# Patient Record
Sex: FEMALE | URBAN - METROPOLITAN AREA
[De-identification: names, ages, dates, MRNs, and addresses within clinical notes are randomized per-mention and may not be internally consistent; named-entity substitution may affect disease eponyms.]

---

## 2017-03-12 ENCOUNTER — INPATIENT (INPATIENT)
Facility: HOSPITAL | Age: 82
LOS: 9 days | Discharge: NURSING, REHAB OR LONG-TERM CARE | End: 2017-03-22
Attending: INTERNAL MEDICINE | Admitting: INTERNAL MEDICINE
Payer: MEDICARE

## 2017-03-12 VITALS
HEART RATE: 103 BPM | DIASTOLIC BLOOD PRESSURE: 118 MMHG | HEIGHT: 62 IN | SYSTOLIC BLOOD PRESSURE: 212 MMHG | WEIGHT: 139.99 LBS | TEMPERATURE: 98 F | OXYGEN SATURATION: 96 % | RESPIRATION RATE: 20 BRPM

## 2017-03-12 LAB
ALBUMIN SERPL ELPH-MCNC: 3 G/DL — LOW (ref 3.3–5)
ALP SERPL-CCNC: 65 U/L — SIGNIFICANT CHANGE UP (ref 40–120)
ALT FLD-CCNC: 16 U/L — SIGNIFICANT CHANGE UP (ref 12–78)
ANION GAP SERPL CALC-SCNC: 11 MMOL/L — SIGNIFICANT CHANGE UP (ref 5–17)
APTT BLD: 35.8 SEC — SIGNIFICANT CHANGE UP (ref 27.5–37.4)
AST SERPL-CCNC: 16 U/L — SIGNIFICANT CHANGE UP (ref 15–37)
BASOPHILS # BLD AUTO: 0.1 K/UL — SIGNIFICANT CHANGE UP (ref 0–0.2)
BASOPHILS NFR BLD AUTO: 1.4 % — SIGNIFICANT CHANGE UP (ref 0–2)
BILIRUB SERPL-MCNC: 0.4 MG/DL — SIGNIFICANT CHANGE UP (ref 0.2–1.2)
BUN SERPL-MCNC: 29 MG/DL — HIGH (ref 7–23)
CALCIUM SERPL-MCNC: 8.6 MG/DL — SIGNIFICANT CHANGE UP (ref 8.5–10.1)
CHLORIDE SERPL-SCNC: 103 MMOL/L — SIGNIFICANT CHANGE UP (ref 96–108)
CK MB BLD-MCNC: <1.4 % — SIGNIFICANT CHANGE UP (ref 0–3.5)
CK MB CFR SERPL CALC: <0.5 NG/ML — SIGNIFICANT CHANGE UP (ref 0.5–3.6)
CK SERPL-CCNC: 35 U/L — SIGNIFICANT CHANGE UP (ref 26–192)
CO2 SERPL-SCNC: 25 MMOL/L — SIGNIFICANT CHANGE UP (ref 22–31)
CREAT SERPL-MCNC: 0.81 MG/DL — SIGNIFICANT CHANGE UP (ref 0.5–1.3)
EOSINOPHIL # BLD AUTO: 0.2 K/UL — SIGNIFICANT CHANGE UP (ref 0–0.5)
EOSINOPHIL NFR BLD AUTO: 2.3 % — SIGNIFICANT CHANGE UP (ref 0–6)
GLUCOSE SERPL-MCNC: 106 MG/DL — HIGH (ref 70–99)
HCT VFR BLD CALC: 35.3 % — SIGNIFICANT CHANGE UP (ref 34.5–45)
HGB BLD-MCNC: 11.9 G/DL — SIGNIFICANT CHANGE UP (ref 11.5–15.5)
INR BLD: 1.22 RATIO — HIGH (ref 0.88–1.16)
LYMPHOCYTES # BLD AUTO: 1.9 K/UL — SIGNIFICANT CHANGE UP (ref 1–3.3)
LYMPHOCYTES # BLD AUTO: 24.1 % — SIGNIFICANT CHANGE UP (ref 13–44)
MCHC RBC-ENTMCNC: 29.9 PG — SIGNIFICANT CHANGE UP (ref 27–34)
MCHC RBC-ENTMCNC: 33.8 GM/DL — SIGNIFICANT CHANGE UP (ref 32–36)
MCV RBC AUTO: 88.4 FL — SIGNIFICANT CHANGE UP (ref 80–100)
MONOCYTES # BLD AUTO: 0.5 K/UL — SIGNIFICANT CHANGE UP (ref 0–0.9)
MONOCYTES NFR BLD AUTO: 6.8 % — SIGNIFICANT CHANGE UP (ref 2–14)
NEUTROPHILS # BLD AUTO: 5.1 K/UL — SIGNIFICANT CHANGE UP (ref 1.8–7.4)
NEUTROPHILS NFR BLD AUTO: 65.5 % — SIGNIFICANT CHANGE UP (ref 43–77)
PLATELET # BLD AUTO: 230 K/UL — SIGNIFICANT CHANGE UP (ref 150–400)
POTASSIUM SERPL-MCNC: 3.7 MMOL/L — SIGNIFICANT CHANGE UP (ref 3.5–5.3)
POTASSIUM SERPL-SCNC: 3.7 MMOL/L — SIGNIFICANT CHANGE UP (ref 3.5–5.3)
PROT SERPL-MCNC: 10 GM/DL — HIGH (ref 6–8.3)
PROTHROM AB SERPL-ACNC: 13.7 SEC — HIGH (ref 10–13.1)
RBC # BLD: 3.99 M/UL — SIGNIFICANT CHANGE UP (ref 3.8–5.2)
RBC # FLD: 13 % — SIGNIFICANT CHANGE UP (ref 11–15)
SODIUM SERPL-SCNC: 139 MMOL/L — SIGNIFICANT CHANGE UP (ref 135–145)
TROPONIN I SERPL-MCNC: <.015 NG/ML — SIGNIFICANT CHANGE UP (ref 0.01–0.04)
WBC # BLD: 7.9 K/UL — SIGNIFICANT CHANGE UP (ref 3.8–10.5)
WBC # FLD AUTO: 7.9 K/UL — SIGNIFICANT CHANGE UP (ref 3.8–10.5)

## 2017-03-12 PROCEDURE — 99285 EMERGENCY DEPT VISIT HI MDM: CPT

## 2017-03-12 PROCEDURE — 73502 X-RAY EXAM HIP UNI 2-3 VIEWS: CPT | Mod: 26,RT

## 2017-03-12 PROCEDURE — 71010: CPT | Mod: 26

## 2017-03-12 PROCEDURE — 70450 CT HEAD/BRAIN W/O DYE: CPT | Mod: 26

## 2017-03-12 RX ORDER — MORPHINE SULFATE 50 MG/1
4 CAPSULE, EXTENDED RELEASE ORAL ONCE
Qty: 0 | Refills: 0 | Status: DISCONTINUED | OUTPATIENT
Start: 2017-03-12 | End: 2017-03-12

## 2017-03-12 RX ORDER — SODIUM CHLORIDE 9 MG/ML
3 INJECTION INTRAMUSCULAR; INTRAVENOUS; SUBCUTANEOUS ONCE
Qty: 0 | Refills: 0 | Status: COMPLETED | OUTPATIENT
Start: 2017-03-12 | End: 2017-03-12

## 2017-03-12 RX ORDER — ONDANSETRON 8 MG/1
4 TABLET, FILM COATED ORAL ONCE
Qty: 0 | Refills: 0 | Status: COMPLETED | OUTPATIENT
Start: 2017-03-12 | End: 2017-03-12

## 2017-03-12 RX ADMIN — MORPHINE SULFATE 4 MILLIGRAM(S): 50 CAPSULE, EXTENDED RELEASE ORAL at 22:00

## 2017-03-12 RX ADMIN — ONDANSETRON 4 MILLIGRAM(S): 8 TABLET, FILM COATED ORAL at 22:00

## 2017-03-12 RX ADMIN — SODIUM CHLORIDE 3 MILLILITER(S): 9 INJECTION INTRAMUSCULAR; INTRAVENOUS; SUBCUTANEOUS at 22:00

## 2017-03-12 NOTE — ED PROVIDER NOTE - OBJECTIVE STATEMENT
87yo female with pmh parkinsons presents with hip pain s/p trip and fall per son. Pt with externall rotated lengthened left leg. Per family, no h/o htn. Pt AOX3, denies head injury, per family (translators).    No fever/chills. No photophobia/eye pain/changes in vision, No ear pain/sore throat/dysphagia, No chest pain/palpitations. No SOB/cough/stridor. No abdominal pain, N/V/D, no black/bloody bm. No dysuria/frequency/discharge, No headache. No Dizziness.  No rash.  No numbness/tingling/weakness. 87yo female with pmh parkinsons presents with hip pain s/p trip and fall per son. Pt with externall rotated lengthened left leg. Per family, no h/o htn. Pt AOX3, denies head injury, per family (translators). family: [phone], [phone]    No fever/chills. No photophobia/eye pain/changes in vision, No ear pain/sore throat/dysphagia, No chest pain/palpitations. No SOB/cough/stridor. No abdominal pain, N/V/D, no black/bloody bm. No dysuria/frequency/discharge, No headache. No Dizziness.  No rash.  No numbness/tingling/weakness.

## 2017-03-12 NOTE — ED PROVIDER NOTE - PHYSICAL EXAMINATION
Gen: Alert, Well appearing. NAD, + b/l upper tremors  Head: NC, AT, PERRL, EOMI, normal lids/conjunctiva   ENT: Bilateral TM WNL, normal hearing, patent oropharynx without erythema/exudate, uvula midline  Neck: supple, no tenderness/meningismus/JVD   Pulm: Bilateral clear BS, normal resp effort, no wheeze/stridor/retractions  CV: RRR, no M/R/G, +dist pulses   Abd: soft, NT/ND, +BS, no guarding/rebound tenderness  Mskel: + rt leg externally rotated and lengthened. DP/PT pulses intact. able to move toes and ankle, but movement and hip/knee limited by pain.   Skin: no rash   Neuro: AAOx3, no sensory/motor deficits, CN 2-12 intact

## 2017-03-12 NOTE — ED ADULT NURSE NOTE - OBJECTIVE STATEMENT
Patient received lying on stretcher w/ family at bedside, p/w c/o to R hip s/p T/F, denies LOC, denies AC use, noted w/ tenderness to R hip, ROM limited, pedal pulse palpable, sensation intact and equal B/L, no shortening or external rotation noted, +tremors to B/L hands, patient appears uncomfortable.

## 2017-03-13 DIAGNOSIS — G20 PARKINSON'S DISEASE: ICD-10-CM

## 2017-03-13 DIAGNOSIS — S72.001A FRACTURE OF UNSPECIFIED PART OF NECK OF RIGHT FEMUR, INITIAL ENCOUNTER FOR CLOSED FRACTURE: ICD-10-CM

## 2017-03-13 DIAGNOSIS — R03.0 ELEVATED BLOOD-PRESSURE READING, WITHOUT DIAGNOSIS OF HYPERTENSION: ICD-10-CM

## 2017-03-13 LAB
ANION GAP SERPL CALC-SCNC: 10 MMOL/L — SIGNIFICANT CHANGE UP (ref 5–17)
ANION GAP SERPL CALC-SCNC: 12 MMOL/L — SIGNIFICANT CHANGE UP (ref 5–17)
APPEARANCE UR: CLEAR — SIGNIFICANT CHANGE UP
APTT BLD: 36.9 SEC — SIGNIFICANT CHANGE UP (ref 27.5–37.4)
BILIRUB UR-MCNC: NEGATIVE — SIGNIFICANT CHANGE UP
BUN SERPL-MCNC: 24 MG/DL — HIGH (ref 7–23)
BUN SERPL-MCNC: 28 MG/DL — HIGH (ref 7–23)
CALCIUM SERPL-MCNC: 8.7 MG/DL — SIGNIFICANT CHANGE UP (ref 8.5–10.1)
CALCIUM SERPL-MCNC: 8.7 MG/DL — SIGNIFICANT CHANGE UP (ref 8.5–10.1)
CHLORIDE SERPL-SCNC: 104 MMOL/L — SIGNIFICANT CHANGE UP (ref 96–108)
CHLORIDE SERPL-SCNC: 105 MMOL/L — SIGNIFICANT CHANGE UP (ref 96–108)
CO2 SERPL-SCNC: 25 MMOL/L — SIGNIFICANT CHANGE UP (ref 22–31)
CO2 SERPL-SCNC: 25 MMOL/L — SIGNIFICANT CHANGE UP (ref 22–31)
COLOR SPEC: YELLOW — SIGNIFICANT CHANGE UP
CREAT SERPL-MCNC: 0.8 MG/DL — SIGNIFICANT CHANGE UP (ref 0.5–1.3)
CREAT SERPL-MCNC: 0.91 MG/DL — SIGNIFICANT CHANGE UP (ref 0.5–1.3)
DIFF PNL FLD: ABNORMAL
GLUCOSE SERPL-MCNC: 110 MG/DL — HIGH (ref 70–99)
GLUCOSE SERPL-MCNC: 123 MG/DL — HIGH (ref 70–99)
GLUCOSE UR QL: NEGATIVE MG/DL — SIGNIFICANT CHANGE UP
HCT VFR BLD CALC: 32.2 % — LOW (ref 34.5–45)
HCT VFR BLD CALC: 32.8 % — LOW (ref 34.5–45)
HGB BLD-MCNC: 10.9 G/DL — LOW (ref 11.5–15.5)
HGB BLD-MCNC: 10.9 G/DL — LOW (ref 11.5–15.5)
INR BLD: 1.3 RATIO — HIGH (ref 0.88–1.16)
KETONES UR-MCNC: NEGATIVE — SIGNIFICANT CHANGE UP
LEUKOCYTE ESTERASE UR-ACNC: ABNORMAL
MCHC RBC-ENTMCNC: 29.6 PG — SIGNIFICANT CHANGE UP (ref 27–34)
MCHC RBC-ENTMCNC: 29.9 PG — SIGNIFICANT CHANGE UP (ref 27–34)
MCHC RBC-ENTMCNC: 33.3 GM/DL — SIGNIFICANT CHANGE UP (ref 32–36)
MCHC RBC-ENTMCNC: 33.7 GM/DL — SIGNIFICANT CHANGE UP (ref 32–36)
MCV RBC AUTO: 88.7 FL — SIGNIFICANT CHANGE UP (ref 80–100)
MCV RBC AUTO: 88.9 FL — SIGNIFICANT CHANGE UP (ref 80–100)
NITRITE UR-MCNC: POSITIVE
PH UR: 7 — SIGNIFICANT CHANGE UP (ref 4.8–8)
PLATELET # BLD AUTO: 220 K/UL — SIGNIFICANT CHANGE UP (ref 150–400)
PLATELET # BLD AUTO: 222 K/UL — SIGNIFICANT CHANGE UP (ref 150–400)
POTASSIUM SERPL-MCNC: 3.9 MMOL/L — SIGNIFICANT CHANGE UP (ref 3.5–5.3)
POTASSIUM SERPL-MCNC: 4.2 MMOL/L — SIGNIFICANT CHANGE UP (ref 3.5–5.3)
POTASSIUM SERPL-SCNC: 3.9 MMOL/L — SIGNIFICANT CHANGE UP (ref 3.5–5.3)
POTASSIUM SERPL-SCNC: 4.2 MMOL/L — SIGNIFICANT CHANGE UP (ref 3.5–5.3)
PROT UR-MCNC: 30 MG/DL
PROTHROM AB SERPL-ACNC: 14.6 SEC — HIGH (ref 10–13.1)
RBC # BLD: 3.63 M/UL — LOW (ref 3.8–5.2)
RBC # BLD: 3.69 M/UL — LOW (ref 3.8–5.2)
RBC # FLD: 12.7 % — SIGNIFICANT CHANGE UP (ref 11–15)
RBC # FLD: 13 % — SIGNIFICANT CHANGE UP (ref 11–15)
SODIUM SERPL-SCNC: 139 MMOL/L — SIGNIFICANT CHANGE UP (ref 135–145)
SODIUM SERPL-SCNC: 142 MMOL/L — SIGNIFICANT CHANGE UP (ref 135–145)
SP GR SPEC: 1 — LOW (ref 1.01–1.02)
TROPONIN I SERPL-MCNC: <.015 NG/ML — SIGNIFICANT CHANGE UP (ref 0.01–0.04)
UROBILINOGEN FLD QL: NEGATIVE MG/DL — SIGNIFICANT CHANGE UP
WBC # BLD: 11.5 K/UL — HIGH (ref 3.8–10.5)
WBC # BLD: 9.9 K/UL — SIGNIFICANT CHANGE UP (ref 3.8–10.5)
WBC # FLD AUTO: 11.5 K/UL — HIGH (ref 3.8–10.5)
WBC # FLD AUTO: 9.9 K/UL — SIGNIFICANT CHANGE UP (ref 3.8–10.5)

## 2017-03-13 PROCEDURE — 99223 1ST HOSP IP/OBS HIGH 75: CPT

## 2017-03-13 PROCEDURE — 73552 X-RAY EXAM OF FEMUR 2/>: CPT | Mod: 26,RT

## 2017-03-13 RX ORDER — BENZOCAINE AND MENTHOL 5; 1 G/100ML; G/100ML
1 LIQUID ORAL
Qty: 0 | Refills: 0 | Status: DISCONTINUED | OUTPATIENT
Start: 2017-03-13 | End: 2017-03-22

## 2017-03-13 RX ORDER — FOLIC ACID 0.8 MG
1 TABLET ORAL DAILY
Qty: 0 | Refills: 0 | Status: DISCONTINUED | OUTPATIENT
Start: 2017-03-13 | End: 2017-03-22

## 2017-03-13 RX ORDER — OXYCODONE HYDROCHLORIDE 5 MG/1
10 TABLET ORAL EVERY 4 HOURS
Qty: 0 | Refills: 0 | Status: DISCONTINUED | OUTPATIENT
Start: 2017-03-13 | End: 2017-03-15

## 2017-03-13 RX ORDER — MORPHINE SULFATE 50 MG/1
4 CAPSULE, EXTENDED RELEASE ORAL ONCE
Qty: 0 | Refills: 0 | Status: DISCONTINUED | OUTPATIENT
Start: 2017-03-13 | End: 2017-03-13

## 2017-03-13 RX ORDER — ENOXAPARIN SODIUM 100 MG/ML
40 INJECTION SUBCUTANEOUS EVERY 24 HOURS
Qty: 0 | Refills: 0 | Status: DISCONTINUED | OUTPATIENT
Start: 2017-03-14 | End: 2017-03-22

## 2017-03-13 RX ORDER — OXYCODONE HYDROCHLORIDE 5 MG/1
5 TABLET ORAL EVERY 4 HOURS
Qty: 0 | Refills: 0 | Status: DISCONTINUED | OUTPATIENT
Start: 2017-03-13 | End: 2017-03-13

## 2017-03-13 RX ORDER — FENTANYL CITRATE 50 UG/ML
25 INJECTION INTRAVENOUS
Qty: 0 | Refills: 0 | Status: DISCONTINUED | OUTPATIENT
Start: 2017-03-13 | End: 2017-03-13

## 2017-03-13 RX ORDER — HYDROMORPHONE HYDROCHLORIDE 2 MG/ML
0.5 INJECTION INTRAMUSCULAR; INTRAVENOUS; SUBCUTANEOUS
Qty: 0 | Refills: 0 | Status: DISCONTINUED | OUTPATIENT
Start: 2017-03-13 | End: 2017-03-13

## 2017-03-13 RX ORDER — DIPHENHYDRAMINE HCL 50 MG
25 CAPSULE ORAL EVERY 4 HOURS
Qty: 0 | Refills: 0 | Status: DISCONTINUED | OUTPATIENT
Start: 2017-03-13 | End: 2017-03-13

## 2017-03-13 RX ORDER — DOCUSATE SODIUM 100 MG
100 CAPSULE ORAL THREE TIMES A DAY
Qty: 0 | Refills: 0 | Status: DISCONTINUED | OUTPATIENT
Start: 2017-03-13 | End: 2017-03-20

## 2017-03-13 RX ORDER — CARBIDOPA AND LEVODOPA 25; 100 MG/1; MG/1
1 TABLET ORAL DAILY
Qty: 0 | Refills: 0 | Status: DISCONTINUED | OUTPATIENT
Start: 2017-03-13 | End: 2017-03-13

## 2017-03-13 RX ORDER — CARBIDOPA AND LEVODOPA 25; 100 MG/1; MG/1
1 TABLET ORAL
Qty: 0 | Refills: 0 | Status: DISCONTINUED | OUTPATIENT
Start: 2017-03-13 | End: 2017-03-22

## 2017-03-13 RX ORDER — ASCORBIC ACID 60 MG
500 TABLET,CHEWABLE ORAL
Qty: 0 | Refills: 0 | Status: DISCONTINUED | OUTPATIENT
Start: 2017-03-13 | End: 2017-03-22

## 2017-03-13 RX ORDER — OXYCODONE HYDROCHLORIDE 5 MG/1
10 TABLET ORAL EVERY 4 HOURS
Qty: 0 | Refills: 0 | Status: DISCONTINUED | OUTPATIENT
Start: 2017-03-13 | End: 2017-03-13

## 2017-03-13 RX ORDER — FENTANYL CITRATE 50 UG/ML
50 INJECTION INTRAVENOUS
Qty: 0 | Refills: 0 | Status: DISCONTINUED | OUTPATIENT
Start: 2017-03-13 | End: 2017-03-13

## 2017-03-13 RX ORDER — FERROUS SULFATE 325(65) MG
325 TABLET ORAL
Qty: 0 | Refills: 0 | Status: DISCONTINUED | OUTPATIENT
Start: 2017-03-13 | End: 2017-03-22

## 2017-03-13 RX ORDER — METOPROLOL TARTRATE 50 MG
5 TABLET ORAL ONCE
Qty: 0 | Refills: 0 | Status: COMPLETED | OUTPATIENT
Start: 2017-03-13 | End: 2017-03-13

## 2017-03-13 RX ORDER — CARBIDOPA AND LEVODOPA 25; 100 MG/1; MG/1
1 TABLET ORAL
Qty: 0 | Refills: 0 | COMMUNITY

## 2017-03-13 RX ORDER — HYDROMORPHONE HYDROCHLORIDE 2 MG/ML
0.5 INJECTION INTRAMUSCULAR; INTRAVENOUS; SUBCUTANEOUS EVERY 4 HOURS
Qty: 0 | Refills: 0 | Status: DISCONTINUED | OUTPATIENT
Start: 2017-03-13 | End: 2017-03-13

## 2017-03-13 RX ORDER — OXYCODONE HYDROCHLORIDE 5 MG/1
5 TABLET ORAL EVERY 4 HOURS
Qty: 0 | Refills: 0 | Status: DISCONTINUED | OUTPATIENT
Start: 2017-03-13 | End: 2017-03-20

## 2017-03-13 RX ORDER — SODIUM CHLORIDE 9 MG/ML
1000 INJECTION, SOLUTION INTRAVENOUS
Qty: 0 | Refills: 0 | Status: DISCONTINUED | OUTPATIENT
Start: 2017-03-13 | End: 2017-03-17

## 2017-03-13 RX ORDER — SODIUM CHLORIDE 9 MG/ML
1000 INJECTION, SOLUTION INTRAVENOUS
Qty: 0 | Refills: 0 | Status: DISCONTINUED | OUTPATIENT
Start: 2017-03-13 | End: 2017-03-13

## 2017-03-13 RX ORDER — CEFAZOLIN SODIUM 1 G
2000 VIAL (EA) INJECTION EVERY 8 HOURS
Qty: 0 | Refills: 0 | Status: COMPLETED | OUTPATIENT
Start: 2017-03-13 | End: 2017-03-14

## 2017-03-13 RX ORDER — ONDANSETRON 8 MG/1
4 TABLET, FILM COATED ORAL EVERY 4 HOURS
Qty: 0 | Refills: 0 | Status: DISCONTINUED | OUTPATIENT
Start: 2017-03-13 | End: 2017-03-22

## 2017-03-13 RX ORDER — HYDROMORPHONE HYDROCHLORIDE 2 MG/ML
0.5 INJECTION INTRAMUSCULAR; INTRAVENOUS; SUBCUTANEOUS EVERY 4 HOURS
Qty: 0 | Refills: 0 | Status: DISCONTINUED | OUTPATIENT
Start: 2017-03-13 | End: 2017-03-15

## 2017-03-13 RX ORDER — ACETAMINOPHEN 500 MG
650 TABLET ORAL EVERY 6 HOURS
Qty: 0 | Refills: 0 | Status: DISCONTINUED | OUTPATIENT
Start: 2017-03-13 | End: 2017-03-22

## 2017-03-13 RX ORDER — DIPHENHYDRAMINE HCL 50 MG
25 CAPSULE ORAL AT BEDTIME
Qty: 0 | Refills: 0 | Status: DISCONTINUED | OUTPATIENT
Start: 2017-03-13 | End: 2017-03-15

## 2017-03-13 RX ORDER — CYCLOBENZAPRINE HYDROCHLORIDE 10 MG/1
5 TABLET, FILM COATED ORAL THREE TIMES A DAY
Qty: 0 | Refills: 0 | Status: DISCONTINUED | OUTPATIENT
Start: 2017-03-13 | End: 2017-03-13

## 2017-03-13 RX ORDER — DIPHENHYDRAMINE HCL 50 MG
25 CAPSULE ORAL EVERY 4 HOURS
Qty: 0 | Refills: 0 | Status: DISCONTINUED | OUTPATIENT
Start: 2017-03-13 | End: 2017-03-15

## 2017-03-13 RX ORDER — BENZOCAINE AND MENTHOL 5; 1 G/100ML; G/100ML
1 LIQUID ORAL
Qty: 0 | Refills: 0 | Status: DISCONTINUED | OUTPATIENT
Start: 2017-03-13 | End: 2017-03-13

## 2017-03-13 RX ORDER — MAGNESIUM HYDROXIDE 400 MG/1
30 TABLET, CHEWABLE ORAL DAILY
Qty: 0 | Refills: 0 | Status: DISCONTINUED | OUTPATIENT
Start: 2017-03-13 | End: 2017-03-22

## 2017-03-13 RX ORDER — METOPROLOL TARTRATE 50 MG
5 TABLET ORAL EVERY 6 HOURS
Qty: 0 | Refills: 0 | Status: DISCONTINUED | OUTPATIENT
Start: 2017-03-13 | End: 2017-03-13

## 2017-03-13 RX ORDER — SODIUM CHLORIDE 9 MG/ML
1000 INJECTION INTRAMUSCULAR; INTRAVENOUS; SUBCUTANEOUS
Qty: 0 | Refills: 0 | Status: DISCONTINUED | OUTPATIENT
Start: 2017-03-13 | End: 2017-03-13

## 2017-03-13 RX ORDER — CARBIDOPA AND LEVODOPA 25; 100 MG/1; MG/1
1 TABLET ORAL
Qty: 0 | Refills: 0 | Status: DISCONTINUED | OUTPATIENT
Start: 2017-03-13 | End: 2017-03-13

## 2017-03-13 RX ADMIN — Medication 100 MILLIGRAM(S): at 23:45

## 2017-03-13 RX ADMIN — Medication 500 MILLIGRAM(S): at 23:45

## 2017-03-13 RX ADMIN — Medication 5 MILLIGRAM(S): at 13:57

## 2017-03-13 RX ADMIN — CARBIDOPA AND LEVODOPA 1 TABLET(S): 25; 100 TABLET ORAL at 23:45

## 2017-03-13 RX ADMIN — SODIUM CHLORIDE 75 MILLILITER(S): 9 INJECTION INTRAMUSCULAR; INTRAVENOUS; SUBCUTANEOUS at 10:36

## 2017-03-13 RX ADMIN — SODIUM CHLORIDE 40 MILLILITER(S): 9 INJECTION, SOLUTION INTRAVENOUS at 18:02

## 2017-03-13 RX ADMIN — MORPHINE SULFATE 4 MILLIGRAM(S): 50 CAPSULE, EXTENDED RELEASE ORAL at 02:50

## 2017-03-13 RX ADMIN — MORPHINE SULFATE 4 MILLIGRAM(S): 50 CAPSULE, EXTENDED RELEASE ORAL at 02:07

## 2017-03-13 NOTE — H&P ADULT. - PROBLEM SELECTOR PLAN 1
Will need clearance  NPO for now, pain control, IVF  Consent obtained in ED by orthopedic surgery resident from [facility] and karolina Flood.

## 2017-03-13 NOTE — BRIEF OPERATIVE NOTE - PRE-OP DX
Closed intertrochanteric fracture of hip, right, initial encounter  03/13/2017    Active  Zana Armstrong

## 2017-03-13 NOTE — H&P ADULT. - HISTORY OF PRESENT ILLNESS
86y Female community ambulatory presents c/o R hip pain and inability to ambulate sp mechanical fall. Denies HS/LOC. Denies numbness/tingling. Denies fever/chills. Denies pain/injury elsewhere. Patient is Uzbek speakin, history obtained via  phone and from son Kyle over the telephone. Patient lives as home with son. He states she is very independent and only takes carbidopa/levodopa for parkinsons disease, denies use of any blood thinners.

## 2017-03-13 NOTE — BRIEF OPERATIVE NOTE - POST-OP DX
Closed intertrochanteric fracture of right hip, initial encounter  03/13/2017    Active  Zana Armstrong

## 2017-03-13 NOTE — CONSULT NOTE ADULT - ASSESSMENT
Elderly Bulgarian female with h/o:  Parkinson disease (G20)  Closed fracture of right hip, initial encounter (S72.001A)  s/p Mechanical fall    No cardiac contrindication to surgery.  BP management as per medicine.  Will follow as needed.

## 2017-03-13 NOTE — H&P ADULT. - NEGATIVE CARDIOVASCULAR SYMPTOMS
no palpitations/no peripheral edema/no dyspnea on exertion/no orthopnea/no claudication/no paroxysmal nocturnal dyspnea/no chest pain

## 2017-03-13 NOTE — PROGRESS NOTE ADULT - SUBJECTIVE AND OBJECTIVE BOX
ortho POC  Patient seen and examined. Pain controlled. difficult exam due to mentation.    HEALTH ISSUES - PROBLEM Dx:  Elevated blood-pressure reading without diagnosis of hypertension: Elevated blood-pressure reading without diagnosis of hypertension  Parkinson disease: Parkinson disease  Closed fracture of right hip, initial encounter: Closed fracture of right hip, initial encounter        MEDICATIONS  (STANDING):  ceFAZolin   IVPB 2000milliGRAM(s) IV Intermittent every 8 hours  lactated ringers. 1000milliLiter(s) IV Continuous <Continuous>    Allergies    No Known Allergies    Intolerances                            10.9   11.5  )-----------( 220      ( 13 Mar 2017 19:02 )             32.2     13 Mar 2017 19:02    139    |  104    |  24     ----------------------------<  123    3.9     |  25     |  0.91     Ca    8.7        13 Mar 2017 19:02    TPro  10.0   /  Alb  3.0    /  TBili  0.4    /  DBili  x      /  AST  16     /  ALT  16     /  AlkPhos  65     12 Mar 2017 22:02    PT/INR - ( 13 Mar 2017 05:58 )   PT: 14.6 sec;   INR: 1.30 ratio         PTT - ( 13 Mar 2017 05:58 )  PTT:36.9 sec  Vital Signs Last 24 Hrs  T(C): 36.3, Max: 36.7 (03-13 @ 08:01)  T(F): 97.4, Max: 98.1 (03-13 @ 08:01)  HR: 87 (71 - 103)  BP: 164/80 (149/73 - 212/118)  BP(mean): --  RR: 16 (12 - 20)  SpO2: 94% (94% - 99%)    Physical Exam  Gen: NAD  RLE:   Dressing c/d/i  motor grossly intact  sensation grossly intact  Dp pulse intact  No calf ttp  Compartments soft

## 2017-03-13 NOTE — ED ADULT NURSE REASSESSMENT NOTE - NS ED NURSE REASSESS COMMENT FT1
Ortho at bedside, confirmed fracture, NPO status maintained, pain managed, pending admission at this time

## 2017-03-13 NOTE — CONSULT NOTE ADULT - ASSESSMENT
A/P: 86y Female with R hip fracture  Pain control  NWB R LE, bedrest  FU labs/imaging  Ca/Vit D  Outpt osteoporosis workup  Admit to medical team  Medical clearance/optimization for OR  Will discuss with attending  Hold all anticoagulation, SCDs only  IVF  NPO except meds  plan for OR today 3/13 for IMN pending medical clearance

## 2017-03-13 NOTE — CHART NOTE - NSCHARTNOTEFT_GEN_A_CORE
hospitalist Follow up note    patient with history of parkinsons admitted with hip fracture   asked by ortho to clear the patient for orif of the hip .  patient with history of parkinsons with no history of coronary artery disease short of breath , congestive heart failure or complain of chest pain or short of breath .   pe   ICU Vital Signs Last 24 Hrs  T(C): 36.2, Max: 36.7 (03-13 @ 08:01)  T(F): 97.2, Max: 98.1 (03-13 @ 08:01)  HR: 82 (82 - 103)  BP: 169/88 (169/88 - 212/118)  BP(mean): --  ABP: --  ABP(mean): --  RR: 16 (16 - 20)  SpO2: 95% (95% - 97%)  patient with obvious upper extremity tremors   lungs- clear  heart s1 s2 with no mrg  abd- soft nontender no organomegaly   ext - no edema    ekg- nsr with no sttw changes  ekg - cardiac sillouette enlarged by ap technique   and only central congestion    assessment      this is a 86 years old female with parkinsons in optimal medical condition  except for her elevated blood pressure, will give lopressor 5 mg ivp and observe. the patient is in optimal medical condition for planned surgery and has  mild- moderate rsik of morbidity or mortality

## 2017-03-14 ENCOUNTER — TRANSCRIPTION ENCOUNTER (OUTPATIENT)
Age: 82
End: 2017-03-14

## 2017-03-14 DIAGNOSIS — I10 ESSENTIAL (PRIMARY) HYPERTENSION: ICD-10-CM

## 2017-03-14 LAB
ANION GAP SERPL CALC-SCNC: 11 MMOL/L — SIGNIFICANT CHANGE UP (ref 5–17)
BUN SERPL-MCNC: 25 MG/DL — HIGH (ref 7–23)
CALCIUM SERPL-MCNC: 8.1 MG/DL — LOW (ref 8.5–10.1)
CHLORIDE SERPL-SCNC: 104 MMOL/L — SIGNIFICANT CHANGE UP (ref 96–108)
CO2 SERPL-SCNC: 24 MMOL/L — SIGNIFICANT CHANGE UP (ref 22–31)
CREAT SERPL-MCNC: 0.88 MG/DL — SIGNIFICANT CHANGE UP (ref 0.5–1.3)
GLUCOSE SERPL-MCNC: 141 MG/DL — HIGH (ref 70–99)
HCT VFR BLD CALC: 27.6 % — LOW (ref 34.5–45)
HGB BLD-MCNC: 9.7 G/DL — LOW (ref 11.5–15.5)
MCHC RBC-ENTMCNC: 30.7 PG — SIGNIFICANT CHANGE UP (ref 27–34)
MCHC RBC-ENTMCNC: 35.1 GM/DL — SIGNIFICANT CHANGE UP (ref 32–36)
MCV RBC AUTO: 87.5 FL — SIGNIFICANT CHANGE UP (ref 80–100)
PLATELET # BLD AUTO: 175 K/UL — SIGNIFICANT CHANGE UP (ref 150–400)
POTASSIUM SERPL-MCNC: 3.7 MMOL/L — SIGNIFICANT CHANGE UP (ref 3.5–5.3)
POTASSIUM SERPL-SCNC: 3.7 MMOL/L — SIGNIFICANT CHANGE UP (ref 3.5–5.3)
RBC # BLD: 3.15 M/UL — LOW (ref 3.8–5.2)
RBC # FLD: 12.9 % — SIGNIFICANT CHANGE UP (ref 11–15)
SODIUM SERPL-SCNC: 139 MMOL/L — SIGNIFICANT CHANGE UP (ref 135–145)
WBC # BLD: 12.2 K/UL — HIGH (ref 3.8–10.5)
WBC # FLD AUTO: 12.2 K/UL — HIGH (ref 3.8–10.5)

## 2017-03-14 RX ORDER — ENOXAPARIN SODIUM 100 MG/ML
40 INJECTION SUBCUTANEOUS
Qty: 0 | Refills: 0 | COMMUNITY
Start: 2017-03-14

## 2017-03-14 RX ADMIN — Medication 1 MILLIGRAM(S): at 12:02

## 2017-03-14 RX ADMIN — Medication 100 MILLIGRAM(S): at 08:58

## 2017-03-14 RX ADMIN — CARBIDOPA AND LEVODOPA 1 TABLET(S): 25; 100 TABLET ORAL at 17:31

## 2017-03-14 RX ADMIN — ENOXAPARIN SODIUM 40 MILLIGRAM(S): 100 INJECTION SUBCUTANEOUS at 06:06

## 2017-03-14 RX ADMIN — Medication 500 MILLIGRAM(S): at 05:56

## 2017-03-14 RX ADMIN — Medication 325 MILLIGRAM(S): at 17:33

## 2017-03-14 RX ADMIN — Medication 100 MILLIGRAM(S): at 00:10

## 2017-03-14 RX ADMIN — OXYCODONE HYDROCHLORIDE 5 MILLIGRAM(S): 5 TABLET ORAL at 15:40

## 2017-03-14 RX ADMIN — CARBIDOPA AND LEVODOPA 1 TABLET(S): 25; 100 TABLET ORAL at 05:56

## 2017-03-14 RX ADMIN — SODIUM CHLORIDE 75 MILLILITER(S): 9 INJECTION, SOLUTION INTRAVENOUS at 05:49

## 2017-03-14 RX ADMIN — Medication 500 MILLIGRAM(S): at 17:31

## 2017-03-14 RX ADMIN — Medication 1 TABLET(S): at 12:02

## 2017-03-14 RX ADMIN — Medication 100 MILLIGRAM(S): at 05:55

## 2017-03-14 RX ADMIN — Medication 100 MILLIGRAM(S): at 22:31

## 2017-03-14 RX ADMIN — Medication 325 MILLIGRAM(S): at 12:03

## 2017-03-14 RX ADMIN — Medication 100 MILLIGRAM(S): at 14:46

## 2017-03-14 RX ADMIN — OXYCODONE HYDROCHLORIDE 5 MILLIGRAM(S): 5 TABLET ORAL at 14:46

## 2017-03-14 NOTE — PROGRESS NOTE ADULT - SUBJECTIVE AND OBJECTIVE BOX
Pt S/E at bedside, no acute events overnight, pain controlled    Vital Signs Last 24 Hrs  T(C): 37.3, Max: 37.3 (03-14 @ 06:16)  T(F): 99.2, Max: 99.2 (03-14 @ 06:16)  HR: 110 (71 - 110)  BP: 146/71 (143/68 - 198/87)  BP(mean): --  RR: 18 (12 - 18)  SpO2: 96% (91% - 99%)    Gen: NAD, Resting comfortably    RLE:  Dressing clean dry intact  +EHL/FHL/TA/GS  SILT L3-S1  +DP/PT Pulses  Compartments soft  No calf TTP B/L

## 2017-03-14 NOTE — PHYSICAL THERAPY INITIAL EVALUATION ADULT - ADDITIONAL COMMENTS
Unable to obtain from pt due to confusion.  SonIglesia reported that pt was I in ambulation w/o assist device and pt was able to negotiate 4-5 steps c one rail on the stairs under supervision. Pt has an aide 8 to 6, 7 days a week to assist in ADL.

## 2017-03-14 NOTE — DISCHARGE NOTE ADULT - PATIENT PORTAL LINK FT
“You can access the Follow[employer] Patient Portal, offered by [facility], by registering with the following website: [email or web address]”

## 2017-03-14 NOTE — DISCHARGE NOTE ADULT - CARE PROVIDER_API CALL
Marcel Xiong (DO), Orthopaedic Surgery  [address] [place] [address]  Phone: [phone]  Fax: [phone] Marcel Xiong (DO), Orthopaedic Surgery  [address] [place] [address]  Phone: [phone]  Fax: [phone]    Elgin White), [facility]  [address] [place] [address]  Phone: [phone]  Fax: [phone]

## 2017-03-14 NOTE — PHYSICAL THERAPY INITIAL EVALUATION ADULT - GENERAL OBSERVATIONS, REHAB EVAL
Pt was seen in supine c IV and  Venodyne pumps donned, sleepy but was able to be aroused. Flashpoint phone was used for translation. Pt was oriented to name only. Pt needed encouragement to participate in therapy. Pt was able to follow 50% of simple command. Resting tremor was noted in LUE.

## 2017-03-14 NOTE — DISCHARGE NOTE ADULT - MEDICATION SUMMARY - MEDICATIONS TO TAKE
I will START or STAY ON the medications listed below when I get home from the hospital:    acetaminophen 325 mg oral tablet  -- 2 tab(s) by mouth every 6 hours, As needed, for  pain  -- Indication: For HIP FRACTURE    oxyCODONE 5 mg oral capsule  -- 1 cap(s) by mouth every 6 hours, As Needed  for  severe m pain  -- Indication: For HIP FRACTURE    aluminum hydroxide-magnesium hydroxide 200 mg-200 mg/5 mL oral suspension  -- 30 milliliter(s) by mouth 4 times a day, As needed, Indigestion  -- Indication: For dyspepsia    enoxaparin  -- 40 milligram(s) subcutaneous every 24 hours for 30 days total for dvt ppx. do not skip doses.  -- Indication: For dvt prophylaxis     carbidopa-levodopa 25 mg-100 mg oral tablet  -- 1 cap(s) by mouth 2 times a day  -- Indication: For Parkinson disease    albuterol-ipratropium 2.5 mg-0.5 mg/3 mL inhalation solution  -- 3 milliliter(s) inhaled every 6 hours  -- Indication: For Pneumonia    betamethasone-clotrimazole 0.05%-1% topical cream  -- 1 application on skin 2 times a day  -- Indication: For dermatitis    petrolatum topical ointment  -- 1 application on skin every 6 hours  -- Indication: For Lip  sore    nystatin 100,000 units/g topical powder  -- 1 application on skin 2 times a day  -- Indication: For Stomatits    bisacodyl 10 mg rectal suppository  -- 1 suppository(ies) rectally once a day, As needed, If no bowel movement  -- Indication: For Constipation    aluminum hydroxide/diphenhydrAMINE/lidocaine/MgOH/simethicone mucous membrane suspension  -- 1 applicatorful mucous membrane 3 times a day  -- Indication: For dyspepsia    Cepacol Sore Throat Cherry 15 mg-3.6 mg mucous membrane lozenge  -- 1 lozenge mucous membrane every 6 hours, As Needed  -- Indication: For Lip sore    Levaquin 500 mg oral tablet  -- 1 tab(s) by mouth every 24 hours for 7 days  -- Indication: For Pneumonia    Multiple Vitamins oral tablet  -- 1 tab(s) by mouth once a day  -- Indication: For Supplemet I will START or STAY ON the medications listed below when I get home from the hospital:    acetaminophen 325 mg oral tablet  -- 2 tab(s) by mouth every 6 hours, As needed, for  pain  -- Indication: For Pain    oxyCODONE 5 mg oral capsule  -- 1 cap(s) by mouth every 6 hours, As Needed  for  severe m pain  -- Indication: For Pain    aluminum hydroxide-magnesium hydroxide 200 mg-200 mg/5 mL oral suspension  -- 30 milliliter(s) by mouth 4 times a day, As needed, Indigestion  -- Indication: For Indigestion    enoxaparin  -- 40 milligram(s) subcutaneous every 24 hours for 30 days total for dvt ppx. do not skip doses.  -- Indication: For DVT prophylaxis    cholestyramine 4 g/5 g oral powder for reconstitution  -- 1 scoop(s) by mouth once a day for  one week  -- Indication: For HLD    carbidopa-levodopa 25 mg-100 mg oral tablet  -- 1 cap(s) by mouth 2 times a day  -- Indication: For Parkinson disease    albuterol-ipratropium 2.5 mg-0.5 mg/3 mL inhalation solution  -- 3 milliliter(s) inhaled every 6 hours  -- Indication: For Aspiration pneumonia of right lower lobe, unspecified aspiration pneumonia type    Merrem 1000 mg intravenous injection  -- 1000 milligram(s) intravenous every 8 hoursm for  2 more  days  -- Indication: For Aspiration pneumonia of right lower lobe, unspecified aspiration pneumonia type    petrolatum topical ointment  -- 1 application on skin every 6 hours  -- Indication: For Lip abrasion    nystatin 100,000 units/g topical powder  -- 1 application on skin 2 times a day  -- Indication: For Dermatitis    betamethasone-clotrimazole 0.05%-1% topical cream  -- 1 application on skin 2 times a day  -- Indication: For Dematitis    Cepacol Sore Throat Cherry 15 mg-3.6 mg mucous membrane lozenge  -- 1 lozenge mucous membrane every 6 hours, As Needed  -- Indication: For Cough    aluminum hydroxide/diphenhydrAMINE/lidocaine/MgOH/simethicone mucous membrane suspension  -- 1 applicatorful mucous membrane 3 times a day  -- Indication: For Indigestion    Levaquin 500 mg oral tablet  -- 1 tab(s) by mouth every 24 hours for 7 days  -- Indication: For Aspiration pneumonia of right lower lobe, unspecified aspiration pneumonia type    Multiple Vitamins oral tablet  -- 1 tab(s) by mouth once a day  -- Indication: For Supplement    folic acid 1 mg oral tablet  -- 1 tab(s) by mouth once a day  -- Indication: For Supplement

## 2017-03-14 NOTE — DISCHARGE NOTE ADULT - PLAN OF CARE
return to baseline ADLs 1.	Pain Control  2.           WBAT Right lower extremity. Rolling walker/Assistive devices for ambulation as needed.  3.           Ice and elevation to extremity  4.           Continue DVT ppx Lovenox 40  mg subq every 24hrs for a total  of 30 days. do not skip doses.  5.           Keep Aquacel bandage on hip. Only change if saturated or leaking/soiled to dry sterile gauze and paper tape PRN. Remove Sutures/Staples Post Op Day #14. OK to Shower with Aquacel bandage.  Avoid direct water beating on bandage. Continue ICE packs to hip.  6.           Call office if there is any Fevers over 101 Deg F, discharge from wound, increased numbness/tingling.  7.           Follow up with Dr. Xiong in 10-14 days. Call office for appointment.

## 2017-03-14 NOTE — DISCHARGE NOTE ADULT - HOSPITAL COURSE
patient  had IMN placement for  R hip  fracture subsequent  hospital  course  +  for  uti  pneumonia  treated  with  meropenem vanco.  also had  c/o  of  rt  lower  lip pain  with  excoriation  of  mucosal  surface  treated  with  oral hygene local  antiseptics removal  of  dentures  and  diet  consistency  modifications. AB  changed  to  oral  patient  discharged  to  rehab patient  had IMN placement for  R hip  fracture subsequent  hospital  course  +  for  uti  pneumonia  treated  with  meropenem vanco.  also had  c/o  of  rt  lower  lip pain  with  excoriation  of  mucosal  surface  treated  with  oral hygene local  antiseptics removal  of  dentures  and  diet  consistency  modifications. AB  changed  to  oral  patient  discharged  to  rehab patient  had  episode  of  fever  tachycardia  now  with  diarrhea  but  feeling  much  better  discussed  with patient  pt's  son discharge  to  [facility]  to  complete AB  and  PT

## 2017-03-14 NOTE — DISCHARGE NOTE ADULT - CARE PROVIDERS DIRECT ADDRESSES
,DirectAddress_Unknown,DirectAddress_Unknown ,DirectAddress_Unknown,[email or web address]

## 2017-03-14 NOTE — PROGRESS NOTE ADULT - SUBJECTIVE AND OBJECTIVE BOX
CHIEF COMPLAINT/INTERVAL HISTORY:    Patient is a 86y old  Female who presents with a chief complaint of Right IT fracture (14 Mar 2017 07:00)      HPI:  86y Female community ambulatory presents c/o R hip pain and inability to ambulate sp mechanical fall. Denies HS/LOC. Denies numbness/tingling. Denies fever/chills. Denies pain/injury elsewhere. Patient is Greek speakin, history obtained via  phone and from son Kyle over the telephone. Patient lives as home with son. He states she is very independent and only takes carbidopa/levodopa for parkinsons disease, denies use of any blood thinners. (13 Mar 2017 02:25)    Overnight issues pod #1- status post orif oflef thip fracture  complain of local pain   SUBJECTIVE & OBJECTIVE: Pt seen and examined at bedside.   ROS:  CONSTITUTIONAL: No fever, weight loss, or fatigue  NECK: No pain or stiffness  RESPIRATORY: No cough, wheezing, chills or hemoptysis; No shortness of breath  CARDIOVASCULAR: No chest pain, palpitations, dizziness, or leg swelling  GASTROINTESTINAL: No abdominal or epigastric pain. No nausea, vomiting, or hematemesis; No diarrhea or constipation. No melena or hematochezia.  GENITOURINARY: No dysuria, frequency, hematuria, or incontinence  NEUROLOGICAL: No headaches, memory loss, loss of strength, numbness, or tremors  SKIN: No itching, burning, rashes, or lesions   ICU Vital Signs Last 24 Hrs  T(C): 37, Max: 37.3 (03-14 @ 06:16)  T(F): 98.6, Max: 99.2 (03-14 @ 06:16)  HR: 96 (71 - 110)  BP: 143/72 (143/68 - 198/87)  BP(mean): --  ABP: --  ABP(mean): --  RR: 17 (12 - 18)  SpO2: 94% (91% - 99%)        MEDICATIONS  (STANDING):  enoxaparin Injectable 40milliGRAM(s) SubCutaneous every 24 hours  lactated ringers. 1000milliLiter(s) IV Continuous <Continuous>  docusate sodium 100milliGRAM(s) Oral three times a day  ferrous    sulfate 325milliGRAM(s) Oral three times a day with meals  folic acid 1milliGRAM(s) Oral daily  multivitamin 1Tablet(s) Oral daily  ascorbic acid 500milliGRAM(s) Oral two times a day  carbidopa/levodopa  25/100 1Tablet(s) Oral two times a day    MEDICATIONS  (PRN):  acetaminophen   Tablet 650milliGRAM(s) Oral every 6 hours PRN For Temp greater than 38 C (100.4 F)  acetaminophen   Tablet. 650milliGRAM(s) Oral every 6 hours PRN headache  oxyCODONE IR 10milliGRAM(s) Oral every 4 hours PRN Moderate Pain  oxyCODONE IR 5milliGRAM(s) Oral every 4 hours PRN Mild Pain  aluminum hydroxide/magnesium hydroxide/simethicone Suspension 30milliLiter(s) Oral four times a day PRN Indigestion  ondansetron Injectable 4milliGRAM(s) IV Push every 4 hours PRN Nausea and/or Vomiting  magnesium hydroxide Suspension 30milliLiter(s) Oral daily PRN Constipation  diphenhydrAMINE   Capsule 25milliGRAM(s) Oral at bedtime PRN Insomnia  benzocaine 15 mG/menthol 3.6 mG Lozenge 1Lozenge Oral every 3 hours PRN Sore Throat  diphenhydrAMINE   Capsule 25milliGRAM(s) Oral every 4 hours PRN Rash and/or Itching  HYDROmorphone  Injectable 0.5milliGRAM(s) IV Push every 4 hours PRN Severe Pain (7 - 10)        PHYSICAL EXAM:    GENERAL: NAD, well-groomed, well-developed  HEAD:  Atraumatic, Normocephalic  EYES: EOMI, PERRLA, conjunctiva and sclera clear  ENMT: Moist mucous membranes  NECK: Supple, No JVD  NERVOUS SYSTEM:  Alert & Oriented X3, Motor Strength 5/5 B/L upper and lower extremities; DTRs 2+ intact and symmetric  CHEST/LUNG: Clear to auscultation bilaterally; No rales, rhonchi, wheezing, or rubs  HEART: Regular rate and rhythm; No murmurs, rubs, or gallops  ABDOMEN: Soft, Nontender, Nondistended; Bowel sounds present  EXTREMITIES:  2+ Peripheral Pulses, No clubbing, cyanosis, or edema    LABS:                        9.7    12.2  )-----------( 175      ( 14 Mar 2017 07:29 )             27.6     14 Mar 2017 07:29    139    |  104    |  25     ----------------------------<  141    3.7     |  24     |  0.88     Ca    8.1        14 Mar 2017 07:29    TPro  10.0   /  Alb  3.0    /  TBili  0.4    /  DBili  x      /  AST  16     /  ALT  16     /  AlkPhos  65     12 Mar 2017 22:02    PT/INR - ( 13 Mar 2017 05:58 )   PT: 14.6 sec;   INR: 1.30 ratio         PTT - ( 13 Mar 2017 05:58 )  PTT:36.9 sec  Urinalysis Basic - ( 13 Mar 2017 03:06 )    Color: Yellow / Appearance: Clear / S.005 / pH: x  Gluc: x / Ketone: Negative  / Bili: Negative / Urobili: Negative mg/dL   Blood: x / Protein: 30 mg/dL / Nitrite: Positive   Leuk Esterase: Trace / RBC: 3-5 /HPF / WBC 0-2   Sq Epi: x / Non Sq Epi: Occasional / Bacteria: Many        CAPILLARY BLOOD GLUCOSE      RECENT CULTURES:      RADIOLOGY & ADDITIONAL TESTS:  Imaging Personally Reviewed:  [ ] YES      Consultant(s) Notes Reviewed:  [ ] YES     Care Discussed with [ ] Consultants [X ] Patient [ ] Family  [x ]    [x ]  Other; RN  HEALTH ISSUES - PROBLEM Dx:  Elevated blood-pressure reading without diagnosis of hypertension: Elevated blood-pressure reading without diagnosis of hypertension  Parkinson disease: Parkinson disease  Closed fracture of right hip, initial encounter: Closed fracture of right hip, initial encounter        DVT/GI ppx  Discussed with pt @ bedside

## 2017-03-14 NOTE — PHYSICAL THERAPY INITIAL EVALUATION ADULT - IMPAIRMENTS CONTRIBUTING IMPAIRED BED MOBILITY, REHAB EVAL
decreased strength/impaired postural control/impaired balance/decreased flexibility/decreased ROM/cognition

## 2017-03-14 NOTE — PHYSICAL THERAPY INITIAL EVALUATION ADULT - MANUAL MUSCLE TESTING RESULTS, REHAB EVAL
not tested due to/confusion and unable to follow command. Grossly  is 3-/5 throughourt except R hip and knee is 2/5 from observation of active movement

## 2017-03-14 NOTE — PHYSICAL THERAPY INITIAL EVALUATION ADULT - PLANNED THERAPY INTERVENTIONS, PT EVAL
balance training/gait training/postural re-education/transfer training/strengthening/ROM/bed mobility training

## 2017-03-14 NOTE — PHYSICAL THERAPY INITIAL EVALUATION ADULT - CRITERIA FOR SKILLED THERAPEUTIC INTERVENTIONS
therapy frequency/impairments found/functional limitations in following categories/risk reduction/prevention/rehab potential/anticipated discharge recommendation/predicted duration of therapy intervention

## 2017-03-14 NOTE — PHYSICAL THERAPY INITIAL EVALUATION ADULT - IMPAIRMENTS FOUND, PT EVAL
poor safety awareness/aerobic capacity/endurance/cognitive impairment/muscle strength/posture/ROM/ergonomics and body mechanics/gait, locomotion, and balance

## 2017-03-15 DIAGNOSIS — G93.41 METABOLIC ENCEPHALOPATHY: ICD-10-CM

## 2017-03-15 DIAGNOSIS — D50.0 IRON DEFICIENCY ANEMIA SECONDARY TO BLOOD LOSS (CHRONIC): ICD-10-CM

## 2017-03-15 DIAGNOSIS — N30.00 ACUTE CYSTITIS WITHOUT HEMATURIA: ICD-10-CM

## 2017-03-15 LAB
-  AMIKACIN: SIGNIFICANT CHANGE UP
-  AMPICILLIN/SULBACTAM: SIGNIFICANT CHANGE UP
-  AMPICILLIN: SIGNIFICANT CHANGE UP
-  AZTREONAM: SIGNIFICANT CHANGE UP
-  CEFAZOLIN: SIGNIFICANT CHANGE UP
-  CEFEPIME: SIGNIFICANT CHANGE UP
-  CEFOXITIN: SIGNIFICANT CHANGE UP
-  CEFTAZIDIME: SIGNIFICANT CHANGE UP
-  CEFTRIAXONE: SIGNIFICANT CHANGE UP
-  CIPROFLOXACIN: SIGNIFICANT CHANGE UP
-  ERTAPENEM: SIGNIFICANT CHANGE UP
-  GENTAMICIN: SIGNIFICANT CHANGE UP
-  IMIPENEM: SIGNIFICANT CHANGE UP
-  LEVOFLOXACIN: SIGNIFICANT CHANGE UP
-  MEROPENEM: SIGNIFICANT CHANGE UP
-  NITROFURANTOIN: SIGNIFICANT CHANGE UP
-  PIPERACILLIN/TAZOBACTAM: SIGNIFICANT CHANGE UP
-  TOBRAMYCIN: SIGNIFICANT CHANGE UP
-  TRIMETHOPRIM/SULFAMETHOXAZOLE: SIGNIFICANT CHANGE UP
ANION GAP SERPL CALC-SCNC: 11 MMOL/L — SIGNIFICANT CHANGE UP (ref 5–17)
BASOPHILS # BLD AUTO: 0.1 K/UL — SIGNIFICANT CHANGE UP (ref 0–0.2)
BASOPHILS NFR BLD AUTO: 0.9 % — SIGNIFICANT CHANGE UP (ref 0–2)
BUN SERPL-MCNC: 23 MG/DL — SIGNIFICANT CHANGE UP (ref 7–23)
CALCIUM SERPL-MCNC: 8 MG/DL — LOW (ref 8.5–10.1)
CHLORIDE SERPL-SCNC: 105 MMOL/L — SIGNIFICANT CHANGE UP (ref 96–108)
CO2 SERPL-SCNC: 24 MMOL/L — SIGNIFICANT CHANGE UP (ref 22–31)
CREAT SERPL-MCNC: 0.68 MG/DL — SIGNIFICANT CHANGE UP (ref 0.5–1.3)
CULTURE RESULTS: SIGNIFICANT CHANGE UP
EOSINOPHIL # BLD AUTO: 0.2 K/UL — SIGNIFICANT CHANGE UP (ref 0–0.5)
EOSINOPHIL NFR BLD AUTO: 1 % — SIGNIFICANT CHANGE UP (ref 0–6)
GLUCOSE SERPL-MCNC: 103 MG/DL — HIGH (ref 70–99)
HCT VFR BLD CALC: 24.1 % — LOW (ref 34.5–45)
HGB BLD-MCNC: 8.2 G/DL — LOW (ref 11.5–15.5)
LYMPHOCYTES # BLD AUTO: 1.8 K/UL — SIGNIFICANT CHANGE UP (ref 1–3.3)
LYMPHOCYTES # BLD AUTO: 11.7 % — LOW (ref 13–44)
MCHC RBC-ENTMCNC: 29.8 PG — SIGNIFICANT CHANGE UP (ref 27–34)
MCHC RBC-ENTMCNC: 34.3 GM/DL — SIGNIFICANT CHANGE UP (ref 32–36)
MCV RBC AUTO: 86.9 FL — SIGNIFICANT CHANGE UP (ref 80–100)
METHOD TYPE: SIGNIFICANT CHANGE UP
MONOCYTES # BLD AUTO: 1 K/UL — HIGH (ref 0–0.9)
MONOCYTES NFR BLD AUTO: 6.5 % — SIGNIFICANT CHANGE UP (ref 2–14)
NEUTROPHILS # BLD AUTO: 12 K/UL — HIGH (ref 1.8–7.4)
NEUTROPHILS NFR BLD AUTO: 79.9 % — HIGH (ref 43–77)
ORGANISM # SPEC MICROSCOPIC CNT: SIGNIFICANT CHANGE UP
ORGANISM # SPEC MICROSCOPIC CNT: SIGNIFICANT CHANGE UP
PLATELET # BLD AUTO: 173 K/UL — SIGNIFICANT CHANGE UP (ref 150–400)
POTASSIUM SERPL-MCNC: 3.7 MMOL/L — SIGNIFICANT CHANGE UP (ref 3.5–5.3)
POTASSIUM SERPL-SCNC: 3.7 MMOL/L — SIGNIFICANT CHANGE UP (ref 3.5–5.3)
RBC # BLD: 2.77 M/UL — LOW (ref 3.8–5.2)
RBC # FLD: 12.8 % — SIGNIFICANT CHANGE UP (ref 11–15)
SODIUM SERPL-SCNC: 140 MMOL/L — SIGNIFICANT CHANGE UP (ref 135–145)
SPECIMEN SOURCE: SIGNIFICANT CHANGE UP
WBC # BLD: 15.1 K/UL — HIGH (ref 3.8–10.5)
WBC # FLD AUTO: 15.1 K/UL — HIGH (ref 3.8–10.5)

## 2017-03-15 PROCEDURE — 99233 SBSQ HOSP IP/OBS HIGH 50: CPT

## 2017-03-15 RX ORDER — CEFTRIAXONE 500 MG/1
INJECTION, POWDER, FOR SOLUTION INTRAMUSCULAR; INTRAVENOUS
Qty: 0 | Refills: 0 | Status: DISCONTINUED | OUTPATIENT
Start: 2017-03-15 | End: 2017-03-17

## 2017-03-15 RX ORDER — CEFTRIAXONE 500 MG/1
1 INJECTION, POWDER, FOR SOLUTION INTRAMUSCULAR; INTRAVENOUS EVERY 24 HOURS
Qty: 0 | Refills: 0 | Status: DISCONTINUED | OUTPATIENT
Start: 2017-03-16 | End: 2017-03-17

## 2017-03-15 RX ORDER — CEFTRIAXONE 500 MG/1
1 INJECTION, POWDER, FOR SOLUTION INTRAMUSCULAR; INTRAVENOUS ONCE
Qty: 0 | Refills: 0 | Status: COMPLETED | OUTPATIENT
Start: 2017-03-15 | End: 2017-03-15

## 2017-03-15 RX ADMIN — SODIUM CHLORIDE 75 MILLILITER(S): 9 INJECTION, SOLUTION INTRAVENOUS at 12:21

## 2017-03-15 RX ADMIN — Medication 500 MILLIGRAM(S): at 06:47

## 2017-03-15 RX ADMIN — Medication 100 MILLIGRAM(S): at 06:47

## 2017-03-15 RX ADMIN — Medication 650 MILLIGRAM(S): at 10:33

## 2017-03-15 RX ADMIN — ENOXAPARIN SODIUM 40 MILLIGRAM(S): 100 INJECTION SUBCUTANEOUS at 06:47

## 2017-03-15 RX ADMIN — Medication 325 MILLIGRAM(S): at 09:33

## 2017-03-15 RX ADMIN — CARBIDOPA AND LEVODOPA 1 TABLET(S): 25; 100 TABLET ORAL at 17:52

## 2017-03-15 RX ADMIN — CEFTRIAXONE 100 GRAM(S): 500 INJECTION, POWDER, FOR SOLUTION INTRAMUSCULAR; INTRAVENOUS at 14:53

## 2017-03-15 RX ADMIN — CARBIDOPA AND LEVODOPA 1 TABLET(S): 25; 100 TABLET ORAL at 06:47

## 2017-03-15 RX ADMIN — Medication 100 MILLIGRAM(S): at 22:33

## 2017-03-15 RX ADMIN — Medication 500 MILLIGRAM(S): at 17:52

## 2017-03-15 RX ADMIN — Medication 325 MILLIGRAM(S): at 17:52

## 2017-03-15 RX ADMIN — Medication 650 MILLIGRAM(S): at 09:33

## 2017-03-15 NOTE — DIETITIAN INITIAL EVALUATION ADULT. - PROBLEM SELECTOR PLAN 1
Will need clearance  NPO for now, pain control, IVF  Consent obtained in ED by orthopedic surgery resident from [facility] and karolina Flood.

## 2017-03-15 NOTE — OCCUPATIONAL THERAPY INITIAL EVALUATION ADULT - PERSONAL SAFETY AND JUDGMENT, REHAB EVAL
at risk behaviors demonstrated/Pt requires verbal cues for hand placement on Etac device during transfers. Multiple reminders for safety awareness were given due to reduce the risk of injury due to falls.

## 2017-03-15 NOTE — OCCUPATIONAL THERAPY INITIAL EVALUATION ADULT - LIVES WITH, PROFILE
children/with son in a private home with 4 steps & rails to enter. Pt has a flight of stairs to get to the shower, which son reports she uses about 2 times a week.  Pt's bathroom is equipped with a  walk in shower with grab bars & shower bench built into the wall.

## 2017-03-15 NOTE — OCCUPATIONAL THERAPY INITIAL EVALUATION ADULT - PERTINENT HX OF CURRENT PROBLEM, REHAB EVAL
Pt was admitted for interochanteric fracture of right hip on 3/13/17 due to a fall. Pt was admitted for interochanteric fracture of right hip on 3/13/17 due to a fall. Xray of the hip on 3/13/17 shows apparent acute intertrochanteric fracture of the right femur Pt was admitted for intertochanteric fracture of right hip on 3/13/17 due to a fall. Xray of the hip on 3/13/17 shows apparent acute intertrochanteric fracture of the right femur

## 2017-03-15 NOTE — DIETITIAN INITIAL EVALUATION ADULT. - PERTINENT LABORATORY DATA
03-15 Na140 mmol/L Glu 103 mg/dL<H> K+ 3.7 mmol/L Cr  0.68 mg/dL BUN 23 mg/dL Phos n/a   Alb n/a   PAB n/a. H/H 8.2L/24.1L, WBC 15.1H.

## 2017-03-15 NOTE — PROGRESS NOTE ADULT - SUBJECTIVE AND OBJECTIVE BOX
CHIEF COMPLAINT/INTERVAL HISTORY:    Patient is a 86y old  Female who presents with a chief complaint of Right IT fracture (14 Mar 2017 07:00)      HPI:  86y Female community ambulatory presents c/o R hip pain and inability to ambulate sp mechanical fall. Denies HS/LOC. Denies numbness/tingling. Denies fever/chills. Denies pain/injury elsewhere. Patient is Qatari speakin, history obtained via  phone and from son Kyle over the telephone. Patient lives as home with son. He states she is very independent and only takes carbidopa/levodopa for parkinsons disease, denies use of any blood thinners. (13 Mar 2017 02:25)    Overnight issues  lethargic  not eating   SUBJECTIVE & OBJECTIVE: Pt seen and examined at bedside.   ROS:  s   ICU Vital Signs Last 24 Hrs  T(C): 37.2, Max: 37.2 (03-15 @ 12:00)  T(F): 98.9, Max: 98.9 (03-15 @ 12:00)  HR: 67 (67 - 122)  BP: 113/63 (113/63 - 148/68)  BP(mean): --  ABP: --  ABP(mean): --  RR: 16 (16 - 18)  SpO2: 95% (93% - 95%)        MEDICATIONS  (STANDING):  enoxaparin Injectable 40milliGRAM(s) SubCutaneous every 24 hours  lactated ringers. 1000milliLiter(s) IV Continuous <Continuous>  docusate sodium 100milliGRAM(s) Oral three times a day  ferrous    sulfate 325milliGRAM(s) Oral three times a day with meals  folic acid 1milliGRAM(s) Oral daily  multivitamin 1Tablet(s) Oral daily  ascorbic acid 500milliGRAM(s) Oral two times a day  carbidopa/levodopa  25/100 1Tablet(s) Oral two times a day  cefTRIAXone   IVPB  IV Intermittent     MEDICATIONS  (PRN):  acetaminophen   Tablet 650milliGRAM(s) Oral every 6 hours PRN For Temp greater than 38 C (100.4 F)  acetaminophen   Tablet. 650milliGRAM(s) Oral every 6 hours PRN headache  oxyCODONE IR 5milliGRAM(s) Oral every 4 hours PRN Mild Pain  aluminum hydroxide/magnesium hydroxide/simethicone Suspension 30milliLiter(s) Oral four times a day PRN Indigestion  ondansetron Injectable 4milliGRAM(s) IV Push every 4 hours PRN Nausea and/or Vomiting  magnesium hydroxide Suspension 30milliLiter(s) Oral daily PRN Constipation  bisacodyl Suppository 10milliGRAM(s) Rectal daily PRN If no bowel movement  benzocaine 15 mG/menthol 3.6 mG Lozenge 1Lozenge Oral every 3 hours PRN Sore Throat        PHYSICAL EXAM:    GENERAL: NAD, well-groomed, well-developed  HEAD:  Atraumatic, Normocephalic  EYES: EOMI, PERRLA, conjunctiva and sclera clear  ENMT: Moist mucous membranes  NECK: Supple, No JVD  NERVOUS SYSTEM:  Alert & Oriented X3, Motor Strength 5/5 B/L upper and lower extremities; DTRs 2+ intact and symmetric  CHEST/LUNG: Clear to auscultation bilaterally; No rales, rhonchi, wheezing, or rubs  HEART: Regular rate and rhythm; No murmurs, rubs, or gallops  ABDOMEN: Soft, Nontender, Nondistended; Bowel sounds present  EXTREMITIES:  2+ Peripheral Pulses, No clubbing, cyanosis, or edema    LABS:                        8.2    15.1  )-----------( 173      ( 15 Mar 2017 06:07 )             24.1     15 Mar 2017 06:07    140    |  105    |  23     ----------------------------<  103    3.7     |  24     |  0.68     Ca    8.0        15 Mar 2017 06:07            CAPILLARY BLOOD GLUCOSE      RECENT CULTURES:      RADIOLOGY & ADDITIONAL TESTS:  Imaging Personally Reviewed:  [ ] YES      Consultant(s) Notes Reviewed:  [ ] YES     Care Discussed with [ ] Consultants [X ] Patient [ ] Family  [x ]    [x ]  Other; RN  HEALTH ISSUES - PROBLEM Dx:  HTN (hypertension), benign: HTN (hypertension), benign  Elevated blood-pressure reading without diagnosis of hypertension: Elevated blood-pressure reading without diagnosis of hypertension  Parkinson disease: Parkinson disease  Closed fracture of right hip, initial encounter: Closed fracture of right hip, initial encounter        DVT/GI ppx  Discussed with pt @ bedside CHIEF COMPLAINT/INTERVAL HISTORY:    Patient is a 86y old  Female who presents with a chief complaint of Right IT fracture (14 Mar 2017 07:00)      HPI:  86y Female community ambulatory presents c/o R hip pain and inability to ambulate sp mechanical fall. Denies HS/LOC. Denies numbness/tingling. Denies fever/chills. Denies pain/injury elsewhere. Patient is Belarusian speakin, history obtained via  phone and from son Kyle over the telephone. Patient lives as home with son. He states she is very independent and only takes carbidopa/levodopa for parkinsons disease, denies use of any blood thinners. (13 Mar 2017 02:25)    Overnight issues  lethargic  not eating   SUBJECTIVE & OBJECTIVE: Pt seen and examined at bedside.   ROS:  s   ICU Vital Signs Last 24 Hrs  T(C): 37.2, Max: 37.2 (03-15 @ 12:00)  T(F): 98.9, Max: 98.9 (03-15 @ 12:00)  HR: 67 (67 - 122)  BP: 113/63 (113/63 - 148/68)  BP(mean): --  ABP: --  ABP(mean): --  RR: 16 (16 - 18)  SpO2: 95% (93% - 95%)        MEDICATIONS  (STANDING):  enoxaparin Injectable 40milliGRAM(s) SubCutaneous every 24 hours  lactated ringers. 1000milliLiter(s) IV Continuous <Continuous>  docusate sodium 100milliGRAM(s) Oral three times a day  ferrous    sulfate 325milliGRAM(s) Oral three times a day with meals  folic acid 1milliGRAM(s) Oral daily  multivitamin 1Tablet(s) Oral daily  ascorbic acid 500milliGRAM(s) Oral two times a day  carbidopa/levodopa  25/100 1Tablet(s) Oral two times a day  cefTRIAXone   IVPB  IV Intermittent     MEDICATIONS  (PRN):  acetaminophen   Tablet 650milliGRAM(s) Oral every 6 hours PRN For Temp greater than 38 C (100.4 F)  acetaminophen   Tablet. 650milliGRAM(s) Oral every 6 hours PRN headache  oxyCODONE IR 5milliGRAM(s) Oral every 4 hours PRN Mild Pain  aluminum hydroxide/magnesium hydroxide/simethicone Suspension 30milliLiter(s) Oral four times a day PRN Indigestion  ondansetron Injectable 4milliGRAM(s) IV Push every 4 hours PRN Nausea and/or Vomiting  magnesium hydroxide Suspension 30milliLiter(s) Oral daily PRN Constipation  bisacodyl Suppository 10milliGRAM(s) Rectal daily PRN If no bowel movement  benzocaine 15 mG/menthol 3.6 mG Lozenge 1Lozenge Oral every 3 hours PRN Sore Throat        PHYSICAL EXAM:    GENERAL: lethargic   HEAD:  Atraumatic, Normocephalic  EYES: EOMI, PERRLA, conjunctiva and sclera clear  ENMT: Moist mucous membranes  NECK: Supple, No JVD  NERVOUS SYSTEM:  Alert & Oriented X3, Motor Strength 5/5 B/L upper and lower extremities; DTRs 2+ intact and symmetric  CHEST/LUNG: Clear to auscultation bilaterally; No rales, rhonchi, wheezing, or rubs  HEART: Regular rate and rhythm; No murmurs, rubs, or gallops  ABDOMEN: Soft, Nontender, Nondistended; Bowel sounds present  EXTREMITIES:  2+ Peripheral Pulses, No clubbing, cyanosis, or edema    LABS:                        8.2    15.1  )-----------( 173      ( 15 Mar 2017 06:07 )             24.1     15 Mar 2017 06:07    140    |  105    |  23     ----------------------------<  103    3.7     |  24     |  0.68     Ca    8.0        15 Mar 2017 06:07            CAPILLARY BLOOD GLUCOSE      RECENT CULTURES:      RADIOLOGY & ADDITIONAL TESTS:  Imaging Personally Reviewed:  [ ] YES      Consultant(s) Notes Reviewed:  [ ] YES     Care Discussed with [ ] Consultants [X ] Patient [ ] Family  [x ]    [x ]  Other; RN  HEALTH ISSUES - PROBLEM Dx:  HTN (hypertension), benign: HTN (hypertension), benign  Elevated blood-pressure reading without diagnosis of hypertension: Elevated blood-pressure reading without diagnosis of hypertension  Parkinson disease: Parkinson disease  Closed fracture of right hip, initial encounter: Closed fracture of right hip, initial encounter        DVT/GI ppx  Discussed with pt @ bedside CHIEF COMPLAINT/INTERVAL HISTORY:    Patient is a 86y old  Female who presents with a chief complaint of Right IT fracture (14 Mar 2017 07:00)      HPI:  86y Female community ambulatory presents c/o R hip pain and inability to ambulate sp mechanical fall. Denies HS/LOC. Denies numbness/tingling. Denies fever/chills. Denies pain/injury elsewhere. Patient is Georgian speakin, history obtained via  phone and from son Kyle over the telephone. Patient lives as home with son. He states she is very independent and only takes carbidopa/levodopa for parkinsons disease, denies use of any blood thinners. (13 Mar 2017 02:25)    Overnight issues  lethargic  not eating   SUBJECTIVE & OBJECTIVE: Pt seen and examined at bedside.   ROS:  s   ICU Vital Signs Last 24 Hrs  T(C): 37.2, Max: 37.2 (03-15 @ 12:00)  T(F): 98.9, Max: 98.9 (03-15 @ 12:00)  HR: 67 (67 - 122)  BP: 113/63 (113/63 - 148/68)  BP(mean): --  ABP: --  ABP(mean): --  RR: 16 (16 - 18)  SpO2: 95% (93% - 95%)        MEDICATIONS  (STANDING):  enoxaparin Injectable 40milliGRAM(s) SubCutaneous every 24 hours  lactated ringers. 1000milliLiter(s) IV Continuous <Continuous>  docusate sodium 100milliGRAM(s) Oral three times a day  ferrous    sulfate 325milliGRAM(s) Oral three times a day with meals  folic acid 1milliGRAM(s) Oral daily  multivitamin 1Tablet(s) Oral daily  ascorbic acid 500milliGRAM(s) Oral two times a day  carbidopa/levodopa  25/100 1Tablet(s) Oral two times a day  cefTRIAXone   IVPB  IV Intermittent     MEDICATIONS  (PRN):  acetaminophen   Tablet 650milliGRAM(s) Oral every 6 hours PRN For Temp greater than 38 C (100.4 F)  acetaminophen   Tablet. 650milliGRAM(s) Oral every 6 hours PRN headache  oxyCODONE IR 5milliGRAM(s) Oral every 4 hours PRN Mild Pain  aluminum hydroxide/magnesium hydroxide/simethicone Suspension 30milliLiter(s) Oral four times a day PRN Indigestion  ondansetron Injectable 4milliGRAM(s) IV Push every 4 hours PRN Nausea and/or Vomiting  magnesium hydroxide Suspension 30milliLiter(s) Oral daily PRN Constipation  bisacodyl Suppository 10milliGRAM(s) Rectal daily PRN If no bowel movement  benzocaine 15 mG/menthol 3.6 mG Lozenge 1Lozenge Oral every 3 hours PRN Sore Throat        PHYSICAL EXAM:    GENERAL: lethargic   HEAD:  Atraumatic, Normocephalic  EYES: EOMI, PERRLA, conjunctiva and sclera clear  ENMT: Moist mucous membranes  NECK: Supple, No JVD  NERVOUS SYSTEM:  lethargic  CHEST/LUNG: Clear to auscultation bilaterally; No rales, rhonchi, wheezing, or rubs  HEART: Regular rate and rhythm; No murmurs, rubs, or gallops  ABDOMEN: Soft, Nontender, Nondistended; Bowel sounds present  EXTREMITIES:  2+ Peripheral Pulses, No clubbing, cyanosis, or edema    LABS:                        8.2    15.1  )-----------( 173      ( 15 Mar 2017 06:07 )             24.1     15 Mar 2017 06:07    140    |  105    |  23     ----------------------------<  103    3.7     |  24     |  0.68     Ca    8.0        15 Mar 2017 06:07            CAPILLARY BLOOD GLUCOSE      RECENT CULTURES:      RADIOLOGY & ADDITIONAL TESTS:  Imaging Personally Reviewed:  [ ] YES      Consultant(s) Notes Reviewed:  [ ] YES     Care Discussed with [ ] Consultants [X ] Patient [ ] Family  [x ]    [x ]  Other; RN  HEALTH ISSUES - PROBLEM Dx:  HTN (hypertension), benign: HTN (hypertension), benign  Elevated blood-pressure reading without diagnosis of hypertension: Elevated blood-pressure reading without diagnosis of hypertension  Parkinson disease: Parkinson disease  Closed fracture of right hip, initial encounter: Closed fracture of right hip, initial encounter        DVT/GI ppx  Discussed with pt @ bedside

## 2017-03-15 NOTE — PROGRESS NOTE ADULT - SUBJECTIVE AND OBJECTIVE BOX
Pt S/E at bedside, no acute events overnight, pain controlled    AVSS  Gen: NAD, Resting comfortably    RLE:  Dressing clean dry intact  +EHL/FHL/TA/GS  SILT L3-S1  +DP/PT Pulses  Compartments soft  No calf TTP B/L

## 2017-03-15 NOTE — OCCUPATIONAL THERAPY INITIAL EVALUATION ADULT - ADDITIONAL COMMENTS
Prior to admission, pt was functioning in her roles & ambulating independently without any assistive devices. Pt completes BADLs independently with minimal assistance from HHA for things like bathing and cooking.  Pt owns a rolling walker but did not need to use it prior to the fall.  Presently, pt requires more assistance with self care & functional mobility due to fear, pain & weakness.  The scale below depicts a picture of the pt's current level of functioning. Barthel Index: Feeding Score__10____, Bathing Score_0_____, Grooming Score__0___, Dressing Score__5___, Bowel Score__10___, Bladder Score__10____, Toilet Score__0___, Transfer Score__5____, Mobility Score___5__, Stairs Score__0___, Total Score___45/100__.

## 2017-03-15 NOTE — OCCUPATIONAL THERAPY INITIAL EVALUATION ADULT - RANGE OF MOTION EXAMINATION, LOWER EXTREMITY
Left LE Active ROM was WFL (within functional limits)/Left LE Passive ROM was WFL (w/i functional limits)/AROm of right hip limited by more than 50% due to pain & weakness

## 2017-03-15 NOTE — DIETITIAN INITIAL EVALUATION ADULT. - PERTINENT MEDS FT
Lactated ringers, Vitamin C 500 mg BID, Colace, Ferrous sulfate, Folic acid, Magnesium hydroxide, Dilaudid PRN, MVI, Zofran PRN, Oxycodone PRN

## 2017-03-15 NOTE — OCCUPATIONAL THERAPY INITIAL EVALUATION ADULT - RANGE OF MOTION EXAMINATION, UPPER EXTREMITY
bilateral UE Passive ROM was WFL  (within functional limits)/bilateral UE Active ROM was WFL  (within functional limits)

## 2017-03-15 NOTE — DIETITIAN INITIAL EVALUATION ADULT. - OTHER INFO
87 yo F seen for consult for chewing/swallowing difficulty, FTT. POD2 s/p R hip IMN. Per caregiver pt c very good appetite PTA, eats everything, prepares food for pt c no salt. Regular consistency diet. No weight changes.

## 2017-03-15 NOTE — OCCUPATIONAL THERAPY INITIAL EVALUATION ADULT - PRECAUTIONS/LIMITATIONS, REHAB EVAL
Pt has diminished reaction time due to age related changes. Pt has developed fear of falling which limit motivation to participate in RX & functional tasks.  Pt has tremors due to Parkinson's./fall precautions

## 2017-03-15 NOTE — OCCUPATIONAL THERAPY INITIAL EVALUATION ADULT - PLANNED THERAPY INTERVENTIONS, OT EVAL
IADL retraining/motor coordination training/fine motor coordination training/energy conservation techniques/neuromuscular re-education/ROM/balance training/joint mobilization/parent/caregiver training.../bed mobility training/massage/strengthening/stretching/transfer training/ADL retraining

## 2017-03-15 NOTE — OCCUPATIONAL THERAPY INITIAL EVALUATION ADULT - GENERAL OBSERVATIONS, REHAB EVAL
seen supine in bed, son Iglesia & caregiver Nano present, AA&Ox2. Grade 1+ edema in RLE noted. Freezing & resting tremors that increase with movement in LUE due to Parkinson's.  seen supine in bed, son Iglesia & caregiver Nano present, AA&Ox2. Grade 1+ edema in RLE noted. Freezing & resting tremors that increase with movement in LUE due to Parkinson's.  Gross grasp & fine motor skills are diminished due to tremors & weakness.  seen supine in bed, son Iglesia who the health care provider & caregiver Nano present, AA&Ox2. Grade 1+ edema in RLE noted. Freezing & resting tremors that increase with movement in LUE due to Parkinson's.  Gross grasp & fine motor skills are diminished due to tremors & weakness.

## 2017-03-15 NOTE — OCCUPATIONAL THERAPY INITIAL EVALUATION ADULT - SOCIAL CONCERNS
Pt has good support system at home. Pt's fear of falling severely limit functional abilities. Pt requires motivation to participate in RX & feels more comfortable when son is present./Complex psychosocial needs/coping issues

## 2017-03-16 DIAGNOSIS — R00.0 TACHYCARDIA, UNSPECIFIED: ICD-10-CM

## 2017-03-16 LAB
ANION GAP SERPL CALC-SCNC: 12 MMOL/L — SIGNIFICANT CHANGE UP (ref 5–17)
BASOPHILS NFR BLD AUTO: 2 % — SIGNIFICANT CHANGE UP (ref 0–2)
BUN SERPL-MCNC: 24 MG/DL — HIGH (ref 7–23)
CALCIUM SERPL-MCNC: 7.9 MG/DL — LOW (ref 8.5–10.1)
CHLORIDE SERPL-SCNC: 107 MMOL/L — SIGNIFICANT CHANGE UP (ref 96–108)
CO2 SERPL-SCNC: 24 MMOL/L — SIGNIFICANT CHANGE UP (ref 22–31)
CREAT SERPL-MCNC: 0.67 MG/DL — SIGNIFICANT CHANGE UP (ref 0.5–1.3)
EOSINOPHIL NFR BLD AUTO: 1 % — SIGNIFICANT CHANGE UP (ref 0–6)
GLUCOSE SERPL-MCNC: 108 MG/DL — HIGH (ref 70–99)
HCT VFR BLD CALC: 21.3 % — LOW (ref 34.5–45)
HGB BLD-MCNC: 7.6 G/DL — LOW (ref 11.5–15.5)
LYMPHOCYTES # BLD AUTO: 10 % — LOW (ref 13–44)
MCHC RBC-ENTMCNC: 31.4 PG — SIGNIFICANT CHANGE UP (ref 27–34)
MCHC RBC-ENTMCNC: 35.9 GM/DL — SIGNIFICANT CHANGE UP (ref 32–36)
MCV RBC AUTO: 87.6 FL — SIGNIFICANT CHANGE UP (ref 80–100)
MONOCYTES NFR BLD AUTO: 3 % — SIGNIFICANT CHANGE UP (ref 2–14)
NEUTROPHILS NFR BLD AUTO: 84 % — HIGH (ref 43–77)
PLATELET # BLD AUTO: 160 K/UL — SIGNIFICANT CHANGE UP (ref 150–400)
POTASSIUM SERPL-MCNC: 3.5 MMOL/L — SIGNIFICANT CHANGE UP (ref 3.5–5.3)
POTASSIUM SERPL-SCNC: 3.5 MMOL/L — SIGNIFICANT CHANGE UP (ref 3.5–5.3)
RBC # BLD: 2.43 M/UL — LOW (ref 3.8–5.2)
RBC # FLD: 12.9 % — SIGNIFICANT CHANGE UP (ref 11–15)
SODIUM SERPL-SCNC: 143 MMOL/L — SIGNIFICANT CHANGE UP (ref 135–145)
WBC # BLD: 18.2 K/UL — HIGH (ref 3.8–10.5)
WBC # FLD AUTO: 18.2 K/UL — HIGH (ref 3.8–10.5)

## 2017-03-16 PROCEDURE — 71010: CPT | Mod: 26

## 2017-03-16 PROCEDURE — 99223 1ST HOSP IP/OBS HIGH 75: CPT

## 2017-03-16 PROCEDURE — 99233 SBSQ HOSP IP/OBS HIGH 50: CPT

## 2017-03-16 RX ORDER — VANCOMYCIN HCL 1 G
750 VIAL (EA) INTRAVENOUS ONCE
Qty: 0 | Refills: 0 | Status: COMPLETED | OUTPATIENT
Start: 2017-03-16 | End: 2017-03-16

## 2017-03-16 RX ORDER — VANCOMYCIN HCL 1 G
VIAL (EA) INTRAVENOUS
Qty: 0 | Refills: 0 | Status: DISCONTINUED | OUTPATIENT
Start: 2017-03-16 | End: 2017-03-16

## 2017-03-16 RX ORDER — VANCOMYCIN HCL 1 G
VIAL (EA) INTRAVENOUS
Qty: 0 | Refills: 0 | Status: DISCONTINUED | OUTPATIENT
Start: 2017-03-16 | End: 2017-03-19

## 2017-03-16 RX ORDER — VANCOMYCIN HCL 1 G
750 VIAL (EA) INTRAVENOUS EVERY 12 HOURS
Qty: 0 | Refills: 0 | Status: DISCONTINUED | OUTPATIENT
Start: 2017-03-17 | End: 2017-03-19

## 2017-03-16 RX ADMIN — ENOXAPARIN SODIUM 40 MILLIGRAM(S): 100 INJECTION SUBCUTANEOUS at 05:54

## 2017-03-16 RX ADMIN — Medication 325 MILLIGRAM(S): at 09:34

## 2017-03-16 RX ADMIN — SODIUM CHLORIDE 75 MILLILITER(S): 9 INJECTION, SOLUTION INTRAVENOUS at 03:41

## 2017-03-16 RX ADMIN — Medication 500 MILLIGRAM(S): at 17:05

## 2017-03-16 RX ADMIN — CEFTRIAXONE 100 GRAM(S): 500 INJECTION, POWDER, FOR SOLUTION INTRAMUSCULAR; INTRAVENOUS at 12:32

## 2017-03-16 RX ADMIN — CARBIDOPA AND LEVODOPA 1 TABLET(S): 25; 100 TABLET ORAL at 05:54

## 2017-03-16 RX ADMIN — Medication 650 MILLIGRAM(S): at 03:32

## 2017-03-16 RX ADMIN — Medication 100 MILLIGRAM(S): at 22:41

## 2017-03-16 RX ADMIN — Medication 650 MILLIGRAM(S): at 19:41

## 2017-03-16 RX ADMIN — Medication 325 MILLIGRAM(S): at 17:05

## 2017-03-16 RX ADMIN — CARBIDOPA AND LEVODOPA 1 TABLET(S): 25; 100 TABLET ORAL at 17:05

## 2017-03-16 RX ADMIN — Medication 150 MILLIGRAM(S): at 17:05

## 2017-03-16 NOTE — PROGRESS NOTE ADULT - SUBJECTIVE AND OBJECTIVE BOX
CHIEF COMPLAINT/INTERVAL HISTORY:    Patient is a 86y old  Female who presents with a chief complaint of Right IT fracture (14 Mar 2017 07:00)      HPI:  86y Female community ambulatory presents c/o R hip pain and inability to ambulate sp mechanical fall. Denies HS/LOC. Denies numbness/tingling. Denies fever/chills. Denies pain/injury elsewhere. Patient is [place]araguan speakin, history obtained via  phone and from son Kyle over the telephone. Patient lives as home with son. He states she is very independent and only takes carbidopa/levodopa for parkinsons disease, denies use of any blood thinners. (13 Mar 2017 02:25)    Overnight issues  hgb decreased -receiving transfusion  on antibiotics yet wbc continue to increase- dr wilkinson called to consult  not eating well [id number] of gum ulcers- requested for dentures to be removed   dr mitchell will take over care   patient more alert today off of narcotics   SUBJECTIVE & OBJECTIVE: Pt seen and examined at bedside.   ROS:  no fever   ICU Vital Signs Last 24 Hrs  T(C): 37.8, Max: 38.4 (03-15 @ 23:20)  T(F): 100, Max: 101.2 (03-15 @ 23:20)  HR: 106 (67 - 116)  BP: 142/61 (113/63 - 147/87)  BP(mean): --  ABP: --  ABP(mean): --  RR: 16 (16 - 18)  SpO2: 94% (93% - 95%)        MEDICATIONS  (STANDING):  enoxaparin Injectable 40milliGRAM(s) SubCutaneous every 24 hours  lactated ringers. 1000milliLiter(s) IV Continuous <Continuous>  docusate sodium 100milliGRAM(s) Oral three times a day  ferrous    sulfate 325milliGRAM(s) Oral three times a day with meals  folic acid 1milliGRAM(s) Oral daily  multivitamin 1Tablet(s) Oral daily  ascorbic acid 500milliGRAM(s) Oral two times a day  carbidopa/levodopa  25/100 1Tablet(s) Oral two times a day  cefTRIAXone   IVPB 1Gram(s) IV Intermittent every 24 hours  cefTRIAXone   IVPB  IV Intermittent     MEDICATIONS  (PRN):  acetaminophen   Tablet 650milliGRAM(s) Oral every 6 hours PRN For Temp greater than 38 C (100.4 F)  acetaminophen   Tablet. 650milliGRAM(s) Oral every 6 hours PRN headache  oxyCODONE IR 5milliGRAM(s) Oral every 4 hours PRN Mild Pain  aluminum hydroxide/magnesium hydroxide/simethicone Suspension 30milliLiter(s) Oral four times a day PRN Indigestion  ondansetron Injectable 4milliGRAM(s) IV Push every 4 hours PRN Nausea and/or Vomiting  magnesium hydroxide Suspension 30milliLiter(s) Oral daily PRN Constipation  bisacodyl Suppository 10milliGRAM(s) Rectal daily PRN If no bowel movement  benzocaine 15 mG/menthol 3.6 mG Lozenge 1Lozenge Oral every 3 hours PRN Sore Throat        PHYSICAL EXAM:    GENERAL: NAD, well-groomed, well-developed  HEAD:  Atraumatic, Normocephalic  EYES: EOMI, PERRLA, conjunctiva and sclera clear  ENMT: Moist mucous membranes  NECK: Supple, No JVD  NERVOUS SYSTEM:  Alert & Oriented X3, Motor Strength 5/5 B/L upper and lower extremities; DTRs 2+ intact and symmetric  CHEST/LUNG: Clear to auscultation bilaterally; No rales, rhonchi, wheezing, or rubs  HEART: Regular rate and rhythm; No murmurs, rubs, or gallops  ABDOMEN: Soft, Nontender, Nondistended; Bowel sounds present  EXTREMITIES:  2+ Peripheral Pulses, No clubbing, cyanosis, or edema    LABS:                        7.6    18.2  )-----------( 160      ( 16 Mar 2017 06:02 )             21.3     16 Mar 2017 06:02    143    |  107    |  24     ----------------------------<  108    3.5     |  24     |  0.67     Ca    7.9        16 Mar 2017 06:02            CAPILLARY BLOOD GLUCOSE      RECENT CULTURES:      RADIOLOGY & ADDITIONAL TESTS:  Imaging Personally Reviewed:  [ ] YES      Consultant(s) Notes Reviewed:  [ ] YES     Care Discussed with [ ] Consultants [X ] Patient [ ] Family  [x ]    [x ]  Other; RN  HEALTH ISSUES - PROBLEM Dx:  Anemia due to blood loss: Anemia due to blood loss  Metabolic encephalopathy: Metabolic encephalopathy  Acute cystitis without hematuria: Acute cystitis without hematuria  HTN (hypertension), benign: HTN (hypertension), benign  Elevated blood-pressure reading without diagnosis of hypertension: Elevated blood-pressure reading without diagnosis of hypertension  Parkinson disease: Parkinson disease  Closed fracture of right hip, initial encounter: Closed fracture of right hip, initial encounter        DVT/GI ppx  Discussed with pt @ bedside

## 2017-03-16 NOTE — PROGRESS NOTE ADULT - SUBJECTIVE AND OBJECTIVE BOX
Pt S/E at bedside, no acute events overnight. pt more confused and lethargic compared to previous exams. unable to answer questions.     Vital Signs Last 24 Hrs  T(C): 37.8, Max: 38.4 (03-15 @ 23:20)  T(F): 100, Max: 101.2 (03-15 @ 23:20)  HR: 109 (67 - 122)  BP: 128/67 (113/63 - 148/68)  BP(mean): --  RR: 16 (16 - 18)  SpO2: 95% (93% - 95%)                          8.2    15.1  )-----------( 173      ( 15 Mar 2017 06:07 )             24.1   16 Mar 2017 06:02    143    |  107    |  24     ----------------------------<  108    3.5     |  24     |  0.67     Ca    7.9        16 Mar 2017 06:02        Gen: NAD, more lethargic, difficult to arouse    RLE:  Dressing clean dry intact, changed, incision clean, well appearing, no erythema, no discharge  motor and sensation grossly intact, difficult exam due to mentation  +DP, foot warm  Compartments soft  No calf TTP B/L

## 2017-03-16 NOTE — CONSULT NOTE ADULT - PROBLEM SELECTOR RECOMMENDATION 3
Pain mediated likely. Appears to have responded to IV beta blockers. May continue to use IV Lopressor as needed but would suggest better pain therapy. Blood pressures have been sufficiently improved to proceed with surgery.
as above

## 2017-03-16 NOTE — PROGRESS NOTE ADULT - SUBJECTIVE AND OBJECTIVE BOX
Patient is a 86y old  Female who presents to ER s/p mechanical fall with c/o severe right hip pain and inability to ambulate.    PAST MEDICAL & SURGICAL HISTORY:  Tremors of nervous system  No pertinent past medical history  No significant past surgical history      INTERVAL HISTORY:   	  MEDICATIONS:  MEDICATIONS  (STANDING):  enoxaparin Injectable 40milliGRAM(s) SubCutaneous every 24 hours  lactated ringers. 1000milliLiter(s) IV Continuous <Continuous>  docusate sodium 100milliGRAM(s) Oral three times a day  ferrous    sulfate 325milliGRAM(s) Oral three times a day with meals  folic acid 1milliGRAM(s) Oral daily  multivitamin 1Tablet(s) Oral daily  ascorbic acid 500milliGRAM(s) Oral two times a day  carbidopa/levodopa  25/100 1Tablet(s) Oral two times a day  cefTRIAXone   IVPB 1Gram(s) IV Intermittent every 24 hours  cefTRIAXone   IVPB  IV Intermittent   vancomycin  IVPB 750milliGRAM(s) IV Intermittent once  vancomycin  IVPB  IV Intermittent     MEDICATIONS  (PRN):  acetaminophen   Tablet 650milliGRAM(s) Oral every 6 hours PRN For Temp greater than 38 C (100.4 F)  acetaminophen   Tablet. 650milliGRAM(s) Oral every 6 hours PRN headache  oxyCODONE IR 5milliGRAM(s) Oral every 4 hours PRN Mild Pain  aluminum hydroxide/magnesium hydroxide/simethicone Suspension 30milliLiter(s) Oral four times a day PRN Indigestion  ondansetron Injectable 4milliGRAM(s) IV Push every 4 hours PRN Nausea and/or Vomiting  magnesium hydroxide Suspension 30milliLiter(s) Oral daily PRN Constipation  bisacodyl Suppository 10milliGRAM(s) Rectal daily PRN If no bowel movement  benzocaine 15 mG/menthol 3.6 mG Lozenge 1Lozenge Oral every 3 hours PRN Sore Throat      Vitals:  T(F): 99.9, Max: 101.2 (03-15 @ 23:20)  HR: 105 (102 - 116)  BP: 143/61 (128/67 - 147/87)  RR: 17 (16 - 20)  SpO2: 95% (93% - 98%)    I & Os for current day (as of 03-16 @ 15:55)  =============================================  IN:    lactated ringers.: 900 ml    Total IN: 900 ml  ---------------------------------------------  OUT:    Total OUT: 0 ml  ---------------------------------------------  Total NET: 900 ml          PHYSICAL EXAM:  Neuro: Awake, responsive  CV: S1 S2 RRR  Lungs: CTABL  GI: Soft, BS +, ND, NT  Extremities: No edema    TELEMETRY:   	    ECG:  	    RADIOLOGY:     DIAGNOSTIC TESTING:    [ ] Echocardiogram:  [ ]  Catheterization:  [ ] Stress Test:    OTHER: 	2    LABS:	 	    CARDIAC MARKERS:          16 Mar 2017 06:02    143    |  107    |  24     ----------------------------<  108    3.5     |  24     |  0.67   15 Mar 2017 06:07    140    |  105    |  23     ----------------------------<  103    3.7     |  24     |  0.68   14 Mar 2017 07:29    139    |  104    |  25     ----------------------------<  141    3.7     |  24     |  0.88     Ca    7.9        16 Mar 2017 06:02                            7.6    18.2  )-----------( 160      ( 16 Mar 2017 06:02 )             21.3 ,                       8.2    15.1  )-----------( 173      ( 15 Mar 2017 06:07 )             24.1 ,                       9.7    12.2  )-----------( 175      ( 14 Mar 2017 07:29 )             27.6 ,                       10.9   11.5  )-----------( 220      ( 13 Mar 2017 19:02 )             32.2   proBNP:   Lipid Profile:   HgA1c:   TSH: Patient is a 86y old  Female who presents to ER s/p mechanical fall with c/o severe right hip pain and inability to ambulate.    PAST MEDICAL & SURGICAL HISTORY:  Tremors of nervous system  No pertinent past medical history  No significant past surgical history      INTERVAL HISTORY: resting in bed, with c/o Rt hip pain, denies any SOB, or CP  	  MEDICATIONS:  MEDICATIONS  (STANDING):  enoxaparin Injectable 40milliGRAM(s) SubCutaneous every 24 hours  lactated ringers. 1000milliLiter(s) IV Continuous <Continuous>  docusate sodium 100milliGRAM(s) Oral three times a day  ferrous    sulfate 325milliGRAM(s) Oral three times a day with meals  folic acid 1milliGRAM(s) Oral daily  multivitamin 1Tablet(s) Oral daily  ascorbic acid 500milliGRAM(s) Oral two times a day  carbidopa/levodopa  25/100 1Tablet(s) Oral two times a day  cefTRIAXone   IVPB 1Gram(s) IV Intermittent every 24 hours  cefTRIAXone   IVPB  IV Intermittent   vancomycin  IVPB 750milliGRAM(s) IV Intermittent once  vancomycin  IVPB  IV Intermittent     MEDICATIONS  (PRN):  acetaminophen   Tablet 650milliGRAM(s) Oral every 6 hours PRN For Temp greater than 38 C (100.4 F)  acetaminophen   Tablet. 650milliGRAM(s) Oral every 6 hours PRN headache  oxyCODONE IR 5milliGRAM(s) Oral every 4 hours PRN Mild Pain  aluminum hydroxide/magnesium hydroxide/simethicone Suspension 30milliLiter(s) Oral four times a day PRN Indigestion  ondansetron Injectable 4milliGRAM(s) IV Push every 4 hours PRN Nausea and/or Vomiting  magnesium hydroxide Suspension 30milliLiter(s) Oral daily PRN Constipation  bisacodyl Suppository 10milliGRAM(s) Rectal daily PRN If no bowel movement  benzocaine 15 mG/menthol 3.6 mG Lozenge 1Lozenge Oral every 3 hours PRN Sore Throat      Vitals:  T(F): 99.9, Max: 101.2 (03-15 @ 23:20)  HR: 105 (102 - 116)  BP: 143/61 (128/67 - 147/87)  RR: 17 (16 - 20)  SpO2: 95% (93% - 98%)    I & Os for current day (as of 03-16 @ 15:55)  =============================================  IN:    lactated ringers.: 900 ml    Total IN: 900 ml  ---------------------------------------------  OUT:    Total OUT: 0 ml  ---------------------------------------------  Total NET: 900 ml      PHYSICAL EXAM:  Neuro: lethargic, easily arousable  CV: S1 S2 RRR  Lungs: diminished to bases  GI: Soft, BS +, ND, NT  Extremities: s/p  Intramedullary nailing of trochanteric fracture of right femur , dressing intact  	     Radiology: CT BRAIN    :No intracranial hemorrhage or mass effect.                    LABS:	 	      16 Mar 2017 06:02    143    |  107    |  24     ----------------------------<  108    3.5     |  24     |  0.67   15 Mar 2017 06:07    140    |  105    |  23     ----------------------------<  103    3.7     |  24     |  0.68   14 Mar 2017 07:29    139    |  104    |  25     ----------------------------<  141    3.7     |  24     |  0.88     Ca    7.9        16 Mar 2017 06:02                            7.6    18.2  )-----------( 160      ( 16 Mar 2017 06:02 )             21.3 ,                       8.2    15.1  )-----------( 173      ( 15 Mar 2017 06:07 )             24.1 ,                       9.7    12.2  )-----------( 175      ( 14 Mar 2017 07:29 )             27.6 ,                       10.9   11.5  )-----------( 220      ( 13 Mar 2017 19:02 )             32.2     16 Mar 2017 06:02    143    |  107    |  24     ----------------------------<  108    3.5     |  24     |  0.67   15 Mar 2017 06:07    140    |  105    |  23     ----------------------------<  103    3.7     |  24     |  0.68   14 Mar 2017 07:29    139    |  104    |  25     ----------------------------<  141    3.7     |  24     |  0.88     Ca    7.9        16 Mar 2017 06:02                            7.6    18.2  )-----------( 160      ( 16 Mar 2017 06:02 )             21.3 ,                       8.2    15.1  )-----------( 173      ( 15 Mar 2017 06:07 )             24.1 ,                       9.7    12.2  )-----------( 175      ( 14 Mar 2017 07:29 )             27.6 ,                       10.9   11.5  )-----------( 220      ( 13 Mar 2017 19:02 )             32.2   proBNP:   Lipid Profile:   HgA1c:   TSH: Patient is a 86y old  Female who presents to ER s/p mechanical fall with c/o severe right hip pain and inability to ambulate.    PAST MEDICAL & SURGICAL HISTORY:  Tremors of nervous system  No pertinent past medical history  No significant past surgical history      INTERVAL HISTORY: resting in bed, with c/o Rt hip pain, denies any SOB, or CP  	  MEDICATIONS:  MEDICATIONS  (STANDING):  enoxaparin Injectable 40milliGRAM(s) SubCutaneous every 24 hours  lactated ringers. 1000milliLiter(s) IV Continuous <Continuous>  docusate sodium 100milliGRAM(s) Oral three times a day  ferrous    sulfate 325milliGRAM(s) Oral three times a day with meals  folic acid 1milliGRAM(s) Oral daily  multivitamin 1Tablet(s) Oral daily  ascorbic acid 500milliGRAM(s) Oral two times a day  carbidopa/levodopa  25/100 1Tablet(s) Oral two times a day  cefTRIAXone   IVPB 1Gram(s) IV Intermittent every 24 hours  cefTRIAXone   IVPB  IV Intermittent   vancomycin  IVPB 750milliGRAM(s) IV Intermittent once  vancomycin  IVPB  IV Intermittent     MEDICATIONS  (PRN):  acetaminophen   Tablet 650milliGRAM(s) Oral every 6 hours PRN For Temp greater than 38 C (100.4 F)  acetaminophen   Tablet. 650milliGRAM(s) Oral every 6 hours PRN headache  oxyCODONE IR 5milliGRAM(s) Oral every 4 hours PRN Mild Pain  aluminum hydroxide/magnesium hydroxide/simethicone Suspension 30milliLiter(s) Oral four times a day PRN Indigestion  ondansetron Injectable 4milliGRAM(s) IV Push every 4 hours PRN Nausea and/or Vomiting  magnesium hydroxide Suspension 30milliLiter(s) Oral daily PRN Constipation  bisacodyl Suppository 10milliGRAM(s) Rectal daily PRN If no bowel movement  benzocaine 15 mG/menthol 3.6 mG Lozenge 1Lozenge Oral every 3 hours PRN Sore Throat      Vitals:  T(F): 99.9, Max: 101.2 (03-15 @ 23:20)  HR: 105 (102 - 116)  BP: 143/61 (128/67 - 147/87)  RR: 17 (16 - 20)  SpO2: 95% (93% - 98%)    I & Os for current day (as of 03-16 @ 15:55)  =============================================  IN:    lactated ringers.: 900 ml    Total IN: 900 ml  ---------------------------------------------  OUT:    Total OUT: 0 ml  ---------------------------------------------  Total NET: 900 ml      PHYSICAL EXAM:  Neuro: lethargic, easily arousable  CV: S1 S2 RRR  Lungs: diminished to bases  GI: Soft, BS +, ND, NT  Extremities: s/p  Intramedullary nailing of trochanteric fracture of right femur , dressing intact  	     Radiology: CT BRAIN    :No intracranial hemorrhage or mass effect.                    LABS:	 	      16 Mar 2017 06:02    143    |  107    |  24     ----------------------------<  108    3.5     |  24     |  0.67   15 Mar 2017 06:07    140    |  105    |  23     ----------------------------<  103    3.7     |  24     |  0.68   14 Mar 2017 07:29    139    |  104    |  25     ----------------------------<  141    3.7     |  24     |  0.88     Ca    7.9        16 Mar 2017 06:02                            7.6    18.2  )-----------( 160      ( 16 Mar 2017 06:02 )             21.3 ,                       8.2    15.1  )-----------( 173      ( 15 Mar 2017 06:07 )             24.1 ,                       9.7    12.2  )-----------( 175      ( 14 Mar 2017 07:29 )             27.6 ,                       10.9   11.5  )-----------( 220      ( 13 Mar 2017 19:02 )             32.2

## 2017-03-16 NOTE — PROGRESS NOTE ADULT - PROBLEM SELECTOR PLAN 6
monitor cbc
likely due to blood loss/pain induced/infection  pain control/PRBC transfusion/ABx
monitor cbc

## 2017-03-16 NOTE — CONSULT NOTE ADULT - PROBLEM SELECTOR RECOMMENDATION 2
Continue meds as per Medicine
with worseing leuckocytosis   add Vanco and CXR  f/u on WBC  check PCT

## 2017-03-16 NOTE — CONSULT NOTE ADULT - PROBLEM SELECTOR RECOMMENDATION 9
awaiting ORIF.  No cardiac contraindication to planned orthopedic procedure
on rocephin  (E coli sens to it ) but leukocytosis worsening  though fever better  F/U On BC

## 2017-03-17 DIAGNOSIS — D72.829 ELEVATED WHITE BLOOD CELL COUNT, UNSPECIFIED: ICD-10-CM

## 2017-03-17 LAB
ANION GAP SERPL CALC-SCNC: 12 MMOL/L — SIGNIFICANT CHANGE UP (ref 5–17)
BASOPHILS # BLD AUTO: 0.1 K/UL — SIGNIFICANT CHANGE UP (ref 0–0.2)
BASOPHILS NFR BLD AUTO: 0.6 % — SIGNIFICANT CHANGE UP (ref 0–2)
BUN SERPL-MCNC: 19 MG/DL — SIGNIFICANT CHANGE UP (ref 7–23)
CALCIUM SERPL-MCNC: 8 MG/DL — LOW (ref 8.5–10.1)
CHLORIDE SERPL-SCNC: 104 MMOL/L — SIGNIFICANT CHANGE UP (ref 96–108)
CO2 SERPL-SCNC: 23 MMOL/L — SIGNIFICANT CHANGE UP (ref 22–31)
CREAT SERPL-MCNC: 0.55 MG/DL — SIGNIFICANT CHANGE UP (ref 0.5–1.3)
EOSINOPHIL # BLD AUTO: 0.3 K/UL — SIGNIFICANT CHANGE UP (ref 0–0.5)
EOSINOPHIL NFR BLD AUTO: 1.4 % — SIGNIFICANT CHANGE UP (ref 0–6)
GLUCOSE SERPL-MCNC: 114 MG/DL — HIGH (ref 70–99)
HCT VFR BLD CALC: 27.3 % — LOW (ref 34.5–45)
HGB BLD-MCNC: 9.9 G/DL — LOW (ref 11.5–15.5)
LYMPHOCYTES # BLD AUTO: 1.6 K/UL — SIGNIFICANT CHANGE UP (ref 1–3.3)
LYMPHOCYTES # BLD AUTO: 7.3 % — LOW (ref 13–44)
MAGNESIUM SERPL-MCNC: 2.1 MG/DL — SIGNIFICANT CHANGE UP (ref 1.8–2.4)
MCHC RBC-ENTMCNC: 31.3 PG — SIGNIFICANT CHANGE UP (ref 27–34)
MCHC RBC-ENTMCNC: 36.5 GM/DL — HIGH (ref 32–36)
MCV RBC AUTO: 85.8 FL — SIGNIFICANT CHANGE UP (ref 80–100)
MONOCYTES # BLD AUTO: 1.1 K/UL — HIGH (ref 0–0.9)
MONOCYTES NFR BLD AUTO: 4.8 % — SIGNIFICANT CHANGE UP (ref 2–14)
NEUTROPHILS # BLD AUTO: 19.2 K/UL — HIGH (ref 1.8–7.4)
NEUTROPHILS NFR BLD AUTO: 85.9 % — HIGH (ref 43–77)
PLATELET # BLD AUTO: 164 K/UL — SIGNIFICANT CHANGE UP (ref 150–400)
POTASSIUM SERPL-MCNC: 3.6 MMOL/L — SIGNIFICANT CHANGE UP (ref 3.5–5.3)
POTASSIUM SERPL-SCNC: 3.6 MMOL/L — SIGNIFICANT CHANGE UP (ref 3.5–5.3)
PROCALCITONIN SERPL-MCNC: 0.22 NG/ML — HIGH (ref 0–0.05)
RBC # BLD: 3.18 M/UL — LOW (ref 3.8–5.2)
RBC # FLD: 12.8 % — SIGNIFICANT CHANGE UP (ref 11–15)
SODIUM SERPL-SCNC: 139 MMOL/L — SIGNIFICANT CHANGE UP (ref 135–145)
TSH SERPL-MCNC: 0.67 UIU/ML — SIGNIFICANT CHANGE UP (ref 0.36–3.74)
VANCOMYCIN TROUGH SERPL-MCNC: 8.2 UG/ML — LOW (ref 10–20)
WBC # BLD: 22.3 K/UL — HIGH (ref 3.8–10.5)
WBC # FLD AUTO: 22.3 K/UL — HIGH (ref 3.8–10.5)

## 2017-03-17 PROCEDURE — 99233 SBSQ HOSP IP/OBS HIGH 50: CPT

## 2017-03-17 PROCEDURE — 71250 CT THORAX DX C-: CPT | Mod: 26

## 2017-03-17 RX ORDER — MEROPENEM 1 G/30ML
1000 INJECTION INTRAVENOUS ONCE
Qty: 0 | Refills: 0 | Status: COMPLETED | OUTPATIENT
Start: 2017-03-17 | End: 2017-03-17

## 2017-03-17 RX ORDER — MEROPENEM 1 G/30ML
INJECTION INTRAVENOUS
Qty: 0 | Refills: 0 | Status: DISCONTINUED | OUTPATIENT
Start: 2017-03-17 | End: 2017-03-22

## 2017-03-17 RX ORDER — CLOTRIMAZOLE 10 MG
1 TROCHE MUCOUS MEMBRANE
Qty: 0 | Refills: 0 | Status: DISCONTINUED | OUTPATIENT
Start: 2017-03-17 | End: 2017-03-22

## 2017-03-17 RX ORDER — MEROPENEM 1 G/30ML
1000 INJECTION INTRAVENOUS EVERY 8 HOURS
Qty: 0 | Refills: 0 | Status: DISCONTINUED | OUTPATIENT
Start: 2017-03-17 | End: 2017-03-22

## 2017-03-17 RX ADMIN — Medication 1 LOZENGE: at 18:32

## 2017-03-17 RX ADMIN — Medication 100 MILLIGRAM(S): at 22:09

## 2017-03-17 RX ADMIN — CEFTRIAXONE 100 GRAM(S): 500 INJECTION, POWDER, FOR SOLUTION INTRAMUSCULAR; INTRAVENOUS at 13:53

## 2017-03-17 RX ADMIN — Medication 100 MILLIGRAM(S): at 13:53

## 2017-03-17 RX ADMIN — Medication 325 MILLIGRAM(S): at 13:43

## 2017-03-17 RX ADMIN — MEROPENEM 200 MILLIGRAM(S): 1 INJECTION INTRAVENOUS at 18:31

## 2017-03-17 RX ADMIN — Medication 1 TABLET(S): at 13:43

## 2017-03-17 RX ADMIN — Medication 1 LOZENGE: at 22:09

## 2017-03-17 RX ADMIN — CARBIDOPA AND LEVODOPA 1 TABLET(S): 25; 100 TABLET ORAL at 05:27

## 2017-03-17 RX ADMIN — Medication 500 MILLIGRAM(S): at 18:32

## 2017-03-17 RX ADMIN — Medication 325 MILLIGRAM(S): at 08:33

## 2017-03-17 RX ADMIN — ENOXAPARIN SODIUM 40 MILLIGRAM(S): 100 INJECTION SUBCUTANEOUS at 05:27

## 2017-03-17 RX ADMIN — Medication 1 MILLIGRAM(S): at 13:43

## 2017-03-17 RX ADMIN — Medication 100 MILLIGRAM(S): at 05:27

## 2017-03-17 RX ADMIN — Medication 150 MILLIGRAM(S): at 20:44

## 2017-03-17 RX ADMIN — MEROPENEM 200 MILLIGRAM(S): 1 INJECTION INTRAVENOUS at 22:09

## 2017-03-17 RX ADMIN — Medication 1 LOZENGE: at 13:48

## 2017-03-17 RX ADMIN — Medication 150 MILLIGRAM(S): at 05:27

## 2017-03-17 RX ADMIN — SODIUM CHLORIDE 75 MILLILITER(S): 9 INJECTION, SOLUTION INTRAVENOUS at 02:38

## 2017-03-17 RX ADMIN — CARBIDOPA AND LEVODOPA 1 TABLET(S): 25; 100 TABLET ORAL at 18:35

## 2017-03-17 RX ADMIN — Medication 325 MILLIGRAM(S): at 18:32

## 2017-03-17 RX ADMIN — Medication 500 MILLIGRAM(S): at 05:27

## 2017-03-17 NOTE — PROGRESS NOTE ADULT - SUBJECTIVE AND OBJECTIVE BOX
INTERVAL HPI/OVERNIGHT EVENTS:    ASKED  BY  PT'S  SON  TO  TAKE OVER PT'S CARE .  PT  KNOWN  TO ME  FROM OFFICE  CHART  REVIEWED  DISCUSSED  WITH DR NO  AND  PT'S  SON  patient  presented  to  ER  with  Rt  hip  pain  found  to have  hip  fracture  surgically  reapird  by  ortho.  patient  with  anemia  post  operatively  requiring Transffsion PRBCs  also  treated with  vaco and  rocephin  for  cystitis  with  Leucoctosis  c/o  mouth pain has  ulceration  lower  lip  probably  from  ill  fitting  dentures      INTERVAL HPI/OVERNIGHT EVENTS:        REVIEW OF SYSTEMS:  CONSTITUTIONAL:  feels  better    NECK: No pain or stiffnes  RESPIRATORY: No SOB   CARDIOVASCULAR: No chest pain, palpitations, dizziness,   GASTROINTESTINAL: No abdominal pain. No nausea, vomiting,   NEUROLOGICAL: No headaches, no  blurry  vision no  dizziness  SKIN: No itching,   MUSCULOSKELETAL: No pain    MEDICATION:  enoxaparin Injectable 40milliGRAM(s) SubCutaneous every 24 hours  lactated ringers. 1000milliLiter(s) IV Continuous <Continuous>  acetaminophen   Tablet 650milliGRAM(s) Oral every 6 hours PRN  acetaminophen   Tablet. 650milliGRAM(s) Oral every 6 hours PRN  oxyCODONE IR 5milliGRAM(s) Oral every 4 hours PRN  aluminum hydroxide/magnesium hydroxide/simethicone Suspension 30milliLiter(s) Oral four times a day PRN  ondansetron Injectable 4milliGRAM(s) IV Push every 4 hours PRN  magnesium hydroxide Suspension 30milliLiter(s) Oral daily PRN  bisacodyl Suppository 10milliGRAM(s) Rectal daily PRN  docusate sodium 100milliGRAM(s) Oral three times a day  ferrous    sulfate 325milliGRAM(s) Oral three times a day with meals  folic acid 1milliGRAM(s) Oral daily  multivitamin 1Tablet(s) Oral daily  ascorbic acid 500milliGRAM(s) Oral two times a day  benzocaine 15 mG/menthol 3.6 mG Lozenge 1Lozenge Oral every 3 hours PRN  carbidopa/levodopa  25/100 1Tablet(s) Oral two times a day  cefTRIAXone   IVPB 1Gram(s) IV Intermittent every 24 hours  cefTRIAXone   IVPB  IV Intermittent   vancomycin  IVPB 750milliGRAM(s) IV Intermittent every 12 hours  vancomycin  IVPB  IV Intermittent     Vital Signs Last 24 Hrs  T(C): 37.6, Max: 38.2 (03-16 @ 19:27)  T(F): 99.6, Max: 100.7 (03-16 @ 19:27)  HR: 113 (91 - 116)  BP: 156/81 (123/63 - 158/78)  BP(mean): --  RR: 17 (16 - 20)  SpO2: 97% (94% - 99%)    PHYSICAL EXAM:  GENERAL: NAD, well-groomed, well-developed  EYES:  conjunctiva and sclera clear  ENMT:  Moist mucous membranes, ulceration  mucosal  aspect  rt  lower  lip  NECK: Supple, No JVD, Normal thyroid  NERVOUS SYSTEM:  Alert oriented   no  focal  deficits;   CHEST/LUNG: Clear    HEART: Regular rate and rhythm; No murmurs, rubs, or gallops  ABDOMEN: Soft, Nontender, Nondistended; Bowel sounds present  EXTREMITIES:  mild  tenderness  rt  hip  SKIN: No rashes   LABS:                        9.9    22.3  )-----------( 164      ( 17 Mar 2017 06:35 )             27.3     17 Mar 2017 06:35    139    |  104    |  19     ----------------------------<  114    3.6     |  23     |  0.55     Ca    8.0        17 Mar 2017 06:35  Mg     2.1       17 Mar 2017 06:35          CAPILLARY BLOOD GLUCOSE      RADIOLOGY & ADDITIONAL TESTS:    Imaging reports  Personally Reviewed:  [ x] YES  [ ] NO    Consultant(s) Notes Reviewed:  [x ] YES  [ ] NO    Care Discussed with Consultants/Other Providers [x ] YES  [ ] NO    Problem/Plan - 1:  ·  Problem: Closed fracture of right hip, initial encounter.  anlagesia  with  tylenol  deep vein thrombosis prophlaxis  physical therapy.     Problem/Plan - 2:  ·  Problem: Parkinson disease.  Plan: Continue Carbidopa/levodopa at home dose.     Problem/Plan - 3:  ·  Problem: HTN (hypertension), benign.  Plan: norvasc.     Problem/Plan - 4:  ·  Problem: Acute cystitis without hematuria.  Plan: rocephin  infectious disease consult for elevated wbc.     Problem/Plan - 5:  ·  Problem: Metabolic encephalopathy.  Plan: hold analgesics   continue to improve.     Problem/Plan - 6:  Problem: Anemia due to blood loss. observe  post  transfusion   oral  pain  ill  fitting  dentures  keep  dentures  out  might  need  to  modify  diet  consistancy

## 2017-03-17 NOTE — PROGRESS NOTE ADULT - PROBLEM SELECTOR PROBLEM 5
Metabolic encephalopathy
Metabolic encephalopathy
HTN (hypertension), benign
Metabolic encephalopathy
Tachycardia

## 2017-03-17 NOTE — PROGRESS NOTE ADULT - SUBJECTIVE AND OBJECTIVE BOX
Patient is a 86y old  Female who presents with a chief complaint of Right IT fracture (14 Mar 2017 07:00)      INTERVAL HPI / OVERNIGHT EVENTS:mental status improved, sitting up and talking. low grade fever but WBC still worsening    MEDICATIONS  (STANDING):  enoxaparin Injectable 40milliGRAM(s) SubCutaneous every 24 hours  docusate sodium 100milliGRAM(s) Oral three times a day  ferrous    sulfate 325milliGRAM(s) Oral three times a day with meals  folic acid 1milliGRAM(s) Oral daily  multivitamin 1Tablet(s) Oral daily  ascorbic acid 500milliGRAM(s) Oral two times a day  carbidopa/levodopa  25/100 1Tablet(s) Oral two times a day  vancomycin  IVPB 750milliGRAM(s) IV Intermittent every 12 hours  vancomycin  IVPB  IV Intermittent   clotrimazole Lozenge 1Lozenge Oral five times a day  meropenem IVPB 1000milliGRAM(s) IV Intermittent every 8 hours  meropenem IVPB  IV Intermittent   ALBUTerol    0.083% 2.5milliGRAM(s) Nebulizer every 6 hours    MEDICATIONS  (PRN):  acetaminophen   Tablet 650milliGRAM(s) Oral every 6 hours PRN For Temp greater than 38 C (100.4 F)  acetaminophen   Tablet. 650milliGRAM(s) Oral every 6 hours PRN headache  oxyCODONE IR 5milliGRAM(s) Oral every 4 hours PRN Mild Pain  aluminum hydroxide/magnesium hydroxide/simethicone Suspension 30milliLiter(s) Oral four times a day PRN Indigestion  ondansetron Injectable 4milliGRAM(s) IV Push every 4 hours PRN Nausea and/or Vomiting  magnesium hydroxide Suspension 30milliLiter(s) Oral daily PRN Constipation  bisacodyl Suppository 10milliGRAM(s) Rectal daily PRN If no bowel movement  benzocaine 15 mG/menthol 3.6 mG Lozenge 1Lozenge Oral every 3 hours PRN Sore Throat      Vital Signs Last 24 Hrs  Tmax 100.7  HR: 87 (82 - 92)  BP: 168/69 (133/57 - 168/69)  BP(mean): --  RR: 17 (16 - 17)  SpO2: 97% (94% - 97%)    PHYSICAL EXAM:    Constitutional: NAD, well-groomed, well-developed  Respiratory: CTAB/L  Cardiovascular: S1 and S2, RRR, no M/G/R  Gastrointestinal: BS+, soft, NT/ND  Extremities: No peripheral edema,Right thigh surgical wound in dressing  Vascular: 2+ peripheral pulses  Skin: No abnormal erythema or swelling of right thigh      LABS:             WBC 18-->22            MICROBIOLOGY:  RECENT CULTURES:  03-16 .Blood Blood XXXX XXXX   No growth to date.    03-13 .Urine Clean Catch (Midstream) Escherichia coli XXXX   >100,000 CFU/ml Escherichia coli          RADIOLOGY & ADDITIONAL STUDIES:

## 2017-03-17 NOTE — PROGRESS NOTE ADULT - SUBJECTIVE AND OBJECTIVE BOX
Patient is a 86y old  Female who presents with a chief complaint of Right IT fracture (14 Mar 2017 07:00)      PAST MEDICAL & SURGICAL HISTORY:  Tremors of nervous system  No pertinent past medical history  No significant past surgical history      INTERVAL HISTORY: OOB in chair, more awake today, no c/o sob or chest pain, + rt hip post op pain.  	  MEDICATIONS:  MEDICATIONS  (STANDING):  enoxaparin Injectable 40milliGRAM(s) SubCutaneous every 24 hours  lactated ringers. 1000milliLiter(s) IV Continuous <Continuous>  docusate sodium 100milliGRAM(s) Oral three times a day  ferrous    sulfate 325milliGRAM(s) Oral three times a day with meals  folic acid 1milliGRAM(s) Oral daily  multivitamin 1Tablet(s) Oral daily  ascorbic acid 500milliGRAM(s) Oral two times a day  carbidopa/levodopa  25/100 1Tablet(s) Oral two times a day  vancomycin  IVPB 750milliGRAM(s) IV Intermittent every 12 hours  vancomycin  IVPB  IV Intermittent   clotrimazole Lozenge 1Lozenge Oral five times a day  meropenem IVPB 1000milliGRAM(s) IV Intermittent once  meropenem IVPB 1000milliGRAM(s) IV Intermittent every 8 hours  meropenem IVPB  IV Intermittent     MEDICATIONS  (PRN):  acetaminophen   Tablet 650milliGRAM(s) Oral every 6 hours PRN For Temp greater than 38 C (100.4 F)  acetaminophen   Tablet. 650milliGRAM(s) Oral every 6 hours PRN headache  oxyCODONE IR 5milliGRAM(s) Oral every 4 hours PRN Mild Pain  aluminum hydroxide/magnesium hydroxide/simethicone Suspension 30milliLiter(s) Oral four times a day PRN Indigestion  ondansetron Injectable 4milliGRAM(s) IV Push every 4 hours PRN Nausea and/or Vomiting  magnesium hydroxide Suspension 30milliLiter(s) Oral daily PRN Constipation  bisacodyl Suppository 10milliGRAM(s) Rectal daily PRN If no bowel movement  benzocaine 15 mG/menthol 3.6 mG Lozenge 1Lozenge Oral every 3 hours PRN Sore Throat      Vitals:  T(F): 98.4, Max: 100.7 (03-16 @ 19:27)  HR: 90 (88 - 116)  BP: 133/57 (133/57 - 158/78)  RR: 16 (16 - 18)  SpO2: 94% (94% - 99%)    I & Os for current day (as of 03-17 @ 17:44)  =============================================  IN:    lactated ringers.: 1500 ml    Packed Red Blood Cells: 614 ml    Total IN: 2114 ml  ---------------------------------------------  OUT:    Total OUT: 0 ml  ---------------------------------------------  Total NET: 2114 ml      PHYSICAL EXAM:  Neuro: Awake, responsive  CV: S1 S2 RRR  Lungs: few rales to rt base  GI: Soft, BS +, ND, NT  Extremities: s/p Rt hip surgery    RADIOLOGY: 3/16/2017  No lung infiltration at this time.      LABS:	 	    17 Mar 2017 06:35    139    |  104    |  19     ----------------------------<  114    3.6     |  23     |  0.55   16 Mar 2017 06:02    143    |  107    |  24     ----------------------------<  108    3.5     |  24     |  0.67   15 Mar 2017 06:07    140    |  105    |  23     ----------------------------<  103    3.7     |  24     |  0.68     Ca    8.0        17 Mar 2017 06:35  Mg     2.1       17 Mar 2017 06:35                            9.9    22.3  )-----------( 164      ( 17 Mar 2017 06:35 )             27.3 ,                       7.6    18.2  )-----------( 160      ( 16 Mar 2017 06:02 )             21.3 ,                       8.2    15.1  )-----------( 173      ( 15 Mar 2017 06:07 )             24.1       TSH: Thyroid Stimulating Hormone, Serum: 0.665 uIU/mL (03-17 @ 06:35)

## 2017-03-17 NOTE — PROGRESS NOTE ADULT - PROBLEM SELECTOR PLAN 5
HR improving  likely due to blood loss/pain induced/infection  pain control/PRBC transfusion/ABx
c/w Norvasc  BP controlled
hold analgesics   continue to improve
hold analgesics   ct if no response

## 2017-03-17 NOTE — PROGRESS NOTE ADULT - ATTENDING COMMENTS
PT dvt px
PT. Dvt px
Tachyarrhythmias likely secondary to combination of anemia and infection. Otherwise appears hemodynamically stable and in no significant distress. No evidence of acute cardiac disease, will follow only as needed.
bleeding likely cause of tachycardia.  Cv appears stable otherwise.  continue current Rx.

## 2017-03-18 DIAGNOSIS — S72.001S FRACTURE OF UNSPECIFIED PART OF NECK OF RIGHT FEMUR, SEQUELA: ICD-10-CM

## 2017-03-18 DIAGNOSIS — A41.9 SEPSIS, UNSPECIFIED ORGANISM: ICD-10-CM

## 2017-03-18 LAB
ALBUMIN SERPL ELPH-MCNC: 1.7 G/DL — LOW (ref 3.3–5)
ALP SERPL-CCNC: 73 U/L — SIGNIFICANT CHANGE UP (ref 40–120)
ALT FLD-CCNC: 6 U/L — LOW (ref 12–78)
ANION GAP SERPL CALC-SCNC: 11 MMOL/L — SIGNIFICANT CHANGE UP (ref 5–17)
AST SERPL-CCNC: 17 U/L — SIGNIFICANT CHANGE UP (ref 15–37)
BASOPHILS # BLD AUTO: 0.1 K/UL — SIGNIFICANT CHANGE UP (ref 0–0.2)
BASOPHILS NFR BLD AUTO: 0.4 % — SIGNIFICANT CHANGE UP (ref 0–2)
BILIRUB SERPL-MCNC: 0.8 MG/DL — SIGNIFICANT CHANGE UP (ref 0.2–1.2)
BUN SERPL-MCNC: 18 MG/DL — SIGNIFICANT CHANGE UP (ref 7–23)
CALCIUM SERPL-MCNC: 7.9 MG/DL — LOW (ref 8.5–10.1)
CHLORIDE SERPL-SCNC: 105 MMOL/L — SIGNIFICANT CHANGE UP (ref 96–108)
CO2 SERPL-SCNC: 25 MMOL/L — SIGNIFICANT CHANGE UP (ref 22–31)
CREAT SERPL-MCNC: 0.48 MG/DL — LOW (ref 0.5–1.3)
EOSINOPHIL # BLD AUTO: 0.4 K/UL — SIGNIFICANT CHANGE UP (ref 0–0.5)
EOSINOPHIL NFR BLD AUTO: 1.9 % — SIGNIFICANT CHANGE UP (ref 0–6)
GLUCOSE SERPL-MCNC: 78 MG/DL — SIGNIFICANT CHANGE UP (ref 70–99)
HCT VFR BLD CALC: 26.6 % — LOW (ref 34.5–45)
HGB BLD-MCNC: 9.5 G/DL — LOW (ref 11.5–15.5)
LYMPHOCYTES # BLD AUTO: 1.5 K/UL — SIGNIFICANT CHANGE UP (ref 1–3.3)
LYMPHOCYTES # BLD AUTO: 7.5 % — LOW (ref 13–44)
MCHC RBC-ENTMCNC: 31.1 PG — SIGNIFICANT CHANGE UP (ref 27–34)
MCHC RBC-ENTMCNC: 35.8 GM/DL — SIGNIFICANT CHANGE UP (ref 32–36)
MCV RBC AUTO: 86.6 FL — SIGNIFICANT CHANGE UP (ref 80–100)
MONOCYTES # BLD AUTO: 1 K/UL — HIGH (ref 0–0.9)
MONOCYTES NFR BLD AUTO: 5.2 % — SIGNIFICANT CHANGE UP (ref 2–14)
NEUTROPHILS # BLD AUTO: 17 K/UL — HIGH (ref 1.8–7.4)
NEUTROPHILS NFR BLD AUTO: 85 % — HIGH (ref 43–77)
PLATELET # BLD AUTO: 148 K/UL — LOW (ref 150–400)
POTASSIUM SERPL-MCNC: 3.4 MMOL/L — LOW (ref 3.5–5.3)
POTASSIUM SERPL-SCNC: 3.4 MMOL/L — LOW (ref 3.5–5.3)
PROT SERPL-MCNC: 7 GM/DL — SIGNIFICANT CHANGE UP (ref 6–8.3)
RBC # BLD: 3.07 M/UL — LOW (ref 3.8–5.2)
RBC # FLD: 12.9 % — SIGNIFICANT CHANGE UP (ref 11–15)
SODIUM SERPL-SCNC: 141 MMOL/L — SIGNIFICANT CHANGE UP (ref 135–145)
WBC # BLD: 20 K/UL — HIGH (ref 3.8–10.5)
WBC # FLD AUTO: 20 K/UL — HIGH (ref 3.8–10.5)

## 2017-03-18 RX ORDER — ALBUTEROL 90 UG/1
2.5 AEROSOL, METERED ORAL EVERY 6 HOURS
Qty: 0 | Refills: 0 | Status: DISCONTINUED | OUTPATIENT
Start: 2017-03-18 | End: 2017-03-20

## 2017-03-18 RX ORDER — ALBUTEROL 90 UG/1
2.5 AEROSOL, METERED ORAL
Qty: 0 | Refills: 0 | Status: DISCONTINUED | OUTPATIENT
Start: 2017-03-18 | End: 2017-03-18

## 2017-03-18 RX ADMIN — MEROPENEM 200 MILLIGRAM(S): 1 INJECTION INTRAVENOUS at 14:34

## 2017-03-18 RX ADMIN — ALBUTEROL 2.5 MILLIGRAM(S): 90 AEROSOL, METERED ORAL at 15:30

## 2017-03-18 RX ADMIN — MEROPENEM 200 MILLIGRAM(S): 1 INJECTION INTRAVENOUS at 05:07

## 2017-03-18 RX ADMIN — Medication 1 LOZENGE: at 16:35

## 2017-03-18 RX ADMIN — Medication 1 TABLET(S): at 12:09

## 2017-03-18 RX ADMIN — Medication 1 LOZENGE: at 08:17

## 2017-03-18 RX ADMIN — Medication 1 MILLIGRAM(S): at 12:09

## 2017-03-18 RX ADMIN — Medication 100 MILLIGRAM(S): at 21:52

## 2017-03-18 RX ADMIN — Medication 325 MILLIGRAM(S): at 17:41

## 2017-03-18 RX ADMIN — ALBUTEROL 2.5 MILLIGRAM(S): 90 AEROSOL, METERED ORAL at 18:39

## 2017-03-18 RX ADMIN — Medication 100 MILLIGRAM(S): at 14:34

## 2017-03-18 RX ADMIN — CARBIDOPA AND LEVODOPA 1 TABLET(S): 25; 100 TABLET ORAL at 05:08

## 2017-03-18 RX ADMIN — ENOXAPARIN SODIUM 40 MILLIGRAM(S): 100 INJECTION SUBCUTANEOUS at 05:09

## 2017-03-18 RX ADMIN — Medication 1 LOZENGE: at 12:08

## 2017-03-18 RX ADMIN — Medication 325 MILLIGRAM(S): at 08:17

## 2017-03-18 RX ADMIN — Medication 500 MILLIGRAM(S): at 05:08

## 2017-03-18 RX ADMIN — Medication 500 MILLIGRAM(S): at 17:41

## 2017-03-18 RX ADMIN — Medication 325 MILLIGRAM(S): at 12:08

## 2017-03-18 RX ADMIN — Medication 1 LOZENGE: at 01:11

## 2017-03-18 RX ADMIN — Medication 1 LOZENGE: at 20:26

## 2017-03-18 RX ADMIN — Medication 150 MILLIGRAM(S): at 05:48

## 2017-03-18 RX ADMIN — Medication 1 LOZENGE: at 23:35

## 2017-03-18 RX ADMIN — Medication 150 MILLIGRAM(S): at 17:41

## 2017-03-18 RX ADMIN — MEROPENEM 200 MILLIGRAM(S): 1 INJECTION INTRAVENOUS at 21:52

## 2017-03-18 RX ADMIN — CARBIDOPA AND LEVODOPA 1 TABLET(S): 25; 100 TABLET ORAL at 17:41

## 2017-03-18 NOTE — CONSULT NOTE ADULT - SUBJECTIVE AND OBJECTIVE BOX
Infectious Diseases - Attending at Dr. Flynn    HPI:  86y Female community ambulatory presents c/o R hip pain and inability to ambulate sp mechanical fall. Denies HS/LOC. Denies numbness/tingling. Denies fever/chills. Denies pain/injury elsewhere. Patient is Khmer speakin, history obtained via  phone and from son Kyle over the telephone. Patient lives as home with son. He states she is very independent and only takes carbidopa/levodopa for parkinsons disease, denies use of any blood thinners. (13 Mar 2017 02:25)  Pt speaks [place]se only but currently confused, lethargic. Aid at bedside, no fever.  Pt is POD 3 for the right hip surgery. Cough fever resolving, UA and UC + UTI ,pt on rocephin      PAST MEDICAL & SURGICAL HISTORY:  Tremors of nervous system  No pertinent past medical history  No significant past surgical history      Allergies    No Known Allergies    Intolerances        FAMILY HISTORY:  No pertinent family history in first degree relatives      SOCIAL HISTORY: No smoking, alcoholism    REVIEW OF SYSTEMS:    unable to obtain     MEDICATIONS  (STANDING):  enoxaparin Injectable 40milliGRAM(s) SubCutaneous every 24 hours  lactated ringers. 1000milliLiter(s) IV Continuous <Continuous>  docusate sodium 100milliGRAM(s) Oral three times a day  ferrous    sulfate 325milliGRAM(s) Oral three times a day with meals  folic acid 1milliGRAM(s) Oral daily  multivitamin 1Tablet(s) Oral daily  ascorbic acid 500milliGRAM(s) Oral two times a day  carbidopa/levodopa  25/100 1Tablet(s) Oral two times a day  cefTRIAXone   IVPB 1Gram(s) IV Intermittent every 24 hours  cefTRIAXone   IVPB  IV Intermittent   vancomycin  IVPB 750milliGRAM(s) IV Intermittent every 12 hours  vancomycin  IVPB  IV Intermittent   clotrimazole Lozenge 1Lozenge Oral five times a day    MEDICATIONS  (PRN):  acetaminophen   Tablet 650milliGRAM(s) Oral every 6 hours PRN For Temp greater than 38 C (100.4 F)  acetaminophen   Tablet. 650milliGRAM(s) Oral every 6 hours PRN headache  oxyCODONE IR 5milliGRAM(s) Oral every 4 hours PRN Mild Pain  aluminum hydroxide/magnesium hydroxide/simethicone Suspension 30milliLiter(s) Oral four times a day PRN Indigestion  ondansetron Injectable 4milliGRAM(s) IV Push every 4 hours PRN Nausea and/or Vomiting  magnesium hydroxide Suspension 30milliLiter(s) Oral daily PRN Constipation  bisacodyl Suppository 10milliGRAM(s) Rectal daily PRN If no bowel movement  benzocaine 15 mG/menthol 3.6 mG Lozenge 1Lozenge Oral every 3 hours PRN Sore Throat      Vital Signs Last 24 Hrs  T(C): 37.6, Max: 38.2 (03-16 @ 19:27)  T(F): 99.6, Max: 100.7 (03-16 @ 19:27)  HR: 112 (91 - 116)  BP: 155/82 (123/63 - 158/78)  BP(mean): --  RR: 17 (17 - 20)  SpO2: 96% (95% - 99%)    PHYSICAL EXAM:    Constitutional: NAD, somnolent, confused  HEENT: PERRL.  Neck: No LAD, No JVD  Respiratory: CTAB/L  Cardiovascular: S1 and S2, RRR, no M/G/R  Gastrointestinal: BS+, soft, NT/ND  Extremities: No peripheral edema  Vascular: 2+ peripheral pulses  Neurological: A/O x 3, no focal deficits  Skin: right hip s/p surgery      LABS:                     WBC 15--> 18  Cr WNL          MICROBIOLOGY:  RECENT CULTURES:  03-16 .Blood Blood XXXX XXXX   No growth to date.    03-13 .Urine Clean Catch (Midstream) Escherichia coli XXXX   >100,000 CFU/ml Escherichia coli          RADIOLOGY & ADDITIONAL STUDIES:
86y Female community ambulatory presents c/o R hip pain and inability to ambulate sp mechanical fall. Denies HS/LOC. Denies numbness/tingling. Denies fever/chills. Denies pain/injury elsewhere. Patient is Syrian speakin, history obtained via  phone and from son Kyle over the telephone. Patient lives as home with son. He states she is very independent and only takes medication for parkinsons disease, denies use of any blood thinners.     HEALTH ISSUES - PROBLEM Dx:    Parkinsons    MEDICATIONS  (STANDING):  morphine  - Injectable 4milliGRAM(s) IV Push Once    Allergies    No Known Allergies    Intolerances                              11.9   7.9   )-----------( 230      ( 12 Mar 2017 22:02 )             35.3     12 Mar 2017 22:02    139    |  103    |  29     ----------------------------<  106    3.7     |  25     |  0.81     Ca    8.6        12 Mar 2017 22:02    TPro  10.0   /  Alb  3.0    /  TBili  0.4    /  DBili  x      /  AST  16     /  ALT  16     /  AlkPhos  65     12 Mar 2017 22:02    PT/INR - ( 12 Mar 2017 22:02 )   PT: 13.7 sec;   INR: 1.22 ratio         PTT - ( 12 Mar 2017 22:02 )  PTT:35.8 sec  Vital Signs Last 24 Hrs  T(C): 36.5, Max: 36.5 (03-12 @ 20:48)  T(F): 97.7, Max: 97.7 (03-12 @ 20:48)  HR: 96 (96 - 103)  BP: 175/82 (175/82 - 212/118)  BP(mean): --  RR: 19 (19 - 20)  SpO2: 96% (96% - 96%)    Imaging: XR demonstates R hip fracture    Physical Exam  Gen: NAD  R/L LE: skin intact, short/ER leg, unable to SLR R LE, + log roll R LE, +ttp hip/groin, no ttp elsewhere, +ehl/fhl/ta/gs function, no calf ttp, dp/pt pulse intact, compartments soft  Secondary survey: benign, nv intact, able to SLR contralateral leg, negative log roll contralateral leg, no bony ttp elsewhere
CARDIOLOGY CONSULT NOTE    Patient is a 86y Female with a known history of :  Parkinson disease (G20)  Closed fracture of right hip, initial encounter (S72.001A)    HPI:  86y Female, generally ambulatory who presented to ER s/p mechanical fall with c/o severe right hip pain and inability to ambulate.  Denies chest pain, dyspnea, palpitations or LOC. Denied fever/chills, pain/injury elsewhere. Patient is Singaporean speakin, history obtained from chart and patient. Patient lives as home with son. He states she is very independent and only takes carbidopa/levodopa for Parkinsons disease, denies use of any blood thinners. Followed by Dr. Hakeem Schultz.    REVIEW OF SYSTEMS:    CONSTITUTIONAL: No fever, weight loss, or fatigue  EYES: No eye pain, visual disturbances, or discharge  ENMT:  No difficulty hearing, tinnitus, vertigo; No sinus or throat pain  NECK: No pain or stiffness  BREASTS: No pain, masses, or nipple discharge  RESPIRATORY: No cough, wheezing, chills or hemoptysis; No shortness of breath  CARDIOVASCULAR: No chest pain, palpitations, dizziness, or leg swelling  GASTROINTESTINAL: No abdominal or epigastric pain. No nausea, vomiting, or hematemesis; No diarrhea or constipation. No melena or hematochezia.  GENITOURINARY: No dysuria, frequency, hematuria, or incontinence  NEUROLOGICAL: No headaches, memory loss, loss of strength, numbness, or tremors  SKIN: No itching, burning, rashes, or lesions   LYMPH NODES: No enlarged glands  ENDOCRINE: No heat or cold intolerance; No hair loss  MUSCULOSKELETAL: Severe right hip pain.  PSYCHIATRIC: No depression, anxiety, mood swings, or difficulty sleeping  HEME/LYMPH: No easy bruising, or bleeding gums  ALLERGY AND IMMUNOLOGIC: No hives or eczema    MEDICATIONS  (STANDING):  sodium chloride 0.9%. 1000milliLiter(s) IV Continuous <Continuous>  carbidopa/levodopa  25/100 1Tablet(s) Oral two times a day    MEDICATIONS  (PRN):  oxyCODONE IR 5milliGRAM(s) Oral every 4 hours PRN Mild Pain (1 - 3)  oxyCODONE IR 10milliGRAM(s) Oral every 4 hours PRN Moderate Pain (4 - 6)  HYDROmorphone  Injectable 0.5milliGRAM(s) IV Push every 3 hours PRN Severe Pain (7 - 10)  cyclobenzaprine 5milliGRAM(s) Oral three times a day PRN Muscle Spasm  benzocaine 15 mG/menthol 3.6 mG Lozenge 1Lozenge Oral every 2 hours PRN Sore Throat  diphenhydrAMINE   Capsule 25milliGRAM(s) Oral every 4 hours PRN Rash and/or Itching      ALLERGIES: No Known Allergies    FAMILY HISTORY: No pertinent family history in first degree relatives      PHYSICAL EXAMINATION:  -----------------------------  T(C): 36.2, Max: 36.7 (03-13 @ 08:01)  HR: 77 (77 - 103)  BP: 149/73 (149/73 - 212/118)  RR: 15 (15 - 20)  SpO2: 94% (94% - 97%)  Wt(kg): --    Height (cm): 157.5 (03-12 @ 20:48)  Weight (kg): 66.5 (03-13 @ 09:35)  BMI (kg/m2): 26.8 (03-13 @ 09:35)  BSA (m2): 1.68 (03-13 @ 09:35)    Constitutional: well developed, normal appearance, well groomed, well nourished, no deformities and no acute distress.   Eyes: the conjunctiva exhibited no abnormalities and the eyelids demonstrated no xanthelasmas.   HEENT: normal oral mucosa, no oral pallor and no oral cyanosis.   Neck: normal jugular venous A waves present, normal jugular venous V waves present and no jugular venous salomon A waves.   Pulmonary: no respiratory distress, normal respiratory rhythm and effort, no accessory muscle use and lungs were clear to auscultation bilaterally.   Cardiovascular: heart rate and rhythm were normal, normal S1 and S2 and no murmur, gallop, rub, heave or thrill are present.   Abdomen: soft, non-tender, no hepato-splenomegaly and no abdominal mass palpated.   Musculoskeletal: the gait could not be assessed..   Extremities: no clubbing of the fingernails, no localized cyanosis, no petechial hemorrhages and no ischemic changes.   Skin: normal skin color and pigmentation, no rash, no venous stasis, no skin lesions, no skin ulcer and no xanthoma was observed.   Psychiatric: oriented to person, place, and time, the affect was normal, the mood was normal and not feeling anxious.     LABS:   --------  13 Mar 2017 05:58    142    |  105    |  28     ----------------------------<  110    4.2     |  25     |  0.80     Ca    8.7        13 Mar 2017 05:58    TPro  10.0   /  Alb  3.0    /  TBili  0.4    /  DBili  x      /  AST  16     /  ALT  16     /  AlkPhos  65     12 Mar 2017 22:02                         10.9   9.9   )-----------( 222      ( 13 Mar 2017 05:58 )             32.8     PT/INR - ( 13 Mar 2017 05:58 )   PT: 14.6 sec;   INR: 1.30 ratio         PTT - ( 13 Mar 2017 05:58 )  PTT:36.9 sec    03-13 @ 11:41 CPK total:--, CKMB --, Troponin I - <.015 ng/mL  03-12 @ 22:02 CPK total:--, CKMB --, Troponin I - <.015 ng/mL      RADIOLOGY:  -----------------    EXAM:  CHEST SINGLE VIEW                            PROCEDURE DATE:  03/12/2017        INTERPRETATION:  Portable chest x-ray    Indication: Hip pain. Hip fracture.    Portable chest x-ray is compared to a previous examination dated   9/12/2016.    Impression: New mild central pulmonary congestion congestion.    No evidence for pulmonary consolidation, pleural effusion or pneumothorax.    New small atelectasis in the right lower lung zone.    The cardiac silhouette magnified by AP technique.              ISRRAEL REGAN M.D., ATTENDING RADIOLOGIST  This document has been electronically signed. Mar 13 2017  8:48AM              ECG: NSR, normal axis and intervals
Patient is a 86y old  Female was admitted with right intertrochanteric fracture.    HPI:  86 year female with Parkinsonism admitted s/p mechanical fall and found to have Right intertrochanteric fracture. Underwent intramedullary nailing  for fracture on 03/13/17. Post op course complicated due to  acute cystitis with hematuria, metabolic encephalopathy and leukocytosis. Seen by ID and is on antibiotics. Ct chest has shown right sided pneumonia.  Prior to admission is reported to be independent with ambulation and lives with son.    PAST MEDICAL & SURGICAL HISTORY:  Tremors of nervous system  Parkinsons.    FAMILY HISTORY:  No pertinent family history in first degree relatives    SOCIAL HISTORY: not available.    Allergies  No Known Allergies    MEDICATIONS  (STANDING):  enoxaparin Injectable 40milliGRAM(s) SubCutaneous every 24 hours  docusate sodium 100milliGRAM(s) Oral three times a day  ferrous    sulfate 325milliGRAM(s) Oral three times a day with meals  folic acid 1milliGRAM(s) Oral daily  multivitamin 1Tablet(s) Oral daily  ascorbic acid 500milliGRAM(s) Oral two times a day  carbidopa/levodopa  25/100 1Tablet(s) Oral two times a day  vancomycin  IVPB 750milliGRAM(s) IV Intermittent every 12 hours  vancomycin  IVPB  IV Intermittent   clotrimazole Lozenge 1Lozenge Oral five times a day  meropenem IVPB 1000milliGRAM(s) IV Intermittent every 8 hours  meropenem IVPB  IV Intermittent   ALBUTerol    0.083% 2.5milliGRAM(s) Nebulizer every 6 hours    MEDICATIONS  (PRN):  acetaminophen   Tablet 650milliGRAM(s) Oral every 6 hours PRN For Temp greater than 38 C (100.4 F)  acetaminophen   Tablet. 650milliGRAM(s) Oral every 6 hours PRN headache  oxyCODONE IR 5milliGRAM(s) Oral every 4 hours PRN Mild Pain  aluminum hydroxide/magnesium hydroxide/simethicone Suspension 30milliLiter(s) Oral four times a day PRN Indigestion  ondansetron Injectable 4milliGRAM(s) IV Push every 4 hours PRN Nausea and/or Vomiting  magnesium hydroxide Suspension 30milliLiter(s) Oral daily PRN Constipation  bisacodyl Suppository 10milliGRAM(s) Rectal daily PRN If no bowel movement  benzocaine 15 mG/menthol 3.6 mG Lozenge 1Lozenge Oral every 3 hours PRN Sore Throat    REVIEW OF SYSTEMS: Some what confused , not able to provide.    Vital Signs Last 24 Hrs  T(C): 37.1, Max: 37.7 (03-17 @ 18:14)  T(F): 98.8, Max: 99.8 (03-17 @ 18:14)  HR: 88 (82 - 92)  BP: 134/62 (134/62 - 168/69)  BP(mean): --  RR: 17 (16 - 17)  SpO2: 93% (92% - 97%)    PHYSICAL EXAM:  GEN:        Awake, responsive and comfortable.  HEENT:    Normal.    RESP:     decreased air entry.  CVS:        Regular rate and rhythm.   ABD:        Soft, non-tender, non-distended;   :           No costovertebral angle tenderness  SKIN:         Warm and dry.  EXTR:          No clubbing, cyanosis or edema  CNS:            Intact sensory and motor function.  PSYCH:        cooperative, no anxiety or depression    LABS:                        9.5    20.0  )-----------( 148      ( 18 Mar 2017 07:19 )             26.6     18 Mar 2017 07:19    141    |  105    |  18     ----------------------------<  78     3.4     |  25     |  0.48     Ca    7.9        18 Mar 2017 07:19  Mg     2.1       17 Mar 2017 06:35    TPro  7.0    /  Alb  1.7    /  TBili  0.8    /  DBili  x      /  AST  17     /  ALT  6      /  AlkPhos  73     18 Mar 2017 07:19    EKG: sinus rhythm.    RADIOLOGY & ADDITIONAL STUDIES:    EXAM:  CT CHEST                            PROCEDURE DATE:  03/17/2017        INTERPRETATION:  CLINICAL HISTORY:  Sepsis    Multiple axial images of the chest were obtained from the lung apices   through upper abdomen without the administration of intravascular   contrast. Reformatted coronal and sagittal images are submitted.    COMPARISON: None    FINDINGS:    Right lower paratracheal lymph nodes measure up to 1.0 cm in size.   Subcentimeter anterior mediastinal lymph nodes are noted. There is no   evidence for left hilar region lymphadenopathy. Evaluation of the right   hilar region is precluded secondary to airspace consolidation within the   adjacent lung parenchyma. There is no evidence for axillary   lymphadenopathy. There is cardiomegaly. No pericardial effusion is   identified. Thoracic aortic caliber demonstrates unremarkable caliber.   Coronary arterial calcification is identified.    The central bronchial anatomy appears patent.    Dense airspace consolidation is identified within the right lower lobe as   well as medial/inferior right middle lobe. A 5 mm nodular opacities   identified within the posterior left lower lobe lung parenchyma. A small   right pleural effusion is noted. There is no evidence for left pleural   effusion. No pneumothorax is demonstrated. No mediastinal shift is noted.    Imaging of the upper abdomen demonstrates an unremarkable appearance to   the bilateral adrenal glands. Degenerative changes of the thoracic and   lumbar spine noted.     IMPRESSION:  Dense airspace consolidation is identified within the right   lower lobe as well as inferior/medial right middle lobe lung parenchyma   consistent with right-sided pneumonia. A small right pleural effusion is   noted.    THAI BOB   This document has been electronically signed. Mar 17 2017  8:03PM      ASSESSMENT AND PLAN:  ·	Multifocal pneumonia.  ·	Right pleural effusion.  ·	Cystitis with hematuria.  ·	Leukocytosis.  ·	Anemia.  ·	Parkinson's disease.    Will continue broad spectrum antibiotics.  Continue nebulizer.  Aspiration precautions.  Incentive spirometry.  Ambulate with assistance if possible.  DVT prophylaxis.  Pain control.

## 2017-03-18 NOTE — PROGRESS NOTE ADULT - SUBJECTIVE AND OBJECTIVE BOX
INTERVAL HPI/OVERNIGHT EVENTS:        REVIEW OF SYSTEMS:  CONSTITUTIONAL:  c/o  poor  appetite    NECK: No pain or stiffnes  RESPIRATORY: No SOB   CARDIOVASCULAR: No chest pain, palpitations, dizziness,   GASTROINTESTINAL: No abdominal pain. No nausea, vomiting,   NEUROLOGICAL: No headaches, no  blurry  vision no  dizziness  SKIN: No itching,   MUSCULOSKELETAL:mild  hip  pain    MEDICATION:  enoxaparin Injectable 40milliGRAM(s) SubCutaneous every 24 hours  acetaminophen   Tablet 650milliGRAM(s) Oral every 6 hours PRN  acetaminophen   Tablet. 650milliGRAM(s) Oral every 6 hours PRN  oxyCODONE IR 5milliGRAM(s) Oral every 4 hours PRN  aluminum hydroxide/magnesium hydroxide/simethicone Suspension 30milliLiter(s) Oral four times a day PRN  ondansetron Injectable 4milliGRAM(s) IV Push every 4 hours PRN  magnesium hydroxide Suspension 30milliLiter(s) Oral daily PRN  bisacodyl Suppository 10milliGRAM(s) Rectal daily PRN  docusate sodium 100milliGRAM(s) Oral three times a day  ferrous    sulfate 325milliGRAM(s) Oral three times a day with meals  folic acid 1milliGRAM(s) Oral daily  multivitamin 1Tablet(s) Oral daily  ascorbic acid 500milliGRAM(s) Oral two times a day  benzocaine 15 mG/menthol 3.6 mG Lozenge 1Lozenge Oral every 3 hours PRN  carbidopa/levodopa  25/100 1Tablet(s) Oral two times a day  vancomycin  IVPB 750milliGRAM(s) IV Intermittent every 12 hours  vancomycin  IVPB  IV Intermittent   clotrimazole Lozenge 1Lozenge Oral five times a day  meropenem IVPB 1000milliGRAM(s) IV Intermittent every 8 hours  meropenem IVPB  IV Intermittent     Vital Signs Last 24 Hrs  T(C): 37.3, Max: 37.7 (03-17 @ 18:14)  T(F): 99.2, Max: 99.8 (03-17 @ 18:14)  HR: 82 (82 - 112)  BP: 138/68 (133/57 - 164/78)  BP(mean): --  RR: 16 (16 - 17)  SpO2: 97% (94% - 97%)    PHYSICAL EXAM:  GENERAL: NAD, well-groomed, well-developed  EYES:  conjunctiva and sclera clear  ENMT:  Moist mucous membranes,   NECK: Supple, No JVD, Normal thyroid  NERVOUS SYSTEM:  Alert oriented   no  focal  deficits;   CHEST/LUNG:  decreased  bs  rt  base   HEART: Regular rate and rhythm; No murmurs, rubs, or gallops  ABDOMEN: Soft, Nontender, Nondistended; Bowel sounds present  EXTREMITIES:  no  edema no  tenderness  SKIN: No rashes   LABS:                        9.5    20.0  )-----------( 148      ( 18 Mar 2017 07:19 )             26.6     18 Mar 2017 07:19    141    |  105    |  18     ----------------------------<  78     3.4     |  25     |  0.48     Ca    7.9        18 Mar 2017 07:19  Mg     2.1       17 Mar 2017 06:35    TPro  7.0    /  Alb  1.7    /  TBili  0.8    /  DBili  x      /  AST  17     /  ALT  6      /  AlkPhos  73     18 Mar 2017 07:19        CAPILLARY BLOOD GLUCOSE      RADIOLOGY & ADDITIONAL TESTS:    Imaging reports  Personally Reviewed:  [x ] YES  [ ] NO    Consultant(s) Notes Reviewed:  [ x] YES  [ ] NO    Care Discussed with Consultants/Other Providers [x ] YES  [ ] NO      Problem: Closed fracture of right hip, initial encounter.  anlagesia  with  tylenol  deep vein thrombosis prophlaxis  physical therapy.     Problem/Plan - 2:  ·  Problem: Parkinson disease.  Plan: Continue Carbidopa/levodopa at home dose.     Problem/Plan - 3:  ·  Problem: HTN (hypertension), benign.  Plan: norvasc.     Problem/Plan - 4:  ·  Problem: Acute cystitis without hematuria.  Plan: vanco  meropenem  as per  ID  Pneumonia  add  prove\ntil  nebulizer  for  pulmonary  evaluation  i    Problem/Plan - 5:  ·  Problem: Metabolic encephalopathy.  Plan: hold analgesics   continue to improve.     Problem/Plan - 6:

## 2017-03-19 DIAGNOSIS — J69.0 PNEUMONITIS DUE TO INHALATION OF FOOD AND VOMIT: ICD-10-CM

## 2017-03-19 LAB
ALBUMIN SERPL ELPH-MCNC: 1.7 G/DL — LOW (ref 3.3–5)
ALP SERPL-CCNC: 64 U/L — SIGNIFICANT CHANGE UP (ref 40–120)
ALT FLD-CCNC: 9 U/L — LOW (ref 12–78)
ANION GAP SERPL CALC-SCNC: 12 MMOL/L — SIGNIFICANT CHANGE UP (ref 5–17)
AST SERPL-CCNC: 22 U/L — SIGNIFICANT CHANGE UP (ref 15–37)
BASOPHILS # BLD AUTO: 0.1 K/UL — SIGNIFICANT CHANGE UP (ref 0–0.2)
BASOPHILS NFR BLD AUTO: 0.7 % — SIGNIFICANT CHANGE UP (ref 0–2)
BILIRUB SERPL-MCNC: 0.7 MG/DL — SIGNIFICANT CHANGE UP (ref 0.2–1.2)
BUN SERPL-MCNC: 18 MG/DL — SIGNIFICANT CHANGE UP (ref 7–23)
CALCIUM SERPL-MCNC: 7.9 MG/DL — LOW (ref 8.5–10.1)
CHLORIDE SERPL-SCNC: 103 MMOL/L — SIGNIFICANT CHANGE UP (ref 96–108)
CO2 SERPL-SCNC: 23 MMOL/L — SIGNIFICANT CHANGE UP (ref 22–31)
CREAT SERPL-MCNC: 0.54 MG/DL — SIGNIFICANT CHANGE UP (ref 0.5–1.3)
EOSINOPHIL # BLD AUTO: 0.1 K/UL — SIGNIFICANT CHANGE UP (ref 0–0.5)
EOSINOPHIL NFR BLD AUTO: 0.6 % — SIGNIFICANT CHANGE UP (ref 0–6)
GLUCOSE SERPL-MCNC: 111 MG/DL — HIGH (ref 70–99)
HCT VFR BLD CALC: 27.7 % — LOW (ref 34.5–45)
HGB BLD-MCNC: 9.7 G/DL — LOW (ref 11.5–15.5)
LYMPHOCYTES # BLD AUTO: 1.2 K/UL — SIGNIFICANT CHANGE UP (ref 1–3.3)
LYMPHOCYTES # BLD AUTO: 6.9 % — LOW (ref 13–44)
MCHC RBC-ENTMCNC: 30.4 PG — SIGNIFICANT CHANGE UP (ref 27–34)
MCHC RBC-ENTMCNC: 35.1 GM/DL — SIGNIFICANT CHANGE UP (ref 32–36)
MCV RBC AUTO: 86.6 FL — SIGNIFICANT CHANGE UP (ref 80–100)
MONOCYTES # BLD AUTO: 1.1 K/UL — HIGH (ref 0–0.9)
MONOCYTES NFR BLD AUTO: 6 % — SIGNIFICANT CHANGE UP (ref 2–14)
NEUTROPHILS # BLD AUTO: 15.2 K/UL — HIGH (ref 1.8–7.4)
NEUTROPHILS NFR BLD AUTO: 85.8 % — HIGH (ref 43–77)
PLATELET # BLD AUTO: 159 K/UL — SIGNIFICANT CHANGE UP (ref 150–400)
POTASSIUM SERPL-MCNC: 3.8 MMOL/L — SIGNIFICANT CHANGE UP (ref 3.5–5.3)
POTASSIUM SERPL-SCNC: 3.8 MMOL/L — SIGNIFICANT CHANGE UP (ref 3.5–5.3)
PROT SERPL-MCNC: 7.5 GM/DL — SIGNIFICANT CHANGE UP (ref 6–8.3)
RBC # BLD: 3.2 M/UL — LOW (ref 3.8–5.2)
RBC # FLD: 13.2 % — SIGNIFICANT CHANGE UP (ref 11–15)
SODIUM SERPL-SCNC: 138 MMOL/L — SIGNIFICANT CHANGE UP (ref 135–145)
WBC # BLD: 17.8 K/UL — HIGH (ref 3.8–10.5)
WBC # FLD AUTO: 17.8 K/UL — HIGH (ref 3.8–10.5)

## 2017-03-19 PROCEDURE — 99233 SBSQ HOSP IP/OBS HIGH 50: CPT

## 2017-03-19 RX ORDER — VANCOMYCIN HCL 1 G
1000 VIAL (EA) INTRAVENOUS EVERY 12 HOURS
Qty: 0 | Refills: 0 | Status: DISCONTINUED | OUTPATIENT
Start: 2017-03-19 | End: 2017-03-21

## 2017-03-19 RX ORDER — CLOTRIMAZOLE AND BETAMETHASONE DIPROPIONATE 10; .5 MG/G; MG/G
1 CREAM TOPICAL
Qty: 0 | Refills: 0 | Status: DISCONTINUED | OUTPATIENT
Start: 2017-03-19 | End: 2017-03-22

## 2017-03-19 RX ADMIN — CARBIDOPA AND LEVODOPA 1 TABLET(S): 25; 100 TABLET ORAL at 05:30

## 2017-03-19 RX ADMIN — ALBUTEROL 2.5 MILLIGRAM(S): 90 AEROSOL, METERED ORAL at 17:20

## 2017-03-19 RX ADMIN — Medication 500 MILLIGRAM(S): at 05:30

## 2017-03-19 RX ADMIN — Medication 150 MILLIGRAM(S): at 05:30

## 2017-03-19 RX ADMIN — Medication 100 MILLIGRAM(S): at 05:30

## 2017-03-19 RX ADMIN — ALBUTEROL 2.5 MILLIGRAM(S): 90 AEROSOL, METERED ORAL at 06:00

## 2017-03-19 RX ADMIN — MEROPENEM 200 MILLIGRAM(S): 1 INJECTION INTRAVENOUS at 06:09

## 2017-03-19 RX ADMIN — Medication 500 MILLIGRAM(S): at 17:11

## 2017-03-19 RX ADMIN — Medication 650 MILLIGRAM(S): at 11:00

## 2017-03-19 RX ADMIN — CLOTRIMAZOLE AND BETAMETHASONE DIPROPIONATE 1 APPLICATION(S): 10; .5 CREAM TOPICAL at 17:10

## 2017-03-19 RX ADMIN — ALBUTEROL 2.5 MILLIGRAM(S): 90 AEROSOL, METERED ORAL at 12:37

## 2017-03-19 RX ADMIN — Medication 100 MILLIGRAM(S): at 21:03

## 2017-03-19 RX ADMIN — ALBUTEROL 2.5 MILLIGRAM(S): 90 AEROSOL, METERED ORAL at 23:43

## 2017-03-19 RX ADMIN — Medication 1 LOZENGE: at 12:11

## 2017-03-19 RX ADMIN — Medication 1 LOZENGE: at 08:00

## 2017-03-19 RX ADMIN — Medication 325 MILLIGRAM(S): at 12:10

## 2017-03-19 RX ADMIN — Medication 250 MILLIGRAM(S): at 17:10

## 2017-03-19 RX ADMIN — ALBUTEROL 2.5 MILLIGRAM(S): 90 AEROSOL, METERED ORAL at 00:59

## 2017-03-19 RX ADMIN — MEROPENEM 200 MILLIGRAM(S): 1 INJECTION INTRAVENOUS at 21:03

## 2017-03-19 RX ADMIN — ENOXAPARIN SODIUM 40 MILLIGRAM(S): 100 INJECTION SUBCUTANEOUS at 05:31

## 2017-03-19 RX ADMIN — Medication 650 MILLIGRAM(S): at 10:00

## 2017-03-19 RX ADMIN — Medication 325 MILLIGRAM(S): at 08:00

## 2017-03-19 RX ADMIN — Medication 1 MILLIGRAM(S): at 12:11

## 2017-03-19 RX ADMIN — CARBIDOPA AND LEVODOPA 1 TABLET(S): 25; 100 TABLET ORAL at 17:10

## 2017-03-19 RX ADMIN — Medication 1 TABLET(S): at 12:11

## 2017-03-19 RX ADMIN — Medication 1 LOZENGE: at 15:58

## 2017-03-19 RX ADMIN — MEROPENEM 200 MILLIGRAM(S): 1 INJECTION INTRAVENOUS at 15:57

## 2017-03-19 RX ADMIN — Medication 100 MILLIGRAM(S): at 15:57

## 2017-03-19 RX ADMIN — Medication 1 LOZENGE: at 21:00

## 2017-03-19 RX ADMIN — Medication 325 MILLIGRAM(S): at 17:10

## 2017-03-19 NOTE — PROGRESS NOTE ADULT - SUBJECTIVE AND OBJECTIVE BOX
INTERVAL HPI/OVERNIGHT EVENTS:  86 year female with Parkinsonism admitted s/p mechanical fall and found to have Right intertrochanteric fracture. Underwent intramedullary nailing  for fracture on 03/13/17. Post op course complicated due to  acute cystitis with hematuria, metabolic encephalopathy and leukocytosis. Seen by ID and is on antibiotics. Ct chest has shown right sided pneumonia.  Prior to admission is reported to be independent with ambulation and lives with son.  Awake, comfortable and feels better.    Vital Signs Last 24 Hrs  T(C): 36.1, Max: 37.7 (03-19 @ 00:06)  T(F): 97, Max: 99.8 (03-19 @ 00:06)  HR: 98 (91 - 115)  BP: 117/66 (115/64 - 156/46)  BP(mean): --  RR: 16 (16 - 17)  SpO2: 95% (92% - 96%)    PHYSICAL EXAM:  GEN:       Awake, responsive and comfortable.  HEENT:    Normal.    RESP:      no wheezing  CVS:          Regular rate and rhythm.   ABD:         Soft, non-tender, non-distended;     MEDICATIONS  (STANDING):  enoxaparin Injectable 40milliGRAM(s) SubCutaneous every 24 hours  docusate sodium 100milliGRAM(s) Oral three times a day  ferrous    sulfate 325milliGRAM(s) Oral three times a day with meals  folic acid 1milliGRAM(s) Oral daily  multivitamin 1Tablet(s) Oral daily  ascorbic acid 500milliGRAM(s) Oral two times a day  carbidopa/levodopa  25/100 1Tablet(s) Oral two times a day  clotrimazole Lozenge 1Lozenge Oral five times a day  meropenem IVPB 1000milliGRAM(s) IV Intermittent every 8 hours  meropenem IVPB  IV Intermittent   ALBUTerol    0.083% 2.5milliGRAM(s) Nebulizer every 6 hours  clotrimazole/betamethasone Cream 1Application(s) Topical two times a day  vancomycin  IVPB 1000milliGRAM(s) IV Intermittent every 12 hours    MEDICATIONS  (PRN):  acetaminophen   Tablet 650milliGRAM(s) Oral every 6 hours PRN For Temp greater than 38 C (100.4 F)  acetaminophen   Tablet. 650milliGRAM(s) Oral every 6 hours PRN headache  oxyCODONE IR 5milliGRAM(s) Oral every 4 hours PRN Mild Pain  aluminum hydroxide/magnesium hydroxide/simethicone Suspension 30milliLiter(s) Oral four times a day PRN Indigestion  ondansetron Injectable 4milliGRAM(s) IV Push every 4 hours PRN Nausea and/or Vomiting  magnesium hydroxide Suspension 30milliLiter(s) Oral daily PRN Constipation  bisacodyl Suppository 10milliGRAM(s) Rectal daily PRN If no bowel movement  benzocaine 15 mG/menthol 3.6 mG Lozenge 1Lozenge Oral every 3 hours PRN Sore Throat    LABS:                        9.7    17.8  )-----------( 159      ( 19 Mar 2017 05:31 )             27.7     19 Mar 2017 05:31    138    |  103    |  18     ----------------------------<  111    3.8     |  23     |  0.54     Ca    7.9        19 Mar 2017 05:31    TPro  7.5    /  Alb  1.7    /  TBili  0.7    /  DBili  x      /  AST  22     /  ALT  9      /  AlkPhos  64     19 Mar 2017 05:31    ASSESSMENT AND PLAN:  ·	Multifocal pneumonia.  ·	Right pleural effusion.  ·	Cystitis with hematuria.  ·	Leukocytosis.  ·	Anemia.  ·	Parkinson's disease.      Continue antibiotics and nebulizer.

## 2017-03-19 NOTE — PROGRESS NOTE ADULT - SUBJECTIVE AND OBJECTIVE BOX
INTERVAL HPI/OVERNIGHT EVENTS:        REVIEW OF SYSTEMS:  CONSTITUTIONAL:  L  hip  pain  pruritus  to  rt  neck  area    NECK: No pain or stiffnes  RESPIRATORY: No SOB   CARDIOVASCULAR: No chest pain, palpitations, dizziness,   GASTROINTESTINAL: No abdominal pain. No nausea, vomiting,   NEUROLOGICAL: No headaches, no  blurry  vision no  dizziness  SKIN: No itching,   MUSCULOSKELETAL: No pain    MEDICATION:  enoxaparin Injectable 40milliGRAM(s) SubCutaneous every 24 hours  acetaminophen   Tablet 650milliGRAM(s) Oral every 6 hours PRN  acetaminophen   Tablet. 650milliGRAM(s) Oral every 6 hours PRN  oxyCODONE IR 5milliGRAM(s) Oral every 4 hours PRN  aluminum hydroxide/magnesium hydroxide/simethicone Suspension 30milliLiter(s) Oral four times a day PRN  ondansetron Injectable 4milliGRAM(s) IV Push every 4 hours PRN  magnesium hydroxide Suspension 30milliLiter(s) Oral daily PRN  bisacodyl Suppository 10milliGRAM(s) Rectal daily PRN  docusate sodium 100milliGRAM(s) Oral three times a day  ferrous    sulfate 325milliGRAM(s) Oral three times a day with meals  folic acid 1milliGRAM(s) Oral daily  multivitamin 1Tablet(s) Oral daily  ascorbic acid 500milliGRAM(s) Oral two times a day  benzocaine 15 mG/menthol 3.6 mG Lozenge 1Lozenge Oral every 3 hours PRN  carbidopa/levodopa  25/100 1Tablet(s) Oral two times a day  vancomycin  IVPB 750milliGRAM(s) IV Intermittent every 12 hours  vancomycin  IVPB  IV Intermittent   clotrimazole Lozenge 1Lozenge Oral five times a day  meropenem IVPB 1000milliGRAM(s) IV Intermittent every 8 hours  meropenem IVPB  IV Intermittent   ALBUTerol    0.083% 2.5milliGRAM(s) Nebulizer every 6 hours    Vital Signs Last 24 Hrs  T(C): 37.7, Max: 37.7 (03-19 @ 00:06)  T(F): 99.8, Max: 99.8 (03-19 @ 00:06)  HR: 103 (88 - 115)  BP: 156/46 (134/62 - 156/46)  BP(mean): --  RR: 16 (16 - 17)  SpO2: 96% (92% - 96%)    PHYSICAL EXAM:  GENERAL: NAD, well-groomed, well-developed  EYES:  conjunctiva and sclera clear  ENMT:  Moist mucous membranes,   NECK: Supple, No JVD, Normal thyroid  NERVOUS SYSTEM:  Alert oriented   no  focal  deficits;   CHEST/LUNG: Clear    HEART: Regular rate and rhythm; No murmurs, rubs, or gallops  ABDOMEN: Soft, Nontender, Nondistended; Bowel sounds present  EXTREMITIES:  no  edema no  tenderness  SKIN: ERYTHEMATOUS  MACERATED  RASH  BASE  OF  RT  NECK  LABS:                        9.7    17.8  )-----------( 159      ( 19 Mar 2017 05:31 )             27.7     19 Mar 2017 05:31    138    |  103    |  18     ----------------------------<  111    3.8     |  23     |  0.54     Ca    7.9        19 Mar 2017 05:31    TPro  7.5    /  Alb  1.7    /  TBili  0.7    /  DBili  x      /  AST  22     /  ALT  9      /  AlkPhos  64     19 Mar 2017 05:31        CAPILLARY BLOOD GLUCOSE      RADIOLOGY & ADDITIONAL TESTS:    Imaging reports  Personally Reviewed:  [ x] YES  [ ] NO    Consultant(s) Notes Reviewed:  [x ] YES  [ ] NO    Care Discussed with Consultants/Other Providers [ x] YES  [ ] NO    PNEUMONIA  MULTIFOCAL  IMPROVING  WITH  MEROPENEM  VANCO NEBULIZER  O2    Problem: Closed fracture of right hip, initial encounter.  anlagesia  with  tylenol  deep vein thrombosis prophlaxis  physical therapy.     Problem/Plan - 2:  ·  Problem: Parkinson disease.  Plan: Continue Carbidopa/levodopa at home dose.     Problem/Plan - 3:  ·  Problem: HTN (hypertension), benign.  Plan: norvasc.     Problem/Plan - 4:  ·  Problem: Acute cystitis without hematuria.  Plan: vanco  meropenem  as per  ID  Pneumonia  add  prove nebulizer  for  pulmonary  evaluation  i    TINEA  CORPORIS   LOTRISONE  CREAM

## 2017-03-19 NOTE — CHART NOTE - NSCHARTNOTEFT_GEN_A_CORE
orthopedics    dressing changed  incision well appearing, no erythema, no discharge  new aquacel applied

## 2017-03-19 NOTE — PROGRESS NOTE ADULT - SUBJECTIVE AND OBJECTIVE BOX
Patient is a 86y old  Female who presents with a chief complaint of Right IT fracture (14 Mar 2017 07:00)      INTERVAL HPI / OVERNIGHT EVENTS:pt seen and examined ,mental status sig better, fever resolved    MEDICATIONS  (STANDING):  enoxaparin Injectable 40milliGRAM(s) SubCutaneous every 24 hours  docusate sodium 100milliGRAM(s) Oral three times a day  ferrous    sulfate 325milliGRAM(s) Oral three times a day with meals  folic acid 1milliGRAM(s) Oral daily  multivitamin 1Tablet(s) Oral daily  ascorbic acid 500milliGRAM(s) Oral two times a day  carbidopa/levodopa  25/100 1Tablet(s) Oral two times a day  clotrimazole Lozenge 1Lozenge Oral five times a day  meropenem IVPB 1000milliGRAM(s) IV Intermittent every 8 hours  meropenem IVPB  IV Intermittent   ALBUTerol    0.083% 2.5milliGRAM(s) Nebulizer every 6 hours  clotrimazole/betamethasone Cream 1Application(s) Topical two times a day  vancomycin  IVPB 1000milliGRAM(s) IV Intermittent every 12 hours    MEDICATIONS  (PRN):  acetaminophen   Tablet 650milliGRAM(s) Oral every 6 hours PRN For Temp greater than 38 C (100.4 F)  acetaminophen   Tablet. 650milliGRAM(s) Oral every 6 hours PRN headache  oxyCODONE IR 5milliGRAM(s) Oral every 4 hours PRN Mild Pain  aluminum hydroxide/magnesium hydroxide/simethicone Suspension 30milliLiter(s) Oral four times a day PRN Indigestion  ondansetron Injectable 4milliGRAM(s) IV Push every 4 hours PRN Nausea and/or Vomiting  magnesium hydroxide Suspension 30milliLiter(s) Oral daily PRN Constipation  bisacodyl Suppository 10milliGRAM(s) Rectal daily PRN If no bowel movement  benzocaine 15 mG/menthol 3.6 mG Lozenge 1Lozenge Oral every 3 hours PRN Sore Throat      Vital Signs Last 24 Hrs  T(C): 36.6, Max: 37.7 (03-19 @ 00:06)  T(F): 97.8, Max: 99.8 (03-19 @ 00:06)  HR: 102 (98 - 115)  BP: 152/75 (115/64 - 156/46)  BP(mean): --  RR: 17 (16 - 17)  SpO2: 96% (94% - 96%)    PHYSICAL EXAM:    Constitutional: NAD, well-groomed, well-developed  Respiratory: CTAB/L  Cardiovascular: S1 and S2, RRR, no M/G/R  Gastrointestinal: BS+, soft, NT/ND  Extremities: No peripheral edema  Vascular: 2+ peripheral pulses  Skin: right leg SSI clean      LABS:                        9.7    17.8  )-----------( 159      ( 19 Mar 2017 05:31 )             27.7     19 Mar 2017 05:31    138    |  103    |  18     ----------------------------<  111    3.8     |  23     |  0.54     Ca    7.9        19 Mar 2017 05:31    TPro  7.5    /  Alb  1.7    /  TBili  0.7    /  DBili  x      /  AST  22     /  ALT  9      /  AlkPhos  64     19 Mar 2017 05:31            MICROBIOLOGY:  RECENT CULTURES:  03-16 .Blood Blood XXXX XXXX   No growth to date.      03-13 .Urine Clean Catch (Midstream) Escherichia coli XXXX   >100,000 CFU/ml Escherichia coli          RADIOLOGY & ADDITIONAL STUDIES:    Ct chest    IMPRESSION:  Dense airspace consolidation is identified within the right   lower lobe as well as inferior/medial right middle lobe lung parenchyma   consistent with right-sided pneumonia. A small right pleural effusion is   noted.

## 2017-03-20 LAB
ALBUMIN SERPL ELPH-MCNC: 1.8 G/DL — LOW (ref 3.3–5)
ALP SERPL-CCNC: 61 U/L — SIGNIFICANT CHANGE UP (ref 40–120)
ALT FLD-CCNC: 13 U/L — SIGNIFICANT CHANGE UP (ref 12–78)
ANION GAP SERPL CALC-SCNC: 14 MMOL/L — SIGNIFICANT CHANGE UP (ref 5–17)
AST SERPL-CCNC: 30 U/L — SIGNIFICANT CHANGE UP (ref 15–37)
BASE EXCESS BLDA CALC-SCNC: 2.1 MMOL/L — HIGH (ref -2–2)
BASOPHILS # BLD AUTO: 0.1 K/UL — SIGNIFICANT CHANGE UP (ref 0–0.2)
BASOPHILS NFR BLD AUTO: 0.9 % — SIGNIFICANT CHANGE UP (ref 0–2)
BILIRUB SERPL-MCNC: 0.7 MG/DL — SIGNIFICANT CHANGE UP (ref 0.2–1.2)
BLOOD GAS COMMENTS: SIGNIFICANT CHANGE UP
BLOOD GAS COMMENTS: SIGNIFICANT CHANGE UP
BLOOD GAS SOURCE: SIGNIFICANT CHANGE UP
BUN SERPL-MCNC: 18 MG/DL — SIGNIFICANT CHANGE UP (ref 7–23)
CALCIUM SERPL-MCNC: 8.1 MG/DL — LOW (ref 8.5–10.1)
CHLORIDE SERPL-SCNC: 102 MMOL/L — SIGNIFICANT CHANGE UP (ref 96–108)
CO2 SERPL-SCNC: 22 MMOL/L — SIGNIFICANT CHANGE UP (ref 22–31)
CREAT SERPL-MCNC: 0.51 MG/DL — SIGNIFICANT CHANGE UP (ref 0.5–1.3)
EOSINOPHIL # BLD AUTO: 0.1 K/UL — SIGNIFICANT CHANGE UP (ref 0–0.5)
EOSINOPHIL NFR BLD AUTO: 0.8 % — SIGNIFICANT CHANGE UP (ref 0–6)
GLUCOSE SERPL-MCNC: 116 MG/DL — HIGH (ref 70–99)
HCO3 BLDA-SCNC: 24 MMOL/L — SIGNIFICANT CHANGE UP (ref 21–29)
HCT VFR BLD CALC: 28.2 % — LOW (ref 34.5–45)
HGB BLD-MCNC: 10.1 G/DL — LOW (ref 11.5–15.5)
HOROWITZ INDEX BLDA+IHG-RTO: 28 — SIGNIFICANT CHANGE UP
LYMPHOCYTES # BLD AUTO: 1 K/UL — SIGNIFICANT CHANGE UP (ref 1–3.3)
LYMPHOCYTES # BLD AUTO: 7.1 % — LOW (ref 13–44)
MCHC RBC-ENTMCNC: 31.3 PG — SIGNIFICANT CHANGE UP (ref 27–34)
MCHC RBC-ENTMCNC: 35.9 GM/DL — SIGNIFICANT CHANGE UP (ref 32–36)
MCV RBC AUTO: 87 FL — SIGNIFICANT CHANGE UP (ref 80–100)
MONOCYTES # BLD AUTO: 1 K/UL — HIGH (ref 0–0.9)
MONOCYTES NFR BLD AUTO: 7 % — SIGNIFICANT CHANGE UP (ref 2–14)
NEUTROPHILS # BLD AUTO: 12.2 K/UL — HIGH (ref 1.8–7.4)
NEUTROPHILS NFR BLD AUTO: 84.1 % — HIGH (ref 43–77)
PCO2 BLDA: 31 MMHG — LOW (ref 32–46)
PH BLD: 7.51 — HIGH (ref 7.35–7.45)
PLATELET # BLD AUTO: 188 K/UL — SIGNIFICANT CHANGE UP (ref 150–400)
PO2 BLDA: 99 MMHG — SIGNIFICANT CHANGE UP (ref 74–108)
POTASSIUM SERPL-MCNC: 3.8 MMOL/L — SIGNIFICANT CHANGE UP (ref 3.5–5.3)
POTASSIUM SERPL-SCNC: 3.8 MMOL/L — SIGNIFICANT CHANGE UP (ref 3.5–5.3)
PROT SERPL-MCNC: 7.9 GM/DL — SIGNIFICANT CHANGE UP (ref 6–8.3)
RBC # BLD: 3.24 M/UL — LOW (ref 3.8–5.2)
RBC # FLD: 13.3 % — SIGNIFICANT CHANGE UP (ref 11–15)
SAO2 % BLDA: 98 % — HIGH (ref 92–96)
SODIUM SERPL-SCNC: 138 MMOL/L — SIGNIFICANT CHANGE UP (ref 135–145)
WBC # BLD: 14.4 K/UL — HIGH (ref 3.8–10.5)
WBC # FLD AUTO: 14.4 K/UL — HIGH (ref 3.8–10.5)

## 2017-03-20 PROCEDURE — 71275 CT ANGIOGRAPHY CHEST: CPT | Mod: 26

## 2017-03-20 RX ORDER — DIPHENHYDRAMINE HYDROCHLORIDE AND LIDOCAINE HYDROCHLORIDE AND ALUMINUM HYDROXIDE AND MAGNESIUM HYDRO
5 KIT EVERY 4 HOURS
Qty: 0 | Refills: 0 | Status: DISCONTINUED | OUTPATIENT
Start: 2017-03-20 | End: 2017-03-22

## 2017-03-20 RX ORDER — IPRATROPIUM/ALBUTEROL SULFATE 18-103MCG
3 AEROSOL WITH ADAPTER (GRAM) INHALATION EVERY 6 HOURS
Qty: 0 | Refills: 0 | Status: DISCONTINUED | OUTPATIENT
Start: 2017-03-20 | End: 2017-03-22

## 2017-03-20 RX ORDER — NYSTATIN CREAM 100000 [USP'U]/G
1 CREAM TOPICAL
Qty: 0 | Refills: 0 | Status: DISCONTINUED | OUTPATIENT
Start: 2017-03-20 | End: 2017-03-22

## 2017-03-20 RX ORDER — MAGNESIUM HYDROXIDE 400 MG/1
30 TABLET, CHEWABLE ORAL DAILY
Qty: 0 | Refills: 0 | Status: DISCONTINUED | OUTPATIENT
Start: 2017-03-20 | End: 2017-03-22

## 2017-03-20 RX ORDER — PETROLATUM,WHITE
1 JELLY (GRAM) TOPICAL EVERY 6 HOURS
Qty: 0 | Refills: 0 | Status: DISCONTINUED | OUTPATIENT
Start: 2017-03-20 | End: 2017-03-22

## 2017-03-20 RX ADMIN — ALBUTEROL 2.5 MILLIGRAM(S): 90 AEROSOL, METERED ORAL at 11:12

## 2017-03-20 RX ADMIN — Medication 1 TABLET(S): at 11:35

## 2017-03-20 RX ADMIN — CARBIDOPA AND LEVODOPA 1 TABLET(S): 25; 100 TABLET ORAL at 05:25

## 2017-03-20 RX ADMIN — Medication 325 MILLIGRAM(S): at 11:35

## 2017-03-20 RX ADMIN — OXYCODONE HYDROCHLORIDE 5 MILLIGRAM(S): 5 TABLET ORAL at 11:10

## 2017-03-20 RX ADMIN — NYSTATIN CREAM 1 APPLICATION(S): 100000 CREAM TOPICAL at 18:55

## 2017-03-20 RX ADMIN — MEROPENEM 200 MILLIGRAM(S): 1 INJECTION INTRAVENOUS at 06:54

## 2017-03-20 RX ADMIN — DIPHENHYDRAMINE HYDROCHLORIDE AND LIDOCAINE HYDROCHLORIDE AND ALUMINUM HYDROXIDE AND MAGNESIUM HYDRO 5 MILLILITER(S): KIT at 23:29

## 2017-03-20 RX ADMIN — Medication 1 LOZENGE: at 23:30

## 2017-03-20 RX ADMIN — CLOTRIMAZOLE AND BETAMETHASONE DIPROPIONATE 1 APPLICATION(S): 10; .5 CREAM TOPICAL at 05:26

## 2017-03-20 RX ADMIN — Medication 100 MILLIGRAM(S): at 05:25

## 2017-03-20 RX ADMIN — CLOTRIMAZOLE AND BETAMETHASONE DIPROPIONATE 1 APPLICATION(S): 10; .5 CREAM TOPICAL at 18:55

## 2017-03-20 RX ADMIN — Medication 1 LOZENGE: at 08:02

## 2017-03-20 RX ADMIN — Medication 500 MILLIGRAM(S): at 18:54

## 2017-03-20 RX ADMIN — ENOXAPARIN SODIUM 40 MILLIGRAM(S): 100 INJECTION SUBCUTANEOUS at 05:25

## 2017-03-20 RX ADMIN — ALBUTEROL 2.5 MILLIGRAM(S): 90 AEROSOL, METERED ORAL at 05:57

## 2017-03-20 RX ADMIN — Medication 1 MILLIGRAM(S): at 11:35

## 2017-03-20 RX ADMIN — MEROPENEM 200 MILLIGRAM(S): 1 INJECTION INTRAVENOUS at 13:15

## 2017-03-20 RX ADMIN — Medication 250 MILLIGRAM(S): at 05:12

## 2017-03-20 RX ADMIN — Medication 1 LOZENGE: at 18:54

## 2017-03-20 RX ADMIN — ALBUTEROL 2.5 MILLIGRAM(S): 90 AEROSOL, METERED ORAL at 17:55

## 2017-03-20 RX ADMIN — Medication 250 MILLIGRAM(S): at 18:55

## 2017-03-20 RX ADMIN — Medication 3 MILLILITER(S): at 23:12

## 2017-03-20 RX ADMIN — Medication 1 APPLICATION(S): at 23:30

## 2017-03-20 RX ADMIN — Medication 1 LOZENGE: at 11:35

## 2017-03-20 RX ADMIN — CARBIDOPA AND LEVODOPA 1 TABLET(S): 25; 100 TABLET ORAL at 18:54

## 2017-03-20 RX ADMIN — Medication 325 MILLIGRAM(S): at 18:55

## 2017-03-20 RX ADMIN — Medication 500 MILLIGRAM(S): at 05:25

## 2017-03-20 RX ADMIN — Medication 325 MILLIGRAM(S): at 08:02

## 2017-03-20 RX ADMIN — OXYCODONE HYDROCHLORIDE 5 MILLIGRAM(S): 5 TABLET ORAL at 10:18

## 2017-03-20 RX ADMIN — MEROPENEM 200 MILLIGRAM(S): 1 INJECTION INTRAVENOUS at 22:39

## 2017-03-20 NOTE — PROGRESS NOTE ADULT - SUBJECTIVE AND OBJECTIVE BOX
INTERVAL HPI/OVERNIGHT EVENTS:        REVIEW OF SYSTEMS:  CONSTITUTIONAL:  feels  better  +  fatigue    NECK: No pain or stiffnes  RESPIRATORY: No SOB +  cough  CARDIOVASCULAR: No chest pain, palpitations, dizziness,   GASTROINTESTINAL: No abdominal pain. No nausea, vomiting,   NEUROLOGICAL: No headaches, no  blurry  vision no  dizziness  SKIN: No itching,   MUSCULOSKELETAL: l  hip  pain    MEDICATION:  enoxaparin Injectable 40milliGRAM(s) SubCutaneous every 24 hours  acetaminophen   Tablet 650milliGRAM(s) Oral every 6 hours PRN  acetaminophen   Tablet. 650milliGRAM(s) Oral every 6 hours PRN  oxyCODONE IR 5milliGRAM(s) Oral every 4 hours PRN  aluminum hydroxide/magnesium hydroxide/simethicone Suspension 30milliLiter(s) Oral four times a day PRN  ondansetron Injectable 4milliGRAM(s) IV Push every 4 hours PRN  magnesium hydroxide Suspension 30milliLiter(s) Oral daily PRN  bisacodyl Suppository 10milliGRAM(s) Rectal daily PRN  docusate sodium 100milliGRAM(s) Oral three times a day  ferrous    sulfate 325milliGRAM(s) Oral three times a day with meals  folic acid 1milliGRAM(s) Oral daily  multivitamin 1Tablet(s) Oral daily  ascorbic acid 500milliGRAM(s) Oral two times a day  benzocaine 15 mG/menthol 3.6 mG Lozenge 1Lozenge Oral every 3 hours PRN  carbidopa/levodopa  25/100 1Tablet(s) Oral two times a day  clotrimazole Lozenge 1Lozenge Oral five times a day  meropenem IVPB 1000milliGRAM(s) IV Intermittent every 8 hours  meropenem IVPB  IV Intermittent   ALBUTerol    0.083% 2.5milliGRAM(s) Nebulizer every 6 hours  clotrimazole/betamethasone Cream 1Application(s) Topical two times a day  vancomycin  IVPB 1000milliGRAM(s) IV Intermittent every 12 hours    Vital Signs Last 24 Hrs  T(C): 37.1, Max: 37.1 (03-19 @ 23:54)  T(F): 98.8, Max: 98.8 (03-19 @ 23:54)  HR: 97 (97 - 112)  BP: 159/87 (115/64 - 159/87)  BP(mean): --  RR: 16 (16 - 17)  SpO2: 96% (93% - 96%)    PHYSICAL EXAM:  GENERAL: NAD, well-groomed, well-developed  EYES:  conjunctiva and sclera clear  ENMT:  Moist mucous membranes,   NECK: Supple, No JVD, Normal thyroid  NERVOUS SYSTEM:  Alert oriented   no  focal  deficits;   CHEST/LUNG: Clear    HEART: Regular rate and rhythm; No murmurs, rubs, or gallops  ABDOMEN: Soft, Nontender, Nondistended; Bowel sounds present  EXTREMITIES:  no  edema no  tenderness  SKIN: No rashes   LABS:                        10.1   14.4  )-----------( 188      ( 20 Mar 2017 05:28 )             28.2     20 Mar 2017 05:28    138    |  102    |  18     ----------------------------<  116    3.8     |  22     |  0.51     Ca    8.1        20 Mar 2017 05:28    TPro  7.9    /  Alb  1.8    /  TBili  0.7    /  DBili  x      /  AST  30     /  ALT  13     /  AlkPhos  61     20 Mar 2017 05:28        CAPILLARY BLOOD GLUCOSE      RADIOLOGY & ADDITIONAL TESTS:    Imaging reports  Personally Reviewed:  [x ] YES  [ ] NO    Consultant(s) Notes Reviewed:  [x ] YES  [ ] NO    Care Discussed with Consultants/Other Providers [x ] YES  [ ] NO  PNEUMONIA  MULTIFOCAL  IMPROVING  WITH  MEROPENEM  VANCO NEBULIZER  O2    Problem: Closed fracture of right hip, initial encounter.  anlagesia  with  tylenol  deep vein thrombosis prophlaxis  physical therapy.     Problem/Plan - 2:  ·  Problem: Parkinson disease.  Plan: Continue Carbidopa/levodopa at home dose.     Problem/Plan - 3:  ·  Problem: HTN (hypertension), benign.  Plan: norvasc.     Problem/Plan - 4:  ·  Problem: Acute cystitis without hematuria.  Plan: vanco  meropenem  as per  ID  Pneumonia  add  prove nebulizer    continue  present  treatment  plan   monitor  labs  discharge t o  rehab in  AM

## 2017-03-20 NOTE — CHART NOTE - NSCHARTNOTEFT_GEN_A_CORE
Called by RN to assess patients lip. Patient's son Iglesia is at bedside.    He states her lip has gotten worse since her operation here on 3/13. She complains of pain and burning to her lip/mouth. Patient has not been wearing her dentures for 3 days now and is unable to eat due to pain.    Patient with abrasion/open sore to corner of Right mouth, dried blood present on lip and over bottom gums. A few red macules noted around the base of the lower lip.    Magic Mouthwash and petroleum jelly ordered.

## 2017-03-20 NOTE — PROGRESS NOTE ADULT - SUBJECTIVE AND OBJECTIVE BOX
Called to see patient for tachycardia 's  and decreased O2 saturation, 90% room air  EKG ST no changes from previous ekg  Temp (R) 99.8    Patient is a 86y old  Female who presents with a chief complaint of Right IT fracture (14 Mar 2017 07:00)      INTERVAL HPI/OVERNIGHT EVENTS:  T(C): 37.7, Max: 37.7 (03-20 @ 19:10)  HR: 120 (84 - 121)  BP: 154/92 (140/69 - 159/87)  RR: 20 (16 - 20)  SpO2: 96% (91% - 98%)  Wt(kg): --  I&O's Summary  I & Os for 24h ending 20 Mar 2017 07:00  =============================================  IN: 690 ml / OUT: 0 ml / NET: 690 ml    I & Os for current day (as of 20 Mar 2017 20:30)  =============================================  IN: 460 ml / OUT: 0 ml / NET: 460 ml      LABS:                        10.1   14.4  )-----------( 188      ( 20 Mar 2017 05:28 )             28.2     20 Mar 2017 05:28    138    |  102    |  18     ----------------------------<  116    3.8     |  22     |  0.51     Ca    8.1        20 Mar 2017 05:28    TPro  7.9    /  Alb  1.8    /  TBili  0.7    /  DBili  x      /  AST  30     /  ALT  13     /  AlkPhos  61     20 Mar 2017 05:28        CAPILLARY BLOOD GLUCOSE      ABG - ( 20 Mar 2017 19:35 )  pH: x     /  pCO2: 31    /  pO2: 99    / HCO3: 24    / Base Excess: 2.1   /  SaO2: 98                        PHYSICAL EXAM:  GENERAL: NAD, Pt alert, not answering questions       CHEST/LUNG: + Rhonchi   HEART: Regular rate and rhythm; + murmur   ABDOMEN: Soft, Nontender, Nondistended; Bowel sounds present  EXTREMITIES:  2+ Peripheral Pulses, + edema   LYMPH: No lymphadenopathy noted  SKIN: Rash right nec, Lips with ? abrasion laceration     Care Discussed with Consultants/Other Providers [X ] YES  [ ] NO

## 2017-03-20 NOTE — PROGRESS NOTE ADULT - SUBJECTIVE AND OBJECTIVE BOX
INTERVAL HPI/OVERNIGHT EVENTS:  86 year female with Parkinsonism admitted s/p mechanical fall and found to have Right intertrochanteric fracture. Underwent intramedullary nailing  for fracture on 03/13/17. Post op course complicated due to  acute cystitis with hematuria, metabolic encephalopathy and leukocytosis. Seen by ID and is on antibiotics. Ct chest has shown right sided pneumonia.  Prior to admission is reported to be independent with ambulation and lives with son.  Tired looking and noted to be tachycardic. Spo2 towards lower side.     Vital Signs Last 24 Hrs  T(C): 36.7, Max: 37.1 (03-19 @ 23:54)  T(F): 98, Max: 98.8 (03-19 @ 23:54)  HR: 113 (84 - 121)  BP: 154/92 (140/69 - 159/87)  BP(mean): --  RR: 16 (16 - 17)  SpO2: 96% (93% - 98%)    PHYSICAL EXAM:  GEN:         Awake, responsive and comfortable. tired looking  HEENT:    Normal.    RESP:      crackles.  CVS:         Regular rate and rhythm.   ABD:         Soft, non-tender, non-distended;     MEDICATIONS  (STANDING):  enoxaparin Injectable 40milliGRAM(s) SubCutaneous every 24 hours  ferrous    sulfate 325milliGRAM(s) Oral three times a day with meals  folic acid 1milliGRAM(s) Oral daily  multivitamin 1Tablet(s) Oral daily  ascorbic acid 500milliGRAM(s) Oral two times a day  carbidopa/levodopa  25/100 1Tablet(s) Oral two times a day  clotrimazole Lozenge 1Lozenge Oral five times a day  meropenem IVPB 1000milliGRAM(s) IV Intermittent every 8 hours  meropenem IVPB  IV Intermittent   ALBUTerol    0.083% 2.5milliGRAM(s) Nebulizer every 6 hours  clotrimazole/betamethasone Cream 1Application(s) Topical two times a day  vancomycin  IVPB 1000milliGRAM(s) IV Intermittent every 12 hours  nystatin Powder 1Application(s) Topical two times a day  ALBUTerol/ipratropium for Nebulization 3milliLiter(s) Nebulizer every 6 hours    MEDICATIONS  (PRN):  acetaminophen   Tablet 650milliGRAM(s) Oral every 6 hours PRN For Temp greater than 38 C (100.4 F)  acetaminophen   Tablet. 650milliGRAM(s) Oral every 6 hours PRN headache  aluminum hydroxide/magnesium hydroxide/simethicone Suspension 30milliLiter(s) Oral four times a day PRN Indigestion  ondansetron Injectable 4milliGRAM(s) IV Push every 4 hours PRN Nausea and/or Vomiting  magnesium hydroxide Suspension 30milliLiter(s) Oral daily PRN Constipation  bisacodyl Suppository 10milliGRAM(s) Rectal daily PRN If no bowel movement  benzocaine 15 mG/menthol 3.6 mG Lozenge 1Lozenge Oral every 3 hours PRN Sore Throat  magnesium hydroxide Suspension 30milliLiter(s) Oral daily PRN Constipation    LABS:                        10.1   14.4  )-----------( 188      ( 20 Mar 2017 05:28 )             28.2     20 Mar 2017 05:28    138    |  102    |  18     ----------------------------<  116    3.8     |  22     |  0.51     Ca    8.1        20 Mar 2017 05:28    TPro  7.9    /  Alb  1.8    /  TBili  0.7    /  DBili  x      /  AST  30     /  ALT  13     /  AlkPhos  61     20 Mar 2017 05:28    ASSESSMENT AND PLAN:  ·	Multifocal pneumonia.  ·	Right pleural effusion.  ·	Cystitis with hematuria. E COLI.  ·	Leukocytosis better.  ·	Anemia.  ·	Parkinson's disease.    Noted to be tachycardic, some what tired and relatively low SPO2.   Will obtain ABG for Acid base status, check rectal temp and obtain CTA chest to r/o PE.  Is on broad spectrum antibiotics, Lovenox for DVT prophylaxis.

## 2017-03-21 LAB
CULTURE RESULTS: SIGNIFICANT CHANGE UP
CULTURE RESULTS: SIGNIFICANT CHANGE UP
SPECIMEN SOURCE: SIGNIFICANT CHANGE UP
SPECIMEN SOURCE: SIGNIFICANT CHANGE UP
VANCOMYCIN TROUGH SERPL-MCNC: 28.8 UG/ML — CRITICAL HIGH (ref 10–20)

## 2017-03-21 PROCEDURE — 99233 SBSQ HOSP IP/OBS HIGH 50: CPT

## 2017-03-21 RX ORDER — MEROPENEM 1 G/30ML
1000 INJECTION INTRAVENOUS
Qty: 2 | Refills: 0 | OUTPATIENT
Start: 2017-03-21 | End: 2017-03-23

## 2017-03-21 RX ORDER — ACETAMINOPHEN 500 MG
2 TABLET ORAL
Qty: 0 | Refills: 0 | COMMUNITY
Start: 2017-03-21

## 2017-03-21 RX ORDER — BENZOCAINE AND MENTHOL 5; 1 G/100ML; G/100ML
1 LIQUID ORAL
Qty: 0 | Refills: 0 | COMMUNITY
Start: 2017-03-21

## 2017-03-21 RX ORDER — CLOTRIMAZOLE AND BETAMETHASONE DIPROPIONATE 10; .5 MG/G; MG/G
1 CREAM TOPICAL
Qty: 0 | Refills: 0 | COMMUNITY
Start: 2017-03-21

## 2017-03-21 RX ORDER — PETROLATUM,WHITE
1 JELLY (GRAM) TOPICAL
Qty: 0 | Refills: 0 | COMMUNITY
Start: 2017-03-21

## 2017-03-21 RX ORDER — SODIUM CHLORIDE 9 MG/ML
1000 INJECTION INTRAMUSCULAR; INTRAVENOUS; SUBCUTANEOUS
Qty: 0 | Refills: 0 | Status: DISCONTINUED | OUTPATIENT
Start: 2017-03-21 | End: 2017-03-22

## 2017-03-21 RX ORDER — DIPHENHYDRAMINE HYDROCHLORIDE AND LIDOCAINE HYDROCHLORIDE AND ALUMINUM HYDROXIDE AND MAGNESIUM HYDRO
1 KIT
Qty: 0 | Refills: 0 | COMMUNITY
Start: 2017-03-21

## 2017-03-21 RX ORDER — IPRATROPIUM/ALBUTEROL SULFATE 18-103MCG
3 AEROSOL WITH ADAPTER (GRAM) INHALATION
Qty: 0 | Refills: 0 | COMMUNITY
Start: 2017-03-21

## 2017-03-21 RX ORDER — FOLIC ACID 0.8 MG
1 TABLET ORAL
Qty: 30 | Refills: 0 | OUTPATIENT
Start: 2017-03-21 | End: 2017-04-20

## 2017-03-21 RX ORDER — NYSTATIN CREAM 100000 [USP'U]/G
1 CREAM TOPICAL
Qty: 0 | Refills: 0 | COMMUNITY
Start: 2017-03-21

## 2017-03-21 RX ORDER — MEROPENEM 1 G/30ML
1000 INJECTION INTRAVENOUS
Qty: 6 | Refills: 0 | OUTPATIENT
Start: 2017-03-21 | End: 2017-03-23

## 2017-03-21 RX ADMIN — Medication 325 MILLIGRAM(S): at 17:51

## 2017-03-21 RX ADMIN — NYSTATIN CREAM 1 APPLICATION(S): 100000 CREAM TOPICAL at 05:43

## 2017-03-21 RX ADMIN — CARBIDOPA AND LEVODOPA 1 TABLET(S): 25; 100 TABLET ORAL at 17:51

## 2017-03-21 RX ADMIN — Medication 1 APPLICATION(S): at 17:52

## 2017-03-21 RX ADMIN — Medication 1 APPLICATION(S): at 12:25

## 2017-03-21 RX ADMIN — Medication 650 MILLIGRAM(S): at 16:36

## 2017-03-21 RX ADMIN — MEROPENEM 200 MILLIGRAM(S): 1 INJECTION INTRAVENOUS at 05:39

## 2017-03-21 RX ADMIN — Medication 1 APPLICATION(S): at 05:43

## 2017-03-21 RX ADMIN — Medication 3 MILLILITER(S): at 06:16

## 2017-03-21 RX ADMIN — Medication 1 LOZENGE: at 12:25

## 2017-03-21 RX ADMIN — NYSTATIN CREAM 1 APPLICATION(S): 100000 CREAM TOPICAL at 17:51

## 2017-03-21 RX ADMIN — Medication 3 MILLILITER(S): at 11:12

## 2017-03-21 RX ADMIN — Medication 500 MILLIGRAM(S): at 05:43

## 2017-03-21 RX ADMIN — Medication 30 MILLILITER(S): at 15:45

## 2017-03-21 RX ADMIN — Medication 1 LOZENGE: at 20:00

## 2017-03-21 RX ADMIN — MEROPENEM 200 MILLIGRAM(S): 1 INJECTION INTRAVENOUS at 13:22

## 2017-03-21 RX ADMIN — CLOTRIMAZOLE AND BETAMETHASONE DIPROPIONATE 1 APPLICATION(S): 10; .5 CREAM TOPICAL at 17:51

## 2017-03-21 RX ADMIN — Medication 1 LOZENGE: at 08:26

## 2017-03-21 RX ADMIN — SODIUM CHLORIDE 75 MILLILITER(S): 9 INJECTION INTRAMUSCULAR; INTRAVENOUS; SUBCUTANEOUS at 20:30

## 2017-03-21 RX ADMIN — DIPHENHYDRAMINE HYDROCHLORIDE AND LIDOCAINE HYDROCHLORIDE AND ALUMINUM HYDROXIDE AND MAGNESIUM HYDRO 5 MILLILITER(S): KIT at 22:10

## 2017-03-21 RX ADMIN — ENOXAPARIN SODIUM 40 MILLIGRAM(S): 100 INJECTION SUBCUTANEOUS at 05:43

## 2017-03-21 RX ADMIN — CLOTRIMAZOLE AND BETAMETHASONE DIPROPIONATE 1 APPLICATION(S): 10; .5 CREAM TOPICAL at 05:43

## 2017-03-21 RX ADMIN — Medication 1 LOZENGE: at 16:36

## 2017-03-21 RX ADMIN — Medication 325 MILLIGRAM(S): at 12:25

## 2017-03-21 RX ADMIN — DIPHENHYDRAMINE HYDROCHLORIDE AND LIDOCAINE HYDROCHLORIDE AND ALUMINUM HYDROXIDE AND MAGNESIUM HYDRO 5 MILLILITER(S): KIT at 12:26

## 2017-03-21 RX ADMIN — Medication 1 TABLET(S): at 12:25

## 2017-03-21 RX ADMIN — Medication 3 MILLILITER(S): at 17:01

## 2017-03-21 RX ADMIN — MEROPENEM 200 MILLIGRAM(S): 1 INJECTION INTRAVENOUS at 22:21

## 2017-03-21 RX ADMIN — CARBIDOPA AND LEVODOPA 1 TABLET(S): 25; 100 TABLET ORAL at 05:39

## 2017-03-21 RX ADMIN — Medication 1 MILLIGRAM(S): at 12:25

## 2017-03-21 RX ADMIN — Medication 325 MILLIGRAM(S): at 08:26

## 2017-03-21 RX ADMIN — Medication 500 MILLIGRAM(S): at 17:51

## 2017-03-21 NOTE — PROGRESS NOTE ADULT - SUBJECTIVE AND OBJECTIVE BOX
Patient is a 86y old  Female who presents with a chief complaint of Right IT fracture (14 Mar 2017 07:00)      INTERVAL HPI / OVERNIGHT EVENTS: coughing with phlegm ,no fever    MEDICATIONS  (STANDING):  enoxaparin Injectable 40milliGRAM(s) SubCutaneous every 24 hours  ferrous    sulfate 325milliGRAM(s) Oral three times a day with meals  folic acid 1milliGRAM(s) Oral daily  multivitamin 1Tablet(s) Oral daily  ascorbic acid 500milliGRAM(s) Oral two times a day  carbidopa/levodopa  25/100 1Tablet(s) Oral two times a day  clotrimazole Lozenge 1Lozenge Oral five times a day  meropenem IVPB 1000milliGRAM(s) IV Intermittent every 8 hours  meropenem IVPB  IV Intermittent   clotrimazole/betamethasone Cream 1Application(s) Topical two times a day  nystatin Powder 1Application(s) Topical two times a day  ALBUTerol/ipratropium for Nebulization 3milliLiter(s) Nebulizer every 6 hours  petrolatum white Ointment 1Application(s) Topical every 6 hours  sodium chloride 0.9%. 1000milliLiter(s) IV Continuous <Continuous>    MEDICATIONS  (PRN):  acetaminophen   Tablet 650milliGRAM(s) Oral every 6 hours PRN For Temp greater than 38 C (100.4 F)  acetaminophen   Tablet. 650milliGRAM(s) Oral every 6 hours PRN headache  aluminum hydroxide/magnesium hydroxide/simethicone Suspension 30milliLiter(s) Oral four times a day PRN Indigestion  ondansetron Injectable 4milliGRAM(s) IV Push every 4 hours PRN Nausea and/or Vomiting  magnesium hydroxide Suspension 30milliLiter(s) Oral daily PRN Constipation  bisacodyl Suppository 10milliGRAM(s) Rectal daily PRN If no bowel movement  benzocaine 15 mG/menthol 3.6 mG Lozenge 1Lozenge Oral every 3 hours PRN Sore Throat  magnesium hydroxide Suspension 30milliLiter(s) Oral daily PRN Constipation  FIRST- Mouthwash  BLM 5milliLiter(s) Swish and Swallow every 4 hours PRN Mouth Care      Vital Signs Last 24 Hrs  T(C): 37.8, Max: 38 (03-21 @ 16:30)  T(F): 100.1, Max: 100.4 (03-21 @ 16:30)  HR: 121 (104 - 124)  BP: 145/88 (138/74 - 152/90)  BP(mean): --  RR: 16 (16 - 17)  SpO2: 95% (93% - 98%)    PHYSICAL EXAM:    Constitutional: NAD, well-groomed, well-developed  Respiratory: CTAB/L  Cardiovascular: S1 and S2, RRR, no M/G/R  Gastrointestinal: BS+, soft, NT/ND  Extremities: No peripheral edema  Vascular: 2+ peripheral pulses  Skin: No rashes      LABS:                        9.4    10.3  )-----------( 237      ( 21 Mar 2017 05:52 )             27.2     21 Mar 2017 05:52    139    |  104    |  25     ----------------------------<  104    4.0     |  24     |  0.52     Ca    8.5        21 Mar 2017 05:52    TPro  7.9    /  Alb  1.9    /  TBili  0.8    /  DBili  x      /  AST  38     /  ALT  18     /  AlkPhos  57     21 Mar 2017 05:52            MICROBIOLOGY:  RECENT CULTURES:  03-16 .Blood Blood XXXX XXXX   No growth at 5 days.          RADIOLOGY & ADDITIONAL STUDIES:

## 2017-03-21 NOTE — PROGRESS NOTE ADULT - SUBJECTIVE AND OBJECTIVE BOX
INTERVAL HPI/OVERNIGHT EVENTS:  86 year female with Parkinsonism admitted s/p mechanical fall and found to have Right intertrochanteric fracture. Underwent intramedullary nailing  for fracture on 03/13/17. Post op course complicated due to  acute cystitis with hematuria, metabolic encephalopathy and leukocytosis. Seen by ID and is on antibiotics. Ct chest has shown right sided pneumonia.  Prior to admission is reported to be independent with ambulation and lives with son.  CT angio chest negative for PE. Breathing is OK but still tachycardic.    Vital Signs Last 24 Hrs  T(C): 37.4, Max: 37.7 (03-20 @ 19:10)  T(F): 99.4, Max: 99.9 (03-20 @ 19:10)  HR: 124 (109 - 124)  BP: 152/85 (138/74 - 154/92)  BP(mean): --  RR: 16 (16 - 20)  SpO2: 94% (91% - 98%)    PHYSICAL EXAM:  GEN:        Awake, responsive and comfortable.  HEENT:    Normal.    RESP:      no wheezing.  CVS:         Regular rate and rhythm.   ABD:         Soft, non-tender, non-distended;     MEDICATIONS  (STANDING):  enoxaparin Injectable 40milliGRAM(s) SubCutaneous every 24 hours  ferrous    sulfate 325milliGRAM(s) Oral three times a day with meals  folic acid 1milliGRAM(s) Oral daily  multivitamin 1Tablet(s) Oral daily  ascorbic acid 500milliGRAM(s) Oral two times a day  carbidopa/levodopa  25/100 1Tablet(s) Oral two times a day  clotrimazole Lozenge 1Lozenge Oral five times a day  meropenem IVPB 1000milliGRAM(s) IV Intermittent every 8 hours  meropenem IVPB  IV Intermittent   clotrimazole/betamethasone Cream 1Application(s) Topical two times a day  vancomycin  IVPB 1000milliGRAM(s) IV Intermittent every 12 hours  nystatin Powder 1Application(s) Topical two times a day  ALBUTerol/ipratropium for Nebulization 3milliLiter(s) Nebulizer every 6 hours  petrolatum white Ointment 1Application(s) Topical every 6 hours    MEDICATIONS  (PRN):  acetaminophen   Tablet 650milliGRAM(s) Oral every 6 hours PRN For Temp greater than 38 C (100.4 F)  acetaminophen   Tablet. 650milliGRAM(s) Oral every 6 hours PRN headache  aluminum hydroxide/magnesium hydroxide/simethicone Suspension 30milliLiter(s) Oral four times a day PRN Indigestion  ondansetron Injectable 4milliGRAM(s) IV Push every 4 hours PRN Nausea and/or Vomiting  magnesium hydroxide Suspension 30milliLiter(s) Oral daily PRN Constipation  bisacodyl Suppository 10milliGRAM(s) Rectal daily PRN If no bowel movement  benzocaine 15 mG/menthol 3.6 mG Lozenge 1Lozenge Oral every 3 hours PRN Sore Throat  magnesium hydroxide Suspension 30milliLiter(s) Oral daily PRN Constipation  FIRST- Mouthwash  BLM 5milliLiter(s) Swish and Swallow every 4 hours PRN Mouth Care    LABS:                        9.4    10.3  )-----------( 237      ( 21 Mar 2017 05:52 )             27.2     21 Mar 2017 05:52    139    |  104    |  25     ----------------------------<  104    4.0     |  24     |  0.52     Ca    8.5        21 Mar 2017 05:52    TPro  7.9    /  Alb  1.9    /  TBili  0.8    /  DBili  x      /  AST  38     /  ALT  18     /  AlkPhos  57     21 Mar 2017 05:52      ABG - 03-20 @ 19:35  pH: 7.51 / pcO2: 31 / pO2: 99 on 2 litres.    RADIOLOGY & ADDITIONAL STUDIES:    EXAM:  CT ANGIO CHEST (W)AW                             PROCEDURE DATE:  03/20/2017        INTERPRETATION:  CLINICAL INFORMATION: Shortness of breath    COMPARISON: 3/17/2017    PROCEDURE:     Serial axial sections through the chest were obtainedfollowing   administration of nonionic intravenous contrast utilizing pulmonary   embolism protocol. Sagittal and coronal reformats were then generated   from the axial images. 3-D maximal intensity projection reconstructions   were performed on an independent workstation.    75 mls of Omnipaque 350 was administered intravenously without   complication and 25 mls were discarded.    FINDINGS:     PULMONARY ARTERIES: The bolus is of good quality for diagnosis of   pulmonary embolism. Multiple images are degraded by respiratory motion.   Excluding regions with motion artifact, there are no pulmonary arterial   filling defects identified.    LUNG AND LARGE AIRWAYS: Right lower lobe opacities. Basilar atelectasis.   Scattered calcified granulomas.  PLEURA: No pleural effusions.  VESSELS: Atherosclerotic changes of the aorta and coronary arteries. No   thoracic aortic aneurysm or dissection.  HEART: Enlarged. No pericardial effusion.  MEDIASTINUM AND JOSE: Small calcified hilar lymph nodes.  CHEST WALL AND LOWER NECK: Within normal limits.    UPPER ABDOMEN: Sludge and/or gallstones. Atrophic pancreas. Mild left   hydronephrosis. Mild adrenal thickening. Colonic diverticulosis.    BONES: Spinal degenerative changes.    IMPRESSION:     Negative for pulmonary embolism.  Right lower lobe opacities, suspicious for pneumonia.  Mild left hydronephrosis.    MICHAEL PARKS M.D., ATTENDING RADIOLOGIST  This document has been electronically signed. Mar 21 2017  8:39AM      ASSESSMENT AND PLAN:  ·	Multifocal pneumonia.  ·	Right pleural effusion.  ·	Cystitis with hematuria. E COLI.  ·	Leukocytosis better.  ·	Anemia.  ·	Parkinson's disease.  ·	Tachycardia.    Continue antibiotics and nebulizer.

## 2017-03-21 NOTE — PROGRESS NOTE ADULT - SUBJECTIVE AND OBJECTIVE BOX
INTERVAL HPI/OVERNIGHT EVENTS:        REVIEW OF SYSTEMS:  CONSTITUTIONAL:  c/o  episodic  rt  lower  lip  pain    NECK: No pain or stiffnes  RESPIRATORY: No SOB   CARDIOVASCULAR: No chest pain, palpitations, dizziness,   GASTROINTESTINAL: No abdominal pain. No nausea, vomiting,   NEUROLOGICAL: No headaches, no  blurry  vision no  dizziness  SKIN: No itching,   MUSCULOSKELETAL: No pain    MEDICATION:  enoxaparin Injectable 40milliGRAM(s) SubCutaneous every 24 hours  acetaminophen   Tablet 650milliGRAM(s) Oral every 6 hours PRN  acetaminophen   Tablet. 650milliGRAM(s) Oral every 6 hours PRN  aluminum hydroxide/magnesium hydroxide/simethicone Suspension 30milliLiter(s) Oral four times a day PRN  ondansetron Injectable 4milliGRAM(s) IV Push every 4 hours PRN  magnesium hydroxide Suspension 30milliLiter(s) Oral daily PRN  bisacodyl Suppository 10milliGRAM(s) Rectal daily PRN  ferrous    sulfate 325milliGRAM(s) Oral three times a day with meals  folic acid 1milliGRAM(s) Oral daily  multivitamin 1Tablet(s) Oral daily  ascorbic acid 500milliGRAM(s) Oral two times a day  benzocaine 15 mG/menthol 3.6 mG Lozenge 1Lozenge Oral every 3 hours PRN  carbidopa/levodopa  25/100 1Tablet(s) Oral two times a day  clotrimazole Lozenge 1Lozenge Oral five times a day  meropenem IVPB 1000milliGRAM(s) IV Intermittent every 8 hours  meropenem IVPB  IV Intermittent   clotrimazole/betamethasone Cream 1Application(s) Topical two times a day  vancomycin  IVPB 1000milliGRAM(s) IV Intermittent every 12 hours  magnesium hydroxide Suspension 30milliLiter(s) Oral daily PRN  nystatin Powder 1Application(s) Topical two times a day  ALBUTerol/ipratropium for Nebulization 3milliLiter(s) Nebulizer every 6 hours  petrolatum white Ointment 1Application(s) Topical every 6 hours  FIRST- Mouthwash  BLM 5milliLiter(s) Swish and Swallow every 4 hours PRN    Vital Signs Last 24 Hrs  T(C): 37.4, Max: 37.7 (03-20 @ 19:10)  T(F): 99.4, Max: 99.9 (03-20 @ 19:10)  HR: 115 (84 - 121)  BP: 147/91 (140/69 - 154/92)  BP(mean): --  RR: 17 (16 - 20)  SpO2: 98% (91% - 98%)    PHYSICAL EXAM:  GENERAL: NAD, well-groomed, well-developed  EYES:  conjunctiva and sclera clear  ENMT:  Moist mucous membranes, dry  excoriation  mucosal aspect  rt  lip  NECK: Supple, No JVD, Normal thyroid  NERVOUS SYSTEM:  Alert oriented   no  focal  deficits;   CHEST/LUNG: Rondchis  rt  base  HEART: Regular rate and rhythm; No murmurs, rubs, or gallops  ABDOMEN: Soft, Nontender, Nondistended; Bowel sounds present  EXTREMITIES:  no  tenderness  SKIN: No rashes   LABS:                        9.4    10.3  )-----------( 237      ( 21 Mar 2017 05:52 )             27.2     21 Mar 2017 05:52    139    |  104    |  25     ----------------------------<  104    4.0     |  24     |  0.52     Ca    8.5        21 Mar 2017 05:52    TPro  7.9    /  Alb  1.9    /  TBili  0.8    /  DBili  x      /  AST  38     /  ALT  18     /  AlkPhos  57     21 Mar 2017 05:52        CAPILLARY BLOOD GLUCOSE      RADIOLOGY & ADDITIONAL TESTS:    Imaging reports  Personally Reviewed:  [x ] YES  [ ] NO    Consultant(s) Notes Reviewed:  [x ] YES  [ ] NO    Care Discussed with Consultants/Other Providers [ x] YES  [ ] NO    Care Discussed with Consultants/Other Providers [x ] YES  [ ] NO    PNEUMONIA  MULTIFOCAL  IMPROVING  WITH  MEROPENEM  VANCO NEBULIZER  O2    Problem: Closed fracture of right hip, initial encounter.  anlagesia  with  tylenol  deep vein thrombosis prophlaxis  physical therapy.     Problem/Plan - 2:  ·  Problem: Parkinson disease.  Plan: Continue Carbidopa/levodopa at home dose.     Problem/Plan - 3:  ·  Problem: HTN (hypertension), benign.  Plan: norvasc.     Problem/Plan - 4:  ·  Problem: Acute cystitis without hematuria.  Plan: vanco  meropenem  as per  ID  rt  lip  excoriation continue  oral   hygene  with FIRST  mouthwash  and and  vaseline    continue  present  treatment  plan   monitor  labs  discharge t o  rehab

## 2017-03-22 VITALS — TEMPERATURE: 99 F

## 2017-03-22 PROCEDURE — 99233 SBSQ HOSP IP/OBS HIGH 50: CPT

## 2017-03-22 RX ORDER — MEROPENEM 1 G/30ML
1000 INJECTION INTRAVENOUS
Qty: 6 | Refills: 0 | OUTPATIENT
Start: 2017-03-22 | End: 2017-03-24

## 2017-03-22 RX ADMIN — Medication 1 TABLET(S): at 12:39

## 2017-03-22 RX ADMIN — Medication 3 MILLILITER(S): at 00:24

## 2017-03-22 RX ADMIN — ONDANSETRON 4 MILLIGRAM(S): 8 TABLET, FILM COATED ORAL at 03:28

## 2017-03-22 RX ADMIN — NYSTATIN CREAM 1 APPLICATION(S): 100000 CREAM TOPICAL at 05:41

## 2017-03-22 RX ADMIN — Medication 3 MILLILITER(S): at 12:13

## 2017-03-22 RX ADMIN — ENOXAPARIN SODIUM 40 MILLIGRAM(S): 100 INJECTION SUBCUTANEOUS at 05:43

## 2017-03-22 RX ADMIN — Medication 1 LOZENGE: at 12:39

## 2017-03-22 RX ADMIN — Medication 1 APPLICATION(S): at 05:43

## 2017-03-22 RX ADMIN — CARBIDOPA AND LEVODOPA 1 TABLET(S): 25; 100 TABLET ORAL at 05:42

## 2017-03-22 RX ADMIN — CLOTRIMAZOLE AND BETAMETHASONE DIPROPIONATE 1 APPLICATION(S): 10; .5 CREAM TOPICAL at 05:41

## 2017-03-22 RX ADMIN — Medication 500 MILLIGRAM(S): at 05:41

## 2017-03-22 RX ADMIN — Medication 1 LOZENGE: at 08:03

## 2017-03-22 RX ADMIN — Medication 1 APPLICATION(S): at 12:39

## 2017-03-22 RX ADMIN — MEROPENEM 200 MILLIGRAM(S): 1 INJECTION INTRAVENOUS at 05:42

## 2017-03-22 RX ADMIN — Medication 1 LOZENGE: at 01:08

## 2017-03-22 RX ADMIN — MEROPENEM 200 MILLIGRAM(S): 1 INJECTION INTRAVENOUS at 13:20

## 2017-03-22 RX ADMIN — Medication 325 MILLIGRAM(S): at 08:03

## 2017-03-22 RX ADMIN — Medication 1 MILLIGRAM(S): at 12:39

## 2017-03-22 RX ADMIN — Medication 1 APPLICATION(S): at 00:50

## 2017-03-22 RX ADMIN — Medication 3 MILLILITER(S): at 05:37

## 2017-03-22 RX ADMIN — SODIUM CHLORIDE 75 MILLILITER(S): 9 INJECTION INTRAMUSCULAR; INTRAVENOUS; SUBCUTANEOUS at 11:07

## 2017-03-22 RX ADMIN — Medication 325 MILLIGRAM(S): at 12:39

## 2017-03-22 NOTE — PROGRESS NOTE ADULT - PROBLEM SELECTOR PLAN 4
Continue sinemet
Continue sinemet
abx as above  Cont meropenem (Day 5of 7)
abx as above  Cont meropenem (Day 6of 7)  sh get one day of merem in nursing home
abx as above  vanco dose increased as level < 10(cont day 3 of 7)  Cont meropenem (Day 3 of 7)
rocephin  infectious disease consult for elevated wbc
rocephin

## 2017-03-22 NOTE — PROGRESS NOTE ADULT - PROBLEM SELECTOR PLAN 3
Ecoli in urine  ABx as per  primary team  WBC elevated
Ecoli in urine  ABx as per  primary team  WBC elevated
norvasc
norvasc
resolving  sec to sepsis
per ortho  DVT prophylaxis SQ lovenox, IPCD
s/p surgery
norvasc

## 2017-03-22 NOTE — PROGRESS NOTE ADULT - SUBJECTIVE AND OBJECTIVE BOX
Patient is a 86y old  Female who presents with a chief complaint of Right IT fracture (14 Mar 2017 07:00)      INTERVAL HPI / OVERNIGHT EVENTS: coughing with phlegm-better today ,last evening few low grde temp    MEDICATIONS  (STANDING):  enoxaparin Injectable 40milliGRAM(s) SubCutaneous every 24 hours  ferrous    sulfate 325milliGRAM(s) Oral three times a day with meals  folic acid 1milliGRAM(s) Oral daily  multivitamin 1Tablet(s) Oral daily  ascorbic acid 500milliGRAM(s) Oral two times a day  carbidopa/levodopa  25/100 1Tablet(s) Oral two times a day  clotrimazole Lozenge 1Lozenge Oral five times a day  meropenem IVPB 1000milliGRAM(s) IV Intermittent every 8 hours  meropenem IVPB  IV Intermittent   clotrimazole/betamethasone Cream 1Application(s) Topical two times a day  nystatin Powder 1Application(s) Topical two times a day  ALBUTerol/ipratropium for Nebulization 3milliLiter(s) Nebulizer every 6 hours  petrolatum white Ointment 1Application(s) Topical every 6 hours  sodium chloride 0.9%. 1000milliLiter(s) IV Continuous <Continuous>    MEDICATIONS  (PRN):  acetaminophen   Tablet 650milliGRAM(s) Oral every 6 hours PRN For Temp greater than 38 C (100.4 F)  acetaminophen   Tablet. 650milliGRAM(s) Oral every 6 hours PRN headache  aluminum hydroxide/magnesium hydroxide/simethicone Suspension 30milliLiter(s) Oral four times a day PRN Indigestion  ondansetron Injectable 4milliGRAM(s) IV Push every 4 hours PRN Nausea and/or Vomiting  magnesium hydroxide Suspension 30milliLiter(s) Oral daily PRN Constipation  bisacodyl Suppository 10milliGRAM(s) Rectal daily PRN If no bowel movement  benzocaine 15 mG/menthol 3.6 mG Lozenge 1Lozenge Oral every 3 hours PRN Sore Throat  magnesium hydroxide Suspension 30milliLiter(s) Oral daily PRN Constipation  FIRST- Mouthwash  BLM 5milliLiter(s) Swish and Swallow every 4 hours PRN Mouth Care      Vital Signs Last 24 Hrs  Tmax:100.4  HR: 121 (104 - 124)  BP: 145/88 (138/74 - 152/90)  BP(mean): --  RR: 16 (16 - 17)  SpO2: 95% (93% - 98%)    PHYSICAL EXAM:    Constitutional: NAD, well-groomed, well-developed  Respiratory: CTAB/L  Cardiovascular: S1 and S2, RRR, no M/G/R  Gastrointestinal: BS+, soft, NT/ND  Extremities: No peripheral edema  Vascular: 2+ peripheral pulses  Skin: right thigh dressing       LABS:             WBC10.6  Cr 0.48            MICROBIOLOGY:  RECENT CULTURES:  03-16 .Blood Blood XXXX XXXX   No growth at 5 days.          RADIOLOGY & ADDITIONAL STUDIES:

## 2017-03-22 NOTE — PROGRESS NOTE ADULT - PROBLEM SELECTOR PROBLEM 4
Parkinson disease
Acute cystitis without hematuria
Acute cystitis without hematuria
Aspiration pneumonia of right lower lobe, unspecified aspiration pneumonia type
Parkinson disease
Parkinson disease

## 2017-03-22 NOTE — PROGRESS NOTE ADULT - PROBLEM SELECTOR PLAN 1
On meroepenem (day 6/7)
analgesics  deep vein thrombosis prohlaxis  physical therapy
hold analgesics as patient lethargic   deep vein thrombosis prohlaxis  physical therapy
hold analgesics as patient lethargic   deep vein thrombosis prophlaxis  physical therapy
s/p Intramedullary nailing of trochanteric fracture of right femur  03/13/2017  DVT prophylaxis  physical therapy  pain control  incentive spirometry  monitor h/h
s/p Intramedullary nailing of trochanteric fracture of right femur  03/13/2017  DVT prophylaxis  physical therapy  will d/c IVF  monitor respiratory status  pain control  incentive spirometry  monitor h/h
swithed to meroepenem (day 5)
with ecoludin on rocephin
stat EKG --   CTA r/o PE
with ecoludin on rocephin

## 2017-03-22 NOTE — PROGRESS NOTE ADULT - PROBLEM SELECTOR PROBLEM 3
Fracture of hip, closed, right, sequela
Acute cystitis without hematuria
Acute cystitis without hematuria
HTN (hypertension), benign
Metabolic encephalopathy
Closed fracture of right hip, initial encounter

## 2017-03-22 NOTE — PROGRESS NOTE ADULT - PROBLEM SELECTOR PLAN 2
Continue Carbidopa/levodopa at home dose.
PRBC transfusion for low H/H  monitor H/H post transfusion  monitor for bleeding
Post PRBC transfusion for low H/H   H/H improved  monitor for bleeding  c/w vitamin supplements /iron
sec to RLL Pneumonia  likely aspiration PNA  cont Vanco and meropenem  Sepsis resolving
sec to RLL Pneumonia  likely aspiration PNA  cont meropenem 5/7  low grade fever-monitor temp curve  hold vanco as level 25  Sepsis resolving
sec to RLL Pneumonia  likely aspiration PNA  cont meropenem 6/7  low grade fever-monitor temp curve  hold vanco as level 25  Sepsis resolving
continue ABX, Duo nebs  oxygen PRN
on appropraite antibiotic for E coli UTI but contniues to have fever (low grade and worsening WBC count as pt had significant AMS   doa CT chest to r/o HCAp vs aspiration pna  cont vanco   change Rocephin to meropenem  f/u on wbc  and temp curve

## 2017-03-22 NOTE — PROGRESS NOTE ADULT - PROVIDER SPECIALTY LIST ADULT
Cardiology
Hospitalist
Infectious Disease
Internal Medicine
Orthopedics
Pulmonology
Cardiology
Internal Medicine

## 2017-03-22 NOTE — PROGRESS NOTE ADULT - PROBLEM SELECTOR PROBLEM 1
Acute cystitis without hematuria
Closed fracture of right hip, initial encounter
Tachycardia

## 2017-03-22 NOTE — PROGRESS NOTE ADULT - PROBLEM SELECTOR PROBLEM 2
Sepsis, due to unspecified organism
Anemia due to blood loss
Anemia due to blood loss
Parkinson disease
Sepsis, due to unspecified organism
Aspiration pneumonia of right lower lobe, unspecified aspiration pneumonia type

## 2017-03-22 NOTE — PROGRESS NOTE ADULT - SUBJECTIVE AND OBJECTIVE BOX
INTERVAL HPI/OVERNIGHT EVENTS:    unable t o  be  discharged  yesterday  secondary  to  fever  tachycardia  now  with  Diarrhea    REVIEW OF SYSTEMS:  CONSTITUTIONAL:  no  complaints    NECK: No pain or stiffnes  RESPIRATORY: No SOB   CARDIOVASCULAR: No chest pain, palpitations, dizziness,   GASTROINTESTINAL: No abdominal pain. No nausea, vomiting,   NEUROLOGICAL: No headaches, no  blurry  vision no  dizziness  SKIN: No itching,   MUSCULOSKELETAL: No pain    MEDICATION:  enoxaparin Injectable 40milliGRAM(s) SubCutaneous every 24 hours  acetaminophen   Tablet 650milliGRAM(s) Oral every 6 hours PRN  acetaminophen   Tablet. 650milliGRAM(s) Oral every 6 hours PRN  aluminum hydroxide/magnesium hydroxide/simethicone Suspension 30milliLiter(s) Oral four times a day PRN  ondansetron Injectable 4milliGRAM(s) IV Push every 4 hours PRN  magnesium hydroxide Suspension 30milliLiter(s) Oral daily PRN  bisacodyl Suppository 10milliGRAM(s) Rectal daily PRN  ferrous    sulfate 325milliGRAM(s) Oral three times a day with meals  folic acid 1milliGRAM(s) Oral daily  multivitamin 1Tablet(s) Oral daily  ascorbic acid 500milliGRAM(s) Oral two times a day  benzocaine 15 mG/menthol 3.6 mG Lozenge 1Lozenge Oral every 3 hours PRN  carbidopa/levodopa  25/100 1Tablet(s) Oral two times a day  clotrimazole Lozenge 1Lozenge Oral five times a day  meropenem IVPB 1000milliGRAM(s) IV Intermittent every 8 hours  meropenem IVPB  IV Intermittent   clotrimazole/betamethasone Cream 1Application(s) Topical two times a day  magnesium hydroxide Suspension 30milliLiter(s) Oral daily PRN  nystatin Powder 1Application(s) Topical two times a day  ALBUTerol/ipratropium for Nebulization 3milliLiter(s) Nebulizer every 6 hours  petrolatum white Ointment 1Application(s) Topical every 6 hours  FIRST- Mouthwash  BLM 5milliLiter(s) Swish and Swallow every 4 hours PRN  sodium chloride 0.9%. 1000milliLiter(s) IV Continuous <Continuous>    Vital Signs Last 24 Hrs  T(C): 37.6, Max: 38 (03-21 @ 16:30)  T(F): 99.6, Max: 100.4 (03-21 @ 16:30)  HR: 103 (100 - 124)  BP: 148/80 (138/74 - 155/84)  BP(mean): --  RR: 18 (16 - 18)  SpO2: 98% (94% - 98%)    PHYSICAL EXAM:  GENERAL: NAD, well-groomed, well-developed  EYES:  conjunctiva and sclera clear  ENMT:  Moist mucous membranes,   NECK: Supple, No JVD, Normal thyroid  NERVOUS SYSTEM:  Alert oriented   no  focal  deficits;   CHEST/LUNG:ronchis  rt  base    HEART: Regular rate and rhythm; No murmurs, rubs, or gallops  ABDOMEN: Soft, Nontender, Nondistended; Bowel sounds present  EXTREMITIES:  no  edema no  tenderness  SKIN: No rashes   LABS:                        9.2    10.6  )-----------( 244      ( 22 Mar 2017 05:55 )             26.7     22 Mar 2017 05:55    142    |  106    |  26     ----------------------------<  102    4.0     |  24     |  0.48     Ca    8.4        22 Mar 2017 05:55    TPro  7.9    /  Alb  1.9    /  TBili  0.8    /  DBili  x      /  AST  38     /  ALT  18     /  AlkPhos  57     21 Mar 2017 05:52        CAPILLARY BLOOD GLUCOSE      RADIOLOGY & ADDITIONAL TESTS:    Imaging reports  Personally Reviewed:  [ x] YES  [ ] NO    Consultant(s) Notes Reviewed:  [x ] YES  [ ] NO    Care Discussed with Consultants/Other Providers [x ] YES  [ ] NO  PNEUMONIA  MULTIFOCAL  IMPROVING  WITH  MEROPENEM  NEBULIZER  O2 DISCHARGE  TO  REHAB  CONT  MEROPENEM  FOR  7 DAY  COURSE  ADD ORAL  LEVAQUIN GFOR  PNEUMONIA  AND  uti    Problem: Closed fracture of right hip, initial encounter.  anlagesia  with  tylenol  deep vein thrombosis prophlaxis  physical therapy.     Problem/Plan - 2:  ·  Problem: Parkinson disease.  Plan: Continue Carbidopa/levodopa at home dose.     Problem/Plan - 3:  ·  Problem: HTN (hypertension), benign.  Plan: norvasc.     Problem/Plan - 4:  ·  Problem: Acute cystitis without hematuria.  Plan: meropenem    continue  present  treatment  plan   monitor  labs  discharge t o  rehab in  [place]  dIARRHEA  D/C  BISOCODYL  ADD  CHOLESTYRAMINE CHECK STOOLS  FOR  C  DIFF

## 2017-03-24 DIAGNOSIS — G93.41 METABOLIC ENCEPHALOPATHY: ICD-10-CM

## 2017-03-24 DIAGNOSIS — K13.0 DISEASES OF LIPS: ICD-10-CM

## 2017-03-24 DIAGNOSIS — S72.141A DISPLACED INTERTROCHANTERIC FRACTURE OF RIGHT FEMUR, INITIAL ENCOUNTER FOR CLOSED FRACTURE: ICD-10-CM
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
## 2017-03-24 DIAGNOSIS — D62 ACUTE POSTHEMORRHAGIC ANEMIA: ICD-10-CM
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
## 2017-03-24 DIAGNOSIS — G20 PARKINSON'S DISEASE: ICD-10-CM

## 2017-03-24 DIAGNOSIS — N30.00 ACUTE CYSTITIS WITHOUT HEMATURIA: ICD-10-CM

## 2017-03-24 DIAGNOSIS — B96.20 UNSPECIFIED ESCHERICHIA COLI [E. COLI] AS THE CAUSE OF DISEASES CLASSIFIED ELSEWHERE: ICD-10-CM

## 2017-03-24 DIAGNOSIS — I10 ESSENTIAL (PRIMARY) HYPERTENSION: ICD-10-CM

## 2017-03-24 DIAGNOSIS — J69.0 PNEUMONITIS DUE TO INHALATION OF FOOD AND VOMIT: ICD-10-CM

## 2017-04-02 ENCOUNTER — OUTPATIENT (OUTPATIENT)
Dept: OUTPATIENT SERVICES | Facility: HOSPITAL | Age: 82
LOS: 1 days | Discharge: HOME | End: 2017-04-02
Payer: MEDICARE

## 2017-04-02 DIAGNOSIS — R10.84 GENERALIZED ABDOMINAL PAIN: ICD-10-CM

## 2017-04-02 PROCEDURE — 74000: CPT | Mod: 26

## 2017-04-02 PROCEDURE — 71010: CPT | Mod: 26

## 2017-04-04 ENCOUNTER — INPATIENT (INPATIENT)
Facility: HOSPITAL | Age: 82
LOS: 52 days | End: 2017-05-27
Attending: INTERNAL MEDICINE | Admitting: INTERNAL MEDICINE
Payer: MEDICARE

## 2017-04-04 ENCOUNTER — OUTPATIENT (OUTPATIENT)
Dept: OUTPATIENT SERVICES | Facility: HOSPITAL | Age: 82
LOS: 1 days | Discharge: HOME | End: 2017-04-04
Payer: MEDICARE

## 2017-04-04 VITALS
SYSTOLIC BLOOD PRESSURE: 165 MMHG | WEIGHT: 134.92 LBS | OXYGEN SATURATION: 96 % | HEART RATE: 97 BPM | DIASTOLIC BLOOD PRESSURE: 87 MMHG | HEIGHT: 62 IN | TEMPERATURE: 99 F | RESPIRATION RATE: 18 BRPM

## 2017-04-04 DIAGNOSIS — J18.9 PNEUMONIA, UNSPECIFIED ORGANISM: ICD-10-CM

## 2017-04-04 DIAGNOSIS — G20 PARKINSON'S DISEASE: ICD-10-CM

## 2017-04-04 DIAGNOSIS — N39.0 URINARY TRACT INFECTION, SITE NOT SPECIFIED: ICD-10-CM

## 2017-04-04 DIAGNOSIS — Z98.890 OTHER SPECIFIED POSTPROCEDURAL STATES: Chronic | ICD-10-CM

## 2017-04-04 DIAGNOSIS — D64.9 ANEMIA, UNSPECIFIED: ICD-10-CM

## 2017-04-04 DIAGNOSIS — R41.82 ALTERED MENTAL STATUS, UNSPECIFIED: ICD-10-CM

## 2017-04-04 DIAGNOSIS — Z96.641 PRESENCE OF RIGHT ARTIFICIAL HIP JOINT: ICD-10-CM

## 2017-04-04 LAB
ALBUMIN SERPL ELPH-MCNC: 2.5 G/DL — LOW (ref 3.3–5)
ALP SERPL-CCNC: 61 U/L — SIGNIFICANT CHANGE UP (ref 40–120)
ALT FLD-CCNC: 9 U/L — LOW (ref 12–78)
ANION GAP SERPL CALC-SCNC: 12 MMOL/L — SIGNIFICANT CHANGE UP (ref 5–17)
ANISOCYTOSIS BLD QL: SLIGHT — SIGNIFICANT CHANGE UP
APPEARANCE UR: ABNORMAL
APTT BLD: 35.7 SEC — SIGNIFICANT CHANGE UP (ref 27.5–37.4)
AST SERPL-CCNC: 17 U/L — SIGNIFICANT CHANGE UP (ref 15–37)
BACTERIA # UR AUTO: ABNORMAL
BILIRUB SERPL-MCNC: 0.8 MG/DL — SIGNIFICANT CHANGE UP (ref 0.2–1.2)
BILIRUB UR-MCNC: NEGATIVE — SIGNIFICANT CHANGE UP
BUN SERPL-MCNC: 53 MG/DL — HIGH (ref 7–23)
CALCIUM SERPL-MCNC: 8.7 MG/DL — SIGNIFICANT CHANGE UP (ref 8.5–10.1)
CHLORIDE SERPL-SCNC: 110 MMOL/L — HIGH (ref 96–108)
CK MB BLD-MCNC: 1.9 % — SIGNIFICANT CHANGE UP (ref 0–3.5)
CK MB CFR SERPL CALC: 0.5 NG/ML — SIGNIFICANT CHANGE UP (ref 0.5–3.6)
CK SERPL-CCNC: 27 U/L — SIGNIFICANT CHANGE UP (ref 26–192)
CO2 SERPL-SCNC: 23 MMOL/L — SIGNIFICANT CHANGE UP (ref 22–31)
COLOR SPEC: YELLOW — SIGNIFICANT CHANGE UP
CREAT SERPL-MCNC: 0.7 MG/DL — SIGNIFICANT CHANGE UP (ref 0.5–1.3)
DIFF PNL FLD: ABNORMAL
GLUCOSE SERPL-MCNC: 102 MG/DL — HIGH (ref 70–99)
GLUCOSE UR QL: NEGATIVE MG/DL — SIGNIFICANT CHANGE UP
HCT VFR BLD CALC: 28.9 % — LOW (ref 34.5–45)
HGB BLD-MCNC: 10 G/DL — LOW (ref 11.5–15.5)
HYPOCHROMIA BLD QL: SLIGHT — SIGNIFICANT CHANGE UP
INR BLD: 1.58 RATIO — HIGH (ref 0.88–1.16)
KETONES UR-MCNC: ABNORMAL
LACTATE SERPL-SCNC: 0.7 MMOL/L — SIGNIFICANT CHANGE UP (ref 0.7–2)
LEUKOCYTE ESTERASE UR-ACNC: ABNORMAL
LYMPHOCYTES # BLD AUTO: 18 % — SIGNIFICANT CHANGE UP (ref 13–44)
MCHC RBC-ENTMCNC: 30.4 PG — SIGNIFICANT CHANGE UP (ref 27–34)
MCHC RBC-ENTMCNC: 34.8 GM/DL — SIGNIFICANT CHANGE UP (ref 32–36)
MCV RBC AUTO: 87.3 FL — SIGNIFICANT CHANGE UP (ref 80–100)
MONOCYTES NFR BLD AUTO: 2 % — SIGNIFICANT CHANGE UP (ref 2–14)
NEUTROPHILS NFR BLD AUTO: 71 % — SIGNIFICANT CHANGE UP (ref 43–77)
NEUTS BAND # BLD: 9 % — HIGH (ref 0–8)
NITRITE UR-MCNC: POSITIVE
PH UR: 8 — SIGNIFICANT CHANGE UP (ref 4.8–8)
PLAT MORPH BLD: NORMAL — SIGNIFICANT CHANGE UP
PLATELET # BLD AUTO: 401 K/UL — HIGH (ref 150–400)
POTASSIUM SERPL-MCNC: 3.4 MMOL/L — LOW (ref 3.5–5.3)
POTASSIUM SERPL-SCNC: 3.4 MMOL/L — LOW (ref 3.5–5.3)
PROT SERPL-MCNC: 9.1 GM/DL — HIGH (ref 6–8.3)
PROT UR-MCNC: 100 MG/DL
PROTHROM AB SERPL-ACNC: 17.4 SEC — HIGH (ref 9.8–12.7)
RBC # BLD: 3.31 M/UL — LOW (ref 3.8–5.2)
RBC # FLD: 13.6 % — SIGNIFICANT CHANGE UP (ref 11–15)
RBC BLD AUTO: ABNORMAL
RBC CASTS # UR COMP ASSIST: ABNORMAL /HPF (ref 0–4)
SODIUM SERPL-SCNC: 145 MMOL/L — SIGNIFICANT CHANGE UP (ref 135–145)
SP GR SPEC: 1.01 — SIGNIFICANT CHANGE UP (ref 1.01–1.02)
TRI-PHOS CRY UR QL COMP ASSIST: ABNORMAL
TROPONIN I SERPL-MCNC: 0.02 NG/ML — SIGNIFICANT CHANGE UP (ref 0.01–0.04)
UROBILINOGEN FLD QL: NEGATIVE MG/DL — SIGNIFICANT CHANGE UP
WBC # BLD: 10.9 K/UL — HIGH (ref 3.8–10.5)
WBC # FLD AUTO: 10.9 K/UL — HIGH (ref 3.8–10.5)
WBC UR QL: >50

## 2017-04-04 PROCEDURE — 73502 X-RAY EXAM HIP UNI 2-3 VIEWS: CPT | Mod: 26,RT

## 2017-04-04 PROCEDURE — 71010: CPT | Mod: 26,76

## 2017-04-04 PROCEDURE — 71010: CPT | Mod: 26

## 2017-04-04 PROCEDURE — 99285 EMERGENCY DEPT VISIT HI MDM: CPT

## 2017-04-04 PROCEDURE — 70450 CT HEAD/BRAIN W/O DYE: CPT | Mod: 26

## 2017-04-04 RX ORDER — CARBIDOPA AND LEVODOPA 25; 100 MG/1; MG/1
1 TABLET ORAL
Qty: 0 | Refills: 0 | Status: DISCONTINUED | OUTPATIENT
Start: 2017-04-04 | End: 2017-04-07

## 2017-04-04 RX ORDER — FOLIC ACID 0.8 MG
0.8 TABLET ORAL DAILY
Qty: 0 | Refills: 0 | Status: DISCONTINUED | OUTPATIENT
Start: 2017-04-04 | End: 2017-04-04

## 2017-04-04 RX ORDER — PIPERACILLIN AND TAZOBACTAM 4; .5 G/20ML; G/20ML
3.38 INJECTION, POWDER, LYOPHILIZED, FOR SOLUTION INTRAVENOUS EVERY 8 HOURS
Qty: 0 | Refills: 0 | Status: DISCONTINUED | OUTPATIENT
Start: 2017-04-04 | End: 2017-04-07

## 2017-04-04 RX ORDER — PIPERACILLIN AND TAZOBACTAM 4; .5 G/20ML; G/20ML
3.38 INJECTION, POWDER, LYOPHILIZED, FOR SOLUTION INTRAVENOUS ONCE
Qty: 0 | Refills: 0 | Status: COMPLETED | OUTPATIENT
Start: 2017-04-04 | End: 2017-04-04

## 2017-04-04 RX ORDER — DEXTROSE MONOHYDRATE, SODIUM CHLORIDE, AND POTASSIUM CHLORIDE 50; .745; 4.5 G/1000ML; G/1000ML; G/1000ML
1000 INJECTION, SOLUTION INTRAVENOUS
Qty: 0 | Refills: 0 | Status: DISCONTINUED | OUTPATIENT
Start: 2017-04-04 | End: 2017-04-12

## 2017-04-04 RX ORDER — ENOXAPARIN SODIUM 100 MG/ML
40 INJECTION SUBCUTANEOUS EVERY 24 HOURS
Qty: 0 | Refills: 0 | Status: DISCONTINUED | OUTPATIENT
Start: 2017-04-04 | End: 2017-04-28

## 2017-04-04 RX ORDER — SENNA PLUS 8.6 MG/1
2 TABLET ORAL AT BEDTIME
Qty: 0 | Refills: 0 | Status: DISCONTINUED | OUTPATIENT
Start: 2017-04-04 | End: 2017-04-04

## 2017-04-04 RX ORDER — FOLIC ACID 0.8 MG
1 TABLET ORAL DAILY
Qty: 0 | Refills: 0 | Status: DISCONTINUED | OUTPATIENT
Start: 2017-04-04 | End: 2017-04-28

## 2017-04-04 RX ORDER — ACETAMINOPHEN 500 MG
650 TABLET ORAL EVERY 6 HOURS
Qty: 0 | Refills: 0 | Status: DISCONTINUED | OUTPATIENT
Start: 2017-04-04 | End: 2017-04-28

## 2017-04-04 RX ORDER — ACETAMINOPHEN 500 MG
650 TABLET ORAL ONCE
Qty: 0 | Refills: 0 | Status: COMPLETED | OUTPATIENT
Start: 2017-04-04 | End: 2017-04-04

## 2017-04-04 RX ORDER — SODIUM CHLORIDE 9 MG/ML
1000 INJECTION INTRAMUSCULAR; INTRAVENOUS; SUBCUTANEOUS ONCE
Qty: 0 | Refills: 0 | Status: COMPLETED | OUTPATIENT
Start: 2017-04-04 | End: 2017-04-04

## 2017-04-04 RX ORDER — MORPHINE SULFATE 50 MG/1
2 CAPSULE, EXTENDED RELEASE ORAL EVERY 4 HOURS
Qty: 0 | Refills: 0 | Status: DISCONTINUED | OUTPATIENT
Start: 2017-04-04 | End: 2017-04-04

## 2017-04-04 RX ORDER — SENNA PLUS 8.6 MG/1
10 TABLET ORAL AT BEDTIME
Qty: 0 | Refills: 0 | Status: DISCONTINUED | OUTPATIENT
Start: 2017-04-04 | End: 2017-04-18

## 2017-04-04 RX ORDER — POLYETHYLENE GLYCOL 3350 17 G/17G
17 POWDER, FOR SOLUTION ORAL DAILY
Qty: 0 | Refills: 0 | Status: DISCONTINUED | OUTPATIENT
Start: 2017-04-04 | End: 2017-04-18

## 2017-04-04 RX ADMIN — PIPERACILLIN AND TAZOBACTAM 200 GRAM(S): 4; .5 INJECTION, POWDER, LYOPHILIZED, FOR SOLUTION INTRAVENOUS at 17:16

## 2017-04-04 RX ADMIN — SODIUM CHLORIDE 1000 MILLILITER(S): 9 INJECTION INTRAMUSCULAR; INTRAVENOUS; SUBCUTANEOUS at 16:00

## 2017-04-04 RX ADMIN — SODIUM CHLORIDE 1000 MILLILITER(S): 9 INJECTION INTRAMUSCULAR; INTRAVENOUS; SUBCUTANEOUS at 17:38

## 2017-04-04 RX ADMIN — Medication 650 MILLIGRAM(S): at 17:15

## 2017-04-04 RX ADMIN — PIPERACILLIN AND TAZOBACTAM 25 GRAM(S): 4; .5 INJECTION, POWDER, LYOPHILIZED, FOR SOLUTION INTRAVENOUS at 23:36

## 2017-04-04 NOTE — H&P ADULT. - HISTORY OF PRESENT ILLNESS
patient  with  worsening  lethargy  fever  dysphagia  evaluated  in  ER  admitted  for  pneumonia  UTI  patient  s/p  l  hip  medullary  nail

## 2017-04-04 NOTE — ED PROVIDER NOTE - CONSTITUTIONAL, MLM
normal... Well appearing, well nourished, awake, alert, oriented to person,  and in no apparent distress. No nasal flaring no shoulders retractions

## 2017-04-04 NOTE — ED ADULT NURSE NOTE - OBJECTIVE STATEMENT
pt received with weakness, sent from [facility] , pt is Albanian speaking and as per son sent by MD CASTELLANOS. PT WITH 101 RECTAL TEMP , SON STATES SHE IS UNABLE TO SWALLOW pt received with weakness, sent from [facility] , pt is English speaking and as per son sent by MD CASTELLANOS. PT WITH 101 RECTAL TEMP , SON STATES SHE IS UNABLE TO SWALLOW. pt has stage 2 ulcer

## 2017-04-04 NOTE — ED PROVIDER NOTE - CARE PLAN
Principal Discharge DX:	Altered mental status  Secondary Diagnosis:	UTI (urinary tract infection)  Secondary Diagnosis:	Urine retention

## 2017-04-04 NOTE — ED ADULT NURSE REASSESSMENT NOTE - NS ED NURSE REASSESS COMMENT FT1
patient tolerated iv fluids well, diuresing large volumes of cloudy yellow foul smelling urine. Incontinent of liquid brown stool, hygienic care done, repositioned for comfort.

## 2017-04-04 NOTE — ED PROVIDER NOTE - OBJECTIVE STATEMENT
86 years old female from [facility] rehab c/o fever for couple days and lethargic with difficulty of swallowing. Pt is lethargic and unable to give detail hx. According to pmd Dr. White pt had right hip replacement about one week ago and subsequently developed pneumonia treated with levaquin orally. He also sts pt stated having difficulty of swallowing for last few days especially taking her medications. Dr. White sts call Dr. Sanchez for consult.

## 2017-04-05 LAB
ALBUMIN SERPL ELPH-MCNC: 2.2 G/DL — LOW (ref 3.3–5)
ALP SERPL-CCNC: 50 U/L — SIGNIFICANT CHANGE UP (ref 40–120)
ALT FLD-CCNC: 12 U/L — SIGNIFICANT CHANGE UP (ref 12–78)
ANION GAP SERPL CALC-SCNC: 13 MMOL/L — SIGNIFICANT CHANGE UP (ref 5–17)
AST SERPL-CCNC: 15 U/L — SIGNIFICANT CHANGE UP (ref 15–37)
BILIRUB SERPL-MCNC: 0.8 MG/DL — SIGNIFICANT CHANGE UP (ref 0.2–1.2)
BUN SERPL-MCNC: 28 MG/DL — HIGH (ref 7–23)
CALCIUM SERPL-MCNC: 8.2 MG/DL — LOW (ref 8.5–10.1)
CHLORIDE SERPL-SCNC: 114 MMOL/L — HIGH (ref 96–108)
CO2 SERPL-SCNC: 21 MMOL/L — LOW (ref 22–31)
CREAT SERPL-MCNC: 0.59 MG/DL — SIGNIFICANT CHANGE UP (ref 0.5–1.3)
GLUCOSE SERPL-MCNC: 128 MG/DL — HIGH (ref 70–99)
HCT VFR BLD CALC: 26.3 % — LOW (ref 34.5–45)
HGB BLD-MCNC: 8.7 G/DL — LOW (ref 11.5–15.5)
MCHC RBC-ENTMCNC: 29.2 PG — SIGNIFICANT CHANGE UP (ref 27–34)
MCHC RBC-ENTMCNC: 33 GM/DL — SIGNIFICANT CHANGE UP (ref 32–36)
MCV RBC AUTO: 88.5 FL — SIGNIFICANT CHANGE UP (ref 80–100)
PLATELET # BLD AUTO: 330 K/UL — SIGNIFICANT CHANGE UP (ref 150–400)
POTASSIUM SERPL-MCNC: 3 MMOL/L — LOW (ref 3.5–5.3)
POTASSIUM SERPL-SCNC: 3 MMOL/L — LOW (ref 3.5–5.3)
PROT SERPL-MCNC: 8 GM/DL — SIGNIFICANT CHANGE UP (ref 6–8.3)
RBC # BLD: 2.97 M/UL — LOW (ref 3.8–5.2)
RBC # FLD: 13.4 % — SIGNIFICANT CHANGE UP (ref 11–15)
SODIUM SERPL-SCNC: 148 MMOL/L — HIGH (ref 135–145)
WBC # BLD: 9.5 K/UL — SIGNIFICANT CHANGE UP (ref 3.8–10.5)
WBC # FLD AUTO: 9.5 K/UL — SIGNIFICANT CHANGE UP (ref 3.8–10.5)

## 2017-04-05 PROCEDURE — 43752 NASAL/OROGASTRIC W/TUBE PLMT: CPT

## 2017-04-05 PROCEDURE — 74000: CPT | Mod: 26

## 2017-04-05 RX ORDER — POTASSIUM CHLORIDE 20 MEQ
10 PACKET (EA) ORAL ONCE
Qty: 0 | Refills: 0 | Status: COMPLETED | OUTPATIENT
Start: 2017-04-05 | End: 2017-04-05

## 2017-04-05 RX ADMIN — Medication 650 MILLIGRAM(S): at 01:34

## 2017-04-05 RX ADMIN — ENOXAPARIN SODIUM 40 MILLIGRAM(S): 100 INJECTION SUBCUTANEOUS at 06:42

## 2017-04-05 RX ADMIN — Medication 650 MILLIGRAM(S): at 11:29

## 2017-04-05 RX ADMIN — CARBIDOPA AND LEVODOPA 1 TABLET(S): 25; 100 TABLET ORAL at 18:17

## 2017-04-05 RX ADMIN — DEXTROSE MONOHYDRATE, SODIUM CHLORIDE, AND POTASSIUM CHLORIDE 75 MILLILITER(S): 50; .745; 4.5 INJECTION, SOLUTION INTRAVENOUS at 11:32

## 2017-04-05 RX ADMIN — CARBIDOPA AND LEVODOPA 1 TABLET(S): 25; 100 TABLET ORAL at 06:42

## 2017-04-05 RX ADMIN — PIPERACILLIN AND TAZOBACTAM 25 GRAM(S): 4; .5 INJECTION, POWDER, LYOPHILIZED, FOR SOLUTION INTRAVENOUS at 22:12

## 2017-04-05 RX ADMIN — PIPERACILLIN AND TAZOBACTAM 25 GRAM(S): 4; .5 INJECTION, POWDER, LYOPHILIZED, FOR SOLUTION INTRAVENOUS at 06:43

## 2017-04-05 RX ADMIN — PIPERACILLIN AND TAZOBACTAM 25 GRAM(S): 4; .5 INJECTION, POWDER, LYOPHILIZED, FOR SOLUTION INTRAVENOUS at 14:34

## 2017-04-05 RX ADMIN — POLYETHYLENE GLYCOL 3350 17 GRAM(S): 17 POWDER, FOR SOLUTION ORAL at 14:28

## 2017-04-05 RX ADMIN — Medication 1 MILLIGRAM(S): at 14:28

## 2017-04-05 RX ADMIN — Medication 100 MILLIEQUIVALENT(S): at 11:31

## 2017-04-05 RX ADMIN — SENNA PLUS 10 MILLILITER(S): 8.6 TABLET ORAL at 22:13

## 2017-04-05 RX ADMIN — SENNA PLUS 10 MILLILITER(S): 8.6 TABLET ORAL at 01:18

## 2017-04-05 RX ADMIN — Medication 650 MILLIGRAM(S): at 18:16

## 2017-04-05 NOTE — SWALLOW BEDSIDE ASSESSMENT ADULT - COMMENTS
CXR 4/4/2017 Impression: Increasing retrocardiac atelectasis. NG tube inserted.    CT Head 4/4/2017 Impression: Chronic brain findings again noted. Presently there is slight fluid in the left maxillary sinus.

## 2017-04-05 NOTE — SWALLOW BEDSIDE ASSESSMENT ADULT - SWALLOW EVAL: PATIENT/FAMILY GOALS STATEMENT
Family friend stated that pt had poor appetite over the weekend which got progressively worse the past few fays. Family friend stated that pt had poor appetite over the weekend which got progressively worse the past few days. Rehab SLP reportedly made pt NPO prior to admission.

## 2017-04-05 NOTE — SWALLOW BEDSIDE ASSESSMENT ADULT - ORAL PREPARATORY PHASE
Refuses to accept bolus into oral cavity Lateral loss of bolus/Refuses to accept bolus into oral cavity Refuses to accept bolus into oral cavity/severe/Anterior loss of bolus

## 2017-04-05 NOTE — PROGRESS NOTE ADULT - SUBJECTIVE AND OBJECTIVE BOX
INTERVAL HPI/OVERNIGHT EVENTS:        REVIEW OF SYSTEMS:  CONSTITUTIONAL:  more  alert  presents  no  complaints      MEDICATION:  enoxaparin Injectable 40milliGRAM(s) SubCutaneous every 24 hours  carbidopa/levodopa  25/100 1Tablet(s) Oral two times a day  piperacillin/tazobactam IVPB. 3.375Gram(s) IV Intermittent every 8 hours  dextrose 5% + sodium chloride 0.9% with potassium chloride 20 mEq/L 1000milliLiter(s) IV Continuous <Continuous>  morphine  - Injectable 2milliGRAM(s) IV Push every 4 hours PRN  polyethylene glycol 3350 17Gram(s) Oral daily  folic acid 1milliGRAM(s) Oral daily  senna Syrup 10milliLiter(s) Oral at bedtime  acetaminophen    Suspension 650milliGRAM(s) Oral every 6 hours PRN    Vital Signs Last 24 Hrs  T(C): 37.7, Max: 37.8 ( @ 22:29)  T(F): 99.8, Max: 100.1 ( @ 22:29)  HR: 89 (89 - 107)  BP: 149/76 (149/76 - 182/92)  BP(mean): --  RR: 18 (17 - 18)  SpO2: 97% (96% - 98%)    PHYSICAL EXAM:  GENERAL: NAD, well-groomed, well-developed  EYES:  conjunctiva and sclera clear  ENMT:  Moist mucous membranes,   NECK: Supple, No JVD, Normal thyroid  NERVOUS SYSTEM:  Alert oriented   no  focal  deficits;   CHEST/LUNG: Clear    HEART: Regular rate and rhythm; No murmurs, rubs, or gallops  ABDOMEN: Soft, Nontender, Nondistended; Bowel sounds present  EXTREMITIES:  no  edema no  tenderness  SKIN: No rashes   LABS:                        8.7    9.5   )-----------( 330      ( 2017 06:23 )             26.3     2017 06:23    148    |  114    |  28     ----------------------------<  128    3.0     |  21     |  0.59     Ca    8.2        2017 06:23    TPro  8.0    /  Alb  2.2    /  TBili  0.8    /  DBili  x      /  AST  15     /  ALT  12     /  AlkPhos  50     2017 06:23    PT/INR - ( 2017 16:02 )   PT: 17.4 sec;   INR: 1.58 ratio         PTT - ( 2017 16:02 )  PTT:35.7 sec  Urinalysis Basic - ( 2017 16:02 )    Color: Yellow / Appearance: very cloudy / S.010 / pH: x  Gluc: x / Ketone: Trace  / Bili: Negative / Urobili: Negative mg/dL   Blood: x / Protein: 100 mg/dL / Nitrite: Positive   Leuk Esterase: Moderate / RBC: 25-50 /HPF / WBC >50   Sq Epi: x / Non Sq Epi: x / Bacteria: Many      CAPILLARY BLOOD GLUCOSE  115 (2017 15:11)      RADIOLOGY & ADDITIONAL TESTS:    Imaging reports  Personally Reviewed:  [ x] YES  [ ] NO    Consultant(s) Notes Reviewed:  [x ] YES  [ ] NO    Care Discussed with Consultants/Other Providers [ x] YES  [ ] NO  Assessment and Plan:   Problem/Plan - 1:  ·  Problem: Pneumonia.  Plan: zosyn  vanco  O2  duoneb.     Problem/Plan - 2:  ·  Problem: UTI (urinary tract infection).  Plan: zosyn.     Problem/Plan - 3:  ·  Problem: Parkinson disease encephalopathy.  Plan: sinemet observe  with  present  treatment  plan    Problem/Plan - 4:  ·  Problem: Altered mental status.  Plan: observation further w/u  and  treatment  as per  clinical  course.     Problem/Plan - 5:  ·  Problem: Anemia.  Plan: iron  folic  acid.

## 2017-04-05 NOTE — SWALLOW BEDSIDE ASSESSMENT ADULT - SWALLOW EVAL: DIAGNOSIS
Pt is an 86 y o female admitted with altered mental status, UTI, with pmh Parkinson's disease presented with oropharyngeal dysphagia marked by reduced cognition, decreased po acceptance, and lateral loss of honey thick liquid to right side. Pt is not safe for po diet at this time. Pt is an 86 y o female admitted with altered mental status, UTI, with pmh Parkinson's disease presented with oropharyngeal dysphagia marked by reduced cognition, decreased po acceptance, and severe anterior loss of honey thick liquid to right side. Pt is not safe for po diet at this time.

## 2017-04-05 NOTE — SWALLOW BEDSIDE ASSESSMENT ADULT - SLP GENERAL OBSERVATIONS
Pt was seen beside alert and nonverbal however communicated by elevating arm to behaviorally refuse po trials. Pt did not follow 1-step directions. Pt was seen beside alert and nonverbal essentially noncommunicative except pt elevates her arm when presented with trials ? to refuse po. to behaviorally refuse po trials. Pt did not follow 1-step directions.

## 2017-04-06 LAB
-  AMIKACIN: SIGNIFICANT CHANGE UP
-  AMPICILLIN/SULBACTAM: SIGNIFICANT CHANGE UP
-  AMPICILLIN: SIGNIFICANT CHANGE UP
-  AZTREONAM: SIGNIFICANT CHANGE UP
-  CEFAZOLIN: SIGNIFICANT CHANGE UP
-  CEFEPIME: SIGNIFICANT CHANGE UP
-  CEFOXITIN: SIGNIFICANT CHANGE UP
-  CEFTAZIDIME: SIGNIFICANT CHANGE UP
-  CEFTRIAXONE: SIGNIFICANT CHANGE UP
-  CIPROFLOXACIN: SIGNIFICANT CHANGE UP
-  ERTAPENEM: SIGNIFICANT CHANGE UP
-  GENTAMICIN: SIGNIFICANT CHANGE UP
-  IMIPENEM: SIGNIFICANT CHANGE UP
-  LEVOFLOXACIN: SIGNIFICANT CHANGE UP
-  MEROPENEM: SIGNIFICANT CHANGE UP
-  NITROFURANTOIN: SIGNIFICANT CHANGE UP
-  PIPERACILLIN/TAZOBACTAM: SIGNIFICANT CHANGE UP
-  TOBRAMYCIN: SIGNIFICANT CHANGE UP
-  TRIMETHOPRIM/SULFAMETHOXAZOLE: SIGNIFICANT CHANGE UP
ALBUMIN SERPL ELPH-MCNC: 2.2 G/DL — LOW (ref 3.3–5)
ALP SERPL-CCNC: 53 U/L — SIGNIFICANT CHANGE UP (ref 40–120)
ALT FLD-CCNC: 7 U/L — LOW (ref 12–78)
ANION GAP SERPL CALC-SCNC: 12 MMOL/L — SIGNIFICANT CHANGE UP (ref 5–17)
AST SERPL-CCNC: 13 U/L — LOW (ref 15–37)
BILIRUB SERPL-MCNC: 0.9 MG/DL — SIGNIFICANT CHANGE UP (ref 0.2–1.2)
BUN SERPL-MCNC: 14 MG/DL — SIGNIFICANT CHANGE UP (ref 7–23)
CALCIUM SERPL-MCNC: 8.3 MG/DL — LOW (ref 8.5–10.1)
CHLORIDE SERPL-SCNC: 111 MMOL/L — HIGH (ref 96–108)
CO2 SERPL-SCNC: 23 MMOL/L — SIGNIFICANT CHANGE UP (ref 22–31)
CREAT SERPL-MCNC: 0.49 MG/DL — LOW (ref 0.5–1.3)
CULTURE RESULTS: SIGNIFICANT CHANGE UP
GLUCOSE SERPL-MCNC: 116 MG/DL — HIGH (ref 70–99)
HCT VFR BLD CALC: 28.2 % — LOW (ref 34.5–45)
HGB BLD-MCNC: 9.8 G/DL — LOW (ref 11.5–15.5)
MAGNESIUM SERPL-MCNC: 1.7 MG/DL — LOW (ref 1.8–2.4)
MCHC RBC-ENTMCNC: 30.7 PG — SIGNIFICANT CHANGE UP (ref 27–34)
MCHC RBC-ENTMCNC: 34.7 GM/DL — SIGNIFICANT CHANGE UP (ref 32–36)
MCV RBC AUTO: 88.7 FL — SIGNIFICANT CHANGE UP (ref 80–100)
METHOD TYPE: SIGNIFICANT CHANGE UP
ORGANISM # SPEC MICROSCOPIC CNT: SIGNIFICANT CHANGE UP
ORGANISM # SPEC MICROSCOPIC CNT: SIGNIFICANT CHANGE UP
PLATELET # BLD AUTO: 314 K/UL — SIGNIFICANT CHANGE UP (ref 150–400)
POTASSIUM SERPL-MCNC: 2.8 MMOL/L — CRITICAL LOW (ref 3.5–5.3)
POTASSIUM SERPL-SCNC: 2.8 MMOL/L — CRITICAL LOW (ref 3.5–5.3)
PROT SERPL-MCNC: 8.5 GM/DL — HIGH (ref 6–8.3)
RBC # BLD: 3.18 M/UL — LOW (ref 3.8–5.2)
RBC # FLD: 13.6 % — SIGNIFICANT CHANGE UP (ref 11–15)
SODIUM SERPL-SCNC: 146 MMOL/L — HIGH (ref 135–145)
SPECIMEN SOURCE: SIGNIFICANT CHANGE UP
WBC # BLD: 10.5 K/UL — SIGNIFICANT CHANGE UP (ref 3.8–10.5)
WBC # FLD AUTO: 10.5 K/UL — SIGNIFICANT CHANGE UP (ref 3.8–10.5)

## 2017-04-06 RX ORDER — POTASSIUM CHLORIDE 20 MEQ
40 PACKET (EA) ORAL EVERY 4 HOURS
Qty: 0 | Refills: 0 | Status: DISCONTINUED | OUTPATIENT
Start: 2017-04-06 | End: 2017-04-06

## 2017-04-06 RX ORDER — LOSARTAN POTASSIUM 100 MG/1
50 TABLET, FILM COATED ORAL DAILY
Qty: 0 | Refills: 0 | Status: DISCONTINUED | OUTPATIENT
Start: 2017-04-06 | End: 2017-04-16

## 2017-04-06 RX ORDER — NITROGLYCERIN 6.5 MG
0.5 CAPSULE, EXTENDED RELEASE ORAL ONCE
Qty: 0 | Refills: 0 | Status: COMPLETED | OUTPATIENT
Start: 2017-04-06 | End: 2017-04-06

## 2017-04-06 RX ORDER — POTASSIUM CHLORIDE 20 MEQ
10 PACKET (EA) ORAL ONCE
Qty: 0 | Refills: 0 | Status: COMPLETED | OUTPATIENT
Start: 2017-04-06 | End: 2017-04-06

## 2017-04-06 RX ORDER — POTASSIUM CHLORIDE 20 MEQ
40 PACKET (EA) ORAL EVERY 4 HOURS
Qty: 0 | Refills: 0 | Status: COMPLETED | OUTPATIENT
Start: 2017-04-06 | End: 2017-04-06

## 2017-04-06 RX ADMIN — PIPERACILLIN AND TAZOBACTAM 25 GRAM(S): 4; .5 INJECTION, POWDER, LYOPHILIZED, FOR SOLUTION INTRAVENOUS at 06:07

## 2017-04-06 RX ADMIN — SENNA PLUS 10 MILLILITER(S): 8.6 TABLET ORAL at 21:47

## 2017-04-06 RX ADMIN — Medication 40 MILLIEQUIVALENT(S): at 14:38

## 2017-04-06 RX ADMIN — Medication 0.5 INCH(S): at 07:05

## 2017-04-06 RX ADMIN — Medication 650 MILLIGRAM(S): at 07:24

## 2017-04-06 RX ADMIN — Medication 40 MILLIEQUIVALENT(S): at 08:42

## 2017-04-06 RX ADMIN — Medication 100 MILLIEQUIVALENT(S): at 10:10

## 2017-04-06 RX ADMIN — ENOXAPARIN SODIUM 40 MILLIGRAM(S): 100 INJECTION SUBCUTANEOUS at 06:07

## 2017-04-06 RX ADMIN — DEXTROSE MONOHYDRATE, SODIUM CHLORIDE, AND POTASSIUM CHLORIDE 75 MILLILITER(S): 50; .745; 4.5 INJECTION, SOLUTION INTRAVENOUS at 08:41

## 2017-04-06 RX ADMIN — PIPERACILLIN AND TAZOBACTAM 25 GRAM(S): 4; .5 INJECTION, POWDER, LYOPHILIZED, FOR SOLUTION INTRAVENOUS at 21:47

## 2017-04-06 RX ADMIN — Medication 650 MILLIGRAM(S): at 23:42

## 2017-04-06 RX ADMIN — Medication 1 MILLIGRAM(S): at 11:13

## 2017-04-06 RX ADMIN — CARBIDOPA AND LEVODOPA 1 TABLET(S): 25; 100 TABLET ORAL at 17:41

## 2017-04-06 RX ADMIN — POLYETHYLENE GLYCOL 3350 17 GRAM(S): 17 POWDER, FOR SOLUTION ORAL at 11:13

## 2017-04-06 RX ADMIN — CARBIDOPA AND LEVODOPA 1 TABLET(S): 25; 100 TABLET ORAL at 06:07

## 2017-04-06 RX ADMIN — LOSARTAN POTASSIUM 50 MILLIGRAM(S): 100 TABLET, FILM COATED ORAL at 08:42

## 2017-04-06 RX ADMIN — PIPERACILLIN AND TAZOBACTAM 25 GRAM(S): 4; .5 INJECTION, POWDER, LYOPHILIZED, FOR SOLUTION INTRAVENOUS at 14:38

## 2017-04-06 RX ADMIN — Medication 0.5 INCH(S): at 20:00

## 2017-04-06 NOTE — PROGRESS NOTE ADULT - SUBJECTIVE AND OBJECTIVE BOX
INTERVAL HPI/OVERNIGHT EVENTS:        REVIEW OF SYSTEMS:  CONSTITUTIONAL:  alert  presents  no  complaints    NECK: No pain or stiffnes  RESPIRATORY: No SOB   CARDIOVASCULAR: No chest pain, palpitations, dizziness,   GASTROINTESTINAL: No abdominal pain. No nausea, vomiting,   NEUROLOGICAL: No headaches, no  blurry  vision no  dizziness  SKIN: No itching,   MUSCULOSKELETAL: No pain    MEDICATION:  enoxaparin Injectable 40milliGRAM(s) SubCutaneous every 24 hours  carbidopa/levodopa  25/100 1Tablet(s) Oral two times a day  piperacillin/tazobactam IVPB. 3.375Gram(s) IV Intermittent every 8 hours  dextrose 5% + sodium chloride 0.9% with potassium chloride 20 mEq/L 1000milliLiter(s) IV Continuous <Continuous>  morphine  - Injectable 2milliGRAM(s) IV Push every 4 hours PRN  polyethylene glycol 3350 17Gram(s) Oral daily  folic acid 1milliGRAM(s) Oral daily  senna Syrup 10milliLiter(s) Oral at bedtime  acetaminophen    Suspension 650milliGRAM(s) Oral every 6 hours PRN    Vital Signs Last 24 Hrs  T(C): 38.1, Max: 38.4 (-05 @ 11:14)  T(F): 100.6, Max: 101.2 (04-05 @ 11:14)  HR: 100 (65 - 117)  BP: 192/65 (158/68 - 192/65)  BP(mean): --  RR: 15 (15 - 19)  SpO2: 98% (95% - 99%)    PHYSICAL EXAM:  GENERAL: NAD, well-groomed, well-developed  EYES:  conjunctiva and sclera clear  ENMT:  Moist mucous membranes,   NECK: Supple, No JVD, Normal thyroid  NERVOUS SYSTEM:  Alert oriented   no  focal  deficits;   CHEST/LUNG: Clear    HEART: Regular rate and rhythm; No murmurs, rubs, or gallops  ABDOMEN: Soft, Nontender, Nondistended; Bowel sounds present  EXTREMITIES:  no  edema no  tenderness  SKIN: No rashes   LABS:                        9.8    10.5  )-----------( 314      ( 2017 07:04 )             28.2     04-05    148<H>  |  114<H>  |  28<H>  ----------------------------<  128<H>  3.0<L>   |  21<L>  |  0.59    Ca    8.2<L>      2017 06:23    TPro  8.0  /  Alb  2.2<L>  /  TBili  0.8  /  DBili  x   /  AST  15  /  ALT  12  /  AlkPhos  50  -    PT/INR - ( 2017 16:02 )   PT: 17.4 sec;   INR: 1.58 ratio         PTT - ( 2017 16:02 )  PTT:35.7 sec  Urinalysis Basic - ( 2017 16:02 )    Color: Yellow / Appearance: very cloudy / S.010 / pH: x  Gluc: x / Ketone: Trace  / Bili: Negative / Urobili: Negative mg/dL   Blood: x / Protein: 100 mg/dL / Nitrite: Positive   Leuk Esterase: Moderate / RBC: 25-50 /HPF / WBC >50   Sq Epi: x / Non Sq Epi: x / Bacteria: Many    Culture Results:   >100,000 CFU/ml Morganella morganii (17 @ 01:00)        CAPILLARY BLOOD GLUCOSE      RADIOLOGY & ADDITIONAL TESTS:    Imaging reports  Personally Reviewed:  [ x] YES  [ ] NO    Consultant(s) Notes Reviewed:  [x ] YES  [ ] NO    Care Discussed with Consultants/Other Providers [x ] YES  [ ] NO  Assessment and Plan:   Problem/Plan - 1:  ·  Problem: Pneumonia.  Plan: zosyn  vanco  O2  duoneb.     Problem/Plan - 2:  ·  Problem: UTI (urinary tract infection).  Plan: zosyn.     Problem/Plan - 3:  ·  Problem: Parkinson disease encephalopathy.  Plan: sinemet observe  with  present  treatment  plan    Problem/Plan - 4:  ·  Problem: Altered mental status.  Plan: observation further w/u  and  treatment  as per  clinical  course.     Problem/Plan - 5:  ·  Problem: Anemia.  Plan: iron  folic  acid.   Hypokalemia  supplement K

## 2017-04-07 LAB
ALBUMIN SERPL ELPH-MCNC: 1.9 G/DL — LOW (ref 3.3–5)
ALP SERPL-CCNC: 64 U/L — SIGNIFICANT CHANGE UP (ref 40–120)
ALT FLD-CCNC: 12 U/L — SIGNIFICANT CHANGE UP (ref 12–78)
ANION GAP SERPL CALC-SCNC: 12 MMOL/L — SIGNIFICANT CHANGE UP (ref 5–17)
ANION GAP SERPL CALC-SCNC: 12 MMOL/L — SIGNIFICANT CHANGE UP (ref 5–17)
ANION GAP SERPL CALC-SCNC: 13 MMOL/L — SIGNIFICANT CHANGE UP (ref 5–17)
AST SERPL-CCNC: 16 U/L — SIGNIFICANT CHANGE UP (ref 15–37)
BASE EXCESS BLDA CALC-SCNC: -4.3 MMOL/L — LOW (ref -2–2)
BILIRUB SERPL-MCNC: 0.9 MG/DL — SIGNIFICANT CHANGE UP (ref 0.2–1.2)
BLOOD GAS COMMENTS: SIGNIFICANT CHANGE UP
BLOOD GAS COMMENTS: SIGNIFICANT CHANGE UP
BLOOD GAS SOURCE: SIGNIFICANT CHANGE UP
BUN SERPL-MCNC: 10 MG/DL — SIGNIFICANT CHANGE UP (ref 7–23)
BUN SERPL-MCNC: 13 MG/DL — SIGNIFICANT CHANGE UP (ref 7–23)
BUN SERPL-MCNC: 14 MG/DL — SIGNIFICANT CHANGE UP (ref 7–23)
CALCIUM SERPL-MCNC: 8.3 MG/DL — LOW (ref 8.5–10.1)
CALCIUM SERPL-MCNC: 8.5 MG/DL — SIGNIFICANT CHANGE UP (ref 8.5–10.1)
CALCIUM SERPL-MCNC: 8.7 MG/DL — SIGNIFICANT CHANGE UP (ref 8.5–10.1)
CHLORIDE SERPL-SCNC: 111 MMOL/L — HIGH (ref 96–108)
CHLORIDE SERPL-SCNC: 112 MMOL/L — HIGH (ref 96–108)
CHLORIDE SERPL-SCNC: 114 MMOL/L — HIGH (ref 96–108)
CO2 SERPL-SCNC: 20 MMOL/L — LOW (ref 22–31)
CO2 SERPL-SCNC: 21 MMOL/L — LOW (ref 22–31)
CO2 SERPL-SCNC: 23 MMOL/L — SIGNIFICANT CHANGE UP (ref 22–31)
CREAT SERPL-MCNC: 0.56 MG/DL — SIGNIFICANT CHANGE UP (ref 0.5–1.3)
CREAT SERPL-MCNC: 0.64 MG/DL — SIGNIFICANT CHANGE UP (ref 0.5–1.3)
CREAT SERPL-MCNC: 0.73 MG/DL — SIGNIFICANT CHANGE UP (ref 0.5–1.3)
GLUCOSE SERPL-MCNC: 148 MG/DL — HIGH (ref 70–99)
GLUCOSE SERPL-MCNC: 152 MG/DL — HIGH (ref 70–99)
GLUCOSE SERPL-MCNC: 239 MG/DL — HIGH (ref 70–99)
HCO3 BLDA-SCNC: 18 MMOL/L — LOW (ref 21–29)
HCT VFR BLD CALC: 28.8 % — LOW (ref 34.5–45)
HCT VFR BLD CALC: 29.3 % — LOW (ref 34.5–45)
HGB BLD-MCNC: 10 G/DL — LOW (ref 11.5–15.5)
HGB BLD-MCNC: 9.8 G/DL — LOW (ref 11.5–15.5)
HOROWITZ INDEX BLDA+IHG-RTO: 32 — SIGNIFICANT CHANGE UP
LACTATE SERPL-SCNC: 3.1 MMOL/L — HIGH (ref 0.7–2)
LACTATE SERPL-SCNC: 3.8 MMOL/L — HIGH (ref 0.7–2)
MAGNESIUM SERPL-MCNC: 1.6 MG/DL — LOW (ref 1.8–2.4)
MCHC RBC-ENTMCNC: 29.9 PG — SIGNIFICANT CHANGE UP (ref 27–34)
MCHC RBC-ENTMCNC: 30.1 PG — SIGNIFICANT CHANGE UP (ref 27–34)
MCHC RBC-ENTMCNC: 33.9 GM/DL — SIGNIFICANT CHANGE UP (ref 32–36)
MCHC RBC-ENTMCNC: 34.3 GM/DL — SIGNIFICANT CHANGE UP (ref 32–36)
MCV RBC AUTO: 87.7 FL — SIGNIFICANT CHANGE UP (ref 80–100)
MCV RBC AUTO: 88.4 FL — SIGNIFICANT CHANGE UP (ref 80–100)
PCO2 BLDA: 26 MMHG — LOW (ref 32–46)
PH BLD: 7.46 — HIGH (ref 7.35–7.45)
PHOSPHATE SERPL-MCNC: 1.6 MG/DL — LOW (ref 2.5–4.5)
PLATELET # BLD AUTO: 295 K/UL — SIGNIFICANT CHANGE UP (ref 150–400)
PLATELET # BLD AUTO: 327 K/UL — SIGNIFICANT CHANGE UP (ref 150–400)
PO2 BLDA: 98 MMHG — SIGNIFICANT CHANGE UP (ref 74–108)
POTASSIUM SERPL-MCNC: 3.2 MMOL/L — LOW (ref 3.5–5.3)
POTASSIUM SERPL-MCNC: 4.2 MMOL/L — SIGNIFICANT CHANGE UP (ref 3.5–5.3)
POTASSIUM SERPL-MCNC: 4.9 MMOL/L — SIGNIFICANT CHANGE UP (ref 3.5–5.3)
POTASSIUM SERPL-SCNC: 3.2 MMOL/L — LOW (ref 3.5–5.3)
POTASSIUM SERPL-SCNC: 4.2 MMOL/L — SIGNIFICANT CHANGE UP (ref 3.5–5.3)
POTASSIUM SERPL-SCNC: 4.9 MMOL/L — SIGNIFICANT CHANGE UP (ref 3.5–5.3)
PROT SERPL-MCNC: 8.3 GM/DL — SIGNIFICANT CHANGE UP (ref 6–8.3)
RBC # BLD: 3.26 M/UL — LOW (ref 3.8–5.2)
RBC # BLD: 3.34 M/UL — LOW (ref 3.8–5.2)
RBC # FLD: 13.4 % — SIGNIFICANT CHANGE UP (ref 11–15)
RBC # FLD: 13.5 % — SIGNIFICANT CHANGE UP (ref 11–15)
SAO2 % BLDA: 97 % — HIGH (ref 92–96)
SODIUM SERPL-SCNC: 144 MMOL/L — SIGNIFICANT CHANGE UP (ref 135–145)
SODIUM SERPL-SCNC: 146 MMOL/L — HIGH (ref 135–145)
SODIUM SERPL-SCNC: 148 MMOL/L — HIGH (ref 135–145)
WBC # BLD: 20.2 K/UL — HIGH (ref 3.8–10.5)
WBC # BLD: 22.2 K/UL — HIGH (ref 3.8–10.5)
WBC # FLD AUTO: 20.2 K/UL — HIGH (ref 3.8–10.5)
WBC # FLD AUTO: 22.2 K/UL — HIGH (ref 3.8–10.5)

## 2017-04-07 PROCEDURE — 71010: CPT | Mod: 26

## 2017-04-07 RX ORDER — IBUPROFEN 200 MG
400 TABLET ORAL ONCE
Qty: 0 | Refills: 0 | Status: COMPLETED | OUTPATIENT
Start: 2017-04-07 | End: 2017-04-07

## 2017-04-07 RX ORDER — MEROPENEM 1 G/30ML
1000 INJECTION INTRAVENOUS EVERY 8 HOURS
Qty: 0 | Refills: 0 | Status: DISCONTINUED | OUTPATIENT
Start: 2017-04-07 | End: 2017-04-20

## 2017-04-07 RX ORDER — SALIVA SUBSTITUTE COMB NO.11 351 MG
5 POWDER IN PACKET (EA) MUCOUS MEMBRANE
Qty: 0 | Refills: 0 | Status: DISCONTINUED | OUTPATIENT
Start: 2017-04-07 | End: 2017-04-07

## 2017-04-07 RX ORDER — MEROPENEM 1 G/30ML
1000 INJECTION INTRAVENOUS ONCE
Qty: 0 | Refills: 0 | Status: COMPLETED | OUTPATIENT
Start: 2017-04-07 | End: 2017-04-07

## 2017-04-07 RX ORDER — POTASSIUM CHLORIDE 20 MEQ
40 PACKET (EA) ORAL EVERY 4 HOURS
Qty: 0 | Refills: 0 | Status: COMPLETED | OUTPATIENT
Start: 2017-04-07 | End: 2017-04-07

## 2017-04-07 RX ORDER — MAGNESIUM SULFATE 500 MG/ML
1 VIAL (ML) INJECTION ONCE
Qty: 0 | Refills: 0 | Status: COMPLETED | OUTPATIENT
Start: 2017-04-07 | End: 2017-04-07

## 2017-04-07 RX ORDER — METOPROLOL TARTRATE 50 MG
5 TABLET ORAL ONCE
Qty: 0 | Refills: 0 | Status: COMPLETED | OUTPATIENT
Start: 2017-04-07 | End: 2017-04-07

## 2017-04-07 RX ORDER — SALIVA SUBSTITUTE COMB NO.11 351 MG
1 POWDER IN PACKET (EA) MUCOUS MEMBRANE
Qty: 0 | Refills: 0 | Status: DISCONTINUED | OUTPATIENT
Start: 2017-04-07 | End: 2017-04-18

## 2017-04-07 RX ORDER — MEROPENEM 1 G/30ML
INJECTION INTRAVENOUS
Qty: 0 | Refills: 0 | Status: DISCONTINUED | OUTPATIENT
Start: 2017-04-07 | End: 2017-04-20

## 2017-04-07 RX ORDER — SODIUM CHLORIDE 9 MG/ML
1500 INJECTION INTRAMUSCULAR; INTRAVENOUS; SUBCUTANEOUS ONCE
Qty: 0 | Refills: 0 | Status: COMPLETED | OUTPATIENT
Start: 2017-04-07 | End: 2017-04-07

## 2017-04-07 RX ORDER — SODIUM CHLORIDE 9 MG/ML
500 INJECTION, SOLUTION INTRAVENOUS
Qty: 0 | Refills: 0 | Status: DISCONTINUED | OUTPATIENT
Start: 2017-04-07 | End: 2017-04-08

## 2017-04-07 RX ORDER — VANCOMYCIN HCL 1 G
750 VIAL (EA) INTRAVENOUS EVERY 12 HOURS
Qty: 0 | Refills: 0 | Status: DISCONTINUED | OUTPATIENT
Start: 2017-04-07 | End: 2017-04-07

## 2017-04-07 RX ORDER — GENTAMICIN SULFATE 40 MG/ML
120 VIAL (ML) INJECTION ONCE
Qty: 0 | Refills: 0 | Status: COMPLETED | OUTPATIENT
Start: 2017-04-07 | End: 2017-04-07

## 2017-04-07 RX ORDER — CARBIDOPA AND LEVODOPA 25; 100 MG/1; MG/1
1 TABLET ORAL
Qty: 0 | Refills: 0 | Status: DISCONTINUED | OUTPATIENT
Start: 2017-04-07 | End: 2017-04-28

## 2017-04-07 RX ORDER — ACETYLCYSTEINE 200 MG/ML
2 VIAL (ML) MISCELLANEOUS EVERY 6 HOURS
Qty: 0 | Refills: 0 | Status: DISCONTINUED | OUTPATIENT
Start: 2017-04-07 | End: 2017-04-20

## 2017-04-07 RX ORDER — VANCOMYCIN HCL 1 G
1000 VIAL (EA) INTRAVENOUS EVERY 24 HOURS
Qty: 0 | Refills: 0 | Status: DISCONTINUED | OUTPATIENT
Start: 2017-04-08 | End: 2017-04-24

## 2017-04-07 RX ORDER — IPRATROPIUM BROMIDE 0.2 MG/ML
500 SOLUTION, NON-ORAL INHALATION EVERY 6 HOURS
Qty: 0 | Refills: 0 | Status: DISCONTINUED | OUTPATIENT
Start: 2017-04-07 | End: 2017-04-28

## 2017-04-07 RX ORDER — POTASSIUM CHLORIDE 20 MEQ
40 PACKET (EA) ORAL EVERY 4 HOURS
Qty: 0 | Refills: 0 | Status: DISCONTINUED | OUTPATIENT
Start: 2017-04-07 | End: 2017-04-07

## 2017-04-07 RX ORDER — SODIUM CHLORIDE 9 MG/ML
1000 INJECTION, SOLUTION INTRAVENOUS ONCE
Qty: 0 | Refills: 0 | Status: COMPLETED | OUTPATIENT
Start: 2017-04-07 | End: 2017-04-07

## 2017-04-07 RX ORDER — POTASSIUM CHLORIDE 20 MEQ
10 PACKET (EA) ORAL ONCE
Qty: 0 | Refills: 0 | Status: COMPLETED | OUTPATIENT
Start: 2017-04-07 | End: 2017-04-07

## 2017-04-07 RX ORDER — PRAMIPEXOLE DIHYDROCHLORIDE 0.12 MG/1
0.25 TABLET ORAL THREE TIMES A DAY
Qty: 0 | Refills: 0 | Status: DISCONTINUED | OUTPATIENT
Start: 2017-04-07 | End: 2017-04-28

## 2017-04-07 RX ORDER — VANCOMYCIN HCL 1 G
VIAL (EA) INTRAVENOUS
Qty: 0 | Refills: 0 | Status: DISCONTINUED | OUTPATIENT
Start: 2017-04-07 | End: 2017-04-07

## 2017-04-07 RX ORDER — AZITHROMYCIN 500 MG/1
500 TABLET, FILM COATED ORAL DAILY
Qty: 0 | Refills: 0 | Status: DISCONTINUED | OUTPATIENT
Start: 2017-04-07 | End: 2017-04-13

## 2017-04-07 RX ADMIN — SODIUM CHLORIDE 2000 MILLILITER(S): 9 INJECTION, SOLUTION INTRAVENOUS at 16:11

## 2017-04-07 RX ADMIN — Medication 500 MICROGRAM(S): at 17:16

## 2017-04-07 RX ADMIN — PIPERACILLIN AND TAZOBACTAM 25 GRAM(S): 4; .5 INJECTION, POWDER, LYOPHILIZED, FOR SOLUTION INTRAVENOUS at 06:16

## 2017-04-07 RX ADMIN — Medication 500 MICROGRAM(S): at 23:53

## 2017-04-07 RX ADMIN — Medication 400 MILLIGRAM(S): at 16:02

## 2017-04-07 RX ADMIN — CARBIDOPA AND LEVODOPA 1 TABLET(S): 25; 100 TABLET ORAL at 06:16

## 2017-04-07 RX ADMIN — Medication 150 MILLIGRAM(S): at 12:45

## 2017-04-07 RX ADMIN — MEROPENEM 200 MILLIGRAM(S): 1 INJECTION INTRAVENOUS at 23:17

## 2017-04-07 RX ADMIN — Medication 1 MILLIGRAM(S): at 11:36

## 2017-04-07 RX ADMIN — LOSARTAN POTASSIUM 50 MILLIGRAM(S): 100 TABLET, FILM COATED ORAL at 06:16

## 2017-04-07 RX ADMIN — SODIUM CHLORIDE 500 MILLILITER(S): 9 INJECTION, SOLUTION INTRAVENOUS at 21:02

## 2017-04-07 RX ADMIN — Medication 650 MILLIGRAM(S): at 06:14

## 2017-04-07 RX ADMIN — DEXTROSE MONOHYDRATE, SODIUM CHLORIDE, AND POTASSIUM CHLORIDE 75 MILLILITER(S): 50; .745; 4.5 INJECTION, SOLUTION INTRAVENOUS at 06:14

## 2017-04-07 RX ADMIN — Medication 650 MILLIGRAM(S): at 15:53

## 2017-04-07 RX ADMIN — MEROPENEM 200 MILLIGRAM(S): 1 INJECTION INTRAVENOUS at 17:15

## 2017-04-07 RX ADMIN — CARBIDOPA AND LEVODOPA 1 TABLET(S): 25; 100 TABLET ORAL at 17:16

## 2017-04-07 RX ADMIN — Medication 5 MILLIGRAM(S): at 16:22

## 2017-04-07 RX ADMIN — PIPERACILLIN AND TAZOBACTAM 25 GRAM(S): 4; .5 INJECTION, POWDER, LYOPHILIZED, FOR SOLUTION INTRAVENOUS at 14:27

## 2017-04-07 RX ADMIN — ENOXAPARIN SODIUM 40 MILLIGRAM(S): 100 INJECTION SUBCUTANEOUS at 06:16

## 2017-04-07 RX ADMIN — Medication 1 SPRAY(S): at 17:15

## 2017-04-07 RX ADMIN — Medication 100 MILLIEQUIVALENT(S): at 11:36

## 2017-04-07 RX ADMIN — Medication 40 MILLIEQUIVALENT(S): at 14:27

## 2017-04-07 RX ADMIN — Medication 100 GRAM(S): at 21:45

## 2017-04-07 RX ADMIN — PRAMIPEXOLE DIHYDROCHLORIDE 0.25 MILLIGRAM(S): 0.12 TABLET ORAL at 21:47

## 2017-04-07 RX ADMIN — Medication 40 MILLIEQUIVALENT(S): at 09:34

## 2017-04-07 RX ADMIN — Medication 2 MILLILITER(S): at 23:54

## 2017-04-07 RX ADMIN — AZITHROMYCIN 500 MILLIGRAM(S): 500 TABLET, FILM COATED ORAL at 17:14

## 2017-04-07 RX ADMIN — Medication 200 MILLIGRAM(S): at 17:15

## 2017-04-07 RX ADMIN — Medication 2 MILLILITER(S): at 17:16

## 2017-04-07 RX ADMIN — SENNA PLUS 10 MILLILITER(S): 8.6 TABLET ORAL at 22:04

## 2017-04-07 RX ADMIN — CARBIDOPA AND LEVODOPA 1 TABLET(S): 25; 100 TABLET ORAL at 21:45

## 2017-04-07 NOTE — PHYSICAL THERAPY INITIAL EVALUATION ADULT - PLANNED THERAPY INTERVENTIONS, PT EVAL
ROM/gait training/lumbar stabilization/strengthening/transfer training/bed mobility training/balance training

## 2017-04-07 NOTE — CONSULT NOTE ADULT - SUBJECTIVE AND OBJECTIVE BOX
Patient is a 86y old  Female who presents with a chief complaint of change  in  mental  status  pneumonia  dysphagia .      HPI:  87 y/o  white female, with hx Parkinson's Disease, with Fx of the Rt Femur in 3/17 and underwent Rt hip ORIF on 3/12 at [facility] with hospital course complicated by  RLL + RML PNA as seen on CT Chest on 3/17 and an EColi UTI, Treated then with Rocephin initially and changed to Meropenem + Vanco for about 5 days, then d/francine to [facility] on 3/22 on po Levaquin, readmitted via the ER on 4/4/17 with increasing lethargy, fever, and difficulty swallowing and suspected Sepsis likely from urinary origin, which later on grew Omrganella.  RRT on 04/07/17 for high fever, tachycardia. Suctioned thick secretions fro upper airway. Lethargic, not able to provide history.    PAST MEDICAL & SURGICAL HISTORY:  Pneumonia  Anemia  Parkinson disease  Tremors of nervous system  No pertinent past medical history  Status post hip surgery  No significant past surgical history    FAMILY HISTORY:  No pertinent family history in first degree relatives    SOCIAL HISTORY: not able to provide.    Allergies  No Known Allergies    MEDICATIONS  (STANDING):  enoxaparin Injectable 40milliGRAM(s) Sub Cutaneous every 24 hours  carbidopa/levodopa  25/100 1Tablet(s) Oral two times a day  dextrose 5% + sodium chloride 0.9% with potassium chloride 20 mEq/L 1000milliLiter(s) IV Continuous <Continuous>  polyethylene glycol 3350 17Gram(s) Oral daily  folic acid 1milliGRAM(s) Oral daily  senna Syrup 10milliLiter(s) Oral at bedtime  losartan 50milliGRAM(s) Oral daily  azithromycin   Tablet 500milliGRAM(s) Oral daily  meropenem IVPB  IV Intermittent   gentamicin   IVPB 120milliGRAM(s) IV Intermittent once  Biotene Dry Mouth Oral Rinse 5milliLiter(s) Swish and Spit two times a day  meropenem IVPB 1000milliGRAM(s) IV Intermittent once  meropenem IVPB 1000milliGRAM(s) IV Intermittent every 8 hours    MEDICATIONS  (PRN):  morphine  - Injectable 2milliGRAM(s) IV Push every 4 hours PRN Severe Pain (7 - 10)  acetaminophen    Suspension 650milliGRAM(s) Oral every 6 hours PRN For Temp greater than 38 C (100.4 F)    REVIEW OF SYSTEMS: not able to provide.    Vital Signs Last 24 Hrs  T(C): 38.7, Max: 38.9 (04-07 @ 00:54)  T(F): 101.7, Max: 102.1 (04-07 @ 00:54)  HR: 121 (88 - 125)  BP: 174/88 (150/73 - 182/69)  BP(mean): --  RR: 17 (16 - 18)  SpO2: 96% (96% - 98%)    PHYSICAL EXAM:  GEN:        Awake, responsive and comfortable.  HEENT:    Normal.    RESP:      crackles.  CVS:         Regular rate and rhythm.   ABD:         Soft, non-tender, non-distended;   :             No costovertebral angle tenderness  SKIN:           Warm and dry.  EXTR:            No clubbing, cyanosis or edema  CNS:              lethargic  PSYCH:        Lethargic.    LABS:                        10.0   22.2  )-----------( 295      ( 07 Apr 2017 16:11 )             29.3     04-07    148<H>  |  112<H>  |  10  ----------------------------<  148<H>  3.2<L>   |  23  |  0.56    Ca    8.7      07 Apr 2017 07:09  Mg     1.7     04-06    TPro  8.3  /  Alb  1.9<L>  /  TBili  0.9  /  DBili  x   /  AST  16  /  ALT  12  /  AlkPhos  64  04-07      ABG - 04-07 @ 16:22  pH: 7.46 / pCO2: 26 / pO2: 98 on 3 litres.    EKG:     RADIOLOGY & ADDITIONAL STUDIES:  EXAM:  CHEST SINGLE VIEW                            PROCEDURE DATE:  04/04/2017        INTERPRETATION:  AP erect chest on April 4 at 11:03 PM. Patient had NG   tube placement.    Heart is magnified by technique.    Retrocardiac atelectasis is more pronounced than on earlier study of the   same day.    NG tube has been inserted with the tip traveling well into the abdominal   area.    IMPRESSION: Increasing retrocardiac atelectasis. NG tube inserted.    YULIYA LOPEZ M.D., ATTENDING RADIOLOGIST  This document has been electronically signed. Apr 5 2017  8:27AM      ASSESSMENT AND PLAN:  ·	Altered mental status.  ·	Gram negative Sepsis with Morganella.  ·	Pyuria with Morganella  ·	Pneumonia/ Tracheobronchitis.  ·	Leukocytosis.  ·	Parkinsonism  ·	Hypokalemia.  ·	Hypernatremia.  ·	Anemia.    Broad spectrum antibiotics.  IV hydration. Follow lactate and procalcitonin.  Suction PRN.  Nebulizer with Atrovent and Mucomyst.  Follow electrolytes.  Seen fro 3.59pm to 4.46pm

## 2017-04-07 NOTE — DIETITIAN INITIAL EVALUATION ADULT. - OTHER INFO
Pt seen for consult - nutrition support. Unable to interview pt; no family present. Per swallow eval (4/5) unable to access due to poor participation/comprehension; recommendation to continue NPO status (pt made NPO by SLP at [facility]). No reports of N/V; BM regular; last x 1 (4/6).

## 2017-04-07 NOTE — PHYSICAL THERAPY INITIAL EVALUATION ADULT - GENERAL OBSERVATIONS, REHAB EVAL
Pt supine c 2L O2 nasal canula, IV, shoemaker catheter, cardiac monitor, and NG tube in place, lethargic c non-verbal indications of discomfort c movement, unable to open L eye but protective reflex intact.

## 2017-04-07 NOTE — CONSULT NOTE ADULT - SUBJECTIVE AND OBJECTIVE BOX
HPI:  87 y/o  white female, born in [place], in the [place] yrs[place] with hx Parkinson's Disease, s/p fall with Fx of the Rt Femur in 3/17 and underwent Rt hip ORIF on 3/12 at [facility] with hospital course complicated by  RLL + RML PNA as seen on CT Chest on 3/17 and an EColi UTI, rx'ed then with Rocephin initially and changed to Meropenem + Vanco for about 5 days, then d/francine to [facility] on 3/22 on po Levaquin, readmitted via the ER on 4/4/17 with increasing lethargy, fever, and difficulty swallowing.  In the ER patient found to have low-grade fever and pyuria on u/a and cx's were obtained and patient was begun empirically on Zosyn.  W/u with CT Head and CXR was done and urine cx subsequently grew Morganella sp. which is sensitive to Zosyn.  Patient required NGT placement for feedings and now fever has increased  over the past few days and now with a TMax of >102 and with increased WBC's to 20.2 today.      Allergies :  No Known Allergies  Intolerances - none      Social History:  Tobacco: neg   Alcohol: neg  Recent Travel: none    Patient lives at home with her son and has an aide with her during the day.  Prior to her fall and fx of the Rt Hip, patient was doing well, eating a regular diet, and ambulating without assistance.    MEDICATIONS  (STANDING):  enoxaparin Injectable 40milliGRAM(s) SubCutaneous every 24 hours  carbidopa/levodopa  25/100 1Tablet(s) Oral two times a day  piperacillin/tazobactam IVPB. 3.375Gram(s) IV Intermittent every 8 hours  dextrose 5% + sodium chloride 0.9% with potassium chloride 20 mEq/L 1000milliLiter(s) IV Continuous <Continuous>  polyethylene glycol 3350 17Gram(s) Oral daily  folic acid 1milliGRAM(s) Oral daily  senna Syrup 10milliLiter(s) Oral at bedtime  losartan 50milliGRAM(s) Oral daily  azithromycin   Tablet 500milliGRAM(s) Oral daily    MEDICATIONS  (PRN):  morphine  - Injectable 2milliGRAM(s) IV Push every 4 hours PRN Severe Pain (7 - 10)  acetaminophen    Suspension 650milliGRAM(s) Oral every 6 hours PRN For Temp greater than 38 C (100.4 F)        LABS:  CBC Full  -  ( 07 Apr 2017 07:09 )  WBC Count : 20.2 K/uL  Hemoglobin : 9.8 g/dL  Hematocrit : 28.8 %  Platelet Count - Automated : 327 K/uL  Mean Cell Volume : 88.4 fl  Mean Cell Hemoglobin : 29.9 pg  Mean Cell Hemoglobin Concentration : 33.9 gm/dL  Auto Neutrophil # : x  Auto Lymphocyte # : x  Auto Monocyte # : x  Auto Eosinophil # : x  Auto Basophil # : x  Auto Neutrophil % : x  Auto Lymphocyte % : x  Auto Monocyte % : x  Auto Eosinophil % : x  Auto Basophil % : x    04-07    148<H>  |  112<H>  |  10  ----------------------------<  148<H>  3.2<L>   |  23  |  0.56    Ca    8.7      07 Apr 2017 07:09  Mg     1.7     04-06    TPro  8.3  /  Alb  1.9<L>  /  TBili  0.9  /  DBili  x   /  AST  16  /  ALT  12  /  AlkPhos  64  04-07    LIVER FUNCTIONS - ( 07 Apr 2017 07:09 )  Alb: 1.9 g/dL / Pro: 8.3 gm/dL / ALK PHOS: 64 U/L / ALT: 12 U/L / AST: 16 U/L / GGT: x               CULTURES:  Specimen Source: .Urine Catheterized (04-05 @ 01:00)  Culture Results:   >100,000 CFU/ml Morganella morganii (04-05 @ 01:00) -  sensitive to Zosyn, Carbapenems, and Aminoglycosides    Specimen Source: .Blood Blood-Peripheral (04-05 @ 00:34)  Culture Results:   No growth to date. (04-05 @ 00:34)    Specimen Source: .Blood Blood-Peripheral (04-05 @ 00:34)  Culture Results:   No growth to date. (04-05 @ 00:34)          Radiology:  CXR - 4/4/17 -   INTERPRETATION:  AP erect chest on April 4 at 11:03 PM. Patient had NG   tube placement.    Heart is magnified by technique.    Retrocardiac atelectasis is more pronounced than on earlier study of the   same day.    NG tube has been inserted with the tip traveling well into the abdominal   area.    IMPRESSION: Increasing retrocardiac atelectasis. NG tube inserted.                        CT Head -   IMPRESSION:Chronic brain findings again noted. Presently there is slight   fluid in the left maxillary sinus.      Vital Signs Last 24 Hrs  T(C): 38.7, Max: 38.9 (04-07 @ 00:54)  T(F): 101.7, Max: 102.1 (04-07 @ 00:54)  HR: 121 (88 - 125)  BP: 174/88 (150/73 - 182/69)  BP(mean): --  RR: 17 (16 - 18)  SpO2: 96% (96% - 98%)  Supplemental O2: on NC O2 now    PHYSICAL EXAM    General: 87 y/o white female presently very lethargic, lying semi-upright in bed, with NGT in place, NCO2 in place, and with marked audible congestion noted now  HEENT: conj pink, sclerae anicteric, PERRLA, no oral lesions noted but mucosa dry  Neck: semi-supple, no nodes noted  Heart: RR  Lungs: diffuse moist rhonchi throughout  Abdomen: soft, BS +, nontender appearance, no masses, no HS-megaly noted, no CVA tenderness elicited  Extremities: no edema LE's                     Rt thigh/ hip incision appears to be healing well, no drainage noted, no erythema, no swelling noted                    Noted tremors of both hands  Skin: warm, dry, no rash noted  Colon in place and draining slightly cloudy yellow urine now      I&O's Summary:    I & Os for current day (as of 07 Apr 2017 15:17)  =============================================  IN: 1920 ml / OUT: 601 ml / NET: 1319 ml      Impression: 87 y/o white female with hx of Parkinson's Disease, s/p Rt Hip ORIF in 3/17 after a fall and fx of the Rt Femur, complicated by an EColi UTI and RLL+ RML PNA, now readmitted from [facility] with fever and lethargy and found to have a Morganella UTI now ( fairly resistant to many ab's) and Sepsis likely  secondary to recurrent PNA - ? Aspiration ? Gm negative PNA and with marked leukocytosis.    Suggestions: Will therefore re-culture blood and try to obtain sputum specimen for cx as well as w/u for Legionella, repeat CXR now, and empirically change ab rx to Meropenem + Vanco +   Zithromax  +  Gent x1 dose pending further w/u and results.  Recommend suctioning as needed.  Renal Sono to r/o hydro/obstruction in view of recurrent UTI. Will repeat labs in AM.  Discussed with patient's private home aide at bedside.  Thanks, will follow with you.

## 2017-04-07 NOTE — DIETITIAN INITIAL EVALUATION ADULT. - PERTINENT MEDS FT
Lovenox, Vancomycin, Zosyn, D5 + NS c KCl @ 75 ml/hr, Cozaar, Sinemet, Folic Acid, Morphine, Miralax, Senna

## 2017-04-07 NOTE — DIETITIAN INITIAL EVALUATION ADULT. - ENERGY NEEDS
Height (cm): 157.5 (04-04)  Weight (kg): 58.6 (04-04)  BMI (kg/m2): 23.6 (04-04)  IBW:   50 kg +/- 10%       % IBW:    117%         UBW:  unknown   %UBW: unknown

## 2017-04-07 NOTE — DIETITIAN INITIAL EVALUATION ADULT. - PERTINENT LABORATORY DATA
04-07 Na148 mmol/L<H> Glu 148 mg/dL<H> K+ 3.2 mmol/L<L> Cr  0.56 mg/dL BUN 10 mg/dL Phos n/a   Alb 1.9 g/dL<L> PAB n/a

## 2017-04-07 NOTE — GOALS OF CARE CONVERSATION - PERSONAL ADVANCE DIRECTIVE - WHAT MATTERS MOST
Son is hoping pt will get better after treatment of UTI, go to rehab & eventually  come back home with prior services. Son is coming in later & we will discuss GOC & Advance Care Planning at that time. Son is hoping pt will get better after treatment of UTI, go to rehab & eventually  come back home with prior services. Son is coming in later & we will discuss GOC & Advance Care Planning at that time. Spoke to son regarding advanced directives and he stated he is thinking about it to come up with a plan. will meet Monday to further discuss.

## 2017-04-07 NOTE — DIETITIAN INITIAL EVALUATION ADULT. - PHYSICAL APPEARANCE
BMI = 23.6; 1+ generalized edema noted; Per RD observation, no physical signs of malnutrition present./well nourished

## 2017-04-08 LAB
ALBUMIN SERPL ELPH-MCNC: 1.7 G/DL — LOW (ref 3.3–5)
ALP SERPL-CCNC: 70 U/L — SIGNIFICANT CHANGE UP (ref 40–120)
ALT FLD-CCNC: <6 U/L — LOW (ref 12–78)
ANION GAP SERPL CALC-SCNC: 12 MMOL/L — SIGNIFICANT CHANGE UP (ref 5–17)
AST SERPL-CCNC: 18 U/L — SIGNIFICANT CHANGE UP (ref 15–37)
BASE EXCESS BLDA CALC-SCNC: -0.7 MMOL/L — SIGNIFICANT CHANGE UP (ref -2–2)
BILIRUB SERPL-MCNC: 0.8 MG/DL — SIGNIFICANT CHANGE UP (ref 0.2–1.2)
BLOOD GAS COMMENTS: SIGNIFICANT CHANGE UP
BLOOD GAS SOURCE: SIGNIFICANT CHANGE UP
BUN SERPL-MCNC: 15 MG/DL — SIGNIFICANT CHANGE UP (ref 7–23)
CALCIUM SERPL-MCNC: 8.5 MG/DL — SIGNIFICANT CHANGE UP (ref 8.5–10.1)
CHLORIDE SERPL-SCNC: 109 MMOL/L — HIGH (ref 96–108)
CO2 SERPL-SCNC: 22 MMOL/L — SIGNIFICANT CHANGE UP (ref 22–31)
CREAT SERPL-MCNC: 0.58 MG/DL — SIGNIFICANT CHANGE UP (ref 0.5–1.3)
ERYTHROCYTE [SEDIMENTATION RATE] IN BLOOD: >140 — SIGNIFICANT CHANGE UP (ref 0–20)
GLUCOSE SERPL-MCNC: 123 MG/DL — HIGH (ref 70–99)
GRAM STN FLD: SIGNIFICANT CHANGE UP
HCO3 BLDA-SCNC: 22 MMOL/L — SIGNIFICANT CHANGE UP (ref 21–29)
HCT VFR BLD CALC: 26.4 % — LOW (ref 34.5–45)
HGB BLD-MCNC: 9.1 G/DL — LOW (ref 11.5–15.5)
HOROWITZ INDEX BLDA+IHG-RTO: 21 — SIGNIFICANT CHANGE UP
MCHC RBC-ENTMCNC: 30.5 PG — SIGNIFICANT CHANGE UP (ref 27–34)
MCHC RBC-ENTMCNC: 34.6 GM/DL — SIGNIFICANT CHANGE UP (ref 32–36)
MCV RBC AUTO: 88 FL — SIGNIFICANT CHANGE UP (ref 80–100)
PCO2 BLDA: 28 MMHG — LOW (ref 32–46)
PH BLD: 7.5 — HIGH (ref 7.35–7.45)
PLATELET # BLD AUTO: 230 K/UL — SIGNIFICANT CHANGE UP (ref 150–400)
PO2 BLDA: 68 MMHG — LOW (ref 74–108)
POTASSIUM SERPL-MCNC: 4 MMOL/L — SIGNIFICANT CHANGE UP (ref 3.5–5.3)
POTASSIUM SERPL-SCNC: 4 MMOL/L — SIGNIFICANT CHANGE UP (ref 3.5–5.3)
PROCALCITONIN SERPL-MCNC: 0.11 NG/ML — HIGH (ref 0–0.05)
PROT SERPL-MCNC: 8 GM/DL — SIGNIFICANT CHANGE UP (ref 6–8.3)
RBC # BLD: 3 M/UL — LOW (ref 3.8–5.2)
RBC # FLD: 13.8 % — SIGNIFICANT CHANGE UP (ref 11–15)
SAO2 % BLDA: 94 % — SIGNIFICANT CHANGE UP (ref 92–96)
SODIUM SERPL-SCNC: 143 MMOL/L — SIGNIFICANT CHANGE UP (ref 135–145)
SPECIMEN SOURCE: SIGNIFICANT CHANGE UP
WBC # BLD: 21.5 K/UL — HIGH (ref 3.8–10.5)
WBC # FLD AUTO: 21.5 K/UL — HIGH (ref 3.8–10.5)

## 2017-04-08 PROCEDURE — 76705 ECHO EXAM OF ABDOMEN: CPT | Mod: 26

## 2017-04-08 PROCEDURE — 99497 ADVNCD CARE PLAN 30 MIN: CPT

## 2017-04-08 RX ORDER — IBUPROFEN 200 MG
400 TABLET ORAL ONCE
Qty: 0 | Refills: 0 | Status: COMPLETED | OUTPATIENT
Start: 2017-04-08 | End: 2017-04-08

## 2017-04-08 RX ADMIN — MEROPENEM 200 MILLIGRAM(S): 1 INJECTION INTRAVENOUS at 13:50

## 2017-04-08 RX ADMIN — Medication 500 MICROGRAM(S): at 17:05

## 2017-04-08 RX ADMIN — PRAMIPEXOLE DIHYDROCHLORIDE 0.25 MILLIGRAM(S): 0.12 TABLET ORAL at 13:51

## 2017-04-08 RX ADMIN — Medication 1 MILLIGRAM(S): at 11:25

## 2017-04-08 RX ADMIN — Medication 500 MICROGRAM(S): at 23:41

## 2017-04-08 RX ADMIN — Medication 500 MICROGRAM(S): at 11:07

## 2017-04-08 RX ADMIN — MEROPENEM 200 MILLIGRAM(S): 1 INJECTION INTRAVENOUS at 22:42

## 2017-04-08 RX ADMIN — AZITHROMYCIN 500 MILLIGRAM(S): 500 TABLET, FILM COATED ORAL at 11:25

## 2017-04-08 RX ADMIN — Medication 2 MILLILITER(S): at 11:07

## 2017-04-08 RX ADMIN — ENOXAPARIN SODIUM 40 MILLIGRAM(S): 100 INJECTION SUBCUTANEOUS at 05:06

## 2017-04-08 RX ADMIN — PRAMIPEXOLE DIHYDROCHLORIDE 0.25 MILLIGRAM(S): 0.12 TABLET ORAL at 22:59

## 2017-04-08 RX ADMIN — CARBIDOPA AND LEVODOPA 1 TABLET(S): 25; 100 TABLET ORAL at 11:25

## 2017-04-08 RX ADMIN — Medication 500 MICROGRAM(S): at 05:55

## 2017-04-08 RX ADMIN — PRAMIPEXOLE DIHYDROCHLORIDE 0.25 MILLIGRAM(S): 0.12 TABLET ORAL at 05:02

## 2017-04-08 RX ADMIN — CARBIDOPA AND LEVODOPA 1 TABLET(S): 25; 100 TABLET ORAL at 17:06

## 2017-04-08 RX ADMIN — SENNA PLUS 10 MILLILITER(S): 8.6 TABLET ORAL at 22:43

## 2017-04-08 RX ADMIN — LOSARTAN POTASSIUM 50 MILLIGRAM(S): 100 TABLET, FILM COATED ORAL at 05:02

## 2017-04-08 RX ADMIN — Medication 250 MILLIGRAM(S): at 04:06

## 2017-04-08 RX ADMIN — Medication 2 MILLILITER(S): at 17:05

## 2017-04-08 RX ADMIN — Medication 650 MILLIGRAM(S): at 19:02

## 2017-04-08 RX ADMIN — CARBIDOPA AND LEVODOPA 1 TABLET(S): 25; 100 TABLET ORAL at 05:02

## 2017-04-08 RX ADMIN — POLYETHYLENE GLYCOL 3350 17 GRAM(S): 17 POWDER, FOR SOLUTION ORAL at 11:25

## 2017-04-08 RX ADMIN — Medication 2 MILLILITER(S): at 05:56

## 2017-04-08 RX ADMIN — Medication 400 MILLIGRAM(S): at 16:02

## 2017-04-08 RX ADMIN — Medication 2 MILLILITER(S): at 23:41

## 2017-04-08 RX ADMIN — Medication 650 MILLIGRAM(S): at 11:24

## 2017-04-08 RX ADMIN — MEROPENEM 200 MILLIGRAM(S): 1 INJECTION INTRAVENOUS at 05:02

## 2017-04-08 RX ADMIN — Medication 1 SPRAY(S): at 17:06

## 2017-04-08 RX ADMIN — CARBIDOPA AND LEVODOPA 1 TABLET(S): 25; 100 TABLET ORAL at 01:07

## 2017-04-08 NOTE — PROGRESS NOTE ADULT - SUBJECTIVE AND OBJECTIVE BOX
INTERVAL HPI/OVERNIGHT EVENTS:  85 y/o  white female, with hx Parkinson's Disease, with Fx of the Rt Femur in 3/17 and underwent Rt hip ORIF on 3/12 at [facility] with hospital course complicated by  RLL + RML PNA as seen on CT Chest on 3/17 and an EColi UTI, Treated then with Rocephin initially and changed to Meropenem + Vanco for about 5 days, then d/francine to [facility] on 3/22 on po Levaquin, readmitted via the ER on 4/4/17 with increasing lethargy, fever, and difficulty swallowing and suspected Sepsis likely from urinary origin, which later on grew Omrganella.  RRT on 04/07/17 for high fever, tachycardia. Suctioned thick secretions fro upper airway. Lethargic, not able to provide history.  Continues to be lethargic and febrile. ID follow up noted.    Vital Signs Last 24 Hrs  T(C): 38.7, Max: 38.9 (04-08 @ 14:11)  T(F): 101.6, Max: 102.1 (04-08 @ 14:11)  HR: 91 (88 - 124)  BP: 147/84 (134/80 - 154/86)  BP(mean): --  RR: 20 (16 - 20)  SpO2: 95% (92% - 98%)    PHYSICAL EXAM:  GEN:         unresponsive and comfortable.  HEENT:     Normal.    RESP:      crackles.  CVS:          Regular rate and rhythm.   ABD:         Soft, non-tender, non-distended;     MEDICATIONS  (STANDING):  enoxaparin Injectable 40milliGRAM(s) SubCutaneous every 24 hours  dextrose 5% + sodium chloride 0.9% with potassium chloride 20 mEq/L 1000milliLiter(s) IV Continuous <Continuous>  polyethylene glycol 3350 17Gram(s) Oral daily  folic acid 1milliGRAM(s) Oral daily  senna Syrup 10milliLiter(s) Oral at bedtime  losartan 50milliGRAM(s) Oral daily  azithromycin   Tablet 500milliGRAM(s) Oral daily  vancomycin  IVPB 1000milliGRAM(s) IV Intermittent every 24 hours  meropenem IVPB  IV Intermittent   ipratropium    for Nebulization 500MICROGram(s) Inhalation every 6 hours  acetylcysteine 10% Inhalation 2milliLiter(s) Inhalation every 6 hours  meropenem IVPB 1000milliGRAM(s) IV Intermittent every 8 hours  saliva substitute (BIOTENE MOISTURIZING MOUTH SPRAY) 1Spray(s) Topical two times a day  carbidopa/levodopa  25/100 1Tablet(s) Oral four times a day  pramipexole 0.25milliGRAM(s) Oral three times a day    MEDICATIONS  (PRN):  morphine  - Injectable 2milliGRAM(s) IV Push every 4 hours PRN Severe Pain (7 - 10)  acetaminophen    Suspension 650milliGRAM(s) Oral every 6 hours PRN For Temp greater than 38 C (100.4 F)    LABS:                        9.1    21.5  )-----------( 230      ( 08 Apr 2017 07:28 )             26.4     04-08    143  |  109<H>  |  15  ----------------------------<  123<H>  4.0   |  22  |  0.58    Ca    8.5      08 Apr 2017 07:28  Phos  1.6     04-07  Mg     1.6     04-07    TPro  8.0  /  Alb  1.7<L>  /  TBili  0.8  /  DBili  .26<H>  /  AST  18  /  ALT  <6<L>  /  AlkPhos  70  04-08      ASSESSMENT AND PLAN:  ·	Altered mental status.  ·	Gram negative Sepsis with Morganella.  ·	UTI with Morganella  ·	RLL Pneumonia  ·	Leukocytosis.  ·	Parkinsonism  ·	Hypokalemia corrected.  ·	Hypernatremia.  ·	Anemia.    Will obtain ABG to assess acid base status.  On broad spectrum antibiotics.  Aspiration precautions.  Continue nebulizer with Mucomyst.

## 2017-04-08 NOTE — PROGRESS NOTE ADULT - SUBJECTIVE AND OBJECTIVE BOX
TMAX - 102.1    On day # 2 Meropenem/ #2 Vanco / # 2 Zithromax now    Vital Signs Last 24 Hrs  T(C): 38.7, Max: 38.9 (04-08 @ 14:11)  T(F): 101.6, Max: 102.1 (04-08 @ 14:11)  HR: 91 (88 - 124)  BP: 147/84 (134/80 - 154/86)  BP(mean): --  RR: 20 (16 - 20)  SpO2: 95% (92% - 98%)    Supplemental O2:  onNC O2             PHYSICAL EXAM  General:    HEENT:    Neck:    Heart:    Lungs:    Abdomen:    Extremities:    Skin:      I&O's Summary:  I & Os for 24h ending 08 Apr 2017 07:00  =============================================  IN: 500 ml / OUT: 1250 ml / NET: -750 ml    I & Os for current day (as of 08 Apr 2017 19:18)  =============================================  IN: 930 ml / OUT: 250 ml / NET: 680 ml        LABS:  CBC Full  -  ( 08 Apr 2017 07:28 )  WBC Count : 21.5 K/uL  Hemoglobin : 9.1 g/dL  Hematocrit : 26.4 %  Platelet Count - Automated : 230 K/uL  Mean Cell Volume : 88.0 fl  Mean Cell Hemoglobin : 30.5 pg  Mean Cell Hemoglobin Concentration : 34.6 gm/dL  Auto Neutrophil # : x  Auto Lymphocyte # : x  Auto Monocyte # : x  Auto Eosinophil # : x  Auto Basophil # : x  Auto Neutrophil % : x  Auto Lymphocyte % : x  Auto Monocyte % : x  Auto Eosinophil % : x  Auto Basophil % : x    04-08    143  |  109<H>  |  15  ----------------------------<  123<H>  4.0   |  22  |  0.58    Ca    8.5      08 Apr 2017 07:28  Phos  1.6     04-07  Mg     1.6     04-07    TPro  8.0  /  Alb  1.7<L>  /  TBili  0.8  /  DBili  .26<H>  /  AST  18  /  ALT  <6<L>  /  AlkPhos  70  04-08    LIVER FUNCTIONS - ( 08 Apr 2017 07:28 )  Alb: 1.7 g/dL / Pro: 8.0 gm/dL / ALK PHOS: 70 U/L / ALT: <6 U/L / AST: 18 U/L / GGT: x           ABG - ( 07 Apr 2017 16:22 )  pH: x     /  pCO2: 26    /  pO2: 98    / HCO3: 18    / Base Excess: -4.3  /  SaO2: 97        Sedimentation Rate, Erythrocyte: >140 (04-08 @ 07:28)        CULTURES:  Specimen Source: .Sputum Sputum/TRAP (04-08 @ 07:12)    Specimen Source: .Urine Catheterized (04-05 @ 01:00)  Culture Results:   >100,000 CFU/ml Morganella morganii (04-05 @ 01:00)    Specimen Source: .Blood Blood-Peripheral (04-05 @ 00:34)  Culture Results:   No growth to date. (04-05 @ 00:34)    Specimen Source: .Blood Blood-Peripheral (04-05 @ 00:34)  Culture Results:   No growth to date. (04-05 @ 00:34)          Radiology:.  CXR - 4/7/17 -     FINDINGS:  Prior study dated 4/4/2017 was available for review.    The lungs demonstrate increased airspace opacity in the right lower lobe   with air bronchograms suspicious for pneumonia. No pleural effusion is   seen.    The heart and mediastinum appear intact.  A nasogastric tube is noted   traversing the chest into the left upper quadrant.      IMPRESSION: Right lower lobe pneumonia.                                Renal Sono- 4/8/17 -      IMPRESSION:    Mild increased echogenicity of both kidneys, possibly renal parenchymal   disease.      Impression:    Suggestions: TMAX - 102.1    On day # 2 Meropenem/ #2 Vanco / # 2 Zithromax now    Vital Signs Last 24 Hrs  T(C): 38.7, Max: 38.9 (04-08 @ 14:11)  T(F): 101.6, Max: 102.1 (04-08 @ 14:11)  HR: 91 (88 - 124)  BP: 147/84 (134/80 - 154/86)  BP(mean): --  RR: 20 (16 - 20)  SpO2: 95% (92% - 98%)    Supplemental O2:  onNC O2     Lying in bed semi-upright now, not following any commands, nonverbal.      PHYSICAL EXAM  General:  still lethargic lying in bed, semi-upright, with NC O2 in place and feeding tube in place, following no commands and nonverbal, but slightly less congested  HEENT:  conj pink, sclerae anicteric, PERRLA, no oral lesions noted  Neck:  semi-supple, no nodes noted  Heart:  RR  Lungs:  bilat rhonchi throughout  Abdomen:  soft, BS+, nontender appearance   Extremities:  1+ edema LE's  Skin: warm, dry, no rash noted    I&O's Summary:  I & Os for 24h ending 08 Apr 2017 07:00  =============================================  IN: 500 ml / OUT: 1250 ml / NET: -750 ml    I & Os for current day (as of 08 Apr 2017 19:18)  =============================================  IN: 930 ml / OUT: 250 ml / NET: 680 ml        LABS:  CBC Full  -  ( 08 Apr 2017 07:28 )  WBC Count : 21.5 K/uL  Hemoglobin : 9.1 g/dL  Hematocrit : 26.4 %  Platelet Count - Automated : 230 K/uL  Mean Cell Volume : 88.0 fl  Mean Cell Hemoglobin : 30.5 pg  Mean Cell Hemoglobin Concentration : 34.6 gm/dL  Auto Neutrophil # : x  Auto Lymphocyte # : x  Auto Monocyte # : x  Auto Eosinophil # : x  Auto Basophil # : x  Auto Neutrophil % : x  Auto Lymphocyte % : x  Auto Monocyte % : x  Auto Eosinophil % : x  Auto Basophil % : x    04-08    143  |  109<H>  |  15  ----------------------------<  123<H>  4.0   |  22  |  0.58    Ca    8.5      08 Apr 2017 07:28  Phos  1.6     04-07  Mg     1.6     04-07    TPro  8.0  /  Alb  1.7<L>  /  TBili  0.8  /  DBili  .26<H>  /  AST  18  /  ALT  <6<L>  /  AlkPhos  70  04-08    LIVER FUNCTIONS - ( 08 Apr 2017 07:28 )  Alb: 1.7 g/dL / Pro: 8.0 gm/dL / ALK PHOS: 70 U/L / ALT: <6 U/L / AST: 18 U/L / GGT: x           ABG - ( 07 Apr 2017 16:22 )  pH: x     /  pCO2: 26    /  pO2: 98    / HCO3: 18    / Base Excess: -4.3  /  SaO2: 97        Sedimentation Rate, Erythrocyte: >140 (04-08 @ 07:28)        CULTURES:  Specimen Source: .Sputum Sputum/TRAP (04-08 @ 07:12)    Specimen Source: .Urine Catheterized (04-05 @ 01:00)  Culture Results:   >100,000 CFU/ml Morganella morganii (04-05 @ 01:00)    Specimen Source: .Blood Blood-Peripheral (04-05 @ 00:34)  Culture Results:   No growth to date. (04-05 @ 00:34)    Specimen Source: .Blood Blood-Peripheral (04-05 @ 00:34)  Culture Results:   No growth to date. (04-05 @ 00:34)          Radiology:.  CXR - 4/7/17 -     FINDINGS:  Prior study dated 4/4/2017 was available for review.    The lungs demonstrate increased airspace opacity in the right lower lobe   with air bronchograms suspicious for pneumonia. No pleural effusion is   seen.    The heart and mediastinum appear intact.  A nasogastric tube is noted   traversing the chest into the left upper quadrant.      IMPRESSION: Right lower lobe pneumonia.                                Renal Sono- 4/8/17 -      IMPRESSION:    Mild increased echogenicity of both kidneys, possibly renal parenchymal   disease.      Impression:  Remains febrile on ab rx for Rt PNA and Morganella UTI with Sepsis, with slightly decreased upper airway congestion noted today.    Suggestions: WIll continue current ab rx with Meropenem + Vanco + Zithromax for now and follow-up new cx's and temps and labs.  Repeat CXR again in a few days.

## 2017-04-08 NOTE — PROGRESS NOTE ADULT - SUBJECTIVE AND OBJECTIVE BOX
INTERVAL HPI/OVERNIGHT EVENTS:        REVIEW OF SYSTEMS:  CONSTITUTIONAL:  somnolent  opens  eyes  with  verbal  stimulation        MEDICATION:  enoxaparin Injectable 40milliGRAM(s) SubCutaneous every 24 hours  dextrose 5% + sodium chloride 0.9% with potassium chloride 20 mEq/L 1000milliLiter(s) IV Continuous <Continuous>  morphine  - Injectable 2milliGRAM(s) IV Push every 4 hours PRN  polyethylene glycol 3350 17Gram(s) Oral daily  folic acid 1milliGRAM(s) Oral daily  senna Syrup 10milliLiter(s) Oral at bedtime  acetaminophen    Suspension 650milliGRAM(s) Oral every 6 hours PRN  losartan 50milliGRAM(s) Oral daily  azithromycin   Tablet 500milliGRAM(s) Oral daily  vancomycin  IVPB 1000milliGRAM(s) IV Intermittent every 24 hours  meropenem IVPB  IV Intermittent   ipratropium    for Nebulization 500MICROGram(s) Inhalation every 6 hours  acetylcysteine 10% Inhalation 2milliLiter(s) Inhalation every 6 hours  meropenem IVPB 1000milliGRAM(s) IV Intermittent every 8 hours  saliva substitute (BIOTENE MOISTURIZING MOUTH SPRAY) 1Spray(s) Topical two times a day  carbidopa/levodopa  25/100 1Tablet(s) Oral four times a day  pramipexole 0.25milliGRAM(s) Oral three times a day  sodium chloride 0.45%. 500milliLiter(s) IV Continuous <Continuous>    Vital Signs Last 24 Hrs  T(C): 37.7, Max: 38.9 (04-07 @ 17:54)  T(F): 99.8, Max: 102 (04-07 @ 17:54)  HR: 109 (99 - 128)  BP: 154/86 (134/80 - 174/88)  BP(mean): --  RR: 16 (16 - 18)  SpO2: 98% (96% - 98%)    PHYSICAL EXAM:  GENERAL: NAD, well-groomed, well-developed  EYES:  conjunctiva and sclera clear  ENMT:  Moist mucous membranes,   NECK: Supple, No JVD, Normal thyroid  NERVOUS SYSTEM:  Alert oriented   no  focal  deficits;   CHEST/LUNG: Clear    HEART: Regular rate and rhythm; No murmurs, rubs, or gallops  ABDOMEN: Soft, Nontender, Nondistended; Bowel sounds present  EXTREMITIES:  no  edema no  tenderness  SKIN: No rashes   LABS:                        9.1    21.5  )-----------( 230      ( 08 Apr 2017 07:28 )             26.4     04-08    143  |  109<H>  |  15  ----------------------------<  123<H>  4.0   |  22  |  0.58    Ca    8.5      08 Apr 2017 07:28  Phos  1.6     04-07  Mg     1.6     04-07    TPro  8.0  /  Alb  1.7<L>  /  TBili  0.8  /  DBili  .26<H>  /  AST  18  /  ALT  <6<L>  /  AlkPhos  70  04-08        CAPILLARY BLOOD GLUCOSE      RADIOLOGY & ADDITIONAL TESTS:    Imaging reports  Personally Reviewed:  [ x] YES  [ ] NO    Consultant(s) Notes Reviewed:  [x ] YES  [ ] NO    Care Discussed with Consultants/Other Providers [x ] YES  [ ] NO  Assessment and Plan:   Problem/Plan - 1:  ·  Problem: Pneumonia.  Plan: zosyn  meropenem azithomycin  O2  duoneb. for  pulmonary  ID  evlals    Problem/Plan - 2:  ·  Problem: UTI (urinary tract infection).  Plan: zosyn.     Problem/Plan - 3:  ·  Problem: Parkinson disease encephalopathy.  Plan: sinemet discussed  with  neurology  as  to  further  adjustments/addition  to  treatment    Problem/Plan - 4:  ·  Problem: Altered mental status.  Plan: observation further w/u  and  treatment  as per  clinical  course.     Problem/Plan - 5:  ·  Problem: Anemia.  Plan: iron  folic  acid.   prognosis  guarded

## 2017-04-09 LAB
ALBUMIN SERPL ELPH-MCNC: 1.6 G/DL — LOW (ref 3.3–5)
ALP SERPL-CCNC: 64 U/L — SIGNIFICANT CHANGE UP (ref 40–120)
ALT FLD-CCNC: 9 U/L — LOW (ref 12–78)
ANION GAP SERPL CALC-SCNC: 11 MMOL/L — SIGNIFICANT CHANGE UP (ref 5–17)
APPEARANCE UR: ABNORMAL
AST SERPL-CCNC: 40 U/L — HIGH (ref 15–37)
BACTERIA # UR AUTO: ABNORMAL
BILIRUB SERPL-MCNC: 0.6 MG/DL — SIGNIFICANT CHANGE UP (ref 0.2–1.2)
BILIRUB UR-MCNC: ABNORMAL
BUN SERPL-MCNC: 22 MG/DL — SIGNIFICANT CHANGE UP (ref 7–23)
CALCIUM SERPL-MCNC: 8.3 MG/DL — LOW (ref 8.5–10.1)
CHLORIDE SERPL-SCNC: 111 MMOL/L — HIGH (ref 96–108)
CO2 SERPL-SCNC: 21 MMOL/L — LOW (ref 22–31)
COLOR SPEC: YELLOW — SIGNIFICANT CHANGE UP
CREAT SERPL-MCNC: 0.5 MG/DL — SIGNIFICANT CHANGE UP (ref 0.5–1.3)
DIFF PNL FLD: ABNORMAL
EPI CELLS # UR: SIGNIFICANT CHANGE UP
ERYTHROCYTE [SEDIMENTATION RATE] IN BLOOD: >140 — SIGNIFICANT CHANGE UP (ref 0–20)
GLUCOSE SERPL-MCNC: 223 MG/DL — HIGH (ref 70–99)
GLUCOSE UR QL: NEGATIVE MG/DL — SIGNIFICANT CHANGE UP
HCT VFR BLD CALC: 24.5 % — LOW (ref 34.5–45)
HGB BLD-MCNC: 8.1 G/DL — LOW (ref 11.5–15.5)
KETONES UR-MCNC: ABNORMAL
LACTATE SERPL-SCNC: 2.2 MMOL/L — HIGH (ref 0.7–2)
LEGIONELLA AG UR QL: NEGATIVE — SIGNIFICANT CHANGE UP
LEUKOCYTE ESTERASE UR-ACNC: ABNORMAL
MCHC RBC-ENTMCNC: 29.1 PG — SIGNIFICANT CHANGE UP (ref 27–34)
MCHC RBC-ENTMCNC: 32.9 GM/DL — SIGNIFICANT CHANGE UP (ref 32–36)
MCV RBC AUTO: 88.6 FL — SIGNIFICANT CHANGE UP (ref 80–100)
NITRITE UR-MCNC: NEGATIVE — SIGNIFICANT CHANGE UP
PH UR: 5 — SIGNIFICANT CHANGE UP (ref 4.8–8)
PLATELET # BLD AUTO: 222 K/UL — SIGNIFICANT CHANGE UP (ref 150–400)
POTASSIUM SERPL-MCNC: 4.2 MMOL/L — SIGNIFICANT CHANGE UP (ref 3.5–5.3)
POTASSIUM SERPL-SCNC: 4.2 MMOL/L — SIGNIFICANT CHANGE UP (ref 3.5–5.3)
PROT SERPL-MCNC: 7.7 GM/DL — SIGNIFICANT CHANGE UP (ref 6–8.3)
PROT UR-MCNC: 100 MG/DL
RBC # BLD: 2.77 M/UL — LOW (ref 3.8–5.2)
RBC # FLD: 14.1 % — SIGNIFICANT CHANGE UP (ref 11–15)
RBC CASTS # UR COMP ASSIST: ABNORMAL /HPF (ref 0–4)
RHEUMATOID FACT SERPL-ACNC: 7.1 IU/ML — SIGNIFICANT CHANGE UP (ref 0–13.9)
SODIUM SERPL-SCNC: 143 MMOL/L — SIGNIFICANT CHANGE UP (ref 135–145)
SP GR SPEC: 1.02 — SIGNIFICANT CHANGE UP (ref 1.01–1.02)
UROBILINOGEN FLD QL: NEGATIVE MG/DL — SIGNIFICANT CHANGE UP
WBC # BLD: 12.7 K/UL — HIGH (ref 3.8–10.5)
WBC # FLD AUTO: 12.7 K/UL — HIGH (ref 3.8–10.5)
WBC UR QL: ABNORMAL

## 2017-04-09 PROCEDURE — 74177 CT ABD & PELVIS W/CONTRAST: CPT | Mod: 26

## 2017-04-09 RX ORDER — IOHEXOL 300 MG/ML
30 INJECTION, SOLUTION INTRAVENOUS ONCE
Qty: 0 | Refills: 0 | Status: COMPLETED | OUTPATIENT
Start: 2017-04-09 | End: 2017-04-09

## 2017-04-09 RX ADMIN — ENOXAPARIN SODIUM 40 MILLIGRAM(S): 100 INJECTION SUBCUTANEOUS at 05:42

## 2017-04-09 RX ADMIN — Medication 250 MILLIGRAM(S): at 05:00

## 2017-04-09 RX ADMIN — CARBIDOPA AND LEVODOPA 1 TABLET(S): 25; 100 TABLET ORAL at 18:12

## 2017-04-09 RX ADMIN — SENNA PLUS 10 MILLILITER(S): 8.6 TABLET ORAL at 22:29

## 2017-04-09 RX ADMIN — Medication 500 MICROGRAM(S): at 17:09

## 2017-04-09 RX ADMIN — Medication 2 MILLILITER(S): at 06:09

## 2017-04-09 RX ADMIN — Medication 650 MILLIGRAM(S): at 09:51

## 2017-04-09 RX ADMIN — Medication 500 MICROGRAM(S): at 06:09

## 2017-04-09 RX ADMIN — AZITHROMYCIN 500 MILLIGRAM(S): 500 TABLET, FILM COATED ORAL at 12:17

## 2017-04-09 RX ADMIN — IOHEXOL 30 MILLILITER(S): 300 INJECTION, SOLUTION INTRAVENOUS at 14:49

## 2017-04-09 RX ADMIN — Medication 500 MICROGRAM(S): at 11:22

## 2017-04-09 RX ADMIN — MEROPENEM 200 MILLIGRAM(S): 1 INJECTION INTRAVENOUS at 14:47

## 2017-04-09 RX ADMIN — LOSARTAN POTASSIUM 50 MILLIGRAM(S): 100 TABLET, FILM COATED ORAL at 05:41

## 2017-04-09 RX ADMIN — Medication 1 SPRAY(S): at 18:12

## 2017-04-09 RX ADMIN — Medication 1 MILLIGRAM(S): at 12:17

## 2017-04-09 RX ADMIN — CARBIDOPA AND LEVODOPA 1 TABLET(S): 25; 100 TABLET ORAL at 12:17

## 2017-04-09 RX ADMIN — MEROPENEM 200 MILLIGRAM(S): 1 INJECTION INTRAVENOUS at 05:40

## 2017-04-09 RX ADMIN — CARBIDOPA AND LEVODOPA 1 TABLET(S): 25; 100 TABLET ORAL at 01:00

## 2017-04-09 RX ADMIN — PRAMIPEXOLE DIHYDROCHLORIDE 0.25 MILLIGRAM(S): 0.12 TABLET ORAL at 22:29

## 2017-04-09 RX ADMIN — Medication 1 SPRAY(S): at 05:40

## 2017-04-09 RX ADMIN — Medication 2 MILLILITER(S): at 11:22

## 2017-04-09 RX ADMIN — Medication 650 MILLIGRAM(S): at 22:30

## 2017-04-09 RX ADMIN — CARBIDOPA AND LEVODOPA 1 TABLET(S): 25; 100 TABLET ORAL at 05:39

## 2017-04-09 RX ADMIN — PRAMIPEXOLE DIHYDROCHLORIDE 0.25 MILLIGRAM(S): 0.12 TABLET ORAL at 14:47

## 2017-04-09 RX ADMIN — DEXTROSE MONOHYDRATE, SODIUM CHLORIDE, AND POTASSIUM CHLORIDE 75 MILLILITER(S): 50; .745; 4.5 INJECTION, SOLUTION INTRAVENOUS at 19:19

## 2017-04-09 RX ADMIN — DEXTROSE MONOHYDRATE, SODIUM CHLORIDE, AND POTASSIUM CHLORIDE 75 MILLILITER(S): 50; .745; 4.5 INJECTION, SOLUTION INTRAVENOUS at 03:00

## 2017-04-09 RX ADMIN — MEROPENEM 200 MILLIGRAM(S): 1 INJECTION INTRAVENOUS at 22:29

## 2017-04-09 RX ADMIN — Medication 650 MILLIGRAM(S): at 01:16

## 2017-04-09 RX ADMIN — PRAMIPEXOLE DIHYDROCHLORIDE 0.25 MILLIGRAM(S): 0.12 TABLET ORAL at 05:39

## 2017-04-09 RX ADMIN — Medication 2 MILLILITER(S): at 17:06

## 2017-04-09 NOTE — PROGRESS NOTE ADULT - SUBJECTIVE AND OBJECTIVE BOX
INTERVAL HPI/OVERNIGHT EVENTS:  87 y/o  white female, with hx Parkinson's Disease, with Fx of the Rt Femur in 3/17 and underwent Rt hip ORIF on 3/12 at [facility] with hospital course complicated by  RLL + RML PNA as seen on CT Chest on 3/17 and an EColi UTI, Treated then with Rocephin initially and changed to Meropenem + Vanco for about 5 days, then d/francine to [facility] on 3/22 on po Levaquin, readmitted via the ER on 4/4/17 with increasing lethargy, fever, and difficulty swallowing and suspected Sepsis likely from urinary origin, which later on grew Omrganella.  RRT on 04/07/17 for high fever, tachycardia. Suctioned thick secretions fro upper airway. Lethargic, not able to provide history.  Still lethargic and febrile. ID follow up noted. RN reports some fecal material in Colon.    Vital Signs Last 24 Hrs  T(C): 38, Max: 38.7 (04-08 @ 17:00)  T(F): 100.4, Max: 101.6 (04-08 @ 17:00)  HR: 102 (88 - 116)  BP: 152/95 (147/84 - 165/94)  BP(mean): --  RR: 18 (18 - 20)  SpO2: 94% (92% - 96%)    PHYSICAL EXAM:  GEN:        unresponsive and comfortable.  HEENT:    Normal.    RESP:      crackles.  CVS:          Regular rate and rhythm.   ABD:         Soft, non-tender, non-distended;     MEDICATIONS  (STANDING):  enoxaparin Injectable 40milliGRAM(s) SubCutaneous every 24 hours  dextrose 5% + sodium chloride 0.9% with potassium chloride 20 mEq/L 1000milliLiter(s) IV Continuous <Continuous>  polyethylene glycol 3350 17Gram(s) Oral daily  folic acid 1milliGRAM(s) Oral daily  senna Syrup 10milliLiter(s) Oral at bedtime  losartan 50milliGRAM(s) Oral daily  azithromycin   Tablet 500milliGRAM(s) Oral daily  vancomycin  IVPB 1000milliGRAM(s) IV Intermittent every 24 hours  meropenem IVPB  IV Intermittent   ipratropium    for Nebulization 500MICROGram(s) Inhalation every 6 hours  acetylcysteine 10% Inhalation 2milliLiter(s) Inhalation every 6 hours  meropenem IVPB 1000milliGRAM(s) IV Intermittent every 8 hours  saliva substitute (BIOTENE MOISTURIZING MOUTH SPRAY) 1Spray(s) Topical two times a day  carbidopa/levodopa  25/100 1Tablet(s) Oral four times a day  pramipexole 0.25milliGRAM(s) Oral three times a day    MEDICATIONS  (PRN):  morphine  - Injectable 2milliGRAM(s) IV Push every 4 hours PRN Severe Pain (7 - 10)  acetaminophen    Suspension 650milliGRAM(s) Oral every 6 hours PRN For Temp greater than 38 C (100.4 F)    LABS:                        8.1    12.7  )-----------( 222      ( 09 Apr 2017 07:10 )             24.5     04-09    143  |  111<H>  |  22  ----------------------------<  223<H>  4.2   |  21<L>  |  0.50    Ca    8.3<L>      09 Apr 2017 07:10    TPro  7.7  /  Alb  1.6<L>  /  TBili  0.6  /  DBili  x   /  AST  40<H>  /  ALT  9<L>  /  AlkPhos  64  04-09    ABG - 04-08 @ 22:04  pH: 7.50 / pCO2: 28 / pO2: 68    ASSESSMENT AND PLAN:  ·	Altered mental status.  ·	Gram negative Sepsis with Morganella.  ·	UTI with Morganella  ·	RLL Pneumonia  ·	Leukocytosis.  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  ·	Parkinsonism  ·	Hypokalemia corrected.  ·	Hypernatremia  ·	Anemia.    Continue O2, nebulizer.  On broad spectrum antibiotics.  Suction PRN, observe Aspiration precautions.  For Abdomen/pelvis CT.

## 2017-04-09 NOTE — PROGRESS NOTE ADULT - SUBJECTIVE AND OBJECTIVE BOX
INTERVAL HPI/OVERNIGHT EVENTS:  reported  with  stool  like  material  in  Colon 4/8/17  discussed  with  PA  Surgical  and  urology evaluations  ordered      REVIEW OF SYSTEMS:  CONSTITUTIONAL  lethargic  arouseable    MEDICATION:  enoxaparin Injectable 40milliGRAM(s) SubCutaneous every 24 hours  dextrose 5% + sodium chloride 0.9% with potassium chloride 20 mEq/L 1000milliLiter(s) IV Continuous <Continuous>  morphine  - Injectable 2milliGRAM(s) IV Push every 4 hours PRN  polyethylene glycol 3350 17Gram(s) Oral daily  folic acid 1milliGRAM(s) Oral daily  senna Syrup 10milliLiter(s) Oral at bedtime  acetaminophen    Suspension 650milliGRAM(s) Oral every 6 hours PRN  losartan 50milliGRAM(s) Oral daily  azithromycin   Tablet 500milliGRAM(s) Oral daily  vancomycin  IVPB 1000milliGRAM(s) IV Intermittent every 24 hours  meropenem IVPB  IV Intermittent   ipratropium    for Nebulization 500MICROGram(s) Inhalation every 6 hours  acetylcysteine 10% Inhalation 2milliLiter(s) Inhalation every 6 hours  meropenem IVPB 1000milliGRAM(s) IV Intermittent every 8 hours  saliva substitute (BIOTENE MOISTURIZING MOUTH SPRAY) 1Spray(s) Topical two times a day  carbidopa/levodopa  25/100 1Tablet(s) Oral four times a day  pramipexole 0.25milliGRAM(s) Oral three times a day    Vital Signs Last 24 Hrs  T(C): 37.8, Max: 38.9 (04-08 @ 14:11)  T(F): 100, Max: 102.1 (04-08 @ 14:11)  HR: 108 (88 - 116)  BP: 162/94 (147/84 - 165/94)  BP(mean): --  RR: 18 (18 - 20)  SpO2: 96% (92% - 98%)    PHYSICAL EXAM:  GENERAL: NAD, well-groomed, well-developed  EYES:  conjunctiva and sclera clear  ENMT:  Moist mucous membranes,   NECK: Supple, No JVD, Normal thyroid  NERVOUS SYSTEM:   somnolent  arouseable   no  focal  deficits;   CHEST/LUNG: Clear    HEART: Regular rate and rhythm; No murmurs, rubs, or gallops  ABDOMEN: Soft, mild  diffuse  tenderness Nondistended; Bowel sounds present  EXTREMITIES:  no  edema no  tenderness  SKIN: No rashes   LABS:                        8.1    12.7  )-----------( 222      ( 09 Apr 2017 07:10 )             24.5     04-09    143  |  111<H>  |  22  ----------------------------<  223<H>  4.2   |  21<L>  |  0.50    Ca    8.3<L>      09 Apr 2017 07:10  Phos  1.6     04-07  Mg     1.6     04-07    TPro  7.7  /  Alb  1.6<L>  /  TBili  0.6  /  DBili  x   /  AST  40<H>  /  ALT  9<L>  /  AlkPhos  64  04-09        CAPILLARY BLOOD GLUCOSE      RADIOLOGY & ADDITIONAL TESTS:    Imaging reports  Personally Reviewed:  [x ] YES  [ ] NO    Consultant(s) Notes Reviewed:  [ x] YES  [ ] NO    Care Discussed with Consultants/Other Providers [x ] YES  [ ] NO  Assessment and Plan:     POSSIBLE  COLO/VESCICULAR FISTULA  for  CT  of  Abd  for  Cystogram  discussed  with  Surgery  and      Problem/Plan - 1:  ·  Problem: Pneumonia.  Plan: zosyn  meropenem azithomycin  O2  duoneb. for  pulmonary  ID  evlals    Problem/Plan - 2:  ·  Problem: UTI (urinary tract infection).  Plan: zosyn.     Problem/Plan - 3:  ·  Problem: Parkinson disease encephalopathy.  Plan: sinemet discussed  with  neurology  as  to  further  adjustments/addition  to  treatment    Problem/Plan - 4:  ·  Problem: Altered mental status.  Plan: observation further w/u  and  treatment  as per  clinical  course.     Problem/Plan - 5:  ·  Problem: Anemia.  Plan: iron  folic  acid.   prognosis  guarded

## 2017-04-09 NOTE — CONSULT NOTE ADULT - SUBJECTIVE AND OBJECTIVE BOX
Patient seen and examined with Dr. Mosley    HPI:  Patient is an 86 year old female PMH Parkinsons disease who presented to the ED with lethargy, fever, dysphagia, and altered mental status and admitted for pneumonia and UTI. Patient is s/p left hip medullary nail.     Review of Systems: Patient is arousable, but not oriented to offer complaints.     MEDICATIONS  (STANDING):  enoxaparin Injectable 40milliGRAM(s) SubCutaneous every 24 hours  dextrose 5% + sodium chloride 0.9% with potassium chloride 20 mEq/L 1000milliLiter(s) IV Continuous <Continuous>  polyethylene glycol 3350 17Gram(s) Oral daily  folic acid 1milliGRAM(s) Oral daily  senna Syrup 10milliLiter(s) Oral at bedtime  losartan 50milliGRAM(s) Oral daily  azithromycin   Tablet 500milliGRAM(s) Oral daily  vancomycin  IVPB 1000milliGRAM(s) IV Intermittent every 24 hours  meropenem IVPB  IV Intermittent   ipratropium    for Nebulization 500MICROGram(s) Inhalation every 6 hours  acetylcysteine 10% Inhalation 2milliLiter(s) Inhalation every 6 hours  meropenem IVPB 1000milliGRAM(s) IV Intermittent every 8 hours  saliva substitute (BIOTENE MOISTURIZING MOUTH SPRAY) 1Spray(s) Topical two times a day  carbidopa/levodopa  25/100 1Tablet(s) Oral four times a day  pramipexole 0.25milliGRAM(s) Oral three times a day  iohexol 300 mG (iodine)/mL Oral Solution 30milliLiter(s) Oral once    MEDICATIONS  (PRN):  morphine  - Injectable 2milliGRAM(s) IV Push every 4 hours PRN Severe Pain (7 - 10)  acetaminophen    Suspension 650milliGRAM(s) Oral every 6 hours PRN For Temp greater than 38 C (100.4 F)      Allergies: No Known Allergies    FAMILY HISTORY:  No pertinent family history in first degree relatives    Vital Signs Last 24 Hrs  T(C): 38, Max: 38.7 (04-08 @ 17:00)  T(F): 100.4, Max: 101.6 (04-08 @ 17:00)  HR: 102 (88 - 116)  BP: 152/95 (147/84 - 165/94)  RR: 18 (18 - 20)  SpO2: 94% (92% - 96%)    PHYSICAL EXAM:  General:  Appears stated age, well-groomed, well-nourished, no distress  Eyes : GABRIELLA  HENT:  WNL, no JVD. nasal cannula in place  Chest: CTA b/l, no wheezes/rhonchi/rales  Cardiovascular: +S1S2, regular rate & rhythm  Abdomen: +BS, soft, obese, nondistended, nontender  Extremities: no pedal edema b/l    Skin: no rash  Neuro/Psych:  Somnolent, arousable        LABS:                        8.1    12.7  )-----------( 222      ( 09 Apr 2017 07:10 )             24.5     04-09    143  |  111<H>  |  22  ----------------------------<  223<H>  4.2   |  21<L>  |  0.50    Ca    8.3<L>      09 Apr 2017 07:10  Phos  1.6     04-07  Mg     1.6     04-07    TPro  7.7  /  Alb  1.6<L>  /  TBili  0.6  /  DBili  x   /  AST  40<H>  /  ALT  9<L>  /  AlkPhos  64  04-09      Impression: 86 year old female a/w AMS likely 2/2 urosepsis and pneumonia, r/o colon etiology vs bladder etiology  Plan:  - CT abdomen/pelvis with IV and PO contrast ordered, f/u results  - Xray cystogram ordered, f/u results  - IV antibiotics  - serial abdominal exams  - continue shoemaker catheter  - monitor labs  - continue all medical management/supportive care Patient seen and examined with Dr. Mosley    HPI:  Patient is an 86 year old female PMH Parkinsons disease who presented to the ED with lethargy, fever, dysphagia, and altered mental status and admitted for pneumonia and UTI. Patient is s/p left hip medullary nail.     Review of Systems: Patient is arousable, but not oriented to offer complaints.     MEDICATIONS  (STANDING):  enoxaparin Injectable 40milliGRAM(s) SubCutaneous every 24 hours  dextrose 5% + sodium chloride 0.9% with potassium chloride 20 mEq/L 1000milliLiter(s) IV Continuous <Continuous>  polyethylene glycol 3350 17Gram(s) Oral daily  folic acid 1milliGRAM(s) Oral daily  senna Syrup 10milliLiter(s) Oral at bedtime  losartan 50milliGRAM(s) Oral daily  azithromycin   Tablet 500milliGRAM(s) Oral daily  vancomycin  IVPB 1000milliGRAM(s) IV Intermittent every 24 hours  meropenem IVPB  IV Intermittent   ipratropium    for Nebulization 500MICROGram(s) Inhalation every 6 hours  acetylcysteine 10% Inhalation 2milliLiter(s) Inhalation every 6 hours  meropenem IVPB 1000milliGRAM(s) IV Intermittent every 8 hours  saliva substitute (BIOTENE MOISTURIZING MOUTH SPRAY) 1Spray(s) Topical two times a day  carbidopa/levodopa  25/100 1Tablet(s) Oral four times a day  pramipexole 0.25milliGRAM(s) Oral three times a day  iohexol 300 mG (iodine)/mL Oral Solution 30milliLiter(s) Oral once    MEDICATIONS  (PRN):  morphine  - Injectable 2milliGRAM(s) IV Push every 4 hours PRN Severe Pain (7 - 10)  acetaminophen    Suspension 650milliGRAM(s) Oral every 6 hours PRN For Temp greater than 38 C (100.4 F)      Allergies: No Known Allergies    FAMILY HISTORY:  No pertinent family history in first degree relatives    Vital Signs Last 24 Hrs  T(C): 38, Max: 38.7 (04-08 @ 17:00)  T(F): 100.4, Max: 101.6 (04-08 @ 17:00)  HR: 102 (88 - 116)  BP: 152/95 (147/84 - 165/94)  RR: 18 (18 - 20)  SpO2: 94% (92% - 96%)    PHYSICAL EXAM:  General:  Appears stated age, well-groomed, well-nourished, no distress  Eyes : GABRIELLA  HENT:  WNL, no JVD. nasal cannula in place  Chest: CTA b/l, no wheezes/rhonchi/rales  Cardiovascular: +S1S2, regular rate & rhythm  Abdomen: +BS, soft, obese, nondistended, nontender  Extremities: no pedal edema b/l    Skin: no rash  Neuro/Psych:  Somnolent, arousable        LABS:                        8.1    12.7  )-----------( 222      ( 09 Apr 2017 07:10 )             24.5     04-09    143  |  111<H>  |  22  ----------------------------<  223<H>  4.2   |  21<L>  |  0.50    Ca    8.3<L>      09 Apr 2017 07:10  Phos  1.6     04-07  Mg     1.6     04-07    TPro  7.7  /  Alb  1.6<L>  /  TBili  0.6  /  DBili  x   /  AST  40<H>  /  ALT  9<L>  /  AlkPhos  64  04-09      Impression: 86 year old female a/w AMS likely 2/2 urosepsis and pneumonia, r/o colon etiology vs bladder etiology  Plan:  - CT abdomen/pelvis with IV and PO contrast ordered, f/u results  - Xray cystogram ordered, f/u results  - IV antibiotics  - serial abdominal exams  - IV hydration  - continue shoemaker catheter  - monitor labs  - continue all medical management/supportive care

## 2017-04-10 LAB
CULTURE RESULTS: SIGNIFICANT CHANGE UP
GRAM STN FLD: SIGNIFICANT CHANGE UP
HCT VFR BLD CALC: 25 % — LOW (ref 34.5–45)
HGB BLD-MCNC: 8.5 G/DL — LOW (ref 11.5–15.5)
MCHC RBC-ENTMCNC: 30.6 PG — SIGNIFICANT CHANGE UP (ref 27–34)
MCHC RBC-ENTMCNC: 34.2 GM/DL — SIGNIFICANT CHANGE UP (ref 32–36)
MCV RBC AUTO: 89.4 FL — SIGNIFICANT CHANGE UP (ref 80–100)
PLATELET # BLD AUTO: 210 K/UL — SIGNIFICANT CHANGE UP (ref 150–400)
RBC # BLD: 2.79 M/UL — LOW (ref 3.8–5.2)
RBC # FLD: 13.8 % — SIGNIFICANT CHANGE UP (ref 11–15)
SPECIMEN SOURCE: SIGNIFICANT CHANGE UP
VANCOMYCIN TROUGH SERPL-MCNC: 10.9 UG/ML — SIGNIFICANT CHANGE UP (ref 10–20)
WBC # BLD: 9.4 K/UL — SIGNIFICANT CHANGE UP (ref 3.8–10.5)
WBC # FLD AUTO: 9.4 K/UL — SIGNIFICANT CHANGE UP (ref 3.8–10.5)

## 2017-04-10 PROCEDURE — 74430 CONTRAST X-RAY BLADDER: CPT | Mod: 26

## 2017-04-10 PROCEDURE — 51600 INJECTION FOR BLADDER X-RAY: CPT

## 2017-04-10 PROCEDURE — 74176 CT ABD & PELVIS W/O CONTRAST: CPT | Mod: 26

## 2017-04-10 RX ADMIN — PRAMIPEXOLE DIHYDROCHLORIDE 0.25 MILLIGRAM(S): 0.12 TABLET ORAL at 22:23

## 2017-04-10 RX ADMIN — Medication 2 MILLILITER(S): at 11:15

## 2017-04-10 RX ADMIN — ENOXAPARIN SODIUM 40 MILLIGRAM(S): 100 INJECTION SUBCUTANEOUS at 08:05

## 2017-04-10 RX ADMIN — Medication 250 MILLIGRAM(S): at 05:42

## 2017-04-10 RX ADMIN — Medication 2 MILLILITER(S): at 00:48

## 2017-04-10 RX ADMIN — CARBIDOPA AND LEVODOPA 1 TABLET(S): 25; 100 TABLET ORAL at 05:43

## 2017-04-10 RX ADMIN — DEXTROSE MONOHYDRATE, SODIUM CHLORIDE, AND POTASSIUM CHLORIDE 75 MILLILITER(S): 50; .745; 4.5 INJECTION, SOLUTION INTRAVENOUS at 08:04

## 2017-04-10 RX ADMIN — LOSARTAN POTASSIUM 50 MILLIGRAM(S): 100 TABLET, FILM COATED ORAL at 05:42

## 2017-04-10 RX ADMIN — AZITHROMYCIN 500 MILLIGRAM(S): 500 TABLET, FILM COATED ORAL at 16:31

## 2017-04-10 RX ADMIN — Medication 500 MICROGRAM(S): at 00:47

## 2017-04-10 RX ADMIN — PRAMIPEXOLE DIHYDROCHLORIDE 0.25 MILLIGRAM(S): 0.12 TABLET ORAL at 05:42

## 2017-04-10 RX ADMIN — Medication 1 MILLIGRAM(S): at 12:31

## 2017-04-10 RX ADMIN — PRAMIPEXOLE DIHYDROCHLORIDE 0.25 MILLIGRAM(S): 0.12 TABLET ORAL at 16:35

## 2017-04-10 RX ADMIN — Medication 2 MILLILITER(S): at 23:19

## 2017-04-10 RX ADMIN — MEROPENEM 200 MILLIGRAM(S): 1 INJECTION INTRAVENOUS at 22:23

## 2017-04-10 RX ADMIN — Medication 500 MICROGRAM(S): at 11:14

## 2017-04-10 RX ADMIN — Medication 2 MILLILITER(S): at 17:11

## 2017-04-10 RX ADMIN — CARBIDOPA AND LEVODOPA 1 TABLET(S): 25; 100 TABLET ORAL at 00:32

## 2017-04-10 RX ADMIN — Medication 500 MICROGRAM(S): at 17:11

## 2017-04-10 RX ADMIN — Medication 2 MILLILITER(S): at 06:41

## 2017-04-10 RX ADMIN — POLYETHYLENE GLYCOL 3350 17 GRAM(S): 17 POWDER, FOR SOLUTION ORAL at 22:23

## 2017-04-10 RX ADMIN — Medication 1 SPRAY(S): at 16:36

## 2017-04-10 RX ADMIN — CARBIDOPA AND LEVODOPA 1 TABLET(S): 25; 100 TABLET ORAL at 16:31

## 2017-04-10 RX ADMIN — Medication 1 SPRAY(S): at 05:44

## 2017-04-10 RX ADMIN — CARBIDOPA AND LEVODOPA 1 TABLET(S): 25; 100 TABLET ORAL at 23:53

## 2017-04-10 RX ADMIN — Medication 500 MICROGRAM(S): at 06:41

## 2017-04-10 RX ADMIN — Medication 500 MICROGRAM(S): at 23:19

## 2017-04-10 RX ADMIN — SENNA PLUS 10 MILLILITER(S): 8.6 TABLET ORAL at 22:22

## 2017-04-10 RX ADMIN — MEROPENEM 200 MILLIGRAM(S): 1 INJECTION INTRAVENOUS at 16:34

## 2017-04-10 RX ADMIN — MEROPENEM 200 MILLIGRAM(S): 1 INJECTION INTRAVENOUS at 05:42

## 2017-04-10 NOTE — PROGRESS NOTE ADULT - SUBJECTIVE AND OBJECTIVE BOX
Patient seen and examined at the bedside.  No acute events overnight.     Patient unresponsive      HPI:  patient  with  worsening  lethargy  fever  dysphagia  evaluated  in  ER  admitted  for  pneumonia  UTI  patient  s/p  l  hip  medullary  nail (04 Apr 2017 19:53)      Vital Signs Last 24 Hrs  T(C): 36.3, Max: 38.2 (04-09 @ 12:06)  T(F): 97.4, Max: 100.8 (04-09 @ 12:06)  HR: 117 (99 - 125)  BP: 153/100 (152/95 - 165/99)  BP(mean): --  RR: 19 (17 - 19)  SpO2: 100% (94% - 100%)                          8.5    9.4   )-----------( 210      ( 10 Apr 2017 04:03 )             25.0       04-09    143  |  111<H>  |  22  ----------------------------<  223<H>  4.2   |  21<L>  |  0.50    Ca    8.3<L>      09 Apr 2017 07:10    TPro  7.7  /  Alb  1.6<L>  /  TBili  0.6  /  DBili  x   /  AST  40<H>  /  ALT  9<L>  /  AlkPhos  64  04-09  LIVER FUNCTIONS - ( 09 Apr 2017 07:10 )  Alb: 1.6 g/dL / Pro: 7.7 gm/dL / ALK PHOS: 64 U/L / ALT: 9 U/L / AST: 40 U/L / GGT: x           I&O's Detail  I & Os for 24h ending 09 Apr 2017 07:00  =============================================  IN:    dextrose 5% + sodium chloride 0.9% with potassium chloride 20 mEq/L: 1650 ml    Jevity: 900 ml    Solution: 250 ml    IV PiggyBack: 100 ml    Solution: 100 ml    Total IN: 3000 ml  ---------------------------------------------  OUT:    Indwelling Catheter - Urethral: 350 ml    Total OUT: 350 ml  ---------------------------------------------  Total NET: 2650 ml    I & Os for current day (as of 10 Apr 2017 06:08)  =============================================  IN:    dextrose 5% + sodium chloride 0.9% with potassium chloride 20 mEq/L: 900 ml    Jevity: 720 ml    Solution: 250 ml    Solution: 200 ml    Total IN: 2070 ml  ---------------------------------------------  OUT:    Indwelling Catheter - Urethral: 800 ml    Total OUT: 800 ml  ---------------------------------------------  Total NET: 1270 ml      MEDICATIONS  (STANDING):  enoxaparin Injectable 40milliGRAM(s) SubCutaneous every 24 hours  dextrose 5% + sodium chloride 0.9% with potassium chloride 20 mEq/L 1000milliLiter(s) IV Continuous <Continuous>  polyethylene glycol 3350 17Gram(s) Oral daily  folic acid 1milliGRAM(s) Oral daily  senna Syrup 10milliLiter(s) Oral at bedtime  losartan 50milliGRAM(s) Oral daily  azithromycin   Tablet 500milliGRAM(s) Oral daily  vancomycin  IVPB 1000milliGRAM(s) IV Intermittent every 24 hours  meropenem IVPB  IV Intermittent   ipratropium    for Nebulization 500MICROGram(s) Inhalation every 6 hours  acetylcysteine 10% Inhalation 2milliLiter(s) Inhalation every 6 hours  meropenem IVPB 1000milliGRAM(s) IV Intermittent every 8 hours  saliva substitute (BIOTENE MOISTURIZING MOUTH SPRAY) 1Spray(s) Topical two times a day  carbidopa/levodopa  25/100 1Tablet(s) Oral four times a day  pramipexole 0.25milliGRAM(s) Oral three times a day    MEDICATIONS  (PRN):  morphine  - Injectable 2milliGRAM(s) IV Push every 4 hours PRN Severe Pain (7 - 10)  acetaminophen    Suspension 650milliGRAM(s) Oral every 6 hours PRN For Temp greater than 38 C (100.4 F)        Physical Exam  GEN: NAD non responsive  HEENT: NCAT, Trachea midline, no scleral icterus, +NC  Resp: unlabored,  CTAB  CV: No Tachycardia, S1 S2  ABD: Soft, ND NTTP  : shoemaker W/slight sediment  EXT: warm well perfused, no pedal edema, no calf tenderness, UE B/L with edema     A/P: 86yFemale a/w AMS likely 2/2 urosepsis and pneumonia, r/o colon etiology vs bladder etiology, leukocytosis resolved      Plan:  - CT abdomen/pelvis with IV and PO contrast ordered, f/u results  - Xray cystogram ordered, f/u results  - f/u urology consult  - continue IV antibiotics  - serial abdominal exams  - IV hydration, continue tube feeds  - continue shoemaker catheter  - monitor labs, replete prn  - DVT prophylaxis  - continue all medical management/supportive care      PAST MEDICAL & SURGICAL HISTORY:  Pneumonia  Anemia  Parkinson disease  Tremors of nervous system  No pertinent past medical history  Status post hip surgery  No significant past surgical history      -

## 2017-04-10 NOTE — PROGRESS NOTE ADULT - SUBJECTIVE AND OBJECTIVE BOX
INTERVAL HPI/OVERNIGHT EVENTS:        REVIEW OF SYSTEMS:  CONSTITUTIONAL:  somnolent  arouseable  presents  no  complaints      MEDICATION:  enoxaparin Injectable 40milliGRAM(s) SubCutaneous every 24 hours  dextrose 5% + sodium chloride 0.9% with potassium chloride 20 mEq/L 1000milliLiter(s) IV Continuous <Continuous>  morphine  - Injectable 2milliGRAM(s) IV Push every 4 hours PRN  polyethylene glycol 3350 17Gram(s) Oral daily  folic acid 1milliGRAM(s) Oral daily  senna Syrup 10milliLiter(s) Oral at bedtime  acetaminophen    Suspension 650milliGRAM(s) Oral every 6 hours PRN  losartan 50milliGRAM(s) Oral daily  azithromycin   Tablet 500milliGRAM(s) Oral daily  vancomycin  IVPB 1000milliGRAM(s) IV Intermittent every 24 hours  meropenem IVPB  IV Intermittent   ipratropium    for Nebulization 500MICROGram(s) Inhalation every 6 hours  acetylcysteine 10% Inhalation 2milliLiter(s) Inhalation every 6 hours  meropenem IVPB 1000milliGRAM(s) IV Intermittent every 8 hours  saliva substitute (BIOTENE MOISTURIZING MOUTH SPRAY) 1Spray(s) Topical two times a day  carbidopa/levodopa  25/100 1Tablet(s) Oral four times a day  pramipexole 0.25milliGRAM(s) Oral three times a day    Vital Signs Last 24 Hrs  T(C): 36.3, Max: 38.2 (04-09 @ 12:06)  T(F): 97.4, Max: 100.8 (04-09 @ 12:06)  HR: 115 (99 - 125)  BP: 153/100 (152/95 - 165/99)  BP(mean): --  RR: 19 (17 - 19)  SpO2: 100% (94% - 100%)    PHYSICAL EXAM:  GENERAL: NAD, well-groomed, well-developed  EYES:  conjunctiva and sclera clear  ENMT:  Moist mucous membranes,   NECK: Supple, No JVD, Normal thyroid  NERVOUS SYSTEM:  moves  all  extr  CHEST/LUNG: Clear    HEART: Regular rate and rhythm; No murmurs, rubs, or gallops  ABDOMEN: Soft, Nontender, Nondistended; Bowel sounds present  EXTREMITIES:  no  edema no  tenderness  SKIN: No rashes   LABS:                        8.5    9.4   )-----------( 210      ( 10 Apr 2017 04:03 )             25.0     04-09    143  |  111<H>  |  22  ----------------------------<  223<H>  4.2   |  21<L>  |  0.50    Ca    8.3<L>      2017 07:10    TPro  7.7  /  Alb  1.6<L>  /  TBili  0.6  /  DBili  x   /  AST  40<H>  /  ALT  9<L>  /  AlkPhos  64        Urinalysis Basic - ( 2017 17:20 )    Color: Yellow / Appearance: very cloudy / S.020 / pH: x  Gluc: x / Ketone: Trace  / Bili: Moderate / Urobili: Negative mg/dL   Blood: x / Protein: 100 mg/dL / Nitrite: Negative   Leuk Esterase: Moderate / RBC: 6-10 /HPF / WBC 26-50   Sq Epi: x / Non Sq Epi: Occasional / Bacteria: Many      CT  ABD  Impression    Dense right lower lobe pneumonia/consolidation.    Bilateral pleural effusions, small to moderate on the right and the left.    Platelike atelectasis/possible infiltrate at the left base.    Distended gallbladder with no calcified gallstones, small amount of   pericholecystic fluid at the hepatic interface. If there is continued   clinical concern follow-up RUQ US can be ordered    Marked bladder wall thickening, follow-up CTwith retrograde   administration of contrast can be ordered for further evaluation of   fistula. 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  Plan: zosyn  meropenem azithomycin  O2  duoneb. for  pulmonary  ID  evlals    Problem/Plan - 2:  ·  Problem: UTI (urinary tract infection).  Plan: zosyn.     Problem/Plan - 3:  ·  Problem: Parkinson disease encephalopathy.  Plan: sinemet discussed  with  neurology  as  to  further  adjustments/addition  to  treatment    Problem/Plan - 4:  ·  Problem: Altered mental status.  Plan: observation further w/u  and  treatment  as per  clinical  course.     Problem/Plan - 5:  ·  Problem: Anemia.  Plan: iron  folic  acid.   prognosis  guarded

## 2017-04-10 NOTE — PROGRESS NOTE ADULT - SUBJECTIVE AND OBJECTIVE BOX
Patient seen and examined bedside resting comfortably.  No complaints offered.   has feeding NG tube  Has Colon Catheter draining urine      T(F): 99.8, Max: 100.6 (04-09 @ 20:12)  HR: 101 (100 - 125)  BP: 154/90 (152/98 - 165/99)  RR: 18 (17 - 19)  SpO2: 98% (96% - 100%)  Wt(kg): --  CAPILLARY BLOOD GLUCOSE      PHYSICAL EXAM:  General: NAD, WDWN  Neuro:  Alert & conscious  HEENT: NCAT, EOMI, conjunctiva clear  CV: +S1S2 regular rate and rhythm  Lung: respirations nonlabored, good inspiratory effort  Abdomen: soft, NTND. Normactive BS  Extremities: no pedal edema or calf tenderness noted   : Colon catheter draining well.  LABS:                        8.5    9.4   )-----------( 210      ( 10 Apr 2017 04:03 )             25.0     04-09    143  |  111<H>  |  22  ----------------------------<  223<H>  4.2   |  21<L>  |  0.50    Ca    8.3<L>      09 Apr 2017 07:10    TPro  7.7  /  Alb  1.6<L>  /  TBili  0.6  /  DBili  x   /  AST  40<H>  /  ALT  9<L>  /  AlkPhos  64  04-09      I&O's Detail    I & Os for current day (as of 10 Apr 2017 16:09)  =============================================  IN:    dextrose 5% + sodium chloride 0.9% with potassium chloride 20 mEq/L: 900 ml    Jevity: 720 ml    Solution: 250 ml    Solution: 200 ml    Total IN: 2070 ml  ---------------------------------------------  OUT:    Indwelling Catheter - Urethral: 1100 ml    Total OUT: 1100 ml  ---------------------------------------------  Total NET: 970 ml    CT scan 04/09/2017: Impression    Dense right lower lobe pneumonia/consolidation.    Bilateral pleural effusions, small to moderate on the right and the left.    Platelike atelectasis/possible infiltrate at the left base.    Distended gallbladder with no calcified gallstones, small amount of   pericholecystic fluid at the hepatic interface. If there is continued   clinical concern follow-up RUQ US can be ordered    Marked bladder wall thickening, follow-up CTwith retrograde   administration of contrast can be ordered for further evaluation of   fistula. Study discussed with TEMO Infante at 1640 on 4/9/2016    04/10/2017:       IMPRESSION:      Colovesical fistula.  3.9 cm right adnexal cyst.  Small bilateral pleural effusions.  Bibasilar lung opacities and right lower lobe consolidation, suspicious   for multifocal pneumonia.

## 2017-04-10 NOTE — CONSULT NOTE ADULT - SUBJECTIVE AND OBJECTIVE BOX
HPI:  patient  with  worsening  lethargy  fever  dysphagia  evaluated  in  ER  admitted  for  pneumonia  UTI  patient  s/p  l  hip  medullary  nail (2017 19:53)    called in to evaluate pt as some feces was noted in the urinary bag.     Pt is known to me since 2016, was admitted for sepsis , urinary retention , bilateral hydronephrosis. Treated with Colon catheter and antibiotics. Subsequent renal sonogram: mild residual right hydronephrosis. In the office minimum residual urine. Pt was doing well.      PAST MEDICAL & SURGICAL HISTORY:  Pneumonia  Anemia  Parkinson disease  Tremors of nervous system  No pertinent past medical history  Status post Right hip surgery  No significant past surgical history      Allergies    No Known Allergies    SOCIAL HISTORY: son is very involved in the care of the patient  FAMILY HISTORY:  No pertinent family history in first degree relatives      MEDICATIONS  (STANDING):  enoxaparin Injectable 40milliGRAM(s) SubCutaneous every 24 hours  dextrose 5% + sodium chloride 0.9% with potassium chloride 20 mEq/L 1000milliLiter(s) IV Continuous <Continuous>  polyethylene glycol 3350 17Gram(s) Oral daily  folic acid 1milliGRAM(s) Oral daily  senna Syrup 10milliLiter(s) Oral at bedtime  losartan 50milliGRAM(s) Oral daily  azithromycin   Tablet 500milliGRAM(s) Oral daily  vancomycin  IVPB 1000milliGRAM(s) IV Intermittent every 24 hours  meropenem IVPB  IV Intermittent   ipratropium    for Nebulization 500MICROGram(s) Inhalation every 6 hours  acetylcysteine 10% Inhalation 2milliLiter(s) Inhalation every 6 hours  meropenem IVPB 1000milliGRAM(s) IV Intermittent every 8 hours  saliva substitute (BIOTENE MOISTURIZING MOUTH SPRAY) 1Spray(s) Topical two times a day  carbidopa/levodopa  25/100 1Tablet(s) Oral four times a day  pramipexole 0.25milliGRAM(s) Oral three times a day  sodium biphosphate Rectal Enema 1Enema Rectal once    MEDICATIONS  (PRN):  morphine  - Injectable 2milliGRAM(s) IV Push every 4 hours PRN Severe Pain (7 - 10)  acetaminophen    Suspension 650milliGRAM(s) Oral every 6 hours PRN For Temp greater than 38 C (100.4 F)      ROS:    General:  No wt loss, fevers, chills, night sweats  Eyes:  no pain  ENT:  No sore throat, pain, runny nose, dysphagia  CV:  No pain, palpitatioins, hypo/hypertension  Resp:  No dyspnea, cough, tachypnea, wheezing  GI:  No pain, nausea, vomiting, diarrhea, has feeding NG tube  : Colon Catheter   Muscle:  S/P rigth hip surgery  Neuro:  No weakness, tingling, memory problems  Psych:  No fatigue, insomnia, mood problems, depression  Endocrine:  No polyuria, polydipsia cold/heat intolerance  Heme:  No petechiae, ecchymosis, easy bruisability  Skin:  No rash, tattoos, scars, edema      Physical Exam:    Vital Signs:  Vital Signs Last 24 Hrs  T(C): 37.7, Max: 38.1 (- @ 20:12)  T(F): 99.8, Max: 100.6 ( @ 20:12)  HR: 101 (100 - 125)  BP: 154/90 (152/98 - 165/99)  BP(mean): --  RR: 18 (17 - 19)  SpO2: 98% (96% - 100%)    I & Os for current day (as of 10 Apr 2017 15:48)  =============================================  IN: 2070 ml / OUT: 1100 ml / NET: 970 ml      General:  Appears stated age,  well-nourished, no distress  HEENT:  NC/AT, patent nares w/ pink mucosa, OP moist and pink,  PERRL, EOMI, conjunctivae clear, no thyromegaly, nodules, adenopathy, no JVD  Chest:  Full & symmetric excursion, no increased effort.   Cardiovascular:  Regular rhythm, S1, S2,   Abdomen:  Soft, non-tender, non-distended, normoactive bowel sounds.  Pelvic: Colon Catheter draining urine  Rectal Examination: Deferred.  Extremities:  no edema, pedal pulsation are present, no calf tenderness.  Skin:  No rash/erythema/ecchymoses/petechiae/wounds/abscess/warm/dry  Musculoskeletal:  S/P right hip surgery  Neuro/Psych:  Alert, oriented, normal and grossly intact and symmetrical.      LABS:  HPI:  patient  with  worsening  lethargy  fever  dysphagia  evaluated  in  ER  admitted  for  pneumonia  UTI  patient  s/p  l  hip  medullary  nail (2017 19:53)      PAST MEDICAL & SURGICAL HISTORY:  Pneumonia  Anemia  Parkinson disease  Tremors of nervous system  No pertinent past medical history  Status post hip surgery  No significant past surgical history      Allergies    No Known Allergies    Intolerances        SOCIAL HISTORY  FAMILY HISTORY:  No pertinent family history in first degree relatives      MEDICATIONS  (STANDING):  enoxaparin Injectable 40milliGRAM(s) SubCutaneous every 24 hours  dextrose 5% + sodium chloride 0.9% with potassium chloride 20 mEq/L 1000milliLiter(s) IV Continuous <Continuous>  polyethylene glycol 3350 17Gram(s) Oral daily  folic acid 1milliGRAM(s) Oral daily  senna Syrup 10milliLiter(s) Oral at bedtime  losartan 50milliGRAM(s) Oral daily  azithromycin   Tablet 500milliGRAM(s) Oral daily  vancomycin  IVPB 1000milliGRAM(s) IV Intermittent every 24 hours  meropenem IVPB  IV Intermittent   ipratropium    for Nebulization 500MICROGram(s) Inhalation every 6 hours  acetylcysteine 10% Inhalation 2milliLiter(s) Inhalation every 6 hours  meropenem IVPB 1000milliGRAM(s) IV Intermittent every 8 hours  saliva substitute (BIOTENE MOISTURIZING MOUTH SPRAY) 1Spray(s) Topical two times a day  carbidopa/levodopa  25/100 1Tablet(s) Oral four times a day  pramipexole 0.25milliGRAM(s) Oral three times a day  sodium biphosphate Rectal Enema 1Enema Rectal once    MEDICATIONS  (PRN):  morphine  - Injectable 2milliGRAM(s) IV Push every 4 hours PRN Severe Pain (7 - 10)  acetaminophen    Suspension 650milliGRAM(s) Oral every 6 hours PRN For Temp greater than 38 C (100.4 F)      ROS:    General:  No wt loss, fevers, chills, night sweats  Eyes:  Good vision, no reported pain  ENT:  No sore throat, pain, runny nose, dysphagia  CV:  No pain, palpitatioins, hypo/hypertension  Resp:  No dyspnea, cough, tachypnea, wheezing  GI:  No pain, nausea, vomiting, diarrhea, constipatiion  :  No pain, bleeding, incontinence, nocturia, frequency  Muscle:  No pain, weakness  Neuro:  No weakness, tingling, memory problems  Psych:  No fatigue, insomnia, mood problems, depression  Endocrine:  No polyuria, polydypsia, cold/heat intolerance  Heme:  No petechiae, ecchymosis, easy bruisability  Skin:  No rash, tattoos, scars, edema      Physical Exam:    Vital Signs:  Vital Signs Last 24 Hrs  T(C): 37.7, Max: 38.1 ( @ 20:12)  T(F): 99.8, Max: 100.6 ( @ 20:12)  HR: 101 (100 - 125)  BP: 154/90 (152/98 - 165/99)  BP(mean): --  RR: 18 (17 - 19)  SpO2: 98% (96% - 100%)  Daily     Daily   I&O's Summary    I & Os for current day (as of 10 Apr 2017 15:48)  =============================================  IN: 2070 ml / OUT: 1100 ml / NET: 970 ml      General:  Appears stated age,  well-nourished, no distress  HEENT:  NC/AT, patent nares w/ pink mucosa, OP moist and pink,  PERRL, EOMI, conjunctivae clear, no thyromegaly, nodules, adenopathy, no JVD  Chest:  Full & symmetric excursion, no increased effort.   Cardiovascular:  Regular rhythm, S1, S2,   Abdomen:  Soft, non-tender, non-distended, normoactive bowel sounds.  Genitalia: Circumcised Phallus, Adequate meatus, no hypospadias. Both testicles descended, non tender, no mass. No epididymitis or epididymal mass. No hydrocele.  Rectal Examination: Deferred.  Extremities:  no edema, pedal pusation are present, no calf tenderness.  Skin:  No rash/erythema/ecchymoses/petechiae/wounds/abscess/warm/dry  Musculoskeletal:  Full ROM in all joints w/o swelling/tenderness/effusion  Neuro/Psych:  Alert, oriented, normal and grossly intact and symmetrical.      LABS:  Complete Blood Count in AM (17 @ 07:10)    WBC Count: 12.7 K/uL    RBC Count: 2.77 M/uL    Hemoglobin: 8.1 g/dL    Hematocrit: 24.5 %    Mean Cell Volume: 88.6 fl    Mean Cell Hemoglobin: 29.1 pg    Mean Cell Hemoglobin Conc: 32.9 gm/dL    Red Cell Distrib Width: 14.1 %    Platelet Count - Automated: 222 K/uL                            143  |  111<H>  |  22  ----------------------------<  223<H>  4.2   |  21<L>  |  0.50    Ca    8.3<L>      2017 07:10    TPro  7.7  /  Alb  1.6<L>  /  TBili  0.6  /  DBili  x   /  AST  40<H>  /  ALT  9<L>  /  AlkPhos  64        Urinalysis Basic - ( 2017 17:20 )    Color: Yellow / Appearance: very cloudy / S.020 / pH: x  Gluc: x / Ketone: Trace  / Bili: Moderate / Urobili: Negative mg/dL   Blood: x / Protein: 100 mg/dL / Nitrite: Negative   Leuk Esterase: Moderate / RBC: 6-10 /HPF / WBC 26-50   Sq Epi: x / Non Sq Epi: Occasional / Bacteria: Many        RADIOLOGY & ADDITIONAL STUDIES:

## 2017-04-10 NOTE — PROGRESS NOTE ADULT - SUBJECTIVE AND OBJECTIVE BOX
INTERVAL HPI/OVERNIGHT EVENTS:  87 y/o  white female, with hx Parkinson's Disease, with Fx of the Rt Femur in 3/17 and underwent Rt hip ORIF on 3/12 at [facility] with hospital course complicated by  RLL + RML PNA as seen on CT Chest on 3/17 and an EColi UTI, Treated then with Rocephin initially and changed to Meropenem + Vanco for about 5 days, then d/francine to [facility] on 3/22 on po Levaquin, readmitted via the ER on 17 with increasing lethargy, fever, and difficulty swallowing and suspected Sepsis likely from urinary origin, which later on grew Omrganella.  RRT on 17 for high fever, tachycardia. Suctioned thick secretions fro upper airway. Lethargic, not able to provide history.  Responds to verbal commands, although still lethargic.    Vital Signs Last 24 Hrs  T(C): 36.4, Max: 38.1 ( @ 20:12)  T(F): 97.6, Max: 100.6 ( @ 20:12)  HR: 126 (100 - 126)  BP: 156/95 (152/98 - 162/100)  BP(mean): --  RR: 18 (18 - 19)  SpO2: 99% (96% - 100%)    PHYSICAL EXAM:  GEN:        Lethargic, responsive and comfortable.  HEENT:    Normal.    RESP:     crackles.   CVS:         Regular rate and rhythm.   ABD:         Soft, non-tender, non-distended;     MEDICATIONS  (STANDING):  enoxaparin Injectable 40milliGRAM(s) SubCutaneous every 24 hours  dextrose 5% + sodium chloride 0.9% with potassium chloride 20 mEq/L 1000milliLiter(s) IV Continuous <Continuous>  polyethylene glycol 3350 17Gram(s) Oral daily  folic acid 1milliGRAM(s) Oral daily  senna Syrup 10milliLiter(s) Oral at bedtime  losartan 50milliGRAM(s) Oral daily  azithromycin   Tablet 500milliGRAM(s) Oral daily  vancomycin  IVPB 1000milliGRAM(s) IV Intermittent every 24 hours  meropenem IVPB  IV Intermittent   ipratropium    for Nebulization 500MICROGram(s) Inhalation every 6 hours  acetylcysteine 10% Inhalation 2milliLiter(s) Inhalation every 6 hours  meropenem IVPB 1000milliGRAM(s) IV Intermittent every 8 hours  saliva substitute (BIOTENE MOISTURIZING MOUTH SPRAY) 1Spray(s) Topical two times a day  carbidopa/levodopa  25/100 1Tablet(s) Oral four times a day  pramipexole 0.25milliGRAM(s) Oral three times a day    MEDICATIONS  (PRN):  morphine  - Injectable 2milliGRAM(s) IV Push every 4 hours PRN Severe Pain (7 - 10)  acetaminophen    Suspension 650milliGRAM(s) Oral every 6 hours PRN For Temp greater than 38 C (100.4 F)    LABS:                        8.5    9.4   )-----------( 210      ( 10 Apr 2017 04:03 )             25.0     -    143  |  111<H>  |  22  ----------------------------<  223<H>  4.2   |  21<L>  |  0.50    Ca    8.3<L>      2017 07:10    TPro  7.7  /  Alb  1.6<L>  /  TBili  0.6  /  DBili  x   /  AST  40<H>  /  ALT  9<L>  /  AlkPhos  64  04-09    Urinalysis Basic - ( 2017 17:20 )    Color: Yellow / Appearance: very cloudy / S.020 / pH: x  Gluc: x / Ketone: Trace  / Bili: Moderate / Urobili: Negative mg/dL   Blood: x / Protein: 100 mg/dL / Nitrite: Negative   Leuk Esterase: Moderate / RBC: 6-10 /HPF / WBC 26-50   Sq Epi: x / Non Sq Epi: Occasional / Bacteria: Many    RADIOLOGY & ADDITIONAL STUDIES:    EXAM:  CT ABDOMEN AND PELVIS                            PROCEDURE DATE:  04/10/2017        INTERPRETATION:  CLINICAL INFORMATION: Suspected colovesical fistula    COMPARISON: And 2017    PROCEDURE:     Serial axial sections through the abdomen and pelvis are obtained without   the use of intravenous contrast from the diaphragms to the symphysis   pubis. 3-D coronal and sagittal reformations were then created from the   axial images.      FINDINGS:    LOWER CHEST: Small bilateral pleural effusions, right larger than left.   Right lower lobe consolidation and bibasilar opacities. Enlarged heart   with aortic valve and coronary artery calcifications.    Evaluation of solid visceral organs and vascular structures is limited by   lack of intravenous contrast.     ABDOMEN: Nasogastric tube with tip in the stomach.  LIVER: Within normal limits.  BILE DUCTS: Normal caliber.  GALLBLADDER: No calcified gallstones. Mild wall thickening.  PANCREAS: Atrophic.  SPLEEN: Within normal limits.  ADRENALS: Within normal limits.  KIDNEYS/URETERS: Residual excretion of intravenous contrast within the   renal collecting systems. No hydronephrosis.    PELVIS:  REPRODUCTIVE ORGANS: The uterus and left adnexa are within normal limits.   3.9 cmright adnexal cyst.  URINARY BLADDER: Decompressed around Colon catheter with residual amount   of contrast from recent cystogram. Marked urinary bladder wall   thickening. Redemonstration of left posterolateral fistulous   communication with the sigmoid colon.    BOWEL: Rectum markedly distended by stool. Rectal temperature probe   noted. Colonic diverticulosis. No bowel obstruction.  APPENDIX: Not visualized.  PERITONEUM: No ascites or free air.   VESSELS: Atherosclerotic changes.  RETROPERITONEUM: Within normal limits.  ABDOMINAL WALL: Right hip postsurgical soft tissue changes.  BONES: Status post right hip ORIF. Hip and spine degenerative changes.    IMPRESSION:      Colovesical fistula.  3.9 cm right adnexal cyst.  Small bilateral pleural effusions.  Bibasilar lung opacities and right lower lobe consolidation, suspicious   for multifocal pneumonia.    MICHAEL PARKS M.D., ATTENDING RADIOLOGIST  This document has been electronically signed. Apr 10 2017  3:01PM      ASSESSMENT AND PLAN:  ·	Altered mental status.  ·	Gram negative Sepsis with Morganella.  ·	UTI with Morganella  ·	RLL Pneumonia , likely gram negative(Complex).  ·	Leukocytosis.  ·	COLOVESICAL FISTULA.  ·	Parkinsonism  ·	Hypokalemia corrected.  ·	Hypernatremia resolved.  ·	Anemia.    Continue antibiotics, Vancomycin, Zithromax and Meropenem.  Nebulizer with Mucomyst.  Suction PRN.  Aspiration precautions.  Discussed with son at bed side.

## 2017-04-11 LAB
ALBUMIN SERPL ELPH-MCNC: 1.4 G/DL — LOW (ref 3.3–5)
ALP SERPL-CCNC: 50 U/L — SIGNIFICANT CHANGE UP (ref 40–120)
ALT FLD-CCNC: 12 U/L — SIGNIFICANT CHANGE UP (ref 12–78)
ANA TITR SER: NEGATIVE — SIGNIFICANT CHANGE UP
ANION GAP SERPL CALC-SCNC: 8 MMOL/L — SIGNIFICANT CHANGE UP (ref 5–17)
AST SERPL-CCNC: 33 U/L — SIGNIFICANT CHANGE UP (ref 15–37)
BILIRUB SERPL-MCNC: 0.5 MG/DL — SIGNIFICANT CHANGE UP (ref 0.2–1.2)
BUN SERPL-MCNC: 16 MG/DL — SIGNIFICANT CHANGE UP (ref 7–23)
CALCIUM SERPL-MCNC: 8.1 MG/DL — LOW (ref 8.5–10.1)
CHLORIDE SERPL-SCNC: 109 MMOL/L — HIGH (ref 96–108)
CO2 SERPL-SCNC: 25 MMOL/L — SIGNIFICANT CHANGE UP (ref 22–31)
CREAT SERPL-MCNC: 0.43 MG/DL — LOW (ref 0.5–1.3)
GLUCOSE SERPL-MCNC: 113 MG/DL — HIGH (ref 70–99)
HCT VFR BLD CALC: 24.1 % — LOW (ref 34.5–45)
HGB BLD-MCNC: 7.9 G/DL — LOW (ref 11.5–15.5)
MCHC RBC-ENTMCNC: 29.4 PG — SIGNIFICANT CHANGE UP (ref 27–34)
MCHC RBC-ENTMCNC: 33 GM/DL — SIGNIFICANT CHANGE UP (ref 32–36)
MCV RBC AUTO: 89.2 FL — SIGNIFICANT CHANGE UP (ref 80–100)
PLATELET # BLD AUTO: 197 K/UL — SIGNIFICANT CHANGE UP (ref 150–400)
POTASSIUM SERPL-MCNC: 4.2 MMOL/L — SIGNIFICANT CHANGE UP (ref 3.5–5.3)
POTASSIUM SERPL-SCNC: 4.2 MMOL/L — SIGNIFICANT CHANGE UP (ref 3.5–5.3)
PROT SERPL-MCNC: 7.3 GM/DL — SIGNIFICANT CHANGE UP (ref 6–8.3)
RBC # BLD: 2.7 M/UL — LOW (ref 3.8–5.2)
RBC # FLD: 13.6 % — SIGNIFICANT CHANGE UP (ref 11–15)
SODIUM SERPL-SCNC: 142 MMOL/L — SIGNIFICANT CHANGE UP (ref 135–145)
WBC # BLD: 11 K/UL — HIGH (ref 3.8–10.5)
WBC # FLD AUTO: 11 K/UL — HIGH (ref 3.8–10.5)

## 2017-04-11 RX ADMIN — Medication 500 MICROGRAM(S): at 23:59

## 2017-04-11 RX ADMIN — Medication 2 MILLILITER(S): at 23:58

## 2017-04-11 RX ADMIN — Medication 2 MILLILITER(S): at 05:28

## 2017-04-11 RX ADMIN — Medication 650 MILLIGRAM(S): at 03:12

## 2017-04-11 RX ADMIN — DEXTROSE MONOHYDRATE, SODIUM CHLORIDE, AND POTASSIUM CHLORIDE 75 MILLILITER(S): 50; .745; 4.5 INJECTION, SOLUTION INTRAVENOUS at 22:23

## 2017-04-11 RX ADMIN — AZITHROMYCIN 500 MILLIGRAM(S): 500 TABLET, FILM COATED ORAL at 11:39

## 2017-04-11 RX ADMIN — SENNA PLUS 10 MILLILITER(S): 8.6 TABLET ORAL at 22:23

## 2017-04-11 RX ADMIN — CARBIDOPA AND LEVODOPA 1 TABLET(S): 25; 100 TABLET ORAL at 11:39

## 2017-04-11 RX ADMIN — CARBIDOPA AND LEVODOPA 1 TABLET(S): 25; 100 TABLET ORAL at 05:35

## 2017-04-11 RX ADMIN — PRAMIPEXOLE DIHYDROCHLORIDE 0.25 MILLIGRAM(S): 0.12 TABLET ORAL at 22:23

## 2017-04-11 RX ADMIN — MEROPENEM 200 MILLIGRAM(S): 1 INJECTION INTRAVENOUS at 22:23

## 2017-04-11 RX ADMIN — Medication 500 MICROGRAM(S): at 05:28

## 2017-04-11 RX ADMIN — Medication 2 MILLILITER(S): at 17:06

## 2017-04-11 RX ADMIN — DEXTROSE MONOHYDRATE, SODIUM CHLORIDE, AND POTASSIUM CHLORIDE 75 MILLILITER(S): 50; .745; 4.5 INJECTION, SOLUTION INTRAVENOUS at 05:34

## 2017-04-11 RX ADMIN — Medication 1 SPRAY(S): at 05:36

## 2017-04-11 RX ADMIN — LOSARTAN POTASSIUM 50 MILLIGRAM(S): 100 TABLET, FILM COATED ORAL at 05:35

## 2017-04-11 RX ADMIN — Medication 1 SPRAY(S): at 17:32

## 2017-04-11 RX ADMIN — Medication 500 MICROGRAM(S): at 17:06

## 2017-04-11 RX ADMIN — Medication 250 MILLIGRAM(S): at 03:14

## 2017-04-11 RX ADMIN — MEROPENEM 200 MILLIGRAM(S): 1 INJECTION INTRAVENOUS at 15:11

## 2017-04-11 RX ADMIN — Medication 500 MICROGRAM(S): at 11:48

## 2017-04-11 RX ADMIN — Medication 2 MILLILITER(S): at 11:48

## 2017-04-11 RX ADMIN — ENOXAPARIN SODIUM 40 MILLIGRAM(S): 100 INJECTION SUBCUTANEOUS at 06:55

## 2017-04-11 RX ADMIN — CARBIDOPA AND LEVODOPA 1 TABLET(S): 25; 100 TABLET ORAL at 17:30

## 2017-04-11 RX ADMIN — CARBIDOPA AND LEVODOPA 1 TABLET(S): 25; 100 TABLET ORAL at 23:45

## 2017-04-11 RX ADMIN — MEROPENEM 200 MILLIGRAM(S): 1 INJECTION INTRAVENOUS at 05:35

## 2017-04-11 RX ADMIN — PRAMIPEXOLE DIHYDROCHLORIDE 0.25 MILLIGRAM(S): 0.12 TABLET ORAL at 15:11

## 2017-04-11 RX ADMIN — PRAMIPEXOLE DIHYDROCHLORIDE 0.25 MILLIGRAM(S): 0.12 TABLET ORAL at 05:35

## 2017-04-11 RX ADMIN — POLYETHYLENE GLYCOL 3350 17 GRAM(S): 17 POWDER, FOR SOLUTION ORAL at 22:23

## 2017-04-11 RX ADMIN — Medication 1 MILLIGRAM(S): at 11:40

## 2017-04-11 RX ADMIN — Medication 650 MILLIGRAM(S): at 11:39

## 2017-04-11 NOTE — PROGRESS NOTE ADULT - SUBJECTIVE AND OBJECTIVE BOX
86y Female admitted with ALTERED MENTAL STATUS/UTI/URINE RETENTION  AMS/PNEUMONIA  AMS      Patient seen and examined bedside, minimally responsive  Tolerating NGT feedings    T(F): 99.8, Max: 100.4 (04-11 @ 00:23)  HR: 112 (101 - 126)  BP: 147/84 (147/84 - 156/95)  RR: 16 (16 - 18)  SpO2: 97% (92% - 99%)  Wt(kg): --  CAPILLARY BLOOD GLUCOSE      PHYSICAL EXAM:  General: NAD, WDWN.   Neuro:  Lethargic, responsive to painful stimuli  HEENT: NCAT, EOMI, conjunctiva clear  CV: +S1+S2 regular rate and rhythm  Lung: clear to ausculation bilaterally, respirations nonlabored, good inspiratory effort  Abdomen: soft,  non tender, no distention, + bowel sounds, no rigidity   Extremities: no pedal edema or calf tenderness noted   : No CVA or SP tenderness    LABS:                        7.9    11.0  )-----------( 197      ( 11 Apr 2017 07:32 )             24.1     04-11    142  |  109<H>  |  16  ----------------------------<  113<H>  4.2   |  25  |  0.43<L>    Ca    8.1<L>      11 Apr 2017 07:32    TPro  7.3  /  Alb  1.4<L>  /  TBili  0.5  /  DBili  x   /  AST  33  /  ALT  12  /  AlkPhos  50  04-11      I&O's Detail    I & Os for current day (as of 11 Apr 2017 08:47)  =============================================  IN:    dextrose 5% + sodium chloride 0.9% with potassium chloride 20 mEq/L: 1650 ml    Jevity: 1220 ml    Solution: 250 ml    Solution: 200 ml    Total IN: 3320 ml  ---------------------------------------------  OUT:    Voided: 1000 ml    Total OUT: 1000 ml  ---------------------------------------------  Total NET: 2320 ml      EXAM:  CT ABDOMEN AND PELVIS                            PROCEDURE DATE:  04/10/2017        INTERPRETATION:  CLINICAL INFORMATION: Suspected colovesical fistula    COMPARISON: And 4/9/2017 9/12/2016    PROCEDURE:     Serial axial sections through the abdomen and pelvis are obtained without   the use of intravenous contrast from the diaphragms to the symphysis   pubis. 3-D coronal and sagittal reformations were then created from the   axial images.      FINDINGS:    LOWER CHEST: Small bilateral pleural effusions, right larger than left.   Right lower lobe consolidation and bibasilar opacities. Enlarged heart   with aortic valve and coronary artery calcifications.    Evaluation of solid visceral organs and vascular structures is limited by   lack of intravenous contrast.     ABDOMEN: Nasogastric tube with tip in the stomach.  LIVER: Within normal limits.  BILE DUCTS: Normal caliber.  GALLBLADDER: No calcified gallstones. Mild wall thickening.  PANCREAS: Atrophic.  SPLEEN: Within normal limits.  ADRENALS: Within normal limits.  KIDNEYS/URETERS: Residual excretion of intravenous contrast within the   renal collecting systems. No hydronephrosis.    PELVIS:  REPRODUCTIVE ORGANS: The uterus and left adnexa are within normal limits.   3.9 cmright adnexal cyst.  URINARY BLADDER: Decompressed around Colon catheter with residual amount   of contrast from recent cystogram. Marked urinary bladder wall   thickening. Redemonstration of left posterolateral fistulous   communication with the sigmoid colon.    BOWEL: Rectum markedly distended by stool. Rectal temperature probe   noted. Colonic diverticulosis. No bowel obstruction.  APPENDIX: Not visualized.  PERITONEUM: No ascites or free air.   VESSELS: Atherosclerotic changes.  RETROPERITONEUM: Within normal limits.  ABDOMINAL WALL: Right hip postsurgical soft tissue changes.  BONES: Status post right hip ORIF. Hip and spine degenerative changes.    IMPRESSION:      Colovesical fistula.  3.9 cm right adnexal cyst.  Small bilateral pleural effusions.  Bibasilar lung opacities and right lower lobe consolidation, suspicious   for multifocal pneumonia.      EXAM:  CYSTOGRAM MIN 3 VIEWS+                            PROCEDURE DATE:  04/10/2017        INTERPRETATION:  RETROGRADE CYSTOGRAM:    CLINICAL INFORMATION: stool-like material in Colon    FINDINGS:    The  film demonstrates residual contrast throughout the colon. A   Colon catheter is in place.    Contrast was introduced into the urinary bladder via a urethral catheter.    The bladder is normal in position and distends normally.  No constant   filling defects are demonstrated, aside from the Colon catheter balloon.   There is accumulation of contrast in the left posterior lateral portion   of the bladder, suspicious for fistulous communication with the sigmoid   colon.     IMPRESSION:      Suspected fistulous communication with the sigmoid colon from the left   posterolateral urinary bladder.    MICHAEL PARKS M.D., ATTENDING RADIOLOGIST  This document has been electronically signed. Apr 10 2017  2:47PM          Impression: 86y Female admitted with Colovesical fistula, pneumonia, Morganella UTI, Sepsis  PMH Pneumonia  Anemia  Parkinson disease  Tremors of nervous system  No pertinent past medical history      Plan:  - continue antibiotics per ID  - May benefit from holding off on Jevity feedings and starting clear liquids through NGT  for possible future surgical intervention once sepsis resolves  -continue with Colon catheter  -monitor intake and output and renal function  - will Discuss with Dr Mosley and Josef for further intervention 86y Female admitted with ALTERED MENTAL STATUS/UTI/URINE RETENTION  AMS/PNEUMONIA  AMS      Patient seen and examined bedside, minimally responsive  Tolerating NGT feedings    T(F): 99.8, Max: 100.4 (04-11 @ 00:23)  HR: 112 (101 - 126)  BP: 147/84 (147/84 - 156/95)  RR: 16 (16 - 18)  SpO2: 97% (92% - 99%)  Wt(kg): --  CAPILLARY BLOOD GLUCOSE      PHYSICAL EXAM:  General: NAD, WDWN.   Neuro:  Lethargic, responsive to painful stimuli  HEENT: NCAT, EOMI, conjunctiva clear  CV: +S1+S2 regular rate and rhythm  Lung: clear to ausculation bilaterally, respirations nonlabored, good inspiratory effort  Abdomen: soft,  non tender, no distention, + bowel sounds, no rigidity   Extremities: no pedal edema or calf tenderness noted   : No CVA or SP tenderness, Colon on SITU with Fecaloid sediment in bag and tubing 1100/24hrs    LABS:                        7.9    11.0  )-----------( 197      ( 11 Apr 2017 07:32 )             24.1     04-11    142  |  109<H>  |  16  ----------------------------<  113<H>  4.2   |  25  |  0.43<L>    Ca    8.1<L>      11 Apr 2017 07:32    TPro  7.3  /  Alb  1.4<L>  /  TBili  0.5  /  DBili  x   /  AST  33  /  ALT  12  /  AlkPhos  50  04-11      I&O's Detail    I & Os for current day (as of 11 Apr 2017 08:47)  =============================================  IN:    dextrose 5% + sodium chloride 0.9% with potassium chloride 20 mEq/L: 1650 ml    Jevity: 1220 ml    Solution: 250 ml    Solution: 200 ml    Total IN: 3320 ml  ---------------------------------------------  OUT:    Voided: 1000 ml    Total OUT: 1000 ml  ---------------------------------------------  Total NET: 2320 ml      EXAM:  CT ABDOMEN AND PELVIS                            PROCEDURE DATE:  04/10/2017        INTERPRETATION:  CLINICAL INFORMATION: Suspected colovesical fistula    COMPARISON: And 4/9/2017 9/12/2016    PROCEDURE:     Serial axial sections through the abdomen and pelvis are obtained without   the use of intravenous contrast from the diaphragms to the symphysis   pubis. 3-D coronal and sagittal reformations were then created from the   axial images.      FINDINGS:    LOWER CHEST: Small bilateral pleural effusions, right larger than left.   Right lower lobe consolidation and bibasilar opacities. Enlarged heart   with aortic valve and coronary artery calcifications.    Evaluation of solid visceral organs and vascular structures is limited by   lack of intravenous contrast.     ABDOMEN: Nasogastric tube with tip in the stomach.  LIVER: Within normal limits.  BILE DUCTS: Normal caliber.  GALLBLADDER: No calcified gallstones. Mild wall thickening.  PANCREAS: Atrophic.  SPLEEN: Within normal limits.  ADRENALS: Within normal limits.  KIDNEYS/URETERS: Residual excretion of intravenous contrast within the   renal collecting systems. No hydronephrosis.    PELVIS:  REPRODUCTIVE ORGANS: The uterus and left adnexa are within normal limits.   3.9 cmright adnexal cyst.  URINARY BLADDER: Decompressed around Colon catheter with residual amount   of contrast from recent cystogram. Marked urinary bladder wall   thickening. Redemonstration of left posterolateral fistulous   communication with the sigmoid colon.    BOWEL: Rectum markedly distended by stool. Rectal temperature probe   noted. Colonic diverticulosis. No bowel obstruction.  APPENDIX: Not visualized.  PERITONEUM: No ascites or free air.   VESSELS: Atherosclerotic changes.  RETROPERITONEUM: Within normal limits.  ABDOMINAL WALL: Right hip postsurgical soft tissue changes.  BONES: Status post right hip ORIF. Hip and spine degenerative changes.    IMPRESSION:      Colovesical fistula.  3.9 cm right adnexal cyst.  Small bilateral pleural effusions.  Bibasilar lung opacities and right lower lobe consolidation, suspicious   for multifocal pneumonia.      EXAM:  CYSTOGRAM MIN 3 VIEWS+                            PROCEDURE DATE:  04/10/2017        INTERPRETATION:  RETROGRADE CYSTOGRAM:    CLINICAL INFORMATION: stool-like material in Colon    FINDINGS:    The  film demonstrates residual contrast throughout the colon. A   Colon catheter is in place.    Contrast was introduced into the urinary bladder via a urethral catheter.    The bladder is normal in position and distends normally.  No constant   filling defects are demonstrated, aside from the Colon catheter balloon.   There is accumulation of contrast in the left posterior lateral portion   of the bladder, suspicious for fistulous communication with the sigmoid   colon.     IMPRESSION:      Suspected fistulous communication with the sigmoid colon from the left   posterolateral urinary bladder.    MICHAEL PARKS M.D., ATTENDING RADIOLOGIST  This document has been electronically signed. Apr 10 2017  2:47PM          Impression: 86y Female admitted with Colovesical fistula, pneumonia, Morganella UTI, Sepsis  PMH Pneumonia  Anemia  Parkinson disease  Tremors of nervous system  No pertinent past medical history      Plan:  - continue antibiotics per ID  - May benefit from holding off on Jevity feedings and starting clear liquids through NGT  for possible future surgical intervention once sepsis resolves  -continue with Colon catheter  -monitor intake and output and renal function  - will Discuss with Dr Mosley and Josef for further intervention

## 2017-04-11 NOTE — PROGRESS NOTE ADULT - ATTENDING COMMENTS
shoemaker draining well. continue supportive care. Discussed with Pt's son yesterday. No abdominal abscess.

## 2017-04-11 NOTE — PROGRESS NOTE ADULT - SUBJECTIVE AND OBJECTIVE BOX
INTERVAL HPI/OVERNIGHT EVENTS:        REVIEW OF SYSTEMS:  CONSTITUTIONAL: lethargic  poorly  arouseable    NECK: No pain or stiffnes  RESPIRATORY: No SOB   CARDIOVASCULAR: No chest pain, palpitations, dizziness,   GASTROINTESTINAL: No abdominal pain. No nausea, vomiting,   NEUROLOGICAL: No headaches, no  blurry  vision no  dizziness  SKIN: No itching,   MUSCULOSKELETAL: No pain    MEDICATION:  enoxaparin Injectable 40milliGRAM(s) SubCutaneous every 24 hours  dextrose 5% + sodium chloride 0.9% with potassium chloride 20 mEq/L 1000milliLiter(s) IV Continuous <Continuous>  morphine  - Injectable 2milliGRAM(s) IV Push every 4 hours PRN  polyethylene glycol 3350 17Gram(s) Oral daily  folic acid 1milliGRAM(s) Oral daily  senna Syrup 10milliLiter(s) Oral at bedtime  acetaminophen    Suspension 650milliGRAM(s) Oral every 6 hours PRN  losartan 50milliGRAM(s) Oral daily  azithromycin   Tablet 500milliGRAM(s) Oral daily  vancomycin  IVPB 1000milliGRAM(s) IV Intermittent every 24 hours  meropenem IVPB  IV Intermittent   ipratropium    for Nebulization 500MICROGram(s) Inhalation every 6 hours  acetylcysteine 10% Inhalation 2milliLiter(s) Inhalation every 6 hours  meropenem IVPB 1000milliGRAM(s) IV Intermittent every 8 hours  saliva substitute (BIOTENE MOISTURIZING MOUTH SPRAY) 1Spray(s) Topical two times a day  carbidopa/levodopa  25/100 1Tablet(s) Oral four times a day  pramipexole 0.25milliGRAM(s) Oral three times a day    Vital Signs Last 24 Hrs  T(C): 37.7, Max: 38 (-11 @ 00:23)  T(F): 99.8, Max: 100.4 (04-11 @ 00:23)  HR: 112 (101 - 126)  BP: 147/84 (147/84 - 156/95)  BP(mean): --  RR: 16 (16 - 18)  SpO2: 97% (92% - 99%)    PHYSICAL EXAM:  GENERAL: NAD, well-groomed, well-developed  EYES:  conjunctiva and sclera clear  ENMT:  Moist mucous membranes,   NECK: Supple, No JVD, Normal thyroid  NERVOUS SYSTEM:  Alert oriented   no  focal  deficits;   CHEST/LUNG: Clear    HEART: Regular rate and rhythm; No murmurs, rubs, or gallops  ABDOMEN: Soft, Nontender, Nondistended; Bowel sounds present  EXTREMITIES:  no  edema no  tenderness  SKIN: No rashes   LABS:                        7.9    11.0  )-----------( 197      ( 2017 07:32 )             24.1     04-11    142  |  109<H>  |  16  ----------------------------<  113<H>  4.2   |  25  |  0.43<L>    Ca    8.1<L>      2017 07:32    TPro  7.3  /  Alb  1.4<L>  /  TBili  0.5  /  DBili  x   /  AST  33  /  ALT  12  /  AlkPhos  50  04-11      Urinalysis Basic - ( 2017 17:20 )    Color: Yellow / Appearance: very cloudy / S.020 / pH: x  Gluc: x / Ketone: Trace  / Bili: Moderate / Urobili: Negative mg/dL   Blood: x / Protein: 100 mg/dL / Nitrite: Negative   Leuk Esterase: Moderate / RBC: 6-10 /HPF / WBC 26-50   Sq Epi: x / Non Sq Epi: Occasional / Bacteria: Many      CAPILLARY BLOOD GLUCOSE      RADIOLOGY & ADDITIONAL TESTS:    Imaging reports  Personally Reviewed:  [x ] YES  [ ] NO    Consultant(s) Notes Reviewed:  [ x] YES  [ ] NO    Care Discussed with Consultants/Other Providers [x ] YES  [ ] NO      INTERVAL HPI/OVERNIGHT EVENTS:        REVIEW OF SYSTEMS:  CONSTITUTIONAL:  no  complaints    NECK: No pain or stiffnes  RESPIRATORY: No SOB   CARDIOVASCULAR: No chest pain, palpitations, dizziness,   GASTROINTESTINAL: No abdominal pain. No nausea, vomiting,   NEUROLOGICAL: No headaches, no  blurry  vision no  dizziness  SKIN: No itching,   MUSCULOSKELETAL: No pain    MEDICATION:  enoxaparin Injectable 40milliGRAM(s) SubCutaneous every 24 hours  dextrose 5% + sodium chloride 0.9% with potassium chloride 20 mEq/L 1000milliLiter(s) IV Continuous <Continuous>  morphine  - Injectable 2milliGRAM(s) IV Push every 4 hours PRN  polyethylene glycol 3350 17Gram(s) Oral daily  folic acid 1milliGRAM(s) Oral daily  senna Syrup 10milliLiter(s) Oral at bedtime  acetaminophen    Suspension 650milliGRAM(s) Oral every 6 hours PRN  losartan 50milliGRAM(s) Oral daily  azithromycin   Tablet 500milliGRAM(s) Oral daily  vancomycin  IVPB 1000milliGRAM(s) IV Intermittent every 24 hours  meropenem IVPB  IV Intermittent   ipratropium    for Nebulization 500MICROGram(s) Inhalation every 6 hours  acetylcysteine 10% Inhalation 2milliLiter(s) Inhalation every 6 hours  meropenem IVPB 1000milliGRAM(s) IV Intermittent every 8 hours  saliva substitute (BIOTENE MOISTURIZING MOUTH SPRAY) 1Spray(s) Topical two times a day  carbidopa/levodopa  25/100 1Tablet(s) Oral four times a day  pramipexole 0.25milliGRAM(s) Oral three times a day    Vital Signs Last 24 Hrs  T(C): 37.7, Max: 38 ( @ 00:23)  T(F): 99.8, Max: 100.4 ( @ 00:23)  HR: 112 (101 - 126)  BP: 147/84 (147/84 - 156/95)  BP(mean): --  RR: 16 (16 - 18)  SpO2: 97% (92% - 99%)    PHYSICAL EXAM:  GENERAL: NAD, well-groomed, well-developed  EYES:  conjunctiva and sclera clear  ENMT:  Moist mucous membranes,   NECK: Supple, No JVD, Normal thyroid  NERVOUS SYSTEM:  Alert oriented   no  focal  deficits;   CHEST/LUNG: Clear    HEART: Regular rate and rhythm; No murmurs, rubs, or gallops  ABDOMEN: Soft, Nontender, Nondistended; Bowel sounds present  EXTREMITIES:  no  edema no  tenderness  SKIN: No rashes   LABS:                        7.9    11.0  )-----------( 197      ( 2017 07:32 )             24.1     04-11    142  |  109<H>  |  16  ----------------------------<  113<H>  4.2   |  25  |  0.43<L>    Ca    8.1<L>      2017 07:32    TPro  7.3  /  Alb  1.4<L>  /  TBili  0.5  /  DBili  x   /  AST  33  /  ALT  12  /  AlkPhos  50  04-11      Urinalysis Basic - ( 2017 17:20 )    Color: Yellow / Appearance: very cloudy / S.020 / pH: x  Gluc: x / Ketone: Trace  / Bili: Moderate / Urobili: Negative mg/dL   Blood: x / Protein: 100 mg/dL / Nitrite: Negative   Leuk Esterase: Moderate / RBC: 6-10 /HPF / WBC 26-50   Sq Epi: x / Non Sq Epi: Occasional / Bacteria: Many      CAPILLARY BLOOD GLUCOSE      RADIOLOGY & ADDITIONAL TESTS:    Imaging reports  Personally Reviewed:  [ ] YES  [ ] NO    Consultant(s) Notes Reviewed:  [ ] YES  [ ] NO    Care Discussed with Consultants/Other Providers [ ] YES  [ ] NO   COLO/VESCICULAR FISTULA proceed  as  per  surgery  Problem/Plan - 1:  ·  Problem: Pneumonia.  Plan: zosyn  meropenem azithomycin  O2  duoneb. for  pulmonary  ID  evlals    Problem/Plan - 2:  ·  Problem: UTI (urinary tract infection).  Plan: zosyn.     Problem/Plan - 3:  ·  Problem: Parkinson disease encephalopathy.  Plan: sinemet discussed  with  neurology  as  to  further  adjustments/addition  to  treatment    Problem/Plan - 4:  ·  Problem: Altered mental status.  Plan: observation further w/u  and  treatment  as per  clinical  course.     Problem/Plan - 5:  ·  Problem: Anemia.  Plan: iron  folic  acid.   prognosis  guarded

## 2017-04-11 NOTE — PROGRESS NOTE ADULT - SUBJECTIVE AND OBJECTIVE BOX
TMAX -  100.4    On day #    Vital Signs Last 24 Hrs  T(C): 37.1, Max: 38 (04-11 @ 00:23)  T(F): 98.8, Max: 100.4 (04-11 @ 00:23)  HR: 121 (101 - 133)  BP: 142/81 (142/81 - 154/92)  BP(mean): --  RR: 17 (16 - 17)  SpO2: 98% (92% - 98%)    Supplemental O2:  on NC O2 now          PHYSICAL EXAM  General:    HEENT:    Neck:    Heart:    Lungs:    Abdomen:    Extremities:    Skin:      I&O's Summary:    I & Os for current day (as of 11 Apr 2017 18:51)  =============================================  IN: 3320 ml / OUT: 1000 ml / NET: 2320 ml      LABS:  CBC Full  -  ( 11 Apr 2017 07:32 )  WBC Count : 11.0 K/uL  Hemoglobin : 7.9 g/dL  Hematocrit : 24.1 %  Platelet Count - Automated : 197 K/uL  Mean Cell Volume : 89.2 fl  Mean Cell Hemoglobin : 29.4 pg  Mean Cell Hemoglobin Concentration : 33.0 gm/dL  Auto Neutrophil # : x  Auto Lymphocyte # : x  Auto Monocyte # : x  Auto Eosinophil # : x  Auto Basophil # : x  Auto Neutrophil % : x  Auto Lymphocyte % : x  Auto Monocyte % : x  Auto Eosinophil % : x  Auto Basophil % : x    04-11    142  |  109<H>  |  16  ----------------------------<  113<H>  4.2   |  25  |  0.43<L>    Ca    8.1<L>      11 Apr 2017 07:32    TPro  7.3  /  Alb  1.4<L>  /  TBili  0.5  /  DBili  x   /  AST  33  /  ALT  12  /  AlkPhos  50  04-11    LIVER FUNCTIONS - ( 11 Apr 2017 07:32 )  Alb: 1.4 g/dL / Pro: 7.3 gm/dL / ALK PHOS: 50 U/L / ALT: 12 U/L / AST: 33 U/L / GGT: x           Sedimentation Rate, Erythrocyte: >140 (04-09 @ 11:01)    Rheumatoid Factor Quant, Serum or Plasma: 7.1 IU/mL (04-09 @ 10:08)    Sedimentation Rate, Erythrocyte: >140 (04-08 @ 07:28)    Vancomycin Level, Trough: 10.9 ug/mL (04-10 @ 03:26)        CULTURES:  Specimen Source: .Sputum Sputum/TRAP (04-08 @ 07:12)  Culture Results:   Normal Respiratory Cindy present (04-08 @ 07:12)    Specimen Source: .Blood Blood (04-08 @ 00:21)  Culture Results:   No growth to date. (04-08 @ 00:21)    Specimen Source: .Blood Blood (04-08 @ 00:21)  Culture Results:   No growth to date. (04-08 @ 00:21)    Specimen Source: .Urine Catheterized (04-05 @ 01:00)  Culture Results:   >100,000 CFU/ml Morganella morganii (04-05 @ 01:00)    Specimen Source: .Blood Blood-Peripheral (04-05 @ 00:34)  Culture Results:   No growth at 5 days. (04-05 @ 00:34)    Specimen Source: .Blood Blood-Peripheral (04-05 @ 00:34)  Culture Results:   No growth at 5 days. (04-05 @ 00:34)        Legionella Antigen, Urine: Negative (04-08 @ 11:04)          Radiology:    Impression:    Suggestions: TMAX -  100.4    On day # 5 Meropenem / # 5 Vanco / # 5 Zithromax now    Vital Signs Last 24 Hrs  T(C): 37.1, Max: 38 (04-11 @ 00:23)  T(F): 98.8, Max: 100.4 (04-11 @ 00:23)  HR: 121 (101 - 133)  BP: 142/81 (142/81 - 154/92)  BP(mean): --  RR: 17 (16 - 17)  SpO2: 98% (92% - 98%)    Supplemental O2:  on NC O2 now    Patient remains lethargic and nonverbal, lying in bed semi-upright.  Above notes appreciated - patient noted to have feculant drainage in Colon catheter the other day and now w/u with CT scan and fistulogram reveal the presence of a [place]-vesicle Fistula.      PHYSICAL EXAM  General:  lethargic, but appeared to open her eyes a little, had apparently rec'd a dose of Morphine a short time ago, appears to be in NAD  HEENT:  conj pink, sclerae anicteric, PERRLA, no oral lesions noted, NGT in place with feedings in progress, and NCO2 in place  Neck:  supple, no nodes noted  Heart:  RR  Lungs:  bilat rhonchi throughout, but decreased anteriorly, and no wheezing noted  Abdomen: soft, BS+, nontender appearance   Extremities:  1-2 + edema LE's now and with some swelling of the hands and arms noted as well.  Skin:  warm, dry, no rash, few ecchymoses on the arms noted    I&O's Summary:    I & Os for current day (as of 11 Apr 2017 18:51)  =============================================  IN: 3320 ml / OUT: 1000 ml / NET: 2320 ml      LABS:  CBC Full  -  ( 11 Apr 2017 07:32 )  WBC Count : 11.0 K/uL  Hemoglobin : 7.9 g/dL  Hematocrit : 24.1 %  Platelet Count - Automated : 197 K/uL  Mean Cell Volume : 89.2 fl  Mean Cell Hemoglobin : 29.4 pg  Mean Cell Hemoglobin Concentration : 33.0 gm/dL  Auto Neutrophil # : x  Auto Lymphocyte # : x  Auto Monocyte # : x  Auto Eosinophil # : x  Auto Basophil # : x  Auto Neutrophil % : x  Auto Lymphocyte % : x  Auto Monocyte % : x  Auto Eosinophil % : x  Auto Basophil % : x    04-11    142  |  109<H>  |  16  ----------------------------<  113<H>  4.2   |  25  |  0.43<L>    Ca    8.1<L>      11 Apr 2017 07:32    TPro  7.3  /  Alb  1.4<L>  /  TBili  0.5  /  DBili  x   /  AST  33  /  ALT  12  /  AlkPhos  50  04-11    LIVER FUNCTIONS - ( 11 Apr 2017 07:32 )  Alb: 1.4 g/dL / Pro: 7.3 gm/dL / ALK PHOS: 50 U/L / ALT: 12 U/L / AST: 33 U/L / GGT: x           Sedimentation Rate, Erythrocyte: >140 (04-09 @ 11:01)    Rheumatoid Factor Quant, Serum or Plasma: 7.1 IU/mL (04-09 @ 10:08)    Sedimentation Rate, Erythrocyte: >140 (04-08 @ 07:28)    Vancomycin Level, Trough: 10.9 ug/mL (04-10 @ 03:26)    MANUEL- negative        CULTURES:  Specimen Source: .Sputum Sputum/TRAP (04-08 @ 07:12)  Culture Results:   Normal Respiratory Cindy present (04-08 @ 07:12)    Specimen Source: .Blood Blood (04-08 @ 00:21)  Culture Results:   No growth to date. (04-08 @ 00:21)    Specimen Source: .Blood Blood (04-08 @ 00:21)  Culture Results:   No growth to date. (04-08 @ 00:21)    Specimen Source: .Urine Catheterized (04-05 @ 01:00)  Culture Results:   >100,000 CFU/ml Morganella morganii (04-05 @ 01:00)    Specimen Source: .Blood Blood-Peripheral (04-05 @ 00:34)  Culture Results:   No growth at 5 days. (04-05 @ 00:34)    Specimen Source: .Blood Blood-Peripheral (04-05 @ 00:34)  Culture Results:   No growth at 5 days. (04-05 @ 00:34)        Legionella Antigen, Urine: Negative (04-08 @ 11:04)        Radiology:   CT ABDOMEN + PELVIS -  4/10/17 ---- IMPRESSION:      Colovesical fistula.  3.9 cm right adnexal cyst.  Small bilateral pleural effusions.  Bibasilar lung opacities and right lower lobe consolidation, suspicious   for multifocal pneumonia.                      CYSTOGRAM -  4/10/17 -    IMPRESSION:      Suspected fistulous communication with the sigmoid colon from the left   posterolateral urinary bladder.                        CT ABDOMEN  + PELVIS -   4/9/17 -    Impression    Dense right lower lobe pneumonia/consolidation.    Bilateral pleural effusions, small to moderate on the right and the left.    Platelike atelectasis/possible infiltrate at the left base.    Distended gallbladder with no calcified gallstones, small amount of   pericholecystic fluid at the hepatic interface. If there is continued   clinical concern follow-up RUQ US can be ordered    Marked bladder wall thickening, follow-up CTwith retrograde   administration of contrast can be ordered for further evaluation of   fistula.      Impression:  Slowly improving with decreasing temps and decreasing WBC's on ab rx with Meropenem + Vanco + Zithromax for multifocal PNA and Morganella UTI with Sepsis, and now found to have a [place]-vesicle fistula on CT Scan + Cystogram.    Suggestions: WIll continue current ab rx with Meropenem + Vanco and will complete rx with Zithromax after 7 days of ab rx.  Continue to follow-up temps and labs closely. Repeat CXR in a few days.  Discussed in detail and at length with patient's son at bedside.

## 2017-04-12 LAB
ALBUMIN SERPL ELPH-MCNC: 1.4 G/DL — LOW (ref 3.3–5)
ALP SERPL-CCNC: 48 U/L — SIGNIFICANT CHANGE UP (ref 40–120)
ALT FLD-CCNC: 15 U/L — SIGNIFICANT CHANGE UP (ref 12–78)
ANION GAP SERPL CALC-SCNC: 8 MMOL/L — SIGNIFICANT CHANGE UP (ref 5–17)
AST SERPL-CCNC: 27 U/L — SIGNIFICANT CHANGE UP (ref 15–37)
BILIRUB SERPL-MCNC: 0.3 MG/DL — SIGNIFICANT CHANGE UP (ref 0.2–1.2)
BUN SERPL-MCNC: 15 MG/DL — SIGNIFICANT CHANGE UP (ref 7–23)
CALCIUM SERPL-MCNC: 8 MG/DL — LOW (ref 8.5–10.1)
CHLORIDE SERPL-SCNC: 104 MMOL/L — SIGNIFICANT CHANGE UP (ref 96–108)
CO2 SERPL-SCNC: 28 MMOL/L — SIGNIFICANT CHANGE UP (ref 22–31)
CREAT SERPL-MCNC: 0.38 MG/DL — LOW (ref 0.5–1.3)
GLUCOSE SERPL-MCNC: 142 MG/DL — HIGH (ref 70–99)
HCT VFR BLD CALC: 24 % — LOW (ref 34.5–45)
HGB BLD-MCNC: 8.4 G/DL — LOW (ref 11.5–15.5)
MAGNESIUM SERPL-MCNC: 2.2 MG/DL — SIGNIFICANT CHANGE UP (ref 1.8–2.4)
MCHC RBC-ENTMCNC: 31.3 PG — SIGNIFICANT CHANGE UP (ref 27–34)
MCHC RBC-ENTMCNC: 34.9 GM/DL — SIGNIFICANT CHANGE UP (ref 32–36)
MCV RBC AUTO: 89.5 FL — SIGNIFICANT CHANGE UP (ref 80–100)
PHOSPHATE SERPL-MCNC: 2.9 MG/DL — SIGNIFICANT CHANGE UP (ref 2.5–4.5)
PLATELET # BLD AUTO: 227 K/UL — SIGNIFICANT CHANGE UP (ref 150–400)
POTASSIUM SERPL-MCNC: 4.4 MMOL/L — SIGNIFICANT CHANGE UP (ref 3.5–5.3)
POTASSIUM SERPL-SCNC: 4.4 MMOL/L — SIGNIFICANT CHANGE UP (ref 3.5–5.3)
PROT SERPL-MCNC: 7.4 GM/DL — SIGNIFICANT CHANGE UP (ref 6–8.3)
RBC # BLD: 2.68 M/UL — LOW (ref 3.8–5.2)
RBC # FLD: 13.8 % — SIGNIFICANT CHANGE UP (ref 11–15)
SODIUM SERPL-SCNC: 140 MMOL/L — SIGNIFICANT CHANGE UP (ref 135–145)
WBC # BLD: 12.1 K/UL — HIGH (ref 3.8–10.5)
WBC # FLD AUTO: 12.1 K/UL — HIGH (ref 3.8–10.5)

## 2017-04-12 RX ORDER — SODIUM CHLORIDE 9 MG/ML
1000 INJECTION, SOLUTION INTRAVENOUS
Qty: 0 | Refills: 0 | Status: DISCONTINUED | OUTPATIENT
Start: 2017-04-12 | End: 2017-04-15

## 2017-04-12 RX ADMIN — Medication 2 MILLILITER(S): at 05:46

## 2017-04-12 RX ADMIN — Medication 500 MICROGRAM(S): at 17:03

## 2017-04-12 RX ADMIN — CARBIDOPA AND LEVODOPA 1 TABLET(S): 25; 100 TABLET ORAL at 13:11

## 2017-04-12 RX ADMIN — SENNA PLUS 10 MILLILITER(S): 8.6 TABLET ORAL at 22:15

## 2017-04-12 RX ADMIN — Medication 650 MILLIGRAM(S): at 22:15

## 2017-04-12 RX ADMIN — Medication 1 MILLIGRAM(S): at 13:10

## 2017-04-12 RX ADMIN — Medication 2 MILLILITER(S): at 11:08

## 2017-04-12 RX ADMIN — Medication 1 SPRAY(S): at 06:31

## 2017-04-12 RX ADMIN — PRAMIPEXOLE DIHYDROCHLORIDE 0.25 MILLIGRAM(S): 0.12 TABLET ORAL at 15:09

## 2017-04-12 RX ADMIN — POLYETHYLENE GLYCOL 3350 17 GRAM(S): 17 POWDER, FOR SOLUTION ORAL at 22:15

## 2017-04-12 RX ADMIN — Medication 250 MILLIGRAM(S): at 04:16

## 2017-04-12 RX ADMIN — LOSARTAN POTASSIUM 50 MILLIGRAM(S): 100 TABLET, FILM COATED ORAL at 06:31

## 2017-04-12 RX ADMIN — PRAMIPEXOLE DIHYDROCHLORIDE 0.25 MILLIGRAM(S): 0.12 TABLET ORAL at 06:32

## 2017-04-12 RX ADMIN — MEROPENEM 200 MILLIGRAM(S): 1 INJECTION INTRAVENOUS at 06:32

## 2017-04-12 RX ADMIN — Medication 500 MICROGRAM(S): at 11:09

## 2017-04-12 RX ADMIN — CARBIDOPA AND LEVODOPA 1 TABLET(S): 25; 100 TABLET ORAL at 06:31

## 2017-04-12 RX ADMIN — SODIUM CHLORIDE 50 MILLILITER(S): 9 INJECTION, SOLUTION INTRAVENOUS at 08:52

## 2017-04-12 RX ADMIN — ENOXAPARIN SODIUM 40 MILLIGRAM(S): 100 INJECTION SUBCUTANEOUS at 06:31

## 2017-04-12 RX ADMIN — MEROPENEM 200 MILLIGRAM(S): 1 INJECTION INTRAVENOUS at 15:11

## 2017-04-12 RX ADMIN — MEROPENEM 200 MILLIGRAM(S): 1 INJECTION INTRAVENOUS at 22:16

## 2017-04-12 RX ADMIN — Medication 500 MICROGRAM(S): at 05:46

## 2017-04-12 RX ADMIN — Medication 2 MILLILITER(S): at 17:03

## 2017-04-12 RX ADMIN — CARBIDOPA AND LEVODOPA 1 TABLET(S): 25; 100 TABLET ORAL at 18:14

## 2017-04-12 RX ADMIN — PRAMIPEXOLE DIHYDROCHLORIDE 0.25 MILLIGRAM(S): 0.12 TABLET ORAL at 22:15

## 2017-04-12 RX ADMIN — Medication 1 SPRAY(S): at 18:26

## 2017-04-12 RX ADMIN — AZITHROMYCIN 500 MILLIGRAM(S): 500 TABLET, FILM COATED ORAL at 13:11

## 2017-04-12 NOTE — PROGRESS NOTE ADULT - SUBJECTIVE AND OBJECTIVE BOX
INTERVAL HPI/OVERNIGHT EVENTS:        REVIEW OF SYSTEMS:  CONSTITUTIONAL:  patient  uncommunicative  does not  respond  to  stimulus      MEDICATION:  enoxaparin Injectable 40milliGRAM(s) SubCutaneous every 24 hours  polyethylene glycol 3350 17Gram(s) Oral daily  folic acid 1milliGRAM(s) Oral daily  senna Syrup 10milliLiter(s) Oral at bedtime  acetaminophen    Suspension 650milliGRAM(s) Oral every 6 hours PRN  losartan 50milliGRAM(s) Oral daily  azithromycin   Tablet 500milliGRAM(s) Oral daily  vancomycin  IVPB 1000milliGRAM(s) IV Intermittent every 24 hours  meropenem IVPB  IV Intermittent   ipratropium    for Nebulization 500MICROGram(s) Inhalation every 6 hours  acetylcysteine 10% Inhalation 2milliLiter(s) Inhalation every 6 hours  meropenem IVPB 1000milliGRAM(s) IV Intermittent every 8 hours  saliva substitute (BIOTENE MOISTURIZING MOUTH SPRAY) 1Spray(s) Topical two times a day  carbidopa/levodopa  25/100 1Tablet(s) Oral four times a day  pramipexole 0.25milliGRAM(s) Oral three times a day  dextrose 5% + sodium chloride 0.45%. 1000milliLiter(s) IV Continuous <Continuous>    Vital Signs Last 24 Hrs  T(C): 37.6, Max: 37.9 (04-11 @ 13:24)  T(F): 99.8, Max: 100.2 (04-11 @ 13:24)  HR: 102 (98 - 133)  BP: 160/96 (142/81 - 160/96)  BP(mean): --  RR: 18 (17 - 18)  SpO2: 97% (96% - 99%)    PHYSICAL EXAM:  GENERAL: NAD, well-groomed, well-developed  EYES:  conjunctiva and sclera clear   NECK: Supple, No JVD, Normal thyroid  NERVOUS SYSTEM:  uncommunicative  plantars  down  CHEST/LUNG: Clear    HEART: Regular rate and rhythm; No murmurs, rubs, or gallops  ABDOMEN: Soft, Nontender, Nondistended; Bowel sounds present  EXTREMITIES:  upper  extr  edema no  tenderness  SKIN: No rashes   LABS:                        7.9    11.0  )-----------( 197      ( 11 Apr 2017 07:32 )             24.1     04-11    142  |  109<H>  |  16  ----------------------------<  113<H>  4.2   |  25  |  0.43<L>    Ca    8.1<L>      11 Apr 2017 07:32    TPro  7.3  /  Alb  1.4<L>  /  TBili  0.5  /  DBili  x   /  AST  33  /  ALT  12  /  AlkPhos  50  04-11        CAPILLARY BLOOD GLUCOSE      RADIOLOGY & ADDITIONAL TESTS:    Imaging reports  Personally Reviewed:  [x ] YES  [ ] NO    Consultant(s) Notes Reviewed:  [x ] YES  [ ] NO    Care Discussed with Consultants/Other Providers [x ] YES  [ ]  COLO/VESCICULAR FISTULA proceed  as  per  surgery  metabolic  encephalopathy  supprtive care  check  non  contrast  Ct of  head  Problem/Plan - 1:  ·  Problem: Pneumonia.  Plan: zosyn  meropenem azithomycin  O2  duoneb. for  pulmonary  ID  evlals    Problem/Plan - 2:  ·  Problem: UTI (urinary tract infection).  Plan: zosyn.     Problem/Plan - 3:  ·  Problem: Parkinson disease encephalopathy.  Plan: sinemet discussed  with  neurology  as  to  further  adjustments/addition  to  treatment    Problem/Plan - 4:  ·  Problem: Altered mental status.  Plan: observation further w/u  and  treatment  as per  clinical  course.     Problem/Plan - 5:  ·  Problem: Anemia.  Plan: iron  folic  acid.   prognosis  guarded

## 2017-04-12 NOTE — PROGRESS NOTE ADULT - SUBJECTIVE AND OBJECTIVE BOX
General Surgery/ Urology PA Note:    Patient seen and examined at bedside with aide present at bedside, resting comfortably.     Vital Signs Last 24 Hrs  T(F): 99.8, Max: 100.2 (04-11 @ 13:24)  HR: 102  BP: 160/96  RR: 18  SpO2: 97%    GENERAL: Alert, NAD  CHEST/LUNG: Clear to auscultation bilaterally, respirations nonlabored  HEART: S1S2, Regular rate and rhythm;   ABDOMEN: + Bowel sounds, soft, Nontender, Nondistended  : shoemaker catheter in place draining clear yellow urine with some brown sediment noted in bag and tubing. 600ml/24hours recorded.     I&O's Detail    I & Os for current day (as of 12 Apr 2017 09:08)  =============================================  IN:    dextrose 5% + sodium chloride 0.9% with potassium chloride 20 mEq/L: 900 ml    Jevity: 720 ml    Solution: 250 ml    Solution: 100 ml    Total IN: 1970 ml  ---------------------------------------------  OUT:    Indwelling Catheter - Urethral: 600 ml    Total OUT: 600 ml  ---------------------------------------------  Total NET: 1370 ml    LABS:                        8.4    12.1  )-----------( 227      ( 12 Apr 2017 07:40 )             24.0     04-12    140  |  104  |  15  ----------------------------<  142<H>  4.4   |  28  |  0.38<L>    Ca    8.0<L>      12 Apr 2017 07:40  Phos  2.9     04-12  Mg     2.2     04-12    TPro  7.4  /  Alb  1.4<L>  /  TBili  0.3  /  DBili  x   /  AST  27  /  ALT  15  /  AlkPhos  48  04-12    86y old Female with PMH Pneumonia, Anemia, Parkinson disease admitted with sepsis secondary to urinary retention secondary to UTI secondary to colovesical fistula and multifocal pneumonia     - continue shoemaker catheter, monitor urine output  - continue antibiotics per ID  - f/u labs  - May need OR for closure of fistula when medically stable  - May benefit from holding off on Jevity feedings and starting clear liquids through NGT for possible future surgical intervention once sepsis resolves  - continue current management per medicine  - will discuss with Dr. Bahena and Dr. Mosley General Surgery/ Urology PA Note:    Patient seen and examined at bedside with aide present at bedside, resting comfortably.     Vital Signs Last 24 Hrs  T(F): 99.8, Max: 100.2 (04-11 @ 13:24)  HR: 102  BP: 160/96  RR: 18  SpO2: 97%    GENERAL: Alert, NAD  CHEST/LUNG: Clear to auscultation bilaterally, respirations nonlabored  HEART: S1S2, Regular rate and rhythm;   ABDOMEN: + Bowel sounds, soft, Nontender, Nondistended  : shoemaker catheter in place draining clear yellow urine with some brown sediment noted in bag and tubing. 600ml/24hours recorded.     I&O's Detail    I & Os for current day (as of 12 Apr 2017 09:08)  =============================================  IN:    dextrose 5% + sodium chloride 0.9% with potassium chloride 20 mEq/L: 900 ml    Jevity: 720 ml    Solution: 250 ml    Solution: 100 ml    Total IN: 1970 ml  ---------------------------------------------  OUT:    Indwelling Catheter - Urethral: 600 ml    Total OUT: 600 ml  ---------------------------------------------  Total NET: 1370 ml    LABS:                        8.4    12.1  )-----------( 227      ( 12 Apr 2017 07:40 )             24.0     04-12    140  |  104  |  15  ----------------------------<  142<H>  4.4   |  28  |  0.38<L>    Ca    8.0<L>      12 Apr 2017 07:40  Phos  2.9     04-12  Mg     2.2     04-12    TPro  7.4  /  Alb  1.4<L>  /  TBili  0.3  /  DBili  x   /  AST  27  /  ALT  15  /  AlkPhos  48  04-12    86y old Female with PMH Pneumonia, Anemia, Parkinson disease admitted with sepsis secondary to multifocal pneumonia and urinary retention secondary to UTI secondary to colovesical fistula?     - continue shoemaker catheter, monitor urine output  - continue antibiotics per ID  - f/u labs  - May need OR for closure of fistula when medically stable  - May benefit from holding off on Jevity feedings and starting clear liquids through NGT for possible future surgical intervention once sepsis resolves  - continue current management per medicine  - will discuss with Dr. Bahena and Dr. Mosley General Surgery/ Urology PA Note:    Patient seen and examined at bedside with aide present at bedside, resting comfortably.     Vital Signs Last 24 Hrs  T(F): 99.8, Max: 100.2 (04-11 @ 13:24)  HR: 102  BP: 160/96  RR: 18  SpO2: 97%    GENERAL: Alert, NAD  CHEST/LUNG: Clear to auscultation bilaterally, respirations nonlabored  HEART: S1S2, Regular rate and rhythm;   ABDOMEN: + Bowel sounds, soft, Nontender, Nondistended  : shoemaker catheter in place draining clear yellow urine with some brown sediment noted in bag and tubing. 600ml/24hours recorded.     I&O's Detail    I & Os for current day (as of 12 Apr 2017 09:08)  =============================================  IN:    dextrose 5% + sodium chloride 0.9% with potassium chloride 20 mEq/L: 900 ml    Jevity: 720 ml    Solution: 250 ml    Solution: 100 ml    Total IN: 1970 ml  ---------------------------------------------  OUT:    Indwelling Catheter - Urethral: 600 ml    Total OUT: 600 ml  ---------------------------------------------  Total NET: 1370 ml    LABS:                        8.4    12.1  )-----------( 227      ( 12 Apr 2017 07:40 )             24.0     04-12    140  |  104  |  15  ----------------------------<  142<H>  4.4   |  28  |  0.38<L>    Ca    8.0<L>      12 Apr 2017 07:40  Phos  2.9     04-12  Mg     2.2     04-12    TPro  7.4  /  Alb  1.4<L>  /  TBili  0.3  /  DBili  x   /  AST  27  /  ALT  15  /  AlkPhos  48  04-12    86y old Female with PMH Pneumonia, Anemia, Parkinson disease admitted with sepsis secondary to multifocal pneumonia and urinary retention secondary to UTI secondary to colovesical fistula?     - continue shoemaker catheter, monitor urine output  - continue antibiotics per ID  - f/u labs  - May need OR for closure of fistula when medically stable  - May benefit from holding off on Jevity feedings and starting clear liquids through NGT for possible future surgical intervention once sepsis resolves  - if sepsis secondary to fistula and patient not improving on antibiotics with medical management may benefit from diverting loop colostomy   - continue current management per medicine  - will discuss with Dr. Bahena  - discussed with Dr. Mosley

## 2017-04-12 NOTE — PROGRESS NOTE ADULT - SUBJECTIVE AND OBJECTIVE BOX
INTERVAL HPI/OVERNIGHT EVENTS:  87 y/o  white female, with hx Parkinson's Disease, with Fx of the Rt Femur in 3/17 and underwent Rt hip ORIF on 3/12 at [facility] with hospital course complicated by  RLL + RML PNA as seen on CT Chest on 3/17 and an EColi UTI, Treated then with Rocephin initially and changed to Meropenem + Vanco for about 5 days, then d/francine to [facility] on 3/22 on po Levaquin, readmitted via the ER on 4/4/17 with increasing lethargy, fever, and difficulty swallowing and suspected Sepsis likely from urinary origin, which later on grew Omrganella.  RRT on 04/07/17 for high fever, tachycardia. Suctioned thick secretions fro upper airway. Lethargic, not able to provide history.  Lethargic, minimal response. Fever is trending down.    Vital Signs Last 24 Hrs  T(C): 37.4, Max: 37.7 (04-12 @ 05:04)  T(F): 99.4, Max: 99.8 (04-12 @ 05:04)  HR: 11 (11 - 123)  BP: 140/85 (140/85 - 165/92)  BP(mean): --  RR: 18 (18 - 18)  SpO2: 99% (96% - 99%)    PHYSICAL EXAM:  GEN:        Awake, responsive and comfortable.  HEENT:    Normal.    RESP:     crackles.   CVS:          Regular rate and rhythm.   ABD:         Soft, non-tender, non-distended;     MEDICATIONS  (STANDING):  enoxaparin Injectable 40milliGRAM(s) SubCutaneous every 24 hours  polyethylene glycol 3350 17Gram(s) Oral daily  folic acid 1milliGRAM(s) Oral daily  senna Syrup 10milliLiter(s) Oral at bedtime  losartan 50milliGRAM(s) Oral daily  azithromycin   Tablet 500milliGRAM(s) Oral daily  vancomycin  IVPB 1000milliGRAM(s) IV Intermittent every 24 hours  meropenem IVPB  IV Intermittent   ipratropium    for Nebulization 500MICROGram(s) Inhalation every 6 hours  acetylcysteine 10% Inhalation 2milliLiter(s) Inhalation every 6 hours  meropenem IVPB 1000milliGRAM(s) IV Intermittent every 8 hours  saliva substitute (BIOTENE MOISTURIZING MOUTH SPRAY) 1Spray(s) Topical two times a day  carbidopa/levodopa  25/100 1Tablet(s) Oral four times a day  pramipexole 0.25milliGRAM(s) Oral three times a day  dextrose 5% + sodium chloride 0.45%. 1000milliLiter(s) IV Continuous <Continuous>    MEDICATIONS  (PRN):  acetaminophen    Suspension 650milliGRAM(s) Oral every 6 hours PRN For Temp greater than 38 C (100.4 F)    LABS:                        8.4    12.1  )-----------( 227      ( 12 Apr 2017 07:40 )             24.0     04-12    140  |  104  |  15  ----------------------------<  142<H>  4.4   |  28  |  0.38<L>    Ca    8.0<L>      12 Apr 2017 07:40  Phos  2.9     04-12  Mg     2.2     04-12    TPro  7.4  /  Alb  1.4<L>  /  TBili  0.3  /  DBili  x   /  AST  27  /  ALT  15  /  AlkPhos  48  04-12      ASSESSMENT AND PLAN:  ·	Altered mental status.  ·	Gram negative Sepsis with Morganella.  ·	UTI with Morganella  ·	RLL Pneumonia , likely gram negative(Complex).  ·	Leukocytosis.  ·	COLOVESICAL FISTULA.  ·	Parkinsonism  ·	Hypokalemia corrected.  ·	Hypernatremia resolved.  ·	Anemia.    Continue  antibiotics.  Suction PRN.

## 2017-04-13 LAB
ALBUMIN SERPL ELPH-MCNC: 1.4 G/DL — LOW (ref 3.3–5)
ALP SERPL-CCNC: 49 U/L — SIGNIFICANT CHANGE UP (ref 40–120)
ALT FLD-CCNC: 10 U/L — LOW (ref 12–78)
ANION GAP SERPL CALC-SCNC: 9 MMOL/L — SIGNIFICANT CHANGE UP (ref 5–17)
AST SERPL-CCNC: 27 U/L — SIGNIFICANT CHANGE UP (ref 15–37)
BILIRUB SERPL-MCNC: 0.4 MG/DL — SIGNIFICANT CHANGE UP (ref 0.2–1.2)
BUN SERPL-MCNC: 15 MG/DL — SIGNIFICANT CHANGE UP (ref 7–23)
CALCIUM SERPL-MCNC: 8 MG/DL — LOW (ref 8.5–10.1)
CHLORIDE SERPL-SCNC: 100 MMOL/L — SIGNIFICANT CHANGE UP (ref 96–108)
CO2 SERPL-SCNC: 29 MMOL/L — SIGNIFICANT CHANGE UP (ref 22–31)
CREAT SERPL-MCNC: 0.37 MG/DL — LOW (ref 0.5–1.3)
CULTURE RESULTS: SIGNIFICANT CHANGE UP
CULTURE RESULTS: SIGNIFICANT CHANGE UP
GLUCOSE SERPL-MCNC: 129 MG/DL — HIGH (ref 70–99)
HCT VFR BLD CALC: 25.5 % — LOW (ref 34.5–45)
HGB BLD-MCNC: 8.9 G/DL — LOW (ref 11.5–15.5)
MCHC RBC-ENTMCNC: 30.9 PG — SIGNIFICANT CHANGE UP (ref 27–34)
MCHC RBC-ENTMCNC: 35 GM/DL — SIGNIFICANT CHANGE UP (ref 32–36)
MCV RBC AUTO: 88.2 FL — SIGNIFICANT CHANGE UP (ref 80–100)
PLATELET # BLD AUTO: 210 K/UL — SIGNIFICANT CHANGE UP (ref 150–400)
POTASSIUM SERPL-MCNC: 4.4 MMOL/L — SIGNIFICANT CHANGE UP (ref 3.5–5.3)
POTASSIUM SERPL-SCNC: 4.4 MMOL/L — SIGNIFICANT CHANGE UP (ref 3.5–5.3)
PROT SERPL-MCNC: 7.3 GM/DL — SIGNIFICANT CHANGE UP (ref 6–8.3)
RBC # BLD: 2.89 M/UL — LOW (ref 3.8–5.2)
RBC # FLD: 13.4 % — SIGNIFICANT CHANGE UP (ref 11–15)
SODIUM SERPL-SCNC: 138 MMOL/L — SIGNIFICANT CHANGE UP (ref 135–145)
SPECIMEN SOURCE: SIGNIFICANT CHANGE UP
SPECIMEN SOURCE: SIGNIFICANT CHANGE UP
TSH SERPL-MCNC: 1.61 UU/ML — SIGNIFICANT CHANGE UP (ref 0.36–3.74)
WBC # BLD: 11 K/UL — HIGH (ref 3.8–10.5)
WBC # FLD AUTO: 11 K/UL — HIGH (ref 3.8–10.5)

## 2017-04-13 PROCEDURE — 36571 INSERT PICVAD CATH: CPT | Mod: AS

## 2017-04-13 PROCEDURE — 76937 US GUIDE VASCULAR ACCESS: CPT | Mod: 26,AS

## 2017-04-13 PROCEDURE — 93971 EXTREMITY STUDY: CPT | Mod: 26,LT

## 2017-04-13 RX ADMIN — CARBIDOPA AND LEVODOPA 1 TABLET(S): 25; 100 TABLET ORAL at 17:55

## 2017-04-13 RX ADMIN — Medication 2 MILLILITER(S): at 00:20

## 2017-04-13 RX ADMIN — Medication 1 MILLIGRAM(S): at 17:46

## 2017-04-13 RX ADMIN — CARBIDOPA AND LEVODOPA 1 TABLET(S): 25; 100 TABLET ORAL at 05:15

## 2017-04-13 RX ADMIN — CARBIDOPA AND LEVODOPA 1 TABLET(S): 25; 100 TABLET ORAL at 01:22

## 2017-04-13 RX ADMIN — Medication 500 MICROGRAM(S): at 11:20

## 2017-04-13 RX ADMIN — LOSARTAN POTASSIUM 50 MILLIGRAM(S): 100 TABLET, FILM COATED ORAL at 05:15

## 2017-04-13 RX ADMIN — Medication 1 SPRAY(S): at 05:16

## 2017-04-13 RX ADMIN — PRAMIPEXOLE DIHYDROCHLORIDE 0.25 MILLIGRAM(S): 0.12 TABLET ORAL at 21:53

## 2017-04-13 RX ADMIN — ENOXAPARIN SODIUM 40 MILLIGRAM(S): 100 INJECTION SUBCUTANEOUS at 05:16

## 2017-04-13 RX ADMIN — Medication 2 MILLILITER(S): at 05:50

## 2017-04-13 RX ADMIN — Medication 2 MILLILITER(S): at 17:48

## 2017-04-13 RX ADMIN — MEROPENEM 200 MILLIGRAM(S): 1 INJECTION INTRAVENOUS at 21:53

## 2017-04-13 RX ADMIN — Medication 500 MICROGRAM(S): at 00:20

## 2017-04-13 RX ADMIN — PRAMIPEXOLE DIHYDROCHLORIDE 0.25 MILLIGRAM(S): 0.12 TABLET ORAL at 05:15

## 2017-04-13 RX ADMIN — SENNA PLUS 10 MILLILITER(S): 8.6 TABLET ORAL at 21:53

## 2017-04-13 RX ADMIN — Medication 2 MILLILITER(S): at 11:20

## 2017-04-13 RX ADMIN — Medication 250 MILLIGRAM(S): at 04:18

## 2017-04-13 RX ADMIN — POLYETHYLENE GLYCOL 3350 17 GRAM(S): 17 POWDER, FOR SOLUTION ORAL at 21:53

## 2017-04-13 RX ADMIN — CARBIDOPA AND LEVODOPA 1 TABLET(S): 25; 100 TABLET ORAL at 12:45

## 2017-04-13 RX ADMIN — Medication 500 MICROGRAM(S): at 05:50

## 2017-04-13 RX ADMIN — AZITHROMYCIN 500 MILLIGRAM(S): 500 TABLET, FILM COATED ORAL at 17:46

## 2017-04-13 RX ADMIN — Medication 500 MICROGRAM(S): at 17:48

## 2017-04-13 RX ADMIN — MEROPENEM 200 MILLIGRAM(S): 1 INJECTION INTRAVENOUS at 05:15

## 2017-04-13 RX ADMIN — Medication 1 SPRAY(S): at 17:55

## 2017-04-13 NOTE — PROGRESS NOTE ADULT - SUBJECTIVE AND OBJECTIVE BOX
Patient seen at bedside for midline catheter placement.  Telephone Consent obtained from patient's son after risks/benefits/alternatives explained.   Left Upper extremity prepped and draped in usual sterile fashion. Time out performed with RN. Multiple attempts to place midline failed.  4Fr 1 cm single lumen midline catheter placed using ultrasound guided seldinger technique, Left basilic vein by TEMO Torres. Flushes well, but unable to aspirate from line despite US visualization. Midline 11cm in length. Secured and sterile dressing applied. Patient tolerated procedure well without complication and was left in no acute distress. Arterial duplex of LUE ordered. Do not use line until US results reviewed.

## 2017-04-13 NOTE — PROGRESS NOTE ADULT - SUBJECTIVE AND OBJECTIVE BOX
INTERVAL HPI/OVERNIGHT EVENTS:        REVIEW OF SYSTEMS:  CONSTITUTIONAL: lethargic  comfortable  opens  eyes  with  verbal  stimulus      MEDICATION:  enoxaparin Injectable 40milliGRAM(s) SubCutaneous every 24 hours  polyethylene glycol 3350 17Gram(s) Oral daily  folic acid 1milliGRAM(s) Oral daily  senna Syrup 10milliLiter(s) Oral at bedtime  acetaminophen    Suspension 650milliGRAM(s) Oral every 6 hours PRN  losartan 50milliGRAM(s) Oral daily  azithromycin   Tablet 500milliGRAM(s) Oral daily  vancomycin  IVPB 1000milliGRAM(s) IV Intermittent every 24 hours  meropenem IVPB  IV Intermittent   ipratropium    for Nebulization 500MICROGram(s) Inhalation every 6 hours  acetylcysteine 10% Inhalation 2milliLiter(s) Inhalation every 6 hours  meropenem IVPB 1000milliGRAM(s) IV Intermittent every 8 hours  saliva substitute (BIOTENE MOISTURIZING MOUTH SPRAY) 1Spray(s) Topical two times a day  carbidopa/levodopa  25/100 1Tablet(s) Oral four times a day  pramipexole 0.25milliGRAM(s) Oral three times a day  dextrose 5% + sodium chloride 0.45%. 1000milliLiter(s) IV Continuous <Continuous>    Vital Signs Last 24 Hrs  T(C): 37, Max: 37.4 (04-12 @ 18:35)  T(F): 98.6, Max: 99.4 (04-12 @ 18:35)  HR: 102 (11 - 123)  BP: 133/73 (111/61 - 165/92)  BP(mean): --  RR: 18 (18 - 18)  SpO2: 6% (6% - 99%)    PHYSICAL EXAM:  GENERAL: NAD, well-groomed, well-developed  EYES:  conjunctiva and sclera clear  ENMT:  Moist mucous membranes,   NECK: Supple, No JVD, Normal thyroid  NERVOUS SYSTEM:  Alert   no  focal  deficits;   CHEST/LUNG: Clear    HEART: Regular rate and rhythm; No murmurs, rubs, or gallops  ABDOMEN: Soft, Nontender, Nondistended; Bowel sounds present  EXTREMITIES:  no  edema no  tenderness  SKIN: No rashes   LABS:                        8.9    11.0  )-----------( 210      ( 13 Apr 2017 07:22 )             25.5     04-12    140  |  104  |  15  ----------------------------<  142<H>  4.4   |  28  |  0.38<L>    Ca    8.0<L>      12 Apr 2017 07:40  Phos  2.9     04-12  Mg     2.2     04-12    TPro  7.4  /  Alb  1.4<L>  /  TBili  0.3  /  DBili  x   /  AST  27  /  ALT  15  /  AlkPhos  48  04-12        CAPILLARY BLOOD GLUCOSE      RADIOLOGY & ADDITIONAL TESTS:    Imaging reports  Personally Reviewed:  [x ] YES  [ ] NO    Consultant(s) Notes Reviewed:  [ x] YES  [ ] NO    Care Discussed with Consultants/Other Providers [ x] YES  [ ] NO  COLO/VESCICULAR FISTULA proceed  as  per  surgery  metabolic  encephalopathy  supprtive care  check  non  contrast  Ct of  head  Problem/Plan - 1:  ·  Problem: Pneumonia.  Plan: zosyn  meropenem azithomycin  O2  duoneb. for  pulmonary  ID  evlals    Problem/Plan - 2:  ·  Problem: UTI (urinary tract infection).  Plan: zosyn.     Problem/Plan - 3:  ·  Problem: Parkinson disease encephalopathy.  Plan: sinemet discussed  with  neurology  as  to  further  adjustments/addition  to  treatment    Problem/Plan - 4:  ·  Problem: Altered mental status.  Plan: observation further w/u  and  treatment  as per  clinical  course.     Problem/Plan - 5:  ·  Problem: Anemia.  Plan: iron  folic  acid.   prognosis  guarded  will  discuss  with  franky if  parenteral  nutrition might  be  of  help

## 2017-04-13 NOTE — PROGRESS NOTE ADULT - SUBJECTIVE AND OBJECTIVE BOX
INTERVAL HPI/OVERNIGHT EVENTS:  87 y/o  white female, with hx Parkinson's Disease, with Fx of the Rt Femur in 3/17 and underwent Rt hip ORIF on 3/12 at [facility] with hospital course complicated by  RLL + RML PNA as seen on CT Chest on 3/17 and an EColi UTI, Treated then with Rocephin initially and changed to Meropenem + Vanco for about 5 days, then d/francine to [facility] on 3/22 on po Levaquin, readmitted via the ER on 4/4/17 with increasing lethargy, fever, and difficulty swallowing and suspected Sepsis likely from urinary origin, which later on grew Omrganella.  RRT on 04/07/17 for high fever, tachycardia. Suctioned thick secretions fro upper airway. Lethargic, not able to provide history.  Lethargic, responds to verbal commands.. IV not working per RN    Vital Signs Last 24 Hrs  T(C): 36.9, Max: 37.4 (04-12 @ 18:35)  T(F): 98.4, Max: 99.4 (04-12 @ 18:35)  HR: 90 (11 - 123)  BP: 139/76 (111/61 - 165/92)  BP(mean): --  RR: 18 (16 - 18)  SpO2: 100% (6% - 100%)    PHYSICAL EXAM:  GEN:        responsive to verbal commands but still lethargic.   HEENT:    NGt.    RESP:     crackles.  CVS:          Regular rate and rhythm.   ABD:         Soft, non-tender, non-distended;   EXTR:         2+ edema    MEDICATIONS  (STANDING):  enoxaparin Injectable 40milliGRAM(s) SubCutaneous every 24 hours  polyethylene glycol 3350 17Gram(s) Oral daily  folic acid 1milliGRAM(s) Oral daily  senna Syrup 10milliLiter(s) Oral at bedtime  losartan 50milliGRAM(s) Oral daily  azithromycin   Tablet 500milliGRAM(s) Oral daily  vancomycin  IVPB 1000milliGRAM(s) IV Intermittent every 24 hours  meropenem IVPB  IV Intermittent   ipratropium    for Nebulization 500MICROGram(s) Inhalation every 6 hours  acetylcysteine 10% Inhalation 2milliLiter(s) Inhalation every 6 hours  meropenem IVPB 1000milliGRAM(s) IV Intermittent every 8 hours  saliva substitute (BIOTENE MOISTURIZING MOUTH SPRAY) 1Spray(s) Topical two times a day  carbidopa/levodopa  25/100 1Tablet(s) Oral four times a day  pramipexole 0.25milliGRAM(s) Oral three times a day  dextrose 5% + sodium chloride 0.45%. 1000milliLiter(s) IV Continuous <Continuous>    MEDICATIONS  (PRN):  acetaminophen    Suspension 650milliGRAM(s) Oral every 6 hours PRN For Temp greater than 38 C (100.4 F)    LABS:                        8.9    11.0  )-----------( 210      ( 13 Apr 2017 07:22 )             25.5     04-13    138  |  100  |  15  ----------------------------<  129<H>  4.4   |  29  |  0.37<L>    Ca    8.0<L>      13 Apr 2017 07:22  Phos  2.9     04-12  Mg     2.2     04-12    TPro  7.3  /  Alb  1.4<L>  /  TBili  0.4  /  DBili  x   /  AST  27  /  ALT  10<L>  /  AlkPhos  49  04-13    ASSESSMENT AND PLAN:  ·	Altered mental status.  ·	Gram negative Sepsis with Morganella.  ·	UTI with Morganella  ·	RLL Pneumonia , likely gram negative(Complex).  ·	Leukocytosis.  ·	COLOVESICAL FISTULA.  ·	Parkinsonism  ·	Hypokalemia corrected.  ·	Hypernatremia resolved.  ·	Anemia.    For mid line Catheter placement.  Continue antibiotics.  Still requires suctioning for thick secretions.  Continue nebulizer and Mucomyst.

## 2017-04-13 NOTE — PROGRESS NOTE ADULT - SUBJECTIVE AND OBJECTIVE BOX
Patient seen and examined at bedside resting comfortably.   Vital Signs Last 24 Hrs  T(F): 98.4, Max: 99.4 (04-12 @ 18:35)  HR: 90  BP: 139/76  RR: 18  SpO2: 100%    GENERAL: Alert, NAD  CHEST/LUNG: Clear to auscultation bilaterally, respirations nonlabored  HEART: S1S2, Regular rate and rhythm;   ABDOMEN: + Bowel sounds, soft, Nontender, mild distention  EXTREMITIES:  anasarca  : shoemaker catheter in place draining clear yellow urine with small about of sediment noted: 1100ml/24 hours.     I&O's Detail    I & Os for current day (as of 13 Apr 2017 17:11)  =============================================  IN:    Total IN: 0 ml  ---------------------------------------------  OUT:    Indwelling Catheter - Urethral: 1100 ml    Total OUT: 1100 ml  ---------------------------------------------  Total NET: -1100 ml    LABS:                        8.9    11.0  )-----------( 210      ( 13 Apr 2017 07:22 )             25.5     04-13    138  |  100  |  15  ----------------------------<  129<H>  4.4   |  29  |  0.37<L>    Ca    8.0<L>      13 Apr 2017 07:22  Phos  2.9     04-12  Mg     2.2     04-12    TPro  7.3  /  Alb  1.4<L>  /  TBili  0.4  /  DBili  x   /  AST  27  /  ALT  10<L>  /  AlkPhos  49  04-13    86y old Female with PMH Pneumonia, Anemia, Parkinson disease admitted with sepsis secondary to multifocal pneumonia and urinary retention secondary to UTI secondary to colovesical fistula?     - continue shoemaker catheter, monitor urine output  - continue antibiotics per ID  - f/u labs  - May need OR for closure of fistula when medically stable  - May benefit from holding off on Jevity feedings and starting clear liquids through NGT for possible future surgical intervention once sepsis resolves  - if sepsis secondary to fistula and patient not improving on antibiotics with medical management may benefit from diverting loop colostomy   - continue current management per medicine  - midline placed at bedside. Doppler study pending  - will discuss with Dr. Bahena  - will discuss with Dr. Mosley Patient seen and examined at bedside resting comfortably.   Vital Signs Last 24 Hrs  T(F): 98.4, Max: 99.4 (04-12 @ 18:35)  HR: 90  BP: 139/76  RR: 18  SpO2: 100%    GENERAL: NAD  CHEST/LUNG: Clear to auscultation bilaterally, respirations nonlabored  HEART: S1S2, Regular rate and rhythm;   ABDOMEN: + Bowel sounds, soft, Nontender, mild distention  EXTREMITIES:  anasarca  : shoemaker catheter in place draining clear yellow urine with small about of sediment noted: 1100ml/24 hours.     I&O's Detail    I & Os for current day (as of 13 Apr 2017 17:11)  =============================================  IN:    Total IN: 0 ml  ---------------------------------------------  OUT:    Indwelling Catheter - Urethral: 1100 ml    Total OUT: 1100 ml  ---------------------------------------------  Total NET: -1100 ml    LABS:                        8.9    11.0  )-----------( 210      ( 13 Apr 2017 07:22 )             25.5     04-13    138  |  100  |  15  ----------------------------<  129<H>  4.4   |  29  |  0.37<L>    Ca    8.0<L>      13 Apr 2017 07:22  Phos  2.9     04-12  Mg     2.2     04-12    TPro  7.3  /  Alb  1.4<L>  /  TBili  0.4  /  DBili  x   /  AST  27  /  ALT  10<L>  /  AlkPhos  49  04-13    86y old Female with PMH Pneumonia, Anemia, Parkinson disease admitted with sepsis secondary to multifocal pneumonia and urinary retention secondary to UTI secondary to colovesical fistula?     - continue shoemaker catheter, monitor urine output  - continue antibiotics per ID  - f/u labs  - May need OR for closure of fistula when medically stable  - May benefit from holding off on Jevity feedings and starting clear liquids through NGT for possible future surgical intervention once sepsis resolves  - if sepsis secondary to fistula and patient not improving on antibiotics with medical management may benefit from diverting loop colostomy   - continue current management per medicine  - midline placed at bedside. Doppler study pending  - will discuss with Dr. Bahena  - will discuss with Dr. Mosley

## 2017-04-13 NOTE — PROGRESS NOTE ADULT - SUBJECTIVE AND OBJECTIVE BOX
TMAX - 99.8    On day # 7 Meropenem / #7 Vanco / #7 Zithromax    Vital Signs Last 24 Hrs  T(C): 37, Max: 37.4 (04-12 @ 18:35)  T(F): 98.6, Max: 99.4 (04-12 @ 18:35)  HR: 105 (11 - 110)  BP: 139/77 (111/61 - 140/85)  BP(mean): --  RR: 18 (16 - 18)  SpO2: 97% (6% - 100%)    Supplemental O2: on NC O2     Remains poorly responsive, following no commands.  S/p Lt arm Midline placement today.      PHYSICAL EXAM  General:  poorly responsive, lying in bed, moaning intermittently, with NGT feedings in progress and NCO2 in place   HEENT: conj pink, sclerae anicteric, PERRLA, no oral lesions noted   Neck:  semi-supple, no nodes noted  Heart:  RR  Lungs:  bilat rhonchi throughout  Abdomen:  soft, BS+, appears nontender  Extremities:  2+ edema LE's                       swelling of both arms and hands with scattered ecchymoses on arms and hands noted                   new Lt arm Midline in place - dressing intact, site clean- placed today  Skin:  warm, dry, no rash noted  Colon in place and draining cloudy yellow urine now      I&O's Summary:  I & Os for 24h ending 13 Apr 2017 07:00  =============================================  IN: 0 ml / OUT: 1100 ml / NET: -1100 ml    I & Os for current day (as of 13 Apr 2017 18:17)  =============================================  IN: 0 ml / OUT: 750 ml / NET: -750 ml      LABS:  CBC Full  -  ( 13 Apr 2017 07:22 )  WBC Count : 11.0 K/uL  Hemoglobin : 8.9 g/dL  Hematocrit : 25.5 %  Platelet Count - Automated : 210 K/uL  Mean Cell Volume : 88.2 fl  Mean Cell Hemoglobin : 30.9 pg  Mean Cell Hemoglobin Concentration : 35.0 gm/dL  Auto Neutrophil # : x  Auto Lymphocyte # : x  Auto Monocyte # : x  Auto Eosinophil # : x  Auto Basophil # : x  Auto Neutrophil % : x  Auto Lymphocyte % : x  Auto Monocyte % : x  Auto Eosinophil % : x  Auto Basophil % : x    04-13    138  |  100  |  15  ----------------------------<  129<H>  4.4   |  29  |  0.37<L>    Ca    8.0<L>      13 Apr 2017 07:22  Phos  2.9     04-12  Mg     2.2     04-12    TPro  7.3  /  Alb  1.4<L>  /  TBili  0.4  /  DBili  x   /  AST  27  /  ALT  10<L>  /  AlkPhos  49  04-13    LIVER FUNCTIONS - ( 13 Apr 2017 07:22 )  Alb: 1.4 g/dL / Pro: 7.3 gm/dL / ALK PHOS: 49 U/L / ALT: 10 U/L / AST: 27 U/L / GGT: x           Sedimentation Rate, Erythrocyte: >140 (04-09 @ 11:01)    Rheumatoid Factor Quant, Serum or Plasma: 7.1 IU/mL (04-09 @ 10:08)      CULTURES:  Specimen Source: .Sputum Sputum/TRAP (04-08 @ 07:12)  Culture Results:   Normal Respiratory Cindy present (04-08 @ 07:12)    Specimen Source: .Blood Blood (04-08 @ 00:21)  Culture Results:   No growth at 5 days. (04-08 @ 00:21)    Specimen Source: .Blood Blood (04-08 @ 00:21)  Culture Results:   No growth at 5 days. (04-08 @ 00:21)          Impression: Slowly improving with decreased temps and decreasing WBC's on present ab rx with Meropenem + Vanco + Zithromax although patient remains poorly responsive.    Suggestions: Will continue rx with Meropenem + Vanco and complete rx with Zithromax today.  Follow-up temps and labs closely and repeat CXR in AM.

## 2017-04-14 LAB
ALBUMIN SERPL ELPH-MCNC: 1.3 G/DL — LOW (ref 3.3–5)
ALP SERPL-CCNC: 53 U/L — SIGNIFICANT CHANGE UP (ref 40–120)
ALT FLD-CCNC: 9 U/L — LOW (ref 12–78)
ANION GAP SERPL CALC-SCNC: 10 MMOL/L — SIGNIFICANT CHANGE UP (ref 5–17)
AST SERPL-CCNC: 22 U/L — SIGNIFICANT CHANGE UP (ref 15–37)
BILIRUB SERPL-MCNC: 0.4 MG/DL — SIGNIFICANT CHANGE UP (ref 0.2–1.2)
BUN SERPL-MCNC: 14 MG/DL — SIGNIFICANT CHANGE UP (ref 7–23)
CALCIUM SERPL-MCNC: 8 MG/DL — LOW (ref 8.5–10.1)
CHLORIDE SERPL-SCNC: 95 MMOL/L — LOW (ref 96–108)
CO2 SERPL-SCNC: 30 MMOL/L — SIGNIFICANT CHANGE UP (ref 22–31)
CREAT SERPL-MCNC: 0.35 MG/DL — LOW (ref 0.5–1.3)
GLUCOSE SERPL-MCNC: 134 MG/DL — HIGH (ref 70–99)
HCT VFR BLD CALC: 24.6 % — LOW (ref 34.5–45)
HGB BLD-MCNC: 8.6 G/DL — LOW (ref 11.5–15.5)
MCHC RBC-ENTMCNC: 31 PG — SIGNIFICANT CHANGE UP (ref 27–34)
MCHC RBC-ENTMCNC: 35 GM/DL — SIGNIFICANT CHANGE UP (ref 32–36)
MCV RBC AUTO: 88.5 FL — SIGNIFICANT CHANGE UP (ref 80–100)
PLATELET # BLD AUTO: 266 K/UL — SIGNIFICANT CHANGE UP (ref 150–400)
POTASSIUM SERPL-MCNC: 4.4 MMOL/L — SIGNIFICANT CHANGE UP (ref 3.5–5.3)
POTASSIUM SERPL-SCNC: 4.4 MMOL/L — SIGNIFICANT CHANGE UP (ref 3.5–5.3)
PROT SERPL-MCNC: 7.4 GM/DL — SIGNIFICANT CHANGE UP (ref 6–8.3)
RBC # BLD: 2.78 M/UL — LOW (ref 3.8–5.2)
RBC # FLD: 13.6 % — SIGNIFICANT CHANGE UP (ref 11–15)
SODIUM SERPL-SCNC: 135 MMOL/L — SIGNIFICANT CHANGE UP (ref 135–145)
WBC # BLD: 11.3 K/UL — HIGH (ref 3.8–10.5)
WBC # FLD AUTO: 11.3 K/UL — HIGH (ref 3.8–10.5)

## 2017-04-14 PROCEDURE — 71010: CPT | Mod: 26

## 2017-04-14 RX ADMIN — SENNA PLUS 10 MILLILITER(S): 8.6 TABLET ORAL at 23:00

## 2017-04-14 RX ADMIN — PRAMIPEXOLE DIHYDROCHLORIDE 0.25 MILLIGRAM(S): 0.12 TABLET ORAL at 23:00

## 2017-04-14 RX ADMIN — PRAMIPEXOLE DIHYDROCHLORIDE 0.25 MILLIGRAM(S): 0.12 TABLET ORAL at 19:29

## 2017-04-14 RX ADMIN — Medication 500 MICROGRAM(S): at 11:10

## 2017-04-14 RX ADMIN — CARBIDOPA AND LEVODOPA 1 TABLET(S): 25; 100 TABLET ORAL at 05:17

## 2017-04-14 RX ADMIN — ENOXAPARIN SODIUM 40 MILLIGRAM(S): 100 INJECTION SUBCUTANEOUS at 05:18

## 2017-04-14 RX ADMIN — PRAMIPEXOLE DIHYDROCHLORIDE 0.25 MILLIGRAM(S): 0.12 TABLET ORAL at 05:17

## 2017-04-14 RX ADMIN — CARBIDOPA AND LEVODOPA 1 TABLET(S): 25; 100 TABLET ORAL at 19:30

## 2017-04-14 RX ADMIN — CARBIDOPA AND LEVODOPA 1 TABLET(S): 25; 100 TABLET ORAL at 01:56

## 2017-04-14 RX ADMIN — MEROPENEM 200 MILLIGRAM(S): 1 INJECTION INTRAVENOUS at 15:23

## 2017-04-14 RX ADMIN — Medication 2 MILLILITER(S): at 06:21

## 2017-04-14 RX ADMIN — LOSARTAN POTASSIUM 50 MILLIGRAM(S): 100 TABLET, FILM COATED ORAL at 05:17

## 2017-04-14 RX ADMIN — Medication 2 MILLILITER(S): at 00:08

## 2017-04-14 RX ADMIN — Medication 1 SPRAY(S): at 19:29

## 2017-04-14 RX ADMIN — Medication 250 MILLIGRAM(S): at 03:51

## 2017-04-14 RX ADMIN — Medication 500 MICROGRAM(S): at 17:14

## 2017-04-14 RX ADMIN — MEROPENEM 200 MILLIGRAM(S): 1 INJECTION INTRAVENOUS at 23:00

## 2017-04-14 RX ADMIN — Medication 1 SPRAY(S): at 05:17

## 2017-04-14 RX ADMIN — Medication 2 MILLILITER(S): at 17:14

## 2017-04-14 RX ADMIN — POLYETHYLENE GLYCOL 3350 17 GRAM(S): 17 POWDER, FOR SOLUTION ORAL at 23:00

## 2017-04-14 RX ADMIN — CARBIDOPA AND LEVODOPA 1 TABLET(S): 25; 100 TABLET ORAL at 11:06

## 2017-04-14 RX ADMIN — Medication 500 MICROGRAM(S): at 00:09

## 2017-04-14 RX ADMIN — Medication 1 MILLIGRAM(S): at 11:06

## 2017-04-14 RX ADMIN — Medication 2 MILLILITER(S): at 11:10

## 2017-04-14 RX ADMIN — Medication 500 MICROGRAM(S): at 06:21

## 2017-04-14 RX ADMIN — MEROPENEM 200 MILLIGRAM(S): 1 INJECTION INTRAVENOUS at 05:17

## 2017-04-14 NOTE — PROGRESS NOTE ADULT - SUBJECTIVE AND OBJECTIVE BOX
INTERVAL HPI/OVERNIGHT EVENTS:        REVIEW OF SYSTEMS:  CONSTITUTIONAL:  lethargic  opens  eyes does not follow  commands      MEDICATION:  enoxaparin Injectable 40milliGRAM(s) SubCutaneous every 24 hours  polyethylene glycol 3350 17Gram(s) Oral daily  folic acid 1milliGRAM(s) Oral daily  senna Syrup 10milliLiter(s) Oral at bedtime  acetaminophen    Suspension 650milliGRAM(s) Oral every 6 hours PRN  losartan 50milliGRAM(s) Oral daily  vancomycin  IVPB 1000milliGRAM(s) IV Intermittent every 24 hours  meropenem IVPB  IV Intermittent   ipratropium    for Nebulization 500MICROGram(s) Inhalation every 6 hours  acetylcysteine 10% Inhalation 2milliLiter(s) Inhalation every 6 hours  meropenem IVPB 1000milliGRAM(s) IV Intermittent every 8 hours  saliva substitute (BIOTENE MOISTURIZING MOUTH SPRAY) 1Spray(s) Topical two times a day  carbidopa/levodopa  25/100 1Tablet(s) Oral four times a day  pramipexole 0.25milliGRAM(s) Oral three times a day  dextrose 5% + sodium chloride 0.45%. 1000milliLiter(s) IV Continuous <Continuous>    Vital Signs Last 24 Hrs  T(C): 37.1, Max: 37.3 (04-14 @ 00:01)  T(F): 98.7, Max: 99.2 (04-14 @ 00:01)  HR: 92 (90 - 105)  BP: 144/78 (129/74 - 144/78)  BP(mean): --  RR: 18 (16 - 18)  SpO2: 98% (97% - 100%)    PHYSICAL EXAM:  GENERAL: NAD, well-groomed, well-developed  EYES:  conjunctiva and sclera clear  ENMT:  Moist mucous membranes,   NECK: Supple, No JVD, Normal thyroid  NERVOUS SYSTEM:  moves  alll  extr  CHEST/LUNG: Clear    HEART: Regular rate and rhythm; No murmurs, rubs, or gallops  ABDOMEN: Soft, Nontender, Nondistended; Bowel sounds present  EXTREMITIES:  edema  rt  forearm  SKIN: No rashes   LABS:                        8.9    11.0  )-----------( 210      ( 13 Apr 2017 07:22 )             25.5     04-13    138  |  100  |  15  ----------------------------<  129<H>  4.4   |  29  |  0.37<L>    Ca    8.0<L>      13 Apr 2017 07:22  Phos  2.9     04-12  Mg     2.2     04-12    TPro  7.3  /  Alb  1.4<L>  /  TBili  0.4  /  DBili  x   /  AST  27  /  ALT  10<L>  /  AlkPhos  49  04-13        CAPILLARY BLOOD GLUCOSE      RADIOLOGY & ADDITIONAL TESTS:    Imaging reports  Personally Reviewed:  [x ] YES  [ ] NO    Consultant(s) Notes Reviewed:  [ x] YES  [ ] NO    Care Discussed with Consultants/Other Providers [x ] YES  [ ] NO  COLO/VESCICULAR FISTULA proceed  as  per  surgery  discussed  with  Urology  to  continue NGT feedings monitor  labs  metabolic  encephalopathy  supprtive care    Problem/Plan - 1:  ·  Problem: Pneumonia.  Plan: zosyn  meropenem azithomycin  O2  duoneb. for  pulmonary  ID  evlals    Problem/Plan - 2:  ·  Problem: UTI (urinary tract infection).  Plan: zosyn.     Problem/Plan - 3:  ·  Problem: Parkinson disease encephalopathy.  Plan: sinemet discussed  with  neurology  as  to  further  adjustments/addition  to  treatment    Problem/Plan - 4:  ·  Problem: Altered mental status.  Plan: observation further w/u  and  treatment  as per  clinical  course.     Problem/Plan - 5:  ·  Problem: Anemia.  Plan: iron  folic  acid.   prognosis  guarded

## 2017-04-14 NOTE — PROGRESS NOTE ADULT - SUBJECTIVE AND OBJECTIVE BOX
General surgery and Urology Note:    Patient seen and examined at bedside resting comfortably. Continues to be lethargic.     Vital Signs Last 24 Hrs  T(F): 98.7, Max: 99.2 (04-14 @ 00:01)  HR: 92  BP: 144/78  RR: 18  SpO2: 98%    GENERAL: Lethargic, NAD  CHEST/LUNG: Course breath sounds bilaterally.   HEART: S1S2, Regular rate and rhythm; NSR on telemetry around 90bpm with occasional tachycardia/SVT  ABDOMEN: + Bowel sounds, soft, Nontender, Nondistended  EXTREMITIES:  bilateral upper extremity edema. LUE midline present with IV antibiotics running. Right hand ecchymosis noted  : Shoemaker in place draining clear yellow urine with sediment in tubing. 1350ml/24hours    I&O's Detail    I & Os for current day (as of 14 Apr 2017 09:35)  =============================================  IN:    Total IN: 0 ml  ---------------------------------------------  OUT:    Indwelling Catheter - Urethral: 1350 ml    Total OUT: 1350 ml  ---------------------------------------------  Total NET: -1350 ml    LABS:                        8.6    11.3  )-----------( 266      ( 14 Apr 2017 08:02 )             24.6     04-14    135  |  95<L>  |  14  ----------------------------<  134<H>  4.4   |  30  |  0.35<L>    Ca    8.0<L>      14 Apr 2017 08:02    TPro  7.4  /  Alb  1.3<L>  /  TBili  0.4  /  DBili  x   /  AST  22  /  ALT  9<L>  /  AlkPhos  53  04-14    86y old Female with PMH Pneumonia, Anemia, Parkinson disease admitted with sepsis secondary to multifocal pneumonia and urinary retention secondary to UTI secondary to colovesical fistula? Parkinson's and toxic metabolic encephalopathy    - continue shoemaker catheter, monitor urine output  - continue IV antibiotics per ID  - f/u labs  - May need OR for closure of fistula when medically stable  - if sepsis secondary to fistula and patient not improving on antibiotics with medical management may benefit from diverting loop colostomy   - continue current management per medicine  - will discuss with Dr. Bahena  - will discuss with Dr. Mosley

## 2017-04-14 NOTE — PROGRESS NOTE ADULT - SUBJECTIVE AND OBJECTIVE BOX
INTERVAL HPI/OVERNIGHT EVENTS:  opens eyes. congested    Vital Signs Last 24 Hrs  T(C): 37.2, Max: 37.3 (04-14 @ 00:01)  T(F): 99, Max: 99.2 (04-14 @ 00:01)  HR: 106 (92 - 119)  BP: 152/73 (137/82 - 152/73)  BP(mean): --  RR: 18 (18 - 18)  SpO2: 97% (96% - 100%)        PHYSICAL EXAM:  GEN:         Awake, responsive and comfortable.  HEENT:    Normal.    RESP:    bilat rhonchi, dullness at right base  CVS:             Regular rate and rhythm.   ABD:         Soft, non-tender, non-distended;   :             No costovertebral angle tenderness  EXTR:            No clubbing, cyanosis or edema  CNS:              Intact sensory and motor function.        MEDICATIONS  (STANDING):  enoxaparin Injectable 40milliGRAM(s) SubCutaneous every 24 hours  polyethylene glycol 3350 17Gram(s) Oral daily  folic acid 1milliGRAM(s) Oral daily  senna Syrup 10milliLiter(s) Oral at bedtime  losartan 50milliGRAM(s) Oral daily  vancomycin  IVPB 1000milliGRAM(s) IV Intermittent every 24 hours  meropenem IVPB  IV Intermittent   ipratropium    for Nebulization 500MICROGram(s) Inhalation every 6 hours  acetylcysteine 10% Inhalation 2milliLiter(s) Inhalation every 6 hours  meropenem IVPB 1000milliGRAM(s) IV Intermittent every 8 hours  saliva substitute (BIOTENE MOISTURIZING MOUTH SPRAY) 1Spray(s) Topical two times a day  carbidopa/levodopa  25/100 1Tablet(s) Oral four times a day  pramipexole 0.25milliGRAM(s) Oral three times a day  dextrose 5% + sodium chloride 0.45%. 1000milliLiter(s) IV Continuous <Continuous>    MEDICATIONS  (PRN):  acetaminophen    Suspension 650milliGRAM(s) Oral every 6 hours PRN For Temp greater than 38 C (100.4 F)        LABS:                        8.6    11.3  )-----------( 266      ( 14 Apr 2017 08:02 )             24.6     04-14    135  |  95<L>  |  14  ----------------------------<  134<H>  4.4   |  30  |  0.35<L>    Ca    8.0<L>      14 Apr 2017 08:02    TPro  7.4  /  Alb  1.3<L>  /  TBili  0.4  /  DBili  x   /  AST  22  /  ALT  9<L>  /  AlkPhos  53  04-14              RADIOLOGY & ADDITIONAL STUDIES:  EXAM:  CHEST SINGLE VIEW                            PROCEDURE DATE:  04/14/2017        INTERPRETATION:    DATE OF STUDY: 4/14/17.    PRIOR: 4/7/17.    CLINICAL INDICATION: Shortness of breath; follow-up of pneumonia.     TECHNIQUE: portable chest.    FINDINGS:   Enteric tube tip is appropriately situated in the stomach.  There is limited by low lung volumes and patient positioning.  It is difficult to evaluate heart size on this projection. Mild increase   in right pleural effusion. Persistent pneumonia in mid and lower right   lung.  Increasing left retrocardiac atelectasis but no clear-cut left-sided   focal infiltrate  No left pleural effusion. No pneumothorax    IMPRESSION:   Mild increase in right pleural effusion. Persistent lower right lung   pneumonia.  Increasing left retrocardiac atelectasis but no clear-cut, focal.   Left-sided pneumonia seen              EZEQUIEL VORA M.D., ATTENDING RADIOLOGIST  This document has been electronically signed. Apr 14 2017  8:15AM                ASSESSMENT AND PLAN: right LL pneumonia with parapneumonic effusion. to continue abs inprogress. bronchodilator with mucomyst. suction PRN

## 2017-04-14 NOTE — PROGRESS NOTE ADULT - SUBJECTIVE AND OBJECTIVE BOX
TMAX - 99.2    On day # 8 Meropenem / # 8 Vanco / Zithromax rx completed     Vital Signs Last 24 Hrs  T(C): 37.2, Max: 37.3 (04-14 @ 00:01)  T(F): 99, Max: 99.2 (04-14 @ 00:01)  HR: 106 (92 - 119)  BP: 152/73 (137/82 - 152/73)  BP(mean): --  RR: 18 (18 - 18)  SpO2: 97% (96% - 100%)  Supplemental O2: on NC O2    Remains nonverbal, but opening her eyes and appears to be looking around.  Following no commands though.      PHYSICAL EXAM  General:  arousable, lying semi-upright in bed, with NGT feedings ongoing, and noted intermittent cough with audible congestion and intermittent moaning   HEENT:  conj pink, sclerae anicteric, PERRLA, no oral lesions noted  Neck: supple, no nodes noted  Heart: RR   Lungs:  bilat moist rhonchi throughout, no wheezing noted  Abdomen: soft, BS +, nontender appearance  Extremities: 2+ edema LE's and hands and arms as well                     Lt. arm Midline in place now - site clean, dressing intact, placed 4/13  Skin:  warm, dry, few scattered ecchymoses on arms and hands  Colon in place and draining cloudy yellow urine now    I&O's Summary:    I & Os for current day (as of 14 Apr 2017 18:01)  =============================================  IN: 0 ml / OUT: 1350 ml / NET: -1350 ml      LABS:  CBC Full  -  ( 14 Apr 2017 08:02 )  WBC Count : 11.3 K/uL  Hemoglobin : 8.6 g/dL  Hematocrit : 24.6 %  Platelet Count - Automated : 266 K/uL  Mean Cell Volume : 88.5 fl  Mean Cell Hemoglobin : 31.0 pg  Mean Cell Hemoglobin Concentration : 35.0 gm/dL  Auto Neutrophil # : x  Auto Lymphocyte # : x  Auto Monocyte # : x  Auto Eosinophil # : x  Auto Basophil # : x  Auto Neutrophil % : x  Auto Lymphocyte % : x  Auto Monocyte % : x  Auto Eosinophil % : x  Auto Basophil % : x    04-14    135  |  95<L>  |  14  ----------------------------<  134<H>  4.4   |  30  |  0.35<L>    Ca    8.0<L>      14 Apr 2017 08:02    TPro  7.4  /  Alb  1.3<L>  /  TBili  0.4  /  DBili  x   /  AST  22  /  ALT  9<L>  /  AlkPhos  53  04-14    LIVER FUNCTIONS - ( 14 Apr 2017 08:02 )  Alb: 1.3 g/dL / Pro: 7.4 gm/dL / ALK PHOS: 53 U/L / ALT: 9 U/L / AST: 22 U/L / GGT: x             CULTURES:  Specimen Source: .Sputum Sputum/TRAP (04-08 @ 07:12)  Culture Results:   Normal Respiratory Cindy present (04-08 @ 07:12)    Specimen Source: .Blood Blood (04-08 @ 00:21)  Culture Results:   No growth at 5 days. (04-08 @ 00:21)    Specimen Source: .Blood Blood (04-08 @ 00:21)  Culture Results:   No growth at 5 days. (04-08 @ 00:21)        Radiology: CXR - 4/14/17 -      IMPRESSION:   Mild increase in right pleural effusion. Persistent lower right lung   pneumonia.  Increasing left retrocardiac atelectasis but no clear-cut, focal.   Left-sided pneumonia seen        Impression: Slowly improving with decreased temps and decreasing WBC's, although still with pulmonary congestion.    Suggestions: Will continue present ab rx and follow-up temps and labs closely and follow-up CXR again in a few days.   Discussed with family at bedside.

## 2017-04-15 LAB
ANION GAP SERPL CALC-SCNC: 11 MMOL/L — SIGNIFICANT CHANGE UP (ref 5–17)
BUN SERPL-MCNC: 12 MG/DL — SIGNIFICANT CHANGE UP (ref 7–23)
CALCIUM SERPL-MCNC: 8.2 MG/DL — LOW (ref 8.5–10.1)
CHLORIDE SERPL-SCNC: 94 MMOL/L — LOW (ref 96–108)
CO2 SERPL-SCNC: 30 MMOL/L — SIGNIFICANT CHANGE UP (ref 22–31)
CREAT SERPL-MCNC: 0.36 MG/DL — LOW (ref 0.5–1.3)
GLUCOSE SERPL-MCNC: 125 MG/DL — HIGH (ref 70–99)
HCT VFR BLD CALC: 21.9 % — LOW (ref 34.5–45)
HGB BLD-MCNC: 7.2 G/DL — LOW (ref 11.5–15.5)
MCHC RBC-ENTMCNC: 29.1 PG — SIGNIFICANT CHANGE UP (ref 27–34)
MCHC RBC-ENTMCNC: 32.8 GM/DL — SIGNIFICANT CHANGE UP (ref 32–36)
MCV RBC AUTO: 88.6 FL — SIGNIFICANT CHANGE UP (ref 80–100)
PLATELET # BLD AUTO: 294 K/UL — SIGNIFICANT CHANGE UP (ref 150–400)
POTASSIUM SERPL-MCNC: 4 MMOL/L — SIGNIFICANT CHANGE UP (ref 3.5–5.3)
POTASSIUM SERPL-SCNC: 4 MMOL/L — SIGNIFICANT CHANGE UP (ref 3.5–5.3)
RBC # BLD: 2.48 M/UL — LOW (ref 3.8–5.2)
RBC # FLD: 14.5 % — SIGNIFICANT CHANGE UP (ref 11–15)
SODIUM SERPL-SCNC: 135 MMOL/L — SIGNIFICANT CHANGE UP (ref 135–145)
WBC # BLD: 11.4 K/UL — HIGH (ref 3.8–10.5)
WBC # FLD AUTO: 11.4 K/UL — HIGH (ref 3.8–10.5)

## 2017-04-15 RX ORDER — ACETAMINOPHEN 500 MG
650 TABLET ORAL EVERY 6 HOURS
Qty: 0 | Refills: 0 | Status: DISCONTINUED | OUTPATIENT
Start: 2017-04-15 | End: 2017-04-28

## 2017-04-15 RX ADMIN — Medication 1 SPRAY(S): at 05:51

## 2017-04-15 RX ADMIN — CARBIDOPA AND LEVODOPA 1 TABLET(S): 25; 100 TABLET ORAL at 18:57

## 2017-04-15 RX ADMIN — Medication 1 SPRAY(S): at 18:56

## 2017-04-15 RX ADMIN — PRAMIPEXOLE DIHYDROCHLORIDE 0.25 MILLIGRAM(S): 0.12 TABLET ORAL at 21:16

## 2017-04-15 RX ADMIN — CARBIDOPA AND LEVODOPA 1 TABLET(S): 25; 100 TABLET ORAL at 13:20

## 2017-04-15 RX ADMIN — CARBIDOPA AND LEVODOPA 1 TABLET(S): 25; 100 TABLET ORAL at 00:52

## 2017-04-15 RX ADMIN — Medication 650 MILLIGRAM(S): at 23:50

## 2017-04-15 RX ADMIN — CARBIDOPA AND LEVODOPA 1 TABLET(S): 25; 100 TABLET ORAL at 23:50

## 2017-04-15 RX ADMIN — Medication 2 MILLILITER(S): at 17:15

## 2017-04-15 RX ADMIN — Medication 2 MILLILITER(S): at 11:52

## 2017-04-15 RX ADMIN — Medication 500 MICROGRAM(S): at 23:12

## 2017-04-15 RX ADMIN — Medication 500 MICROGRAM(S): at 17:15

## 2017-04-15 RX ADMIN — Medication 500 MICROGRAM(S): at 00:11

## 2017-04-15 RX ADMIN — LOSARTAN POTASSIUM 50 MILLIGRAM(S): 100 TABLET, FILM COATED ORAL at 05:53

## 2017-04-15 RX ADMIN — Medication 1 MILLIGRAM(S): at 13:20

## 2017-04-15 RX ADMIN — Medication 2 MILLILITER(S): at 23:12

## 2017-04-15 RX ADMIN — POLYETHYLENE GLYCOL 3350 17 GRAM(S): 17 POWDER, FOR SOLUTION ORAL at 21:16

## 2017-04-15 RX ADMIN — Medication 250 MILLIGRAM(S): at 04:05

## 2017-04-15 RX ADMIN — Medication 500 MICROGRAM(S): at 06:50

## 2017-04-15 RX ADMIN — CARBIDOPA AND LEVODOPA 1 TABLET(S): 25; 100 TABLET ORAL at 05:50

## 2017-04-15 RX ADMIN — SODIUM CHLORIDE 50 MILLILITER(S): 9 INJECTION, SOLUTION INTRAVENOUS at 05:51

## 2017-04-15 RX ADMIN — ENOXAPARIN SODIUM 40 MILLIGRAM(S): 100 INJECTION SUBCUTANEOUS at 05:51

## 2017-04-15 RX ADMIN — SENNA PLUS 10 MILLILITER(S): 8.6 TABLET ORAL at 21:16

## 2017-04-15 RX ADMIN — PRAMIPEXOLE DIHYDROCHLORIDE 0.25 MILLIGRAM(S): 0.12 TABLET ORAL at 13:20

## 2017-04-15 RX ADMIN — Medication 2 MILLILITER(S): at 00:11

## 2017-04-15 RX ADMIN — Medication 500 MICROGRAM(S): at 11:53

## 2017-04-15 RX ADMIN — MEROPENEM 200 MILLIGRAM(S): 1 INJECTION INTRAVENOUS at 05:50

## 2017-04-15 RX ADMIN — Medication 2 MILLILITER(S): at 06:50

## 2017-04-15 RX ADMIN — MEROPENEM 200 MILLIGRAM(S): 1 INJECTION INTRAVENOUS at 21:16

## 2017-04-15 RX ADMIN — MEROPENEM 200 MILLIGRAM(S): 1 INJECTION INTRAVENOUS at 14:02

## 2017-04-15 RX ADMIN — PRAMIPEXOLE DIHYDROCHLORIDE 0.25 MILLIGRAM(S): 0.12 TABLET ORAL at 05:50

## 2017-04-15 NOTE — PROGRESS NOTE ADULT - SUBJECTIVE AND OBJECTIVE BOX
TMAX - 99.2    On day # 9 Meropenem / #9 Vanco     Vital Signs Last 24 Hrs  T(C): 37.1, Max: 37.1 (04-14 @ 20:48)  T(F): 98.7, Max: 98.8 (04-14 @ 20:48)  HR: 928 (34 - 928)  BP: 150/75 (120/66 - 150/77)  BP(mean): --  RR: 16 (16 - 18)  SpO2: 95% (92% - 99%)  Supplemental O2:  on NC O2    Patient remains nonverbal and follow no commands.  Found with Lt arm Midline out in the bed this afternoon.  Peripheral IV restarted now in Rt arm.      PHYSICAL EXAM  General:  opens her eyes and appears to follow  with her eyes, but remains non-verbal and following no commands, but moans intemittently, lying in bed with NC O2 in place, intermittent                 cough noted  HEENT:  conj pink, sclerae anicteric, PERRLA, no oral lesions noted, NGT in place with feedings in progress.  Neck:  supple, no nodes noted  Heart: RR   Lungs:  bilat rhonchi especially mid and upper airways, with decreased BS at bases bilat.  Abdomen:  soft, BS +, seems nontender  Extremities: 2+ edema LE's                     arms and hands remain with swelling and slowly resolving ecchymoses   Skin: warm, dry, no rash noted  Colon remains in place with cloudy dark yellow urine in tubing and bag     I&O's Summary :    I & Os for current day (as of 15 Apr 2017 18:04)  =============================================  IN: 0 ml / OUT: 700 ml / NET: -700 ml        LABS:  CBC Full  -  ( 15 Apr 2017 08:11 )  WBC Count : 11.4 K/uL  Hemoglobin : 7.2 g/dL  Hematocrit : 21.9 %  Platelet Count - Automated : 294 K/uL  Mean Cell Volume : 88.6 fl  Mean Cell Hemoglobin : 29.1 pg  Mean Cell Hemoglobin Concentration : 32.8 gm/dL  Auto Neutrophil # : x  Auto Lymphocyte # : x  Auto Monocyte # : x  Auto Eosinophil # : x  Auto Basophil # : x  Auto Neutrophil % : x  Auto Lymphocyte % : x  Auto Monocyte % : x  Auto Eosinophil % : x  Auto Basophil % : x    04-15    135  |  94<L>  |  12  ----------------------------<  125<H>  4.0   |  30  |  0.36<L>    Ca    8.2<L>      15 Apr 2017 08:11    TPro  7.4  /  Alb  1.3<L>  /  TBili  0.4  /  DBili  x   /  AST  22  /  ALT  9<L>  /  AlkPhos  53  04-14    LIVER FUNCTIONS - ( 14 Apr 2017 08:02 )  Alb: 1.3 g/dL / Pro: 7.4 gm/dL / ALK PHOS: 53 U/L / ALT: 9 U/L / AST: 22 U/L / GGT: x               Radiology:  CXR -  IMPRESSION:   Mild increase in right pleural effusion. Persistent lower right lung   pneumonia.  Increasing left retrocardiac atelectasis but no clear-cut, focal.   Left-sided pneumonia seen        Impression: Slowly improving Multifocal PNA/ ? aspiration on present ab rx and with [place]-vesicle fistula, but still with ongoing mental status changes.    Suggestions: Will continue present ab rx with Meropenem + Vanco and continue to follow-up temps and labs.  Repeat CXR again in a few days.  May require replacement of Midline to assure continued IV access ( presently with new Rt arm IV Hep Lock in place).

## 2017-04-15 NOTE — PROGRESS NOTE ADULT - SUBJECTIVE AND OBJECTIVE BOX
INTERVAL HPI/OVERNIGHT EVENTS:        REVIEW OF SYSTEMS:  CONSTITUTIONAL: patient  more  alert able  to  answer  appropriately  to  some  questions      MEDICATION:  enoxaparin Injectable 40milliGRAM(s) SubCutaneous every 24 hours  polyethylene glycol 3350 17Gram(s) Oral daily  folic acid 1milliGRAM(s) Oral daily  senna Syrup 10milliLiter(s) Oral at bedtime  acetaminophen    Suspension 650milliGRAM(s) Oral every 6 hours PRN  losartan 50milliGRAM(s) Oral daily  vancomycin  IVPB 1000milliGRAM(s) IV Intermittent every 24 hours  meropenem IVPB  IV Intermittent   ipratropium    for Nebulization 500MICROGram(s) Inhalation every 6 hours  acetylcysteine 10% Inhalation 2milliLiter(s) Inhalation every 6 hours  meropenem IVPB 1000milliGRAM(s) IV Intermittent every 8 hours  saliva substitute (BIOTENE MOISTURIZING MOUTH SPRAY) 1Spray(s) Topical two times a day  carbidopa/levodopa  25/100 1Tablet(s) Oral four times a day  pramipexole 0.25milliGRAM(s) Oral three times a day  dextrose 5% + sodium chloride 0.45%. 1000milliLiter(s) IV Continuous <Continuous>    Vital Signs Last 24 Hrs  T(C): 36.9, Max: 37.2 (04-14 @ 17:01)  T(F): 98.4, Max: 99 (04-14 @ 17:01)  HR: 84 (34 - 119)  BP: 134/55 (120/66 - 152/73)  BP(mean): --  RR: 17 (17 - 18)  SpO2: 96% (96% - 100%)    PHYSICAL EXAM:  GENERAL: NAD, well-groomed, well-developed  EYES:  conjunctiva and sclera clear  ENMT:  Moist mucous membranes,   NECK: Supple, No JVD, Normal thyroid  NERVOUS SYSTEM:  Alert oriented   no  focal  deficits;   CHEST/LUNG: Clear    HEART: Regular rate and rhythm; No murmurs, rubs, or gallops  ABDOMEN: Soft, Nontender, Nondistended; Bowel sounds present  EXTREMITIES:  no  edema no  tenderness  SKIN: No rashes   LABS:                        7.2    11.4  )-----------( 294      ( 15 Apr 2017 08:11 )             21.9     04-15    135  |  94<L>  |  12  ----------------------------<  125<H>  4.0   |  30  |  0.36<L>    Ca    8.2<L>      15 Apr 2017 08:11    TPro  7.4  /  Alb  1.3<L>  /  TBili  0.4  /  DBili  x   /  AST  22  /  ALT  9<L>  /  AlkPhos  53  04-14    TTE   Summary:   1. Left ventricular ejection fraction, by visual estimation, is 35 to   40%.   2. Mildly to moderately decreased global left ventricular systolic   function.   3. Spectral Doppler shows pseudonormal pattern of left ventricular   myocardial filling (Grade II diastolic dysfunction).   4. Normal right ventricular size and function.   5. The right atrium is normal in size.   6. Structurally normal mitral valve, with normal leaflet excursion.   7. Structurally normal tricuspid valve, with normal leaflet excursion.   8. Mild to moderate aortic regurgitation.   9. Normal trileaflet aortic valve with normal opening.  10. Structurally normal pulmonic valve, with normal leaflet excursion.  11. Estimated pulmonary artery systolic pressure is 37.0 mmHg assuming a   right atrial pressure of 5 mmHg, which is consistent with borderline   pulmonary hypertension.  12. Mildly    CAPILLARY BLOOD GLUCOSE      RADIOLOGY & ADDITIONAL TESTS:    Imaging reports  Personally Reviewed:  [x ] YES  [ ] NO    Consultant(s) Notes Reviewed:  [x ] YES  [ ] NO    Care Discussed with Consultants/Other Providers [x ] YES  [ ] NO  COLO/VESCICULAR FISTULA proceed  as  per  surgery  discussed  with  Urology  to  continue NGT feedings monitor  labs  metabolic  encephalopathy  supprtive care    Problem/Plan - 1:  ·  Problem: Pneumonia.  Plan: zosyn  meropenem azithomycin  O2  duoneb. for  pulmonary  ID  evlals    Problem/Plan - 2:  ·  Problem: UTI (urinary tract infection).  Plan: zosyn.     Problem/Plan - 3:  ·  Problem: Parkinson disease encephalopathy.  Plan: sinemet discussed  with  neurology  as  to  further  adjustments/addition  to  treatment    Problem/Plan - 4:  ·  Problem: Altered mental status.  Plan: observation further w/u  and  treatment  as per  clinical  course.     Problem/Plan - 5:  ·  Problem: Anemia.  Plan: iron  folic  acid.   prognosis  guarded  discussed  with  pt's  son

## 2017-04-15 NOTE — PROGRESS NOTE ADULT - SUBJECTIVE AND OBJECTIVE BOX
INTERVAL HPI/OVERNIGHT EVENTS:  lethargic, ngt feedings in progress    Vital Signs Last 24 Hrs  T(C): 37.1, Max: 37.1 (04-14 @ 20:48)  T(F): 98.7, Max: 98.8 (04-14 @ 20:48)  HR: 928 (34 - 928)  BP: 150/75 (120/66 - 150/77)  BP(mean): --  RR: 16 (16 - 18)  SpO2: 95% (92% - 99%)        PHYSICAL EXAM:  GEN:         Awake, responsive and comfortable.  HEENT:    Normal.    RESP:       bilat rhonchi  CVS:             Regular rate and rhythm.   ABD:         Soft, non-tender, non-distended;   :             No costovertebral angle tenderness  EXTR:            No clubbing, cyanosis or edema        MEDICATIONS  (STANDING):  enoxaparin Injectable 40milliGRAM(s) SubCutaneous every 24 hours  polyethylene glycol 3350 17Gram(s) Oral daily  folic acid 1milliGRAM(s) Oral daily  senna Syrup 10milliLiter(s) Oral at bedtime  losartan 50milliGRAM(s) Oral daily  vancomycin  IVPB 1000milliGRAM(s) IV Intermittent every 24 hours  meropenem IVPB  IV Intermittent   ipratropium    for Nebulization 500MICROGram(s) Inhalation every 6 hours  acetylcysteine 10% Inhalation 2milliLiter(s) Inhalation every 6 hours  meropenem IVPB 1000milliGRAM(s) IV Intermittent every 8 hours  saliva substitute (BIOTENE MOISTURIZING MOUTH SPRAY) 1Spray(s) Topical two times a day  carbidopa/levodopa  25/100 1Tablet(s) Oral four times a day  pramipexole 0.25milliGRAM(s) Oral three times a day    MEDICATIONS  (PRN):  acetaminophen    Suspension 650milliGRAM(s) Oral every 6 hours PRN For Temp greater than 38 C (100.4 F)  acetaminophen    Suspension. 650milliGRAM(s) Enteral Tube every 6 hours PRN Mild Pain (1 - 3)        LABS:                        7.2    11.4  )-----------( 294      ( 15 Apr 2017 08:11 )             21.9     04-15    135  |  94<L>  |  12  ----------------------------<  125<H>  4.0   |  30  |  0.36<L>    Ca    8.2<L>      15 Apr 2017 08:11    TPro  7.4  /  Alb  1.3<L>  /  TBili  0.4  /  DBili  x   /  AST  22  /  ALT  9<L>  /  AlkPhos  53  04-14              RADIOLOGY & ADDITIONAL STUDIES:  EXAM:  CHEST SINGLE VIEW                            PROCEDURE DATE:  04/14/2017        INTERPRETATION:    DATE OF STUDY: 4/14/17.    PRIOR: 4/7/17.    CLINICAL INDICATION: Shortness of breath; follow-up of pneumonia.     TECHNIQUE: portable chest.    FINDINGS:   Enteric tube tip is appropriately situated in the stomach.  There is limited by low lung volumes and patient positioning.  It is difficult to evaluate heart size on this projection. Mild increase   in right pleural effusion. Persistent pneumonia in mid and lower right   lung.  Increasing left retrocardiac atelectasis but no clear-cut left-sided   focal infiltrate  No left pleural effusion. No pneumothorax    IMPRESSION:   Mild increase in right pleural effusion. Persistent lower right lung   pneumonia.  Increasing left retrocardiac atelectasis but no clear-cut, focal.   Left-sided pneumonia seen              EZEQUIEL VORA M.D., ATTENDING RADIOLOGIST  T  ASSESSMENT AND PLAN:pneumonia with parapneumonic effusion. to increase nco2 to 3l/min and may titrate to maintain sat of at least 92%

## 2017-04-15 NOTE — CONSULT NOTE ADULT - SUBJECTIVE AND OBJECTIVE BOX
MEDICAL RECORD REVIEWED; HISTORY AND  REVIEW OF SYSTEMS OBTAINED; PATIENT EXAMINED:    86 YEAR OLD FEMALE WITH SEPSIS SYNDROME AND REVIEW OF TELEMETRY SHOWS INTERMITTENT SINUS TACHYCARDIA, PSVT AND SINUS BRADYCARDIA WITHOUT SIGNIFICANT PAUSES; HEMODYNAMICALLY STABLE;  ALL LABS/RADIOLOGY/MEDICATIONS REVIEWED; ANEMIC  ON EXAM: FRAIL WITH NGT TUBE, LETHARGIC COARSE BREATH SOUNDS NO JVD SOFT S1 EDEMATOUS; ECHO: LV DYSFUNCTION; ECG:NSR WITH NONSPECIFIC ST CHANGES; MOST RECENT CHEST XRAY FROM TODAY SHOWS PERSISTENT RIGHT LOWER LOBE PNEUMONIA AND EFFUSION    IMPRESSION:    TACHYCARDIA/BRADYCARDIA WITHOUT HEMODYNAMIC COMPROMISE IN ELDERLY FEMALE WITH  PNEUMONIA/EFFUSION/SEPSIS SYNDROME;  CONTINUING TELEMETRY MONITORING; NO PRESENT INDICATION FOR PACEMAKEING; TCPM IF NEEDED; AVOID AV NADIA AGENTS IF POSSIBLE; IF NEEDED, USE SHORT ACTING AGENTS    THAI ROMERO MD, FACC

## 2017-04-15 NOTE — PROGRESS NOTE ADULT - SUBJECTIVE AND OBJECTIVE BOX
Patient seen and examined bedside with son present.  Per RN, LUE midline came out today, unsure how.  Pt is lethargic, nonverbal and noted to moan often throughout encounter.    T(F): 98.7, Max: 98.8 (04-14 @ 20:48)  HR: 928 (34 - 928)  BP: 150/75 (120/66 - 150/77)  RR: 16 (16 - 18)  SpO2: 95% (92% - 99%)    PHYSICAL EXAM:  General: opens eyes to stimuli  HEENT: NCAT, EOMI, conjunctiva clear  CV: +S1+S2 regular rate and rhythm  Lung: decreased breath sounds right lung, b/l rhonchi  Abdomen: soft, NTND. Normoactive BS  Extremities: trace pedal and upper extremity edema  : shoemaker catheter indwelling with yellow urine    LABS:                        7.2    11.4  )-----------( 294      ( 15 Apr 2017 08:11 )             21.9     04-15    135  |  94<L>  |  12  ----------------------------<  125<H>  4.0   |  30  |  0.36<L>    Ca    8.2<L>      15 Apr 2017 08:11    TPro  7.4  /  Alb  1.3<L>  /  TBili  0.4  /  DBili  x   /  AST  22  /  ALT  9<L>  /  AlkPhos  53  04-14    I/O: intake not recorded, output 700cc    Impression/Plan:  86y old Female with PMH Pneumonia, Anemia, Parkinson disease admitted with sepsis secondary to multifocal pneumonia and urinary retention secondary to 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 Add tylenol prn pain via NGT  - cardio consult noted. cont tele monitoring  - discussed with Dr Mosley. May require diverting loop ileostomy when medically stable  - will discuss with Dr. Bahena  - lengthy discussion with son at bedside Patient seen and examined bedside with son present.  Per RN, FABIANO midline came out today, unsure how.  Pt is lethargic, nonverbal and noted to moan often throughout encounter.    T(F): 98.7, Max: 98.8 (04-14 @ 20:48)  HR: 928 (34 - 928)  BP: 150/75 (120/66 - 150/77)  RR: 16 (16 - 18)  SpO2: 95% (92% - 99%)    PHYSICAL EXAM:  General: opens eyes to stimuli  HEENT: NCAT, EOMI, conjunctiva clear  CV: +S1+S2 regular rate and rhythm  Lung: decreased breath sounds right lung, b/l rhonchi  Abdomen: soft, NTND. Normoactive BS  Extremities: trace pedal and upper extremity edema. +RUE peripheral IV (placed this afternoon)  : shoemaker catheter indwelling with yellow urine    LABS:                        7.2    11.4  )-----------( 294      ( 15 Apr 2017 08:11 )             21.9     04-15    135  |  94<L>  |  12  ----------------------------<  125<H>  4.0   |  30  |  0.36<L>    Ca    8.2<L>      15 Apr 2017 08:11    TPro  7.4  /  Alb  1.3<L>  /  TBili  0.4  /  DBili  x   /  AST  22  /  ALT  9<L>  /  AlkPhos  53  04-14    I/O: intake not recorded, output 700cc    Impression/Plan:  86y old Female with PMH Pneumonia, Anemia, Parkinson disease admitted with sepsis secondary to multifocal pneumonia and urinary retention secondary to 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 Patient has peripheral IV. Will follow, may require reinsertion of midline in future.   - trend h/h  - continue current management per medicine, tube feeds. Add tylenol prn pain via NGT  - cardio consult noted. cont tele monitoring  - discussed with Dr Mosley. May require diverting loop ileostomy when medically stable  - will discuss with Dr. Bahena  - lengthy discussion with son at bedside

## 2017-04-16 LAB
ALBUMIN SERPL ELPH-MCNC: 1.4 G/DL — LOW (ref 3.3–5)
ALP SERPL-CCNC: 60 U/L — SIGNIFICANT CHANGE UP (ref 40–120)
ALT FLD-CCNC: 9 U/L — LOW (ref 12–78)
ANION GAP SERPL CALC-SCNC: 16 MMOL/L — SIGNIFICANT CHANGE UP (ref 5–17)
ANION GAP SERPL CALC-SCNC: 7 MMOL/L — SIGNIFICANT CHANGE UP (ref 5–17)
AST SERPL-CCNC: 22 U/L — SIGNIFICANT CHANGE UP (ref 15–37)
BASE EXCESS BLDA CALC-SCNC: 3.9 MMOL/L — HIGH (ref -2–2)
BASE EXCESS BLDA CALC-SCNC: 6.4 MMOL/L — HIGH (ref -2–2)
BILIRUB SERPL-MCNC: 0.5 MG/DL — SIGNIFICANT CHANGE UP (ref 0.2–1.2)
BLOOD GAS COMMENTS: SIGNIFICANT CHANGE UP
BLOOD GAS SOURCE: SIGNIFICANT CHANGE UP
BLOOD GAS SOURCE: SIGNIFICANT CHANGE UP
BUN SERPL-MCNC: 14 MG/DL — SIGNIFICANT CHANGE UP (ref 7–23)
BUN SERPL-MCNC: 21 MG/DL — SIGNIFICANT CHANGE UP (ref 7–23)
CALCIUM SERPL-MCNC: 8.3 MG/DL — LOW (ref 8.5–10.1)
CALCIUM SERPL-MCNC: 8.6 MG/DL — SIGNIFICANT CHANGE UP (ref 8.5–10.1)
CHLORIDE SERPL-SCNC: 96 MMOL/L — SIGNIFICANT CHANGE UP (ref 96–108)
CHLORIDE SERPL-SCNC: 97 MMOL/L — SIGNIFICANT CHANGE UP (ref 96–108)
CK MB BLD-MCNC: 2 % — SIGNIFICANT CHANGE UP (ref 0–3.5)
CK MB CFR SERPL CALC: 0.7 NG/ML — SIGNIFICANT CHANGE UP (ref 0.5–3.6)
CK SERPL-CCNC: 35 U/L — SIGNIFICANT CHANGE UP (ref 26–192)
CO2 SERPL-SCNC: 25 MMOL/L — SIGNIFICANT CHANGE UP (ref 22–31)
CO2 SERPL-SCNC: 33 MMOL/L — HIGH (ref 22–31)
CREAT SERPL-MCNC: 0.29 MG/DL — LOW (ref 0.5–1.3)
CREAT SERPL-MCNC: 0.53 MG/DL — SIGNIFICANT CHANGE UP (ref 0.5–1.3)
ERYTHROCYTE [SEDIMENTATION RATE] IN BLOOD: >140 — SIGNIFICANT CHANGE UP (ref 0–20)
FOLATE SERPL-MCNC: 19.5 NG/ML — SIGNIFICANT CHANGE UP (ref 4.8–24.2)
GLUCOSE SERPL-MCNC: 184 MG/DL — HIGH (ref 70–99)
GLUCOSE SERPL-MCNC: 84 MG/DL — SIGNIFICANT CHANGE UP (ref 70–99)
HCO3 BLDA-SCNC: 27 MMOL/L — SIGNIFICANT CHANGE UP (ref 21–29)
HCO3 BLDA-SCNC: 29 MMOL/L — SIGNIFICANT CHANGE UP (ref 21–29)
HCT VFR BLD CALC: 22 % — LOW (ref 34.5–45)
HCT VFR BLD CALC: 24.1 % — LOW (ref 34.5–45)
HGB BLD-MCNC: 7.4 G/DL — LOW (ref 11.5–15.5)
HGB BLD-MCNC: 8.4 G/DL — LOW (ref 11.5–15.5)
HOROWITZ INDEX BLDA+IHG-RTO: 100 — SIGNIFICANT CHANGE UP
HOROWITZ INDEX BLDA+IHG-RTO: 100 — SIGNIFICANT CHANGE UP
IRON SATN MFR SERPL: 15 % — SIGNIFICANT CHANGE UP (ref 14–50)
IRON SATN MFR SERPL: 27 UG/DL — LOW (ref 30–160)
LACTATE SERPL-SCNC: 3.2 MMOL/L — HIGH (ref 0.7–2)
LACTATE SERPL-SCNC: 4.2 MMOL/L — CRITICAL HIGH (ref 0.7–2)
MAGNESIUM SERPL-MCNC: 2.8 MG/DL — HIGH (ref 1.8–2.4)
MCHC RBC-ENTMCNC: 30 PG — SIGNIFICANT CHANGE UP (ref 27–34)
MCHC RBC-ENTMCNC: 31.2 PG — SIGNIFICANT CHANGE UP (ref 27–34)
MCHC RBC-ENTMCNC: 33.5 GM/DL — SIGNIFICANT CHANGE UP (ref 32–36)
MCHC RBC-ENTMCNC: 34.9 GM/DL — SIGNIFICANT CHANGE UP (ref 32–36)
MCV RBC AUTO: 89.2 FL — SIGNIFICANT CHANGE UP (ref 80–100)
MCV RBC AUTO: 89.6 FL — SIGNIFICANT CHANGE UP (ref 80–100)
PCO2 BLDA: 33 MMHG — SIGNIFICANT CHANGE UP (ref 32–46)
PCO2 BLDA: 33 MMHG — SIGNIFICANT CHANGE UP (ref 32–46)
PH BLD: 7.52 — HIGH (ref 7.35–7.45)
PH BLD: 7.56 — HIGH (ref 7.35–7.45)
PHOSPHATE SERPL-MCNC: 2.9 MG/DL — SIGNIFICANT CHANGE UP (ref 2.5–4.5)
PLATELET # BLD AUTO: 166 K/UL — SIGNIFICANT CHANGE UP (ref 150–400)
PLATELET # BLD AUTO: 319 K/UL — SIGNIFICANT CHANGE UP (ref 150–400)
PO2 BLDA: 202 MMHG — HIGH (ref 74–108)
PO2 BLDA: 239 MMHG — HIGH (ref 74–108)
POTASSIUM SERPL-MCNC: 4 MMOL/L — SIGNIFICANT CHANGE UP (ref 3.5–5.3)
POTASSIUM SERPL-MCNC: 4.2 MMOL/L — SIGNIFICANT CHANGE UP (ref 3.5–5.3)
POTASSIUM SERPL-SCNC: 4 MMOL/L — SIGNIFICANT CHANGE UP (ref 3.5–5.3)
POTASSIUM SERPL-SCNC: 4.2 MMOL/L — SIGNIFICANT CHANGE UP (ref 3.5–5.3)
PROT SERPL-MCNC: 7.7 GM/DL — SIGNIFICANT CHANGE UP (ref 6–8.3)
RBC # BLD: 2.45 M/UL — LOW (ref 3.8–5.2)
RBC # BLD: 2.7 M/UL — LOW (ref 3.8–5.2)
RBC # FLD: 15.1 % — HIGH (ref 11–15)
RBC # FLD: 15.4 % — HIGH (ref 11–15)
SAO2 % BLDA: 100 % — HIGH (ref 92–96)
SAO2 % BLDA: 99 % — HIGH (ref 92–96)
SODIUM SERPL-SCNC: 136 MMOL/L — SIGNIFICANT CHANGE UP (ref 135–145)
SODIUM SERPL-SCNC: 138 MMOL/L — SIGNIFICANT CHANGE UP (ref 135–145)
TIBC SERPL-MCNC: 180 UG/DL — LOW (ref 220–430)
TROPONIN I SERPL-MCNC: 0.03 NG/ML — SIGNIFICANT CHANGE UP (ref 0.01–0.04)
UIBC SERPL-MCNC: 153 UG/DL — SIGNIFICANT CHANGE UP (ref 110–370)
VANCOMYCIN TROUGH SERPL-MCNC: 16.4 UG/ML — SIGNIFICANT CHANGE UP (ref 10–20)
VIT B12 SERPL-MCNC: 305 PG/ML — SIGNIFICANT CHANGE UP (ref 243–894)
WBC # BLD: 10 K/UL — SIGNIFICANT CHANGE UP (ref 3.8–10.5)
WBC # BLD: 11.4 K/UL — HIGH (ref 3.8–10.5)
WBC # FLD AUTO: 10 K/UL — SIGNIFICANT CHANGE UP (ref 3.8–10.5)
WBC # FLD AUTO: 11.4 K/UL — HIGH (ref 3.8–10.5)

## 2017-04-16 PROCEDURE — 99291 CRITICAL CARE FIRST HOUR: CPT | Mod: 25

## 2017-04-16 PROCEDURE — 71010: CPT | Mod: 26,77

## 2017-04-16 PROCEDURE — 36556 INSERT NON-TUNNEL CV CATH: CPT

## 2017-04-16 PROCEDURE — 99292 CRITICAL CARE ADDL 30 MIN: CPT | Mod: 25

## 2017-04-16 PROCEDURE — 71010: CPT | Mod: 26

## 2017-04-16 RX ORDER — SODIUM CHLORIDE 9 MG/ML
500 INJECTION INTRAMUSCULAR; INTRAVENOUS; SUBCUTANEOUS ONCE
Qty: 0 | Refills: 0 | Status: COMPLETED | OUTPATIENT
Start: 2017-04-16 | End: 2017-04-16

## 2017-04-16 RX ORDER — SODIUM CHLORIDE 9 MG/ML
1000 INJECTION INTRAMUSCULAR; INTRAVENOUS; SUBCUTANEOUS ONCE
Qty: 0 | Refills: 0 | Status: COMPLETED | OUTPATIENT
Start: 2017-04-16 | End: 2017-04-16

## 2017-04-16 RX ORDER — CHLORHEXIDINE GLUCONATE 213 G/1000ML
1 SOLUTION TOPICAL DAILY
Qty: 0 | Refills: 0 | Status: DISCONTINUED | OUTPATIENT
Start: 2017-04-16 | End: 2017-04-28

## 2017-04-16 RX ORDER — PANTOPRAZOLE SODIUM 20 MG/1
40 TABLET, DELAYED RELEASE ORAL DAILY
Qty: 0 | Refills: 0 | Status: DISCONTINUED | OUTPATIENT
Start: 2017-04-16 | End: 2017-04-19

## 2017-04-16 RX ORDER — CHLORHEXIDINE GLUCONATE 213 G/1000ML
15 SOLUTION TOPICAL
Qty: 0 | Refills: 0 | Status: DISCONTINUED | OUTPATIENT
Start: 2017-04-16 | End: 2017-04-28

## 2017-04-16 RX ADMIN — Medication 500 MICROGRAM(S): at 05:38

## 2017-04-16 RX ADMIN — LOSARTAN POTASSIUM 50 MILLIGRAM(S): 100 TABLET, FILM COATED ORAL at 05:01

## 2017-04-16 RX ADMIN — Medication 650 MILLIGRAM(S): at 00:50

## 2017-04-16 RX ADMIN — Medication 650 MILLIGRAM(S): at 20:41

## 2017-04-16 RX ADMIN — PRAMIPEXOLE DIHYDROCHLORIDE 0.25 MILLIGRAM(S): 0.12 TABLET ORAL at 22:54

## 2017-04-16 RX ADMIN — Medication 2 MILLILITER(S): at 17:16

## 2017-04-16 RX ADMIN — Medication 1 SPRAY(S): at 05:03

## 2017-04-16 RX ADMIN — SODIUM CHLORIDE 1000 MILLILITER(S): 9 INJECTION INTRAMUSCULAR; INTRAVENOUS; SUBCUTANEOUS at 20:30

## 2017-04-16 RX ADMIN — CARBIDOPA AND LEVODOPA 1 TABLET(S): 25; 100 TABLET ORAL at 13:10

## 2017-04-16 RX ADMIN — SODIUM CHLORIDE 2000 MILLILITER(S): 9 INJECTION INTRAMUSCULAR; INTRAVENOUS; SUBCUTANEOUS at 17:45

## 2017-04-16 RX ADMIN — Medication 250 MILLIGRAM(S): at 05:03

## 2017-04-16 RX ADMIN — Medication 2 MILLILITER(S): at 05:40

## 2017-04-16 RX ADMIN — MEROPENEM 200 MILLIGRAM(S): 1 INJECTION INTRAVENOUS at 15:43

## 2017-04-16 RX ADMIN — Medication 1 MILLIGRAM(S): at 13:10

## 2017-04-16 RX ADMIN — ENOXAPARIN SODIUM 40 MILLIGRAM(S): 100 INJECTION SUBCUTANEOUS at 05:03

## 2017-04-16 RX ADMIN — MEROPENEM 200 MILLIGRAM(S): 1 INJECTION INTRAVENOUS at 05:01

## 2017-04-16 RX ADMIN — MEROPENEM 200 MILLIGRAM(S): 1 INJECTION INTRAVENOUS at 21:37

## 2017-04-16 RX ADMIN — Medication 500 MICROGRAM(S): at 17:17

## 2017-04-16 RX ADMIN — PRAMIPEXOLE DIHYDROCHLORIDE 0.25 MILLIGRAM(S): 0.12 TABLET ORAL at 05:02

## 2017-04-16 RX ADMIN — PRAMIPEXOLE DIHYDROCHLORIDE 0.25 MILLIGRAM(S): 0.12 TABLET ORAL at 15:43

## 2017-04-16 RX ADMIN — CARBIDOPA AND LEVODOPA 1 TABLET(S): 25; 100 TABLET ORAL at 23:42

## 2017-04-16 RX ADMIN — SODIUM CHLORIDE 1000 MILLILITER(S): 9 INJECTION INTRAMUSCULAR; INTRAVENOUS; SUBCUTANEOUS at 22:25

## 2017-04-16 RX ADMIN — Medication 2 MILLILITER(S): at 11:20

## 2017-04-16 RX ADMIN — CARBIDOPA AND LEVODOPA 1 TABLET(S): 25; 100 TABLET ORAL at 05:02

## 2017-04-16 RX ADMIN — Medication 500 MICROGRAM(S): at 11:21

## 2017-04-16 RX ADMIN — SODIUM CHLORIDE 2000 MILLILITER(S): 9 INJECTION INTRAMUSCULAR; INTRAVENOUS; SUBCUTANEOUS at 18:20

## 2017-04-16 NOTE — CONSULT NOTE ADULT - SUBJECTIVE AND OBJECTIVE BOX
CC: unable to obtain due to unresponsiveness    HPI:  86F PMH Parkinson's, s/p recent mechanical fall with R intertrochanteric femur fracture requiring R intramedullary nailing of R trochanteric fracture of femur 3/13/17 during which hospitalization pt was treated for UTI and PNA presents from [facility] rehab for AMS, fever. Admitted to medicine for infectious work up, suspected UTI and PNA along with dysphagia requiring NGT placement due to overall poor mental status. Placed on antibiotics for sepsis and underlying PNA/UTI.  Work up during hospitalization with findings of colo vesiculo fistula. Pt for possible surgical treatment - diverting loop ileostomy when stable.     Pt with RRT called this evening 4/16 for hypotension and hypoxia. Has been on cooling blanket per floor staff for fevers. Remains unresponsive and encephalopathic. Course with midline insertion but dislodged vs pt pulled line out. NGT was reinserted this afternoon as it was noted to have dislodged as well. At RRT pt found to be hypoxic with O2 sat 60s. Somnolent, unresponsive to tactile stimuli. Hypotensive with SBP 70s. Repeat BP however with -180s without intervention. Pt unable to protect airway. Thick brown/yellow secretions suctioned orally. Pt emergently intubated during RRT, upon immediate insertion of ETT secretions similar to tube feeds poured out from ETT. Pt post intubation started to have emesis of tube feeds as well.     Transferred to ICU intubated for acute hypoxic respiratory failure due to aspiration.         Allergies: No Known Allergies      MEDICATIONS  (STANDING):  enoxaparin Injectable 40milliGRAM(s) SubCutaneous every 24 hours  polyethylene glycol 3350 17Gram(s) Oral daily  folic acid 1milliGRAM(s) Oral daily  senna Syrup 10milliLiter(s) Oral at bedtime  vancomycin  IVPB 1000milliGRAM(s) IV Intermittent every 24 hours  meropenem IVPB  IV Intermittent   ipratropium    for Nebulization 500MICROGram(s) Inhalation every 6 hours  acetylcysteine 10% Inhalation 2milliLiter(s) Inhalation every 6 hours  meropenem IVPB 1000milliGRAM(s) IV Intermittent every 8 hours  saliva substitute (BIOTENE MOISTURIZING MOUTH SPRAY) 1Spray(s) Topical two times a day  carbidopa/levodopa  25/100 1Tablet(s) Oral four times a day  pramipexole 0.25milliGRAM(s) Oral three times a day    MEDICATIONS  (PRN):  acetaminophen    Suspension 650milliGRAM(s) Oral every 6 hours PRN For Temp greater than 38 C (100.4 F)  acetaminophen    Suspension. 650milliGRAM(s) Enteral Tube every 6 hours PRN Mild Pain (1 - 3)      Drug Dosing Weight  Height (cm): 157.5 (04 Apr 2017 22:29)  Weight (kg): 58.6 (04 Apr 2017 22:29)  BMI (kg/m2): 23.6 (04 Apr 2017 22:29)  BSA (m2): 1.59 (04 Apr 2017 22:29)    PAST MEDICAL & SURGICAL HISTORY:  Pneumonia  UTI  Anemia  Parkinson disease  Tremors of nervous system  Colovesicular fistula    Status post hip surgery 3/2017      FAMILY HISTORY:  No pertinent family history in first degree relatives      SOCIAL HISTORY:  non smoker  lived with son prior to rehab    ADVANCE DIRECTIVES:  Full code    REVIEW OF SYSTEMS:  [ x ] unable to obtain due to encephalopathy/unresponsiveness    Mode: AC/ CMV (Assist Control/ Continuous Mandatory Ventilation)  RR (machine): 16  TV (machine): 500  FiO2: 100  PEEP: 5      Vital Signs Last 24 Hrs  T(C): 38.6, Max: 38.6 (04-16 @ 20:30)  T(F): 101.4, Max: 101.4 (04-16 @ 20:30)  HR: 105 (88 - 130)  BP: 119/58 (76/42 - 188/96)  BP(mean): 72 (51 - 84)  RR: 16 (14 - 18)  SpO2: 98% (88% - 100%)      ABG - ( 16 Apr 2017 19:06 )  pH: 7.56 /  pCO2: 33    /  pO2: 202   / HCO3: 29    / Base Excess: 6.4   /  SaO2: 99            PHYSICAL EXAM:    GENERAL: elderly female, orally intubated, minimally responsive (on no sedation)  EYES:  PERRL, conjunctiva and sclera clear  NECK: Supple, No JVD  NERVOUS SYSTEM:  unresponsive to tactile/verbal stimuli, withdraws to pain  CHEST/LUNG: bilateral rhonchi and coarse breath sounds  HEART: Regular rate and rhythm  ABDOMEN: Soft, Nondistended; Bowel sounds present, ecchymosis abdominal wall at injection sites  EXTREMITIES:  No clubbing, cyanosis; bilateral hand edema and ecchymosis    LABS:  CBC Full  -  ( 16 Apr 2017 19:34 )  WBC Count : 10.0 K/uL  Hemoglobin : 7.4 g/dL  Hematocrit : 22.0 %  Platelet Count - Automated : 166 K/uL  Mean Cell Volume : 89.6 fl  Mean Cell Hemoglobin : 30.0 pg  Mean Cell Hemoglobin Concentration : 33.5 gm/dL        138  |  97  |  21  ----------------------------<  184<H>  4.0   |  25  |  0.53    Ca    8.6      16 Apr 2017 19:34  Phos  2.9     04-16  Mg     2.8     04-16    TPro  7.7  /  Alb  1.4<L>  /  TBili  0.5  /  AST  22  /  ALT  9<L>  /  AlkPhos  60  04-16    CAPILLARY BLOOD GLUCOSE  264 (16 Apr 2017 17:02)        CARDIAC MARKERS ( 16 Apr 2017 19:34 )  Troponin I, Serum (04.16.17 @ 19:34): .027 ng/mL      EKG: sinus tach    RADIOLOGY:  CXR: diffuse R infiltrate (new)

## 2017-04-16 NOTE — PROGRESS NOTE ADULT - SUBJECTIVE AND OBJECTIVE BOX
INTERVAL HPI/OVERNIGHT EVENTS:        REVIEW OF SYSTEMS:  CONSTITUTIONAL: somnolent  opens  eyes  with  verbal  stimulus      MEDICATION:  enoxaparin Injectable 40milliGRAM(s) SubCutaneous every 24 hours  polyethylene glycol 3350 17Gram(s) Oral daily  folic acid 1milliGRAM(s) Oral daily  senna Syrup 10milliLiter(s) Oral at bedtime  acetaminophen    Suspension 650milliGRAM(s) Oral every 6 hours PRN  losartan 50milliGRAM(s) Oral daily  vancomycin  IVPB 1000milliGRAM(s) IV Intermittent every 24 hours  meropenem IVPB  IV Intermittent   ipratropium    for Nebulization 500MICROGram(s) Inhalation every 6 hours  acetylcysteine 10% Inhalation 2milliLiter(s) Inhalation every 6 hours  meropenem IVPB 1000milliGRAM(s) IV Intermittent every 8 hours  saliva substitute (BIOTENE MOISTURIZING MOUTH SPRAY) 1Spray(s) Topical two times a day  carbidopa/levodopa  25/100 1Tablet(s) Oral four times a day  pramipexole 0.25milliGRAM(s) Oral three times a day  acetaminophen    Suspension. 650milliGRAM(s) Enteral Tube every 6 hours PRN    Vital Signs Last 24 Hrs  T(C): 37, Max: 37.2 (04-16 @ 00:24)  T(F): 98.6, Max: 98.9 (04-16 @ 00:24)  HR: 90 (77 - 928)  BP: 130/85 (130/85 - 150/75)  BP(mean): --  RR: 16 (16 - 16)  SpO2: 95% (92% - 98%)    PHYSICAL EXAM:  GENERAL: NAD, well-groomed, well-developed  EYES:  conjunctiva and sclera clear  ENMT:  Moist mucous membranes,   NECK: Supple, No JVD, Normal thyroid  NERVOUS SYSTEM:     no  focal  deficits;   CHEST/LUNG: ronchis  both  bases    HEART: Regular rate and rhythm; No murmurs, rubs, or gallops  ABDOMEN: Soft, Nontender, Nondistended; Bowel sounds present  EXTREMITIES:  no  edema no  tenderness  SKIN: No rashes   LABS:                        8.4    11.4  )-----------( 319      ( 16 Apr 2017 03:48 )             24.1     04-16    136  |  96  |  14  ----------------------------<  84  4.2   |  33<H>  |  0.29<L>    Ca    8.3<L>      16 Apr 2017 03:48  Phos  2.9     04-16  Mg     2.8     04-16    TPro  7.7  /  Alb  1.4<L>  /  TBili  0.5  /  DBili  x   /  AST  22  /  ALT  9<L>  /  AlkPhos  60  04-16        CAPILLARY BLOOD GLUCOSE      RADIOLOGY & ADDITIONAL TESTS:    Imaging reports  Personally Reviewed:  [x ] YES  [ ] NO    Consultant(s) Notes Reviewed:  [x ] YES  [ ] NO    Care Discussed with Consultants/Other Providers [ x] YES  [ ] NO  COLO/VESCICULAR FISTULA proceed  as  per  surgery  discussed  with  Urology  to  continue NGT feedings monitor  labs  metabolic  encephalopathy  supprtive care    Problem/Plan - 1:  ·  Problem: Pneumonia.  Plan: zosyn  meropenem azithomycin  O2  duoneb. for  pulmonary  ID  evlals    Problem/Plan - 2:  ·  Problem: UTI (urinary tract infection).  Plan: zosyn.     Problem/Plan - 3:  ·  Problem: Parkinson disease encephalopathy.  Plan: sinemet discussed  with  neurology  as  to  further  adjustments/addition  to  treatment    Problem/Plan - 4:  ·  Problem: Altered mental status.  Plan: observation further w/u  and  treatment  as per  clinical  course.     Problem/Plan - 5:  ·  Problem: Anemia.  Plan: iron  folic  acid.   prognosis  guarded  discussed  with  pt's  son

## 2017-04-16 NOTE — AIRWAY PLACEMENT NOTE ADULT - POST AIRWAY PLACEMENT ASSESSMENT:
CXR pending/chest excursion noted/positive end tidal CO2 noted/skin color improved/breath sounds bilateral/breath sounds equal

## 2017-04-16 NOTE — PROCEDURE NOTE - NSPROCDETAILS_GEN_ALL_CORE
sterile technique, catheter placed/sterile dressing applied/lumen(s) aspirated and flushed/guidewire recovered

## 2017-04-16 NOTE — PROGRESS NOTE ADULT - SUBJECTIVE AND OBJECTIVE BOX
INTERVAL HPI/OVERNIGHT EVENTS:  found to be in marked respiratory distress. transfered to ICU and intubated    Vital Signs Last 24 Hrs  T(C): 36.2, Max: 37.2 (04-16 @ 00:24)  T(F): 97.2, Max: 98.9 (04-16 @ 00:24)  HR: 88 (88 - 111)  BP: 77/42 (76/42 - 188/96)  BP(mean): --  RR: 18 (16 - 18)  SpO2: 88% (88% - 98%)        PHYSICAL EXAM:  GEN:         intubated  RESP:       bilat crackles and rhonchi  CVS:             Regular rate and rhythm.   ABD:         Soft, non-tender, non-distended;   :             No costovertebral angle tenderness  EXTR:            No clubbing, cyanosis or edema          MEDICATIONS  (STANDING):  enoxaparin Injectable 40milliGRAM(s) SubCutaneous every 24 hours  polyethylene glycol 3350 17Gram(s) Oral daily  folic acid 1milliGRAM(s) Oral daily  senna Syrup 10milliLiter(s) Oral at bedtime  losartan 50milliGRAM(s) Oral daily  vancomycin  IVPB 1000milliGRAM(s) IV Intermittent every 24 hours  meropenem IVPB  IV Intermittent   ipratropium    for Nebulization 500MICROGram(s) Inhalation every 6 hours  acetylcysteine 10% Inhalation 2milliLiter(s) Inhalation every 6 hours  meropenem IVPB 1000milliGRAM(s) IV Intermittent every 8 hours  saliva substitute (BIOTENE MOISTURIZING MOUTH SPRAY) 1Spray(s) Topical two times a day  carbidopa/levodopa  25/100 1Tablet(s) Oral four times a day  pramipexole 0.25milliGRAM(s) Oral three times a day  chlorhexidine 0.12% Liquid 15milliLiter(s) Swish and Spit two times a day  chlorhexidine 4% Liquid 1Application(s) Topical daily  sodium chloride 0.9% Bolus 500milliLiter(s) IV Bolus once  sodium chloride 0.9% Bolus 1000milliLiter(s) IV Bolus once    MEDICATIONS  (PRN):  acetaminophen    Suspension 650milliGRAM(s) Oral every 6 hours PRN For Temp greater than 38 C (100.4 F)  acetaminophen    Suspension. 650milliGRAM(s) Enteral Tube every 6 hours PRN Mild Pain (1 - 3)        LABS:                        8.4    11.4  )-----------( 319      ( 16 Apr 2017 03:48 )             24.1     04-16    136  |  96  |  14  ----------------------------<  84  4.2   |  33<H>  |  0.29<L>    Ca    8.3<L>      16 Apr 2017 03:48  Phos  2.9     04-16  Mg     2.8     04-16    TPro  7.7  /  Alb  1.4<L>  /  TBili  0.5  /  DBili  x   /  AST  22  /  ALT  9<L>  /  AlkPhos  60  04-16              RADIOLOGY & ADDITIONAL STUDIES:increased R. sided infiltrate    ASSESSMENT AND PLAN: respiratory failure. probable aspiration, vent a/c 16/500/100%. ABG pending. broad spectrum abs.

## 2017-04-16 NOTE — PROGRESS NOTE ADULT - SUBJECTIVE AND OBJECTIVE BOX
Patient seen and examined at the bedside.  Presently receiving neb treatment NAD    HPI:  patient  with  worsening  lethargy  fever  dysphagia  evaluated  in  ER  admitted  for  pneumonia  UTI  patient  s/p  l  hip  medullary  nail (04 Apr 2017 19:53)      Vital Signs Last 24 Hrs  T(C): 37, Max: 37.2 (04-16 @ 00:24)  T(F): 98.6, Max: 98.9 (04-16 @ 00:24)  HR: 100 (84 - 928)  BP: 188/96 (130/85 - 188/96)  BP(mean): --  RR: 18 (16 - 18)  SpO2: 93% (92% - 98%)                          8.4    11.4  )-----------( 319      ( 16 Apr 2017 03:48 )             24.1       04-16    136  |  96  |  14  ----------------------------<  84  4.2   |  33<H>  |  0.29<L>    Ca    8.3<L>      16 Apr 2017 03:48  Phos  2.9     04-16  Mg     2.8     04-16    TPro  7.7  /  Alb  1.4<L>  /  TBili  0.5  /  DBili  x   /  AST  22  /  ALT  9<L>  /  AlkPhos  60  04-16  LIVER FUNCTIONS - ( 16 Apr 2017 03:48 )  Alb: 1.4 g/dL / Pro: 7.7 gm/dL / ALK PHOS: 60 U/L / ALT: 9 U/L / AST: 22 U/L / GGT: x           I&O's Detail    I & Os for current day (as of 16 Apr 2017 13:14)  =============================================  IN:    Total IN: 0 ml  ---------------------------------------------  OUT:    Indwelling Catheter - Urethral: 1150 ml    Total OUT: 1150 ml  ---------------------------------------------  Total NET: -1150 ml      MEDICATIONS  (STANDING):  enoxaparin Injectable 40milliGRAM(s) SubCutaneous every 24 hours  polyethylene glycol 3350 17Gram(s) Oral daily  folic acid 1milliGRAM(s) Oral daily  senna Syrup 10milliLiter(s) Oral at bedtime  losartan 50milliGRAM(s) Oral daily  vancomycin  IVPB 1000milliGRAM(s) IV Intermittent every 24 hours  meropenem IVPB  IV Intermittent   ipratropium    for Nebulization 500MICROGram(s) Inhalation every 6 hours  acetylcysteine 10% Inhalation 2milliLiter(s) Inhalation every 6 hours  meropenem IVPB 1000milliGRAM(s) IV Intermittent every 8 hours  saliva substitute (BIOTENE MOISTURIZING MOUTH SPRAY) 1Spray(s) Topical two times a day  carbidopa/levodopa  25/100 1Tablet(s) Oral four times a day  pramipexole 0.25milliGRAM(s) Oral three times a day    MEDICATIONS  (PRN):  acetaminophen    Suspension 650milliGRAM(s) Oral every 6 hours PRN For Temp greater than 38 C (100.4 F)  acetaminophen    Suspension. 650milliGRAM(s) Enteral Tube every 6 hours PRN Mild Pain (1 - 3)        Physical Exam  GEN:non responsive  HEENT: NCAT, Trachea midline, no scleral icterus  Resp: Rhonchi B/L, no accessory muscle use evident  CV: mildly Tachy, S1 S2  ABD: Soft, ND NTTP  :  EXT: warm well perfused, no edema, no calf tenderness    A/P: 86yFemale a/w      PAST MEDICAL & SURGICAL HISTORY:  Pneumonia  Anemia  Parkinson disease  Tremors of nervous system  No pertinent past medical history  Status post hip surgery  No significant past surgical history      - Patient seen and examined at the bedside.  Presently receiving neb treatment NAD    HPI:  patient  with  worsening  lethargy  fever  dysphagia  evaluated  in  ER  admitted  for  pneumonia  UTI  patient  s/p  l  hip  medullary  nail (04 Apr 2017 19:53)      Vital Signs Last 24 Hrs  T(C): 37, Max: 37.2 (04-16 @ 00:24)  T(F): 98.6, Max: 98.9 (04-16 @ 00:24)  HR: 100 (84 - 928)  BP: 188/96 (130/85 - 188/96)  BP(mean): --  RR: 18 (16 - 18)  SpO2: 93% (92% - 98%)                          8.4    11.4  )-----------( 319      ( 16 Apr 2017 03:48 )             24.1       04-16    136  |  96  |  14  ----------------------------<  84  4.2   |  33<H>  |  0.29<L>    Ca    8.3<L>      16 Apr 2017 03:48  Phos  2.9     04-16  Mg     2.8     04-16    TPro  7.7  /  Alb  1.4<L>  /  TBili  0.5  /  DBili  x   /  AST  22  /  ALT  9<L>  /  AlkPhos  60  04-16  LIVER FUNCTIONS - ( 16 Apr 2017 03:48 )  Alb: 1.4 g/dL / Pro: 7.7 gm/dL / ALK PHOS: 60 U/L / ALT: 9 U/L / AST: 22 U/L / GGT: x           I&O's Detail    I & Os for current day (as of 16 Apr 2017 13:14)  =============================================  IN:    Total IN: 0 ml  ---------------------------------------------  OUT:    Indwelling Catheter - Urethral: 1150 ml    Total OUT: 1150 ml  ---------------------------------------------  Total NET: -1150 ml      MEDICATIONS  (STANDING):  enoxaparin Injectable 40milliGRAM(s) SubCutaneous every 24 hours  polyethylene glycol 3350 17Gram(s) Oral daily  folic acid 1milliGRAM(s) Oral daily  senna Syrup 10milliLiter(s) Oral at bedtime  losartan 50milliGRAM(s) Oral daily  vancomycin  IVPB 1000milliGRAM(s) IV Intermittent every 24 hours  meropenem IVPB  IV Intermittent   ipratropium    for Nebulization 500MICROGram(s) Inhalation every 6 hours  acetylcysteine 10% Inhalation 2milliLiter(s) Inhalation every 6 hours  meropenem IVPB 1000milliGRAM(s) IV Intermittent every 8 hours  saliva substitute (BIOTENE MOISTURIZING MOUTH SPRAY) 1Spray(s) Topical two times a day  carbidopa/levodopa  25/100 1Tablet(s) Oral four times a day  pramipexole 0.25milliGRAM(s) Oral three times a day    MEDICATIONS  (PRN):  acetaminophen    Suspension 650milliGRAM(s) Oral every 6 hours PRN For Temp greater than 38 C (100.4 F)  acetaminophen    Suspension. 650milliGRAM(s) Enteral Tube every 6 hours PRN Mild Pain (1 - 3)        Physical Exam  GEN:non responsive verbally, opens eyes  HEENT: NCAT, Trachea midline, no scleral icterus  Resp: Rhonchi B/L, no accessory muscle use evident  CV: mildly Tachy, S1 S2  ABD: Soft, ND NTTP  : shoemaker with urine and sediment  EXT: warm well perfused,mild edema    A/P: 86yFemale a/w lethargy, PNA, sepsis and colovesicular fistula    -continue tube feeds  -abx,  care per primary team  -DVT prophylaxis        PAST MEDICAL & SURGICAL HISTORY:  Pneumonia  Anemia  Parkinson disease  Tremors of nervous system  No pertinent past medical history  Status post hip surgery  No significant past surgical history      -

## 2017-04-17 DIAGNOSIS — T17.908A UNSPECIFIED FOREIGN BODY IN RESPIRATORY TRACT, PART UNSPECIFIED CAUSING OTHER INJURY, INITIAL ENCOUNTER: ICD-10-CM

## 2017-04-17 DIAGNOSIS — J96.91 RESPIRATORY FAILURE, UNSPECIFIED WITH HYPOXIA: ICD-10-CM

## 2017-04-17 LAB
ALBUMIN SERPL ELPH-MCNC: 1.3 G/DL — LOW (ref 3.3–5)
ALP SERPL-CCNC: 68 U/L — SIGNIFICANT CHANGE UP (ref 40–120)
ALT FLD-CCNC: 12 U/L — SIGNIFICANT CHANGE UP (ref 12–78)
ANION GAP SERPL CALC-SCNC: 10 MMOL/L — SIGNIFICANT CHANGE UP (ref 5–17)
ANION GAP SERPL CALC-SCNC: 12 MMOL/L — SIGNIFICANT CHANGE UP (ref 5–17)
AST SERPL-CCNC: 27 U/L — SIGNIFICANT CHANGE UP (ref 15–37)
BASE EXCESS BLDA CALC-SCNC: 2.6 MMOL/L — HIGH (ref -2–2)
BILIRUB SERPL-MCNC: 0.5 MG/DL — SIGNIFICANT CHANGE UP (ref 0.2–1.2)
BLD GP AB SCN SERPL QL: SIGNIFICANT CHANGE UP
BLOOD GAS COMMENTS: SIGNIFICANT CHANGE UP
BLOOD GAS COMMENTS: SIGNIFICANT CHANGE UP
BLOOD GAS SOURCE: SIGNIFICANT CHANGE UP
BUN SERPL-MCNC: 20 MG/DL — SIGNIFICANT CHANGE UP (ref 7–23)
BUN SERPL-MCNC: 20 MG/DL — SIGNIFICANT CHANGE UP (ref 7–23)
CALCIUM SERPL-MCNC: 8.1 MG/DL — LOW (ref 8.5–10.1)
CALCIUM SERPL-MCNC: 8.2 MG/DL — LOW (ref 8.5–10.1)
CHLORIDE SERPL-SCNC: 102 MMOL/L — SIGNIFICANT CHANGE UP (ref 96–108)
CHLORIDE SERPL-SCNC: 103 MMOL/L — SIGNIFICANT CHANGE UP (ref 96–108)
CO2 SERPL-SCNC: 26 MMOL/L — SIGNIFICANT CHANGE UP (ref 22–31)
CO2 SERPL-SCNC: 29 MMOL/L — SIGNIFICANT CHANGE UP (ref 22–31)
CREAT SERPL-MCNC: 0.39 MG/DL — LOW (ref 0.5–1.3)
CREAT SERPL-MCNC: 0.5 MG/DL — SIGNIFICANT CHANGE UP (ref 0.5–1.3)
GLUCOSE SERPL-MCNC: 110 MG/DL — HIGH (ref 70–99)
GLUCOSE SERPL-MCNC: 96 MG/DL — SIGNIFICANT CHANGE UP (ref 70–99)
GRAM STN FLD: SIGNIFICANT CHANGE UP
HCO3 BLDA-SCNC: 27 MMOL/L — SIGNIFICANT CHANGE UP (ref 21–29)
HCT VFR BLD CALC: 20.1 % — CRITICAL LOW (ref 34.5–45)
HCT VFR BLD CALC: 24.4 % — LOW (ref 34.5–45)
HGB BLD-MCNC: 6.8 G/DL — CRITICAL LOW (ref 11.5–15.5)
HGB BLD-MCNC: 8 G/DL — LOW (ref 11.5–15.5)
HOROWITZ INDEX BLDA+IHG-RTO: 80 — SIGNIFICANT CHANGE UP
LACTATE SERPL-SCNC: 2.9 MMOL/L — HIGH (ref 0.7–2)
MAGNESIUM SERPL-MCNC: 2.2 MG/DL — SIGNIFICANT CHANGE UP (ref 1.8–2.4)
MCHC RBC-ENTMCNC: 28.2 PG — SIGNIFICANT CHANGE UP (ref 27–34)
MCHC RBC-ENTMCNC: 29.8 PG — SIGNIFICANT CHANGE UP (ref 27–34)
MCHC RBC-ENTMCNC: 32.8 GM/DL — SIGNIFICANT CHANGE UP (ref 32–36)
MCHC RBC-ENTMCNC: 33.6 GM/DL — SIGNIFICANT CHANGE UP (ref 32–36)
MCV RBC AUTO: 86 FL — SIGNIFICANT CHANGE UP (ref 80–100)
MCV RBC AUTO: 88.8 FL — SIGNIFICANT CHANGE UP (ref 80–100)
NT-PROBNP SERPL-SCNC: HIGH PG/ML (ref 0–450)
PCO2 BLDA: 40 MMHG — SIGNIFICANT CHANGE UP (ref 32–46)
PH BLD: 7.43 — SIGNIFICANT CHANGE UP (ref 7.35–7.45)
PHOSPHATE SERPL-MCNC: 2.1 MG/DL — LOW (ref 2.5–4.5)
PLATELET # BLD AUTO: 298 K/UL — SIGNIFICANT CHANGE UP (ref 150–400)
PLATELET # BLD AUTO: 303 K/UL — SIGNIFICANT CHANGE UP (ref 150–400)
PO2 BLDA: 96 MMHG — SIGNIFICANT CHANGE UP (ref 74–108)
POTASSIUM SERPL-MCNC: 3 MMOL/L — LOW (ref 3.5–5.3)
POTASSIUM SERPL-MCNC: 3.5 MMOL/L — SIGNIFICANT CHANGE UP (ref 3.5–5.3)
POTASSIUM SERPL-SCNC: 3 MMOL/L — LOW (ref 3.5–5.3)
POTASSIUM SERPL-SCNC: 3.5 MMOL/L — SIGNIFICANT CHANGE UP (ref 3.5–5.3)
PROT SERPL-MCNC: 6.9 GM/DL — SIGNIFICANT CHANGE UP (ref 6–8.3)
RBC # BLD: 2.26 M/UL — LOW (ref 3.8–5.2)
RBC # BLD: 2.83 M/UL — LOW (ref 3.8–5.2)
RBC # FLD: 15.6 % — HIGH (ref 11–15)
RBC # FLD: 16.5 % — HIGH (ref 11–15)
SAO2 % BLDA: 97 % — HIGH (ref 92–96)
SODIUM SERPL-SCNC: 141 MMOL/L — SIGNIFICANT CHANGE UP (ref 135–145)
SODIUM SERPL-SCNC: 141 MMOL/L — SIGNIFICANT CHANGE UP (ref 135–145)
SPECIMEN SOURCE: SIGNIFICANT CHANGE UP
WBC # BLD: 11.8 K/UL — HIGH (ref 3.8–10.5)
WBC # BLD: 5.6 K/UL — SIGNIFICANT CHANGE UP (ref 3.8–10.5)
WBC # FLD AUTO: 11.8 K/UL — HIGH (ref 3.8–10.5)
WBC # FLD AUTO: 5.6 K/UL — SIGNIFICANT CHANGE UP (ref 3.8–10.5)

## 2017-04-17 PROCEDURE — 99291 CRITICAL CARE FIRST HOUR: CPT

## 2017-04-17 RX ORDER — PANTOPRAZOLE SODIUM 20 MG/1
40 TABLET, DELAYED RELEASE ORAL DAILY
Qty: 0 | Refills: 0 | Status: DISCONTINUED | OUTPATIENT
Start: 2017-04-17 | End: 2017-04-17

## 2017-04-17 RX ORDER — FLUCONAZOLE 150 MG/1
100 TABLET ORAL DAILY
Qty: 0 | Refills: 0 | Status: DISCONTINUED | OUTPATIENT
Start: 2017-04-17 | End: 2017-04-28

## 2017-04-17 RX ORDER — POTASSIUM CHLORIDE 20 MEQ
40 PACKET (EA) ORAL ONCE
Qty: 0 | Refills: 0 | Status: COMPLETED | OUTPATIENT
Start: 2017-04-17 | End: 2017-04-17

## 2017-04-17 RX ORDER — POTASSIUM CHLORIDE 20 MEQ
20 PACKET (EA) ORAL
Qty: 0 | Refills: 0 | Status: COMPLETED | OUTPATIENT
Start: 2017-04-17 | End: 2017-04-17

## 2017-04-17 RX ORDER — METOPROLOL TARTRATE 50 MG
5 TABLET ORAL ONCE
Qty: 0 | Refills: 0 | Status: COMPLETED | OUTPATIENT
Start: 2017-04-17 | End: 2017-04-17

## 2017-04-17 RX ORDER — POTASSIUM CHLORIDE 20 MEQ
40 PACKET (EA) ORAL ONCE
Qty: 0 | Refills: 0 | Status: DISCONTINUED | OUTPATIENT
Start: 2017-04-17 | End: 2017-04-17

## 2017-04-17 RX ORDER — FUROSEMIDE 40 MG
40 TABLET ORAL ONCE
Qty: 0 | Refills: 0 | Status: COMPLETED | OUTPATIENT
Start: 2017-04-17 | End: 2017-04-17

## 2017-04-17 RX ADMIN — CARBIDOPA AND LEVODOPA 1 TABLET(S): 25; 100 TABLET ORAL at 05:24

## 2017-04-17 RX ADMIN — MEROPENEM 200 MILLIGRAM(S): 1 INJECTION INTRAVENOUS at 21:30

## 2017-04-17 RX ADMIN — FLUCONAZOLE 100 MILLIGRAM(S): 150 TABLET ORAL at 17:39

## 2017-04-17 RX ADMIN — Medication 650 MILLIGRAM(S): at 20:29

## 2017-04-17 RX ADMIN — CARBIDOPA AND LEVODOPA 1 TABLET(S): 25; 100 TABLET ORAL at 13:25

## 2017-04-17 RX ADMIN — Medication 2 MILLILITER(S): at 12:48

## 2017-04-17 RX ADMIN — Medication 250 MILLIGRAM(S): at 04:59

## 2017-04-17 RX ADMIN — Medication 40 MILLIGRAM(S): at 12:05

## 2017-04-17 RX ADMIN — PRAMIPEXOLE DIHYDROCHLORIDE 0.25 MILLIGRAM(S): 0.12 TABLET ORAL at 05:24

## 2017-04-17 RX ADMIN — Medication 100 MILLIEQUIVALENT(S): at 22:27

## 2017-04-17 RX ADMIN — Medication 500 MICROGRAM(S): at 06:13

## 2017-04-17 RX ADMIN — Medication 100 MILLIEQUIVALENT(S): at 21:01

## 2017-04-17 RX ADMIN — Medication 500 MICROGRAM(S): at 00:38

## 2017-04-17 RX ADMIN — PANTOPRAZOLE SODIUM 40 MILLIGRAM(S): 20 TABLET, DELAYED RELEASE ORAL at 11:42

## 2017-04-17 RX ADMIN — Medication 500 MICROGRAM(S): at 17:15

## 2017-04-17 RX ADMIN — Medication 2 MILLILITER(S): at 17:15

## 2017-04-17 RX ADMIN — Medication 500 MICROGRAM(S): at 12:48

## 2017-04-17 RX ADMIN — CHLORHEXIDINE GLUCONATE 15 MILLILITER(S): 213 SOLUTION TOPICAL at 17:39

## 2017-04-17 RX ADMIN — Medication 100 MILLIEQUIVALENT(S): at 19:54

## 2017-04-17 RX ADMIN — Medication 1 MILLIGRAM(S): at 11:42

## 2017-04-17 RX ADMIN — Medication 2 MILLILITER(S): at 00:37

## 2017-04-17 RX ADMIN — MEROPENEM 200 MILLIGRAM(S): 1 INJECTION INTRAVENOUS at 05:23

## 2017-04-17 RX ADMIN — CHLORHEXIDINE GLUCONATE 1 APPLICATION(S): 213 SOLUTION TOPICAL at 11:42

## 2017-04-17 RX ADMIN — PRAMIPEXOLE DIHYDROCHLORIDE 0.25 MILLIGRAM(S): 0.12 TABLET ORAL at 21:31

## 2017-04-17 RX ADMIN — Medication 2 MILLILITER(S): at 06:13

## 2017-04-17 RX ADMIN — PRAMIPEXOLE DIHYDROCHLORIDE 0.25 MILLIGRAM(S): 0.12 TABLET ORAL at 13:25

## 2017-04-17 RX ADMIN — CHLORHEXIDINE GLUCONATE 15 MILLILITER(S): 213 SOLUTION TOPICAL at 05:24

## 2017-04-17 RX ADMIN — Medication 650 MILLIGRAM(S): at 12:10

## 2017-04-17 RX ADMIN — MEROPENEM 200 MILLIGRAM(S): 1 INJECTION INTRAVENOUS at 13:24

## 2017-04-17 RX ADMIN — Medication 40 MILLIEQUIVALENT(S): at 19:54

## 2017-04-17 RX ADMIN — CARBIDOPA AND LEVODOPA 1 TABLET(S): 25; 100 TABLET ORAL at 17:39

## 2017-04-17 RX ADMIN — Medication 5 MILLIGRAM(S): at 05:59

## 2017-04-17 NOTE — PROGRESS NOTE ADULT - SUBJECTIVE AND OBJECTIVE BOX
Surgery/ Urology Note     Patient seen and examined at bedside. Overnight Events noted. Patient currently intubated and unresponsive.     Vital Signs Last 24 Hrs  T(F): 99.8, Max: 101.4 (04-16 @ 20:30)  HR: 103  BP: 145/69  RR: 24  SpO2: 93%  CAPILLARY BLOOD GLUCOSE:  264 (16 Apr 2017 17:02)    GENERAL: Intubated. Unresponsive  CHEST/LUNG: Course breath sounds. Intubated.  HEART: S1S2, tachycardic  ABDOMEN: + Bowel sounds, soft, mild Distention  EXTREMITIES:  edema of bilateral upper and lower extremities.  : shoemaker catheter in place draining yellow urine with feculent sediment noted. 1575ml/24hours.     I&O's Detail  I & Os for 24h ending 17 Apr 2017 07:00  =============================================  IN:    Sodium Chloride 0.9% IV Bolus: 1500 ml    Jevity: 450 ml    Solution: 250 ml    Solution: 200 ml    Oral Fluid: 50 ml    Total IN: 2450 ml  ---------------------------------------------  OUT:    Indwelling Catheter - Urethral: 1575 ml    Total OUT: 1575 ml  ---------------------------------------------  Total NET: 875 ml    I & Os for current day (as of 17 Apr 2017 10:09)  =============================================  IN:    Jevity: 50 ml    Total IN: 50 ml  ---------------------------------------------  OUT:    Total OUT: 0 ml  ---------------------------------------------  Total NET: 50 ml    LABS:                        6.8    5.6   )-----------( 298      ( 17 Apr 2017 03:59 )             20.1     04-17    141  |  103  |  20  ----------------------------<  96  3.5   |  26  |  0.39<L>    Ca    8.1<L>      17 Apr 2017 03:59  Phos  2.9     04-16  Mg     2.8     04-16    TPro  6.9  /  Alb  1.3<L>  /  TBili  0.5  /  DBili  x   /  AST  27  /  ALT  12  /  AlkPhos  68  04-17    86y old Female with PMH Pneumonia, Anemia, and Parkinson disease admitted with sepsis secondary to PNA and colovesical fistula? now s/p RRT and intubation secondary to hypoxia, hypotension and acute respiratory failure.     - continue shoemaker catheter and monitor urine output.  - patient not currently stable for any surgical intervention  - cotninue IV antibiotics per ID  - continue current management per ICU  - will discuss with Dr. Mosley and Dr. Bahena

## 2017-04-17 NOTE — PROGRESS NOTE ADULT - SUBJECTIVE AND OBJECTIVE BOX
TMAX- 101.4    On day # 11 Meropenem / # 11 Vanco now    Vital Signs Last 24 Hrs  T(C): 38.5, Max: 38.6 (04-16 @ 20:30)  T(F): 101.3, Max: 101.4 (04-16 @ 20:30)  HR: 100 (88 - 130)  BP: 149/55 (76/42 - 206/188)  BP(mean): 73 (51 - 193)  RR: 26 (9 - 30)  SpO2: 97% (88% - 100%)  Mode: AC/ CMV (Assist Control/ Continuous Mandatory Ventilation)  RR (machine): 16  TV (machine): 400  FiO2: 75  PEEP: 5  ITime: 1  MAP: 9  PIP: 21    Supplemental O2:  on 75% FIO2 + 5 PEEP      Above notes appreciated.  Patient now intubated and sedated on ventilator in ICU, s/p RRT for hypoxia and hypotension last evening.  Reportedly suctioned for secretions, some of which appeared similar to patient's tube feedings.  New cultures of sputum and blood obtained and patient had Rt subclavian CVP placed for continued IV access. Lt Femoral A-line placed but now removed.      PHYSICAL EXAM  General:  sedated, on ventilator, orally intubated and with NGT feedings in place  HEENT:  conj pink, sclerae anicteric, PERRLA, orally intubated  Neck:  semi-supple, no nodes noted            Rt SC CVP in place - site clean, dressing intact - placed 4/16  Heart:  RR  Lungs:  bilat. rhonchi  Abdomen:  soft, BS +, nontender appearance  Extremities: 2+ edema LE's and arms and hands still swollen   Skin:  warm, dry, no rash, scattered ecchymoses on arms and legs    Colon in place and draining cloudy dark yellow urine now    I&O's Summary :  I & Os for 24h ending 17 Apr 2017 07:00  =============================================  IN: 2450 ml / OUT: 1575 ml / NET: 875 ml    I & Os for current day (as of 17 Apr 2017 14:58)  =============================================  IN: 150 ml / OUT: 115 ml / NET: 35 ml      LABS:  CBC Full  -  ( 17 Apr 2017 03:59 )  WBC Count : 5.6 K/uL  Hemoglobin : 6.8 g/dL  Hematocrit : 20.1 %  Platelet Count - Automated : 298 K/uL  Mean Cell Volume : 88.8 fl  Mean Cell Hemoglobin : 29.8 pg  Mean Cell Hemoglobin Concentration : 33.6 gm/dL  Auto Neutrophil # : x  Auto Lymphocyte # : x  Auto Monocyte # : x  Auto Eosinophil # : x  Auto Basophil # : x  Auto Neutrophil % : x  Auto Lymphocyte % : x  Auto Monocyte % : x  Auto Eosinophil % : x  Auto Basophil % : x    04-17    141  |  103  |  20  ----------------------------<  96  3.5   |  26  |  0.39<L>    Ca    8.1<L>      17 Apr 2017 03:59  Phos  2.9     04-16  Mg     2.8     04-16    TPro  6.9  /  Alb  1.3<L>  /  TBili  0.5  /  DBili  x   /  AST  27  /  ALT  12  /  AlkPhos  68  04-17    LIVER FUNCTIONS - ( 17 Apr 2017 03:59 )  Alb: 1.3 g/dL / Pro: 6.9 gm/dL / ALK PHOS: 68 U/L / ALT: 12 U/L / AST: 27 U/L / GGT: x           ABG - ( 17 Apr 2017 09:14 )  pH: x     /  pCO2: 40    /  pO2: 96    / HCO3: 27    / Base Excess: 2.6   /  SaO2: 97        CARDIAC MARKERS ( 16 Apr 2017 19:34 )  .027 ng/mL / x     / 35 U/L / x     / 0.7 ng/mL    Sedimentation Rate, Erythrocyte: >140 (04-16 @ 11:51)    Vancomycin Level, Trough: 16.4 ug/mL (04-16 @ 03:48)        CULTURES:  Specimen Source: .Sputum Sputum, trap (04-17 @ 08:15)                               Gram Stain - many WBC's, and few yeast seen        Radiology:  CXR - 4/16/17 -  FINDINGS:   ET and NG tubes are in appropriate position.  There has been interval insertion of right subclavian venous central   catheter with tip overlying SVC.  The cardiomediastinal silhouette is stable in appearance.   Patchy infiltrates again seen throughout right lung. Patchy infiltrates   persist in mid and lower left lung.  No significant pleural effusions. No pneumothorax.    IMPRESSION:   No significant interval change in bilateral pleural-parenchymal pattern.  Life supporting devices in appropriate position.  No pneumothorax.         Impression:  Recurrent fever and now with Respiratory Failure requiring ventilator support and continued Multifocal PNA, and underlying Comstock-vesicle Fistula, n ab rx with Meropenem + Vanco - ? new aspiration ?    Suggestions: Will follow-up new cx's and continue current ab rx with Meropenem + Vanco and will add Diflucan to rx in view of yeast seen on Sputum Gm Stain.  Follow-up temps, labs, and CXR, and ABG's.

## 2017-04-17 NOTE — PROGRESS NOTE ADULT - SUBJECTIVE AND OBJECTIVE BOX
HPI:  86F hx of parkinsons, recent right hip / femur fractuce with medullary pins sent from rehab for AMS and fever. Admitted to the hospital for UTI/PNA. Patient had a RRT on the floors for hypotension, and hypoxia. During intubation for airway protection, tube feeds found in the endotracheal tube. Transferred to the ICU for hypxic respiratory failure.      CENTRAL LINE: [ X] YES [ ] NO  LOCATION:   DATE INSERTED: 4/16/17  REMOVE: [ ] YES [ ] NO  EXPLAIN: critical care monitoring    MAYFIELD: [ X] YES [ ] NO    DATE INSERTED: 4/16/17  REMOVE:  [ ] YES [ ] NO  EXPLAIN: critical care monitoring.    A-LINE:  [ ] YES [X ] NO  LOCATION:   DATE INSERTED:  REMOVE:  [ ] YES [ ] NO  EXPLAIN:    PAST MEDICAL & SURGICAL HISTORY:  Pneumonia  Anemia  Parkinson disease  Tremors of nervous system  No pertinent past medical history  Status post hip surgery  No significant past surgical history      REVIEW OF SYSTEMS:    intubated      ICU Vital Signs Last 24 Hrs  T(C): 38.5, Max: 38.6 (04-16 @ 20:30)  T(F): 101.3, Max: 101.4 (04-16 @ 20:30)  HR: 119 (88 - 130)  BP: 153/108 (76/42 - 206/188)  BP(mean): 117 (51 - 193)  ABP: --  ABP(mean): --  RR: 26 (9 - 30)  SpO2: 97% (88% - 100%)      ABG - ( 17 Apr 2017 09:14 )  pH: x     /  pCO2: 40    /  pO2: 96    / HCO3: 27    / Base Excess: 2.6   /  SaO2: 97                  I&O's Detail  I & Os for 24h ending 17 Apr 2017 07:00  =============================================  IN:    Sodium Chloride 0.9% IV Bolus: 1500 ml    Jevity: 450 ml    Solution: 250 ml    Solution: 200 ml    Oral Fluid: 50 ml    Total IN: 2450 ml  ---------------------------------------------  OUT:    Indwelling Catheter - Urethral: 1575 ml    Total OUT: 1575 ml  ---------------------------------------------  Total NET: 875 ml    I & Os for current day (as of 17 Apr 2017 13:35)  =============================================  IN:    Jevity: 150 ml    Total IN: 150 ml  ---------------------------------------------  OUT:    Indwelling Catheter - Urethral: 115 ml    Total OUT: 115 ml  ---------------------------------------------  Total NET: 35 ml      Mode: AC/ CMV (Assist Control/ Continuous Mandatory Ventilation)  RR (machine): 16  TV (machine): 400  FiO2: 75  PEEP: 5  ITime: 1  MAP: 9  PIP: 21    CAPILLARY BLOOD GLUCOSE  264 (16 Apr 2017 17:02)    PHYSICAL EXAM:    GENERAL: NAD, well-groomed, well-developed  HEAD:  Atraumatic, Normocephalic  EYES: PERRLA, conjunctiva and sclera clear  ENMT: intubated  NECK: Supple, No JVD, Normal thyroid  NERVOUS SYSTEM:  Alert & Oriented X0. not responding to commands or stimuli.  CHEST/LUNG: Clear to percussion bilaterally; No rales, rhonchi, wheezing, or rubs  HEART: Regular rate and rhythm; No murmurs, rubs, or gallops  ABDOMEN: Soft, Nontender, Nondistended; Bowel sounds present  EXTREMITIES:  2+ Peripheral Pulses, No clubbing, cyanosis, or edema  SKIN: No rashes or lesions      LABS:                        6.8    5.6   )-----------( 298      ( 17 Apr 2017 03:59 )             20.1      04-17    141  |  103  |  20  ----------------------------<  96  3.5   |  26  |  0.39<L>    Ca    8.1<L>      17 Apr 2017 03:59  Phos  2.9     04-16  Mg     2.8     04-16    TPro  6.9  /  Alb  1.3<L>  /  TBili  0.5  /  DBili  x   /  AST  27  /  ALT  12  /  AlkPhos  68  04-17            RADIOLOGY & ADDITIONAL STUDIES: CXR -> increased opacity in the right middle and upper lobe from previous CXR.      CRITICAL CARE TIME SPENT:45 minutes HPI:  86F hx of parkinsons, recent right hip / femur fractuce with medullary pins sent from rehab for AMS and fever. Admitted to the hospital for UTI/PNA. Patient had a RRT on the floors for hypotension, and hypoxia. During intubation for airway protection, tube feeds found in the endotracheal tube. Transferred to the ICU for hypxic respiratory failure.      CENTRAL LINE: [ X] YES [ ] NO  LOCATION:   DATE INSERTED: 4/16/17  REMOVE: [ ] YES [x] NO  EXPLAIN: critical care monitoring    MAYFIELD: [ X] YES [ ] NO    DATE INSERTED: 4/16/17  REMOVE:  [ ] YES [ ] NO  EXPLAIN: critical care monitoring.    A-LINE:  [ ] YES [X ] NO  LOCATION:   DATE INSERTED:  REMOVE:  [ ] YES [ ] NO  EXPLAIN:    PAST MEDICAL & SURGICAL HISTORY:  Pneumonia  Anemia  Parkinson disease  Tremors of nervous system  No pertinent past medical history  Status post hip surgery  No significant past surgical history      REVIEW OF SYSTEMS:  [x] unable to obtain    intubated      ICU Vital Signs Last 24 Hrs  T(C): 38.5, Max: 38.6 (04-16 @ 20:30)  T(F): 101.3, Max: 101.4 (04-16 @ 20:30)  HR: 119 (88 - 130)  BP: 153/108 (76/42 - 206/188)  BP(mean): 117 (51 - 193)  RR: 26 (9 - 30)  SpO2: 97% (88% - 100%)      ABG - ( 17 Apr 2017 09:14 )  pH: 7.43     /  pCO2: 40    /  pO2: 96    / HCO3: 27    / Base Excess: 2.6   /  SaO2: 97            I&O's Detail  I & Os for 24h ending 17 Apr 2017 07:00  =============================================  IN:    Sodium Chloride 0.9% IV Bolus: 1500 ml    Jevity: 450 ml    Solution: 250 ml    Solution: 200 ml    Oral Fluid: 50 ml    Total IN: 2450 ml  ---------------------------------------------  OUT:    Indwelling Catheter - Urethral: 1575 ml    Total OUT: 1575 ml  ---------------------------------------------  Total NET: 875 ml    I & Os for current day (as of 17 Apr 2017 13:35)  =============================================  IN:    Jevity: 150 ml    Total IN: 150 ml  ---------------------------------------------  OUT:    Indwelling Catheter - Urethral: 115 ml    Total OUT: 115 ml  ---------------------------------------------  Total NET: 35 ml      Mode: AC/ CMV (Assist Control/ Continuous Mandatory Ventilation)  RR (machine): 16  TV (machine): 400  FiO2: 75  PEEP: 5  ITime: 1  MAP: 9  PIP: 21    CAPILLARY BLOOD GLUCOSE  264 (16 Apr 2017 17:02)    PHYSICAL EXAM:    GENERAL: NAD, elderly female, unresponsive off sedation  HEAD:  Atraumatic, Normocephalic  EYES: PERRLA, conjunctiva and sclera clear  ENMT: intubated  NECK: Supple, No JVD  NERVOUS SYSTEM:  Alert & Oriented X0. not responding to commands or stimuli.  CHEST/LUNG: Clear to auscultation bilaterally; No rales, rhonchi, mechanical breath sounds  HEART: Regular rate and rhythm  ABDOMEN: Soft, Nontender, Nondistended; Bowel sounds present  EXTREMITIES:  2+ Peripheral Pulses, No clubbing, cyanosis; bilateral hand edema      LABS:                        6.8    5.6   )-----------( 298      ( 17 Apr 2017 03:59 )             20.1      04-17    141  |  103  |  20  ----------------------------<  96  3.5   |  26  |  0.39<L>    Ca    8.1<L>      17 Apr 2017 03:59  Phos  2.9     04-16  Mg     2.8     04-16    TPro  6.9  /  Alb  1.3<L>  /  TBili  0.5  /   AST  27  /  ALT  12  /  AlkPhos  68  04-17        RADIOLOGY & ADDITIONAL STUDIES:   CXR -> increased opacity in the right middle and upper lobe from previous CXR.

## 2017-04-17 NOTE — PROGRESS NOTE ADULT - SUBJECTIVE AND OBJECTIVE BOX
TRANSFERED TO ICU FOR RESPIRATORY FAILURE, INTUBATED  PRESENTLY SINUS  TACHYCARDIA WITH BP HOLDING  COARSE BREATH SOUNDS  TACHYCARDIC  SOFT ABDOMEN  PERFUSED, WARM , MILD EDEMA    IMPRESSION:  RESPIRATORY FAILURE; PNEUMONIA  TACHY-FAWAD  CONTINUING SUPPORTIVE CARE;  POOR OVERALL PROGNOSIS

## 2017-04-17 NOTE — PROGRESS NOTE ADULT - ATTENDING COMMENTS
86F PMH Parkinson's, s/p recent mechanical fall with R intertrochanteric femur fracture requiring R intramedullary nailing of R trochanteric fracture of femur 3/13/17, UTI, PNA presents from [facility] rehab for sepsis, metabolic encephalopathy, UTI, PNA, colovesicular fistula with course complicated by aspiration of tube feeds, acute hypoxic respiratory failure requiring intubation, hypotension.     DX: sepsis, acute hypoxic respiratory failure, respiratory failure requiring intubation, aspiration PNA, coloesicular fistula, acute metabolic encephalopathy, anemia    1. PULM  - cont mechanical ventilation  - FIO2 weaned down from 100% to 75%  - follow up sputum culture  - cont broad spectrum antibiotics (on vanco and meropenem): will d/w ID need for change of antibiotics as pt continues to spike through current regimen  - unable to wean due to high FIO2 requirements    2. CV  - hypotension resolved with IVF  - BP responded to 2L IVF bolus  - initiate vasopressors if needed to maintain MAP >65  - monitor off antihypertensives  - vascular congestion on CXR and bedside lung ultrasonography  - will give lasix 40mg IVP x1 for diuresis    3. ID  - recurrent sepsis  - now with new aspiration event and new R PNA on CXR  - cont antibiotics  - follow up repeat blood cultures  - with colovesicular fistula UA and urine culture will be dirty and contaminated    4. HEME  - anemia likely partially dilutional  - s/p 1 unit pRBC today  - check post transfusion CBC    5. NEURO  - acute encephalopathy likely from underlying sepsis  - if no improvement in mental status will repeat CT head    6. GEN  - family updated on condition  - pt remains DNR with [facility]  - all other aggressive care to be continued per family wishes    Total Critical Care Time: 45 min

## 2017-04-17 NOTE — PROGRESS NOTE ADULT - SUBJECTIVE AND OBJECTIVE BOX
INTERVAL HPI/OVERNIGHT EVENTS:    events  of  yesterday noted  patient  with  acute  respiratory failure  now  intubated  ventilated  being  transfused  PRBC    REVIEW OF SYSTEMS:  CONSTITUTIONAL:     MEDICATION:  enoxaparin Injectable 40milliGRAM(s) SubCutaneous every 24 hours  polyethylene glycol 3350 17Gram(s) Oral daily  folic acid 1milliGRAM(s) Oral daily  senna Syrup 10milliLiter(s) Oral at bedtime  acetaminophen    Suspension 650milliGRAM(s) Oral every 6 hours PRN  vancomycin  IVPB 1000milliGRAM(s) IV Intermittent every 24 hours  meropenem IVPB  IV Intermittent   ipratropium    for Nebulization 500MICROGram(s) Inhalation every 6 hours  acetylcysteine 10% Inhalation 2milliLiter(s) Inhalation every 6 hours  meropenem IVPB 1000milliGRAM(s) IV Intermittent every 8 hours  saliva substitute (BIOTENE MOISTURIZING MOUTH SPRAY) 1Spray(s) Topical two times a day  carbidopa/levodopa  25/100 1Tablet(s) Oral four times a day  pramipexole 0.25milliGRAM(s) Oral three times a day  acetaminophen    Suspension. 650milliGRAM(s) Enteral Tube every 6 hours PRN  chlorhexidine 0.12% Liquid 15milliLiter(s) Swish and Spit two times a day  chlorhexidine 4% Liquid 1Application(s) Topical daily  pantoprazole  Injectable 40milliGRAM(s) IV Push daily    Vital Signs Last 24 Hrs  T(C): 37.7, Max: 38.6 (04-16 @ 20:30)  T(F): 99.8, Max: 101.4 (04-16 @ 20:30)  HR: 109 (88 - 130)  BP: 161/85 (76/42 - 206/188)  BP(mean): 102 (51 - 193)  RR: 21 (9 - 30)  SpO2: 96% (88% - 100%)    PHYSICAL EXAM:    EYES:  conjunctiva and sclera clear  NECK: Supple, No JVD,  NERVOUS SYSTEM:  Alert oriented   no  focal  deficits;   CHEST/LUNG: scattered  ronchis  HEART: Regular rate and rhythm; No murmurs, rubs, or gallops  ABDOMEN: Soft, Nontender, Nondistended; Bowel sounds present  EXTREMITIES:  no  edema no  tenderness  SKIN: No rashes   LABS:                        6.8    5.6   )-----------( 298      ( 17 Apr 2017 03:59 )             20.1     04-17    141  |  103  |  20  ----------------------------<  96  3.5   |  26  |  0.39<L>    Ca    8.1<L>      17 Apr 2017 03:59  Phos  2.9     04-16  Mg     2.8     04-16    TPro  6.9  /  Alb  1.3<L>  /  TBili  0.5  /  DBili  x   /  AST  27  /  ALT  12  /  AlkPhos  68  04-17        CAPILLARY BLOOD GLUCOSE  264 (16 Apr 2017 17:02)      RADIOLOGY & ADDITIONAL TESTS:    Imaging reports  Personally Reviewed:  [ x] YES  [ ] NO    Consultant(s) Notes Reviewed:  [x ] YES  [ ] NO    Care Discussed with Consultants/Other Providers [ x] YES  [ ] NO  acute resspiratory  failure  pneumonia  sepsis  meropenem  vanco  IVF  vent support   management  as  per  ICU  COLO/VESCICULAR FISTULA proceed  as  per  surgery  discussed  with  Urology  to  continue NGT feedings monitor  labs  metabolic  encephalopathy  supprtive care        Problem/Plan - 2:  ·  Problem: UTI (urinary tract infection).  Plan: zosyn.     Problem/Plan - 3:  ·  Problem: Parkinson disease encephalopathy.  Plan: sinemet discussed  with  neurology  as  to  further  adjustments/addition  to  treatment    Problem/Plan - 4:  ·  Problem: Altered mental status.  Plan: observation further w/u  and  treatment  as per  clinical  course.     Problem/Plan - 5:  ·  Problem: Anemia.  Plan: iron  folic  acid. transfuse  as  needed    prognosis  guarded  discussed  with  pt's  son

## 2017-04-17 NOTE — PROGRESS NOTE ADULT - PROBLEM SELECTOR PLAN 1
Likely due to aspiration into the airway.  pneumonitis vs pneumonia  Requiring high FiO2 requirements. slow titration  currently on vanco and leanna  f/up cultures  Lasix 40mg IVP post prbc xfusion

## 2017-04-18 LAB
ALBUMIN SERPL ELPH-MCNC: 1.2 G/DL — LOW (ref 3.3–5)
ALP SERPL-CCNC: 178 U/L — HIGH (ref 40–120)
ALT FLD-CCNC: 17 U/L — SIGNIFICANT CHANGE UP (ref 12–78)
ANION GAP SERPL CALC-SCNC: 11 MMOL/L — SIGNIFICANT CHANGE UP (ref 5–17)
APTT BLD: 38.1 SEC — HIGH (ref 27.5–37.4)
AST SERPL-CCNC: 72 U/L — HIGH (ref 15–37)
BASOPHILS # BLD AUTO: 0.1 K/UL — SIGNIFICANT CHANGE UP (ref 0–0.2)
BASOPHILS NFR BLD AUTO: 0.4 % — SIGNIFICANT CHANGE UP (ref 0–2)
BILIRUB SERPL-MCNC: 0.9 MG/DL — SIGNIFICANT CHANGE UP (ref 0.2–1.2)
BUN SERPL-MCNC: 21 MG/DL — SIGNIFICANT CHANGE UP (ref 7–23)
CALCIUM SERPL-MCNC: 8.3 MG/DL — LOW (ref 8.5–10.1)
CHLORIDE SERPL-SCNC: 102 MMOL/L — SIGNIFICANT CHANGE UP (ref 96–108)
CO2 SERPL-SCNC: 27 MMOL/L — SIGNIFICANT CHANGE UP (ref 22–31)
CREAT SERPL-MCNC: 0.48 MG/DL — LOW (ref 0.5–1.3)
EOSINOPHIL # BLD AUTO: 0 K/UL — SIGNIFICANT CHANGE UP (ref 0–0.5)
EOSINOPHIL NFR BLD AUTO: 0.1 % — SIGNIFICANT CHANGE UP (ref 0–6)
GLUCOSE SERPL-MCNC: 116 MG/DL — HIGH (ref 70–99)
HCT VFR BLD CALC: 25.4 % — LOW (ref 34.5–45)
HGB BLD-MCNC: 8.6 G/DL — LOW (ref 11.5–15.5)
INR BLD: 1.92 RATIO — HIGH (ref 0.88–1.16)
LYMPHOCYTES # BLD AUTO: 0.7 K/UL — LOW (ref 1–3.3)
LYMPHOCYTES # BLD AUTO: 5 % — LOW (ref 13–44)
MAGNESIUM SERPL-MCNC: 2.2 MG/DL — SIGNIFICANT CHANGE UP (ref 1.8–2.4)
MCHC RBC-ENTMCNC: 29.2 PG — SIGNIFICANT CHANGE UP (ref 27–34)
MCHC RBC-ENTMCNC: 33.9 GM/DL — SIGNIFICANT CHANGE UP (ref 32–36)
MCV RBC AUTO: 86.3 FL — SIGNIFICANT CHANGE UP (ref 80–100)
MONOCYTES # BLD AUTO: 0.4 K/UL — SIGNIFICANT CHANGE UP (ref 0–0.9)
MONOCYTES NFR BLD AUTO: 2.6 % — SIGNIFICANT CHANGE UP (ref 2–14)
NEUTROPHILS # BLD AUTO: 12.4 K/UL — HIGH (ref 1.8–7.4)
NEUTROPHILS NFR BLD AUTO: 91.9 % — HIGH (ref 43–77)
PHOSPHATE SERPL-MCNC: 2.1 MG/DL — LOW (ref 2.5–4.5)
PLATELET # BLD AUTO: 298 K/UL — SIGNIFICANT CHANGE UP (ref 150–400)
POTASSIUM SERPL-MCNC: 4.3 MMOL/L — SIGNIFICANT CHANGE UP (ref 3.5–5.3)
POTASSIUM SERPL-SCNC: 4.3 MMOL/L — SIGNIFICANT CHANGE UP (ref 3.5–5.3)
PROT SERPL-MCNC: 7.4 GM/DL — SIGNIFICANT CHANGE UP (ref 6–8.3)
PROTHROM AB SERPL-ACNC: 21.2 SEC — HIGH (ref 9.8–12.7)
RBC # BLD: 2.95 M/UL — LOW (ref 3.8–5.2)
RBC # FLD: 16.2 % — HIGH (ref 11–15)
SODIUM SERPL-SCNC: 140 MMOL/L — SIGNIFICANT CHANGE UP (ref 135–145)
VANCOMYCIN TROUGH SERPL-MCNC: 14.1 UG/ML — SIGNIFICANT CHANGE UP (ref 10–20)
WBC # BLD: 13.4 K/UL — HIGH (ref 3.8–10.5)
WBC # FLD AUTO: 13.4 K/UL — HIGH (ref 3.8–10.5)

## 2017-04-18 PROCEDURE — 71010: CPT | Mod: 26

## 2017-04-18 PROCEDURE — 99291 CRITICAL CARE FIRST HOUR: CPT

## 2017-04-18 RX ORDER — IBUPROFEN 200 MG
400 TABLET ORAL ONCE
Qty: 0 | Refills: 0 | Status: COMPLETED | OUTPATIENT
Start: 2017-04-18 | End: 2017-04-18

## 2017-04-18 RX ADMIN — Medication 250 MILLIGRAM(S): at 05:06

## 2017-04-18 RX ADMIN — CARBIDOPA AND LEVODOPA 1 TABLET(S): 25; 100 TABLET ORAL at 00:33

## 2017-04-18 RX ADMIN — FLUCONAZOLE 100 MILLIGRAM(S): 150 TABLET ORAL at 12:33

## 2017-04-18 RX ADMIN — Medication 400 MILLIGRAM(S): at 22:00

## 2017-04-18 RX ADMIN — PANTOPRAZOLE SODIUM 40 MILLIGRAM(S): 20 TABLET, DELAYED RELEASE ORAL at 12:33

## 2017-04-18 RX ADMIN — Medication 500 MICROGRAM(S): at 11:56

## 2017-04-18 RX ADMIN — Medication 650 MILLIGRAM(S): at 17:42

## 2017-04-18 RX ADMIN — MEROPENEM 200 MILLIGRAM(S): 1 INJECTION INTRAVENOUS at 06:29

## 2017-04-18 RX ADMIN — PRAMIPEXOLE DIHYDROCHLORIDE 0.25 MILLIGRAM(S): 0.12 TABLET ORAL at 13:52

## 2017-04-18 RX ADMIN — CARBIDOPA AND LEVODOPA 1 TABLET(S): 25; 100 TABLET ORAL at 17:42

## 2017-04-18 RX ADMIN — CHLORHEXIDINE GLUCONATE 15 MILLILITER(S): 213 SOLUTION TOPICAL at 05:33

## 2017-04-18 RX ADMIN — MEROPENEM 200 MILLIGRAM(S): 1 INJECTION INTRAVENOUS at 22:42

## 2017-04-18 RX ADMIN — Medication 1 MILLIGRAM(S): at 12:33

## 2017-04-18 RX ADMIN — PRAMIPEXOLE DIHYDROCHLORIDE 0.25 MILLIGRAM(S): 0.12 TABLET ORAL at 05:33

## 2017-04-18 RX ADMIN — Medication 2 MILLILITER(S): at 00:17

## 2017-04-18 RX ADMIN — Medication 500 MICROGRAM(S): at 06:02

## 2017-04-18 RX ADMIN — CHLORHEXIDINE GLUCONATE 15 MILLILITER(S): 213 SOLUTION TOPICAL at 17:42

## 2017-04-18 RX ADMIN — CARBIDOPA AND LEVODOPA 1 TABLET(S): 25; 100 TABLET ORAL at 05:33

## 2017-04-18 RX ADMIN — MEROPENEM 200 MILLIGRAM(S): 1 INJECTION INTRAVENOUS at 13:51

## 2017-04-18 RX ADMIN — Medication 2 MILLILITER(S): at 06:02

## 2017-04-18 RX ADMIN — Medication 500 MICROGRAM(S): at 00:17

## 2017-04-18 RX ADMIN — Medication 400 MILLIGRAM(S): at 20:45

## 2017-04-18 RX ADMIN — ENOXAPARIN SODIUM 40 MILLIGRAM(S): 100 INJECTION SUBCUTANEOUS at 05:54

## 2017-04-18 RX ADMIN — Medication 2 MILLILITER(S): at 17:31

## 2017-04-18 RX ADMIN — CARBIDOPA AND LEVODOPA 1 TABLET(S): 25; 100 TABLET ORAL at 12:34

## 2017-04-18 RX ADMIN — PRAMIPEXOLE DIHYDROCHLORIDE 0.25 MILLIGRAM(S): 0.12 TABLET ORAL at 22:42

## 2017-04-18 RX ADMIN — CHLORHEXIDINE GLUCONATE 1 APPLICATION(S): 213 SOLUTION TOPICAL at 12:34

## 2017-04-18 RX ADMIN — Medication 2 MILLILITER(S): at 11:55

## 2017-04-18 RX ADMIN — Medication 500 MICROGRAM(S): at 17:31

## 2017-04-18 NOTE — PROGRESS NOTE ADULT - SUBJECTIVE AND OBJECTIVE BOX
SURGERY PROGRESS HPI:  Patient seen and examined at bedside. Overnight Events noted. Patient currently intubated and unresponsive.       Vital Signs Last 24 Hrs  T(C): 37.9, Max: 39.1 (04-17 @ 20:00)  T(F): 100.2, Max: 102.4 (04-17 @ 20:00)  HR: 109 (85 - 119)  BP: 114/69 (102/50 - 206/188)  BP(mean): 80 (62 - 193)  RR: 22 (13 - 30)  SpO2: 98% (93% - 100%)      PHYSICAL EXAM:    GENERAL: Intubated. Unresponsive  CHEST/LUNG: Course breath sounds. Intubated.  HEART: S1S2, tachycardic  ABDOMEN: + Bowel sounds, soft, mild Distention  EXTREMITIES:  edema of bilateral upper and lower extremities.  : shoemaker catheter in place draining yellow urine with feculent sediment noted. 1800ml/24hours.       I&O's Detail  I & Os for 24h ending 17 Apr 2017 07:00  =============================================  IN:    Sodium Chloride 0.9% IV Bolus: 1500 ml    Jevity: 450 ml    Solution: 250 ml    Solution: 200 ml    Oral Fluid: 50 ml    Total IN: 2450 ml  ---------------------------------------------  OUT:    Indwelling Catheter - Urethral: 1575 ml    Total OUT: 1575 ml  ---------------------------------------------  Total NET: 875 ml    I & Os for current day (as of 18 Apr 2017 05:24)  =============================================  IN:    Jevity: 780 ml    Solution: 100 ml    Total IN: 880 ml  ---------------------------------------------  OUT:    Indwelling Catheter - Urethral: 1800 ml    Total OUT: 1800 ml  ---------------------------------------------  Total NET: -920 ml      LABS:                        8.6    13.4  )-----------( 298      ( 18 Apr 2017 02:10 )             25.4     04-18    140  |  102  |  21  ----------------------------<  116<H>  4.3   |  27  |  0.48<L>    Ca    8.3<L>      18 Apr 2017 02:10  Phos  2.1     04-18  Mg     2.2     04-18    TPro  7.4  /  Alb  1.2<L>  /  TBili  0.9  /  DBili  x   /  AST  72<H>  /  ALT  17  /  AlkPhos  178<H>  04-18    PT/INR - ( 18 Apr 2017 02:10 )   PT: 21.2 sec;   INR: 1.92 ratio         PTT - ( 18 Apr 2017 02:10 )  PTT:38.1 sec    Culture Results:   No growth to date. (04-17 @ 01:19)  Culture Results:   No growth to date. (04-17 @ 01:18)        Assessment: 86y old Female with PMH Pneumonia, Anemia, and Parkinson disease admitted with sepsis secondary to PNA and colovesical fistula. now s/p RRT and intubation secondary to hypoxia, hypotension and acute respiratory failure.     Plan:  - continue shoemaker catheter and monitor urine output.  - patient not currently stable for any surgical intervention  - continue IV antibiotics per ID  - continue current management per ICU  - will discuss with Dr. Mosley and Dr. Bahena

## 2017-04-18 NOTE — PROGRESS NOTE ADULT - SUBJECTIVE AND OBJECTIVE BOX
TMAX - 102.4    On day # 12 Meropenem / #12 Vanco / # 2 Diflucan now    Vital Signs Last 24 Hrs  T(C): 37.9, Max: 39.1 (04-17 @ 20:00)  T(F): 100.3, Max: 102.4 (04-17 @ 20:00)  HR: 101 (95 - 118)  BP: 138/63 (102/50 - 155/73)  BP(mean): 82 (62 - 128)  RR: 24 (16 - 26)  SpO2: 96% (95% - 99%)  Mode: AC/ CMV (Assist Control/ Continuous Mandatory Ventilation)  RR (machine): 16  TV (machine): 400  FiO2: 50  PEEP: 5  ITime: 1  MAP: 11  PIP: 23    Supplemental O2:  on 50 % FIO2 + 5 PEEP now    Remains on ventilator, poorly responsive, soughing intermittently and on decreased FIO2 now.      PHYSICAL EXAM  General:  poorly responsive,  on ventilator, orally intubated and with NGT in place with feedings in progress  HEENT:  conj pink, sclerae anicteric, PERRLA, no oral lesions visualized but difficult to assess in view of ETtube in place  Neck:  semi-supple, no nodes noted,             Rt subclavian CVP in place - site clean, dressing intact - placed 4/16  Heart: RR   Lungs:  bilat rhonchi throughout with mild wheeze anterior cw Rt > Lt  Abdomen: soft, BS+, nontender appearance    Extremities: hands with some decreased swelling + ecchymoses                    LE's with 1-2+ edema    Skin: warm, dry, no rash noted  Colon in place with dark, cloudy yellow urine noted  Fecal tube in place now with small amount of stool noted       I&O's Summary :  I & Os for 24h ending 18 Apr 2017 07:00  =============================================  IN: 1120 ml / OUT: 1890 ml / NET: -770 ml    I & Os for current day (as of 18 Apr 2017 14:23)  =============================================  IN: 520 ml / OUT: 135 ml / NET: 385 ml      LABS:  CBC Full  -  ( 18 Apr 2017 02:10 )  WBC Count : 13.4 K/uL  Hemoglobin : 8.6 g/dL  Hematocrit : 25.4 %  Platelet Count - Automated : 298 K/uL  Mean Cell Volume : 86.3 fl  Mean Cell Hemoglobin : 29.2 pg  Mean Cell Hemoglobin Concentration : 33.9 gm/dL  Auto Neutrophil # : 12.4 K/uL  Auto Lymphocyte # : 0.7 K/uL  Auto Monocyte # : 0.4 K/uL  Auto Eosinophil # : 0.0 K/uL  Auto Basophil # : 0.1 K/uL  Auto Neutrophil % : 91.9 %  Auto Lymphocyte % : 5.0 %  Auto Monocyte % : 2.6 %  Auto Eosinophil % : 0.1 %  Auto Basophil % : 0.4 %    04-18    140  |  102  |  21  ----------------------------<  116<H>  4.3   |  27  |  0.48<L>    Ca    8.3<L>      18 Apr 2017 02:10  Phos  2.1     04-18  Mg     2.2     04-18    TPro  7.4  /  Alb  1.2<L>  /  TBili  0.9  /  DBili  x   /  AST  72<H>  /  ALT  17  /  AlkPhos  178<H>  04-18    LIVER FUNCTIONS - ( 18 Apr 2017 02:10 )  Alb: 1.2 g/dL / Pro: 7.4 gm/dL / ALK PHOS: 178 U/L / ALT: 17 U/L / AST: 72 U/L / GGT: x           PT/INR - ( 18 Apr 2017 02:10 )   PT: 21.2 sec;   INR: 1.92 ratio       PTT - ( 18 Apr 2017 02:10 )  PTT:38.1 sec    ABG - ( 17 Apr 2017 09:14 )  pH: x     /  pCO2: 40    /  pO2: 96    / HCO3: 27    / Base Excess: 2.6   /  SaO2: 97        CARDIAC MARKERS ( 16 Apr 2017 19:34 )  .027 ng/mL / x     / 35 U/L / x     / 0.7 ng/mL    Sedimentation Rate, Erythrocyte: >140 (04-16 @ 11:51)    Vancomycin Level, Trough: 14.1 ug/mL (04-18 @ 03:54)        CULTURES:  Specimen Source: .Sputum Sputum, trap (04-17 @ 08:15)  Culture Results:   Normal Respiratory Cindy present (04-17 @ 08:15)    Specimen Source: .Blood Blood (04-17 @ 01:19)  Culture Results:   No growth to date. (04-17 @ 01:19)    Specimen Source: .Blood Blood (04-17 @ 01:18)  Culture Results:   No growth to date. (04-17 @ 01:18)        Radiology:  CXR - 4/18/17 -  Endotracheal tube, nasogastric tube, right subclavian line remain.    Advanced infiltration mainly in the right upper lobe lobe again noted   roughly similar to April 16. In addition there is now increasing   infiltrate in the right lower lobe.    Left lower lobe infiltrate is stable.    IMPRESSION: Significant bilateral infiltrates again noted. There is   increasing infiltration right lower lobe.        Impression: Remains febrile on present ab rx with Meropenem + Vanco + Diflucan for Sepsis/ Multifocal PNA - likely aspiration component with Respiratory failure.    Suggestions: Will continue present ab rx and follow-up new cx's, temps, and labs.  Repeat CXR in a few days.

## 2017-04-18 NOTE — PROGRESS NOTE ADULT - PROBLEM SELECTOR PLAN 1
Likely due to aspiration into the airway.  pneumonitis vs pneumonia  Requiring high FiO2 requirements. slow titration  currently on vanco and leanna  f/up cultures  Poor prognosis.  Organ donation referral made.  Case discussed and plan discussed with son during rounds. all questions / concerns were addressed. Likely due to aspiration into the airway.  pneumonitis vs pneumonia  Required high FiO2 requirements. slow titration  currently on vanco and leanna  f/up cultures  Poor prognosis.  Organ donation referral made.  Case discussed and plan discussed with son during rounds. all questions / concerns were addressed.

## 2017-04-18 NOTE — PROGRESS NOTE ADULT - SUBJECTIVE AND OBJECTIVE BOX
HPI:  86F hx of parkinsons, recent right hip / femur fracture with medullary pins sent from rehab for AMS and fever. Admitted to the hospital for UTI/PNA. Patient had a RRT on the floors for hypotension, and hypoxia. During intubation for airway protection, tube feeds found in the endotracheal tube. Transferred to the ICU for hypoxic respiratory failure.    24hour events:  Patient's clinical status remains unchanged from previous. persistently febrile despite being on broad spectrum abx. 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 Mental status unchanged.      CENTRAL LINE: [ ] YES [X ] NO  LOCATION:   DATE INSERTED:  REMOVE: [ ] YES [ ] NO  EXPLAIN:    MAYFIELD: [X ] YES [ ] NO    DATE INSERTED:  REMOVE:  [ ] YES [ ] NO  EXPLAIN:    A-LINE:  [ ] YES [X ] NO  LOCATION:   DATE INSERTED:  REMOVE:  [ ] YES [ ] NO  EXPLAIN:    PAST MEDICAL & SURGICAL HISTORY:  Pneumonia  Anemia  Parkinson disease  Tremors of nervous system  No pertinent past medical history  Status post hip surgery  No significant past surgical history      REVIEW OF SYSTEMS:    [+] Intubated. no mental status.      ICU Vital Signs Last 24 Hrs  T(C): 37.9, Max: 39.1 (04-17 @ 20:00)  T(F): 100.3, Max: 102.4 (04-17 @ 20:00)  HR: 115 (95 - 118)  BP: 130/59 (102/50 - 155/73)  BP(mean): 77 (62 - 128)  ABP: --  ABP(mean): --  RR: 25 (16 - 26)  SpO2: 97% (95% - 99%)      ABG - ( 17 Apr 2017 09:14 )  pH: x     /  pCO2: 40    /  pO2: 96    / HCO3: 27    / Base Excess: 2.6   /  SaO2: 97          Mode: AC/ CMV (Assist Control/ Continuous Mandatory Ventilation)  RR (machine): 16  TV (machine): 400  FiO2: 50  PEEP: 5  ITime: 1  MAP: 11  PIP: 23    PHYSICAL EXAM:    GENERAL: NAD, elderly female, unresponsive off sedation  HEAD:  Atraumatic, Normocephalic  EYES: PERRLA, conjunctiva and sclera clear  ENMT: intubated  NECK: Supple, No JVD  NERVOUS SYSTEM:  Alert & Oriented X0. not responding to commands or stimuli.  CHEST/LUNG: Clear to auscultation bilaterally; No rales, rhonchi, mechanical breath sounds  HEART: Regular rate and rhythm  ABDOMEN: Soft, Nontender, Nondistended; Bowel sounds present  EXTREMITIES:  2+ Peripheral Pulses, No clubbing, cyanosis; bilateral hand edema      LABS:                        8.6    13.4  )-----------( 298      ( 18 Apr 2017 02:10 )             25.4      04-18    140  |  102  |  21  ----------------------------<  116<H>  4.3   |  27  |  0.48<L>    Ca    8.3<L>      18 Apr 2017 02:10  Phos  2.1     04-18  Mg     2.2     04-18    TPro  7.4  /  Alb  1.2<L>  /  TBili  0.9  /  DBili  x   /  AST  72<H>  /  ALT  17  /  AlkPhos  178<H>  04-18    PT/INR - ( 18 Apr 2017 02:10 )   PT: 21.2 sec;   INR: 1.92 ratio         PTT - ( 18 Apr 2017 02:10 )  PTT:38.1 sec    Culture Results:   Normal Respiratory Cindy present (04-17 @ 08:15)  Culture Results:   No growth to date. (04-17 @ 01:19)  Culture Results:   No growth to date. (04-17 @ 01:18)      RADIOLOGY & ADDITIONAL STUDIES:      CRITICAL CARE TIME SPENT: 40 minutes HPI:  86F hx of parkinsons, recent right hip / femur fracture with medullary pins sent from rehab for AMS and fever. Admitted to the hospital for UTI/PNA. Patient had a RRT on the floors for hypotension, and hypoxia. During intubation for airway protection, tube feeds found in the endotracheal tube. Transferred to the ICU for hypoxic respiratory failure.    24hour events:  Patient's clinical status remains unchanged from previous. persistently febrile despite being on broad spectrum abx. 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 Mental status unchanged.      CENTRAL LINE: [x] YES [ ] NO  LOCATION:   DATE INSERTED: 4/16  REMOVE: [x] YES [ ] NO  EXPLAIN:    MAYFIELD: [X ] YES [ ] NO    DATE INSERTED:  REMOVE:  [ ] YES [x] NO  EXPLAIN:    A-LINE:  [ ] YES [X] NO  LOCATION:   DATE INSERTED:  REMOVE:  [ ] YES [ ] NO  EXPLAIN:    PAST MEDICAL & SURGICAL HISTORY:  Pneumonia  Anemia  Parkinson disease  Tremors of nervous system  No pertinent past medical history  Status post hip surgery  No significant past surgical history      REVIEW OF SYSTEMS:    [+] Intubated. no mental status.      ICU Vital Signs Last 24 Hrs  T(C): 37.9, Max: 39.1 (04-17 @ 20:00)  T(F): 100.3, Max: 102.4 (04-17 @ 20:00)  HR: 115 (95 - 118)  BP: 130/59 (102/50 - 155/73)  BP(mean): 77 (62 - 128)  RR: 25 (16 - 26)  SpO2: 97% (95% - 99%)      ABG - ( 17 Apr 2017 09:14 )  pH: 7.43 /  pCO2: 40    /  pO2: 96    / HCO3: 27    / Base Excess: 2.6   /  SaO2: 97          Mode: AC/ CMV (Assist Control/ Continuous Mandatory Ventilation)  RR (machine): 16  TV (machine): 400  FiO2: 50  PEEP: 5  ITime: 1  MAP: 11  PIP: 23    PHYSICAL EXAM:    GENERAL: NAD, elderly female, unresponsive off sedation  HEAD:  Atraumatic, Normocephalic  EYES: PERRLA, conjunctiva and sclera clear  ENMT: intubated  NECK: Supple, No JVD  NERVOUS SYSTEM:  Alert & Oriented X0. not responding to commands or stimuli.  CHEST/LUNG: Clear to auscultation bilaterally; No rales, rhonchi, mechanical breath sounds  HEART: Regular rate and rhythm  ABDOMEN: Soft, Nontender, Nondistended; Bowel sounds present  EXTREMITIES:  2+ Peripheral Pulses, No clubbing, cyanosis; bilateral hand edema      LABS:                        8.6    13.4  )-----------( 298      ( 18 Apr 2017 02:10 )             25.4      04-18    140  |  102  |  21  ----------------------------<  116<H>  4.3   |  27  |  0.48<L>    Ca    8.3<L>      18 Apr 2017 02:10  Phos  2.1     04-18  Mg     2.2     04-18    TPro  7.4  /  Alb  1.2<L>  /  TBili  0.9  /  DBili  x   /  AST  72<H>  /  ALT  17  /  AlkPhos  178<H>  04-18    PT/INR - ( 18 Apr 2017 02:10 )   PT: 21.2 sec;   INR: 1.92 ratio         PTT - ( 18 Apr 2017 02:10 )  PTT:38.1 sec    Culture - Sputum . (04.17.17 @ 08:15)    Specimen Source: .Sputum Sputum, trap    Culture Results: Normal Respiratory Cindy present    Culture - Blood (04.17.17 @ 01:19)    Specimen Source: .Blood Blood    Culture Results: No growth to date.            RADIOLOGY & ADDITIONAL STUDIES:  CXR Advanced infiltration mainly in the right upper lobe lobe again noted roughly similar to April 16. In addition there is now increasing infiltrate in the right lower lobe. Left lower lobe infiltrate is stable.    CRITICAL CARE TIME SPENT: 40 minutes

## 2017-04-18 NOTE — PROGRESS NOTE ADULT - SUBJECTIVE AND OBJECTIVE BOX
REMAINS INTUBATED AND UNRESPONSIVE  NO SIGNIFICANT TACHYCARDIA OR BRADYCARDIA  SUPPORTIVE EFFORTS CONTINUE    THAI ROMERO MD, FACC

## 2017-04-18 NOTE — PROGRESS NOTE ADULT - SUBJECTIVE AND OBJECTIVE BOX
INTERVAL HPI/OVERNIGHT EVENTS:        REVIEW OF SYSTEMS:  CONSTITUTIONAL:  comfortable  on  respirator      MEDICATION:  enoxaparin Injectable 40milliGRAM(s) SubCutaneous every 24 hours  polyethylene glycol 3350 17Gram(s) Oral daily  folic acid 1milliGRAM(s) Oral daily  senna Syrup 10milliLiter(s) Oral at bedtime  acetaminophen    Suspension 650milliGRAM(s) Oral every 6 hours PRN  vancomycin  IVPB 1000milliGRAM(s) IV Intermittent every 24 hours  meropenem IVPB  IV Intermittent   ipratropium    for Nebulization 500MICROGram(s) Inhalation every 6 hours  acetylcysteine 10% Inhalation 2milliLiter(s) Inhalation every 6 hours  meropenem IVPB 1000milliGRAM(s) IV Intermittent every 8 hours  saliva substitute (BIOTENE MOISTURIZING MOUTH SPRAY) 1Spray(s) Topical two times a day  carbidopa/levodopa  25/100 1Tablet(s) Oral four times a day  pramipexole 0.25milliGRAM(s) Oral three times a day  acetaminophen    Suspension. 650milliGRAM(s) Enteral Tube every 6 hours PRN  chlorhexidine 0.12% Liquid 15milliLiter(s) Swish and Spit two times a day  chlorhexidine 4% Liquid 1Application(s) Topical daily  pantoprazole  Injectable 40milliGRAM(s) IV Push daily  fluconAZOLE   Tablet 100milliGRAM(s) Oral daily    Vital Signs Last 24 Hrs  T(C): 37.9, Max: 39.1 (04-17 @ 20:00)  T(F): 100.2, Max: 102.4 (04-17 @ 20:00)  HR: 97 (95 - 119)  BP: 119/57 (102/50 - 166/117)  BP(mean): 72 (62 - 130)  RR: 22 (19 - 27)  SpO2: 98% (93% - 99%)    PHYSICAL EXAM:  GENERAL: NAD, well-groomed, well-developed  EYES:  conjunctiva and sclera clear  NERVOUS SYSTEM: moves  all  extr  CHEST/LUNG:scattered  ronchis  HEART: Regular rate and rhythm; No murmurs, rubs, or gallops  ABDOMEN: Soft, Nontender, Nondistended; Bowel sounds present  EXTREMITIES:  no  edema no  tenderness  SKIN: No rashes   LABS:                        8.6    13.4  )-----------( 298      ( 18 Apr 2017 02:10 )             25.4     04-18    140  |  102  |  21  ----------------------------<  116<H>  4.3   |  27  |  0.48<L>    Ca    8.3<L>      18 Apr 2017 02:10  Phos  2.1     04-18  Mg     2.2     04-18    TPro  7.4  /  Alb  1.2<L>  /  TBili  0.9  /  DBili  x   /  AST  72<H>  /  ALT  17  /  AlkPhos  178<H>  04-18    PT/INR - ( 18 Apr 2017 02:10 )   PT: 21.2 sec;   INR: 1.92 ratio         PTT - ( 18 Apr 2017 02:10 )  PTT:38.1 sec    CAPILLARY BLOOD GLUCOSE      RADIOLOGY & ADDITIONAL TESTS:    Imaging reports  Personally Reviewed:  [x ] YES  [ ] NO    Consultant(s) Notes Reviewed:  [x ] YES  [ ] NO    Care Discussed with Consultants/Other Providers [x ] YES  [ ] NO  acute  respiratory  failure  aspiration  pneumonia contnue  vent  support  weaning  as  per intensivist    COLO/VESCICULAR FISTULA proceed  as  per  surgery  discussed  with  Urology  to  continue NGT feedings monitor  labs    LFT elevation  continue  to  monitor might  need  repeat  ABD  imaging  metabolic  encephalopathy  supprtive care        Problem/Plan - 3:  ·  Problem: Parkinson disease encephalopathy.  Plan: sinemet discussed  with  neurology  as  to  further  adjustments/addition  to  treatment    Problem/Plan - 4:      Problem/Plan - 5:  ·  Problem: Anemia.  Plan: iron  folic  acid. transfuse  as  needed    prognosis  guarded

## 2017-04-18 NOTE — PROGRESS NOTE ADULT - ATTENDING COMMENTS
86F PMH Parkinson's, s/p recent mechanical fall with R intertrochanteric femur fracture requiring R intramedullary nailing of R trochanteric fracture of femur 3/13/17, UTI, PNA presents from [facility] rehab for sepsis, metabolic encephalopathy, UTI, PNA, colovesicular fistula with course complicated by aspiration of tube feeds, acute hypoxic respiratory failure requiring intubation, hypotension.     DX: sepsis, acute hypoxic respiratory failure, respiratory failure requiring intubation, aspiration PNA, colovesicular fistula, acute metabolic encephalopathy, anemia    1. PULM  - cont mechanical ventilation  - FIO2 weaned down to 50% today  - remains on broad spectrum antibiotics  (vanco and meropenem): will d/w ID need for change of antibiotics as pt continues to spike through current regimen  - wean as tolerates, however with underlying poor mental status and copious oral secretions pt likely not able to protect airway and will continue to aspirate without airway protection.     2. CV  - hypotension resolved with IVF  - monitoring off antihypertensives  - diuresed with lasix yesterday with >1L output    3. ID  - recurrent sepsis  - now with new aspiration event and new R PNA on CXR  - cont antibiotics  - with colovesicular fistula UA and urine culture will be dirty and contaminated and concern for chronic underlying source of infection    4. HEME  - anemia likely partially dilutional and partially due to sepsis  - s/p 1 unit pRBC yesterday with improvement in Hb today  - no signs of acute overt bleeding    5. NEURO  - acute encephalopathy likely from underlying sepsis  - if no improvement in mental status will repeat CT head    6. GEN  - son and family updated on condition  - pt remains DNR with [facility]  - all other aggressive care to be continued per family wishes  - supplement hypophosphatemia

## 2017-04-19 LAB
ANION GAP SERPL CALC-SCNC: 9 MMOL/L — SIGNIFICANT CHANGE UP (ref 5–17)
BASE EXCESS BLDA CALC-SCNC: 4 MMOL/L — HIGH (ref -2–2)
BLOOD GAS COMMENTS: SIGNIFICANT CHANGE UP
BLOOD GAS COMMENTS: SIGNIFICANT CHANGE UP
BLOOD GAS SOURCE: SIGNIFICANT CHANGE UP
BUN SERPL-MCNC: 28 MG/DL — HIGH (ref 7–23)
CALCIUM SERPL-MCNC: 8.4 MG/DL — LOW (ref 8.5–10.1)
CHLORIDE SERPL-SCNC: 102 MMOL/L — SIGNIFICANT CHANGE UP (ref 96–108)
CO2 SERPL-SCNC: 29 MMOL/L — SIGNIFICANT CHANGE UP (ref 22–31)
CREAT SERPL-MCNC: 0.41 MG/DL — LOW (ref 0.5–1.3)
CULTURE RESULTS: SIGNIFICANT CHANGE UP
GLUCOSE SERPL-MCNC: 130 MG/DL — HIGH (ref 70–99)
GRAM STN FLD: SIGNIFICANT CHANGE UP
HCO3 BLDA-SCNC: 28 MMOL/L — SIGNIFICANT CHANGE UP (ref 21–29)
HCT VFR BLD CALC: 22.7 % — LOW (ref 34.5–45)
HGB BLD-MCNC: 7.6 G/DL — LOW (ref 11.5–15.5)
HOROWITZ INDEX BLDA+IHG-RTO: 40 — SIGNIFICANT CHANGE UP
MAGNESIUM SERPL-MCNC: 2.3 MG/DL — SIGNIFICANT CHANGE UP (ref 1.8–2.4)
MCHC RBC-ENTMCNC: 29.2 PG — SIGNIFICANT CHANGE UP (ref 27–34)
MCHC RBC-ENTMCNC: 33.4 GM/DL — SIGNIFICANT CHANGE UP (ref 32–36)
MCV RBC AUTO: 87.4 FL — SIGNIFICANT CHANGE UP (ref 80–100)
PCO2 BLDA: 43 MMHG — SIGNIFICANT CHANGE UP (ref 32–46)
PH BLD: 7.43 — SIGNIFICANT CHANGE UP (ref 7.35–7.45)
PHOSPHATE SERPL-MCNC: 2.1 MG/DL — LOW (ref 2.5–4.5)
PLATELET # BLD AUTO: 272 K/UL — SIGNIFICANT CHANGE UP (ref 150–400)
PO2 BLDA: 74 MMHG — SIGNIFICANT CHANGE UP (ref 74–108)
POTASSIUM SERPL-MCNC: 3.5 MMOL/L — SIGNIFICANT CHANGE UP (ref 3.5–5.3)
POTASSIUM SERPL-SCNC: 3.5 MMOL/L — SIGNIFICANT CHANGE UP (ref 3.5–5.3)
RBC # BLD: 2.6 M/UL — LOW (ref 3.8–5.2)
RBC # FLD: 15.8 % — HIGH (ref 11–15)
SAO2 % BLDA: 95 % — SIGNIFICANT CHANGE UP (ref 92–96)
SODIUM SERPL-SCNC: 140 MMOL/L — SIGNIFICANT CHANGE UP (ref 135–145)
SPECIMEN SOURCE: SIGNIFICANT CHANGE UP
WBC # BLD: 15.4 K/UL — HIGH (ref 3.8–10.5)
WBC # FLD AUTO: 15.4 K/UL — HIGH (ref 3.8–10.5)

## 2017-04-19 PROCEDURE — 71010: CPT | Mod: 26

## 2017-04-19 PROCEDURE — 99291 CRITICAL CARE FIRST HOUR: CPT

## 2017-04-19 PROCEDURE — 70450 CT HEAD/BRAIN W/O DYE: CPT | Mod: 26

## 2017-04-19 RX ORDER — POTASSIUM PHOSPHATE, MONOBASIC POTASSIUM PHOSPHATE, DIBASIC 236; 224 MG/ML; MG/ML
15 INJECTION, SOLUTION INTRAVENOUS ONCE
Qty: 0 | Refills: 0 | Status: COMPLETED | OUTPATIENT
Start: 2017-04-19 | End: 2017-04-19

## 2017-04-19 RX ORDER — PANTOPRAZOLE SODIUM 20 MG/1
40 TABLET, DELAYED RELEASE ORAL DAILY
Qty: 0 | Refills: 0 | Status: DISCONTINUED | OUTPATIENT
Start: 2017-04-19 | End: 2017-04-28

## 2017-04-19 RX ADMIN — Medication 2 MILLILITER(S): at 05:49

## 2017-04-19 RX ADMIN — CARBIDOPA AND LEVODOPA 1 TABLET(S): 25; 100 TABLET ORAL at 12:10

## 2017-04-19 RX ADMIN — Medication 500 MICROGRAM(S): at 12:04

## 2017-04-19 RX ADMIN — CHLORHEXIDINE GLUCONATE 1 APPLICATION(S): 213 SOLUTION TOPICAL at 12:11

## 2017-04-19 RX ADMIN — FLUCONAZOLE 100 MILLIGRAM(S): 150 TABLET ORAL at 12:11

## 2017-04-19 RX ADMIN — Medication 500 MICROGRAM(S): at 00:29

## 2017-04-19 RX ADMIN — PRAMIPEXOLE DIHYDROCHLORIDE 0.25 MILLIGRAM(S): 0.12 TABLET ORAL at 22:15

## 2017-04-19 RX ADMIN — POTASSIUM PHOSPHATE, MONOBASIC POTASSIUM PHOSPHATE, DIBASIC 63.75 MILLIMOLE(S): 236; 224 INJECTION, SOLUTION INTRAVENOUS at 06:10

## 2017-04-19 RX ADMIN — MEROPENEM 200 MILLIGRAM(S): 1 INJECTION INTRAVENOUS at 22:15

## 2017-04-19 RX ADMIN — Medication 2 MILLILITER(S): at 12:04

## 2017-04-19 RX ADMIN — Medication 250 MILLIGRAM(S): at 04:15

## 2017-04-19 RX ADMIN — Medication 500 MICROGRAM(S): at 05:49

## 2017-04-19 RX ADMIN — MEROPENEM 200 MILLIGRAM(S): 1 INJECTION INTRAVENOUS at 06:10

## 2017-04-19 RX ADMIN — Medication 500 MICROGRAM(S): at 17:11

## 2017-04-19 RX ADMIN — CARBIDOPA AND LEVODOPA 1 TABLET(S): 25; 100 TABLET ORAL at 06:10

## 2017-04-19 RX ADMIN — CHLORHEXIDINE GLUCONATE 15 MILLILITER(S): 213 SOLUTION TOPICAL at 06:10

## 2017-04-19 RX ADMIN — CARBIDOPA AND LEVODOPA 1 TABLET(S): 25; 100 TABLET ORAL at 00:02

## 2017-04-19 RX ADMIN — CHLORHEXIDINE GLUCONATE 15 MILLILITER(S): 213 SOLUTION TOPICAL at 17:04

## 2017-04-19 RX ADMIN — Medication 2 MILLILITER(S): at 00:29

## 2017-04-19 RX ADMIN — Medication 2 MILLILITER(S): at 17:08

## 2017-04-19 RX ADMIN — MEROPENEM 200 MILLIGRAM(S): 1 INJECTION INTRAVENOUS at 14:19

## 2017-04-19 RX ADMIN — ENOXAPARIN SODIUM 40 MILLIGRAM(S): 100 INJECTION SUBCUTANEOUS at 06:11

## 2017-04-19 RX ADMIN — PRAMIPEXOLE DIHYDROCHLORIDE 0.25 MILLIGRAM(S): 0.12 TABLET ORAL at 14:40

## 2017-04-19 RX ADMIN — PANTOPRAZOLE SODIUM 40 MILLIGRAM(S): 20 TABLET, DELAYED RELEASE ORAL at 12:10

## 2017-04-19 RX ADMIN — Medication 1 MILLIGRAM(S): at 12:11

## 2017-04-19 RX ADMIN — PRAMIPEXOLE DIHYDROCHLORIDE 0.25 MILLIGRAM(S): 0.12 TABLET ORAL at 06:10

## 2017-04-19 RX ADMIN — CARBIDOPA AND LEVODOPA 1 TABLET(S): 25; 100 TABLET ORAL at 17:04

## 2017-04-19 NOTE — PROGRESS NOTE ADULT - SUBJECTIVE AND OBJECTIVE BOX
HPI:  86F hx of parkinsons, recent right hip / femur fracture with medullary pins sent from rehab for AMS and fever. Admitted to the hospital for UTI/PNA. Patient had a RRT on the floors for hypotension, and hypoxia. During intubation for airway protection, tube feeds found in the endotracheal tube. Transferred to the ICU for hypoxic respiratory failure.    24hour events:      CENTRAL LINE: [ ] YES [ ] NO  LOCATION:   DATE INSERTED:  REMOVE: [ ] YES [ ] NO  EXPLAIN:    MAYFIELD: [ X] YES [ ] NO    DATE INSERTED:  REMOVE:  [ ] YES [ ] NO  EXPLAIN:    A-LINE:  [ ] YES [X ] NO  LOCATION:   DATE INSERTED:  REMOVE:  [ ] YES [ ] NO  EXPLAIN:    PAST MEDICAL & SURGICAL HISTORY:  Pneumonia  Anemia  Parkinson disease  Tremors of nervous system  No pertinent past medical history  Status post hip surgery  No significant past surgical history      REVIEW OF SYSTEMS:    [+]Intubated      ICU Vital Signs Last 24 Hrs  T(C): 36.8, Max: 39.1 (04-18 @ 17:00)  T(F): 98.3, Max: 102.4 (04-18 @ 17:00)  HR: 106 (69 - 119)  BP: 163/92 (106/44 - 163/92)  BP(mean): 102 (60 - 110)  ABP: --  ABP(mean): --  RR: 13 (13 - 26)  SpO2: 98% (93% - 100%)      ABG - ( 17 Apr 2017 09:14 )  pH: x     /  pCO2: 40    /  pO2: 96    / HCO3: 27    / Base Excess: 2.6   /  SaO2: 97          PHYSICAL EXAM:    GENERAL: NAD, well-groomed, well-developed  HEAD:  Atraumatic, Normocephalic  EYES: EOMI, PERRLA, conjunctiva and sclera clear  ENMT: No tonsillar erythema, exudates, or enlargement; Moist mucous membranes, Good dentition, No lesions  NECK: Supple, No JVD, Normal thyroid  NERVOUS SYSTEM:  Alert & Oriented X3, Good concentration; Motor Strength 5/5 B/L upper and lower extremities; DTRs 2+ intact and symmetric  CHEST/LUNG: Clear to percussion bilaterally; No rales, rhonchi, wheezing, or rubs  HEART: Regular rate and rhythm; No murmurs, rubs, or gallops  ABDOMEN: Soft, Nontender, Nondistended; Bowel sounds present  EXTREMITIES:  2+ Peripheral Pulses, No clubbing, cyanosis, or edema  LYMPH: No lymphadenopathy noted  SKIN: No rashes or lesions      LABS:                        7.6    15.4  )-----------( 272      ( 19 Apr 2017 03:56 )             22.7      04-19    140  |  102  |  28<H>  ----------------------------<  130<H>  3.5   |  29  |  0.41<L>    Ca    8.4<L>      19 Apr 2017 03:56  Phos  2.1     04-19  Mg     2.3     04-19    TPro  7.4  /  Alb  1.2<L>  /  TBili  0.9  /  DBili  x   /  AST  72<H>  /  ALT  17  /  AlkPhos  178<H>  04-18    PT/INR - ( 18 Apr 2017 02:10 )   PT: 21.2 sec;   INR: 1.92 ratio         PTT - ( 18 Apr 2017 02:10 )  PTT:38.1 sec    Culture Results:   Normal Respiratory Cindy present (04-17 @ 08:15)  Culture Results:   No growth to date. (04-17 @ 01:19)  Culture Results:   No growth to date. (04-17 @ 01:18)      CRITICAL CARE TIME SPENT: HPI:  86F hx of parkinsons, recent right hip / femur fracture with medullary pins sent from rehab for AMS and fever. Admitted to the hospital for UTI/PNA. Patient had a RRT on the floors for hypotension, and hypoxia. During intubation for airway protection, tube feeds found in the endotracheal tube. Transferred to the ICU for hypoxic respiratory failure.    24hour events:  Patient's clinical status remains unchanged from previous. Patient had fever again. mental status unchanged.       CENTRAL LINE: [ X] YES [ ] NO  LOCATION:   DATE INSERTED: 4/16/17  REMOVE: [X ] YES [ ] NO  EXPLAIN:    MAYFIELD: [ X] YES [ ] NO    DATE INSERTED:  REMOVE:  [ ] YES [ ] NO  EXPLAIN:    A-LINE:  [ ] YES [X ] NO  LOCATION:   DATE INSERTED:  REMOVE:  [ ] YES [ ] NO  EXPLAIN:    PAST MEDICAL & SURGICAL HISTORY:  Pneumonia  Anemia  Parkinson disease  Tremors of nervous system  No pertinent past medical history  Status post hip surgery  No significant past surgical history      REVIEW OF SYSTEMS:    [+]Intubated      ICU Vital Signs Last 24 Hrs  T(C): 36.8, Max: 39.1 (04-18 @ 17:00)  T(F): 98.3, Max: 102.4 (04-18 @ 17:00)  HR: 106 (69 - 119)  BP: 163/92 (106/44 - 163/92)  BP(mean): 102 (60 - 110)  ABP: --  ABP(mean): --  RR: 13 (13 - 26)  SpO2: 98% (93% - 100%)      ABG - ( 17 Apr 2017 09:14 )  pH: x     /  pCO2: 40    /  pO2: 96    / HCO3: 27    / Base Excess: 2.6   /  SaO2: 97          PHYSICAL EXAM:    GENERAL: NAD, elderly female, unresponsive off sedation  HEAD:  Atraumatic, Normocephalic  EYES: PERRLA, conjunctiva and sclera clear  ENMT: Intubated  NECK: Supple, No JVD  NERVOUS SYSTEM:  Alert & Oriented X0. not responding to commands or stimuli.  CHEST/LUNG: Clear to auscultation bilaterally; No rales, rhonchi, mechanical breath sounds. right subclavian.  HEART: Regular rate and rhythm  ABDOMEN: Soft, Nontender, Nondistended; Bowel sounds present  EXTREMITIES:  2+ Peripheral Pulses, No clubbing, cyanosis; bilateral hand edema      LABS:                        7.6    15.4  )-----------( 272      ( 19 Apr 2017 03:56 )             22.7      04-19    140  |  102  |  28<H>  ----------------------------<  130<H>  3.5   |  29  |  0.41<L>    Ca    8.4<L>      19 Apr 2017 03:56  Phos  2.1     04-19  Mg     2.3     04-19    TPro  7.4  /  Alb  1.2<L>  /  TBili  0.9  /  DBili  x   /  AST  72<H>  /  ALT  17  /  AlkPhos  178<H>  04-18    PT/INR - ( 18 Apr 2017 02:10 )   PT: 21.2 sec;   INR: 1.92 ratio         PTT - ( 18 Apr 2017 02:10 )  PTT:38.1 sec    Culture Results:   Normal Respiratory Cindy present (04-17 @ 08:15)  Culture Results:   No growth to date. (04-17 @ 01:19)  Culture Results:   No growth to date. (04-17 @ 01:18)    RADIOLOGY & ADDITIONAL STUDIES:  CXR 4/19-IMPRESSION:    Mild interval worsening of bilateral lung opacities.      CRITICAL CARE TIME SPENT: 40 minutes HPI:  86F hx of parkinsons, recent right hip / femur fracture with medullary pins sent from rehab for AMS and fever. Admitted to the hospital for UTI/PNA. Patient had a RRT on the floors for hypotension, and hypoxia. During intubation for airway protection, tube feeds found in the endotracheal tube. Transferred to the ICU for hypoxic respiratory failure.    24hour events:  Patient's clinical status remains unchanged from previous. Patient had fever again. mental status unchanged.       CENTRAL LINE: [ X] YES [ ] NO  LOCATION:   DATE INSERTED: 4/16/17  REMOVE: [X ] YES [ ] NO      MAYFIELD: [ X] YES [ ] NO    DATE INSERTED:  REMOVE:  [ ] YES [ ] NO  EXPLAIN:    A-LINE:  [ ] YES [X ] NO  LOCATION:   DATE INSERTED:  REMOVE:  [ ] YES [ ] NO  EXPLAIN:    PAST MEDICAL & SURGICAL HISTORY:  Pneumonia  Anemia  Parkinson disease  Tremors of nervous system  No pertinent past medical history  Status post hip surgery  No significant past surgical history      REVIEW OF SYSTEMS:    [+]Intubated: unable to obtain      ICU Vital Signs Last 24 Hrs  T(C): 36.8, Max: 39.1 (04-18 @ 17:00)  T(F): 98.3, Max: 102.4 (04-18 @ 17:00)  HR: 106 (69 - 119)  BP: 163/92 (106/44 - 163/92)  BP(mean): 102 (60 - 110)  RR: 13 (13 - 26)  SpO2: 98% (93% - 100%)      ABG - ( 19 Apr 2017 10:14 )  pH: 7.43     /  pCO2: 43    /  pO2: 74    / HCO3: 28    / Base Excess: 4.0   /  SaO2: 95          PHYSICAL EXAM:    GENERAL: NAD, elderly female, unresponsive off sedation  HEAD:  Atraumatic, Normocephalic  EYES: PERRLA, conjunctiva and sclera clear  ENMT: Intubated  NECK: Supple, No JVD  NERVOUS SYSTEM:  Alert & Oriented X0. not responding to commands or stimuli.  CHEST/LUNG: Clear to auscultation bilaterally; No rales, rhonchi, mechanical breath sounds. right subclavian.  HEART: Regular rate and rhythm  ABDOMEN: Soft, Nontender, Nondistended; Bowel sounds present  EXTREMITIES:  2+ Peripheral Pulses, No clubbing, cyanosis; bilateral hand edema      LABS:                        7.6    15.4  )-----------( 272      ( 19 Apr 2017 03:56 )             22.7      04-19    140  |  102  |  28<H>  ----------------------------<  130<H>  3.5   |  29  |  0.41<L>    Ca    8.4<L>      19 Apr 2017 03:56  Phos  2.1     04-19  Mg     2.3     04-19    TPro  7.4  /  Alb  1.2<L>  /  TBili  0.9  /  AST  72<H>  /  ALT  17  /  AlkPhos  178<H>  04-18    PT/INR - ( 18 Apr 2017 02:10 )   PT: 21.2 sec;   INR: 1.92 ratio         PTT - ( 18 Apr 2017 02:10 )  PTT:38.1 sec    Culture - Sputum . (04.17.17 @ 08:15)    Specimen Source: .Sputum Sputum, trap    Culture Results: Normal Respiratory Cindy present    Culture - Blood (04.17.17 @ 01:19)    Specimen Source: .Blood Blood    Culture Results: No growth to date.    Culture - Blood (04.17.17 @ 01:18)    Specimen Source: .Blood Blood    Culture Results: Growth in aerobic bottle: Gram Positive Cocci in Clusters        RADIOLOGY & ADDITIONAL STUDIES:  CXR 4/19-IMPRESSION: Mild interval worsening of bilateral lung opacities.    CT head: Involutional and ischemic gliotic changes. No acute intracranial hemorrhage. Bilateral mastoid effusions.      CRITICAL CARE TIME SPENT: 40 minutes

## 2017-04-19 NOTE — PROGRESS NOTE ADULT - PROBLEM SELECTOR PLAN 1
Likely due to aspiration into the airway.  pneumonitis vs pneumonia  downtitration of vent requirements.  currently on vanco and leanna  f/up cultures  Poor prognosis.  Case discussed with Karissa goel.  CT Head to eval for reversible causes of absent mental status.

## 2017-04-19 NOTE — PROGRESS NOTE ADULT - SUBJECTIVE AND OBJECTIVE BOX
INTERVAL HPI/OVERNIGHT EVENTS:        REVIEW OF SYSTEMS:  CONSTITUTIONAL: continues  on  ventilator        MEDICATION:  enoxaparin Injectable 40milliGRAM(s) SubCutaneous every 24 hours  folic acid 1milliGRAM(s) Oral daily  acetaminophen    Suspension 650milliGRAM(s) Oral every 6 hours PRN  vancomycin  IVPB 1000milliGRAM(s) IV Intermittent every 24 hours  meropenem IVPB  IV Intermittent   ipratropium    for Nebulization 500MICROGram(s) Inhalation every 6 hours  acetylcysteine 10% Inhalation 2milliLiter(s) Inhalation every 6 hours  meropenem IVPB 1000milliGRAM(s) IV Intermittent every 8 hours  carbidopa/levodopa  25/100 1Tablet(s) Oral four times a day  pramipexole 0.25milliGRAM(s) Oral three times a day  acetaminophen    Suspension. 650milliGRAM(s) Enteral Tube every 6 hours PRN  chlorhexidine 0.12% Liquid 15milliLiter(s) Swish and Spit two times a day  chlorhexidine 4% Liquid 1Application(s) Topical daily  pantoprazole  Injectable 40milliGRAM(s) IV Push daily  fluconAZOLE   Tablet 100milliGRAM(s) Oral daily    Vital Signs Last 24 Hrs  T(C): 36.8, Max: 39.1 (04-18 @ 17:00)  T(F): 98.3, Max: 102.4 (04-18 @ 17:00)  HR: 78 (69 - 119)  BP: 163/92 (106/44 - 163/92)  BP(mean): 102 (60 - 110)  RR: 19 (16 - 26)  SpO2: 97% (93% - 100%)    PHYSICAL EXAM:  GENERAL: NAD, well-groomed, well-developed  EYES:  conjunctiva and sclera clear  ENMT:  Moist mucous membranes,   NECK: Supple, No JVD, Normal thyroid  NERVOUS SYSTEM:  Alert oriented   no  focal  deficits;   CHEST/LUNG: Clear    HEART: Regular rate and rhythm; No murmurs, rubs, or gallops  ABDOMEN: Soft, Nontender, Nondistended; Bowel sounds present  EXTREMITIES:  no  edema no  tenderness  SKIN: No rashes   LABS:                        7.6    15.4  )-----------( 272      ( 19 Apr 2017 03:56 )             22.7     04-19    140  |  102  |  28<H>  ----------------------------<  130<H>  3.5   |  29  |  0.41<L>    Ca    8.4<L>      19 Apr 2017 03:56  Phos  2.1     04-19  Mg     2.3     04-19    TPro  7.4  /  Alb  1.2<L>  /  TBili  0.9  /  DBili  x   /  AST  72<H>  /  ALT  17  /  AlkPhos  178<H>  04-18    PT/INR - ( 18 Apr 2017 02:10 )   PT: 21.2 sec;   INR: 1.92 ratio         PTT - ( 18 Apr 2017 02:10 )  PTT:38.1 sec    CAPILLARY BLOOD GLUCOSE      RADIOLOGY & ADDITIONAL TESTS:    Imaging reports  Personally Reviewed:  [ x] YES  [ ] NO    Consultant(s) Notes Reviewed:  [x ] YES  [ ] NO    Care Discussed with Consultants/Other Providers [ x] YES  [ ] NO    ACUTE  RESPIRATORY  FAILURE  PNEUMONIA   CONT  VENT  SUPPORT  AB  IVF      COLO/VESCICULAR FISTULA proceed  as  per  surgery  discussed  with  Urology  to  continue NGT feedings monitor  labs    LFT elevation  continue  to  monitor might  need  repeat  ABD  imaging  metabolic  encephalopathy  supprtive care        Problem/Plan - 3:  ·  Problem: Parkinson disease encephalopathy.  Plan: sinemet discussed  with  neurology  as  to  further  adjustments/addition  to  treatment    Problem/Plan - 4:      Problem/Plan - 5:  ·  Problem: Anemia.  Plan: iron  folic  acid. transfuse  as  needed    prognosis  guarded

## 2017-04-19 NOTE — PROGRESS NOTE ADULT - SUBJECTIVE AND OBJECTIVE BOX
SURGERY PROGRESS HPI:  Patient seen and examined at bedside. Patient currently intubated and unresponsive.     Vital Signs Last 24 Hrs  T(C): 37.4, Max: 39.1 (04-18 @ 17:00)  T(F): 99.4, Max: 102.4 (04-18 @ 17:00)  HR: 82 (82 - 119)  BP: 113/47 (106/44 - 155/73)  BP(mean): 64 (60 - 110)  RR: 19 (16 - 26)  SpO2: 97% (93% - 99%)      PHYSICAL EXAM:    GENERAL: Intubated. Unresponsive  CHEST/LUNG: Course breath sounds. Intubated.  HEART: S1S2, tachycardic  ABDOMEN: + Bowel sounds, soft, mild Distention  EXTREMITIES:  edema of bilateral upper and lower extremities.  : shoemaker catheter in place draining yellow urine with feculent sediment noted. 525ml/24hours.     I&O's Detail  I & Os for 24h ending 18 Apr 2017 07:00  =============================================  IN:    Jevity: 1020 ml    Solution: 100 ml    Total IN: 1120 ml  ---------------------------------------------  OUT:    Indwelling Catheter - Urethral: 1890 ml    Total OUT: 1890 ml  ---------------------------------------------  Total NET: -770 ml    I & Os for current day (as of 19 Apr 2017 04:16)  =============================================  IN:    Jevity: 1080 ml    Solution: 200 ml    Total IN: 1280 ml  ---------------------------------------------  OUT:    Indwelling Catheter - Urethral: 525 ml    Total OUT: 525 ml  ---------------------------------------------  Total NET: 755 ml      LABS:                        7.6    15.4  )-----------( 272      ( 19 Apr 2017 03:56 )             22.7     04-18    140  |  102  |  21  ----------------------------<  116<H>  4.3   |  27  |  0.48<L>    Ca    8.3<L>      18 Apr 2017 02:10  Phos  2.1     04-18  Mg     2.2     04-18    TPro  7.4  /  Alb  1.2<L>  /  TBili  0.9  /  DBili  x   /  AST  72<H>  /  ALT  17  /  AlkPhos  178<H>  04-18    PT/INR - ( 18 Apr 2017 02:10 )   PT: 21.2 sec;   INR: 1.92 ratio         PTT - ( 18 Apr 2017 02:10 )  PTT:38.1 sec    Culture Results:   Normal Respiratory Cindy present (04-17 @ 08:15)  Culture Results:   No growth to date. (04-17 @ 01:19)  Culture Results:   No growth to date. (04-17 @ 01:18)        Assessment: 86y old Female with PMH Pneumonia, Anemia, and Parkinson disease admitted with sepsis secondary to PNA and colovesical fistula. now s/p RRT and intubation secondary to hypoxia, hypotension and acute respiratory failure.     Plan:  - continue shoemaker catheter and monitor urine output.  - patient not currently stable for any surgical intervention  - continue IV antibiotics per ID  - continue current management per ICU  - will discuss with Dr. Mosley and Dr. Bahena

## 2017-04-19 NOTE — PROGRESS NOTE ADULT - PROBLEM SELECTOR PLAN 3
Normocytic anemia without evidence of blood loss.  hemoglobin stable.  Continue and transfuse as needed. may be a component of chronic disease anemia but difficulty to workup in the setting of PRBC transfusion.
Normocytic anemia without evidence of blood loss.  s/p 1 unit prbc  recheck cbc post xfusion.
Normocytic anemia without evidence of blood loss.  hemoglobin stable.  Continue and transfuse as needed. may be a component of chronic disease anemia but difficulty to workup in the setting of PRBC transfusion.

## 2017-04-19 NOTE — PROGRESS NOTE ADULT - SUBJECTIVE AND OBJECTIVE BOX
REMAINS INTUBATED  IN NORMAL SINUS RHYTHM  UNRESPONSIVE  COARSE BREATH SOUNDS  SOFT S1  NO DISTENSION  MILD EDEMA  PERFUSED  ALL LABS/RADIOLOGY/MEDICATIONS REVIEWED;    ON GOING SUPPORT RESPIRATORY FAILURE, PNEUMONIA, TACHY-FAWAD SYNDROME  GUARDED PROGNOSIS

## 2017-04-19 NOTE — PROGRESS NOTE ADULT - ATTENDING COMMENTS
86F PMH Parkinson's, s/p recent mechanical fall with R intertrochanteric femur fracture requiring R intramedullary nailing of R trochanteric fracture of femur 3/13/17, UTI, PNA presents from [facility] rehab for sepsis, metabolic encephalopathy, UTI, PNA, colovesicular fistula with course complicated by aspiration of tube feeds, acute hypoxic respiratory failure requiring intubation, hypotension.     DX: sepsis, acute hypoxic respiratory failure, respiratory failure requiring intubation, aspiration PNA, colovesicular fistula, acute metabolic encephalopathy, anemia, gram positive bacteremia    1. PULM  - cont mechanical ventilation  - tolerating some weaning today  - remains on broad spectrum antibiotics  (vanco and meropenem)  - f/u with ID regarding antibiotic course  - wean as tolerates, however with underlying poor mental status and copious oral secretions pt likely not able to protect airway and will continue to aspirate without airway being protected    2. CV  - hypotension resolved with IVF  - monitoring off antihypertensives  - BP stable at present time  - out of shock state    3. ID  - recurrent sepsis  - now with new aspiration event and new R PNA on CXR along with gram positive bacteremia from 4/16  - not a central line infection as blood culture were drawn at at approximately time of insertion  - central line d/francine today  - cont antibiotics  - with colovesicular fistula UA and urine culture will be dirty and contaminated and concern for source of chronic underlying infection    4. HEME  - anemia likely partially dilutional and partially due to sepsis  - s/p 1 unit pRBC 4/17 with improvement in Hb which remains stable  - no signs of acute overt bleeding  - goal to maintain Hb >7    5. NEURO  - acute encephalopathy likely from underlying sepsis  - CT head without any acute pathology noted (no acute hemorrhage, no acute CVA)    6. GEN  - son updated this morning on condition  - pt remains DNR with MOLST in placce  - all other aggressive care to be continued per family wishes  - supplement hypophosphatemia. 86F PMH Parkinson's, s/p recent mechanical fall with R intertrochanteric femur fracture requiring R intramedullary nailing of R trochanteric fracture of femur 3/13/17, UTI, PNA presents from [facility] rehab for sepsis, metabolic encephalopathy, UTI, PNA, colovesicular fistula with course complicated by aspiration of tube feeds, acute hypoxic respiratory failure requiring intubation, hypotension.     DX: sepsis, acute hypoxic respiratory failure, respiratory failure requiring intubation, aspiration PNA, colovesicular fistula, acute metabolic encephalopathy, anemia, gram positive bacteremia    1. PULM  - cont mechanical ventilation  - tolerating some weaning today  - remains on broad spectrum antibiotics  (vanco and meropenem)  - f/u with ID regarding antibiotic course  - wean as tolerates, however with underlying poor mental status and copious oral secretions pt likely not able to protect airway and will continue to aspirate without airway being protected    2. CV  - hypotension resolved with IVF  - monitoring off antihypertensives  - BP stable at present time  - out of shock state    3. ID  - recurrent sepsis  - now with new aspiration event and new R PNA on CXR along with gram positive bacteremia from 4/16  - not a central line infection as blood culture were drawn at approximately time of insertion  - central line d/francine today  - cont antibiotics  - with colovesicular fistula UA and urine culture will be dirty and contaminated and concern for source of chronic underlying infection. /surgery follow up    4. HEME  - anemia likely partially dilutional and partially due to sepsis  - s/p 1 unit pRBC 4/17 with improvement in Hb which remains stable  - no signs of acute overt bleeding  - goal to maintain Hb >7    5. NEURO  - acute encephalopathy likely from underlying sepsis  - CT head without any acute pathology noted (no acute hemorrhage, no acute CVA)    6. GEN  - son updated this morning on condition  - pt remains DNR with MOLST in place  - all other aggressive care to be continued per family wishes  - supplement hypophosphatemia.

## 2017-04-20 LAB
ANION GAP SERPL CALC-SCNC: 12 MMOL/L — SIGNIFICANT CHANGE UP (ref 5–17)
BUN SERPL-MCNC: 24 MG/DL — HIGH (ref 7–23)
CALCIUM SERPL-MCNC: 8.2 MG/DL — LOW (ref 8.5–10.1)
CHLORIDE SERPL-SCNC: 101 MMOL/L — SIGNIFICANT CHANGE UP (ref 96–108)
CO2 SERPL-SCNC: 27 MMOL/L — SIGNIFICANT CHANGE UP (ref 22–31)
CREAT SERPL-MCNC: 0.34 MG/DL — LOW (ref 0.5–1.3)
CULTURE RESULTS: SIGNIFICANT CHANGE UP
GLUCOSE SERPL-MCNC: 105 MG/DL — HIGH (ref 70–99)
GRAM STN FLD: SIGNIFICANT CHANGE UP
HCT VFR BLD CALC: 22.4 % — LOW (ref 34.5–45)
HGB BLD-MCNC: 7.4 G/DL — LOW (ref 11.5–15.5)
MAGNESIUM SERPL-MCNC: 2.3 MG/DL — SIGNIFICANT CHANGE UP (ref 1.8–2.4)
MCHC RBC-ENTMCNC: 28.8 PG — SIGNIFICANT CHANGE UP (ref 27–34)
MCHC RBC-ENTMCNC: 33.1 GM/DL — SIGNIFICANT CHANGE UP (ref 32–36)
MCV RBC AUTO: 87.1 FL — SIGNIFICANT CHANGE UP (ref 80–100)
PHOSPHATE SERPL-MCNC: 2.8 MG/DL — SIGNIFICANT CHANGE UP (ref 2.5–4.5)
PLATELET # BLD AUTO: 258 K/UL — SIGNIFICANT CHANGE UP (ref 150–400)
POTASSIUM SERPL-MCNC: 4.2 MMOL/L — SIGNIFICANT CHANGE UP (ref 3.5–5.3)
POTASSIUM SERPL-SCNC: 4.2 MMOL/L — SIGNIFICANT CHANGE UP (ref 3.5–5.3)
RBC # BLD: 2.58 M/UL — LOW (ref 3.8–5.2)
RBC # FLD: 16.4 % — HIGH (ref 11–15)
SODIUM SERPL-SCNC: 140 MMOL/L — SIGNIFICANT CHANGE UP (ref 135–145)
SPECIMEN SOURCE: SIGNIFICANT CHANGE UP
WBC # BLD: 15.2 K/UL — HIGH (ref 3.8–10.5)
WBC # FLD AUTO: 15.2 K/UL — HIGH (ref 3.8–10.5)

## 2017-04-20 PROCEDURE — 71010: CPT | Mod: 26

## 2017-04-20 PROCEDURE — 99291 CRITICAL CARE FIRST HOUR: CPT

## 2017-04-20 RX ORDER — AZTREONAM 2 G
1000 VIAL (EA) INJECTION EVERY 8 HOURS
Qty: 0 | Refills: 0 | Status: DISCONTINUED | OUTPATIENT
Start: 2017-04-20 | End: 2017-04-28

## 2017-04-20 RX ORDER — AZTREONAM 2 G
VIAL (EA) INJECTION
Qty: 0 | Refills: 0 | Status: DISCONTINUED | OUTPATIENT
Start: 2017-04-20 | End: 2017-04-28

## 2017-04-20 RX ORDER — AZTREONAM 2 G
1000 VIAL (EA) INJECTION ONCE
Qty: 0 | Refills: 0 | Status: COMPLETED | OUTPATIENT
Start: 2017-04-20 | End: 2017-04-20

## 2017-04-20 RX ADMIN — MEROPENEM 200 MILLIGRAM(S): 1 INJECTION INTRAVENOUS at 05:45

## 2017-04-20 RX ADMIN — Medication 650 MILLIGRAM(S): at 21:53

## 2017-04-20 RX ADMIN — Medication 500 MICROGRAM(S): at 17:02

## 2017-04-20 RX ADMIN — Medication 1 MILLIGRAM(S): at 12:12

## 2017-04-20 RX ADMIN — CHLORHEXIDINE GLUCONATE 15 MILLILITER(S): 213 SOLUTION TOPICAL at 05:45

## 2017-04-20 RX ADMIN — CHLORHEXIDINE GLUCONATE 15 MILLILITER(S): 213 SOLUTION TOPICAL at 18:35

## 2017-04-20 RX ADMIN — CARBIDOPA AND LEVODOPA 1 TABLET(S): 25; 100 TABLET ORAL at 18:34

## 2017-04-20 RX ADMIN — Medication 2 MILLILITER(S): at 00:38

## 2017-04-20 RX ADMIN — Medication 250 MILLIGRAM(S): at 04:37

## 2017-04-20 RX ADMIN — PRAMIPEXOLE DIHYDROCHLORIDE 0.25 MILLIGRAM(S): 0.12 TABLET ORAL at 05:45

## 2017-04-20 RX ADMIN — CARBIDOPA AND LEVODOPA 1 TABLET(S): 25; 100 TABLET ORAL at 05:45

## 2017-04-20 RX ADMIN — PRAMIPEXOLE DIHYDROCHLORIDE 0.25 MILLIGRAM(S): 0.12 TABLET ORAL at 13:15

## 2017-04-20 RX ADMIN — Medication 50 MILLIGRAM(S): at 21:55

## 2017-04-20 RX ADMIN — Medication 500 MICROGRAM(S): at 00:38

## 2017-04-20 RX ADMIN — Medication 50 MILLIGRAM(S): at 13:16

## 2017-04-20 RX ADMIN — CARBIDOPA AND LEVODOPA 1 TABLET(S): 25; 100 TABLET ORAL at 12:12

## 2017-04-20 RX ADMIN — Medication 500 MICROGRAM(S): at 06:15

## 2017-04-20 RX ADMIN — CHLORHEXIDINE GLUCONATE 1 APPLICATION(S): 213 SOLUTION TOPICAL at 12:22

## 2017-04-20 RX ADMIN — PRAMIPEXOLE DIHYDROCHLORIDE 0.25 MILLIGRAM(S): 0.12 TABLET ORAL at 21:54

## 2017-04-20 RX ADMIN — PANTOPRAZOLE SODIUM 40 MILLIGRAM(S): 20 TABLET, DELAYED RELEASE ORAL at 12:13

## 2017-04-20 RX ADMIN — Medication 2 MILLILITER(S): at 06:15

## 2017-04-20 RX ADMIN — CARBIDOPA AND LEVODOPA 1 TABLET(S): 25; 100 TABLET ORAL at 00:41

## 2017-04-20 RX ADMIN — ENOXAPARIN SODIUM 40 MILLIGRAM(S): 100 INJECTION SUBCUTANEOUS at 05:46

## 2017-04-20 RX ADMIN — Medication 650 MILLIGRAM(S): at 01:33

## 2017-04-20 RX ADMIN — Medication 500 MICROGRAM(S): at 11:08

## 2017-04-20 RX ADMIN — FLUCONAZOLE 100 MILLIGRAM(S): 150 TABLET ORAL at 12:12

## 2017-04-20 NOTE — PROGRESS NOTE ADULT - SUBJECTIVE AND OBJECTIVE BOX
INTERVAL HPI/OVERNIGHT EVENTS:        REVIEW OF SYSTEMS:  CONSTITUTIONAL: continues  on  respirator     MEDICATION:  enoxaparin Injectable 40milliGRAM(s) SubCutaneous every 24 hours  folic acid 1milliGRAM(s) Oral daily  acetaminophen    Suspension 650milliGRAM(s) Oral every 6 hours PRN  vancomycin  IVPB 1000milliGRAM(s) IV Intermittent every 24 hours  ipratropium    for Nebulization 500MICROGram(s) Inhalation every 6 hours  carbidopa/levodopa  25/100 1Tablet(s) Oral four times a day  pramipexole 0.25milliGRAM(s) Oral three times a day  acetaminophen    Suspension. 650milliGRAM(s) Enteral Tube every 6 hours PRN  chlorhexidine 0.12% Liquid 15milliLiter(s) Swish and Spit two times a day  chlorhexidine 4% Liquid 1Application(s) Topical daily  fluconAZOLE   Tablet 100milliGRAM(s) Oral daily  pantoprazole   Suspension 40milliGRAM(s) Oral daily  aztreonam  IVPB  IV Intermittent   aztreonam  IVPB 1000milliGRAM(s) IV Intermittent every 8 hours    Vital Signs Last 24 Hrs  T(C): 37.6, Max: 38.1 (04-20 @ 01:33)  T(F): 99.7, Max: 100.5 (04-20 @ 01:33)  HR: 94 (82 - 114)  BP: 139/71 (113/53 - 169/79)  BP(mean): 87 (67 - 131)  RR: 22 (17 - 25)  SpO2: 96% (93% - 99%)    PHYSICAL EXAM:  EYES:  conjunctiva and sclera clear  NERVOUS SYSTEM:  moves  all  extr  CHEST/LUNG: scatterd  ronchis  HEART: Regular rate and rhythm; No murmurs, rubs, or gallops  ABDOMEN: Soft, Nontender, Nondistended; Bowel sounds present  EXTREMITIES:  n+  edema no  tenderness  SKIN: No rashes   LABS:                        7.4    15.2  )-----------( 258      ( 20 Apr 2017 03:50 )             22.4     04-20    140  |  101  |  24<H>  ----------------------------<  105<H>  4.2   |  27  |  0.34<L>    Ca    8.2<L>      20 Apr 2017 03:50  Phos  2.8     04-20  Mg     2.3     04-20          CAPILLARY BLOOD GLUCOSE      RADIOLOGY & ADDITIONAL TESTS:    Imaging reports  Personally Reviewed:  [ x] YES  [ ] NO    Consultant(s) Notes Reviewed:  [ x] YES  [ ] NO    Care Discussed with Consultants/Other Providers [ x] YES  [ ] NO  ACUTE  RESPIRATORY  FAILURE  PNEUMONIA   CONT  VENT  SUPPORT  AB  IVF      COLO/VESCICULAR FISTULA proceed  as  per  surgery  discussed  with  Urology  to  continue NGT feedings monitor  labs    LFT elevation  continue  to  monitor might  need  repeat  ABD  imaging  metabolic  encephalopathy  supprtive care        Problem/Plan - 3:  ·  Problem: Parkinson disease encephalopathy.  Plan: sinemet discussed  with  neurology  as  to  further  adjustments/addition  to  treatment    Problem/Plan - 4:      Problem/Plan - 5:  ·  Problem: Anemia.  Plan: iron  folic  acid. transfuse  as  needed    prognosis  guarded

## 2017-04-20 NOTE — PROGRESS NOTE ADULT - SUBJECTIVE AND OBJECTIVE BOX
Magali Mosley MD  [phone] [facility] [phone]    Post Op Day#:     Procedure:      Vital Signs Last 24 Hrs  T(C): 37.8, Max: 38.1 (04-20 @ 01:33)  T(F): 100, Max: 100.5 (04-20 @ 01:33)  HR: 113 (83 - 114)  BP: 129/62 (113/53 - 169/79)  BP(mean): 79 (67 - 131)  RR: 20 (17 - 25)  SpO2: 96% (93% - 99%)    I&O's Detail  I & Os for 24h ending 20 Apr 2017 07:00  =============================================  IN:    Jevity: 1320 ml    Solution: 300 ml    Solution: 250 ml    Total IN: 1870 ml  ---------------------------------------------  OUT:    Indwelling Catheter - Urethral: 665 ml    Total OUT: 665 ml  ---------------------------------------------  Total NET: 1205 ml    I & Os for current day (as of 20 Apr 2017 17:53)  =============================================  IN:    Jevity: 420 ml    Enteral Tube Flush: 125 ml    Solution: 50 ml    Total IN: 595 ml  ---------------------------------------------  OUT:    Indwelling Catheter - Urethral: 235 ml    Total OUT: 235 ml  ---------------------------------------------  Total NET: 360 ml                            7.4    15.2  )-----------( 258      ( 20 Apr 2017 03:50 )             22.4       04-20    140  |  101  |  24<H>  ----------------------------<  105<H>  4.2   |  27  |  0.34<L>    Ca    8.2<L>      20 Apr 2017 03:50  Phos  2.8     04-20  Mg     2.3     04-20        INR:     Radiology:    Physical Exam:    General:  Appears stated age, well-groomed, well-nourished, no distress  Eyes : GABRIELLA BOSTONT:  WNL, no JVD  Chest:  clear breath sounds  Cardiovascular:  Regular rate & rhythm  Abdomen:  soft  Extremities:  Gait & station:    Skin:  No rash  Musculoskeletal:  normal strength  Neuro/Psych:  Alert, oriented tp time, place and person

## 2017-04-20 NOTE — PROGRESS NOTE ADULT - SUBJECTIVE AND OBJECTIVE BOX
HPI:  86F hx of parkinsons, recent right hip / femur fracture with medullary pins sent from rehab for AMS and fever. Admitted to the hospital for sepsis, UTI/PNA, AMS. Patient had RRT on the floors for hypotension, and hypoxia. During intubation for airway protection, tube feeds found in the endotracheal tube. Transferred to the ICU for acute hypoxic respiratory failure, intubated.    24 hr events:  tolerating wean  mental status poor: unresponsive  blanching petechial rash noted on posterior back  still febrile 100.5 despite broad spectrum antibiotics       ## ROS:  [x] unable to obtain due to intubation, metabolic encephalopathy    ## Labs:  CBC:                        7.4    15.2  )-----------( 258      ( 20 Apr 2017 03:50 )             22.4     Chem:  04-20    140  |  101  |  24<H>  ----------------------------<  105<H>  4.2   |  27  |  0.34<L>    Ca    8.2<L>      20 Apr 2017 03:50  Phos  2.8     04-20  Mg     2.3     04-20      Coags:          ## Imaging:    ## Medications:  vancomycin  IVPB 1000milliGRAM(s) IV Intermittent every 24 hours  fluconAZOLE   Tablet 100milliGRAM(s) Oral daily  aztreonam  IVPB  IV Intermittent   aztreonam  IVPB 1000milliGRAM(s) IV Intermittent every 8 hours      ipratropium    for Nebulization 500MICROGram(s) Inhalation every 6 hours      enoxaparin Injectable 40milliGRAM(s) SubCutaneous every 24 hours    pantoprazole   Suspension 40milliGRAM(s) Oral daily    acetaminophen    Suspension 650milliGRAM(s) Oral every 6 hours PRN  carbidopa/levodopa  25/100 1Tablet(s) Oral four times a day  pramipexole 0.25milliGRAM(s) Oral three times a day  acetaminophen    Suspension. 650milliGRAM(s) Enteral Tube every 6 hours PRN      ## Vitals:  T(C): 37.8, Max: 38.1 (04-20 @ 01:33)  HR: 113 (83 - 114)  BP: 129/62 (113/53 - 169/79)  BP(mean): 79 (67 - 131)  RR: 20 (17 - 25)  SpO2: 96% (93% - 99%)  Wt(kg): --  Vent: Mode: AC/ CMV (Assist Control/ Continuous Mandatory Ventilation), RR (machine): 16, RR (patient): 22, TV (machine): 400, FiO2: 40, PEEP: 5, PIP: 25  ABG: ABG - ( 19 Apr 2017 10:17 )  pH: x     /  pCO2: 43    /  pO2: 74    / HCO3: 28    / Base Excess: 4.0   /  SaO2: 95                  I & Os for 24h ending 04-20 @ 07:00  =============================================  IN: 1870 ml / OUT: 665 ml / NET: 1205 ml    I & Os for current day (as of 04-20 @ 18:20)  =============================================  IN: 595 ml / OUT: 235 ml / NET: 360 ml        ## P/E:  Gen: lying comfortably in bed in no apparent distress  HEENT: PERRL, EOMI  Resp: CTA B/L no c/r/w  CVS: S1S2 no m/r/g  Abd: soft NT/ND +BS  Ext: no c/c/e  Neuro: A&Ox3    CENTRAL LINE: [ ] YES [ ] NO  LOCATION:   DATE INSERTED:  REMOVE: [ ] YES [ ] NO      MAYFIELD: [ ] YES [ ] NO    DATE INSERTED:  REMOVE:  [ ] YES [ ] NO      A-LINE:  [ ] YES [ ] NO  LOCATION:   DATE INSERTED:  REMOVE:  [ ] YES [ ] NO  EXPLAIN:    GLOBAL ISSUE/BEST PRACTICE:  Analgesia:  Sedation:  HOB elevation: yes  Stress ulcer prophylaxis:  VTE prophylaxis:  Oral Care:  Glycemic control:  Nutrition:    CODE STATUS: [ ] full code  [ ] DNR  [ ] DNI  [ ] MOLST  Goals of care discussion: [ ] yes HPI:  86F hx of parkinsons, recent right hip / femur fracture with medullary pins sent from rehab for AMS and fever. Admitted to the hospital for sepsis, UTI/PNA, AMS. Patient had RRT on the floors for hypotension, and hypoxia. During intubation for airway protection, tube feeds found in the endotracheal tube. Transferred to the ICU for acute hypoxic respiratory failure, intubated.    24 hr events:  tolerating wean  mental status poor: unresponsive  blanching petechial rash noted on posterior back  still febrile 100.5 despite broad spectrum antibiotics       ## ROS:  [x] unable to obtain due to intubation, metabolic encephalopathy    ## Labs:  CBC:                        7.4    15.2  )-----------( 258      ( 20 Apr 2017 03:50 )             22.4     Chem:  04-20    140  |  101  |  24<H>  ----------------------------<  105<H>  4.2   |  27  |  0.34<L>    Ca    8.2<L>      20 Apr 2017 03:50  Phos  2.8     04-20  Mg     2.3     04-20    Culture - Sputum . (04.17.17 @ 08:15)    Specimen Source: .Sputum Sputum, trap    Culture Results: Normal Respiratory Cindy present    Culture - Blood (04.17.17 @ 01:19)    Specimen Source: .Blood Blood    Culture Results: No growth to date.    Culture - Blood (04.17.17 @ 01:18)   Culture Results:  Growth in aerobic bottle: Coag Negative Staphylococcus   Single set isolate, possible contaminant. Contact      ## Imaging:  CXR: Extensive multifocal pneumonia again noted R > L, grossly unchanged      ## Medications:  vancomycin  IVPB 1000milliGRAM(s) IV Intermittent every 24 hours  fluconAZOLE   Tablet 100milliGRAM(s) Oral daily  aztreonam  IVPB 1000milliGRAM(s) IV Intermittent every 8 hours      ipratropium    for Nebulization 500MICROGram(s) Inhalation every 6 hours      enoxaparin Injectable 40milliGRAM(s) SubCutaneous every 24 hours    pantoprazole   Suspension 40milliGRAM(s) Oral daily    acetaminophen    Suspension 650milliGRAM(s) Oral every 6 hours PRN  carbidopa/levodopa  25/100 1Tablet(s) Oral four times a day  pramipexole 0.25milliGRAM(s) Oral three times a day  acetaminophen    Suspension. 650milliGRAM(s) Enteral Tube every 6 hours PRN      ## Vitals:  T(C): 37.8, Max: 38.1 (04-20 @ 01:33)  HR: 113 (83 - 114)  BP: 129/62 (113/53 - 169/79)  BP(mean): 79 (67 - 131)  RR: 20 (17 - 25)  SpO2: 96% (93% - 99%)  Wt(kg): 70.6  Vent: Mode: AC/ CMV (Assist Control/ Continuous Mandatory Ventilation), RR (machine): 16, RR (patient): 22, TV (machine): 400, FiO2: 40, PEEP: 5, PIP: 25  ABG: ABG - ( 19 Apr 2017 10:17 )  pH: 7.43     /  pCO2: 43    /  pO2: 74    / HCO3: 28    / Base Excess: 4.0   /  SaO2: 95              I & Os for 24h ending 04-20 @ 07:00  =============================================  IN: 1870 ml / OUT: 665 ml / NET: 1205 ml    I & Os for current day (as of 04-20 @ 18:20)  =============================================  IN: 595 ml / OUT: 235 ml / NET: 360 ml        ## P/E:  GENERAL: NAD, elderly female, unresponsive off sedation  HEAD:  Atraumatic, Normocephalic  EYES: PERRL, conjunctiva and sclera clear  ENMT: orally intubated, NGT in place  NECK: Supple, No JVD  NERVOUS SYSTEM:  Alert & Oriented X0. not responding to commands or stimuli. no purposeful movement   CHEST/LUNG: Clear to auscultation bilaterally; No rales, rhonchi, mechanical breath sounds.   HEART: Regular rate and rhythm  ABDOMEN: Soft, Nontender, Nondistended; Bowel sounds present  EXTREMITIES:  2+ Peripheral Pulses, No clubbing, cyanosis; bilateral hand edema  SKIN: petechial macular rash over back, blanching      CENTRAL LINE: [ ] YES [x] NO      MAYFIELD: [x] YES [ ] NO          A-LINE:  [ ] YES [x] NO        GLOBAL ISSUE/BEST PRACTICE:  Analgesia: n/a  Sedation: n/a  HOB elevation: yes  Stress ulcer prophylaxis: protonix  VTE prophylaxis: lovenox  Oral Care: chlorhexidine  Glycemic control: n/a   Nutrition: jevity NGT    CODE STATUS: [ ] full code  [x] DNR  [ ] DNI  [ ] MOLST  Goals of care discussion: [x] yes

## 2017-04-20 NOTE — PROGRESS NOTE ADULT - SUBJECTIVE AND OBJECTIVE BOX
TMAX - 100.5    On day # 14 Meropenem / # 14 Vanco / # 4 Diflucan    Vital Signs Last 24 Hrs  T(C): 37.8, Max: 38.1 (04-20 @ 01:33)  T(F): 100, Max: 100.5 (04-20 @ 01:33)  HR: 113 (83 - 114)  BP: 129/62 (113/53 - 169/79)  BP(mean): 79 (67 - 131)  RR: 20 (17 - 25)  SpO2: 96% (93% - 99%)  Mode: AC/ CMV (Assist Control/ Continuous Mandatory Ventilation)  RR (machine): 16  TV (machine): 400  FiO2: 40  PEEP: 5  ITime: 1  MAP: 11  PIP: 25    Supplemental O2:  on 40% FIO2 + 5 PEEP now       Remains on ventilator, following no commands.  Noted  earlier today to have a rash on her back and Meropenem was d/c'ed and patient was begun on Aztreonam.    PHYSICAL EXAM  General:  remains orally intubated, opens her eyes, but follows no commands, on ventilator, with NGT in place and feedings in progress  HEENT:  conj pink, sclerae anicteric, PERRLA, unable to visualize oral mucosa  Neck:  supple, no nodes noted            Rt SC CVP in place - site clean, dressing intact - placed 4/16  Heart: RR  Lungs:  diffuse rhonchi throughout  Abdomen:  soft, BS +,  nontender appearance  Extremities:    Skin:      I&O's Summary:  I & Os for 24h ending 20 Apr 2017 07:00  =============================================  IN: 1870 ml / OUT: 665 ml / NET: 1205 ml    I & Os for current day (as of 20 Apr 2017 18:09)  =============================================  IN: 595 ml / OUT: 235 ml / NET: 360 ml      LABS:  CBC Full  -  ( 20 Apr 2017 03:50 )  WBC Count : 15.2 K/uL  Hemoglobin : 7.4 g/dL  Hematocrit : 22.4 %  Platelet Count - Automated : 258 K/uL  Mean Cell Volume : 87.1 fl  Mean Cell Hemoglobin : 28.8 pg  Mean Cell Hemoglobin Concentration : 33.1 gm/dL  Auto Neutrophil # : x  Auto Lymphocyte # : x  Auto Monocyte # : x  Auto Eosinophil # : x  Auto Basophil # : x  Auto Neutrophil % : x  Auto Lymphocyte % : x  Auto Monocyte % : x  Auto Eosinophil % : x  Auto Basophil % : x    04-20    140  |  101  |  24<H>  ----------------------------<  105<H>  4.2   |  27  |  0.34<L>    Ca    8.2<L>      20 Apr 2017 03:50  Phos  2.8     04-20  Mg     2.3     04-20    ABG - ( 19 Apr 2017 10:17 )  pH: x     /  pCO2: 43    /  pO2: 74    / HCO3: 28    / Base Excess: 4.0   /  SaO2: 95        Sedimentation Rate, Erythrocyte: >140 (04-16 @ 11:51)    Vancomycin Level, Trough: 14.1 ug/mL (04-18 @ 03:54)          CULTURES:  Specimen Source: .Sputum Sputum, trap (04-17 @ 08:15)  Culture Results:   Normal Respiratory Cindy present (04-17 @ 08:15)    Specimen Source: .Blood Blood (04-17 @ 01:19)  Culture Results:   No growth to date. (04-17 @ 01:19)    Specimen Source: .Blood Blood (04-17 @ 01:18)  Culture Results:   Growth in aerobic bottle: Coag Negative Staphylococcus  Single set isolate, possible contaminant. Contact  Microbiology if susceptibility testing clinically  indicated. (04-17 @ 01:18)                        Radiology:    Impression:    Suggestions: TMAX - 100.5    On day # 14 Meropenem / # 14 Vanco / # 4 Diflucan    Vital Signs Last 24 Hrs  T(C): 37.8, Max: 38.1 (04-20 @ 01:33)  T(F): 100, Max: 100.5 (04-20 @ 01:33)  HR: 113 (83 - 114)  BP: 129/62 (113/53 - 169/79)  BP(mean): 79 (67 - 131)  RR: 20 (17 - 25)  SpO2: 96% (93% - 99%)  Mode: AC/ CMV (Assist Control/ Continuous Mandatory Ventilation)  RR (machine): 16  TV (machine): 400  FiO2: 40  PEEP: 5  ITime: 1  MAP: 11  PIP: 25    Supplemental O2:  on 40% FIO2 + 5 PEEP now       Remains on ventilator, following no commands.  Noted  earlier today to have a rash on her back and Meropenem was d/c'ed and patient was begun on Aztreonam.    PHYSICAL EXAM  General:  remains orally intubated, opens her eyes, but follows no commands, on ventilator, with NGT in place and feedings in progress  HEENT:  conj pink, sclerae anicteric, PERRLA, unable to visualize oral mucosa  Neck:  supple, no nodes noted            Rt SC CVP in place - site clean, dressing intact - placed 4/16  Heart: RR  Lungs:  diffuse rhonchi throughout  Abdomen:  soft, BS +,  nontender appearance  Extremities:  1+ edema LE's                      Hands still swollen and with some slowly resolving ecchymoses  Skin:  warm, moist, with a few areas of pinpoint pinkish colored rash scattered on lower back, upper back near axillary area and in the axillary area and with pinkish discoloration and some            skin breakdown beneath both breasts, Rt > Lt ; no other areas of rash noted at this time      I&O's Summary:  I & Os for 24h ending 20 Apr 2017 07:00  =============================================  IN: 1870 ml / OUT: 665 ml / NET: 1205 ml    I & Os for current day (as of 20 Apr 2017 18:09)  =============================================  IN: 595 ml / OUT: 235 ml / NET: 360 ml      LABS:  CBC Full  -  ( 20 Apr 2017 03:50 )  WBC Count : 15.2 K/uL  Hemoglobin : 7.4 g/dL  Hematocrit : 22.4 %  Platelet Count - Automated : 258 K/uL  Mean Cell Volume : 87.1 fl  Mean Cell Hemoglobin : 28.8 pg  Mean Cell Hemoglobin Concentration : 33.1 gm/dL  Auto Neutrophil # : x  Auto Lymphocyte # : x  Auto Monocyte # : x  Auto Eosinophil # : x  Auto Basophil # : x  Auto Neutrophil % : x  Auto Lymphocyte % : x  Auto Monocyte % : x  Auto Eosinophil % : x  Auto Basophil % : x    04-20    140  |  101  |  24<H>  ----------------------------<  105<H>  4.2   |  27  |  0.34<L>    Ca    8.2<L>      20 Apr 2017 03:50  Phos  2.8     04-20  Mg     2.3     04-20    ABG - ( 19 Apr 2017 10:17 )  pH: x     /  pCO2: 43    /  pO2: 74    / HCO3: 28    / Base Excess: 4.0   /  SaO2: 95        Sedimentation Rate, Erythrocyte: >140 (04-16 @ 11:51)    Vancomycin Level, Trough: 14.1 ug/mL (04-18 @ 03:54)        CULTURES:  Specimen Source: .Sputum Sputum, trap (04-17 @ 08:15)  Culture Results:   Normal Respiratory Cindy present (04-17 @ 08:15)    Specimen Source: .Blood Blood (04-17 @ 01:19)  Culture Results:   No growth to date. (04-17 @ 01:19)    Specimen Source: .Blood Blood (04-17 @ 01:18)  Culture Results:   Growth in aerobic bottle: Coag Negative Staphylococcus  Single set isolate, possible contaminant. Contact  Microbiology if susceptibility testing clinically  indicated. (04-17 @ 01:18)        Radiology:  CXR -  4/20/17 -  FINDINGS:      Heart is mildly enlarged    Extensive multifocal pneumonia again noted, grossly unchanged    Right pleural effusion again noted    Support devices in situ    No pneumothorax noted    IMPRESSION:    Stable follow-up study with no significant change                   CT HEAD -    4/19/17 -    The mastoids are partially opacified. There is paranasal sinus mucosal   thickening.    IMPRESSION:    Involutional and ischemic gliotic changes.  No acute intracranial hemorrhage.  Bilateral mastoid effusions.      Impression:  Temps persist intermittently, now on Azactam + Vanco + Diflucan for Sepsis with Multifocal PNA and Respiratory Failure. Rash noted today appears consistent with a fungal rash secondary to moisture/ warmth.  Noted also now on CT Head - ? Mastoid Sinusitis as well. One bottle of one blood cx set out of 2 + CNS likely represents a contaminant and no specific rx is required.    Suggestions: Will continue present rx for now and observe.  Follow-up temps and labs closely.  Follow-up CXR.

## 2017-04-20 NOTE — PROGRESS NOTE ADULT - ATTENDING COMMENTS
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    If patient warrants and stable consider PEG   TRACH and Diverting transverse loop colostomy to divert fistula drainage to bladder. Will be discussed with patients son.

## 2017-04-20 NOTE — PROGRESS NOTE ADULT - SUBJECTIVE AND OBJECTIVE BOX
Patient seen and examined bedside in CCU.      T(F): 99.8, Max: 100.5 (04-20 @ 01:33)  HR: 97 (82 - 113)  BP: 169/79 (108/50 - 169/79)  RR: 25 (17 - 26)  SpO2: 93% (92% - 99%)    PHYSICAL EXAM:  General: unresponsive  HEENT: orally intubated. +Feeding tube  CV: +S1+S2 regular rate and rhythm  Lung: b/l rhonchi  Abdomen: soft, NTND. Normoactive BS  Extremities: 1+ pitting edema b/l LE  : shoemaker catheter indwelling with hui urine    LABS:                        7.4    15.2  )-----------( 258      ( 20 Apr 2017 03:50 )             22.4     04-20    140  |  101  |  24<H>  ----------------------------<  105<H>  4.2   |  27  |  0.34<L>    Ca    8.2<L>      20 Apr 2017 03:50  Phos  2.8     04-20  Mg     2.3     04-20    I&O: 1870/665cc    Culture Results:   Normal Respiratory Cindy present (04-17 @ 08:15)  Culture Results:   No growth to date. (04-17 @ 01:19)  Culture Results:   Growth in aerobic bottle: Gram Positive Cocci in Clusters (04-17 @ 01:18)      Impression: 86F hx of parkinsons, recent right hip fractuce with medullary pins initially sent from rehab for AMS and fever. Admitted to the hospital for UTI/PNA. s/p RRT 4/16 with hypoxic respiratory failure, aspiration pneumonia,       Plan:  -continue VTE prophylaxis   -Increase activity with PT, OOB, Ambulate  -educated on proper incentive spirometry use  -continue local wound care  -f/u AM labs  -will discuss with surgical attending Patient seen and examined bedside in CCU.  Chart reviewed. No sedation or pressors since transfer from telemetry floor.    T(F): 99.8, Max: 100.5 (04-20 @ 01:33)  HR: 97 (82 - 113)  BP: 169/79 (108/50 - 169/79)  RR: 25 (17 - 26)  SpO2: 93% (92% - 99%)    PHYSICAL EXAM:  General: unresponsive  HEENT: orally intubated. +Feeding tube  CV: +S1+S2 regular rate and rhythm  Lung: b/l rhonchi  Abdomen: soft, NTND. Normoactive BS  Extremities: 1+ pitting edema b/l LE  : shoemaker catheter indwelling with hui urine    LABS:                        7.4    15.2  )-----------( 258      ( 20 Apr 2017 03:50 )             22.4     04-20    140  |  101  |  24<H>  ----------------------------<  105<H>  4.2   |  27  |  0.34<L>    Ca    8.2<L>      20 Apr 2017 03:50  Phos  2.8     04-20  Mg     2.3     04-20    I&O: 1870/665cc    Culture Results:   Normal Respiratory Cindy present (04-17 @ 08:15)  Culture Results:   No growth to date. (04-17 @ 01:19)  Culture Results:   Growth in aerobic bottle: Gram Positive Cocci in Clusters (04-17 @ 01:18)      Impression: 86F hx of parkinsons, recent right hip fractuce with medullary pins initially sent from rehab for AMS and fever and admitted with UTI, colovesicular fistula, sepsis secondary to multifocal PNA s/p RRT on 4/16 with hypoxic respiratory failure, aspiration pneumonia, now bacteremic with gram positive cocci    Plan:  -continue present CCU management/supportive care, vent support with wean trials  -cont abx per ID, azactam added  -continue shoemaker cathter, urine output monitoring  -not a surgical candidate at present  -will discuss with Dr Mosley and Dr Bahena

## 2017-04-21 LAB
ANION GAP SERPL CALC-SCNC: 11 MMOL/L — SIGNIFICANT CHANGE UP (ref 5–17)
BUN SERPL-MCNC: 20 MG/DL — SIGNIFICANT CHANGE UP (ref 7–23)
CALCIUM SERPL-MCNC: 8.2 MG/DL — LOW (ref 8.5–10.1)
CHLORIDE SERPL-SCNC: 101 MMOL/L — SIGNIFICANT CHANGE UP (ref 96–108)
CO2 SERPL-SCNC: 27 MMOL/L — SIGNIFICANT CHANGE UP (ref 22–31)
CREAT SERPL-MCNC: 0.37 MG/DL — LOW (ref 0.5–1.3)
FLUAV SPEC QL CULT: NEGATIVE — SIGNIFICANT CHANGE UP
FLUBV AG SPEC QL IA: NEGATIVE — SIGNIFICANT CHANGE UP
GLUCOSE SERPL-MCNC: 105 MG/DL — HIGH (ref 70–99)
HCT VFR BLD CALC: 23.2 % — LOW (ref 34.5–45)
HGB BLD-MCNC: 7.8 G/DL — LOW (ref 11.5–15.5)
MAGNESIUM SERPL-MCNC: 2.3 MG/DL — SIGNIFICANT CHANGE UP (ref 1.8–2.4)
MCHC RBC-ENTMCNC: 29.2 PG — SIGNIFICANT CHANGE UP (ref 27–34)
MCHC RBC-ENTMCNC: 33.4 GM/DL — SIGNIFICANT CHANGE UP (ref 32–36)
MCV RBC AUTO: 87.5 FL — SIGNIFICANT CHANGE UP (ref 80–100)
PHOSPHATE SERPL-MCNC: 2.9 MG/DL — SIGNIFICANT CHANGE UP (ref 2.5–4.5)
PLATELET # BLD AUTO: 256 K/UL — SIGNIFICANT CHANGE UP (ref 150–400)
POTASSIUM SERPL-MCNC: 4.3 MMOL/L — SIGNIFICANT CHANGE UP (ref 3.5–5.3)
POTASSIUM SERPL-SCNC: 4.3 MMOL/L — SIGNIFICANT CHANGE UP (ref 3.5–5.3)
RBC # BLD: 2.65 M/UL — LOW (ref 3.8–5.2)
RBC # FLD: 15.5 % — HIGH (ref 11–15)
SODIUM SERPL-SCNC: 139 MMOL/L — SIGNIFICANT CHANGE UP (ref 135–145)
WBC # BLD: 12.3 K/UL — HIGH (ref 3.8–10.5)
WBC # FLD AUTO: 12.3 K/UL — HIGH (ref 3.8–10.5)

## 2017-04-21 PROCEDURE — 99291 CRITICAL CARE FIRST HOUR: CPT

## 2017-04-21 PROCEDURE — 71010: CPT | Mod: 26

## 2017-04-21 RX ADMIN — CHLORHEXIDINE GLUCONATE 15 MILLILITER(S): 213 SOLUTION TOPICAL at 05:32

## 2017-04-21 RX ADMIN — Medication 1 MILLIGRAM(S): at 12:05

## 2017-04-21 RX ADMIN — Medication 50 MILLIGRAM(S): at 21:17

## 2017-04-21 RX ADMIN — CARBIDOPA AND LEVODOPA 1 TABLET(S): 25; 100 TABLET ORAL at 05:32

## 2017-04-21 RX ADMIN — Medication 650 MILLIGRAM(S): at 14:10

## 2017-04-21 RX ADMIN — CARBIDOPA AND LEVODOPA 1 TABLET(S): 25; 100 TABLET ORAL at 01:30

## 2017-04-21 RX ADMIN — CHLORHEXIDINE GLUCONATE 15 MILLILITER(S): 213 SOLUTION TOPICAL at 17:30

## 2017-04-21 RX ADMIN — PRAMIPEXOLE DIHYDROCHLORIDE 0.25 MILLIGRAM(S): 0.12 TABLET ORAL at 21:18

## 2017-04-21 RX ADMIN — Medication 500 MICROGRAM(S): at 23:44

## 2017-04-21 RX ADMIN — Medication 250 MILLIGRAM(S): at 04:30

## 2017-04-21 RX ADMIN — CARBIDOPA AND LEVODOPA 1 TABLET(S): 25; 100 TABLET ORAL at 23:52

## 2017-04-21 RX ADMIN — CARBIDOPA AND LEVODOPA 1 TABLET(S): 25; 100 TABLET ORAL at 17:30

## 2017-04-21 RX ADMIN — Medication 500 MICROGRAM(S): at 06:17

## 2017-04-21 RX ADMIN — Medication 500 MICROGRAM(S): at 12:12

## 2017-04-21 RX ADMIN — Medication 500 MICROGRAM(S): at 00:43

## 2017-04-21 RX ADMIN — Medication 500 MICROGRAM(S): at 17:55

## 2017-04-21 RX ADMIN — CHLORHEXIDINE GLUCONATE 1 APPLICATION(S): 213 SOLUTION TOPICAL at 12:05

## 2017-04-21 RX ADMIN — PANTOPRAZOLE SODIUM 40 MILLIGRAM(S): 20 TABLET, DELAYED RELEASE ORAL at 12:05

## 2017-04-21 RX ADMIN — ENOXAPARIN SODIUM 40 MILLIGRAM(S): 100 INJECTION SUBCUTANEOUS at 05:48

## 2017-04-21 RX ADMIN — PRAMIPEXOLE DIHYDROCHLORIDE 0.25 MILLIGRAM(S): 0.12 TABLET ORAL at 13:35

## 2017-04-21 RX ADMIN — PRAMIPEXOLE DIHYDROCHLORIDE 0.25 MILLIGRAM(S): 0.12 TABLET ORAL at 05:32

## 2017-04-21 RX ADMIN — FLUCONAZOLE 100 MILLIGRAM(S): 150 TABLET ORAL at 12:05

## 2017-04-21 RX ADMIN — CARBIDOPA AND LEVODOPA 1 TABLET(S): 25; 100 TABLET ORAL at 12:05

## 2017-04-21 RX ADMIN — Medication 50 MILLIGRAM(S): at 13:35

## 2017-04-21 RX ADMIN — Medication 50 MILLIGRAM(S): at 05:32

## 2017-04-21 NOTE — PROGRESS NOTE ADULT - SUBJECTIVE AND OBJECTIVE BOX
UNRESPONSIVE ON VENTILATOR  STABLE OVERALL HEART RATE  SUPPORTIVE CARE ONGOING: SEPSIS , PNEUMONIA, TACHY-FAWAD SYNDROME    THAI ROMERO MD, FACC

## 2017-04-21 NOTE — PROGRESS NOTE ADULT - SUBJECTIVE AND OBJECTIVE BOX
TMAX - 101.3    On day # 2 Azactam / # 15 Vanco / # 5 Diflucan    Vital Signs Last 24 Hrs  T(C): 37.7, Max: 38.5 (04-20 @ 19:30)  T(F): 99.8, Max: 101.3 (04-20 @ 19:30)  HR: 108 (84 - 116)  BP: 148/72 (127/52 - 184/99)  BP(mean): 91 (71 - 121)  RR: 0 (0 - 24)  SpO2: 98% (94% - 100%)  Mode: AC/ CMV (Assist Control/ Continuous Mandatory Ventilation)  RR (machine): 16  TV (machine): 400  FiO2: 40  PEEP: 5  ITime: 0.9  MAP: 10  PIP: 27  Supplemental O2:  on 40% FIO2 + 5 PEEP now    Remains poorly responsive, on ventilator.  Informed by RN that patient may have been exposed to Influenza B while on 2D prior to intubation and transfer to CCU.  Will request Rapid Influenza testing now and decide on further rx pending these results.      PHYSICAL EXAM  General:  poorly responsive, on ventilator, orally intubated and with NGT in place with feedings ongoing  HEENT: conj pink, sclerae anicteric, PERRLA, no oral lesions noted   Neck:  semi-supple, no nodes noted  Heart:  RR  Lungs:  diffuse rhonchi bilat throughout  Abdomen: soft, BS+, nontender appearance  Extremities: 1+ edema LE's     Skin:  warm, dry, few scattered ecchymoses on arms and hands           Candida appearing rash beneath the breasts and some in the axillary regions bilat    I&O's Summary:  I & Os for 24h ending 21 Apr 2017 07:00  =============================================  IN: 1815 ml / OUT: 1265 ml / NET: 550 ml    I & Os for current day (as of 21 Apr 2017 18:32)  =============================================  IN: 690 ml / OUT: 445 ml / NET: 245 ml      LABS:  CBC Full  -  ( 21 Apr 2017 03:46 )  WBC Count : 12.3 K/uL  Hemoglobin : 7.8 g/dL  Hematocrit : 23.2 %  Platelet Count - Automated : 256 K/uL  Mean Cell Volume : 87.5 fl  Mean Cell Hemoglobin : 29.2 pg  Mean Cell Hemoglobin Concentration : 33.4 gm/dL  Auto Neutrophil # : x  Auto Lymphocyte # : x  Auto Monocyte # : x  Auto Eosinophil # : x  Auto Basophil # : x  Auto Neutrophil % : x  Auto Lymphocyte % : x  Auto Monocyte % : x  Auto Eosinophil % : x  Auto Basophil % : x    04-21    139  |  101  |  20  ----------------------------<  105<H>  4.3   |  27  |  0.37<L>    Ca    8.2<L>      21 Apr 2017 03:46  Phos  2.9     04-21  Mg     2.3     04-21        CULTURES:  Specimen Source: .Sputum Sputum, trap (04-17 @ 08:15)  Culture Results:   Normal Respiratory Cindy present (04-17 @ 08:15)    Specimen Source: .Blood Blood (04-17 @ 01:19)  Culture Results:   No growth to date. (04-17 @ 01:19)    Specimen Source: .Blood Blood (04-17 @ 01:18)  Culture Results:   Growth in aerobic bottle: Coag Negative Staphylococcus  Single set isolate, possible contaminant. Contact  Microbiology if susceptibility testing clinically  indicated. (04-17 @ 01:18)        Radiology:  CXR -    IMPRESSION:    Bilateral lung opacities without significant change.      Impression: Continues with fevers now on Azactam + Vanco + Diflucan for Multifocal PNA/ Aspiration, UTI, with Sepsis and Respiratory Failure.  Believe that the one blood cx + CNS represents a skin contaminant and no specific rx is required for this.  Now with reported possible exposure to Influenza B while on 2D prior to coming to the CCU.    Suggestions: Will continue current ab rx and request Rapid Influenza testing to be done now, then will decide re: further rx.  Follow-up temps and labs closely.  Repeat CXR in AM.

## 2017-04-21 NOTE — PROGRESS NOTE ADULT - SUBJECTIVE AND OBJECTIVE BOX
INTERVAL HPI/OVERNIGHT EVENTS:        REVIEW OF SYSTEMS:  CONSTITUTIONAL:  continues  on  respirator  failed  weaning  4/20/17    MEDICATION:  enoxaparin Injectable 40milliGRAM(s) SubCutaneous every 24 hours  folic acid 1milliGRAM(s) Oral daily  acetaminophen    Suspension 650milliGRAM(s) Oral every 6 hours PRN  vancomycin  IVPB 1000milliGRAM(s) IV Intermittent every 24 hours  ipratropium    for Nebulization 500MICROGram(s) Inhalation every 6 hours  carbidopa/levodopa  25/100 1Tablet(s) Oral four times a day  pramipexole 0.25milliGRAM(s) Oral three times a day  acetaminophen    Suspension. 650milliGRAM(s) Enteral Tube every 6 hours PRN  chlorhexidine 0.12% Liquid 15milliLiter(s) Swish and Spit two times a day  chlorhexidine 4% Liquid 1Application(s) Topical daily  fluconAZOLE   Tablet 100milliGRAM(s) Oral daily  pantoprazole   Suspension 40milliGRAM(s) Oral daily  aztreonam  IVPB  IV Intermittent   aztreonam  IVPB 1000milliGRAM(s) IV Intermittent every 8 hours    Vital Signs Last 24 Hrs  T(C): 37.8, Max: 38.5 (04-20 @ 19:30)  T(F): 100.1, Max: 101.3 (04-20 @ 19:30)  HR: 109 (84 - 116)  BP: 158/79 (127/52 - 184/99)  BP(mean): 100 (71 - 121)  RR: 22 (19 - 24)  SpO2: 96% (93% - 100%)    PHYSICAL EXAM:  GENERAL: NAD, well-groomed, well-developed  EYES:  conjunctiva and sclera clear  NERVOUS SYSTEM:  moves  all  extr   CHEST/LUNG: scattered  ronchis   HEART: Regular rate and rhythm; No murmurs, rubs, or gallops  ABDOMEN: Soft, Nontender, Nondistended; Bowel sounds present  EXTREMITIES: +  edema  SKIN: No rashes   LABS:                        7.8    12.3  )-----------( 256      ( 21 Apr 2017 03:46 )             23.2     04-21    139  |  101  |  20  ----------------------------<  105<H>  4.3   |  27  |  0.37<L>    Ca    8.2<L>      21 Apr 2017 03:46  Phos  2.9     04-21  Mg     2.3     04-21          CAPILLARY BLOOD GLUCOSE      RADIOLOGY & ADDITIONAL TESTS:    Imaging reports  Personally Reviewed:  [x ] YES  [ ] NO    Consultant(s) Notes Reviewed:  [ x] YES  [ ] NO    Care Discussed with Consultants/Other Providers [x ] YES  [ ] NO  ACUTE  RESPIRATORY  FAILURE  PNEUMONIA   CONT  VENT  SUPPORT  AB  IVF  weaning  as  per  intensivist    COLO/VESCICULAR FISTULA proceed  as  per  surgery  discussed  with  Urology  to  continue NGT feedings monitor  labs    LFT elevation  continue  to  monitor might  need  repeat  ABD  imaging  metabolic  encephalopathy  supprtive care        Problem/Plan - 3:  ·  Problem: Parkinson disease encephalopathy.  Plan: sinemet discussed  with  neurology  as  to  further  adjustments/addition  to  treatment    Problem/Plan - 4:      Problem/Plan - 5:  ·  Problem: Anemia.  Plan: iron  folic  acid. transfuse  as  needed    prognosis  guarded

## 2017-04-21 NOTE — PROGRESS NOTE ADULT - SUBJECTIVE AND OBJECTIVE BOX
HPI:  86F hx of parkinsons, recent right hip / femur fracture with medullary pins sent from rehab for AMS and fever. Admitted to the hospital for sepsis, UTI/PNA, AMS. Patient had RRT on the floors for hypotension, and hypoxia. During intubation for airway protection, tube feeds found in the endotracheal tube. Transferred to the ICU for acute hypoxic respiratory failure, intubated.    24 hr events:  failed wean this morning: became hypoxic and bradycardic after about 1 hr of weaning  mental status poor: remains unresponsive on no sedation  still febrile 101.3 despite broad spectrum antibiotics   antibiotics changed from meropenem to aztreonam due to rash noted on back      ## ROS:  [x] unable to obtain due to intubation and encephalopathy     ## Labs:  CBC:                        7.8    12.3  )-----------( 256      ( 21 Apr 2017 03:46 )             23.2     Chem:  04-21    139  |  101  |  20  ----------------------------<  105<H>  4.3   |  27  |  0.37<L>    Ca    8.2<L>      21 Apr 2017 03:46  Phos  2.9     04-21  Mg     2.3     04-21    Culture - Sputum . (04.17.17 @ 08:15)    Specimen Source: .Sputum Sputum, trap    Culture Results: Normal Respiratory Cindy present    Culture - Blood (04.17.17 @ 01:19)    Specimen Source: .Blood Blood    Culture Results: No growth to date.    Culture - Blood (04.17.17 @ 01:18)    Specimen Source: .Blood Blood    Culture Results: Growth in aerobic bottle: Coag Negative Staphylococcus    Single set isolate, possible contaminant. Contact      ## Imaging:  CT head 4/19: Involutional and ischemic gliotic changes. No acute intracranial hemorrhage. Bilateral mastoid effusions.      ## Medications:  vancomycin  IVPB 1000milliGRAM(s) IV Intermittent every 24 hours  fluconAZOLE   Tablet 100milliGRAM(s) Oral daily  aztreonam  IVPB 1000milliGRAM(s) IV Intermittent every 8 hours      ipratropium    for Nebulization 500MICROGram(s) Inhalation every 6 hours      enoxaparin Injectable 40milliGRAM(s) SubCutaneous every 24 hours    pantoprazole   Suspension 40milliGRAM(s) Oral daily    acetaminophen    Suspension 650milliGRAM(s) Oral every 6 hours PRN  carbidopa/levodopa  25/100 1Tablet(s) Oral four times a day  pramipexole 0.25milliGRAM(s) Oral three times a day  acetaminophen    Suspension. 650milliGRAM(s) Enteral Tube every 6 hours PRN      ## Vitals:  T(C): 37.3, Max: 38.5 (04-20 @ 19:30)  HR: 110 (85 - 116)  BP: 150/66 (127/52 - 169/79)  BP(mean): 87 (71 - 107)  RR: 22 (19 - 25)  SpO2: 98% (93% - 100%)  Wt(kg): 70.6  Vent: Mode: AC/ CMV (Assist Control/ Continuous Mandatory Ventilation), RR (machine): 16, RR (patient): 18, TV (machine): 400, FiO2: 40, PEEP: 5, PIP: 35  ABG: ABG - ( 19 Apr 2017 10:17 )  pH: 7.43     /  pCO2: 43    /  pO2: 74    / HCO3: 28    / Base Excess: 4.0   /  SaO2: 95            I & Os for 24h ending 04-20 @ 07:00  =============================================  IN: 1870 ml / OUT: 665 ml / NET: 1205 ml    I & Os for current day (as of 04-21 @ 06:21)  =============================================  IN: 1075 ml / OUT: 835 ml / NET: 240 ml        ## P/E:  GENERAL: NAD, elderly female, unresponsive off sedation  HEAD:  Atraumatic, Normocephalic  EYES: PERRL, conjunctiva and sclera clear  ENMT: orally intubated, NGT in place  NECK: Supple, No JVD  NERVOUS SYSTEM:  Alert & Oriented X0. not responding to commands, opens eyes to tactile stimuli, no purposeful movement   CHEST/LUNG: Clear to auscultation bilaterally; No rales, rhonchi, mechanical breath sounds.   HEART: Regular rate and rhythm  ABDOMEN: Soft, Nontender, Nondistended; Bowel sounds present  EXTREMITIES:  2+ Peripheral Pulses, No clubbing, cyanosis; bilateral hand edema with ecchymosis  SKIN: petechial macular rash over back, blanching      CENTRAL LINE: [ ] YES [x] NO      MAYFIELD: [x] YES [ ] NO          A-LINE:  [ ] YES [x] NO        GLOBAL ISSUE/BEST PRACTICE:  Analgesia: n/a  Sedation: n/a  HOB elevation: yes  Stress ulcer prophylaxis: protonix  VTE prophylaxis: lovenox  Oral Care: chlorhexidine  Glycemic control: n/a   Nutrition: jevity NGT    CODE STATUS: [ ] full code  [x] DNR  [ ] DNI  [x] MOLST  Goals of care discussion: [x] yes

## 2017-04-21 NOTE — PROGRESS NOTE ADULT - SUBJECTIVE AND OBJECTIVE BOX
SURGERY/UROLOGY PROGRESS HPI:  Patient seen and examined at bedside. Patient currently intubated and unresponsive.       Vital Signs Last 24 Hrs  T(C): 37.3, Max: 38.5 (04-20 @ 19:30)  T(F): 99.1, Max: 101.3 (04-20 @ 19:30)  HR: 104 (85 - 116)  BP: 150/66 (127/52 - 169/79)  BP(mean): 87 (71 - 107)  RR: 22 (19 - 25)  SpO2: 99% (93% - 100%)      PHYSICAL EXAM:    GENERAL: Intubated. Unresponsive  CHEST/LUNG: Course breath sounds. Intubated.  HEART: S1S2, tachycardic  ABDOMEN: + Bowel sounds, soft, mild Distention  EXTREMITIES:  edema of bilateral upper and lower extremities.  : shoemaker catheter in place draining yellow urine with feculent sediment noted. 510ml/24hours.     I&O's Detail  I & Os for 24h ending 20 Apr 2017 07:00  =============================================  IN:    Jevity: 1320 ml    Solution: 300 ml    Solution: 250 ml    Total IN: 1870 ml  ---------------------------------------------  OUT:    Indwelling Catheter - Urethral: 665 ml    Total OUT: 665 ml  ---------------------------------------------  Total NET: 1205 ml    I & Os for current day (as of 21 Apr 2017 04:44)  =============================================  IN:    Jevity: 900 ml    Enteral Tube Flush: 125 ml    Solution: 50 ml    Total IN: 1075 ml  ---------------------------------------------  OUT:    Indwelling Catheter - Urethral: 510 ml    Total OUT: 510 ml  ---------------------------------------------  Total NET: 565 ml      LABS:                        7.8    12.3  )-----------( 256      ( 21 Apr 2017 03:46 )             23.2     04-21    139  |  101  |  20  ----------------------------<  105<H>  4.3   |  27  |  0.37<L>    Ca    8.2<L>      21 Apr 2017 03:46  Phos  2.9     04-21  Mg     2.3     04-21          Assessment: 86y old Female with PMH Pneumonia, Anemia, and Parkinson disease admitted with sepsis secondary to PNA and colovesical fistula. now s/p RRT and intubation secondary to hypoxia, hypotension and acute respiratory failure.     Plan:  - continue shoemaker catheter and monitor urine output.  - patient not currently stable for any surgical intervention  - if stable, possible diverting transverse loop colostomy/trache/PEG  - continue IV antibiotics per ID  - continue current management per ICU  - will discuss with Dr. Mosley and Dr. Bahena

## 2017-04-22 DIAGNOSIS — J96.01 ACUTE RESPIRATORY FAILURE WITH HYPOXIA: ICD-10-CM

## 2017-04-22 LAB
ANION GAP SERPL CALC-SCNC: 10 MMOL/L — SIGNIFICANT CHANGE UP (ref 5–17)
BUN SERPL-MCNC: 18 MG/DL — SIGNIFICANT CHANGE UP (ref 7–23)
CALCIUM SERPL-MCNC: 8.2 MG/DL — LOW (ref 8.5–10.1)
CHLORIDE SERPL-SCNC: 97 MMOL/L — SIGNIFICANT CHANGE UP (ref 96–108)
CO2 SERPL-SCNC: 29 MMOL/L — SIGNIFICANT CHANGE UP (ref 22–31)
CREAT SERPL-MCNC: 0.33 MG/DL — LOW (ref 0.5–1.3)
CULTURE RESULTS: SIGNIFICANT CHANGE UP
GLUCOSE SERPL-MCNC: 105 MG/DL — HIGH (ref 70–99)
HCT VFR BLD CALC: 23.2 % — LOW (ref 34.5–45)
HGB BLD-MCNC: 7.9 G/DL — LOW (ref 11.5–15.5)
MAGNESIUM SERPL-MCNC: 2.3 MG/DL — SIGNIFICANT CHANGE UP (ref 1.8–2.4)
MCHC RBC-ENTMCNC: 29.4 PG — SIGNIFICANT CHANGE UP (ref 27–34)
MCHC RBC-ENTMCNC: 33.8 GM/DL — SIGNIFICANT CHANGE UP (ref 32–36)
MCV RBC AUTO: 87.2 FL — SIGNIFICANT CHANGE UP (ref 80–100)
PHOSPHATE SERPL-MCNC: 3.7 MG/DL — SIGNIFICANT CHANGE UP (ref 2.5–4.5)
PLATELET # BLD AUTO: 234 K/UL — SIGNIFICANT CHANGE UP (ref 150–400)
POTASSIUM SERPL-MCNC: 4.4 MMOL/L — SIGNIFICANT CHANGE UP (ref 3.5–5.3)
POTASSIUM SERPL-SCNC: 4.4 MMOL/L — SIGNIFICANT CHANGE UP (ref 3.5–5.3)
RBC # BLD: 2.67 M/UL — LOW (ref 3.8–5.2)
RBC # FLD: 15.5 % — HIGH (ref 11–15)
SODIUM SERPL-SCNC: 136 MMOL/L — SIGNIFICANT CHANGE UP (ref 135–145)
SPECIMEN SOURCE: SIGNIFICANT CHANGE UP
VANCOMYCIN TROUGH SERPL-MCNC: 15 UG/ML — SIGNIFICANT CHANGE UP (ref 10–20)
WBC # BLD: 11.6 K/UL — HIGH (ref 3.8–10.5)
WBC # FLD AUTO: 11.6 K/UL — HIGH (ref 3.8–10.5)

## 2017-04-22 PROCEDURE — 71010: CPT | Mod: 26

## 2017-04-22 PROCEDURE — 99291 CRITICAL CARE FIRST HOUR: CPT

## 2017-04-22 RX ADMIN — PRAMIPEXOLE DIHYDROCHLORIDE 0.25 MILLIGRAM(S): 0.12 TABLET ORAL at 15:55

## 2017-04-22 RX ADMIN — PRAMIPEXOLE DIHYDROCHLORIDE 0.25 MILLIGRAM(S): 0.12 TABLET ORAL at 05:18

## 2017-04-22 RX ADMIN — Medication 500 MICROGRAM(S): at 05:44

## 2017-04-22 RX ADMIN — PANTOPRAZOLE SODIUM 40 MILLIGRAM(S): 20 TABLET, DELAYED RELEASE ORAL at 12:39

## 2017-04-22 RX ADMIN — CARBIDOPA AND LEVODOPA 1 TABLET(S): 25; 100 TABLET ORAL at 23:28

## 2017-04-22 RX ADMIN — Medication 50 MILLIGRAM(S): at 23:28

## 2017-04-22 RX ADMIN — Medication 250 MILLIGRAM(S): at 05:18

## 2017-04-22 RX ADMIN — Medication 500 MICROGRAM(S): at 11:11

## 2017-04-22 RX ADMIN — Medication 500 MICROGRAM(S): at 17:08

## 2017-04-22 RX ADMIN — FLUCONAZOLE 100 MILLIGRAM(S): 150 TABLET ORAL at 12:39

## 2017-04-22 RX ADMIN — CARBIDOPA AND LEVODOPA 1 TABLET(S): 25; 100 TABLET ORAL at 18:10

## 2017-04-22 RX ADMIN — CARBIDOPA AND LEVODOPA 1 TABLET(S): 25; 100 TABLET ORAL at 12:39

## 2017-04-22 RX ADMIN — Medication 50 MILLIGRAM(S): at 15:55

## 2017-04-22 RX ADMIN — ENOXAPARIN SODIUM 40 MILLIGRAM(S): 100 INJECTION SUBCUTANEOUS at 06:17

## 2017-04-22 RX ADMIN — CHLORHEXIDINE GLUCONATE 1 APPLICATION(S): 213 SOLUTION TOPICAL at 10:00

## 2017-04-22 RX ADMIN — CHLORHEXIDINE GLUCONATE 15 MILLILITER(S): 213 SOLUTION TOPICAL at 05:18

## 2017-04-22 RX ADMIN — Medication 1 MILLIGRAM(S): at 12:39

## 2017-04-22 RX ADMIN — PRAMIPEXOLE DIHYDROCHLORIDE 0.25 MILLIGRAM(S): 0.12 TABLET ORAL at 23:28

## 2017-04-22 RX ADMIN — Medication 50 MILLIGRAM(S): at 06:18

## 2017-04-22 RX ADMIN — CARBIDOPA AND LEVODOPA 1 TABLET(S): 25; 100 TABLET ORAL at 05:18

## 2017-04-22 RX ADMIN — CHLORHEXIDINE GLUCONATE 15 MILLILITER(S): 213 SOLUTION TOPICAL at 18:10

## 2017-04-22 NOTE — PROGRESS NOTE ADULT - PROBLEM SELECTOR PLAN 1
c/w assist control no plans to wean  vanco/aztreonam  c/w parkinsons meds  DNR  Surgery is on board  tube feedsDVT PPX  CCM time 56 min

## 2017-04-22 NOTE — PROGRESS NOTE ADULT - SUBJECTIVE AND OBJECTIVE BOX
INTERVAL HPI/OVERNIGHT EVENTS:   HPI:  patient  with  worsening  lethargy  fever  dysphagia  evaluated  in  ER  admitted  for  pneumonia  UTI  patient  s/p  l  hip  medullary  nail (04 Apr 2017 19:53)  Colovesical fistuale, recurrent UTI  aspiration PNA. Is DNR.  Gen surg on board seems like no immediate plans for repair of fistula           PAST MEDICAL & SURGICAL HISTORY:  Pneumonia  Anemia  Parkinson disease  Tremors of nervous system  No pertinent past medical history  Status post hip surgery  No significant past surgical history      REVIEW OF SYSTEMS:    2      ICU Vital Signs Last 24 Hrs  T(C): 38.2, Max: 38.2 (04-22 @ 07:10)  T(F): 100.8, Max: 100.8 (04-22 @ 07:10)  HR: 111 (81 - 117)  BP: 150/70 (131/67 - 168/91)  BP(mean): 89 (76 - 111)  ABP: --  ABP(mean): --  RR: 20 (0 - 23)  SpO2: 95% (95% - 100%)          I&O's Detail    I & Os for current day (as of 22 Apr 2017 10:30)  =============================================  IN:    Jevity: 1200 ml    Enteral Tube Flush: 320 ml    Solution: 150 ml    Total IN: 1670 ml  ---------------------------------------------  OUT:    Indwelling Catheter - Urethral: 1570 ml    Rectal Tube: 100 ml    Total OUT: 1670 ml  ---------------------------------------------  Total NET: 0 ml      Mode: AC/ CMV (Assist Control/ Continuous Mandatory Ventilation)  RR (machine): 16  TV (machine): 400  FiO2: 35  PEEP: 5  ITime: 0.9  MAP: 9  PIP: 21    CAPILLARY BLOOD GLUCOSE    PHYSICAL EXAM:    GENERAL: NAD, well-groomed, well-developed  HEAD:  Atraumatic, Normocephalic  EYES: EOMI, PERRLA, conjunctiva and sclera clear  ENMT: No tonsillar erythema, exudates, or enlargement; Moist mucous membranes, Good dentition, No lesions  NECK: Supple, No JVD, Normal thyroid  NERVOUS SYSTEM:  Alert & Oriented X3, Good concentration; Motor Strength 5/5 B/L upper and lower extremities; DTRs 2+ intact and symmetric  CHEST/LUNG: Clear to percussion bilaterally; No rales, rhonchi, wheezing, or rubs  HEART: Regular rate and rhythm; No murmurs, rubs, or gallops  ABDOMEN: Soft, Nontender, Nondistended; Bowel sounds present  EXTREMITIES:  2+ Peripheral Pulses, No clubbing, cyanosis, or edema  LYMPH: No lymphadenopathy noted  SKIN: No rashes or lesions      LABS:                        7.9    11.6  )-----------( 234      ( 22 Apr 2017 03:14 )             23.2      04-22    136  |  97  |  18  ----------------------------<  105<H>  4.4   |  29  |  0.33<L>    Ca    8.2<L>      22 Apr 2017 03:14  Phos  3.7     04-22  Mg     2.3     04-22              RADIOLOGY & ADDITIONAL STUDIES:      Assessment and Plan:    CRITICAL CARE TIME SPENT:

## 2017-04-22 NOTE — PROGRESS NOTE ADULT - SUBJECTIVE AND OBJECTIVE BOX
TMAX -  100.8    On day #  3 Azactam / # 16 Vanco / # 6 Diflucan    Vital Signs Last 24 Hrs  T(C): 38, Max: 38.2 (04-22 @ 07:10)  T(F): 100.4, Max: 100.8 (04-22 @ 07:10)  HR: 107 (81 - 117)  BP: 146/73 (131/67 - 162/83)  BP(mean): 91 (76 - 103)  RR: 21 (0 - 23)  SpO2: 98% (95% - 100%)  Mode: AC/ CMV (Assist Control/ Continuous Mandatory Ventilation)  RR (machine): 16  TV (machine): 400  FiO2: 35  PEEP: 5  ITime: 1  MAP: 11  PIP: 25  Supplemental O2:  on Ventilator - now on 35% FIO2 + 5 PEEP      Remains on ventilator in CCU, poorly responsive but opening her eyes today.    PHYSICAL EXAM  General:  poorly responsive, on ventilator, orally intubated and with NGT in place with feedings in progress, opening her eyes today, but follows no commands  HEENT:  conj pink, sclerae anicteric, PERRLA, no oral lesions noted  Neck:  semi-supple, no nodes noted  Heart:  RR  Lungs:  rhonchi bilat  Abdomen:  soft, BS +, nontender appearance  Extremities:  1+ edema LE's, hand still with some swelling and slowly resolving ecchymoses  Skin:  warm, dry now, fungal rash improving  Colon in place with dark yellow urine draining  Fecal tube in place with some soft stool noted      I&O's Summary:  I & Os for 24h ending 22 Apr 2017 07:00  =============================================  IN: 1670 ml / OUT: 1670 ml / NET: 0 ml    I & Os for current day (as of 22 Apr 2017 15:30)  =============================================  IN: 420 ml / OUT: 475 ml / NET: -55 ml      LABS:  CBC Full  -  ( 22 Apr 2017 03:14 )  WBC Count : 11.6 K/uL  Hemoglobin : 7.9 g/dL  Hematocrit : 23.2 %  Platelet Count - Automated : 234 K/uL  Mean Cell Volume : 87.2 fl  Mean Cell Hemoglobin : 29.4 pg  Mean Cell Hemoglobin Concentration : 33.8 gm/dL  Auto Neutrophil # : x  Auto Lymphocyte # : x  Auto Monocyte # : x  Auto Eosinophil # : x  Auto Basophil # : x  Auto Neutrophil % : x  Auto Lymphocyte % : x  Auto Monocyte % : x  Auto Eosinophil % : x  Auto Basophil % : x    04-22    136  |  97  |  18  ----------------------------<  105<H>  4.4   |  29  |  0.33<L>    Ca    8.2<L>      22 Apr 2017 03:14  Phos  3.7     04-22  Mg     2.3     04-22    Vancomycin Level, Trough: 15.0 ug/mL (04-22 @ 03:14)      Rapid Influenza A and B - negative  ]  RVP - negative      CULTURES:  Specimen Source: .Sputum Sputum, trap (04-17 @ 08:15)  Culture Results:   Normal Respiratory Cindy present (04-17 @ 08:15)    Specimen Source: .Blood Blood (04-17 @ 01:19)  Culture Results:   No growth at 5 days. (04-17 @ 01:19)    Specimen Source: .Blood Blood (04-17 @ 01:18)  Culture Results:   Growth in aerobic bottle: Coag Negative Staphylococcus  Single set isolate, possible contaminant. Contact  Microbiology if susceptibility testing clinically  indicated. (04-17 @ 01:18)        Radiology:  CXR - 4/22/17 -  IMPRESSION: Intubated with extensive bilateral airspace disease, right   greater than left, which appears worsened when compared to the prior   chest x-ray examination.        Impression:   Fevers continue although appear to be somewhat lower on rx now with Azactam + Vanco + Diflucan for rx of Respiratory Failure/ Multifocal PNA/ Sepsis / presumed Bilateral Mastoiditis as noted on CT Head / Fungal rash/ thrush with underlying Colovesicle Fistula.    Suggestions: Will continue present ab rx and follow-up temps and labs closely.  Repeat CXR and await decision re : tracheostomy and PEG placement and possible Diverting Colostomy.

## 2017-04-22 NOTE — PROGRESS NOTE ADULT - SUBJECTIVE AND OBJECTIVE BOX
INTERVAL HPI/OVERNIGHT EVENTS:        REVIEW OF SYSTEMS:  CONSTITUTIONAL: continues  on  respirator  MEDICATION:  enoxaparin Injectable 40milliGRAM(s) SubCutaneous every 24 hours  folic acid 1milliGRAM(s) Oral daily  acetaminophen    Suspension 650milliGRAM(s) Oral every 6 hours PRN  vancomycin  IVPB 1000milliGRAM(s) IV Intermittent every 24 hours  ipratropium    for Nebulization 500MICROGram(s) Inhalation every 6 hours  carbidopa/levodopa  25/100 1Tablet(s) Oral four times a day  pramipexole 0.25milliGRAM(s) Oral three times a day  acetaminophen    Suspension. 650milliGRAM(s) Enteral Tube every 6 hours PRN  chlorhexidine 0.12% Liquid 15milliLiter(s) Swish and Spit two times a day  chlorhexidine 4% Liquid 1Application(s) Topical daily  fluconAZOLE   Tablet 100milliGRAM(s) Oral daily  pantoprazole   Suspension 40milliGRAM(s) Oral daily  aztreonam  IVPB  IV Intermittent   aztreonam  IVPB 1000milliGRAM(s) IV Intermittent every 8 hours    Vital Signs Last 24 Hrs  T(C): 36.4, Max: 38.1 (04-21 @ 14:00)  T(F): 97.6, Max: 100.6 (04-21 @ 14:00)  HR: 97 (81 - 117)  BP: 150/70 (131/67 - 168/91)  BP(mean): 89 (76 - 111)  RR: 20 (0 - 23)  SpO2: 98% (96% - 100%)    PHYSICAL EXAM:  GENERAL: NAD, well-groomed, well-developed  EYES:  conjunctiva and sclera clear  ENMT:  Moist mucous membranes,   NECK: Supple, No JVD, Normal thyroid  NERVOUS SYSTEM:  Alert oriented   no  focal  deficits;   CHEST/LUNG: Clear    HEART: Regular rate and rhythm; No murmurs, rubs, or gallops  ABDOMEN: Soft, Nontender, Nondistended; Bowel sounds present  EXTREMITIES:  no  edema no  tenderness  SKIN: No rashes   LABS:                        7.9    11.6  )-----------( 234      ( 22 Apr 2017 03:14 )             23.2     04-22    136  |  97  |  18  ----------------------------<  105<H>  4.4   |  29  |  0.33<L>    Ca    8.2<L>      22 Apr 2017 03:14  Phos  3.7     04-22  Mg     2.3     04-22          CAPILLARY BLOOD GLUCOSE      RADIOLOGY & ADDITIONAL TESTS:    Imaging reports  Personally Reviewed:  [x ] YES  [ ] NO    Consultant(s) Notes Reviewed:  [x ] YES  [ ] NO    Care Discussed with Consultants/Other Providers [x ] YES  [ ] NO  ACUTE  RESPIRATORY  FAILURE  PNEUMONIA   CONT  VENT  SUPPORT  AB  IVF  weaning  as  per  intensivist  patient's  son  to  decide  on  trach  peg  vs  palliative  care  COLO/VESCICULAR FISTULA proceed  as  per  surgery  discussed  with  Urology  to  continue NGT feedings monitor  labs    LFT elevation  continue  to  monitor might  need  repeat  ABD  imaging  metabolic  encephalopathy  supprtive care        Problem/Plan - 3:  ·  Problem: Parkinson disease encephalopathy.  Plan: sinemet discussed  with  neurology  as  to  further  adjustments/addition  to  treatment    Problem/Plan - 4:      Problem/Plan - 5:  ·  Problem: Anemia.  Plan: iron  folic  acid. transfuse  as  needed    prognosis  guarded

## 2017-04-23 DIAGNOSIS — N30.00 ACUTE CYSTITIS WITHOUT HEMATURIA: ICD-10-CM

## 2017-04-23 LAB
ANION GAP SERPL CALC-SCNC: 12 MMOL/L — SIGNIFICANT CHANGE UP (ref 5–17)
BUN SERPL-MCNC: 17 MG/DL — SIGNIFICANT CHANGE UP (ref 7–23)
CALCIUM SERPL-MCNC: 8.3 MG/DL — LOW (ref 8.5–10.1)
CHLORIDE SERPL-SCNC: 95 MMOL/L — LOW (ref 96–108)
CO2 SERPL-SCNC: 28 MMOL/L — SIGNIFICANT CHANGE UP (ref 22–31)
CREAT SERPL-MCNC: 0.33 MG/DL — LOW (ref 0.5–1.3)
GLUCOSE SERPL-MCNC: 126 MG/DL — HIGH (ref 70–99)
HCT VFR BLD CALC: 22.9 % — LOW (ref 34.5–45)
HGB BLD-MCNC: 7.4 G/DL — LOW (ref 11.5–15.5)
MAGNESIUM SERPL-MCNC: 2.4 MG/DL — SIGNIFICANT CHANGE UP (ref 1.8–2.4)
MCHC RBC-ENTMCNC: 27.8 PG — SIGNIFICANT CHANGE UP (ref 27–34)
MCHC RBC-ENTMCNC: 32.1 GM/DL — SIGNIFICANT CHANGE UP (ref 32–36)
MCV RBC AUTO: 86.5 FL — SIGNIFICANT CHANGE UP (ref 80–100)
PHOSPHATE SERPL-MCNC: 3.8 MG/DL — SIGNIFICANT CHANGE UP (ref 2.5–4.5)
PLATELET # BLD AUTO: 241 K/UL — SIGNIFICANT CHANGE UP (ref 150–400)
POTASSIUM SERPL-MCNC: 4.4 MMOL/L — SIGNIFICANT CHANGE UP (ref 3.5–5.3)
POTASSIUM SERPL-SCNC: 4.4 MMOL/L — SIGNIFICANT CHANGE UP (ref 3.5–5.3)
RBC # BLD: 2.65 M/UL — LOW (ref 3.8–5.2)
RBC # FLD: 16 % — HIGH (ref 11–15)
SODIUM SERPL-SCNC: 135 MMOL/L — SIGNIFICANT CHANGE UP (ref 135–145)
VANCOMYCIN TROUGH SERPL-MCNC: 7.1 UG/ML — LOW (ref 10–20)
WBC # BLD: 10.9 K/UL — HIGH (ref 3.8–10.5)
WBC # FLD AUTO: 10.9 K/UL — HIGH (ref 3.8–10.5)

## 2017-04-23 PROCEDURE — 99291 CRITICAL CARE FIRST HOUR: CPT

## 2017-04-23 RX ADMIN — FLUCONAZOLE 100 MILLIGRAM(S): 150 TABLET ORAL at 14:30

## 2017-04-23 RX ADMIN — Medication 500 MICROGRAM(S): at 17:00

## 2017-04-23 RX ADMIN — CHLORHEXIDINE GLUCONATE 15 MILLILITER(S): 213 SOLUTION TOPICAL at 17:14

## 2017-04-23 RX ADMIN — Medication 500 MICROGRAM(S): at 11:00

## 2017-04-23 RX ADMIN — PANTOPRAZOLE SODIUM 40 MILLIGRAM(S): 20 TABLET, DELAYED RELEASE ORAL at 14:30

## 2017-04-23 RX ADMIN — Medication 500 MICROGRAM(S): at 00:55

## 2017-04-23 RX ADMIN — PRAMIPEXOLE DIHYDROCHLORIDE 0.25 MILLIGRAM(S): 0.12 TABLET ORAL at 21:17

## 2017-04-23 RX ADMIN — CARBIDOPA AND LEVODOPA 1 TABLET(S): 25; 100 TABLET ORAL at 17:14

## 2017-04-23 RX ADMIN — ENOXAPARIN SODIUM 40 MILLIGRAM(S): 100 INJECTION SUBCUTANEOUS at 05:21

## 2017-04-23 RX ADMIN — Medication 50 MILLIGRAM(S): at 21:17

## 2017-04-23 RX ADMIN — PRAMIPEXOLE DIHYDROCHLORIDE 0.25 MILLIGRAM(S): 0.12 TABLET ORAL at 14:29

## 2017-04-23 RX ADMIN — CARBIDOPA AND LEVODOPA 1 TABLET(S): 25; 100 TABLET ORAL at 14:31

## 2017-04-23 RX ADMIN — Medication 250 MILLIGRAM(S): at 05:14

## 2017-04-23 RX ADMIN — CHLORHEXIDINE GLUCONATE 15 MILLILITER(S): 213 SOLUTION TOPICAL at 05:14

## 2017-04-23 RX ADMIN — CHLORHEXIDINE GLUCONATE 1 APPLICATION(S): 213 SOLUTION TOPICAL at 14:33

## 2017-04-23 RX ADMIN — CARBIDOPA AND LEVODOPA 1 TABLET(S): 25; 100 TABLET ORAL at 23:48

## 2017-04-23 RX ADMIN — Medication 500 MICROGRAM(S): at 05:50

## 2017-04-23 RX ADMIN — Medication 1 MILLIGRAM(S): at 14:33

## 2017-04-23 RX ADMIN — Medication 50 MILLIGRAM(S): at 05:14

## 2017-04-23 RX ADMIN — PRAMIPEXOLE DIHYDROCHLORIDE 0.25 MILLIGRAM(S): 0.12 TABLET ORAL at 05:15

## 2017-04-23 RX ADMIN — Medication 50 MILLIGRAM(S): at 14:31

## 2017-04-23 RX ADMIN — CARBIDOPA AND LEVODOPA 1 TABLET(S): 25; 100 TABLET ORAL at 05:14

## 2017-04-23 NOTE — PROGRESS NOTE ADULT - SUBJECTIVE AND OBJECTIVE BOX
INTERVAL HPI/OVERNIGHT EVENTS:        REVIEW OF SYSTEMS:  CONSTITUTIONAL:  continues on  respirator    MEDICATION:  enoxaparin Injectable 40milliGRAM(s) SubCutaneous every 24 hours  folic acid 1milliGRAM(s) Oral daily  acetaminophen    Suspension 650milliGRAM(s) Oral every 6 hours PRN  vancomycin  IVPB 1000milliGRAM(s) IV Intermittent every 24 hours  ipratropium    for Nebulization 500MICROGram(s) Inhalation every 6 hours  carbidopa/levodopa  25/100 1Tablet(s) Oral four times a day  pramipexole 0.25milliGRAM(s) Oral three times a day  acetaminophen    Suspension. 650milliGRAM(s) Enteral Tube every 6 hours PRN  chlorhexidine 0.12% Liquid 15milliLiter(s) Swish and Spit two times a day  chlorhexidine 4% Liquid 1Application(s) Topical daily  fluconAZOLE   Tablet 100milliGRAM(s) Oral daily  pantoprazole   Suspension 40milliGRAM(s) Oral daily  aztreonam  IVPB  IV Intermittent   aztreonam  IVPB 1000milliGRAM(s) IV Intermittent every 8 hours    Vital Signs Last 24 Hrs  T(C): 37.2, Max: 37.8 (04-22 @ 15:13)  T(F): 98.9, Max: 100.1 (04-23 @ 04:47)  HR: 104 (77 - 111)  BP: 139/62 (121/55 - 168/79)  BP(mean): 81 (69 - 103)  RR: 21 (15 - 22)  SpO2: 96% (96% - 100%)    PHYSICAL EXAM:   NERVOUS SYSTEM: opens  eyes  slightly with verbal  stimualtion   CHEST/LUNG: ronchis  at  bases  HEART: Regular rate and rhythm; No murmurs, rubs, or gallops  ABDOMEN: Soft, Nontender, Nondistended; Bowel sounds present  EXTREMITIES: +  edema  SKIN: No rashes   LABS:                        7.4    10.9  )-----------( 241      ( 23 Apr 2017 03:59 )             22.9     04-23    135  |  95<L>  |  17  ----------------------------<  126<H>  4.4   |  28  |  0.33<L>    Ca    8.3<L>      23 Apr 2017 03:59  Phos  3.8     04-23  Mg     2.4     04-23          CAPILLARY BLOOD GLUCOSE      RADIOLOGY & ADDITIONAL TESTS:    Imaging reports  Personally Reviewed:  [x ] YES  [ ] NO    Consultant(s) Notes Reviewed:  [x ] YES  [ ] NO    Care Discussed with Consultants/Other Providers [ x] YES  [ ] NO  ACUTE  RESPIRATORY  FAILURE  PNEUMONIA   CONT  VENT  SUPPORT  AB  IVF  weaning  as  per  intensivist  patient's  son  to  decide  on  trach  peg  vs  palliative  care  COLO/VESCICULAR FISTULA proceed  as  per  surgery  discussed  with  Urology  to  continue NGT feedings monitor  labs    LFT elevation  continue  to  monitor might  need  repeat  ABD  imaging  metabolic  encephalopathy  supprtive care        Problem/Plan - 3:  ·  Problem: Parkinson disease encephalopathy.  Plan: sinemet discussed  with  neurology  as  to  further  adjustments/addition  to  treatment    Problem/Plan - 4:      Problem/Plan - 5:  ·  Problem: Anemia.  Plan: iron  folic  acid. transfuse  as  needed    prognosis  guarded  discussed  with  ICU  Np  pt's  son  deciding  on  PEG  TRACH

## 2017-04-23 NOTE — PROGRESS NOTE ADULT - PROBLEM SELECTOR PLAN 1
C/W assist control, vanco aztreonam for aspiration PNA  CXr with significant infiltrates. Dementia/parkinsons- not weanabel at this time will d/w family re trach/peg

## 2017-04-23 NOTE — PROGRESS NOTE ADULT - SUBJECTIVE AND OBJECTIVE BOX
Patient seen and examined at bedside, intubated, unresponsive.     Vital Signs Last 24 Hrs  T(F): 99, Max: 100.1 (04-23 @ 04:47)  HR: 91  BP: 139/62  RR: 21  SpO2: 97%    GENERAL: Intubated, unresponsive   CHEST/LUNG: +rhonchi b/l   HEART: S1S2, tachy to 110   ABDOMEN: + Bowel sounds, soft, obese, nondistended  EXTREMITIES:  no calf tenderness, No edema  : Shoemaker catheter in situ draining clear, yellow urine, output: 1570cc/24hrs  RECTUM: Rectal tube in situ, output: 200cc/24hrs    LABS:                        7.4    10.9  )-----------( 241      ( 23 Apr 2017 03:59 )             22.9     04-23    135  |  95<L>  |  17  ----------------------------<  126<H>  4.4   |  28  |  0.33<L>    Ca    8.3<L>      23 Apr 2017 03:59  Phos  3.8     04-23  Mg     2.4     04-23    Impression: 86 year old female PMH Parkinson's, s/p repair of femur fracture 2/2 trip and fall, admitted with sepsis, metabolic encephalopathy, UTI, penumonia, colovesicular fistula, now s/p intubation 2/2 aspiration of tube feeds and hypoxia  Plan:  - no abdominal/urological surgical intervention planned at this time  - will consider PEG/trache/diverting colostomy when patient stable  - continue shoemaker catheter and flexiseal, monitor output  - continue antibiotics  - follow labs  - continue CCU management/vent per CCU  - will discuss with Dr. Mosley and Dr. Bahena

## 2017-04-23 NOTE — PROGRESS NOTE ADULT - SUBJECTIVE AND OBJECTIVE BOX
INTERVAL HPI/OVERNIGHT EVENTS:   HPI:  patient  with  worsening  lethargy  fever  dysphagia  evaluated  in  ER  admitted  for  pneumonia  UTI  patient  s/p  l  hip  medullary  nail (04 Apr 2017 19:53)  Colovesical fistula,aspiration PNa, resp failure      CENTRAL LINE: [ ] YES [ x] NO  LOCATION:   DATE INSERTED:  REMOVE: [ ] YES [ ] NO  EXPLAIN:    [place]: [x ] YES [ ] NO    DATE INSERTED:  REMOVE:  [ ] YES [ ] NO  EXPLAIN:    A-LINE:  [ ] YES [x ] NO  LOCATION:   DATE INSERTED:  REMOVE:  [ ] YES [ ] NO  EXPLAIN:    PAST MEDICAL & SURGICAL HISTORY:  Pneumonia  Anemia  Parkinson disease  Tremors of nervous system  No pertinent past medical history  Status post hip surgery  No significant past surgical history      REVIEW OF SYSTEMS:     no fevers, no arrythmia, intubated off pressor    ICU Vital Signs Last 24 Hrs  T(C): 37.2, Max: 38 (04-22 @ 11:48)  T(F): 98.9, Max: 100.4 (04-22 @ 11:48)  HR: 78 (77 - 113)  BP: 139/62 (121/55 - 168/79)  BP(mean): 81 (69 - 103)  ABP: --  ABP(mean): --  RR: 21 (15 - 22)  SpO2: 99% (96% - 100%)          I&O's Detail    I & Os for current day (as of 23 Apr 2017 08:57)  =============================================  IN:    Jevity: 1440 ml    Solution: 250 ml    Solution: 200 ml    Total IN: 1890 ml  ---------------------------------------------  OUT:    Indwelling Catheter - Urethral: 1570 ml    Rectal Tube: 200 ml    Total OUT: 1770 ml  ---------------------------------------------  Total NET: 120 ml      Mode: AC/ CMV (Assist Control/ Continuous Mandatory Ventilation)  RR (machine): 16  TV (machine): 400  FiO2: 35  PEEP: 5  ITime: 0.9  MAP: 10  PIP: 27    CAPILLARY BLOOD GLUCOSE    PHYSICAL EXAM:    Gen itnubated, unresponsive off sedation  HEENT no JVD NO LAd  Lungs Rhonic BL   CVS S! S@ RRR  ABd NT/Nd  Ext no edema    LABS:                        7.4    10.9  )-----------( 241      ( 23 Apr 2017 03:59 )             22.9      04-23    135  |  95<L>  |  17  ----------------------------<  126<H>  4.4   |  28  |  0.33<L>    Ca    8.3<L>      23 Apr 2017 03:59  Phos  3.8     04-23  Mg     2.4     04-23              RADIOLOGY & ADDITIONAL STUDIES  MPRESSION:    Ct 4/10/17  Colovesical fistula.  3.9 cm right adnexal cyst.  Small bilateral pleural effusions.  Bibasilar lung opacities and right lower lobe consolidation, suspicious   for multifocal pneumonia.    CXr 4/22 :MPRESSION: Intubated with extensive bilateral airspace disease, right   greater than left, which appears worsened when compared to the prior   chest x-ray examination.        Assessment and Plan:    CRITICAL CARE TIME SPENT:

## 2017-04-24 LAB
ALBUMIN SERPL ELPH-MCNC: 1 G/DL — LOW (ref 3.3–5)
ALP SERPL-CCNC: 233 U/L — HIGH (ref 40–120)
ALT FLD-CCNC: 14 U/L — SIGNIFICANT CHANGE UP (ref 12–78)
ANION GAP SERPL CALC-SCNC: 10 MMOL/L — SIGNIFICANT CHANGE UP (ref 5–17)
AST SERPL-CCNC: 52 U/L — HIGH (ref 15–37)
BILIRUB SERPL-MCNC: 0.3 MG/DL — SIGNIFICANT CHANGE UP (ref 0.2–1.2)
BUN SERPL-MCNC: 17 MG/DL — SIGNIFICANT CHANGE UP (ref 7–23)
CALCIUM SERPL-MCNC: 8.3 MG/DL — LOW (ref 8.5–10.1)
CHLORIDE SERPL-SCNC: 96 MMOL/L — SIGNIFICANT CHANGE UP (ref 96–108)
CO2 SERPL-SCNC: 28 MMOL/L — SIGNIFICANT CHANGE UP (ref 22–31)
CREAT SERPL-MCNC: 0.33 MG/DL — LOW (ref 0.5–1.3)
GLUCOSE SERPL-MCNC: 122 MG/DL — HIGH (ref 70–99)
GRAM STN FLD: SIGNIFICANT CHANGE UP
HCT VFR BLD CALC: 22.3 % — LOW (ref 34.5–45)
HGB BLD-MCNC: 7.5 G/DL — LOW (ref 11.5–15.5)
MAGNESIUM SERPL-MCNC: 2.4 MG/DL — SIGNIFICANT CHANGE UP (ref 1.8–2.4)
MCHC RBC-ENTMCNC: 29.2 PG — SIGNIFICANT CHANGE UP (ref 27–34)
MCHC RBC-ENTMCNC: 33.8 GM/DL — SIGNIFICANT CHANGE UP (ref 32–36)
MCV RBC AUTO: 86.5 FL — SIGNIFICANT CHANGE UP (ref 80–100)
PHOSPHATE SERPL-MCNC: 3.6 MG/DL — SIGNIFICANT CHANGE UP (ref 2.5–4.5)
PLATELET # BLD AUTO: 228 K/UL — SIGNIFICANT CHANGE UP (ref 150–400)
POTASSIUM SERPL-MCNC: 4.5 MMOL/L — SIGNIFICANT CHANGE UP (ref 3.5–5.3)
POTASSIUM SERPL-SCNC: 4.5 MMOL/L — SIGNIFICANT CHANGE UP (ref 3.5–5.3)
PROT SERPL-MCNC: 7.7 GM/DL — SIGNIFICANT CHANGE UP (ref 6–8.3)
RBC # BLD: 2.58 M/UL — LOW (ref 3.8–5.2)
RBC # FLD: 15.4 % — HIGH (ref 11–15)
SODIUM SERPL-SCNC: 134 MMOL/L — LOW (ref 135–145)
SPECIMEN SOURCE: SIGNIFICANT CHANGE UP
WBC # BLD: 9.8 K/UL — SIGNIFICANT CHANGE UP (ref 3.8–10.5)
WBC # FLD AUTO: 9.8 K/UL — SIGNIFICANT CHANGE UP (ref 3.8–10.5)

## 2017-04-24 PROCEDURE — 31624 DX BRONCHOSCOPE/LAVAGE: CPT

## 2017-04-24 PROCEDURE — 71010: CPT | Mod: 26

## 2017-04-24 RX ORDER — VANCOMYCIN HCL 1 G
1250 VIAL (EA) INTRAVENOUS EVERY 24 HOURS
Qty: 0 | Refills: 0 | Status: DISCONTINUED | OUTPATIENT
Start: 2017-04-25 | End: 2017-04-28

## 2017-04-24 RX ADMIN — CARBIDOPA AND LEVODOPA 1 TABLET(S): 25; 100 TABLET ORAL at 12:48

## 2017-04-24 RX ADMIN — Medication 650 MILLIGRAM(S): at 05:25

## 2017-04-24 RX ADMIN — CHLORHEXIDINE GLUCONATE 1 APPLICATION(S): 213 SOLUTION TOPICAL at 12:48

## 2017-04-24 RX ADMIN — Medication 50 MILLIGRAM(S): at 15:53

## 2017-04-24 RX ADMIN — ENOXAPARIN SODIUM 40 MILLIGRAM(S): 100 INJECTION SUBCUTANEOUS at 05:25

## 2017-04-24 RX ADMIN — Medication 500 MICROGRAM(S): at 23:54

## 2017-04-24 RX ADMIN — PRAMIPEXOLE DIHYDROCHLORIDE 0.25 MILLIGRAM(S): 0.12 TABLET ORAL at 21:07

## 2017-04-24 RX ADMIN — CHLORHEXIDINE GLUCONATE 15 MILLILITER(S): 213 SOLUTION TOPICAL at 05:25

## 2017-04-24 RX ADMIN — CARBIDOPA AND LEVODOPA 1 TABLET(S): 25; 100 TABLET ORAL at 05:24

## 2017-04-24 RX ADMIN — PANTOPRAZOLE SODIUM 40 MILLIGRAM(S): 20 TABLET, DELAYED RELEASE ORAL at 12:48

## 2017-04-24 RX ADMIN — Medication 50 MILLIGRAM(S): at 05:25

## 2017-04-24 RX ADMIN — Medication 500 MICROGRAM(S): at 17:33

## 2017-04-24 RX ADMIN — Medication 500 MICROGRAM(S): at 06:06

## 2017-04-24 RX ADMIN — PRAMIPEXOLE DIHYDROCHLORIDE 0.25 MILLIGRAM(S): 0.12 TABLET ORAL at 14:14

## 2017-04-24 RX ADMIN — Medication 250 MILLIGRAM(S): at 03:25

## 2017-04-24 RX ADMIN — Medication 1 MILLIGRAM(S): at 12:48

## 2017-04-24 RX ADMIN — PRAMIPEXOLE DIHYDROCHLORIDE 0.25 MILLIGRAM(S): 0.12 TABLET ORAL at 05:24

## 2017-04-24 RX ADMIN — Medication 500 MICROGRAM(S): at 11:11

## 2017-04-24 RX ADMIN — CHLORHEXIDINE GLUCONATE 15 MILLILITER(S): 213 SOLUTION TOPICAL at 18:03

## 2017-04-24 RX ADMIN — FLUCONAZOLE 100 MILLIGRAM(S): 150 TABLET ORAL at 12:48

## 2017-04-24 RX ADMIN — CARBIDOPA AND LEVODOPA 1 TABLET(S): 25; 100 TABLET ORAL at 18:03

## 2017-04-24 RX ADMIN — Medication 50 MILLIGRAM(S): at 21:07

## 2017-04-24 RX ADMIN — Medication 500 MICROGRAM(S): at 00:43

## 2017-04-24 NOTE — PROGRESS NOTE ADULT - PROBLEM SELECTOR PLAN 1
C/W assist control, vanco aztreonam for aspiration PNA  CXr with significant infiltrates. Dementia/parkinsons- will bronchoscope for pulmonary toilet. Son not ready to discuss trach/peg  c/w tube feeds

## 2017-04-24 NOTE — PROGRESS NOTE ADULT - PROBLEM SELECTOR PROBLEM 2
Acute cystitis without hematuria
Aspiration into airway, initial encounter
Acute cystitis without hematuria
Aspiration into airway, initial encounter
Aspiration into airway, initial encounter

## 2017-04-24 NOTE — PROGRESS NOTE ADULT - SUBJECTIVE AND OBJECTIVE BOX
TMAX -  100.6    On day # 5 Azactam / #18 Vanco / #8 Diflucan    Vital Signs Last 24 Hrs  T(C): 36.6, Max: 38.1 (04-24 @ 05:20)  T(F): 97.8, Max: 100.6 (04-24 @ 05:20)  HR: 85 (74 - 131)  BP: 149/68 (135/58 - 211/105)  BP(mean): 89 (76 - 160)  RR: 22 (16 - 23)  SpO2: 98% (95% - 100%)  Mode: AC/ CMV (Assist Control/ Continuous Mandatory Ventilation)  RR (machine): 16  TV (machine): 400  FiO2: 35  PEEP: 5  ITime: 1  MAP: 10  PIP: 34  Supplemental O2:  on 35% FIO2 + 5 PEEP on ventilator      Remains on ventilator, s/p Bronchoscopy and lavage earlier today at bedside.  New specimens sent for culture.      PHYSICAL EXAM  General:  remains poorly responsive on ventilator, orally intubated and with NGT in place with feedings ongoing  HEENT: conj pink, sclerae anicteric, PERRLA, no oral lesions noted but limited visualization secondary to oral ET-Tube   Neck: semi-supple, no nodes noted   Heart: RR   Lungs:  bilat rhonchi throughout  Abdomen:  soft, BS +, nontender appearance  Extremities:  decreased edema LE's, and some decrease edema of arms and hands, with resolving ecchymoses on the hands  Skin:  warm, dry, no rash noted    I&O's Summary:  I & Os for 24h ending 24 Apr 2017 07:00  =============================================  IN: 1980 ml / OUT: 1550 ml / NET: 430 ml    I & Os for current day (as of 24 Apr 2017 17:02)  =============================================  IN: 710 ml / OUT: 420 ml / NET: 290 ml        LABS:  CBC Full  -  ( 24 Apr 2017 03:26 )  WBC Count : 9.8 K/uL  Hemoglobin : 7.5 g/dL  Hematocrit : 22.3 %  Platelet Count - Automated : 228 K/uL  Mean Cell Volume : 86.5 fl  Mean Cell Hemoglobin : 29.2 pg  Mean Cell Hemoglobin Concentration : 33.8 gm/dL  Auto Neutrophil # : x  Auto Lymphocyte # : x  Auto Monocyte # : x  Auto Eosinophil # : x  Auto Basophil # : x  Auto Neutrophil % : x  Auto Lymphocyte % : x  Auto Monocyte % : x  Auto Eosinophil % : x  Auto Basophil % : x    04-24    134<L>  |  96  |  17  ----------------------------<  122<H>  4.5   |  28  |  0.33<L>    Ca    8.3<L>      24 Apr 2017 03:26  Phos  3.6     04-24  Mg     2.4     04-24    TPro  7.7  /  Alb  1.0<L>  /  TBili  0.3  /  DBili  x   /  AST  52<H>  /  ALT  14  /  AlkPhos  233<H>  04-24    LIVER FUNCTIONS - ( 24 Apr 2017 03:26 )  Alb: 1.0 g/dL / Pro: 7.7 gm/dL / ALK PHOS: 233 U/L / ALT: 14 U/L / AST: 52 U/L / GGT: x           Vancomycin Level, Trough: 7.1 ug/mL (04-23 @ 03:59)          Radiology:   CXR - 4/24 -   FINDINGS:  There is an endotracheal tube with the tip seentwo vertebral   bodies above the deuardo. There is an NG tube seen coursing below the   diaphragm with the tip off of the film. There are bilateral patchy   airspace opacities as well as more focal consolidation in the right upper   lobe. Heart size is within normal limits. The osseous structures are   intact.    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    Suggestions: Will continue present ab rx and follow-up temps, labs, and new specimen from today's bronchoscopy.  Will adjust Vanco dose as level is low and await decision from patient's son regarding trach, PEG, and possible Diverting Colostomy.  Will request Midline placement for continued IV access as patient's peripheral access is limited.

## 2017-04-24 NOTE — PROGRESS NOTE ADULT - PROBLEM SELECTOR PROBLEM 1
Acute respiratory failure with hypoxia
Respiratory failure with hypoxia
Acute respiratory failure with hypoxia
Acute respiratory failure with hypoxia
Respiratory failure with hypoxia
Respiratory failure with hypoxia

## 2017-04-24 NOTE — PROGRESS NOTE ADULT - SUBJECTIVE AND OBJECTIVE BOX
INTERVAL HPI/OVERNIGHT EVENTS:   HPI:  patient  with parkinsons dementia, aspiration pneumonia, worsening  lethargy  fever  dysphagia  evaluated  in  ER  admitted  for  pneumonia  UTI  patient  s/p  l  hip  medullary  nail (04 Apr 2017 19:53)  colovesicval fistula, recurrent urosepsis, aspiration PNA, parkinsons dementia           PAST MEDICAL & SURGICAL HISTORY:  Pneumonia  Anemia  Parkinson disease  Tremors of nervous system  No pertinent past medical history  Status post hip surgery  No significant past surgical history      REVIEW OF SYSTEMS:     no fevers, substantial secretions oral and tracheal, no niarrhea, no melena     ALLERGY AND IMMUNOLOGIC: No hives or eczema      ICU Vital Signs Last 24 Hrs  T(C): 38.1, Max: 38.1 (04-24 @ 05:20)  T(F): 100.6, Max: 100.6 (04-24 @ 05:20)  HR: 108 (80 - 112)  BP: 157/107 (135/58 - 166/85)  BP(mean): 121 (77 - 121)  ABP: --  ABP(mean): --  RR: 17 (11 - 38)  SpO2: 98% (95% - 99%)          I&O's Detail    I & Os for current day (as of 24 Apr 2017 09:05)  =============================================  IN:    Jevity: 1320 ml    Solution: 250 ml    Enteral Tube Flush: 200 ml    Solution: 150 ml    Total IN: 1920 ml  ---------------------------------------------  OUT:    Indwelling Catheter - Urethral: 1450 ml    Rectal Tube: 50 ml    Total OUT: 1500 ml  ---------------------------------------------  Total NET: 420 ml      Mode: AC/ CMV (Assist Control/ Continuous Mandatory Ventilation)  RR (machine): 16  TV (machine): 400  FiO2: 35  PEEP: 5  ITime: 0.9  MAP: 12  PIP: 11    CAPILLARY BLOOD GLUCOSE    PHYSICAL EXAM:    GENERAL: NAD, well-groomed, well-developed  HEAD:  Atraumatic, Normocephalic  EYES: EOMI, PERRLA, conjunctiva and sclera clear  ENMT: No tonsillar erythema, exudates, or enlargement; Moist mucous membranes, Good dentition, No lesions  NECK: Supple, No JVD, Normal thyroid  NERVOUS SYSTEM:  Alert & Oriented X3, Good concentration; Motor Strength 5/5 B/L upper and lower extremities; DTRs 2+ intact and symmetric  CHEST/LUNG: Clear to percussion bilaterally; No rales, rhonchi, wheezing, or rubs  HEART: Regular rate and rhythm; No murmurs, rubs, or gallops  ABDOMEN: Soft, Nontender, Nondistended; Bowel sounds present  EXTREMITIES:  2+ Peripheral Pulses, No clubbing, cyanosis, or edema  LYMPH: No lymphadenopathy noted  SKIN: No rashes or lesions      LABS:                        7.5    9.8   )-----------( 228      ( 24 Apr 2017 03:26 )             22.3      04-24    134<L>  |  96  |  17  ----------------------------<  122<H>  4.5   |  28  |  0.33<L>    Ca    8.3<L>      24 Apr 2017 03:26  Phos  3.6     04-24  Mg     2.4     04-24    TPro  7.7  /  Alb  1.0<L>  /  TBili  0.3  /  DBili  x   /  AST  52<H>  /  ALT  14  /  AlkPhos  233<H>  04-24            RADIOLOGY & ADDITIONAL STUDIES:      Assessment and Plan:    CRITICAL CARE TIME SPENT:

## 2017-04-24 NOTE — PROGRESS NOTE ADULT - PROBLEM SELECTOR PLAN 2
HOB above 30%  Continue with broad spectrum abx.  f/up cultures.
no plans to repair  colovesical fistula at this time Maria Victoria Bahena and Melany are on board  Recurrent UTI are expected, bldcs remain negative.    CCM time 45 min
HOB above 30%  Continue with broad spectrum abx.  f/up cultures.
no plans to repair  colovesical fistula at this time  Recurrent UTI are expected, bldcs remain negative. expect recurrent UTI.  CCM time 45 min
HOB above 30%  Continue with broad spectrum abx.  f/up cultures.  F/up ID on length of antibiotics given patient has completed >10 days of broad spectrum abx in light of possible chronic source and worsening chest imaging despite this.

## 2017-04-24 NOTE — PROGRESS NOTE ADULT - SUBJECTIVE AND OBJECTIVE BOX
INTERVAL HPI/OVERNIGHT EVENTS:        REVIEW OF SYSTEMS:  CONSTITUTIONAL:  continues on  respirator  poorly  responsive      MEDICATION:  enoxaparin Injectable 40milliGRAM(s) SubCutaneous every 24 hours  folic acid 1milliGRAM(s) Oral daily  acetaminophen    Suspension 650milliGRAM(s) Oral every 6 hours PRN  vancomycin  IVPB 1000milliGRAM(s) IV Intermittent every 24 hours  ipratropium    for Nebulization 500MICROGram(s) Inhalation every 6 hours  carbidopa/levodopa  25/100 1Tablet(s) Oral four times a day  pramipexole 0.25milliGRAM(s) Oral three times a day  acetaminophen    Suspension. 650milliGRAM(s) Enteral Tube every 6 hours PRN  chlorhexidine 0.12% Liquid 15milliLiter(s) Swish and Spit two times a day  chlorhexidine 4% Liquid 1Application(s) Topical daily  fluconAZOLE   Tablet 100milliGRAM(s) Oral daily  pantoprazole   Suspension 40milliGRAM(s) Oral daily  aztreonam  IVPB  IV Intermittent   aztreonam  IVPB 1000milliGRAM(s) IV Intermittent every 8 hours    Vital Signs Last 24 Hrs  T(C): 38.1, Max: 38.1 (04-24 @ 05:20)  T(F): 100.6, Max: 100.6 (04-24 @ 05:20)  HR: 108 (80 - 112)  BP: 157/107 (135/58 - 166/85)  BP(mean): 121 (77 - 121)  RR: 17 (11 - 38)  SpO2: 98% (95% - 99%)    PHYSICAL EXAM:  NECK: Supple, No JVD, Normal thyroid  NERVOUS SYSTEM: opens  eyes  slightly  with  verbal  stimulus   CHEST/LUNG:ronchis   HEART: Regular rate and rhythm; No murmurs, rubs, or gallops  ABDOMEN: Soft, Nontender, Nondistended; Bowel sounds present  EXTREMITIES:  no  edema no  tenderness  SKIN: No rashes   LABS:                        7.5    9.8   )-----------( 228      ( 24 Apr 2017 03:26 )             22.3     04-24    134<L>  |  96  |  17  ----------------------------<  122<H>  4.5   |  28  |  0.33<L>    Ca    8.3<L>      24 Apr 2017 03:26  Phos  3.6     04-24  Mg     2.4     04-24    TPro  7.7  /  Alb  1.0<L>  /  TBili  0.3  /  DBili  x   /  AST  52<H>  /  ALT  14  /  AlkPhos  233<H>  04-24        CAPILLARY BLOOD GLUCOSE      RADIOLOGY & ADDITIONAL TESTS:    Imaging reports  Personally Reviewed:  [x ] YES  [ ] NO    Consultant(s) Notes Reviewed:  [x ] YES  [ ] NO    Care Discussed with Consultants/Other Providers [x ] YES  [ ] NO  ACUTE  RESPIRATORY  FAILURE  PNEUMONIA   CONT  VENT  SUPPORT  AB  IVF  weaning  as  per  intensivist  patient's  son  to  decide  on  trach  peg  vs  palliative  care  COLO/VESCICULAR FISTULA proceed  as  per  surgery  discussed  with  Urology  to  continue NGT feedings monitor  labs    LFT elevation observation  metabolic  encephalopathy  supprtive care        Problem/Plan - 3:  ·  Problem: Parkinson disease encephalopathy.  Plan: sinemet discussed  with  neurology  as  to  further  adjustments/addition  to  treatment INTERVAL HPI/OVERNIGHT EVENTS:        REVIEW OF SYSTEMS:  CONSTITUTIONAL:  continues on  respirator  poorly  responsive      MEDICATION:  enoxaparin Injectable 40milliGRAM(s) SubCutaneous every 24 hours  folic acid 1milliGRAM(s) Oral daily  acetaminophen    Suspension 650milliGRAM(s) Oral every 6 hours PRN  vancomycin  IVPB 1000milliGRAM(s) IV Intermittent every 24 hours  ipratropium    for Nebulization 500MICROGram(s) Inhalation every 6 hours  carbidopa/levodopa  25/100 1Tablet(s) Oral four times a day  pramipexole 0.25milliGRAM(s) Oral three times a day  acetaminophen    Suspension. 650milliGRAM(s) Enteral Tube every 6 hours PRN  chlorhexidine 0.12% Liquid 15milliLiter(s) Swish and Spit two times a day  chlorhexidine 4% Liquid 1Application(s) Topical daily  fluconAZOLE   Tablet 100milliGRAM(s) Oral daily  pantoprazole   Suspension 40milliGRAM(s) Oral daily  aztreonam  IVPB  IV Intermittent   aztreonam  IVPB 1000milliGRAM(s) IV Intermittent every 8 hours    Vital Signs Last 24 Hrs  T(C): 38.1, Max: 38.1 (04-24 @ 05:20)  T(F): 100.6, Max: 100.6 (04-24 @ 05:20)  HR: 108 (80 - 112)  BP: 157/107 (135/58 - 166/85)  BP(mean): 121 (77 - 121)  RR: 17 (11 - 38)  SpO2: 98% (95% - 99%)    PHYSICAL EXAM:  NECK: Supple, No JVD, Normal thyroid  NERVOUS SYSTEM: opens  eyes  slightly  with  verbal  stimulus   CHEST/LUNG:ronchis   HEART: Regular rate and rhythm; No murmurs, rubs, or gallops  ABDOMEN: Soft, Nontender, Nondistended; Bowel sounds present  EXTREMITIES:  no  edema no  tenderness  SKIN: No rashes   LABS:                        7.5    9.8   )-----------( 228      ( 24 Apr 2017 03:26 )             22.3     04-24    134<L>  |  96  |  17  ----------------------------<  122<H>  4.5   |  28  |  0.33<L>    Ca    8.3<L>      24 Apr 2017 03:26  Phos  3.6     04-24  Mg     2.4     04-24    TPro  7.7  /  Alb  1.0<L>  /  TBili  0.3  /  DBili  x   /  AST  52<H>  /  ALT  14  /  AlkPhos  233<H>  04-24        CAPILLARY BLOOD GLUCOSE      RADIOLOGY & ADDITIONAL TESTS:    Imaging reports  Personally Reviewed:  [x ] YES  [ ] NO    Consultant(s) Notes Reviewed:  [x ] YES  [ ] NO    Care Discussed with Consultants/Other Providers [x ] YES  [ ] NO  ACUTE  RESPIRATORY  FAILURE  PNEUMONIA   CONT  VENT  SUPPORT  AB  IVF  weaning  as  per  intensivist  patient's  son  to  decide  on  trach  peg  vs  palliative  care  COLO/VESCICULAR FISTULA proceed  as  per  surgery  discussed  with  Urology  to  continue NGT feedings monitor  labs    LFT elevation observation  metabolic  encephalopathy  supprtive care        Problem/Plan - 3:  ·  Problem: Parkinson disease encephalopathy.  Plan: sinemet discussed  with  neurology  as  to  further  adjustments/addition  to  treatment  have  left  message  on Pt's  Son"s  phone as  to  further  treatment /palliative  care  decisisns

## 2017-04-25 LAB
ANION GAP SERPL CALC-SCNC: 11 MMOL/L — SIGNIFICANT CHANGE UP (ref 5–17)
BUN SERPL-MCNC: 17 MG/DL — SIGNIFICANT CHANGE UP (ref 7–23)
CALCIUM SERPL-MCNC: 8.3 MG/DL — LOW (ref 8.5–10.1)
CHLORIDE SERPL-SCNC: 95 MMOL/L — LOW (ref 96–108)
CO2 SERPL-SCNC: 28 MMOL/L — SIGNIFICANT CHANGE UP (ref 22–31)
CREAT SERPL-MCNC: 0.31 MG/DL — LOW (ref 0.5–1.3)
ERYTHROCYTE [SEDIMENTATION RATE] IN BLOOD: >140 MM/HR — HIGH (ref 0–20)
GLUCOSE SERPL-MCNC: 116 MG/DL — HIGH (ref 70–99)
HCT VFR BLD CALC: 22.6 % — LOW (ref 34.5–45)
HGB BLD-MCNC: 7.6 G/DL — LOW (ref 11.5–15.5)
MAGNESIUM SERPL-MCNC: 2.4 MG/DL — SIGNIFICANT CHANGE UP (ref 1.8–2.4)
MCHC RBC-ENTMCNC: 28.9 PG — SIGNIFICANT CHANGE UP (ref 27–34)
MCHC RBC-ENTMCNC: 33.5 GM/DL — SIGNIFICANT CHANGE UP (ref 32–36)
MCV RBC AUTO: 86.2 FL — SIGNIFICANT CHANGE UP (ref 80–100)
PHOSPHATE SERPL-MCNC: 4 MG/DL — SIGNIFICANT CHANGE UP (ref 2.5–4.5)
PLATELET # BLD AUTO: 260 K/UL — SIGNIFICANT CHANGE UP (ref 150–400)
POTASSIUM SERPL-MCNC: 4.6 MMOL/L — SIGNIFICANT CHANGE UP (ref 3.5–5.3)
POTASSIUM SERPL-SCNC: 4.6 MMOL/L — SIGNIFICANT CHANGE UP (ref 3.5–5.3)
RBC # BLD: 2.62 M/UL — LOW (ref 3.8–5.2)
RBC # FLD: 15.6 % — HIGH (ref 11–15)
SODIUM SERPL-SCNC: 134 MMOL/L — LOW (ref 135–145)
WBC # BLD: 12.2 K/UL — HIGH (ref 3.8–10.5)
WBC # FLD AUTO: 12.2 K/UL — HIGH (ref 3.8–10.5)

## 2017-04-25 PROCEDURE — 36571 INSERT PICVAD CATH: CPT | Mod: AS

## 2017-04-25 PROCEDURE — 71010: CPT | Mod: 26

## 2017-04-25 PROCEDURE — 99291 CRITICAL CARE FIRST HOUR: CPT

## 2017-04-25 PROCEDURE — 76937 US GUIDE VASCULAR ACCESS: CPT | Mod: 26,AS

## 2017-04-25 RX ADMIN — CARBIDOPA AND LEVODOPA 1 TABLET(S): 25; 100 TABLET ORAL at 05:18

## 2017-04-25 RX ADMIN — FLUCONAZOLE 100 MILLIGRAM(S): 150 TABLET ORAL at 12:57

## 2017-04-25 RX ADMIN — Medication 50 MILLIGRAM(S): at 14:31

## 2017-04-25 RX ADMIN — Medication 500 MICROGRAM(S): at 05:40

## 2017-04-25 RX ADMIN — CHLORHEXIDINE GLUCONATE 15 MILLILITER(S): 213 SOLUTION TOPICAL at 17:30

## 2017-04-25 RX ADMIN — CARBIDOPA AND LEVODOPA 1 TABLET(S): 25; 100 TABLET ORAL at 17:30

## 2017-04-25 RX ADMIN — CHLORHEXIDINE GLUCONATE 15 MILLILITER(S): 213 SOLUTION TOPICAL at 05:18

## 2017-04-25 RX ADMIN — Medication 166.67 MILLIGRAM(S): at 04:22

## 2017-04-25 RX ADMIN — PRAMIPEXOLE DIHYDROCHLORIDE 0.25 MILLIGRAM(S): 0.12 TABLET ORAL at 13:32

## 2017-04-25 RX ADMIN — Medication 50 MILLIGRAM(S): at 21:52

## 2017-04-25 RX ADMIN — Medication 500 MICROGRAM(S): at 11:24

## 2017-04-25 RX ADMIN — CARBIDOPA AND LEVODOPA 1 TABLET(S): 25; 100 TABLET ORAL at 12:57

## 2017-04-25 RX ADMIN — CHLORHEXIDINE GLUCONATE 1 APPLICATION(S): 213 SOLUTION TOPICAL at 12:59

## 2017-04-25 RX ADMIN — PANTOPRAZOLE SODIUM 40 MILLIGRAM(S): 20 TABLET, DELAYED RELEASE ORAL at 12:57

## 2017-04-25 RX ADMIN — PRAMIPEXOLE DIHYDROCHLORIDE 0.25 MILLIGRAM(S): 0.12 TABLET ORAL at 05:18

## 2017-04-25 RX ADMIN — Medication 500 MICROGRAM(S): at 17:15

## 2017-04-25 RX ADMIN — PRAMIPEXOLE DIHYDROCHLORIDE 0.25 MILLIGRAM(S): 0.12 TABLET ORAL at 21:52

## 2017-04-25 RX ADMIN — ENOXAPARIN SODIUM 40 MILLIGRAM(S): 100 INJECTION SUBCUTANEOUS at 05:18

## 2017-04-25 RX ADMIN — CARBIDOPA AND LEVODOPA 1 TABLET(S): 25; 100 TABLET ORAL at 00:11

## 2017-04-25 RX ADMIN — Medication 1 MILLIGRAM(S): at 12:57

## 2017-04-25 RX ADMIN — Medication 50 MILLIGRAM(S): at 05:18

## 2017-04-25 NOTE — PROGRESS NOTE ADULT - SUBJECTIVE AND OBJECTIVE BOX
TMAX - 100.6    On day # 6 Azactam / # 19 Vanco / # 9 Diflucan    Vital Signs Last 24 Hrs  T(C): 36.9, Max: 37.8 (04-25 @ 05:00)  T(F): 98.4, Max: 100 (04-25 @ 05:00)  HR: 93 (79 - 111)  BP: 129/95 (129/54 - 178/91)  BP(mean): 103 (72 - 113)  RR: 24 (15 - 24)  SpO2: 100% (95% - 100%)  Mode: AC/ CMV (Assist Control/ Continuous Mandatory Ventilation)  RR (machine): 16  TV (machine): 400  FiO2: 35  PEEP: 5  ITime: 1  MAP: 11  PIP: 26  Supplemental O2:  on Ventilator on 35 % FIO2 + 5 PEEP now    Remains poorly responsive, on ventilator, s/p Rt arm Midline placement today at bedside for continued IV access.      PHYSICAL EXAM  General:  poorly responsive on ventilator, opens eyes intermittently, with NGT feedings in progress, lying in bed  HEENT:  conj pink, sclerae anicteric, PERRLA, orally intubated  Neck:  supple, no nodes noted  Heart: RR   Lungs: bilat rhonchi  Abdomen:  soft, BS+, nontender appearance  Extremities: trace edema LE's                     some decreased swelling of both arms and hands                     Rt upper arm Midline in place now - site clean, dressing intact - placed today at bedside   Skin:  warm, dry, few resolving ecchymoses on hands           no rash noted    I&O's Summary :  I & Os for 24h ending 25 Apr 2017 07:00  =============================================  IN: 2100 ml / OUT: 1520 ml / NET: 580 ml    I & Os for current day (as of 25 Apr 2017 17:42)  =============================================  IN: 420 ml / OUT: 150 ml / NET: 270 ml      LABS:  CBC Full  -  ( 25 Apr 2017 03:49 )  WBC Count : 12.2 K/uL  Hemoglobin : 7.6 g/dL  Hematocrit : 22.6 %  Platelet Count - Automated : 260 K/uL  Mean Cell Volume : 86.2 fl  Mean Cell Hemoglobin : 28.9 pg  Mean Cell Hemoglobin Concentration : 33.5 gm/dL  Auto Neutrophil # : x  Auto Lymphocyte # : x  Auto Monocyte # : x  Auto Eosinophil # : x  Auto Basophil # : x  Auto Neutrophil % : x  Auto Lymphocyte % : x  Auto Monocyte % : x  Auto Eosinophil % : x  Auto Basophil % : x    04-25    134<L>  |  95<L>  |  17  ----------------------------<  116<H>  4.6   |  28  |  0.31<L>    Ca    8.3<L>      25 Apr 2017 03:49  Phos  4.0     04-25  Mg     2.4     04-25    TPro  7.7  /  Alb  1.0<L>  /  TBili  0.3  /  DBili  x   /  AST  52<H>  /  ALT  14  /  AlkPhos  233<H>  04-24    LIVER FUNCTIONS - ( 24 Apr 2017 03:26 )  Alb: 1.0 g/dL / Pro: 7.7 gm/dL / ALK PHOS: 233 U/L / ALT: 14 U/L / AST: 52 U/L / GGT: x           Sedimentation Rate, Erythrocyte: >140 mm/hr (04-25 @ 03:49)    Influenza A Rapid Screen: Negative (04-21 @ 19:47)    Influenza B Rapid Screen: Negative (04-21 @ 19:47)    Vancomycin Level, Trough: 7.1 ug/mL (04-23 @ 03:59)        CULTURES:  Specimen Source: .Broncial Bronchoalveolar Lavage (04-24 @ 20:53)  Culture Results:   Normal Respiratory Cindy present (04-24 @ 20:53)        Radiology:  CXR - 4/25 -  Heart is normal in size    Multifocal pneumonia again noted with no significant change.    Small effusions noted    Support devices in situ    No pneumothorax seen    IMPRESSION:    No significant change        Impression:  Temps seem to be tending downwards, on current ab rx for Sepsis with Multifocal PNA and Respiratory failure with underlying [place]-vesicle fistula.    Suggestions: Will continue present ab rx and follow-up temps and labs closely.  Await final sputum cx results and await decision re: trach, PEG, and Diverting Colostomy.

## 2017-04-25 NOTE — PROGRESS NOTE ADULT - SUBJECTIVE AND OBJECTIVE BOX
Initial Consultaion Note  Requested by Name:  Date/ Time:4/25/17  Reason for referral/ Consultation:    HPI:  patient  with  worsening  lethargy  fever  dysphagia  evaluated  in  ER  admitted  for  pneumonia  UTI  patient  s/p  l  hip  medullary  nail (04 Apr 2017 19:53)      PAST MEDICAL & SURGICAL HISTORY:  Pneumonia  Anemia  Parkinson disease  Tremors of nervous system  No pertinent past medical history  Status post hip surgery  No significant past surgical history      SOCIAL HISTORY:                                 Admitted from: home [ ] SNF [ ] [facility] [ ] [facility] [ ]    )    ADVANCE DIRECTIVES:  [xz ] YES [ ] NO     DNR [x ] YES [ ] NO  Completed on:                       MOLST  [x ] YES [ ] NO   Completed on  :  Living Will  [ ] YES [ ] NO   Completed on:    Allergies    No Known Allergies    Intolerances        Review of Systems:     PAIN : (Y/N) (0-10)          DYSPNEA:     NAUSEA/VOMITING:   DEPRESSION:        SECRETIONS:(    FRAILTY SYNDROM       FAILURE TO THRIVE     DIBILITY   OTHER SYMPTOMS :    UNABLE TO OBTAIN DUE TO POOR MENTATION (   )    PHYSICAL EXAM:  T(F): 98.4, Max: 100 (04-25 @ 05:00)  HR: 97 (79 - 111)  BP: 137/66 (129/54 - 178/91)  RR: 18 (15 - 24)  SpO2: 99% (95% - 100%)  Wt(kg): --   WEIGHT :    [id number]                  BMI:  I&O's Summary  I & Os for 24h ending 25 Apr 2017 07:00  =============================================  IN: 2100 ml / OUT: 1520 ml / NET: 580 ml    I & Os for current day (as of 25 Apr 2017 18:24)  =============================================  IN: 720 ml / OUT: 1020 ml / NET: -300 ml    CAPILLARY BLOOD GLUCOSE      GENERAL: alert [ ] oriented x ______[ ] lethargic        [ ] cachexia     [ ] nonverbal [ ] coma [ ]    HEENT: normal [ ] dry mouth [ ] ET tube/trach [ ]   LUNGS: ]  CV :  normal [ ]  GI : normal [ ]  PEG /NG tube [ ]   : normal [ ] incontinent [ ] oliguria/anuria [ ] shoemaker [ ]  MSK : normal [ ] weakness [ ] edema [ ]              ambulatory [ ] bebbound/wheelchair bound [ ]   SKIN : normal [ ] pressure ulcer (Y/N) Stage ______________ rash (Y/N)    Functional Assessment:Karnofsky Performance Score :  Palliative Performance Status Version 2:         %    LABS:                        7.6    12.2  )-----------( 260      ( 25 Apr 2017 03:49 )             22.6     04-25    134<L>  |  95<L>  |  17  ----------------------------<  116<H>  4.6   |  28  |  0.31<L>    Ca    8.3<L>      25 Apr 2017 03:49  Phos  4.0     04-25  Mg     2.4     04-25    TPro  7.7  /  Alb  1.0<L>  /  TBili  0.3  /  DBili  x   /  AST  52<H>  /  ALT  14  /  AlkPhos  233<H>  04-24          I&O's Detail  I & Os for 24h ending 25 Apr 2017 07:00  =============================================  IN:    Jevity: 1260 ml    Enteral Tube Flush: 340 ml    Solution: 250 ml    Solution: 250 ml    Total IN: 2100 ml  ---------------------------------------------  OUT:    Indwelling Catheter - Urethral: 1420 ml    Rectal Tube: 100 ml    Total OUT: 1520 ml  ---------------------------------------------  Total NET: 580 ml    I & Os for current day (as of 25 Apr 2017 18:24)  =============================================  IN:    Jevity: 720 ml    Total IN: 720 ml  ---------------------------------------------  OUT:    Indwelling Catheter - Urethral: 720 ml    Rectal Tube: 300 ml    Total OUT: 1020 ml  ---------------------------------------------  Total NET: -300 ml      Assessment:   86y Female admitted with ALTERED MENTAL STATUS/UTI/URINE RETENTION  AMS/PNEUMONIA  AMS      PROBLEM LIST :  PROBLEM/RECOMMENDATION: 1  Problem : Goals of care, counseling/ discussion.  Recommendation: met with patient/ family and discussed GOC & Advanced care planning                                    Palliative care info/counseling provided (Y/N)                                      Family meeting                                   Advanced Directives addressed (Y/N)  PROBLEM/ RECOMMENDATION:2  Problem :Resuscitation/ DNR/ DNI,   Recommendation: DNR/ DNI    PROBLEM/ RECOMMENDATION :3  Problem : Medical order for life sustaning treatment  Recommendation : MOLST Form ( initiated/ completed)    PROBLEM/RECOMMENDATION :4  Problem : Advanced care planning  Recommendation : Family meeting.(Y/N)     PLAN:  REFFERALS:                           Unit SW/Case Mgmt (Y/N)                          (Y/N)                         Speech/Swallow (Y/N)                           nutrition                                              Ethics (Y/N)                         PT/OT (Y/N)

## 2017-04-25 NOTE — PROGRESS NOTE ADULT - SUBJECTIVE AND OBJECTIVE BOX
INTERVAL HPI/OVERNIGHT EVENTS:    underwent  flexoble  bronchoscopy 4 /24/17     REVIEW OF SYSTEMS:  CONSTITUTIONAL: continues  on  respirator  unresponsive      MEDICATION:  enoxaparin Injectable 40milliGRAM(s) SubCutaneous every 24 hours  folic acid 1milliGRAM(s) Oral daily  acetaminophen    Suspension 650milliGRAM(s) Oral every 6 hours PRN  ipratropium    for Nebulization 500MICROGram(s) Inhalation every 6 hours  carbidopa/levodopa  25/100 1Tablet(s) Oral four times a day  pramipexole 0.25milliGRAM(s) Oral three times a day  acetaminophen    Suspension. 650milliGRAM(s) Enteral Tube every 6 hours PRN  chlorhexidine 0.12% Liquid 15milliLiter(s) Swish and Spit two times a day  chlorhexidine 4% Liquid 1Application(s) Topical daily  fluconAZOLE   Tablet 100milliGRAM(s) Oral daily  pantoprazole   Suspension 40milliGRAM(s) Oral daily  aztreonam  IVPB  IV Intermittent   aztreonam  IVPB 1000milliGRAM(s) IV Intermittent every 8 hours  vancomycin  IVPB 1250milliGRAM(s) IV Intermittent every 24 hours    Vital Signs Last 24 Hrs  T(C): 37.8, Max: 37.8 (04-25 @ 05:00)  T(F): 100, Max: 100 (04-25 @ 05:00)  HR: 86 (74 - 131)  BP: 159/80 (136/57 - 211/105)  BP(mean): 99 (76 - 160)  RR: 22 (17 - 23)  SpO2: 95% (95% - 100%)    PHYSICAL EXAM:d  NERVOUS SYSTEM:  unresponsive    CHEST/LUNG: few  ronchis   HEART: Regular rate and rhythm; No murmurs, rubs, or gallops  ABDOMEN: Soft, Nontender, Nondistended; Bowel sounds present  EXTREMITIES:  +edema  SKIN: No rashes   LABS:                        7.6    12.2  )-----------( 260      ( 25 Apr 2017 03:49 )             22.6     04-25    134<L>  |  95<L>  |  17  ----------------------------<  116<H>  4.6   |  28  |  0.31<L>    Ca    8.3<L>      25 Apr 2017 03:49  Phos  4.0     04-25  Mg     2.4     04-25    TPro  7.7  /  Alb  1.0<L>  /  TBili  0.3  /  DBili  x   /  AST  52<H>  /  ALT  14  /  AlkPhos  233<H>  04-24        CAPILLARY BLOOD GLUCOSE      RADIOLOGY & ADDITIONAL TESTS:    Imaging reports  Personally Reviewed:  [x ] YES  [ ] NO    Consultant(s) Notes Reviewed:  [x ] YES  [ ] NO    Care Discussed with Consultants/Other Providers [x ] YES  [ ] NO      INTERVAL HPI/OVERNIGHT EVENTS:        REVIEW OF SYSTEMS:  CONSTITUTIONAL:  no  complaints    NECK: No pain or stiffnes  RESPIRATORY: No SOB   CARDIOVASCULAR: No chest pain, palpitations, dizziness,   GASTROINTESTINAL: No abdominal pain. No nausea, vomiting,   NEUROLOGICAL: No headaches, no  blurry  vision no  dizziness  SKIN: No itching,   MUSCULOSKELETAL: No pain    MEDICATION:  enoxaparin Injectable 40milliGRAM(s) SubCutaneous every 24 hours  folic acid 1milliGRAM(s) Oral daily  acetaminophen    Suspension 650milliGRAM(s) Oral every 6 hours PRN  ipratropium    for Nebulization 500MICROGram(s) Inhalation every 6 hours  carbidopa/levodopa  25/100 1Tablet(s) Oral four times a day  pramipexole 0.25milliGRAM(s) Oral three times a day  acetaminophen    Suspension. 650milliGRAM(s) Enteral Tube every 6 hours PRN  chlorhexidine 0.12% Liquid 15milliLiter(s) Swish and Spit two times a day  chlorhexidine 4% Liquid 1Application(s) Topical daily  fluconAZOLE   Tablet 100milliGRAM(s) Oral daily  pantoprazole   Suspension 40milliGRAM(s) Oral daily  aztreonam  IVPB  IV Intermittent   aztreonam  IVPB 1000milliGRAM(s) IV Intermittent every 8 hours  vancomycin  IVPB 1250milliGRAM(s) IV Intermittent every 24 hours    Vital Signs Last 24 Hrs  T(C): 37.8, Max: 37.8 (04-25 @ 05:00)  T(F): 100, Max: 100 (04-25 @ 05:00)  HR: 86 (74 - 131)  BP: 159/80 (136/57 - 211/105)  BP(mean): 99 (76 - 160)  RR: 22 (17 - 23)  SpO2: 95% (95% - 100%)    PHYSICAL EXAM:  GENERAL: NAD, well-groomed, well-developed  EYES:  conjunctiva and sclera clear  ENMT:  Moist mucous membranes,   NECK: Supple, No JVD, Normal thyroid  NERVOUS SYSTEM:  Alert oriented   no  focal  deficits;   CHEST/LUNG: Clear    HEART: Regular rate and rhythm; No murmurs, rubs, or gallops  ABDOMEN: Soft, Nontender, Nondistended; Bowel sounds present  EXTREMITIES:  no  edema no  tenderness  SKIN: No rashes   LABS:                        7.6    12.2  )-----------( 260      ( 25 Apr 2017 03:49 )             22.6     04-25    134<L>  |  95<L>  |  17  ----------------------------<  116<H>  4.6   |  28  |  0.31<L>    Ca    8.3<L>      25 Apr 2017 03:49  Phos  4.0     04-25  Mg     2.4     04-25    TPro  7.7  /  Alb  1.0<L>  /  TBili  0.3  /  DBili  x   /  AST  52<H>  /  ALT  14  /  AlkPhos  233<H>  04-24    ESR > 140        CAPILLARY BLOOD GLUCOSE      RADIOLOGY & ADDITIONAL TESTS:    Imaging reports  Personally Reviewed:  [ x] YES  [ ] NO    Consultant(s) Notes Reviewed:  [x ] YES  [ ] NO    Care Discussed with Consultants/Other Providers [x ] YES  [ ] NO  ACUTE  RESPIRATORY  FAILURE  PNEUMONIA   CONT  VENT  SUPPORT  AB  IVF  check  cultures  from  bronchial  lavages  son  to  decide  on  trach  peg  vs  palliative  care  COLO/VESCICULAR FISTULA proceed  as  per  surgery  discussed  with  Urology  to  continue NGT feedings monitor  labs    LFT elevation observation  metabolic  encephalopathy with  severe  inflammatory  processs supportive care        Problem/Plan - 3:  ·  Problem: Parkinson disease encephalopathy.  Plan: sinemet discussed  with  neurology  as  to  further  adjustments/addition  to  treatmen  anemia  continue  to  monitor  no  clinical  evidence  of  acute bleed  transfuse a s per Intensivist  prognosis  guarded

## 2017-04-25 NOTE — PROGRESS NOTE ADULT - SUBJECTIVE AND OBJECTIVE BOX
Patient seen in CCU, intubated and unresponsive.  S/p flex bronchoscopy yesterday.    T(F): 99, Max: 100 (04-25 @ 05:00)  HR: 103 (79 - 111)  BP: 161/73 (129/54 - 178/91)  RR: 21 (15 - 23)  SpO2: 98% (95% - 100%)    PHYSICAL EXAM:  General: unresponsive  HEENT: orally intubated, tube feeds  Abdomen: soft   : shoemaker catheter indwelling with yellow urine    LABS:                        7.6    12.2  )-----------( 260      ( 25 Apr 2017 03:49 )             22.6     04-25    134<L>  |  95<L>  |  17  ----------------------------<  116<H>  4.6   |  28  |  0.31<L>    Ca    8.3<L>      25 Apr 2017 03:49  Phos  4.0     04-25  Mg     2.4     04-25  TPro  7.7  /  Alb  1.0<L>  /  TBili  0.3  /  DBili  x   /  AST  52<H>  /  ALT  14  /  AlkPhos  233<H>  04-24    I&O: 2100/1520cc    Impression: 86F hx of parkinsons, hx right hip fracture with medullary pins, sepsis secondary to multifocal pna, with UTI, colovesicular fistula    Plan:  -continue present CCU management/supportive care, vent support, abx per ID  -as discussed with Dr Mosley, awaiting family decision regarding trache/PEG planning  -c/w shoemaker catheter per Dr Bahena Patient seen in CCU, intubated and unresponsive.  S/p flex bronchoscopy yesterday.    T(F): 99, Max: 100 (04-25 @ 05:00)  HR: 103 (79 - 111)  BP: 161/73 (129/54 - 178/91)  RR: 21 (15 - 23)  SpO2: 98% (95% - 100%)    PHYSICAL EXAM:  General: unresponsive  HEENT: orally intubated, tube feeds  Abdomen: soft   : shoemaker catheter indwelling with yellow urine    LABS:                        7.6    12.2  )-----------( 260      ( 25 Apr 2017 03:49 )             22.6     04-25    134<L>  |  95<L>  |  17  ----------------------------<  116<H>  4.6   |  28  |  0.31<L>    Ca    8.3<L>      25 Apr 2017 03:49  Phos  4.0     04-25  Mg     2.4     04-25  TPro  7.7  /  Alb  1.0<L>  /  TBili  0.3  /  DBili  x   /  AST  52<H>  /  ALT  14  /  AlkPhos  233<H>  04-24    I&O: 2100/1520cc    Impression: 86F hx of parkinsons, hx right hip fracture with medullary pins, sepsis secondary to multifocal pna, respiratory failure, and underlying colovesicular fistula    Plan:  -continue present CCU management/supportive care, vent support, abx per ID  -as discussed with Dr Mosley, awaiting family decision regarding trache/PEG planning  -c/w shoemaker catheter per Dr Bahena

## 2017-04-25 NOTE — PROGRESS NOTE ADULT - SUBJECTIVE AND OBJECTIVE BOX
HEART RATES REASONABLE RATES  BP STABLE  ON VENT  NO MAJOR CHANGES CLINICALLY  SUPPORTIVE CARE CONTINUES

## 2017-04-25 NOTE — PROCEDURE NOTE - NSINDICATIONS_GEN_A_CORE
emergency venous access/antibiotic therapy
arterial puncture to obtain ABG's/blood sampling/critical patient
critical illness/volume resuscitation/emergency venous access

## 2017-04-25 NOTE — PROGRESS NOTE ADULT - SUBJECTIVE AND OBJECTIVE BOX
HPI:  86F hx of parkinsons, recent right hip / femur fracture with medullary pins sent from rehab for AMS and fever. Admitted to the hospital for sepsis, UTI/PNA, AMS. Patient had RRT on the floors for hypotension, and hypoxia. During intubation for airway protection, tube feeds found in the endotracheal tube. Transferred to the ICU for acute hypoxic respiratory failure, intubated.    24 hr events:  weaned for a few hours today but became tachypneic, place back on full vent support  s/p bronchoscopy yesterday to help with airway clearance with secretions  mental status remains poor, no purposeful movement , opens eyes spontaneously    ## ROS:  [x] unable to obtain due to encephalopathy     ## Labs:  CBC:                        7.6    12.2  )-----------( 260      ( 25 Apr 2017 03:49 )             22.6     Chem:  04-25    134<L>  |  95<L>  |  17  ----------------------------<  116<H>  4.6   |  28  |  0.31<L>    Ca    8.3<L>      25 Apr 2017 03:49  Phos  4.0     04-25  Mg     2.4     04-25    TPro  7.7  /  Alb  1.0<L>  /  TBili  0.3  /   AST  52<H>  /  ALT  14  /  AlkPhos  233<H>  04-24    Culture - Bronchial (04.24.17 @ 20:53)    Specimen Source: .Broncial Bronchoalveolar Lavage    Culture Results: Normal Respiratory Cindy present    Culture - Sputum . (04.17.17 @ 08:15)    Specimen Source: .Sputum Sputum, trap    Culture Results: Normal Respiratory Cindy present      ## Imaging:  CXR: Multifocal pneumonia again noted with no significant change.      ## Medications:  fluconAZOLE   Tablet 100milliGRAM(s) Oral daily  aztreonam  IVPB 1000milliGRAM(s) IV Intermittent every 8 hours  vancomycin  IVPB 1250milliGRAM(s) IV Intermittent every 24 hours      ipratropium    for Nebulization 500MICROGram(s) Inhalation every 6 hours      enoxaparin Injectable 40milliGRAM(s) SubCutaneous every 24 hours    pantoprazole   Suspension 40milliGRAM(s) Oral daily    acetaminophen    Suspension 650milliGRAM(s) Oral every 6 hours PRN  carbidopa/levodopa  25/100 1Tablet(s) Oral four times a day  pramipexole 0.25milliGRAM(s) Oral three times a day  acetaminophen    Suspension. 650milliGRAM(s) Enteral Tube every 6 hours PRN      ## Vitals:  T(C): 36.9, Max: 37.8 (04-25 @ 05:00)  HR: 97 (79 - 111)  BP: 137/66 (129/54 - 178/91)  BP(mean): 84 (72 - 113)  RR: 18 (15 - 24)  SpO2: 99% (95% - 100%)  Wt(kg): 68.4  Vent: Mode: AC/ CMV (Assist Control/ Continuous Mandatory Ventilation), RR (machine): 16, RR (patient): 33, TV (machine): 400, FiO2: 35, PEEP: 5, PIP: 26      I & Os for 24h ending 04-25 @ 07:00  =============================================  IN: 2100 ml / OUT: 1520 ml / NET: 580 ml    I & Os for current day (as of 04-25 @ 19:05)  =============================================  IN: 720 ml / OUT: 1020 ml / NET: -300 ml        ## P/E:  GENERAL: NAD, elderly female, unresponsive off sedation  HEAD:  Atraumatic, Normocephalic  EYES: PERRL, conjunctiva and sclera clear  ENMT: orally intubated, NGT in place  NECK: Supple, No JVD  NERVOUS SYSTEM:  Alert & Oriented X0. not responding to commands or stimuli. no purposeful movement   CHEST/LUNG: Clear to auscultation bilaterally; No rales, rhonchi; mechanical breath sounds.   HEART: Regular rate and rhythm  ABDOMEN: Soft, Nontender, Nondistended; Bowel sounds present  EXTREMITIES:  2+ Peripheral Pulses, No clubbing, cyanosis; bilateral hand edema, trace bilateral edema of feet      CENTRAL LINE: [ ] YES [x] NO      MAYFIELD: [x] YES [ ] NO          A-LINE:  [ ] YES [x] NO        GLOBAL ISSUE/BEST PRACTICE:  Analgesia: n/a  Sedation: n/a  HOB elevation: yes  Stress ulcer prophylaxis: protonix  VTE prophylaxis: lovenox  Oral Care: chlorhexidine  Glycemic control: n/a   Nutrition: jevity NGT    CODE STATUS: [ ] full code  [x] DNR  [ ] DNI  [ ] MOLST  Goals of care discussion: [x] yes

## 2017-04-25 NOTE — PROCEDURE NOTE - NSPROCDETAILS_GEN_ALL_CORE
location identified, draped/prepped, sterile technique used/sterile technique, catheter placed/dressing applied/secured in place/flushes easily/ultrasound utilization

## 2017-04-26 LAB
ANION GAP SERPL CALC-SCNC: 9 MMOL/L — SIGNIFICANT CHANGE UP (ref 5–17)
BUN SERPL-MCNC: 18 MG/DL — SIGNIFICANT CHANGE UP (ref 7–23)
CALCIUM SERPL-MCNC: 8.4 MG/DL — LOW (ref 8.5–10.1)
CHLORIDE SERPL-SCNC: 96 MMOL/L — SIGNIFICANT CHANGE UP (ref 96–108)
CO2 SERPL-SCNC: 29 MMOL/L — SIGNIFICANT CHANGE UP (ref 22–31)
CREAT SERPL-MCNC: 0.3 MG/DL — LOW (ref 0.5–1.3)
CULTURE RESULTS: SIGNIFICANT CHANGE UP
GLUCOSE SERPL-MCNC: 110 MG/DL — HIGH (ref 70–99)
GRAM STN FLD: SIGNIFICANT CHANGE UP
HCT VFR BLD CALC: 22.2 % — LOW (ref 34.5–45)
HGB BLD-MCNC: 7.6 G/DL — LOW (ref 11.5–15.5)
MAGNESIUM SERPL-MCNC: 2.3 MG/DL — SIGNIFICANT CHANGE UP (ref 1.8–2.4)
MCHC RBC-ENTMCNC: 29.7 PG — SIGNIFICANT CHANGE UP (ref 27–34)
MCHC RBC-ENTMCNC: 34.2 GM/DL — SIGNIFICANT CHANGE UP (ref 32–36)
MCV RBC AUTO: 86.8 FL — SIGNIFICANT CHANGE UP (ref 80–100)
PHOSPHATE SERPL-MCNC: 4.4 MG/DL — SIGNIFICANT CHANGE UP (ref 2.5–4.5)
PLATELET # BLD AUTO: 258 K/UL — SIGNIFICANT CHANGE UP (ref 150–400)
POTASSIUM SERPL-MCNC: 4.5 MMOL/L — SIGNIFICANT CHANGE UP (ref 3.5–5.3)
POTASSIUM SERPL-SCNC: 4.5 MMOL/L — SIGNIFICANT CHANGE UP (ref 3.5–5.3)
RBC # BLD: 2.56 M/UL — LOW (ref 3.8–5.2)
RBC # FLD: 15.8 % — HIGH (ref 11–15)
SODIUM SERPL-SCNC: 134 MMOL/L — LOW (ref 135–145)
SPECIMEN SOURCE: SIGNIFICANT CHANGE UP
WBC # BLD: 13.2 K/UL — HIGH (ref 3.8–10.5)
WBC # FLD AUTO: 13.2 K/UL — HIGH (ref 3.8–10.5)

## 2017-04-26 PROCEDURE — 71010: CPT | Mod: 26

## 2017-04-26 PROCEDURE — 99291 CRITICAL CARE FIRST HOUR: CPT

## 2017-04-26 RX ADMIN — FLUCONAZOLE 100 MILLIGRAM(S): 150 TABLET ORAL at 11:49

## 2017-04-26 RX ADMIN — PRAMIPEXOLE DIHYDROCHLORIDE 0.25 MILLIGRAM(S): 0.12 TABLET ORAL at 21:18

## 2017-04-26 RX ADMIN — CARBIDOPA AND LEVODOPA 1 TABLET(S): 25; 100 TABLET ORAL at 00:00

## 2017-04-26 RX ADMIN — Medication 1 MILLIGRAM(S): at 11:49

## 2017-04-26 RX ADMIN — CARBIDOPA AND LEVODOPA 1 TABLET(S): 25; 100 TABLET ORAL at 18:45

## 2017-04-26 RX ADMIN — PANTOPRAZOLE SODIUM 40 MILLIGRAM(S): 20 TABLET, DELAYED RELEASE ORAL at 11:49

## 2017-04-26 RX ADMIN — CARBIDOPA AND LEVODOPA 1 TABLET(S): 25; 100 TABLET ORAL at 12:42

## 2017-04-26 RX ADMIN — Medication 500 MICROGRAM(S): at 12:19

## 2017-04-26 RX ADMIN — Medication 500 MICROGRAM(S): at 05:17

## 2017-04-26 RX ADMIN — Medication 50 MILLIGRAM(S): at 21:17

## 2017-04-26 RX ADMIN — ENOXAPARIN SODIUM 40 MILLIGRAM(S): 100 INJECTION SUBCUTANEOUS at 05:01

## 2017-04-26 RX ADMIN — CHLORHEXIDINE GLUCONATE 15 MILLILITER(S): 213 SOLUTION TOPICAL at 05:01

## 2017-04-26 RX ADMIN — PRAMIPEXOLE DIHYDROCHLORIDE 0.25 MILLIGRAM(S): 0.12 TABLET ORAL at 05:00

## 2017-04-26 RX ADMIN — Medication 500 MICROGRAM(S): at 00:19

## 2017-04-26 RX ADMIN — Medication 500 MICROGRAM(S): at 17:40

## 2017-04-26 RX ADMIN — Medication 50 MILLIGRAM(S): at 14:32

## 2017-04-26 RX ADMIN — CHLORHEXIDINE GLUCONATE 15 MILLILITER(S): 213 SOLUTION TOPICAL at 18:45

## 2017-04-26 RX ADMIN — Medication 166.67 MILLIGRAM(S): at 03:58

## 2017-04-26 RX ADMIN — CARBIDOPA AND LEVODOPA 1 TABLET(S): 25; 100 TABLET ORAL at 05:01

## 2017-04-26 RX ADMIN — CHLORHEXIDINE GLUCONATE 1 APPLICATION(S): 213 SOLUTION TOPICAL at 11:49

## 2017-04-26 RX ADMIN — PRAMIPEXOLE DIHYDROCHLORIDE 0.25 MILLIGRAM(S): 0.12 TABLET ORAL at 14:32

## 2017-04-26 RX ADMIN — Medication 50 MILLIGRAM(S): at 05:01

## 2017-04-26 NOTE — PROGRESS NOTE ADULT - SUBJECTIVE AND OBJECTIVE BOX
HPI:  86F hx of parkinsons, recent right hip / femur fracture with medullary pins sent from rehab for AMS and fever. Admitted to the hospital for sepsis, UTI/PNA, AMS. Patient had RRT on the floors for hypotension, and hypoxia. During intubation for airway protection, tube feeds found in the endotracheal tube. Transferred to the ICU for acute hypoxic respiratory failure, intubated.    24 hr events:  mental status remains poor  not following commands but opens eyes  tolerating some weaning but has thick secretions through ETT    ## ROS:  [x] unable to obtain due to intubation and encephalopathy    ## Labs:  CBC:                        7.6    13.2  )-----------( 258      ( 26 Apr 2017 04:18 )             22.2     Chem:  04-26    134<L>  |  96  |  18  ----------------------------<  110<H>  4.5   |  29  |  0.30<L>    Ca    8.4<L>      26 Apr 2017 04:18  Phos  4.4     04-26  Mg     2.3     04-26    Culture - Bronchial (04.24.17 @ 20:53)    Specimen Source: .Broncial Bronchoalveolar Lavage    Culture Results: Normal Respiratory Cindy present    Culture - Blood (04.17.17 @ 01:19)    Specimen Source: .Blood Blood    Culture Results: No growth at 5 days.      ## Imaging:  CXR Mild improvement in focal right upper lobe pneumonia. Other areas of bilateral lung pneumonic infiltration are stable. Trace right pleural effusion present. Life supporting devices in appropriate position.      ## Medications:  fluconAZOLE   Tablet 100milliGRAM(s) Oral daily  aztreonam  IVPB 1000milliGRAM(s) IV Intermittent every 8 hours  vancomycin  IVPB 1250milliGRAM(s) IV Intermittent every 24 hours    ipratropium    for Nebulization 500MICROGram(s) Inhalation every 6 hours    enoxaparin Injectable 40milliGRAM(s) SubCutaneous every 24 hours    pantoprazole   Suspension 40milliGRAM(s) Oral daily    acetaminophen    Suspension 650milliGRAM(s) Oral every 6 hours PRN  carbidopa/levodopa  25/100 1Tablet(s) Oral four times a day  pramipexole 0.25milliGRAM(s) Oral three times a day  acetaminophen    Suspension. 650milliGRAM(s) Enteral Tube every 6 hours PRN      ## Vitals:  T(C): 36.8, Max: 37.7 (04-26 @ 04:00)  HR: 100 (78 - 111)  BP: 148/72 (136/59 - 175/87)  BP(mean): 92 (78 - 113)  RR: 16 (16 - 40)  SpO2: 96% (95% - 100%)  Wt(kg): 67.4  Vent: Mode: AC/ CMV (Assist Control/ Continuous Mandatory Ventilation), RR (machine): 16, RR (patient): 19, TV (machine): 400, FiO2: 35, PEEP: 5      I & Os for 24h ending 04-26 @ 07:00  =============================================  IN: 1480 ml / OUT: 1845 ml / NET: -365 ml    I & Os for current day (as of 04-26 @ 21:54)  =============================================  IN: 1060 ml / OUT: 1075 ml / NET: -15 ml        ## P/E:  GENERAL: NAD, elderly female, unresponsive off sedation  HEAD:  Atraumatic, Normocephalic  EYES: PERRL, conjunctiva and sclera clear  ENMT: orally intubated, NGT in place  NECK: Supple, No JVD  NERVOUS SYSTEM:  Alert & Oriented X0. not responding to commands or stimuli. no purposeful movement   CHEST/LUNG: Clear to auscultation bilaterally; No rales, rhonchi; mechanical breath sounds.   HEART: Regular rate and rhythm  ABDOMEN: Soft, Nontender, Nondistended; Bowel sounds present  EXTREMITIES:  2+ Peripheral Pulses, No clubbing, cyanosis; bilateral hand edema, trace bilateral edema of feet  SKIN: macular petechial rash on back improving      CENTRAL LINE: [ ] YES [x] NO      MAYFIELD: [x] YES [ ] NO          A-LINE:  [ ] YES [x] NO        GLOBAL ISSUE/BEST PRACTICE:  Analgesia: n/a  Sedation: n/a  HOB elevation: yes  Stress ulcer prophylaxis: protonix  VTE prophylaxis: lovenox  Oral Care: chlorhexidine  Glycemic control: n/a   Nutrition: jevity NGT    CODE STATUS: [ ] full code  [x] DNR  [ ] DNI  [x] MOLST  Goals of care discussion: [x] yes

## 2017-04-26 NOTE — PROGRESS NOTE ADULT - SUBJECTIVE AND OBJECTIVE BOX
TMAX - 100    On day # 7 Azactam / # 20 Vanco / # 10 Diflucan    Vital Signs Last 24 Hrs  T(C): 37, Max: 37.7 (04-26 @ 04:00)  T(F): 98.6, Max: 99.9 (04-26 @ 04:00)  HR: 108 (78 - 111)  BP: 141/75 (129/95 - 175/87)  BP(mean): 92 (78 - 110)  RR: 19 (17 - 24)  SpO2: 98% (96% - 100%)  Mode: CPAP with PS  FiO2: 35  PEEP: 5  PS: 5  ITime: 0.9  Supplemental O2:  on ventilator  on FIO2 35% + 5 PEEP      Remains poorly responsive, on ventilator, follows no commands.      PHYSICAL EXAM  General:  opens eyes spontaneously but follows no commands, on ventilator, remains orally intubated and with NGT feedings in progress  HEENT:  conj pink, sclerae anicteric, PERRLA, no oral lesions noted, orally intubated  Neck:  supple, no nodes noted  Heart: RR   Lungs: bilat rhonchi    Abdomen:  soft, BS +, nontender appearance  Extremities:  decreased edema of arms and hands, with resolving ecchymoses on hands                      Rt upper arm with midline in place - site clean, dressing intact - placed 4/25                      trace edema LE's  Skin: warm, clammy at present          no rash noted       I&O's Summary:  I & Os for 24h ending 26 Apr 2017 07:00  =============================================  IN: 1480 ml / OUT: 1845 ml / NET: -365 ml    I & Os for current day (as of 26 Apr 2017 14:49)  =============================================  IN: 480 ml / OUT: 375 ml / NET: 105 ml      LABS:  CBC Full  -  ( 26 Apr 2017 04:18 )  WBC Count : 13.2 K/uL  Hemoglobin : 7.6 g/dL  Hematocrit : 22.2 %  Platelet Count - Automated : 258 K/uL  Mean Cell Volume : 86.8 fl  Mean Cell Hemoglobin : 29.7 pg  Mean Cell Hemoglobin Concentration : 34.2 gm/dL  Auto Neutrophil # : x  Auto Lymphocyte # : x  Auto Monocyte # : x  Auto Eosinophil # : x  Auto Basophil # : x  Auto Neutrophil % : x  Auto Lymphocyte % : x  Auto Monocyte % : x  Auto Eosinophil % : x  Auto Basophil % : x    04-26    134<L>  |  96  |  18  ----------------------------<  110<H>  4.5   |  29  |  0.30<L>    Ca    8.4<L>      26 Apr 2017 04:18  Phos  4.4     04-26  Mg     2.3     04-26    Sedimentation Rate, Erythrocyte: >140 mm/hr (04-25 @ 03:49)    Influenza A Rapid Screen: Negative (04-21 @ 19:47)    Influenza B Rapid Screen: Negative (04-21 @ 19:47)        CULTURES:  Specimen Source: .Broncial Bronchoalveolar Lavage (04-24 @ 20:53)  Culture Results:   Normal Respiratory Cindy present (04-24 @ 20:53)          Radiology:   CXR - 4/26 -   IMPRESSION:   Mild improvement in focal right upper lobe pneumonia.  Other areas of bilateral lung pneumonic infiltration are stable.  Trace right pleural effusion present.  Life supporting devices in appropriate position.          Impression: Temps appear to be trending downwards, on current ab rx with Azactam, Vanco, and Diflucan, but with continued Respiratory Failure, Multifocal PNA, Sepsis, and recurrent UTI's with underlying colovesicle fistula.    Suggestions: Will continue present ab rx and follow-up temps, labs, cx's, and CXR.  Awaiting son's decision re: Trach, PEG, and Diverting Colostomy.

## 2017-04-26 NOTE — PROGRESS NOTE ADULT - SUBJECTIVE AND OBJECTIVE BOX
INTERVAL HPI/OVERNIGHT EVENTS:        REVIEW OF SYSTEMS:  CONSTITUTIONAL:  remains  unresponsive  on  respirator      MEDICATION:  enoxaparin Injectable 40milliGRAM(s) SubCutaneous every 24 hours  folic acid 1milliGRAM(s) Oral daily  acetaminophen    Suspension 650milliGRAM(s) Oral every 6 hours PRN  ipratropium    for Nebulization 500MICROGram(s) Inhalation every 6 hours  carbidopa/levodopa  25/100 1Tablet(s) Oral four times a day  pramipexole 0.25milliGRAM(s) Oral three times a day  acetaminophen    Suspension. 650milliGRAM(s) Enteral Tube every 6 hours PRN  chlorhexidine 0.12% Liquid 15milliLiter(s) Swish and Spit two times a day  chlorhexidine 4% Liquid 1Application(s) Topical daily  fluconAZOLE   Tablet 100milliGRAM(s) Oral daily  pantoprazole   Suspension 40milliGRAM(s) Oral daily  aztreonam  IVPB  IV Intermittent   aztreonam  IVPB 1000milliGRAM(s) IV Intermittent every 8 hours  vancomycin  IVPB 1250milliGRAM(s) IV Intermittent every 24 hours    Vital Signs Last 24 Hrs  T(C): 37.7, Max: 37.7 (04-26 @ 04:00)  T(F): 99.9, Max: 99.9 (04-26 @ 04:00)  HR: 94 (78 - 111)  BP: 160/68 (129/54 - 171/82)  BP(mean): 92 (72 - 105)  RR: 21 (15 - 24)  SpO2: 99% (96% - 100%)    PHYSICAL EXAM:  GENERAL: NAD, well-groomed, well-developed  EYES:  conjunctiva and sclera clear  ENMT:  Moist mucous membranes,   NECK: Supple, No JVD, Normal thyroid  NERVOUS SYSTEM:  Alert oriented   no  focal  deficits;   CHEST/LUNG: Clear    HEART: Regular rate and rhythm; No murmurs, rubs, or gallops  ABDOMEN: Soft, Nontender, Nondistended; Bowel sounds present  EXTREMITIES:  no  edema no  tenderness  SKIN: No rashes   LABS:                        7.6    13.2  )-----------( 258      ( 26 Apr 2017 04:18 )             22.2     04-26    134<L>  |  96  |  18  ----------------------------<  110<H>  4.5   |  29  |  0.30<L>    Ca    8.4<L>      26 Apr 2017 04:18  Phos  4.4     04-26  Mg     2.3     04-26          CAPILLARY BLOOD GLUCOSE      RADIOLOGY & ADDITIONAL TESTS:    Imaging reports  Personally Reviewed:  [x ] YES  [ ] NO    Consultant(s) Notes Reviewed:  [x ] YES  [ ] NO    Care Discussed with Consultants/Other Providers [x ] YES  [ ] NO  ACUTE  RESPIRATORY  FAILURE  PNEUMONIA   CONT  VENT  SUPPORT  AB  IVF  check  cultures  from  bronchial  lavages  son  to  decide  on  trach  peg  vs  palliative  care  COLO/VESCICULAR FISTULA proceed  as  per  surgery  discussed  with  Urology  to  continue NGT feedings monitor  labs    LFT elevation observation  metabolic  encephalopathy with  severe  inflammatory  processs supportive care        Problem/Plan - 3:  ·  Problem: Parkinson disease encephalopathy.  Plan: sinemet pramipexole  anemia  continue  to  monitor  no  clinical  evidence  of  acute bleed  transfuse a s per Intensivist  awating  family  decision  on  Trach  Peg  or comfort  care

## 2017-04-26 NOTE — PROGRESS NOTE ADULT - SUBJECTIVE AND OBJECTIVE BOX
Initial Consultaion Note  Requested by Name:  Date/ Time:  Reason for referral/ Consultation:    HPI:  patient  with  worsening  lethargy  fever  dysphagia  evaluated  in  ER  admitted  for  pneumonia  UTI  patient  s/p  l  hip  medullary  nail (04 Apr 2017 19:53)      PAST MEDICAL & SURGICAL HISTORY:  Pneumonia  Anemia  Parkinson disease  Tremors of nervous system  No pertinent past medical history  Status post hip surgery  No significant past surgical history      SOCIAL HISTORY:                                 Admitted from: home [ ] SNF [ ] [facility] [ ] [facility] [ ]    )    ADVANCE DIRECTIVES:  [ ] YES [ ] NO     DNR [ ] YES [ ] NO  Completed on:                       MOLST  [ ] YES [ ] NO   Completed on  :  Living Will  [ ] YES [ ] NO   Completed on:    Allergies    No Known Allergies    Intolerances        Review of Systems:     PAIN : (Y/N) (0-10)          DYSPNEA:     NAUSEA/VOMITING:   DEPRESSION:        SECRETIONS:(    FRAILTY SYNDROM       FAILURE TO THRIVE     DIBILITY   OTHER SYMPTOMS :    UNABLE TO OBTAIN DUE TO POOR MENTATION (   )    PHYSICAL EXAM:  T(F): 98.3, Max: 99.9 (04-26 @ 04:00)  HR: 100 (78 - 111)  BP: 148/72 (136/59 - 175/87)  RR: 16 (16 - 40)  SpO2: 96% (95% - 100%)  Wt(kg): --   WEIGHT :    [id number]                  BMI:  I&O's Summary  I & Os for 24h ending 26 Apr 2017 07:00  =============================================  IN: 1480 ml / OUT: 1845 ml / NET: -365 ml    I & Os for current day (as of 26 Apr 2017 21:38)  =============================================  IN: 1060 ml / OUT: 1075 ml / NET: -15 ml    CAPILLARY BLOOD GLUCOSE      GENERAL: alert [ ] oriented x ______[ ] lethargic        [ ] cachexia     [ ] nonverbal [ ] coma [ ]    HEENT: normal [ ] dry mouth [ ] ET tube/trach [ ]   LUNGS: ]  CV :  normal [ ]  GI : normal [ ]  PEG /NG tube [ ]   : normal [ ] incontinent [ ] oliguria/anuria [ ] shoemaker [ ]  MSK : normal [ ] weakness [ ] edema [ ]              ambulatory [ ] bebbound/wheelchair bound [ ]   SKIN : normal [ ] pressure ulcer (Y/N) Stage ______________ rash (Y/N)    Functional Assessment:Karnofsky Performance Score :  Palliative Performance Status Version 2:         %    LABS:                        7.6    13.2  )-----------( 258      ( 26 Apr 2017 04:18 )             22.2     04-26    134<L>  |  96  |  18  ----------------------------<  110<H>  4.5   |  29  |  0.30<L>    Ca    8.4<L>      26 Apr 2017 04:18  Phos  4.4     04-26  Mg     2.3     04-26            I&O's Detail  I & Os for 24h ending 26 Apr 2017 07:00  =============================================  IN:    Jevity: 1380 ml    Enteral Tube Flush: 50 ml    Solution: 50 ml    Total IN: 1480 ml  ---------------------------------------------  OUT:    Indwelling Catheter - Urethral: 1545 ml    Rectal Tube: 300 ml    Total OUT: 1845 ml  ---------------------------------------------  Total NET: -365 ml    I & Os for current day (as of 26 Apr 2017 21:38)  =============================================  IN:    Jevity: 720 ml    Enteral Tube Flush: 290 ml    Solution: 50 ml    Total IN: 1060 ml  ---------------------------------------------  OUT:    Indwelling Catheter - Urethral: 1025 ml    Rectal Tube: 50 ml    Total OUT: 1075 ml  ---------------------------------------------  Total NET: -15 ml      Assessment:   86y Female admitted with ALTERED MENTAL STATUS/UTI/URINE RETENTION  AMS/PNEUMONIA  AMS      PROBLEM LIST :  PROBLEM/RECOMMENDATION: 1  Problem : Goals of care, counseling/ discussion.  Recommendation: met with patient/ family and discussed GOC & Advanced care planning                                    Palliative care info/counseling provided (Y/N)                                      Family meeting                                   Advanced Directives addressed (Y/N)  PROBLEM/ RECOMMENDATION:2  Problem :Resuscitation/ DNR/ DNI,   Recommendation: DNR/ DNI    PROBLEM/ RECOMMENDATION :3  Problem : Medical order for life sustaning treatment  Recommendation : MOLST Form ( initiated/ completed)    PROBLEM/RECOMMENDATION :4  Problem : Advanced care planning  Recommendation : Family meeting.(Y/N)     PLAN:  REFFERALS:                           Unit SW/Case Mgmt (Y/N)                          (Y/N)                         Speech/Swallow (Y/N)                           nutrition                                              Ethics (Y/N)                         PT/OT (Y/N)

## 2017-04-27 LAB
ANION GAP SERPL CALC-SCNC: 11 MMOL/L — SIGNIFICANT CHANGE UP (ref 5–17)
BUN SERPL-MCNC: 17 MG/DL — SIGNIFICANT CHANGE UP (ref 7–23)
CALCIUM SERPL-MCNC: 8.1 MG/DL — LOW (ref 8.5–10.1)
CHLORIDE SERPL-SCNC: 94 MMOL/L — LOW (ref 96–108)
CO2 SERPL-SCNC: 29 MMOL/L — SIGNIFICANT CHANGE UP (ref 22–31)
CREAT SERPL-MCNC: 0.28 MG/DL — LOW (ref 0.5–1.3)
GLUCOSE SERPL-MCNC: 104 MG/DL — HIGH (ref 70–99)
HCT VFR BLD CALC: 20.7 % — CRITICAL LOW (ref 34.5–45)
HGB BLD-MCNC: 7.2 G/DL — LOW (ref 11.5–15.5)
MAGNESIUM SERPL-MCNC: 2.3 MG/DL — SIGNIFICANT CHANGE UP (ref 1.8–2.4)
MCHC RBC-ENTMCNC: 29.9 PG — SIGNIFICANT CHANGE UP (ref 27–34)
MCHC RBC-ENTMCNC: 35 GM/DL — SIGNIFICANT CHANGE UP (ref 32–36)
MCV RBC AUTO: 85.4 FL — SIGNIFICANT CHANGE UP (ref 80–100)
PHOSPHATE SERPL-MCNC: 4.5 MG/DL — SIGNIFICANT CHANGE UP (ref 2.5–4.5)
PLATELET # BLD AUTO: 271 K/UL — SIGNIFICANT CHANGE UP (ref 150–400)
POTASSIUM SERPL-MCNC: 4.3 MMOL/L — SIGNIFICANT CHANGE UP (ref 3.5–5.3)
POTASSIUM SERPL-SCNC: 4.3 MMOL/L — SIGNIFICANT CHANGE UP (ref 3.5–5.3)
RBC # BLD: 2.42 M/UL — LOW (ref 3.8–5.2)
RBC # FLD: 15.2 % — HIGH (ref 11–15)
SODIUM SERPL-SCNC: 134 MMOL/L — LOW (ref 135–145)
WBC # BLD: 12.2 K/UL — HIGH (ref 3.8–10.5)
WBC # FLD AUTO: 12.2 K/UL — HIGH (ref 3.8–10.5)

## 2017-04-27 PROCEDURE — 99291 CRITICAL CARE FIRST HOUR: CPT

## 2017-04-27 RX ORDER — LACTOBACILLUS ACIDOPHILUS 100MM CELL
1 CAPSULE ORAL EVERY 12 HOURS
Qty: 0 | Refills: 0 | Status: DISCONTINUED | OUTPATIENT
Start: 2017-04-27 | End: 2017-04-28

## 2017-04-27 RX ORDER — SODIUM CHLORIDE 9 MG/ML
1000 INJECTION INTRAMUSCULAR; INTRAVENOUS; SUBCUTANEOUS
Qty: 0 | Refills: 0 | Status: DISCONTINUED | OUTPATIENT
Start: 2017-04-27 | End: 2017-04-28

## 2017-04-27 RX ADMIN — CARBIDOPA AND LEVODOPA 1 TABLET(S): 25; 100 TABLET ORAL at 12:08

## 2017-04-27 RX ADMIN — CHLORHEXIDINE GLUCONATE 15 MILLILITER(S): 213 SOLUTION TOPICAL at 17:27

## 2017-04-27 RX ADMIN — PRAMIPEXOLE DIHYDROCHLORIDE 0.25 MILLIGRAM(S): 0.12 TABLET ORAL at 05:08

## 2017-04-27 RX ADMIN — Medication 500 MICROGRAM(S): at 05:44

## 2017-04-27 RX ADMIN — CHLORHEXIDINE GLUCONATE 1 APPLICATION(S): 213 SOLUTION TOPICAL at 12:08

## 2017-04-27 RX ADMIN — Medication 50 MILLIGRAM(S): at 14:07

## 2017-04-27 RX ADMIN — Medication 50 MILLIGRAM(S): at 05:08

## 2017-04-27 RX ADMIN — PRAMIPEXOLE DIHYDROCHLORIDE 0.25 MILLIGRAM(S): 0.12 TABLET ORAL at 14:08

## 2017-04-27 RX ADMIN — CHLORHEXIDINE GLUCONATE 15 MILLILITER(S): 213 SOLUTION TOPICAL at 05:07

## 2017-04-27 RX ADMIN — PRAMIPEXOLE DIHYDROCHLORIDE 0.25 MILLIGRAM(S): 0.12 TABLET ORAL at 22:42

## 2017-04-27 RX ADMIN — CARBIDOPA AND LEVODOPA 1 TABLET(S): 25; 100 TABLET ORAL at 23:10

## 2017-04-27 RX ADMIN — Medication 500 MICROGRAM(S): at 11:35

## 2017-04-27 RX ADMIN — Medication 166.67 MILLIGRAM(S): at 04:20

## 2017-04-27 RX ADMIN — Medication 500 MICROGRAM(S): at 00:03

## 2017-04-27 RX ADMIN — Medication 50 MILLIGRAM(S): at 22:42

## 2017-04-27 RX ADMIN — FLUCONAZOLE 100 MILLIGRAM(S): 150 TABLET ORAL at 12:08

## 2017-04-27 RX ADMIN — PANTOPRAZOLE SODIUM 40 MILLIGRAM(S): 20 TABLET, DELAYED RELEASE ORAL at 12:08

## 2017-04-27 RX ADMIN — CARBIDOPA AND LEVODOPA 1 TABLET(S): 25; 100 TABLET ORAL at 06:07

## 2017-04-27 RX ADMIN — CARBIDOPA AND LEVODOPA 1 TABLET(S): 25; 100 TABLET ORAL at 01:06

## 2017-04-27 RX ADMIN — ENOXAPARIN SODIUM 40 MILLIGRAM(S): 100 INJECTION SUBCUTANEOUS at 06:07

## 2017-04-27 RX ADMIN — Medication 1 MILLIGRAM(S): at 12:08

## 2017-04-27 RX ADMIN — Medication 500 MICROGRAM(S): at 17:00

## 2017-04-27 RX ADMIN — CARBIDOPA AND LEVODOPA 1 TABLET(S): 25; 100 TABLET ORAL at 17:28

## 2017-04-27 RX ADMIN — Medication 1 TABLET(S): at 17:28

## 2017-04-27 NOTE — PROGRESS NOTE ADULT - SUBJECTIVE AND OBJECTIVE BOX
SURGERY/UROLOGY PROGRESS HPI:  Pt seen and examined at bedside. Intubated and unresponsive. No acute events overnight.       Vital Signs Last 24 Hrs  T(C): 37.1, Max: 37.2 (04-26 @ 15:46)  T(F): 98.8, Max: 99 (04-26 @ 15:46)  HR: 87 (80 - 110)  BP: 143/59 (129/54 - 175/87)  BP(mean): 80 (72 - 113)  RR: 24 (12 - 40)  SpO2: 97% (95% - 100%)      PHYSICAL EXAM:    GENERAL: NAD  HEAD:  tube feeds  CHEST/LUNG: Clear to ausculation, bilaterally   HEART: RRR S1S2  ABDOMEN: non distended, +BS, soft, non tender, no guarding  : shoemaker with clear yellow urine 1465cc/24 hours  EXTREMITIES:  calf soft, non tender b/l    I&O's Detail  I & Os for 24h ending 26 Apr 2017 07:00  =============================================  IN:    Jevity: 1380 ml    Enteral Tube Flush: 50 ml    Solution: 50 ml    Total IN: 1480 ml  ---------------------------------------------  OUT:    Indwelling Catheter - Urethral: 1545 ml    Rectal Tube: 300 ml    Total OUT: 1845 ml  ---------------------------------------------  Total NET: -365 ml    I & Os for current day (as of 27 Apr 2017 05:09)  =============================================  IN:    Jevity: 1320 ml    Enteral Tube Flush: 340 ml    Solution: 50 ml    Total IN: 1710 ml  ---------------------------------------------  OUT:    Indwelling Catheter - Urethral: 1465 ml    Rectal Tube: 50 ml    Total OUT: 1515 ml  ---------------------------------------------  Total NET: 195 ml      LABS:                        7.2    12.2  )-----------( 271      ( 27 Apr 2017 04:04 )             20.7     04-27    134<L>  |  94<L>  |  17  ----------------------------<  104<H>  4.3   |  29  |  0.28<L>    Ca    8.1<L>      27 Apr 2017 04:04  Phos  4.5     04-27  Mg     2.3     04-27      Culture Results:   Normal Respiratory Cindy present (04-24 @ 20:53)      Impression: 86F hx of parkinsons, hx right hip fracture with medullary pins, sepsis secondary to multifocal pna, respiratory failure, and underlying colovesicular fistula    Plan:  -continue present CCU management/supportive care, vent support, abx per ID  -as discussed with Dr Mosley, awaiting family decision regarding trache/PEG planning  -c/w shoemaker catheter per Dr Bahena

## 2017-04-27 NOTE — PROGRESS NOTE ADULT - SUBJECTIVE AND OBJECTIVE BOX
HPI:  86F hx of parkinsons, recent right hip / femur fracture with medullary pins sent from rehab for AMS and fever. Admitted to the hospital for sepsis, UTI/PNA, AMS. Patient had RRT on the floors for hypotension, and hypoxia. During intubation for airway protection, tube feeds found in the endotracheal tube. Transferred to the ICU for acute hypoxic respiratory failure, intubated.    24 hr events:  failed wean this morning due to tachypnea and accessory muscle use, diaphoretic  mental status remains poor    ## ROS:  [x] unable to obtain due to encephalopathy     ## Labs:  CBC:                        7.2    12.2  )-----------( 271      ( 27 Apr 2017 04:04 )             20.7     Chem:  04-27    134<L>  |  94<L>  |  17  ----------------------------<  104<H>  4.3   |  29  |  0.28<L>    Ca    8.1<L>      27 Apr 2017 04:04  Phos  4.5     04-27  Mg     2.3     04-27    Culture - Bronchial (04.24.17 @ 20:53)    Specimen Source: .Broncial Bronchoalveolar Lavage    Culture Results: Normal Respiratory Cindy present    Culture - Sputum . (04.17.17 @ 08:15)    Specimen Source: .Sputum Sputum, trap    Culture Results: Normal Respiratory Cindy present    Culture - Blood (04.17.17 @ 01:19)    Specimen Source: .Blood Blood    Culture Results: No growth at 5 days.      ## Imaging:  no new imaging    ## Medications:  fluconAZOLE   Tablet 100milliGRAM(s) Oral daily  aztreonam  IVPB 1000milliGRAM(s) IV Intermittent every 8 hours  vancomycin  IVPB 1250milliGRAM(s) IV Intermittent every 24 hours      ipratropium    for Nebulization 500MICROGram(s) Inhalation every 6 hours    enoxaparin Injectable 40milliGRAM(s) SubCutaneous every 24 hours    pantoprazole   Suspension 40milliGRAM(s) Oral daily    acetaminophen    Suspension 650milliGRAM(s) Oral every 6 hours PRN  carbidopa/levodopa  25/100 1Tablet(s) Oral four times a day  pramipexole 0.25milliGRAM(s) Oral three times a day  acetaminophen    Suspension. 650milliGRAM(s) Enteral Tube every 6 hours PRN      ## Vitals:  T(C): 36.6, Max: 37.1 (04-27 @ 00:00)  HR: 85 (80 - 105)  BP: 128/54 (126/52 - 174/73)  BP(mean): 72 (70 - 99)  RR: 19 (12 - 24)  SpO2: 97% (95% - 100%)  Wt(kg): 65.9  Vent: Mode: AC/ CMV (Assist Control/ Continuous Mandatory Ventilation), RR (machine): 16, RR (patient): 21, TV (machine): 400, FiO2: 30, PEEP: 5, PIP: 35      I & Os for 24h ending 04-27 @ 07:00  =============================================  IN: 2180 ml / OUT: 2180 ml / NET: 0 ml    I & Os for current day (as of 04-27 @ 20:03)  =============================================  IN: 1130 ml / OUT: 1100 ml / NET: 30 ml        ## P/E:  GENERAL: NAD, elderly female, unresponsive   HEAD:  Atraumatic, Normocephalic  EYES: PERRL, conjunctiva and sclera clear  ENMT: orally intubated, NGT in place  NECK: Supple, No JVD  NERVOUS SYSTEM:  Alert & Oriented X0. not responding to commands or stimuli. no purposeful movement   CHEST/LUNG: Clear to auscultation bilaterally; No rales, rhonchi; mechanical breath sounds.   HEART: Regular rate and rhythm  ABDOMEN: Soft, Nontender, Nondistended; Bowel sounds present  EXTREMITIES:  2+ Peripheral Pulses, No clubbing, cyanosis; bilateral hand edema improving  SKIN: macular petechial rash on back improving      CENTRAL LINE: [ ] YES [x] NO      [place]YFIELD: [x] YES [ ] NO          A-LINE:  [ ] YES [x] NO        GLOBAL ISSUE/BEST PRACTICE:  Analgesia: n/a  Sedation: n/a  HOB elevation: yes  Stress ulcer prophylaxis: protonix  VTE prophylaxis: lovenox  Oral Care: chlorhexidine  Glycemic control: n/a   Nutrition: jevity NGT    CODE STATUS: [ ] full code  [x] DNR  [ ] DNI  [x] LUCITA  Goals of care discussion: [x] yes

## 2017-04-27 NOTE — PROGRESS NOTE ADULT - SUBJECTIVE AND OBJECTIVE BOX
TMAX -  99.9    On day # 8 Azactam / #21 Vanco / # 11 Diflucan    Vital Signs Last 24 Hrs  T(C): 37, Max: 37.2 (04-26 @ 15:46)  T(F): 98.6, Max: 99 (04-26 @ 15:46)  HR: 85 (80 - 110)  BP: 146/63 (126/52 - 174/73)  BP(mean): 84 (70 - 113)  RR: 19 (12 - 40)  SpO2: 97% (95% - 100%)  Mode: AC/ CMV (Assist Control/ Continuous Mandatory Ventilation)  RR (machine): 16  TV (machine): 400  FiO2: 30  PEEP: 5  ITime: 0.9  MAP: 12  PIP: 31  Supplemental O2:  on 30% FIO2 now with + 5 PEEP    Remains poorly responsive, on ventilator, but with FIO2 decreased to 30% now.    PHYSICAL EXAM  General:  poorly responsive, on ventilator, not opening her eyes today, remains orally intubated and with NGT feedings in progress  HEENT: conj pink, sclerae anicteric, PERRLA, no oral lesions noted but exam limited due to ETTube   Neck:  semi- supple, no nodes noted  Heart:  RR  Lungs:  bilat rhonchi throughout  Abdomen: soft, BS +, nontender appearance   Extremities: trace edema LE's                     decreased edema of both arms and hands and resolving ecchymoses on hands                     RUE - midline in place - site clean with dressing intact - placed  4/27   Skin: warm, dry now, no rash noted     I&O's Summary :  I & Os for 24h ending 27 Apr 2017 07:00  =============================================  IN: 2180 ml / OUT: 2180 ml / NET: 0 ml    I & Os for current day (as of 27 Apr 2017 13:39)  =============================================  IN: 180 ml / OUT: 200 ml / NET: -20 ml      LABS:  CBC Full  -  ( 27 Apr 2017 04:04 )  WBC Count : 12.2 K/uL  Hemoglobin : 7.2 g/dL  Hematocrit : 20.7 %  Platelet Count - Automated : 271 K/uL  Mean Cell Volume : 85.4 fl  Mean Cell Hemoglobin : 29.9 pg  Mean Cell Hemoglobin Concentration : 35.0 gm/dL  Auto Neutrophil # : x  Auto Lymphocyte # : x  Auto Monocyte # : x  Auto Eosinophil # : x  Auto Basophil # : x  Auto Neutrophil % : x  Auto Lymphocyte % : x  Auto Monocyte % : x  Auto Eosinophil % : x  Auto Basophil % : x    04-27    134<L>  |  94<L>  |  17  ----------------------------<  104<H>  4.3   |  29  |  0.28<L>    Ca    8.1<L>      27 Apr 2017 04:04  Phos  4.5     04-27  Mg     2.3     04-27    Sedimentation Rate, Erythrocyte: >140 mm/hr (04-25 @ 03:49)      CULTURES:  Specimen Source: .Broncial Other, bronchoalveolar lavage (04-25 @ 02:26)  Culture Results:   Rare to few Yeast like cells (04-25 @ 02:26)    Specimen Source: .Broncial Bronchoalveolar Lavage (04-24 @ 20:53)  Culture Results:   Normal Respiratory Cindy present (04-24 @ 20:53)        Impression:  Temps improved  now on ab rx with Azactam + Vanco + Diflucan now for Sepsis with Multifocal PNA - likely Aspiration component with Respiratory Failure, UTI, with underlying Colovesicle Fistula.    Suggestions: Will continue current ab rx and follow-up temps, labs, and CXR.  Still awaiting son's decision regarding Trach, PEG, and Diverting Colostomy.  Discussed with family at bedside. TMAX -  99.9    On day # 8 Azactam / #21 Vanco / # 11 Diflucan    Vital Signs Last 24 Hrs  T(C): 37, Max: 37.2 (04-26 @ 15:46)  T(F): 98.6, Max: 99 (04-26 @ 15:46)  HR: 85 (80 - 110)  BP: 146/63 (126/52 - 174/73)  BP(mean): 84 (70 - 113)  RR: 19 (12 - 40)  SpO2: 97% (95% - 100%)  Mode: AC/ CMV (Assist Control/ Continuous Mandatory Ventilation)  RR (machine): 16  TV (machine): 400  FiO2: 30  PEEP: 5  ITime: 0.9  MAP: 12  PIP: 31  Supplemental O2:  on 30% FIO2 now with + 5 PEEP    Remains poorly responsive, on ventilator, but with FIO2 decreased to 30% now.    PHYSICAL EXAM  General:  poorly responsive, on ventilator, not opening her eyes today, remains orally intubated and with NGT feedings in progress  HEENT: conj pink, sclerae anicteric, PERRLA, no oral lesions noted but exam limited due to ETTube   Neck:  semi- supple, no nodes noted  Heart:  RR  Lungs:  bilat rhonchi throughout  Abdomen: soft, BS +, nontender appearance   Extremities: trace edema LE's                     decreased edema of both arms and hands and resolving ecchymoses on hands                     RUE - midline in place - site clean with dressing intact - placed  4/25   Skin: warm, dry now, no rash noted     I&O's Summary :  I & Os for 24h ending 27 Apr 2017 07:00  =============================================  IN: 2180 ml / OUT: 2180 ml / NET: 0 ml    I & Os for current day (as of 27 Apr 2017 13:39)  =============================================  IN: 180 ml / OUT: 200 ml / NET: -20 ml      LABS:  CBC Full  -  ( 27 Apr 2017 04:04 )  WBC Count : 12.2 K/uL  Hemoglobin : 7.2 g/dL  Hematocrit : 20.7 %  Platelet Count - Automated : 271 K/uL  Mean Cell Volume : 85.4 fl  Mean Cell Hemoglobin : 29.9 pg  Mean Cell Hemoglobin Concentration : 35.0 gm/dL  Auto Neutrophil # : x  Auto Lymphocyte # : x  Auto Monocyte # : x  Auto Eosinophil # : x  Auto Basophil # : x  Auto Neutrophil % : x  Auto Lymphocyte % : x  Auto Monocyte % : x  Auto Eosinophil % : x  Auto Basophil % : x    04-27    134<L>  |  94<L>  |  17  ----------------------------<  104<H>  4.3   |  29  |  0.28<L>    Ca    8.1<L>      27 Apr 2017 04:04  Phos  4.5     04-27  Mg     2.3     04-27    Sedimentation Rate, Erythrocyte: >140 mm/hr (04-25 @ 03:49)      CULTURES:  Specimen Source: .Broncial Other, bronchoalveolar lavage (04-25 @ 02:26)  Culture Results:   Rare to few Yeast like cells (04-25 @ 02:26)    Specimen Source: .Broncial Bronchoalveolar Lavage (04-24 @ 20:53)  Culture Results:   Normal Respiratory Cindy present (04-24 @ 20:53)        Impression:  Temps improved  now on ab rx with Azactam + Vanco + Diflucan now for Sepsis with Multifocal PNA - likely Aspiration component with Respiratory Failure, UTI, with underlying Colovesicle Fistula.    Suggestions: Will continue current ab rx and follow-up temps, labs, and CXR.  Still awaiting son's decision regarding Trach, PEG, and Diverting Colostomy.  Discussed with family at bedside.

## 2017-04-27 NOTE — PROGRESS NOTE ADULT - SUBJECTIVE AND OBJECTIVE BOX
INTERVAL HPI/OVERNIGHT EVENTS:        REVIEW OF SYSTEMS:  CONSTITUTIONAL: patient  alert  comfortable  on  respirator      MEDICATION:  enoxaparin Injectable 40milliGRAM(s) SubCutaneous every 24 hours  folic acid 1milliGRAM(s) Oral daily  acetaminophen    Suspension 650milliGRAM(s) Oral every 6 hours PRN  ipratropium    for Nebulization 500MICROGram(s) Inhalation every 6 hours  carbidopa/levodopa  25/100 1Tablet(s) Oral four times a day  pramipexole 0.25milliGRAM(s) Oral three times a day  acetaminophen    Suspension. 650milliGRAM(s) Enteral Tube every 6 hours PRN  chlorhexidine 0.12% Liquid 15milliLiter(s) Swish and Spit two times a day  chlorhexidine 4% Liquid 1Application(s) Topical daily  fluconAZOLE   Tablet 100milliGRAM(s) Oral daily  pantoprazole   Suspension 40milliGRAM(s) Oral daily  aztreonam  IVPB  IV Intermittent   aztreonam  IVPB 1000milliGRAM(s) IV Intermittent every 8 hours  vancomycin  IVPB 1250milliGRAM(s) IV Intermittent every 24 hours    Vital Signs Last 24 Hrs  T(C): 37.1, Max: 37.2 (04-26 @ 15:46)  T(F): 98.8, Max: 99 (04-26 @ 15:46)  HR: 86 (80 - 110)  BP: 126/52 (126/52 - 175/87)  BP(mean): 70 (70 - 113)  RR: 20 (12 - 40)  SpO2: 99% (95% - 100%)    PHYSICAL EXAM:  GENERAL: NAD, well-groomed, well-developed  EYES:  conjunctiva and sclera clear  ENMT:  Moist mucous membranes,   NECK: Supple, No JVD, Normal thyroid  NERVOUS SYSTEM:  Alert oriented follows  some  commands  CHEST/LUNG: Clear    HEART: Regular rate and rhythm; No murmurs, rubs, or gallops  ABDOMEN: Soft, Nontender, Nondistended; Bowel sounds present  EXTREMITIES:  +  edema  SKIN: No rashes   LABS:                        7.2    12.2  )-----------( 271      ( 27 Apr 2017 04:04 )             20.7     04-27    134<L>  |  94<L>  |  17  ----------------------------<  104<H>  4.3   |  29  |  0.28<L>    Ca    8.1<L>      27 Apr 2017 04:04  Phos  4.5     04-27  Mg     2.3     04-27          CAPILLARY BLOOD GLUCOSE      RADIOLOGY & ADDITIONAL TESTS:    Imaging reports  Personally Reviewed:  [x ] YES  [ ] NO    Consultant(s) Notes Reviewed:  [x ] YES  [ ] NO    Care Discussed with Consultants/Other Providers [x ] YES  [ ] NO    ACUTE  RESPIRATORY  FAILURE  PNEUMONIA   CONT  VENT  SUPPORT  AB  IVF  check  cultures  from  bronchial  lavages  son  to  decide  on  trach  peg  vs  palliative  care  COLO/VESCICULAR FISTULA proceed  as  per  surgery  discussed  with  Urology  to  continue NGT feedings monitor  labs    LFT elevation observation  metabolic  encephalopathy with  severe  inflammatory  processs supportive care        Problem/Plan - 3:  ·  Problem: Parkinson disease encephalopathy.  Plan: sinemet pramipexole  anemia  continue  to  monitor  no  clinical  evidence  of  acute bleed  transfuse as per Intensivist  awating  family  decision  on  Trach  Peg  or comfort  care

## 2017-04-28 ENCOUNTER — TRANSCRIPTION ENCOUNTER (OUTPATIENT)
Age: 82
End: 2017-04-28

## 2017-04-28 LAB
ANION GAP SERPL CALC-SCNC: 10 MMOL/L — SIGNIFICANT CHANGE UP (ref 5–17)
APTT BLD: 34.6 SEC — SIGNIFICANT CHANGE UP (ref 27.5–37.4)
BASOPHILS # BLD AUTO: 0.1 K/UL — SIGNIFICANT CHANGE UP (ref 0–0.2)
BASOPHILS NFR BLD AUTO: 0.6 % — SIGNIFICANT CHANGE UP (ref 0–2)
BLD GP AB SCN SERPL QL: SIGNIFICANT CHANGE UP
BUN SERPL-MCNC: 16 MG/DL — SIGNIFICANT CHANGE UP (ref 7–23)
CALCIUM SERPL-MCNC: 8.4 MG/DL — LOW (ref 8.5–10.1)
CHLORIDE SERPL-SCNC: 95 MMOL/L — LOW (ref 96–108)
CO2 SERPL-SCNC: 28 MMOL/L — SIGNIFICANT CHANGE UP (ref 22–31)
CREAT SERPL-MCNC: 0.28 MG/DL — LOW (ref 0.5–1.3)
EOSINOPHIL # BLD AUTO: 0.1 K/UL — SIGNIFICANT CHANGE UP (ref 0–0.5)
EOSINOPHIL NFR BLD AUTO: 1.2 % — SIGNIFICANT CHANGE UP (ref 0–6)
GLUCOSE SERPL-MCNC: 71 MG/DL — SIGNIFICANT CHANGE UP (ref 70–99)
HCT VFR BLD CALC: 21.3 % — LOW (ref 34.5–45)
HGB BLD-MCNC: 7.3 G/DL — LOW (ref 11.5–15.5)
INR BLD: 1.37 RATIO — HIGH (ref 0.88–1.16)
LYMPHOCYTES # BLD AUTO: 1.5 K/UL — SIGNIFICANT CHANGE UP (ref 1–3.3)
LYMPHOCYTES # BLD AUTO: 11.7 % — LOW (ref 13–44)
MAGNESIUM SERPL-MCNC: 2.3 MG/DL — SIGNIFICANT CHANGE UP (ref 1.8–2.4)
MCHC RBC-ENTMCNC: 29.5 PG — SIGNIFICANT CHANGE UP (ref 27–34)
MCHC RBC-ENTMCNC: 34.2 GM/DL — SIGNIFICANT CHANGE UP (ref 32–36)
MCV RBC AUTO: 86.4 FL — SIGNIFICANT CHANGE UP (ref 80–100)
MONOCYTES # BLD AUTO: 0.5 K/UL — SIGNIFICANT CHANGE UP (ref 0–0.9)
MONOCYTES NFR BLD AUTO: 3.7 % — SIGNIFICANT CHANGE UP (ref 2–14)
NEUTROPHILS # BLD AUTO: 10.3 K/UL — HIGH (ref 1.8–7.4)
NEUTROPHILS NFR BLD AUTO: 82.9 % — HIGH (ref 43–77)
PHOSPHATE SERPL-MCNC: 4.6 MG/DL — HIGH (ref 2.5–4.5)
PLATELET # BLD AUTO: 268 K/UL — SIGNIFICANT CHANGE UP (ref 150–400)
POTASSIUM SERPL-MCNC: 4.5 MMOL/L — SIGNIFICANT CHANGE UP (ref 3.5–5.3)
POTASSIUM SERPL-SCNC: 4.5 MMOL/L — SIGNIFICANT CHANGE UP (ref 3.5–5.3)
PROTHROM AB SERPL-ACNC: 15 SEC — HIGH (ref 9.8–12.7)
RBC # BLD: 2.46 M/UL — LOW (ref 3.8–5.2)
RBC # FLD: 15.8 % — HIGH (ref 11–15)
SODIUM SERPL-SCNC: 133 MMOL/L — LOW (ref 135–145)
WBC # BLD: 12.4 K/UL — HIGH (ref 3.8–10.5)
WBC # FLD AUTO: 12.4 K/UL — HIGH (ref 3.8–10.5)

## 2017-04-28 PROCEDURE — 71010: CPT | Mod: 26

## 2017-04-28 PROCEDURE — 44320 COLOSTOMY: CPT | Mod: AS

## 2017-04-28 PROCEDURE — 99291 CRITICAL CARE FIRST HOUR: CPT

## 2017-04-28 PROCEDURE — 71010: CPT | Mod: 26,77

## 2017-04-28 RX ORDER — ENOXAPARIN SODIUM 100 MG/ML
40 INJECTION SUBCUTANEOUS EVERY 24 HOURS
Qty: 0 | Refills: 0 | Status: DISCONTINUED | OUTPATIENT
Start: 2017-04-28 | End: 2017-05-11

## 2017-04-28 RX ORDER — ACETAMINOPHEN 500 MG
650 TABLET ORAL EVERY 6 HOURS
Qty: 0 | Refills: 0 | Status: DISCONTINUED | OUTPATIENT
Start: 2017-04-28 | End: 2017-05-27

## 2017-04-28 RX ORDER — AZTREONAM 2 G
1000 VIAL (EA) INJECTION EVERY 8 HOURS
Qty: 0 | Refills: 0 | Status: DISCONTINUED | OUTPATIENT
Start: 2017-04-28 | End: 2017-05-05

## 2017-04-28 RX ORDER — FENTANYL CITRATE 50 UG/ML
25 INJECTION INTRAVENOUS ONCE
Qty: 0 | Refills: 0 | Status: DISCONTINUED | OUTPATIENT
Start: 2017-04-28 | End: 2017-04-28

## 2017-04-28 RX ORDER — CARBIDOPA AND LEVODOPA 25; 100 MG/1; MG/1
1 TABLET ORAL
Qty: 0 | Refills: 0 | Status: DISCONTINUED | OUTPATIENT
Start: 2017-04-28 | End: 2017-05-27

## 2017-04-28 RX ORDER — CHLORHEXIDINE GLUCONATE 213 G/1000ML
1 SOLUTION TOPICAL DAILY
Qty: 0 | Refills: 0 | Status: DISCONTINUED | OUTPATIENT
Start: 2017-04-28 | End: 2017-05-04

## 2017-04-28 RX ORDER — PANTOPRAZOLE SODIUM 20 MG/1
40 TABLET, DELAYED RELEASE ORAL DAILY
Qty: 0 | Refills: 0 | Status: DISCONTINUED | OUTPATIENT
Start: 2017-04-28 | End: 2017-05-27

## 2017-04-28 RX ORDER — FLUCONAZOLE 150 MG/1
100 TABLET ORAL DAILY
Qty: 0 | Refills: 0 | Status: DISCONTINUED | OUTPATIENT
Start: 2017-04-28 | End: 2017-05-16

## 2017-04-28 RX ORDER — LACTOBACILLUS ACIDOPHILUS 100MM CELL
1 CAPSULE ORAL EVERY 12 HOURS
Qty: 0 | Refills: 0 | Status: DISCONTINUED | OUTPATIENT
Start: 2017-04-28 | End: 2017-05-27

## 2017-04-28 RX ORDER — VANCOMYCIN HCL 1 G
1250 VIAL (EA) INTRAVENOUS EVERY 24 HOURS
Qty: 0 | Refills: 0 | Status: DISCONTINUED | OUTPATIENT
Start: 2017-04-28 | End: 2017-05-01

## 2017-04-28 RX ORDER — PRAMIPEXOLE DIHYDROCHLORIDE 0.12 MG/1
0.25 TABLET ORAL THREE TIMES A DAY
Qty: 0 | Refills: 0 | Status: DISCONTINUED | OUTPATIENT
Start: 2017-04-28 | End: 2017-05-27

## 2017-04-28 RX ADMIN — Medication 50 MILLIGRAM(S): at 05:18

## 2017-04-28 RX ADMIN — FLUCONAZOLE 100 MILLIGRAM(S): 150 TABLET ORAL at 12:24

## 2017-04-28 RX ADMIN — Medication 1 TABLET(S): at 05:20

## 2017-04-28 RX ADMIN — Medication 500 MICROGRAM(S): at 11:22

## 2017-04-28 RX ADMIN — CHLORHEXIDINE GLUCONATE 15 MILLILITER(S): 213 SOLUTION TOPICAL at 05:18

## 2017-04-28 RX ADMIN — FENTANYL CITRATE 25 MICROGRAM(S): 50 INJECTION INTRAVENOUS at 19:10

## 2017-04-28 RX ADMIN — Medication 166.67 MILLIGRAM(S): at 20:53

## 2017-04-28 RX ADMIN — PRAMIPEXOLE DIHYDROCHLORIDE 0.25 MILLIGRAM(S): 0.12 TABLET ORAL at 05:18

## 2017-04-28 RX ADMIN — Medication 50 MILLIGRAM(S): at 22:07

## 2017-04-28 RX ADMIN — SODIUM CHLORIDE 50 MILLILITER(S): 9 INJECTION INTRAMUSCULAR; INTRAVENOUS; SUBCUTANEOUS at 00:00

## 2017-04-28 RX ADMIN — CHLORHEXIDINE GLUCONATE 1 APPLICATION(S): 213 SOLUTION TOPICAL at 12:25

## 2017-04-28 RX ADMIN — SODIUM CHLORIDE 50 MILLILITER(S): 9 INJECTION INTRAMUSCULAR; INTRAVENOUS; SUBCUTANEOUS at 12:24

## 2017-04-28 RX ADMIN — PANTOPRAZOLE SODIUM 40 MILLIGRAM(S): 20 TABLET, DELAYED RELEASE ORAL at 12:24

## 2017-04-28 RX ADMIN — FENTANYL CITRATE 25 MICROGRAM(S): 50 INJECTION INTRAVENOUS at 20:00

## 2017-04-28 RX ADMIN — CARBIDOPA AND LEVODOPA 1 TABLET(S): 25; 100 TABLET ORAL at 12:24

## 2017-04-28 RX ADMIN — CARBIDOPA AND LEVODOPA 1 TABLET(S): 25; 100 TABLET ORAL at 05:18

## 2017-04-28 RX ADMIN — Medication 500 MICROGRAM(S): at 00:20

## 2017-04-28 RX ADMIN — Medication 500 MICROGRAM(S): at 05:56

## 2017-04-28 RX ADMIN — Medication 166.67 MILLIGRAM(S): at 04:19

## 2017-04-28 RX ADMIN — Medication 50 MILLIGRAM(S): at 15:23

## 2017-04-28 RX ADMIN — Medication 1 MILLIGRAM(S): at 12:24

## 2017-04-28 NOTE — PROGRESS NOTE ADULT - SUBJECTIVE AND OBJECTIVE BOX
Post-op check    S/P Loop colostomy, Tracheostomy, PEG POD#0  86y year old Female seen and examined at bedside. On vent resting comfortably.     Vital Signs Last 24 Hrs  T(F): 98.8, Max: 99.1 (04-28 @ 15:34)  HR: 85  BP: 154/69  RR: 18  SpO2: 96%  Wt(kg): --   CAPILLARY BLOOD GLUCOSE      GENERAL: Alert, NAD  NECK: Tracheostomy in tact, on vent. Tracheostomy dressing clean/dry/intact   CHEST/LUNG: Clear to auscultation bilaterally, respirations nonlabored  HEART: S1S2, Regular rate and rhythm  ABDOMEN: Colostomy pink and viable and colostomy bag with stool and air. PEG Dressing C/D/I; + Bowel sounds, soft, Appropriate incisional tenderness, Nondistended  EXTREMITIES:  no calf tenderness, edema, intermittent compression devices in place bilaterally    Impression: 86F hx of parkinsons, hx right hip fracture with medullary pins, sepsis secondary to multifocal pna, respiratory failure, and underlying colovesicular fistula S/P Loop colostomy, Tracheostomy, PEG POD#0    Plan:  -continue present CCU management/supportive care, vent support, abx per ID  -colostomy care and monitor output, PEG care, trache care  -c/w shoemaker catheter per Dr Bahena  -will discuss with Dr. Blood and Dr. Bahena

## 2017-04-28 NOTE — PROGRESS NOTE ADULT - SUBJECTIVE AND OBJECTIVE BOX
TMAX -  98.8     On day # 9 Azactam/ # 22 Vanco / # 12 Diflucan now    Vital Signs Last 24 Hrs  T(C): 36.9, Max: 37 (04-27 @ 23:25)  T(F): 98.4, Max: 98.6 (04-27 @ 23:25)  HR: 76 (76 - 105)  BP: 136/56 (120/50 - 158/66)  BP(mean): 77 (66 - 93)  RR: 21 (15 - 21)  SpO2: 97% (95% - 100%)  Mode: AC/ CMV (Assist Control/ Continuous Mandatory Ventilation)  RR (machine): 16  TV (machine): 400  FiO2: 30  PEEP: 5  ITime: 0.91  MAP: 11  PIP: 21  Supplemental O2:  on  30% FIO2 + 5 PEEP on ventilator      Remains poorly responsive on ventilator, orally intubated, but with decreased FIO2 and decreased temps now.        PHYSICAL EXAM  General: poorly responsive, orally intubated on ventilator, opening her eyes again today spontaneously, but follows no commands at this time.   HEENT: conj pink, sclerae anicteric, PERRLA, no oral lesions noted but ETTube in place, NGT in place with feedings presently on hold    Neck:  supple, no nodes noted  Heart:  RR  Lungs: scattered bilat rhonchi   Abdomen:  soft, BS+, nontender appearance  Extremities:  trace edema LE's                      RUE with Midline in place - site clean, dressing intact - placed 4/25  Skin:  warm, clammy now, but no rash seen      I&O's Summary:  I & Os for 24h ending 28 Apr 2017 07:00  =============================================  IN: 2170 ml / OUT: 2155 ml / NET: 15 ml    I & Os for current day (as of 28 Apr 2017 14:17)  =============================================  IN: 200 ml / OUT: 300 ml / NET: -100 ml      LABS:  CBC Full  -  ( 28 Apr 2017 04:21 )  WBC Count : 12.4 K/uL  Hemoglobin : 7.3 g/dL  Hematocrit : 21.3 %  Platelet Count - Automated : 268 K/uL  Mean Cell Volume : 86.4 fl  Mean Cell Hemoglobin : 29.5 pg  Mean Cell Hemoglobin Concentration : 34.2 gm/dL  Auto Neutrophil # : 10.3 K/uL  Auto Lymphocyte # : 1.5 K/uL  Auto Monocyte # : 0.5 K/uL  Auto Eosinophil # : 0.1 K/uL  Auto Basophil # : 0.1 K/uL  Auto Neutrophil % : 82.9 %  Auto Lymphocyte % : 11.7 %  Auto Monocyte % : 3.7 %  Auto Eosinophil % : 1.2 %  Auto Basophil % : 0.6 %    04-28    133<L>  |  95<L>  |  16  ----------------------------<  71  4.5   |  28  |  0.28<L>    Ca    8.4<L>      28 Apr 2017 04:21  Phos  4.6     04-28  Mg     2.3     04-28      PT/INR - ( 28 Apr 2017 04:21 )   PT: 15.0 sec;   INR: 1.37 ratio       PTT - ( 28 Apr 2017 04:21 )  PTT:34.6 sec    Sedimentation Rate, Erythrocyte: >140 mm/hr (04-25 @ 03:49)        CULTURES:  Specimen Source: .Broncial Other, bronchoalveolar lavage (04-25 @ 02:26)  Culture Results:   Rare to few Yeast like cells (04-25 @ 02:26)    Specimen Source: .Broncial Bronchoalveolar Lavage (04-24 @ 20:53)  Culture Results:   Normal Respiratory Cindy present (04-24 @ 20:53)        Radiology:  CXR - 4/ 28 -  COMPARISON: 4/26/2017    FINDINGS:     Endotracheal and gastric tubes are noted in place.    There are bilateral lung opacities. The cardiac and mediastinal contours   are prominent, which may be due to magnification from AP technique and   shallow inspiration. The osseous structures are intact.    IMPRESSION:    No change in bilateral lung opacities.        Impression: Clinically improving with decreased fevers, decreased WBC's, and decreased O2 requirements although remains poorly responsive and vent-dependent at this time, on ab rx with Azactam, Vanco, and Diflucan for Sepsis with Multuifocal PNA - likely aspiration component and UTI, with underlying Colovesicle Fistula.    Suggestions:  Will continue present ab rx and follow-up temps and labs closely.  Prolonged discussion yesterday with patient's son regarding further course of rx including the consideration to pursue Trach, PEG, and Diverting Colostomy.  He has apparently agreed to these procedures now and they are scheduled to be done today in the OR.

## 2017-04-28 NOTE — BRIEF OPERATIVE NOTE - PRE-OP DX
Acute respiratory failure, unspecified whether with hypoxia or hypercapnia  04/28/2017    Miller Ramirez  Colovesical fistula  04/28/2017    Miller Ramirez

## 2017-04-28 NOTE — PROGRESS NOTE ADULT - SUBJECTIVE AND OBJECTIVE BOX
INTERVAL HPI/OVERNIGHT EVENTS:        REVIEW OF SYSTEMS:  CONSTITUTIONAL:  unresponsive  on  respirator  for  PEG  palcement      MEDICATION:  enoxaparin Injectable 40milliGRAM(s) SubCutaneous every 24 hours  folic acid 1milliGRAM(s) Oral daily  acetaminophen    Suspension 650milliGRAM(s) Oral every 6 hours PRN  ipratropium    for Nebulization 500MICROGram(s) Inhalation every 6 hours  carbidopa/levodopa  25/100 1Tablet(s) Oral four times a day  pramipexole 0.25milliGRAM(s) Oral three times a day  acetaminophen    Suspension. 650milliGRAM(s) Enteral Tube every 6 hours PRN  chlorhexidine 0.12% Liquid 15milliLiter(s) Swish and Spit two times a day  chlorhexidine 4% Liquid 1Application(s) Topical daily  fluconAZOLE   Tablet 100milliGRAM(s) Oral daily  pantoprazole   Suspension 40milliGRAM(s) Oral daily  aztreonam  IVPB  IV Intermittent   aztreonam  IVPB 1000milliGRAM(s) IV Intermittent every 8 hours  vancomycin  IVPB 1250milliGRAM(s) IV Intermittent every 24 hours  lactobacillus acidophilus 1Tablet(s) Oral every 12 hours  sodium chloride 0.9%. 1000milliLiter(s) IV Continuous <Continuous>    Vital Signs Last 24 Hrs  T(C): 36.9, Max: 37 (04-27 @ 12:29)  T(F): 98.4, Max: 98.6 (04-27 @ 12:29)  HR: 83 (79 - 105)  BP: 133/53 (120/50 - 174/73)  BP(mean): 73 (66 - 99)  RR: 19 (15 - 24)  SpO2: 96% (95% - 100%)    PHYSICAL EXAM:    NERVOUS SYSTEM:  unresponsive    CHEST/LUNG: scattered  ronchis    HEART: Regular rate and rhythm; No murmurs, rubs, or gallops  ABDOMEN: Soft, Nontender, Nondistended; Bowel sounds present  EXTREMITIES:  +edema  SKIN: No rashes   LABS:                        7.3    12.4  )-----------( 268      ( 28 Apr 2017 04:21 )             21.3     04-28    133<L>  |  95<L>  |  16  ----------------------------<  71  4.5   |  28  |  0.28<L>    Ca    8.4<L>      28 Apr 2017 04:21  Phos  4.6     04-28  Mg     2.3     04-28      PT/INR - ( 28 Apr 2017 04:21 )   PT: 15.0 sec;   INR: 1.37 ratio         PTT - ( 28 Apr 2017 04:21 )  PTT:34.6 sec    CAPILLARY BLOOD GLUCOSE      RADIOLOGY & ADDITIONAL TESTS:    Imaging reports  Personally Reviewed:  [x ] YES  [ ] NO    Consultant(s) Notes Reviewed:  [x ] YES  [ ] NO    Care Discussed with Consultants/Other Providers [x ] YES  [ ] NO  ACUTE  RESPIRATORY  FAILURE  PNEUMONIA   CONT  VENT  SUPPORT  AB  IVF  cont  as  per  Intensivist  unable  to  be  wean  off  respirator listeh  probably  need  trach  COLO/VESCICULAR FISTULA proceed  as  per  surgery  discussed  with  Urology  to  continue NGT feedings monitor  labs    LFT elevation observation  metabolic  encephalopathy with  severe  inflammatory  processs supportive care        Problem/Plan - 3:  ·  Problem: Parkinson disease encephalopathy.  Plan: sinemet pramipexole  anemia  continue  to  monitor  no  clinical  evidence  of  acute bleed  transfuse as per Intensivist  for  PEG  placement   prognosis  guarded  For  PEG palcement

## 2017-04-28 NOTE — BRIEF OPERATIVE NOTE - PROCEDURE
Loop colostomy on right side  04/28/2017    Active  DANAE  Tracheostomy  04/28/2017    Active  DANAE

## 2017-04-28 NOTE — PROGRESS NOTE ADULT - SUBJECTIVE AND OBJECTIVE BOX
Initial Consultaion Note  Requested by Name:  Date/ Time:  Reason for referral/ Consultation:        PAST MEDICAL & SURGICAL HISTORY:  Pneumonia  Anemia  Parkinson disease  Tremors of nervous system  No pertinent past medical history  Status post hip surgery  No significant past surgical history      SOCIAL HISTORY:                                 Admitted from: home [ ] SNF [ ] [facility] [ ] [facility] [ ]    )    ADVANCE DIRECTIVES:  [ ] YES [ ] NO     DNR [ ] YES [ ] NO  Completed on:                       MOLST  [ ] YES [ ] NO   Completed on  :  Living Will  [ ] YES [ ] NO   Completed on:    Allergies    No Known Allergies    Intolerances        Review of Systems:     PAIN : (Y/N) (0-10)          DYSPNEA:     NAUSEA/VOMITING:   DEPRESSION:        SECRETIONS:(    FRAILTY SYNDROM       FAILURE TO THRIVE     DIBILITY   OTHER SYMPTOMS :    UNABLE TO OBTAIN DUE TO POOR MENTATION (   )    PHYSICAL EXAM:  T(F): 98.2, Max: 99.1 (04-28 @ 15:34)  HR: 92 (76 - 96)  BP: 146/62 (120/50 - 158/66)  RR: 21 (15 - 21)  SpO2: 99% (95% - 100%)  Wt(kg): --   WEIGHT :    [id number]                  BMI:  I&O's Summary  I & Os for 24h ending 28 Apr 2017 07:00  =============================================  IN: 2170 ml / OUT: 2155 ml / NET: 15 ml    I & Os for current day (as of 28 Apr 2017 16:50)  =============================================  IN: 200 ml / OUT: 1150 ml / NET: -950 ml    CAPILLARY BLOOD GLUCOSE      GENERAL: alert [ ] oriented x ______[ ] lethargic        [ ] cachexia     [ ] nonverbal [ ] coma [ ]    HEENT: normal [ ] dry mouth [ ] ET tube/trach [ ]   LUNGS: ]  CV :  normal [ ]  GI : normal [ ]  PEG /NG tube [ ]   : normal [ ] incontinent [ ] oliguria/anuria [ ] shoemaker [ ]  MSK : normal [ ] weakness [ ] edema [ ]              ambulatory [ ] bebbound/wheelchair bound [ ]   SKIN : normal [ ] pressure ulcer (Y/N) Stage ______________ rash (Y/N)    Functional Assessment:Karnofsky Performance Score :  Palliative Performance Status Version 2:         %    LABS:                        7.3    12.4  )-----------( 268      ( 28 Apr 2017 04:21 )             21.3     04-28    133<L>  |  95<L>  |  16  ----------------------------<  71  4.5   |  28  |  0.28<L>    Ca    8.4<L>      28 Apr 2017 04:21  Phos  4.6     04-28  Mg     2.3     04-28      PT/INR - ( 28 Apr 2017 04:21 )   PT: 15.0 sec;   INR: 1.37 ratio         PTT - ( 28 Apr 2017 04:21 )  PTT:34.6 sec      I&O's Detail  I & Os for 24h ending 28 Apr 2017 07:00  =============================================  IN:    Jevity: 960 ml    Enteral Tube Flush: 410 ml    sodium chloride 0.9%.: 400 ml    Solution: 250 ml    Solution: 150 ml    Total IN: 2170 ml  ---------------------------------------------  OUT:    Indwelling Catheter - Urethral: 1955 ml    Rectal Tube: 200 ml    Total OUT: 2155 ml  ---------------------------------------------  Total NET: 15 ml    I & Os for current day (as of 28 Apr 2017 16:50)  =============================================  IN:    sodium chloride 0.9%.: 200 ml    Total IN: 200 ml  ---------------------------------------------  OUT:    Indwelling Catheter - Urethral: 1150 ml    Total OUT: 1150 ml  ---------------------------------------------  Total NET: -950 ml      Assessment:   86y Female admitted with ALTERED MENTAL STATUS/UTI/URINE RETENTION  AMS/PNEUMONIA  AMS      PROBLEM LIST :  PROBLEM/RECOMMENDATION: 1  Problem : Goals of care, counseling/ discussion.  Recommendation: met with patient/ family and discussed GOC & Advanced care planning                                    Palliative care info/counseling provided (Y/N)                                      Family meeting                                   Advanced Directives addressed (Y/N)  PROBLEM/ RECOMMENDATION:2  Problem :Resuscitation/ DNR/ DNI,   Recommendation: DNR/ DNI    PROBLEM/ RECOMMENDATION :3  Problem : Medical order for life sustaning treatment  Recommendation : MOLST Form ( initiated/ completed)    PROBLEM/RECOMMENDATION :4  Problem : Advanced care planning  Recommendation : Family meeting.(Y/N)     PLAN:  REFFERALS:                           Unit SW/Case Mgmt (Y/N)                          (Y/N)                         Speech/Swallow (Y/N)                           nutrition                                              Ethics (Y/N)                         PT/OT (Y/N)

## 2017-04-28 NOTE — PROGRESS NOTE ADULT - SUBJECTIVE AND OBJECTIVE BOX
HPI:  86F hx of parkinsons, recent right hip / femur fracture with medullary pins sent from rehab for AMS and fever. Admitted to the hospital for sepsis, UTI/PNA, AMS. Patient had RRT on the floors for hypotension, and hypoxia. During intubation for airway protection, tube feeds found in the endotracheal tube. Transferred to the ICU for acute hypoxic respiratory failure, intubated. Course with failure to wean.     24 hr events:  s/p trach/PEG and diverting loop colostomy today with surgery and GI  mental status remains poor  s/p 1 unit pRBC for anemia     ## ROS:  [x] unable to obtain due to unrepsonsiveness/encephalopathy    ## Labs:  CBC:                        7.3    12.4  )-----------( 268      ( 28 Apr 2017 04:21 )             21.3     Chem:  04-28    133<L>  |  95<L>  |  16  ----------------------------<  71  4.5   |  28  |  0.28<L>    Ca    8.4<L>      28 Apr 2017 04:21  Phos  4.6     04-28  Mg     2.3     04-28      Coags:  PT/INR - ( 28 Apr 2017 04:21 )   PT: 15.0 sec;   INR: 1.37 ratio         PTT - ( 28 Apr 2017 04:21 )  PTT:34.6 sec        ## Imaging:  CXR interval placement of tracheostomy tube with good position of the tube above the eduardo.  Prior enteric tube has been withdrawn. The cardiac silhouette is stable in appearance.  Bilateral lung opacities again seen with improvement in lower right lung haziness. No significant pleural effusions. No pneumothorax.      ## Medications:  aztreonam  IVPB 1000milliGRAM(s) IV Intermittent every 8 hours  vancomycin  IVPB 1250milliGRAM(s) IV Intermittent every 24 hours  fluconAZOLE   Tablet 100milliGRAM(s) Oral daily    enoxaparin Injectable 40milliGRAM(s) SubCutaneous every 24 hours    pantoprazole   Suspension 40milliGRAM(s) Oral daily    acetaminophen    Suspension. 650milliGRAM(s) Enteral Tube every 6 hours PRN  carbidopa/levodopa  25/100 1Tablet(s) Oral four times a day  pramipexole 0.25milliGRAM(s) Oral three times a day  acetaminophen    Suspension 650milliGRAM(s) Oral every 6 hours PRN      ## Vitals:  T(C): 36.5, Max: 37.3 (04-28 @ 15:34)  HR: 95 (76 - 103)  BP: 163/72 (120/50 - 175/80)  BP(mean): 95 (66 - 104)  RR: 20 (15 - 21)  SpO2: 96% (95% - 100%)  Wt(kg): 63.9  Vent: Mode: AC/ CMV (Assist Control/ Continuous Mandatory Ventilation), RR (machine): 16, RR (patient): 19, TV (machine): 400, FiO2: 30, PEEP: 5, PIP: 19      I & Os for 24h ending 04-28 @ 07:00  =============================================  IN: 2170 ml / OUT: 2155 ml / NET: 15 ml        ## P/E:  GENERAL: NAD, elderly female, unresponsive   HEAD:  Atraumatic, Normocephalic  EYES: PERRL, conjunctiva and sclera clear  ENMT: + trach  NECK: Supple, No JVD  NERVOUS SYSTEM:  not responding to commands or stimuli. no purposeful movement   CHEST/LUNG: Clear to auscultation bilaterally; No rales or rhonchi; mechanical breath sounds.   HEART: Regular rate and rhythm  ABDOMEN: Soft, Nontender, Nondistended; Bowel sounds present + PEG + right colostomy  EXTREMITIES:  2+ Peripheral Pulses, No clubbing, cyanosis; bilateral hand edema improving  SKIN: macular petechial rash on back improved      CENTRAL LINE: [ ] YES [x] NO      MAYFIELD: [x] YES [ ] NO          A-LINE:  [ ] YES [x] NO        GLOBAL ISSUE/BEST PRACTICE:  Analgesia: n/a  Sedation: n/a  HOB elevation: yes  Stress ulcer prophylaxis: protonix  VTE prophylaxis: lovenox  Oral Care: chlorhexidine  Glycemic control: n/a   Nutrition: jevity peg    CODE STATUS: [ ] full code  [x] DNR  [ ] DNI  [x] MOLST  Goals of care discussion: [x] yes

## 2017-04-29 LAB
ANION GAP SERPL CALC-SCNC: 11 MMOL/L — SIGNIFICANT CHANGE UP (ref 5–17)
BUN SERPL-MCNC: 14 MG/DL — SIGNIFICANT CHANGE UP (ref 7–23)
CALCIUM SERPL-MCNC: 8.3 MG/DL — LOW (ref 8.5–10.1)
CHLORIDE SERPL-SCNC: 97 MMOL/L — SIGNIFICANT CHANGE UP (ref 96–108)
CO2 SERPL-SCNC: 26 MMOL/L — SIGNIFICANT CHANGE UP (ref 22–31)
CREAT SERPL-MCNC: 0.32 MG/DL — LOW (ref 0.5–1.3)
GLUCOSE SERPL-MCNC: 67 MG/DL — LOW (ref 70–99)
HCT VFR BLD CALC: 24.5 % — LOW (ref 34.5–45)
HGB BLD-MCNC: 8.5 G/DL — LOW (ref 11.5–15.5)
MAGNESIUM SERPL-MCNC: 2.3 MG/DL — SIGNIFICANT CHANGE UP (ref 1.8–2.4)
MCHC RBC-ENTMCNC: 29.4 PG — SIGNIFICANT CHANGE UP (ref 27–34)
MCHC RBC-ENTMCNC: 34.8 GM/DL — SIGNIFICANT CHANGE UP (ref 32–36)
MCV RBC AUTO: 84.6 FL — SIGNIFICANT CHANGE UP (ref 80–100)
PHOSPHATE SERPL-MCNC: 5.3 MG/DL — HIGH (ref 2.5–4.5)
PLATELET # BLD AUTO: 326 K/UL — SIGNIFICANT CHANGE UP (ref 150–400)
POTASSIUM SERPL-MCNC: 4.1 MMOL/L — SIGNIFICANT CHANGE UP (ref 3.5–5.3)
POTASSIUM SERPL-SCNC: 4.1 MMOL/L — SIGNIFICANT CHANGE UP (ref 3.5–5.3)
RBC # BLD: 2.89 M/UL — LOW (ref 3.8–5.2)
RBC # FLD: 15.8 % — HIGH (ref 11–15)
SODIUM SERPL-SCNC: 134 MMOL/L — LOW (ref 135–145)
VANCOMYCIN TROUGH SERPL-MCNC: 20.5 UG/ML — HIGH (ref 10–20)
WBC # BLD: 11.8 K/UL — HIGH (ref 3.8–10.5)
WBC # FLD AUTO: 11.8 K/UL — HIGH (ref 3.8–10.5)

## 2017-04-29 PROCEDURE — 99233 SBSQ HOSP IP/OBS HIGH 50: CPT

## 2017-04-29 PROCEDURE — 71010: CPT | Mod: 26

## 2017-04-29 RX ORDER — METOPROLOL TARTRATE 50 MG
2.5 TABLET ORAL ONCE
Qty: 0 | Refills: 0 | Status: COMPLETED | OUTPATIENT
Start: 2017-04-29 | End: 2017-04-29

## 2017-04-29 RX ADMIN — CARBIDOPA AND LEVODOPA 1 TABLET(S): 25; 100 TABLET ORAL at 00:00

## 2017-04-29 RX ADMIN — Medication 50 MILLIGRAM(S): at 05:51

## 2017-04-29 RX ADMIN — Medication 650 MILLIGRAM(S): at 20:20

## 2017-04-29 RX ADMIN — CARBIDOPA AND LEVODOPA 1 TABLET(S): 25; 100 TABLET ORAL at 12:41

## 2017-04-29 RX ADMIN — Medication 1 TABLET(S): at 05:51

## 2017-04-29 RX ADMIN — PANTOPRAZOLE SODIUM 40 MILLIGRAM(S): 20 TABLET, DELAYED RELEASE ORAL at 12:41

## 2017-04-29 RX ADMIN — Medication 1 TABLET(S): at 18:20

## 2017-04-29 RX ADMIN — PRAMIPEXOLE DIHYDROCHLORIDE 0.25 MILLIGRAM(S): 0.12 TABLET ORAL at 22:04

## 2017-04-29 RX ADMIN — PRAMIPEXOLE DIHYDROCHLORIDE 0.25 MILLIGRAM(S): 0.12 TABLET ORAL at 00:00

## 2017-04-29 RX ADMIN — ENOXAPARIN SODIUM 40 MILLIGRAM(S): 100 INJECTION SUBCUTANEOUS at 00:00

## 2017-04-29 RX ADMIN — Medication 50 MILLIGRAM(S): at 15:56

## 2017-04-29 RX ADMIN — Medication 2.5 MILLIGRAM(S): at 10:35

## 2017-04-29 RX ADMIN — CARBIDOPA AND LEVODOPA 1 TABLET(S): 25; 100 TABLET ORAL at 18:20

## 2017-04-29 RX ADMIN — FLUCONAZOLE 100 MILLIGRAM(S): 150 TABLET ORAL at 12:41

## 2017-04-29 RX ADMIN — CHLORHEXIDINE GLUCONATE 1 APPLICATION(S): 213 SOLUTION TOPICAL at 12:53

## 2017-04-29 RX ADMIN — Medication 650 MILLIGRAM(S): at 19:44

## 2017-04-29 RX ADMIN — PRAMIPEXOLE DIHYDROCHLORIDE 0.25 MILLIGRAM(S): 0.12 TABLET ORAL at 18:20

## 2017-04-29 RX ADMIN — CARBIDOPA AND LEVODOPA 1 TABLET(S): 25; 100 TABLET ORAL at 05:51

## 2017-04-29 RX ADMIN — FLUCONAZOLE 100 MILLIGRAM(S): 150 TABLET ORAL at 00:00

## 2017-04-29 RX ADMIN — Medication 50 MILLIGRAM(S): at 22:03

## 2017-04-29 RX ADMIN — PRAMIPEXOLE DIHYDROCHLORIDE 0.25 MILLIGRAM(S): 0.12 TABLET ORAL at 10:40

## 2017-04-29 NOTE — PROGRESS NOTE ADULT - SUBJECTIVE AND OBJECTIVE BOX
TMAX - 100    On day # 10 Azactam / # 23 Vanco / #13 Diflucan    Vital Signs Last 24 Hrs  T(C): 37, Max: 37.8 (04-29 @ 05:00)  T(F): 98.6, Max: 100 (04-29 @ 05:00)  HR: 96 (83 - 113)  BP: 151/74 (125/50 - 175/80)  BP(mean): 95 (69 - 104)  RR: 23 (17 - 26)  SpO2: 96% (91% - 100%)  Mode: CPAP with PS  FiO2: 30  PEEP: 5  PS: 5  ITime: 0.9  Supplemental O2:  on 30% FIO2 + 5 PEEP      Patient remains on ventilator, now s/p Trach, PEG, and Diverting Colostomy done yesterday in the OR.        PHYSICAL EXAM  General:  Opening her eyes spontaneously, on ventilator, and moved both hands spontaneously today.  HEENT:  conj pink, sclerae anicteric, PERRLA, no oral lesions noted  Neck:  supple, no nodes noted             Trach midline with bloody drainage on dressing surrounding now  Heart:  RR  Lungs:  few rhonchi anteriorly  Abdomen:  soft, BS+, nontender appearance                  PEG in place now with tube feedings in progress- site clean                  Rt side Colostomy in place with active feces draining now  Extremities:  no edema LE's                     decreased swelling both arms and hands                     RUE midline in place - site clean, dressing in place  Skin:  warm, dry, no rash noted      I&O's Summary :  I & Os for 24h ending 29 Apr 2017 07:00  =============================================  IN: 1075 ml / OUT: 2005 ml / NET: -930 ml    I & Os for current day (as of 29 Apr 2017 17:52)  =============================================  IN: 360 ml / OUT: 240 ml / NET: 120 ml      LABS:  CBC Full  -  ( 29 Apr 2017 03:14 )  WBC Count : 11.8 K/uL  Hemoglobin : 8.5 g/dL  Hematocrit : 24.5 %  Platelet Count - Automated : 326 K/uL  Mean Cell Volume : 84.6 fl  Mean Cell Hemoglobin : 29.4 pg  Mean Cell Hemoglobin Concentration : 34.8 gm/dL  Auto Neutrophil # : x  Auto Lymphocyte # : x  Auto Monocyte # : x  Auto Eosinophil # : x  Auto Basophil # : x  Auto Neutrophil % : x  Auto Lymphocyte % : x  Auto Monocyte % : x  Auto Eosinophil % : x  Auto Basophil % : x    04-29    134<L>  |  97  |  14  ----------------------------<  67<L>  4.1   |  26  |  0.32<L>    Ca    8.3<L>      29 Apr 2017 03:14  Phos  5.3     04-29  Mg     2.3     04-29      PT/INR - ( 28 Apr 2017 04:21 )   PT: 15.0 sec;   INR: 1.37 ratio       PTT - ( 28 Apr 2017 04:21 )  PTT:34.6 sec    Sedimentation Rate, Erythrocyte: >140 mm/hr (04-25 @ 03:49)        CULTURES:  Specimen Source: .Broncial Other, bronchoalveolar lavage (04-25 @ 02:26)  Culture Results:   Rare to few Yeast like cells (04-25 @ 02:26)    Specimen Source: .Broncial Bronchoalveolar Lavage (04-24 @ 20:53)  Culture Results:   Normal Respiratory Cindy present (04-24 @ 20:53)          Radiology:    CXR - 4/29 -  FINDINGS:    Tracheostomy tube is noted in place.    There are bilateral lung opacities. The cardiac and mediastinal contours   are prominent, which may be due to magnification from AP technique and   shallow inspiration. The osseous structures are intact.    IMPRESSION:    No change in bilateral lung opacities.      Impression:  Slowly improving on ab rx with Azactam + Vanco + Diflucan and now s/p PEG, Trach , and Diverting Colostomy for continued rx of Respiratory Failure and underlying Colovesicle Fistula.    Suggestions: Will continue present ab rx and follow-up temps, labs, and CXR. TMAX - 100    On day # 10 Azactam / # 23 Vanco / #13 Diflucan    Vital Signs Last 24 Hrs  T(C): 37, Max: 37.8 (04-29 @ 05:00)  T(F): 98.6, Max: 100 (04-29 @ 05:00)  HR: 96 (83 - 113)  BP: 151/74 (125/50 - 175/80)  BP(mean): 95 (69 - 104)  RR: 23 (17 - 26)  SpO2: 96% (91% - 100%)  Mode: CPAP with PS  FiO2: 30  PEEP: 5  PS: 5  ITime: 0.9  Supplemental O2:  on 30% FIO2 + 5 PEEP      Patient remains on ventilator, now s/p Trach, PEG, and Diverting Colostomy done yesterday in the OR.        PHYSICAL EXAM  General:  Opening her eyes spontaneously, on ventilator, and moved both hands spontaneously today.  HEENT:  conj pink, sclerae anicteric, PERRLA, no oral lesions noted  Neck:  supple, no nodes noted             Trach midline with bloody drainage on dressing surrounding now  Heart:  RR  Lungs:  few rhonchi anteriorly  Abdomen:  soft, BS+, nontender appearance                  PEG in place now with tube feedings in progress- site clean                  Rt side Colostomy in place with active feces draining now  Extremities:  no edema LE's                     decreased swelling both arms and hands                     RUE midline in place - site clean, dressing in place - placed 4/25  Skin:  warm, dry, no rash noted      I&O's Summary :  I & Os for 24h ending 29 Apr 2017 07:00  =============================================  IN: 1075 ml / OUT: 2005 ml / NET: -930 ml    I & Os for current day (as of 29 Apr 2017 17:52)  =============================================  IN: 360 ml / OUT: 240 ml / NET: 120 ml      LABS:  CBC Full  -  ( 29 Apr 2017 03:14 )  WBC Count : 11.8 K/uL  Hemoglobin : 8.5 g/dL  Hematocrit : 24.5 %  Platelet Count - Automated : 326 K/uL  Mean Cell Volume : 84.6 fl  Mean Cell Hemoglobin : 29.4 pg  Mean Cell Hemoglobin Concentration : 34.8 gm/dL  Auto Neutrophil # : x  Auto Lymphocyte # : x  Auto Monocyte # : x  Auto Eosinophil # : x  Auto Basophil # : x  Auto Neutrophil % : x  Auto Lymphocyte % : x  Auto Monocyte % : x  Auto Eosinophil % : x  Auto Basophil % : x    04-29    134<L>  |  97  |  14  ----------------------------<  67<L>  4.1   |  26  |  0.32<L>    Ca    8.3<L>      29 Apr 2017 03:14  Phos  5.3     04-29  Mg     2.3     04-29      PT/INR - ( 28 Apr 2017 04:21 )   PT: 15.0 sec;   INR: 1.37 ratio       PTT - ( 28 Apr 2017 04:21 )  PTT:34.6 sec    Sedimentation Rate, Erythrocyte: >140 mm/hr (04-25 @ 03:49)        CULTURES:  Specimen Source: .Broncial Other, bronchoalveolar lavage (04-25 @ 02:26)  Culture Results:   Rare to few Yeast like cells (04-25 @ 02:26)    Specimen Source: .Broncial Bronchoalveolar Lavage (04-24 @ 20:53)  Culture Results:   Normal Respiratory Cindy present (04-24 @ 20:53)          Radiology:    CXR - 4/29 -  FINDINGS:    Tracheostomy tube is noted in place.    There are bilateral lung opacities. The cardiac and mediastinal contours   are prominent, which may be due to magnification from AP technique and   shallow inspiration. The osseous structures are intact.    IMPRESSION:    No change in bilateral lung opacities.      Impression:  Slowly improving on ab rx with Azactam + Vanco + Diflucan and now s/p PEG, Trach , and Diverting Colostomy for continued rx of Respiratory Failure and underlying Colovesicle Fistula.    Suggestions: Will continue present ab rx and follow-up temps, labs, and CXR.

## 2017-04-29 NOTE — PROGRESS NOTE ADULT - SUBJECTIVE AND OBJECTIVE BOX
INTERVAL HPI/OVERNIGHT EVENTS:    s/p  TRACH placement  s/p  diverting colostomy    REVIEW OF SYSTEMS:  CONSTITUTIONAL: opens  eyes  with  verbal  stimulus      MEDICATION:  enoxaparin Injectable 40milliGRAM(s) SubCutaneous every 24 hours  chlorhexidine 4% Liquid 1Application(s) Topical daily  aztreonam  IVPB 1000milliGRAM(s) IV Intermittent every 8 hours  vancomycin  IVPB 1250milliGRAM(s) IV Intermittent every 24 hours  fluconAZOLE   Tablet 100milliGRAM(s) Oral daily  pantoprazole   Suspension 40milliGRAM(s) Oral daily  lactobacillus acidophilus 1Tablet(s) Oral every 12 hours  acetaminophen    Suspension. 650milliGRAM(s) Enteral Tube every 6 hours PRN  carbidopa/levodopa  25/100 1Tablet(s) Oral four times a day  pramipexole 0.25milliGRAM(s) Oral three times a day  acetaminophen    Suspension 650milliGRAM(s) Oral every 6 hours PRN    Vital Signs Last 24 Hrs  T(C): 37.2, Max: 37.8 (04-29 @ 05:00)  T(F): 99, Max: 100 (04-29 @ 05:00)  HR: 113 (76 - 113)  BP: 134/52 (125/50 - 175/80)  BP(mean): 73 (69 - 104)  RR: 21 (16 - 26)  SpO2: 97% (93% - 100%)    PHYSICAL EXAM:  GENERAL: NAD, well-groomed, well-developed  EYES:  conjunctiva and sclera clear  ENMT:  Moist mucous membranes,   NECK: Supple, No JVD, Normal thyroid  NERVOUS SYSTEM:  opens  CHEST/LUNG: Clear    HEART: Regular rate and rhythm; No murmurs, rubs, or gallops  ABDOMEN: Soft, Nontender, Nondistended; Bowel sounds present  EXTREMITIES:  no  edema no  tenderness  SKIN: No rashes   LABS:                        8.5    11.8  )-----------( 326      ( 29 Apr 2017 03:14 )             24.5     04-29    134<L>  |  97  |  14  ----------------------------<  67<L>  4.1   |  26  |  0.32<L>    Ca    8.3<L>      29 Apr 2017 03:14  Phos  5.3     04-29  Mg     2.3     04-29      PT/INR - ( 28 Apr 2017 04:21 )   PT: 15.0 sec;   INR: 1.37 ratio         PTT - ( 28 Apr 2017 04:21 )  PTT:34.6 sec    CAPILLARY BLOOD GLUCOSE      RADIOLOGY & ADDITIONAL TESTS:    Imaging reports  Personally Reviewed:  [x ] YES  [ ] NO    Consultant(s) Notes Reviewed:  [x ] YES  [ ] NO    Care Discussed with Consultants/Other Providers [x ] YES  [ ] NO  ACUTE  RESPIRATORY  FAILURE  PNEUMONIA   CONT  VENT  SUPPORT  AB  IVF  S/P  TRACH  COLO/VESCICULAR FISTULA S/P  diverting  colostomy    LFT elevation observation  metabolic  encephalopathy with  severe  inflammatory  processs supportive care        Problem/Plan - 3:  ·  Problem: Parkinson disease encephalopathy.  Plan: sinemet pramipexole  anemia  continue  to  monitor  no  clinical  evidence  of  acute bleed  transfuse as per Intensivist  monitor  labs

## 2017-04-30 LAB
ANION GAP SERPL CALC-SCNC: 11 MMOL/L — SIGNIFICANT CHANGE UP (ref 5–17)
BUN SERPL-MCNC: 16 MG/DL — SIGNIFICANT CHANGE UP (ref 7–23)
CALCIUM SERPL-MCNC: 8.3 MG/DL — LOW (ref 8.5–10.1)
CHLORIDE SERPL-SCNC: 101 MMOL/L — SIGNIFICANT CHANGE UP (ref 96–108)
CO2 SERPL-SCNC: 25 MMOL/L — SIGNIFICANT CHANGE UP (ref 22–31)
CREAT SERPL-MCNC: 0.25 MG/DL — LOW (ref 0.5–1.3)
GLUCOSE SERPL-MCNC: 99 MG/DL — SIGNIFICANT CHANGE UP (ref 70–99)
HCT VFR BLD CALC: 23 % — LOW (ref 34.5–45)
HGB BLD-MCNC: 8.1 G/DL — LOW (ref 11.5–15.5)
MAGNESIUM SERPL-MCNC: 2.3 MG/DL — SIGNIFICANT CHANGE UP (ref 1.8–2.4)
MCHC RBC-ENTMCNC: 29.7 PG — SIGNIFICANT CHANGE UP (ref 27–34)
MCHC RBC-ENTMCNC: 35.1 GM/DL — SIGNIFICANT CHANGE UP (ref 32–36)
MCV RBC AUTO: 84.8 FL — SIGNIFICANT CHANGE UP (ref 80–100)
PHOSPHATE SERPL-MCNC: 4.1 MG/DL — SIGNIFICANT CHANGE UP (ref 2.5–4.5)
PLATELET # BLD AUTO: 299 K/UL — SIGNIFICANT CHANGE UP (ref 150–400)
POTASSIUM SERPL-MCNC: 4 MMOL/L — SIGNIFICANT CHANGE UP (ref 3.5–5.3)
POTASSIUM SERPL-SCNC: 4 MMOL/L — SIGNIFICANT CHANGE UP (ref 3.5–5.3)
RBC # BLD: 2.71 M/UL — LOW (ref 3.8–5.2)
RBC # FLD: 15.6 % — HIGH (ref 11–15)
SODIUM SERPL-SCNC: 137 MMOL/L — SIGNIFICANT CHANGE UP (ref 135–145)
WBC # BLD: 8.2 K/UL — SIGNIFICANT CHANGE UP (ref 3.8–10.5)
WBC # FLD AUTO: 8.2 K/UL — SIGNIFICANT CHANGE UP (ref 3.8–10.5)

## 2017-04-30 RX ADMIN — Medication 50 MILLIGRAM(S): at 22:20

## 2017-04-30 RX ADMIN — CARBIDOPA AND LEVODOPA 1 TABLET(S): 25; 100 TABLET ORAL at 00:12

## 2017-04-30 RX ADMIN — FLUCONAZOLE 100 MILLIGRAM(S): 150 TABLET ORAL at 14:29

## 2017-04-30 RX ADMIN — CARBIDOPA AND LEVODOPA 1 TABLET(S): 25; 100 TABLET ORAL at 06:35

## 2017-04-30 RX ADMIN — Medication 1 TABLET(S): at 18:22

## 2017-04-30 RX ADMIN — CARBIDOPA AND LEVODOPA 1 TABLET(S): 25; 100 TABLET ORAL at 18:22

## 2017-04-30 RX ADMIN — Medication 50 MILLIGRAM(S): at 14:17

## 2017-04-30 RX ADMIN — Medication 650 MILLIGRAM(S): at 13:30

## 2017-04-30 RX ADMIN — PRAMIPEXOLE DIHYDROCHLORIDE 0.25 MILLIGRAM(S): 0.12 TABLET ORAL at 06:35

## 2017-04-30 RX ADMIN — PRAMIPEXOLE DIHYDROCHLORIDE 0.25 MILLIGRAM(S): 0.12 TABLET ORAL at 22:20

## 2017-04-30 RX ADMIN — CHLORHEXIDINE GLUCONATE 1 APPLICATION(S): 213 SOLUTION TOPICAL at 12:53

## 2017-04-30 RX ADMIN — ENOXAPARIN SODIUM 40 MILLIGRAM(S): 100 INJECTION SUBCUTANEOUS at 00:12

## 2017-04-30 RX ADMIN — Medication 50 MILLIGRAM(S): at 06:35

## 2017-04-30 RX ADMIN — PRAMIPEXOLE DIHYDROCHLORIDE 0.25 MILLIGRAM(S): 0.12 TABLET ORAL at 14:16

## 2017-04-30 RX ADMIN — Medication 166.67 MILLIGRAM(S): at 20:10

## 2017-04-30 RX ADMIN — CARBIDOPA AND LEVODOPA 1 TABLET(S): 25; 100 TABLET ORAL at 12:53

## 2017-04-30 RX ADMIN — Medication 1 TABLET(S): at 06:35

## 2017-04-30 RX ADMIN — PANTOPRAZOLE SODIUM 40 MILLIGRAM(S): 20 TABLET, DELAYED RELEASE ORAL at 12:53

## 2017-04-30 NOTE — PROGRESS NOTE ADULT - SUBJECTIVE AND OBJECTIVE BOX
INTERVAL HPI/OVERNIGHT EVENTS: No change in status overnight.    Vital Signs Last 24 Hrs  T(C): 36.3, Max: 37.2 (04-29 @ 08:00)  T(F): 97.4, Max: 99 (04-29 @ 08:00)  HR: 82 (82 - 113)  BP: 159/77 (125/50 - 159/77)  BP(mean): 97 (69 - 97)  RR: 22 (19 - 23)  SpO2: 97% (91% - 98%)    MEDICATIONS  (STANDING):  enoxaparin Injectable 40milliGRAM(s) SubCutaneous every 24 hours  chlorhexidine 4% Liquid 1Application(s) Topical daily  aztreonam  IVPB 1000milliGRAM(s) IV Intermittent every 8 hours  vancomycin  IVPB 1250milliGRAM(s) IV Intermittent every 24 hours  fluconAZOLE   Tablet 100milliGRAM(s) Oral daily  pantoprazole   Suspension 40milliGRAM(s) Oral daily  lactobacillus acidophilus 1Tablet(s) Oral every 12 hours  carbidopa/levodopa  25/100 1Tablet(s) Oral four times a day  pramipexole 0.25milliGRAM(s) Oral three times a day    MEDICATIONS  (PRN):  acetaminophen    Suspension. 650milliGRAM(s) Enteral Tube every 6 hours PRN Mild Pain (1 - 3)  acetaminophen    Suspension 650milliGRAM(s) Oral every 6 hours PRN For Temp greater than 38 C (100.4 F)      PHYSICAL EXAM:    GENERAL: Does not respond to verbal commands or touch. NAD  HEENT: On ventilator via trach. Peep 5, O2 30%,   CHEST/LUNG: CTAB with good breath sounds.  HEART: RRR  ABDOMEN: Soft. PEG tube in place and TF running. Colostomy with air/stool in bag. Ostomy pink/viable.  EXTREMITIES: Normal.    I&O's Detail  I & Os for 24h ending 29 Apr 2017 07:00  =============================================  IN:    Jevity: 355 ml    sodium chloride 0.9%: 350 ml    Solution: 250 ml    Enteral Tube Flush: 120 ml    Total IN: 1075 ml  ---------------------------------------------  OUT:    Indwelling Catheter - Urethral: 1805 ml    Rectal Tube: 150 ml    Colostomy: 50 ml    Total OUT: 2005 ml  ---------------------------------------------  Total NET: -930 ml    I & Os for current day (as of 30 Apr 2017 06:32)  =============================================  IN:    Jevity: 1260 ml    Solution: 150 ml    Enteral Tube Flush: 120 ml    Total IN: 1530 ml  ---------------------------------------------  OUT:    Indwelling Catheter - Urethral: 1150 ml    Colostomy: 150 ml    Total OUT: 1300 ml  ---------------------------------------------  Total NET: 230 ml      LABS:                        8.1    8.2   )-----------( 299      ( 30 Apr 2017 03:33 )             23.0     04-30    137  |  101  |  16  ----------------------------<  99  4.0   |  25  |  0.25<L>    Ca    8.3<L>      30 Apr 2017 03:33  Phos  4.1     04-30  Mg     2.3     04-30            type    RADIOLOGY & ADDITIONAL STUDIES:        Impression:    86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  Continue supportive care per CCU  Appreciate other consultant input  Trach/PEG/Ostomy care.  Will continue to follow.

## 2017-04-30 NOTE — PROGRESS NOTE ADULT - SUBJECTIVE AND OBJECTIVE BOX
INTERVAL HPI/OVERNIGHT EVENTS:        REVIEW OF SYSTEMS:  CONSTITUTIONAL:  unresponsive  on  respirator      MEDICATION:  enoxaparin Injectable 40milliGRAM(s) SubCutaneous every 24 hours  chlorhexidine 4% Liquid 1Application(s) Topical daily  aztreonam  IVPB 1000milliGRAM(s) IV Intermittent every 8 hours  vancomycin  IVPB 1250milliGRAM(s) IV Intermittent every 24 hours  fluconAZOLE   Tablet 100milliGRAM(s) Oral daily  pantoprazole   Suspension 40milliGRAM(s) Oral daily  lactobacillus acidophilus 1Tablet(s) Oral every 12 hours  acetaminophen    Suspension. 650milliGRAM(s) Enteral Tube every 6 hours PRN  carbidopa/levodopa  25/100 1Tablet(s) Oral four times a day  pramipexole 0.25milliGRAM(s) Oral three times a day  acetaminophen    Suspension 650milliGRAM(s) Oral every 6 hours PRN    Vital Signs Last 24 Hrs  T(C): 36.3, Max: 37.1 (04-29 @ 12:00)  T(F): 97.4, Max: 98.8 (04-29 @ 12:00)  HR: 82 (82 - 113)  BP: 159/77 (127/57 - 159/77)  BP(mean): 97 (75 - 97)  RR: 22 (19 - 23)  SpO2: 97% (91% - 98%)    PHYSICAL EXAM:  GENERAL: NAD, well-groomed, well-developed  NECK: Supple, No JVD, Normal thyroid    NERVOUS SYSTEM:  unresponsive opens  eyes  to  nociferous  stimulus  CHEST/LUNG: Clear    HEART: Regular rate and rhythm; No murmurs, rubs, or gallops  ABDOMEN: Soft, Nontender, Nondistended; Bowel sounds present  EXTREMITIES:  no  edema no  tenderness  SKIN: No rashes   LABS:                        8.1    8.2   )-----------( 299      ( 30 Apr 2017 03:33 )             23.0     04-30    137  |  101  |  16  ----------------------------<  99  4.0   |  25  |  0.25<L>    Ca    8.3<L>      30 Apr 2017 03:33  Phos  4.1     04-30  Mg     2.3     04-30          CAPILLARY BLOOD GLUCOSE      RADIOLOGY & ADDITIONAL TESTS:    Imaging reports  Personally Reviewed:  [x ] YES  [ ] NO    Consultant(s) Notes Reviewed:  [x ] YES  [ ] NO    Care Discussed with Consultants/Other Providers [x ] YES  [ ] NO  ACUTE  RESPIRATORY  FAILURE  PNEUMONIA   CONT  VENT  SUPPORT  AB  IVF  S/P  TRACH  COLO/VESCICULAR FISTULA S/P  diverting  colostomy    LFT elevation observation  metabolic  encephalopathy with  severe  inflammatory  processs supportive care        Problem/Plan - 3:  ·  Problem: Parkinson disease encephalopathy.  Plan: sinemet pramipexole  anemia  continue  to  monitor  no  clinical  evidence  of  acute bleed  transfuse as  needed for  transfer  to  rehab  facility  with  vent  unit

## 2017-05-01 PROCEDURE — 99233 SBSQ HOSP IP/OBS HIGH 50: CPT

## 2017-05-01 RX ORDER — VANCOMYCIN HCL 1 G
1000 VIAL (EA) INTRAVENOUS EVERY 24 HOURS
Qty: 0 | Refills: 0 | Status: DISCONTINUED | OUTPATIENT
Start: 2017-05-01 | End: 2017-05-05

## 2017-05-01 RX ORDER — AMLODIPINE BESYLATE 2.5 MG/1
5 TABLET ORAL DAILY
Qty: 0 | Refills: 0 | Status: DISCONTINUED | OUTPATIENT
Start: 2017-05-01 | End: 2017-05-11

## 2017-05-01 RX ADMIN — Medication 50 MILLIGRAM(S): at 13:33

## 2017-05-01 RX ADMIN — ENOXAPARIN SODIUM 40 MILLIGRAM(S): 100 INJECTION SUBCUTANEOUS at 00:36

## 2017-05-01 RX ADMIN — PRAMIPEXOLE DIHYDROCHLORIDE 0.25 MILLIGRAM(S): 0.12 TABLET ORAL at 13:34

## 2017-05-01 RX ADMIN — Medication 50 MILLIGRAM(S): at 23:00

## 2017-05-01 RX ADMIN — Medication 250 MILLIGRAM(S): at 17:34

## 2017-05-01 RX ADMIN — CARBIDOPA AND LEVODOPA 1 TABLET(S): 25; 100 TABLET ORAL at 05:40

## 2017-05-01 RX ADMIN — PRAMIPEXOLE DIHYDROCHLORIDE 0.25 MILLIGRAM(S): 0.12 TABLET ORAL at 05:41

## 2017-05-01 RX ADMIN — Medication 50 MILLIGRAM(S): at 05:40

## 2017-05-01 RX ADMIN — PANTOPRAZOLE SODIUM 40 MILLIGRAM(S): 20 TABLET, DELAYED RELEASE ORAL at 13:33

## 2017-05-01 RX ADMIN — Medication 1 TABLET(S): at 05:40

## 2017-05-01 RX ADMIN — CARBIDOPA AND LEVODOPA 1 TABLET(S): 25; 100 TABLET ORAL at 13:34

## 2017-05-01 RX ADMIN — CARBIDOPA AND LEVODOPA 1 TABLET(S): 25; 100 TABLET ORAL at 00:36

## 2017-05-01 RX ADMIN — CHLORHEXIDINE GLUCONATE 1 APPLICATION(S): 213 SOLUTION TOPICAL at 13:34

## 2017-05-01 RX ADMIN — PRAMIPEXOLE DIHYDROCHLORIDE 0.25 MILLIGRAM(S): 0.12 TABLET ORAL at 23:00

## 2017-05-01 RX ADMIN — CARBIDOPA AND LEVODOPA 1 TABLET(S): 25; 100 TABLET ORAL at 17:30

## 2017-05-01 RX ADMIN — AMLODIPINE BESYLATE 5 MILLIGRAM(S): 2.5 TABLET ORAL at 19:08

## 2017-05-01 RX ADMIN — FLUCONAZOLE 100 MILLIGRAM(S): 150 TABLET ORAL at 17:28

## 2017-05-01 RX ADMIN — Medication 1 TABLET(S): at 17:28

## 2017-05-01 NOTE — PROGRESS NOTE ADULT - SUBJECTIVE AND OBJECTIVE BOX
INTERVAL HPI/OVERNIGHT EVENTS:        REVIEW OF SYSTEMS:  CONSTITUTIONAL:  unresponsive  on  rspirator      MEDICATION:  enoxaparin Injectable 40milliGRAM(s) SubCutaneous every 24 hours  chlorhexidine 4% Liquid 1Application(s) Topical daily  aztreonam  IVPB 1000milliGRAM(s) IV Intermittent every 8 hours  fluconAZOLE   Tablet 100milliGRAM(s) Oral daily  pantoprazole   Suspension 40milliGRAM(s) Oral daily  lactobacillus acidophilus 1Tablet(s) Oral every 12 hours  acetaminophen    Suspension. 650milliGRAM(s) Enteral Tube every 6 hours PRN  carbidopa/levodopa  25/100 1Tablet(s) Oral four times a day  pramipexole 0.25milliGRAM(s) Oral three times a day  acetaminophen    Suspension 650milliGRAM(s) Oral every 6 hours PRN  vancomycin  IVPB 1000milliGRAM(s) IV Intermittent every 24 hours    Vital Signs Last 24 Hrs  T(C): 37.2, Max: 37.9 (05-01 @ 04:00)  T(F): 99, Max: 100.2 (05-01 @ 04:00)  HR: 109 (90 - 109)  BP: 168/87 (148/62 - 170/82)  BP(mean): 107 (84 - 107)  RR: 24 (20 - 25)  SpO2: 96% (96% - 99%)    PHYSICAL EXAM:  GENERAL: NAD, well-groomed, well-developed  EYES:  conjunctiva and sclera clear  ENMT:  Moist mucous membranes,   NECK: Supple, No JVD, Normal thyroid  NERVOUS SYSTEM:  Alert oriented   no  focal  deficits;   CHEST/LUNG: Clear    HEART: Regular rate and rhythm; No murmurs, rubs, or gallops  ABDOMEN: Soft, Nontender, Nondistended; Bowel sounds present  EXTREMITIES:  no  edema no  tenderness  SKIN: No rashes   LABS:                        8.6    8.6   )-----------( 339      ( 01 May 2017 03:14 )             25.6     05-01    135  |  98  |  19  ----------------------------<  97  4.4   |  25  |  0.29<L>    Ca    8.5      01 May 2017 03:14  Phos  4.1     05-01  Mg     2.1     05-01    TPro  8.9<H>  /  Alb  1.2<L>  /  TBili  0.3  /  DBili  x   /  AST  21  /  ALT  9<L>  /  AlkPhos  140<H>  05-01        CAPILLARY BLOOD GLUCOSE      RADIOLOGY & ADDITIONAL TESTS:    Imaging reports  Personally Reviewed:  [ x] YES  [ ] NO    Consultant(s) Notes Reviewed:  [x ] YES  [ ] NO    Care Discussed with Consultants/Other Providers [ x] YES  [ ] NO  ACUTE  RESPIRATORY  FAILURE  PNEUMONIA   CONT  VENT  SUPPORT  AB  IVF  S/P  TRACH  COLO/VESCICULAR FISTULA S/P  diverting  colostomy    LFT elevation observation  metabolic  encephalopathy with  severe  inflammatory  processs supportive care        Problem/Plan - 3:  ·  Problem: Parkinson disease encephalopathy.  Plan: sinemet pramipexole  anemia  continue  to  monitor  no  clinical  evidence  of  acute bleed  transfuse as  needed for  transfer  to  rehab  facility  with  vent  unit

## 2017-05-01 NOTE — PROGRESS NOTE ADULT - SUBJECTIVE AND OBJECTIVE BOX
Pt stable - s/p Trach and Peg  on Jevity tube feedings      MEDICATIONS  (STANDING):  enoxaparin Injectable 40milliGRAM(s) SubCutaneous every 24 hours  chlorhexidine 4% Liquid 1Application(s) Topical daily  aztreonam  IVPB 1000milliGRAM(s) IV Intermittent every 8 hours  fluconAZOLE   Tablet 100milliGRAM(s) Oral daily  pantoprazole   Suspension 40milliGRAM(s) Oral daily  lactobacillus acidophilus 1Tablet(s) Oral every 12 hours  carbidopa/levodopa  25/100 1Tablet(s) Oral four times a day  pramipexole 0.25milliGRAM(s) Oral three times a day  vancomycin  IVPB 1000milliGRAM(s) IV Intermittent every 24 hours    MEDICATIONS  (PRN):  acetaminophen    Suspension. 650milliGRAM(s) Enteral Tube every 6 hours PRN Mild Pain (1 - 3)  acetaminophen    Suspension 650milliGRAM(s) Oral every 6 hours PRN For Temp greater than 38 C (100.4 F)      Allergies    No Known Allergies    Intolerances        Vital Signs Last 24 Hrs  T(C): 37.2, Max: 37.9 (05-01 @ 04:00)  T(F): 99, Max: 100.2 (05-01 @ 04:00)  HR: 109 (90 - 109)  BP: 168/87 (148/62 - 170/82)  BP(mean): 107 (84 - 107)  RR: 24 (20 - 25)  SpO2: 96% (96% - 99%)    PHYSICAL EXAM:  General: NAD.  CVS: S1, S2  Chest: air entry bilaterally present  Abd: BS present, soft, non-tender, +Peg    LABS:                        8.6    8.6   )-----------( 339      ( 01 May 2017 03:14 )             25.6     05-01    135  |  98  |  19  ----------------------------<  97  4.4   |  25  |  0.29<L>    Ca    8.5      01 May 2017 03:14  Phos  4.1     05-01  Mg     2.1     05-01    TPro  8.9<H>  /  Alb  1.2<L>  /  TBili  0.3  /  DBili  x   /  AST  21  /  ALT  9<L>  /  AlkPhos  140<H>  05-01      Continue jevity feedings via PEG

## 2017-05-01 NOTE — PROGRESS NOTE ADULT - SUBJECTIVE AND OBJECTIVE BOX
HEMODYNAMICALLY STABLE OVERNIGHT  SP TRACHEOSTOMY & FEEDING TUBE;   STABLE RHYTHM  SUPPORTIVE EFFORTS CONTINUE

## 2017-05-01 NOTE — PROGRESS NOTE ADULT - SUBJECTIVE AND OBJECTIVE BOX
HPI:  86F hx of parkinsons, recent right hip / femur fracture with medullary pins sent from rehab for AMS and fever. Admitted to the hospital for sepsis, UTI/PNA, AMS. Patient had RRT on the floors for hypotension, and hypoxia. During intubation for airway protection, tube feeds found in the endotracheal tube. Transferred to the ICU for acute hypoxic respiratory failure, intubated. Course with failure to wean.     24 hr events:  slight bleeding around trach  mental status remains poor      ## Labs:  CBC:                          8.6    8.6   )-----------( 339      ( 01 May 2017 03:14 )             25.6     Chem:  05-01    135  |  98  |  19  ----------------------------<  97  4.4   |  25  |  0.29<L>    Ca    8.5      01 May 2017 03:14  Phos  4.1     05-01  Mg     2.1     05-01    TPro  8.9<H>  /  Alb  1.2<L>  /  TBili  0.3  /  DBili  x   /  AST  21  /  ALT  9<L>  /  AlkPhos  140<H>  05-01          ## Imaging:  CXR interval placement of tracheostomy tube with good position of the tube above the eduardo.  Prior enteric tube has been withdrawn. The cardiac silhouette is stable in appearance.  Bilateral lung opacities again seen with improvement in lower right lung haziness. No significant pleural effusions. No pneumothorax.      MEDICATIONS  (STANDING):  enoxaparin Injectable 40milliGRAM(s) SubCutaneous every 24 hours  chlorhexidine 4% Liquid 1Application(s) Topical daily  aztreonam  IVPB 1000milliGRAM(s) IV Intermittent every 8 hours  fluconAZOLE   Tablet 100milliGRAM(s) Oral daily  pantoprazole   Suspension 40milliGRAM(s) Oral daily  lactobacillus acidophilus 1Tablet(s) Oral every 12 hours  carbidopa/levodopa  25/100 1Tablet(s) Oral four times a day  pramipexole 0.25milliGRAM(s) Oral three times a day  vancomycin  IVPB 1000milliGRAM(s) IV Intermittent every 24 hours    ICU Vital Signs Last 24 Hrs  T(C): 37.2, Max: 37.9 (05-01 @ 04:00)  T(F): 99, Max: 100.2 (05-01 @ 04:00)  HR: 109 (90 - 109)  BP: 168/87 (149/67 - 170/82)  BP(mean): 107 (87 - 107)  ABP: --  ABP(mean): --  RR: 24 (20 - 25)  SpO2: 96% (96% - 99%)        I & Os for 24h ending 04-28 @ 07:00  =============================================  IN: 2170 ml / OUT: 2155 ml / NET: 15 ml        ## P/E:  GENERAL: NAD, elderly female, unresponsive   HEAD:  Atraumatic, Normocephalic  EYES: PERRL, conjunctiva and sclera clear  ENMT: + trach  NECK: Supple, No JVD  NERVOUS SYSTEM:  withdraws upper extremities to pain  CHEST/LUNG: Clear to auscultation bilaterally; No rales or rhonchi; mechanical breath sounds.   HEART: Regular rate and rhythm  ABDOMEN: Soft, Nontender, Nondistended; Bowel sounds present + PEG + right colostomy  EXTREMITIES:  2+ Peripheral Pulses, No clubbing, cyanosis; bilateral hand edema improving        CENTRAL LINE: [ ] YES [x] NO      MAYFIELD: [x] YES [ ] NO          A-LINE:  [ ] YES [x] NO        GLOBAL ISSUE/BEST PRACTICE:  Analgesia: n/a  Sedation: n/a  HOB elevation: yes  Stress ulcer prophylaxis: protonix  VTE prophylaxis: lovenox  Oral Care: chlorhexidine  Glycemic control: n/a   Nutrition: jevity peg    CODE STATUS: [ ] full code  [x] DNR  [ ] DNI  [x] MOLST  Goals of care discussion: [x] yes

## 2017-05-01 NOTE — PROGRESS NOTE ADULT - SUBJECTIVE AND OBJECTIVE BOX
Patient seen and examined at the bedside.  No acute events overnight.    Patient s/p trach, diverting colostomy by surgery, PEG by GI    notified of concern for new drainage at trach site.      HPI:  patient  with  worsening  lethargy  fever  dysphagia  evaluated  in  ER  admitted  for  pneumonia  UTI  patient  s/p  l  hip  medullary  nail (04 Apr 2017 19:53)      Vital Signs Last 24 Hrs  T(C): 37.9, Max: 38.7 (04-30 @ 13:00)  T(F): 100.2, Max: 101.6 (04-30 @ 13:00)  HR: 94 (86 - 105)  BP: 160/73 (140/58 - 160/73)  BP(mean): 95 (80 - 100)  RR: 25 (20 - 26)  SpO2: 97% (95% - 99%)                          8.6    8.6   )-----------( 339      ( 01 May 2017 03:14 )             25.6       05-01    135  |  98  |  19  ----------------------------<  97  4.4   |  25  |  0.29<L>    Ca    8.5      01 May 2017 03:14  Phos  4.1     05-01  Mg     2.1     05-01    TPro  8.9<H>  /  Alb  1.2<L>  /  TBili  0.3  /  DBili  x   /  AST  21  /  ALT  9<L>  /  AlkPhos  140<H>  05-01  LIVER FUNCTIONS - ( 01 May 2017 03:14 )  Alb: 1.2 g/dL / Pro: 8.9 gm/dL / ALK PHOS: 140 U/L / ALT: 9 U/L / AST: 21 U/L / GGT: x           I&O's Detail    I & Os for current day (as of 01 May 2017 07:54)  =============================================  IN:    Jevity: 1310 ml    Enteral Tube Flush: 310 ml    Solution: 250 ml    Solution: 100 ml    Total IN: 1970 ml  ---------------------------------------------  OUT:    Indwelling Catheter - Urethral: 1415 ml    Colostomy: 350 ml    Total OUT: 1765 ml  ---------------------------------------------  Total NET: 205 ml    Mode: CPAP with PS  FiO2: 30  PEEP: 5  PS: 5  ITime: 0.9  MAP: 8  PIP: 14    MEDICATIONS  (STANDING):  enoxaparin Injectable 40milliGRAM(s) SubCutaneous every 24 hours  chlorhexidine 4% Liquid 1Application(s) Topical daily  aztreonam  IVPB 1000milliGRAM(s) IV Intermittent every 8 hours  vancomycin  IVPB 1250milliGRAM(s) IV Intermittent every 24 hours  fluconAZOLE   Tablet 100milliGRAM(s) Oral daily  pantoprazole   Suspension 40milliGRAM(s) Oral daily  lactobacillus acidophilus 1Tablet(s) Oral every 12 hours  carbidopa/levodopa  25/100 1Tablet(s) Oral four times a day  pramipexole 0.25milliGRAM(s) Oral three times a day    MEDICATIONS  (PRN):  acetaminophen    Suspension. 650milliGRAM(s) Enteral Tube every 6 hours PRN Mild Pain (1 - 3)  acetaminophen    Suspension 650milliGRAM(s) Oral every 6 hours PRN For Temp greater than 38 C (100.4 F)        Physical Exam  GEN: unresponsive, trach in place on vent  HEENT: NCAT, Trachea in place with noted clotted blood at the periphery , not currently frankly bleeding, skin beneath collar without obvious swelling or hematoma, no scleral icterus  Resp: unlabored,no accessory muscle use evident   CV: No Tachycardia, S1 S2  ABD: Soft, no rigidity, ostomy functioning with copious stool produced  EXT: warm     A/P: 86yFemale a/w lethargy, PNA, sepsis and colovesicular fistula, with aspiration event 4/16 leading to complete respiratory failure now s/p PEG via GI and tracheostomy + diverting loop colostomy via general surgery POD #3, with some minor bleeding at trach site and functioning ostomy.  Possible transfer to med/surg floor later today.    -change drain sponge at tracheostomy prn  -suction trach and routine trach care prn  -ostomy care per routine  -IV hydration  -continued medical management per CCU/medicine team  -will d/w surgical attg        PAST MEDICAL & SURGICAL HISTORY:  Pneumonia  Anemia  Parkinson disease  Tremors of nervous system  No pertinent past medical history  Status post hip surgery  No significant past surgical history      -

## 2017-05-01 NOTE — PROGRESS NOTE ADULT - SUBJECTIVE AND OBJECTIVE BOX
SURGERY PROGRESS HPI:  Called by CCU PA to evaluate blood on tracheostomy dressing. Pt seen and examined at bedside. Patient remains intubated and resting comfortably.       Vital Signs Last 24 Hrs  T(C): 37.2, Max: 37.9 (05-01 @ 04:00)  T(F): 99, Max: 100.2 (05-01 @ 04:00)  HR: 109 (90 - 109)  BP: 171/79 (149/67 - 171/79)  BP(mean): 103 (87 - 107)  RR: 25 (20 - 25)  SpO2: 93% (93% - 99%)      PHYSICAL EXAM:    GENERAL: NAD, vent  HEAD/NECK: Tracheostomy intact and functioning well. Small amount of clotted blood at the sides of tracheostomy. No active bleeding. No purulence. Skin beneath collar without obvious swelling or hematoma, no scleral icterus.  CHEST/LUNG: Clear to ausculation, bilaterally   HEART: RRR S1S2    I&O's Detail    I & Os for current day (as of 01 May 2017 19:04)  =============================================  IN:    Jevity: 1310 ml    Enteral Tube Flush: 310 ml    Solution: 250 ml    Solution: 100 ml    Total IN: 1970 ml  ---------------------------------------------  OUT:    Indwelling Catheter - Urethral: 1415 ml    Colostomy: 350 ml    Total OUT: 1765 ml  ---------------------------------------------  Total NET: 205 ml      LABS:                        8.6    8.6   )-----------( 339      ( 01 May 2017 03:14 )             25.6     05-01    135  |  98  |  19  ----------------------------<  97  4.4   |  25  |  0.29<L>    Ca    8.5      01 May 2017 03:14  Phos  4.1     05-01  Mg     2.1     05-01    TPro  8.9<H>  /  Alb  1.2<L>  /  TBili  0.3  /  DBili  x   /  AST  21  /  ALT  9<L>  /  AlkPhos  140<H>  05-01        Assessment: 86yFemale a/w lethargy, PNA, sepsis and colovesicular fistula, with aspiration event 4/16 leading to complete respiratory failure now s/p PEG via GI and tracheostomy + diverting loop colostomy via general surgery POD #3, with some minor bleeding at trach site and functioning ostomy.  Possible transfer to med/surg floor later.    Plan:  -change drain sponge at tracheostomy prn  -suction trach and routine trach care prn  -ostomy care per routine  -IV hydration  -continued medical management per CCU/medicine team  -will d/w surgical attending     PAST MEDICAL & SURGICAL HISTORY:  Pneumonia  Anemia  Parkinson disease  Tremors of nervous system  No pertinent past medical history  Status post hip surgery  No significant past surgical history

## 2017-05-01 NOTE — PROGRESS NOTE ADULT - SUBJECTIVE AND OBJECTIVE BOX
TMAX -  101.6 yesterday afternoon    On day #  12 Azactam / # 25 Vanco / # 15 Diflucan     Vital Signs Last 24 Hrs  T(C): 37.2, Max: 37.9 (05-01 @ 04:00)  T(F): 99, Max: 100.2 (05-01 @ 04:00)  HR: 109 (90 - 109)  BP: 168/87 (148/62 - 170/82)  BP(mean): 107 (84 - 107)  RR: 24 (20 - 25)  SpO2: 96% (96% - 99%)  Mode: CPAP with PS  FiO2: 30  PEEP: 5  PS: 5  ITime: 0.9  MAP: 7  PIP: 11  Supplemental O2:   on 30% FIO2 + 5 PEEP on vent       Remains on ventilator, poorly responsive.        PHYSICAL EXAM  General:  opens eyes ? spontaneously / ? to stimulation now, but does not follow commands, on ventilator  HEENT: conj pink, sclerae anicteric, PERRLA, no oral lesions noted   Neck:  supple, no nodes noted, trach site with bloody drainage noted, but no erythema surrounding  Heart:  RR  Lungs:  few anterior rhonchi bilat   Abdomen:  soft, BS+, nontender appearance, Colostomy functioning, PEG in place - site clean but dressing with some tannish discoloration, tube feedings in progress  Extremities:  decreased swelling both arms and hands                     RUE midline in place - site clean, dressing intact - placed 4/25  Skin:  warm, clammy now, no rash noted      I&O's Summary :    I & Os for current day (as of 01 May 2017 15:03)  =============================================  IN: 1970 ml / OUT: 1765 ml / NET: 205 ml      LABS:  CBC Full  -  ( 01 May 2017 03:14 )  WBC Count : 8.6 K/uL  Hemoglobin : 8.6 g/dL  Hematocrit : 25.6 %  Platelet Count - Automated : 339 K/uL  Mean Cell Volume : 86.7 fl  Mean Cell Hemoglobin : 29.1 pg  Mean Cell Hemoglobin Concentration : 33.5 gm/dL  Auto Neutrophil # : x  Auto Lymphocyte # : x  Auto Monocyte # : x  Auto Eosinophil # : x  Auto Basophil # : x  Auto Neutrophil % : x  Auto Lymphocyte % : x  Auto Monocyte % : x  Auto Eosinophil % : x  Auto Basophil % : x    05-01    135  |  98  |  19  ----------------------------<  97  4.4   |  25  |  0.29<L>    Ca    8.5      01 May 2017 03:14  Phos  4.1     05-01  Mg     2.1     05-01    TPro  8.9<H>  /  Alb  1.2<L>  /  TBili  0.3  /  DBili  x   /  AST  21  /  ALT  9<L>  /  AlkPhos  140<H>  05-01    LIVER FUNCTIONS - ( 01 May 2017 03:14 )  Alb: 1.2 g/dL / Pro: 8.9 gm/dL / ALK PHOS: 140 U/L / ALT: 9 U/L / AST: 21 U/L / GGT: x           Vancomycin Level, Trough: 20.5 ug/mL (04-29 @ 17:52)          CULTURES:  Specimen Source: .Broncial Other, bronchoalveolar lavage (04-25 @ 02:26)  Culture Results:   Rare to few Yeast like cells (04-25 @ 02:26)    Specimen Source: .Broncial Bronchoalveolar Lavage (04-24 @ 20:53)  Culture Results:   Normal Respiratory Cindy present (04-24 @ 20:53)        Radiology:  CXR - 4/29 -  FINDINGS:    Tracheostomy tube is noted in place.    There are bilateral lung opacities. The cardiac and mediastinal contours   are prominent, which may be due to magnification from AP technique and   shallow inspiration. The osseous structures are intact.    IMPRESSION:    No change in bilateral lung opacities.        Impression: Temp increased again  on ab rx with Azactam + Vanco + Diflucan, s/p Trach, PEG, and Colostomy, with WBC's -wnl and Vanco trough slightly elevated now.    Suggestions: Will re-cx blood and sputum now, and repeat CXR in AM as well as labs.  Follow-up temps and cx's.  Continue present ab rx for now.

## 2017-05-01 NOTE — PROGRESS NOTE ADULT - SUBJECTIVE AND OBJECTIVE BOX
HPI:  patient  with  worsening  lethargy  fever  dysphagia  evaluated  in  ER  admitted  for  pneumonia  UTI  patient  s/p  l  hip  medullary  nail (04 Apr 2017 19:53)  Initial Consultaion Note  Requested by Name:  Date/ Time:  Reason for referral/ Consultation:        PAST MEDICAL & SURGICAL HISTORY:  Pneumonia  Anemia  Parkinson disease  Tremors of nervous system  No pertinent past medical history  Status post hip surgery  No significant past surgical history      SOCIAL HISTORY:                                 Admitted from: home [ ] SNF [ ] [facility] [ ] [facility] [ ]    )    ADVANCE DIRECTIVES:  [ ] YES [ ] NO     DNR [ ] YES [ ] NO  Completed on:                       MOLST  [ ] YES [ ] NO   Completed on  :  Living Will  [ ] YES [ ] NO   Completed on:    Allergies    No Known Allergies    Intolerances        Review of Systems:     PAIN : (Y/N) (0-10)          DYSPNEA:     NAUSEA/VOMITING:   DEPRESSION:        SECRETIONS:(    FRAILTY SYNDROM       FAILURE TO THRIVE     DIBILITY   OTHER SYMPTOMS :    UNABLE TO OBTAIN DUE TO POOR MENTATION (   )    PHYSICAL EXAM:  T(F): 99, Max: 100.2 (05-01 @ 04:00)  HR: 109 (90 - 109)  BP: 168/87 (149/67 - 170/82)  RR: 24 (20 - 25)  SpO2: 96% (96% - 99%)  Wt(kg): --   WEIGHT :    [id number]                  BMI:  I&O's Summary    I & Os for current day (as of 01 May 2017 16:30)  =============================================  IN: 1970 ml / OUT: 1765 ml / NET: 205 ml    CAPILLARY BLOOD GLUCOSE      GENERAL: alert [ ] oriented x ______[ ] lethargic        [ ] cachexia     [ ] nonverbal [ ] coma [ ]    HEENT: normal [ ] dry mouth [ ] ET tube/trach [ ]   LUNGS: ]  CV :  normal [ ]  GI : normal [ ]  PEG /NG tube [ ]   : normal [ ] incontinent [ ] oliguria/anuria [ ] shoemaker [ ]  MSK : normal [ ] weakness [ ] edema [ ]              ambulatory [ ] bebbound/wheelchair bound [ ]   SKIN : normal [ ] pressure ulcer (Y/N) Stage ______________ rash (Y/N)    Functional Assessment:Karnofsky Performance Score :  Palliative Performance Status Version 2:         %    LABS:                        8.6    8.6   )-----------( 339      ( 01 May 2017 03:14 )             25.6     05-01    135  |  98  |  19  ----------------------------<  97  4.4   |  25  |  0.29<L>    Ca    8.5      01 May 2017 03:14  Phos  4.1     05-01  Mg     2.1     05-01    TPro  8.9<H>  /  Alb  1.2<L>  /  TBili  0.3  /  DBili  x   /  AST  21  /  ALT  9<L>  /  AlkPhos  140<H>  05-01          I&O's Detail    I & Os for current day (as of 01 May 2017 16:30)  =============================================  IN:    Jevity: 1310 ml    Enteral Tube Flush: 310 ml    Solution: 250 ml    Solution: 100 ml    Total IN: 1970 ml  ---------------------------------------------  OUT:    Indwelling Catheter - Urethral: 1415 ml    Colostomy: 350 ml    Total OUT: 1765 ml  ---------------------------------------------  Total NET: 205 ml      Assessment:   86y Female admitted with ALTERED MENTAL STATUS/UTI/URINE RETENTION  AMS/PNEUMONIA  AMS      PROBLEM LIST :  PROBLEM/RECOMMENDATION: 1  Problem : Goals of care, counseling/ discussion.  Recommendation: met with patient/ family and discussed GOC & Advanced care planning                                    Palliative care info/counseling provided (Y/N)                                      Family meeting                                   Advanced Directives addressed (Y/N)  PROBLEM/ RECOMMENDATION:2  Problem :Resuscitation/ DNR/ DNI,   Recommendation: DNR/ DNI    PROBLEM/ RECOMMENDATION :3  Problem : Medical order for life sustaning treatment  Recommendation : MOLST Form ( initiated/ completed)    PROBLEM/RECOMMENDATION :4  Problem : Advanced care planning  Recommendation : Family meeting.(Y/N)     PLAN:  REFFERALS:                           Unit SW/Case Mgmt (Y/N)                          (Y/N)                         Speech/Swallow (Y/N)                           nutrition                                              Ethics (Y/N)                         PT/OT (Y/N)

## 2017-05-02 LAB
ALBUMIN SERPL ELPH-MCNC: 1.2 G/DL — LOW (ref 3.3–5)
ALP SERPL-CCNC: 123 U/L — HIGH (ref 40–120)
ALT FLD-CCNC: 17 U/L — SIGNIFICANT CHANGE UP (ref 12–78)
ANION GAP SERPL CALC-SCNC: 13 MMOL/L — SIGNIFICANT CHANGE UP (ref 5–17)
ANISOCYTOSIS BLD QL: SLIGHT — SIGNIFICANT CHANGE UP
AST SERPL-CCNC: 21 U/L — SIGNIFICANT CHANGE UP (ref 15–37)
BASO STIPL BLD QL SMEAR: PRESENT — SIGNIFICANT CHANGE UP
BASOPHILS NFR BLD AUTO: 1 % — SIGNIFICANT CHANGE UP (ref 0–2)
BILIRUB SERPL-MCNC: 0.3 MG/DL — SIGNIFICANT CHANGE UP (ref 0.2–1.2)
BUN SERPL-MCNC: 17 MG/DL — SIGNIFICANT CHANGE UP (ref 7–23)
CALCIUM SERPL-MCNC: 8.8 MG/DL — SIGNIFICANT CHANGE UP (ref 8.5–10.1)
CHLORIDE SERPL-SCNC: 94 MMOL/L — LOW (ref 96–108)
CO2 SERPL-SCNC: 27 MMOL/L — SIGNIFICANT CHANGE UP (ref 22–31)
CREAT SERPL-MCNC: 0.31 MG/DL — LOW (ref 0.5–1.3)
EOSINOPHIL NFR BLD AUTO: 1 % — SIGNIFICANT CHANGE UP (ref 0–6)
ERYTHROCYTE [SEDIMENTATION RATE] IN BLOOD: >140 MM/HR — HIGH (ref 0–20)
GLUCOSE SERPL-MCNC: 94 MG/DL — SIGNIFICANT CHANGE UP (ref 70–99)
GRAM STN FLD: SIGNIFICANT CHANGE UP
HCT VFR BLD CALC: 27.4 % — LOW (ref 34.5–45)
HGB BLD-MCNC: 9.1 G/DL — LOW (ref 11.5–15.5)
HYPOCHROMIA BLD QL: SLIGHT — SIGNIFICANT CHANGE UP
LYMPHOCYTES # BLD AUTO: 13 % — SIGNIFICANT CHANGE UP (ref 13–44)
MCHC RBC-ENTMCNC: 28.8 PG — SIGNIFICANT CHANGE UP (ref 27–34)
MCHC RBC-ENTMCNC: 33.2 GM/DL — SIGNIFICANT CHANGE UP (ref 32–36)
MCV RBC AUTO: 87 FL — SIGNIFICANT CHANGE UP (ref 80–100)
METAMYELOCYTES # FLD: 1 % — HIGH (ref 0–0)
MICROCYTES BLD QL: SLIGHT — SIGNIFICANT CHANGE UP
MONOCYTES NFR BLD AUTO: 5 % — SIGNIFICANT CHANGE UP (ref 2–14)
NEUTROPHILS NFR BLD AUTO: 78 % — HIGH (ref 43–77)
NEUTS BAND # BLD: 1 % — SIGNIFICANT CHANGE UP (ref 0–8)
PLAT MORPH BLD: NORMAL — SIGNIFICANT CHANGE UP
PLATELET # BLD AUTO: 340 K/UL — SIGNIFICANT CHANGE UP (ref 150–400)
POIKILOCYTOSIS BLD QL AUTO: SLIGHT — SIGNIFICANT CHANGE UP
POLYCHROMASIA BLD QL SMEAR: SLIGHT — SIGNIFICANT CHANGE UP
POTASSIUM SERPL-MCNC: 4.2 MMOL/L — SIGNIFICANT CHANGE UP (ref 3.5–5.3)
POTASSIUM SERPL-SCNC: 4.2 MMOL/L — SIGNIFICANT CHANGE UP (ref 3.5–5.3)
PROT SERPL-MCNC: 9.2 GM/DL — HIGH (ref 6–8.3)
RBC # BLD: 3.14 M/UL — LOW (ref 3.8–5.2)
RBC # FLD: 16.1 % — HIGH (ref 11–15)
RBC BLD AUTO: ABNORMAL
ROULEAUX BLD QL SMEAR: PRESENT — SIGNIFICANT CHANGE UP
SMUDGE CELLS # BLD: PRESENT — SIGNIFICANT CHANGE UP
SODIUM SERPL-SCNC: 134 MMOL/L — LOW (ref 135–145)
SPECIMEN SOURCE: SIGNIFICANT CHANGE UP
WBC # BLD: 9.7 K/UL — SIGNIFICANT CHANGE UP (ref 3.8–10.5)
WBC # FLD AUTO: 9.7 K/UL — SIGNIFICANT CHANGE UP (ref 3.8–10.5)

## 2017-05-02 PROCEDURE — 99233 SBSQ HOSP IP/OBS HIGH 50: CPT

## 2017-05-02 PROCEDURE — 71010: CPT | Mod: 26

## 2017-05-02 RX ADMIN — Medication 1 TABLET(S): at 17:35

## 2017-05-02 RX ADMIN — Medication 1 TABLET(S): at 06:04

## 2017-05-02 RX ADMIN — Medication 250 MILLIGRAM(S): at 17:35

## 2017-05-02 RX ADMIN — Medication 50 MILLIGRAM(S): at 06:04

## 2017-05-02 RX ADMIN — CARBIDOPA AND LEVODOPA 1 TABLET(S): 25; 100 TABLET ORAL at 17:35

## 2017-05-02 RX ADMIN — CARBIDOPA AND LEVODOPA 1 TABLET(S): 25; 100 TABLET ORAL at 01:00

## 2017-05-02 RX ADMIN — AMLODIPINE BESYLATE 5 MILLIGRAM(S): 2.5 TABLET ORAL at 06:04

## 2017-05-02 RX ADMIN — PRAMIPEXOLE DIHYDROCHLORIDE 0.25 MILLIGRAM(S): 0.12 TABLET ORAL at 14:11

## 2017-05-02 RX ADMIN — CARBIDOPA AND LEVODOPA 1 TABLET(S): 25; 100 TABLET ORAL at 06:04

## 2017-05-02 RX ADMIN — PANTOPRAZOLE SODIUM 40 MILLIGRAM(S): 20 TABLET, DELAYED RELEASE ORAL at 14:11

## 2017-05-02 RX ADMIN — Medication 50 MILLIGRAM(S): at 14:11

## 2017-05-02 RX ADMIN — Medication 50 MILLIGRAM(S): at 22:40

## 2017-05-02 RX ADMIN — PRAMIPEXOLE DIHYDROCHLORIDE 0.25 MILLIGRAM(S): 0.12 TABLET ORAL at 06:04

## 2017-05-02 RX ADMIN — PRAMIPEXOLE DIHYDROCHLORIDE 0.25 MILLIGRAM(S): 0.12 TABLET ORAL at 22:40

## 2017-05-02 RX ADMIN — CARBIDOPA AND LEVODOPA 1 TABLET(S): 25; 100 TABLET ORAL at 14:11

## 2017-05-02 RX ADMIN — CHLORHEXIDINE GLUCONATE 1 APPLICATION(S): 213 SOLUTION TOPICAL at 14:12

## 2017-05-02 RX ADMIN — FLUCONAZOLE 100 MILLIGRAM(S): 150 TABLET ORAL at 15:21

## 2017-05-02 NOTE — PROGRESS NOTE ADULT - SUBJECTIVE AND OBJECTIVE BOX
TMAX - 100,2    On day # 13 Azactam / # 26 Vanco / # 16 Diflucan  Vital Signs Last 24 Hrs  T(C): 37.1, Max: 37.1 (05-02 @ 11:45)  T(F): 98.7, Max: 98.7 (05-02 @ 11:45)  HR: 105 (83 - 112)  BP: 127/61 (125/60 - 171/79)  BP(mean): 79 (79 - 107)  RR: 18 (18 - 25)  SpO2: 99% (93% - 100%)  Mode: AC/ CMV (Assist Control/ Continuous Mandatory Ventilation)  RR (machine): 16  TV (machine): 400  FiO2: 30  PEEP: 5  ITime: 0.9  MAP: 7  PIP: 13  Supplemental O2:  on 30% FIO2 + 5 PEEP on ventilator    Remains on ventilator, off pressors, with low-grade temp now.      PHYSICAL EXAM  General: opens eyes on ventilator, following no commands, but appears to follow somewhat with her eyes, in NAD now, with noted mild tremor Lt hand   HEENT:  conj pink, sclerae anicteric, PERRLA, no oral lesions noted  Neck: supple, no nodes noted, trach midline with small amount of dried blood at site   Heart:  RR  Lungs:  few anterior rhonchi, Rt > Lt  Abdomen:  soft, BS+, appears nontender, PEG in place with feedings ongoing- site clean                  Colostomy functioning Rt side abdomen  Extremities:  no edema LE's                       RUE with Midline in place - site clean, dressing intact - placed 4/25                      Rt hand and forearm with mild swelling noted  Skin:  warm, dry, no rash noted     I&O's Summary :    I & Os for current day (as of 02 May 2017 14:14)  =============================================  IN: 1160 ml / OUT: 1660 ml / NET: -500 ml     LABS:  CBC Full  -  ( 02 May 2017 03:29 )  WBC Count : 9.7 K/uL  Hemoglobin : 9.1 g/dL  Hematocrit : 27.4 %  Platelet Count - Automated : 340 K/uL  Mean Cell Volume : 87.0 fl  Mean Cell Hemoglobin : 28.8 pg  Mean Cell Hemoglobin Concentration : 33.2 gm/dL  Auto Neutrophil # : x  Auto Lymphocyte # : x  Auto Monocyte # : x  Auto Eosinophil # : x  Auto Basophil # : x  Auto Neutrophil % : 78.0 %  Auto Lymphocyte % : 13.0 %  Auto Monocyte % : 5.0 %  Auto Eosinophil % : 1.0 %  Auto Basophil % : 1.0 %    05-02    134<L>  |  94<L>  |  17  ----------------------------<  94  4.2   |  27  |  0.31<L>    Ca    8.8      02 May 2017 03:29  Phos  4.1     05-01  Mg     2.1     05-01    TPro  9.2<H>  /  Alb  1.2<L>  /  TBili  0.3  /  DBili  x   /  AST  21  /  ALT  17  /  AlkPhos  123<H>  05-02    LIVER FUNCTIONS - ( 02 May 2017 03:29 )  Alb: 1.2 g/dL / Pro: 9.2 gm/dL / ALK PHOS: 123 U/L / ALT: 17 U/L / AST: 21 U/L / GGT: x           Sedimentation Rate, Erythrocyte: >140 mm/hr (05-02 @ 03:29)    Vancomycin Level, Trough: 20.5 ug/mL (04-29 @ 17:52)      CULTURES:  Specimen Source: .Sputum sputum (05-02 @ 01:19)      2 Blood Cultures from 5/1/17 - pending      Radiology:  CXR - 5/2/17 -   FINDINGS:  Tracheostomy tube is in appropriate position.  The cardiac and mediastinal contours are prominent, which may be due to   exaggeration from AP technique and shallow inspiration.  Again seen are bilateral lung opacities, right more thanleft.  No significant pleural effusions. No pneumothorax  There are degenerative changes of the thoracic spine and both shoulders.    IMPRESSION:   No interval change in bilateral lung opacities.        Impression: Slowly improving with low-grade temp now, on ab rx with Azactam + Vanco + Diflucan for Sepsis with Respiratory Failure and Multifocal PNA, s/p Trach, PEG, and Diverting Colostomy for Colovesicle Fistula with recurrent UTI's, with new blood and sputum cx's pending now.    Suggestions:  Will continue present ab rx for now and follow-up new cx's, temps, and labs.

## 2017-05-02 NOTE — PROGRESS NOTE ADULT - SUBJECTIVE AND OBJECTIVE BOX
tolerating Jevity feedings via peg      MEDICATIONS  (STANDING):  enoxaparin Injectable 40milliGRAM(s) SubCutaneous every 24 hours  chlorhexidine 4% Liquid 1Application(s) Topical daily  aztreonam  IVPB 1000milliGRAM(s) IV Intermittent every 8 hours  fluconAZOLE   Tablet 100milliGRAM(s) Oral daily  pantoprazole   Suspension 40milliGRAM(s) Oral daily  lactobacillus acidophilus 1Tablet(s) Oral every 12 hours  carbidopa/levodopa  25/100 1Tablet(s) Oral four times a day  pramipexole 0.25milliGRAM(s) Oral three times a day  vancomycin  IVPB 1000milliGRAM(s) IV Intermittent every 24 hours  amLODIPine   Tablet 5milliGRAM(s) Oral daily    MEDICATIONS  (PRN):  acetaminophen    Suspension. 650milliGRAM(s) Enteral Tube every 6 hours PRN Mild Pain (1 - 3)  acetaminophen    Suspension 650milliGRAM(s) Oral every 6 hours PRN For Temp greater than 38 C (100.4 F)      Allergies    No Known Allergies    Intolerances        Vital Signs Last 24 Hrs  T(C): 37.1, Max: 37.1 (05-02 @ 11:45)  T(F): 98.8, Max: 98.8 (05-02 @ 16:25)  HR: 92 (82 - 112)  BP: 115/57 (107/51 - 165/82)  BP(mean): 74 (68 - 107)  RR: 20 (17 - 23)  SpO2: 98% (95% - 100%)    PHYSICAL EXAM:  General: NAD.  CVS: S1, S2  Chest: air entry bilaterally present  Abd: BS present, soft, non-tender, +peg      LABS:                        9.1    9.7   )-----------( 340      ( 02 May 2017 03:29 )             27.4     05-02    134<L>  |  94<L>  |  17  ----------------------------<  94  4.2   |  27  |  0.31<L>    Ca    8.8      02 May 2017 03:29  Phos  4.1     05-01  Mg     2.1     05-01    TPro  9.2<H>  /  Alb  1.2<L>  /  TBili  0.3  /  DBili  x   /  AST  21  /  ALT  17  /  AlkPhos  123<H>  05-02      continue present feedings  on antibx  please reconsult GI if needed

## 2017-05-02 NOTE — PROGRESS NOTE ADULT - SUBJECTIVE AND OBJECTIVE BOX
INTERVAL HPI/OVERNIGHT EVENTS:        REVIEW OF SYSTEMS:  CONSTITUTIONAL:  eyes  open  uncommunicative no  further  fever  paast 24  hours      MEDICATION:  enoxaparin Injectable 40milliGRAM(s) SubCutaneous every 24 hours  chlorhexidine 4% Liquid 1Application(s) Topical daily  aztreonam  IVPB 1000milliGRAM(s) IV Intermittent every 8 hours  fluconAZOLE   Tablet 100milliGRAM(s) Oral daily  pantoprazole   Suspension 40milliGRAM(s) Oral daily  lactobacillus acidophilus 1Tablet(s) Oral every 12 hours  acetaminophen    Suspension. 650milliGRAM(s) Enteral Tube every 6 hours PRN  carbidopa/levodopa  25/100 1Tablet(s) Oral four times a day  pramipexole 0.25milliGRAM(s) Oral three times a day  acetaminophen    Suspension 650milliGRAM(s) Oral every 6 hours PRN  vancomycin  IVPB 1000milliGRAM(s) IV Intermittent every 24 hours  amLODIPine   Tablet 5milliGRAM(s) Oral daily    Vital Signs Last 24 Hrs  T(C): 36.7, Max: 37 (05-01 @ 23:10)  T(F): 98, Max: 98.6 (05-01 @ 23:10)  HR: 83 (83 - 112)  BP: 127/61 (125/60 - 171/79)  BP(mean): 79 (79 - 107)  RR: 18 (18 - 25)  SpO2: 97% (93% - 100%)    PHYSICAL EXAM:  GENERAL: NAD,  on  respirator  EYES:  conjunctiva and sclera clear  ENMT:  Moist mucous membranes,   NECK: Supple, No JVD, Normal thyroid  NERVOUS SYSTEM:  Alert oriented   no  focal  deficits;   CHEST/LUNG: ronchis  bases  HEART: Regular rate and rhythm; No murmurs, rubs, or gallops  ABDOMEN: Soft, Nontender, Nondistended; Bowel sounds present  EXTREMITIES:  no  edema no  tenderness  SKIN: No rashes   LABS:                        9.1    9.7   )-----------( 340      ( 02 May 2017 03:29 )             27.4     05-02    134<L>  |  94<L>  |  17  ----------------------------<  94  4.2   |  27  |  0.31<L>    Ca    8.8      02 May 2017 03:29  Phos  4.1     05-01  Mg     2.1     05-01    TPro  9.2<H>  /  Alb  1.2<L>  /  TBili  0.3  /  DBili  x   /  AST  21  /  ALT  17  /  AlkPhos  123<H>  05-02        CAPILLARY BLOOD GLUCOSE      RADIOLOGY & ADDITIONAL TESTS:    Imaging reports  Personally Reviewed:  [x ] YES  [ ] NO    Consultant(s) Notes Reviewed:  [x ] YES  [ ] NO    Care Discussed with Consultants/Other Providers [x ] YES  [ ] NO  Care Discussed with Consultants/Other Providers [ x] YES  [ ] NO    ACUTE  RESPIRATORY  FAILURE  PNEUMONIA   CONT  VENT  SUPPORT  AB  IVF  S/P  TRACH  COLO/VESCICULAR FISTULA S/P  diverting  colostomy    LFT elevation observation  metabolic  encephalopathy with  severe  inflammatory  processs supportive care        Problem/Plan - 3:  ·  Problem: Parkinson disease encephalopathy.  Plan: sinemet pramipexole  anemia  continue  to  monitor  no  clinical  evidence  of  acute bleed  transfuse as  needed for  transfer  to  rehab  facility  with  vent  unit

## 2017-05-02 NOTE — PROGRESS NOTE ADULT - SUBJECTIVE AND OBJECTIVE BOX
HPI:  patient  with  worsening  lethargy  fever  dysphagia  evaluated  in  ER  admitted  for  pneumonia  UTI  patient  s/p  l  hip  medullary  nail (04 Apr 2017 19:53)      ovInitial Consultaion Note  Requested by Name:  Date/ Time:  Reason for referral/ Consultation:        PAST MEDICAL & SURGICAL HISTORY:  Pneumonia  Anemia  Parkinson disease  Tremors of nervous system  No pertinent past medical history  Status post hip surgery  No significant past surgical history      SOCIAL HISTORY:                                 Admitted from: home [ ] SNF [ ] [facility] [ ] [facility] [ ]    )    ADVANCE DIRECTIVES:  [ ] YES [ ] NO     DNR [ ] YES [ ] NO  Completed on:                       MOLST  [ ] YES [ ] NO   Completed on  :  Living Will  [ ] YES [ ] NO   Completed on:    Allergies    No Known Allergies    Intolerances        Review of Systems:     PAIN : (Y/N) (0-10)          DYSPNEA:     NAUSEA/VOMITING:   DEPRESSION:        SECRETIONS:(    FRAILTY SYNDROM       FAILURE TO THRIVE     DIBILITY   OTHER SYMPTOMS :    UNABLE TO OBTAIN DUE TO POOR MENTATION (   )    PHYSICAL EXAM:  T(F): 98.7, Max: 98.7 (05-02 @ 11:45)  HR: 99 (83 - 112)  BP: 127/61 (125/60 - 171/79)  RR: 18 (18 - 25)  SpO2: 97% (93% - 100%)  Wt(kg): --   WEIGHT :    [id number]                  BMI:  I&O's Summary    I & Os for current day (as of 02 May 2017 13:12)  =============================================  IN: 1160 ml / OUT: 1660 ml / NET: -500 ml    CAPILLARY BLOOD GLUCOSE      GENERAL: alert [ ] oriented x ______[ ] lethargic        [ ] cachexia     [ ] nonverbal [ ] coma [ ]    HEENT: normal [ ] dry mouth [ ] ET tube/trach [ ]   LUNGS: ]  CV :  normal [ ]  GI : normal [ ]  PEG /NG tube [ ]   : normal [ ] incontinent [ ] oliguria/anuria [ ] shoemaker [ ]  MSK : normal [ ] weakness [ ] edema [ ]              ambulatory [ ] bebbound/wheelchair bound [ ]   SKIN : normal [ ] pressure ulcer (Y/N) Stage ______________ rash (Y/N)    Functional Assessment:Karnofsky Performance Score :  Palliative Performance Status Version 2:         %    LABS:                        9.1    9.7   )-----------( 340      ( 02 May 2017 03:29 )             27.4     05-02    134<L>  |  94<L>  |  17  ----------------------------<  94  4.2   |  27  |  0.31<L>    Ca    8.8      02 May 2017 03:29  Phos  4.1     05-01  Mg     2.1     05-01    TPro  9.2<H>  /  Alb  1.2<L>  /  TBili  0.3  /  DBili  x   /  AST  21  /  ALT  17  /  AlkPhos  123<H>  05-02          I&O's Detail    I & Os for current day (as of 02 May 2017 13:12)  =============================================  IN:    Jevity: 720 ml    Solution: 250 ml    Enteral Tube Flush: 100 ml    Solution: 90 ml    Total IN: 1160 ml  ---------------------------------------------  OUT:    Indwelling Catheter - Urethral: 1660 ml    Total OUT: 1660 ml  ---------------------------------------------  Total NET: -500 ml      Assessment:   86y Female admitted with ALTERED MENTAL STATUS/UTI/URINE RETENTION  AMS/PNEUMONIA  AMS      PROBLEM LIST :  PROBLEM/RECOMMENDATION: 1  Problem : Goals of care, counseling/ discussion.  Recommendation: met with patient/ family and discussed GOC & Advanced care planning                                    Palliative care info/counseling provided (Y/N)                                      Family meeting                                   Advanced Directives addressed (Y/N)  PROBLEM/ RECOMMENDATION:2  Problem :Resuscitation/ DNR/ DNI,   Recommendation: DNR/ DNI    PROBLEM/ RECOMMENDATION :3  Problem : Medical order for life sustaning treatment  Recommendation : MOLST Form ( initiated/ completed)    PROBLEM/RECOMMENDATION :4  Problem : Advanced care planning  Recommendation : Family meeting.(Y/N)     PLAN:  REFFERALS:                           Unit SW/Case Mgmt (Y/N)                          (Y/N)                         Speech/Swallow (Y/N)                           nutrition                                              Ethics (Y/N)                         PT/OT (Y/N)  ernight  no changes

## 2017-05-03 LAB
AMMONIA BLD-MCNC: 43 UMOL/L — HIGH (ref 11–32)
CULTURE RESULTS: SIGNIFICANT CHANGE UP
GRAM STN FLD: SIGNIFICANT CHANGE UP
SPECIMEN SOURCE: SIGNIFICANT CHANGE UP
VIRUS SPEC CULT: ABNORMAL

## 2017-05-03 RX ADMIN — AMLODIPINE BESYLATE 5 MILLIGRAM(S): 2.5 TABLET ORAL at 06:54

## 2017-05-03 RX ADMIN — CARBIDOPA AND LEVODOPA 1 TABLET(S): 25; 100 TABLET ORAL at 18:03

## 2017-05-03 RX ADMIN — Medication 50 MILLIGRAM(S): at 13:04

## 2017-05-03 RX ADMIN — PRAMIPEXOLE DIHYDROCHLORIDE 0.25 MILLIGRAM(S): 0.12 TABLET ORAL at 23:02

## 2017-05-03 RX ADMIN — Medication 650 MILLIGRAM(S): at 13:06

## 2017-05-03 RX ADMIN — ENOXAPARIN SODIUM 40 MILLIGRAM(S): 100 INJECTION SUBCUTANEOUS at 00:27

## 2017-05-03 RX ADMIN — ENOXAPARIN SODIUM 40 MILLIGRAM(S): 100 INJECTION SUBCUTANEOUS at 23:02

## 2017-05-03 RX ADMIN — CHLORHEXIDINE GLUCONATE 1 APPLICATION(S): 213 SOLUTION TOPICAL at 12:31

## 2017-05-03 RX ADMIN — CARBIDOPA AND LEVODOPA 1 TABLET(S): 25; 100 TABLET ORAL at 12:29

## 2017-05-03 RX ADMIN — PRAMIPEXOLE DIHYDROCHLORIDE 0.25 MILLIGRAM(S): 0.12 TABLET ORAL at 06:54

## 2017-05-03 RX ADMIN — CARBIDOPA AND LEVODOPA 1 TABLET(S): 25; 100 TABLET ORAL at 06:54

## 2017-05-03 RX ADMIN — Medication 250 MILLIGRAM(S): at 18:03

## 2017-05-03 RX ADMIN — PANTOPRAZOLE SODIUM 40 MILLIGRAM(S): 20 TABLET, DELAYED RELEASE ORAL at 12:31

## 2017-05-03 RX ADMIN — Medication 50 MILLIGRAM(S): at 06:54

## 2017-05-03 RX ADMIN — FLUCONAZOLE 100 MILLIGRAM(S): 150 TABLET ORAL at 12:29

## 2017-05-03 RX ADMIN — Medication 50 MILLIGRAM(S): at 23:02

## 2017-05-03 RX ADMIN — Medication 1 TABLET(S): at 06:54

## 2017-05-03 RX ADMIN — PRAMIPEXOLE DIHYDROCHLORIDE 0.25 MILLIGRAM(S): 0.12 TABLET ORAL at 13:05

## 2017-05-03 RX ADMIN — Medication 1 TABLET(S): at 18:03

## 2017-05-03 RX ADMIN — CARBIDOPA AND LEVODOPA 1 TABLET(S): 25; 100 TABLET ORAL at 00:27

## 2017-05-03 RX ADMIN — CARBIDOPA AND LEVODOPA 1 TABLET(S): 25; 100 TABLET ORAL at 23:02

## 2017-05-03 NOTE — PROGRESS NOTE ADULT - SUBJECTIVE AND OBJECTIVE BOX
TMAX -  100.8 today    On day # 14 Azactam / #27 Vanco / # 17 Diflucan    Vital Signs Last 24 Hrs  T(C): 38.2, Max: 38.2 (05-03 @ 12:00)  T(F): 100.8, Max: 100.8 (05-03 @ 12:00)  HR: 118 (82 - 129)  BP: 144/73 (107/51 - 174/87)  BP(mean): 94 (68 - 113)  RR: 25 (18 - 26)  SpO2: 93% (93% - 100%)  Mode: AC/ CMV (Assist Control/ Continuous Mandatory Ventilation)  RR (machine): 16  TV (machine): 400  FiO2: 30  PEEP: 5  ITime: 0.91  MAP: 8  PIP: 16  Supplemental O2:   on 30% FIO2 + 5 PEEP      Remains poorly responsive, on ventilator.  Temp up to 100.8 now and Tylenol given.      PHYSICAL EXAM  General:  poorly responsive, on ventilator, presently diaphoretic - s/p Tylenol rx for fever, lying semi-upright in bed  HEENT:  conj pink, sclerae anicteric, PERRLA, eyes opened minimally now to stimulation, no oral lesions noted  Neck:  supple, no nodes, trach site with mild pinkish discoloration at inferior aspect, sutures in place  Heart:  RR  Lungs:  few rhonchi bilat   Abdomen: soft, BS+, nontender appearance, PEG site clean, feedings in progress, Colostomy functioning   Extremities:  no edema LE's                     mild swelling Rt  hand and decreased swelling of the forearm                     RUE Midline in place - site clean, dressing intact - placed 4/25  Skin:  warm, diaphoretic now, no rash noted    I&O's Summary :  I & Os for 24h ending 03 May 2017 07:00  =============================================  IN: 1790 ml / OUT: 1200 ml / NET: 590 ml    I & Os for current day (as of 03 May 2017 14:43)  =============================================  IN: 460 ml / OUT: 0 ml / NET: 460 ml        LABS:  CBC Full  -  ( 02 May 2017 03:29 )  WBC Count : 9.7 K/uL  Hemoglobin : 9.1 g/dL  Hematocrit : 27.4 %  Platelet Count - Automated : 340 K/uL  Mean Cell Volume : 87.0 fl  Mean Cell Hemoglobin : 28.8 pg  Mean Cell Hemoglobin Concentration : 33.2 gm/dL  Auto Neutrophil # : x  Auto Lymphocyte # : x  Auto Monocyte # : x  Auto Eosinophil # : x  Auto Basophil # : x  Auto Neutrophil % : 78.0 %  Auto Lymphocyte % : 13.0 %  Auto Monocyte % : 5.0 %  Auto Eosinophil % : 1.0 %  Auto Basophil % : 1.0 %    05-02    134<L>  |  94<L>  |  17  ----------------------------<  94  4.2   |  27  |  0.31<L>    Ca    8.8      02 May 2017 03:29    TPro  9.2<H>  /  Alb  1.2<L>  /  TBili  0.3  /  DBili  x   /  AST  21  /  ALT  17  /  AlkPhos  123<H>  05-02    LIVER FUNCTIONS - ( 02 May 2017 03:29 )  Alb: 1.2 g/dL / Pro: 9.2 gm/dL / ALK PHOS: 123 U/L / ALT: 17 U/L / AST: 21 U/L / GGT: x           Sedimentation Rate, Erythrocyte: >140 mm/hr (05-02 @ 03:29)        CULTURES:  Specimen Source: .Sputum sputum (05-02 @ 01:19)  Culture Results:   Normal Respiratory Cindy present (05-02 @ 01:19)    Specimen Source: .Blood Blood (05-02 @ 00:28)  Culture Results:   No growth to date. (05-02 @ 00:28)    Specimen Source: .Blood Blood (05-02 @ 00:28)  Culture Results:   No growth to date. (05-02 @ 00:28)          Radiology:  CXR - 5/2/17 -    FINDINGS:  Tracheostomy tube is in appropriate position.  The cardiac and mediastinal contours are prominent, which may be due to   exaggeration from AP technique and shallow inspiration.  Again seen are bilateral lung opacities, right more thanleft.  No significant pleural effusions. No pneumothorax  There are degenerative changes of the thoracic spine and both shoulders.    IMPRESSION:   No interval change in bilateral lung opacities.        Impression: Recurrent fever on present ab rx with Azactam + Vanco + Diflucan for Sepsis with Multifocal PNA, Respiratory Failure, s/p Trach, PEG, and Diverting Colostomy for Colovesicled fistula, with repeat cx's drawn yesterday negative so far - ? etiology, with WBC's WNL - ? element of Drug Fever now?    Suggestions: Will repeat labs again in AM as well as CXR, and if cx's remain negative, labs are acceptable, CXR with no new changes, and patient clinically unchanged, will consider D/C ab rx and observe.

## 2017-05-03 NOTE — PROGRESS NOTE ADULT - SUBJECTIVE AND OBJECTIVE BOX
INTERVAL HPI/OVERNIGHT EVENTS:    unresponsive  on  respirator  reported with  T  100.6  this  am    MEDICATION:  enoxaparin Injectable 40milliGRAM(s) SubCutaneous every 24 hours  chlorhexidine 4% Liquid 1Application(s) Topical daily  aztreonam  IVPB 1000milliGRAM(s) IV Intermittent every 8 hours  fluconAZOLE   Tablet 100milliGRAM(s) Oral daily  pantoprazole   Suspension 40milliGRAM(s) Oral daily  lactobacillus acidophilus 1Tablet(s) Oral every 12 hours  acetaminophen    Suspension. 650milliGRAM(s) Enteral Tube every 6 hours PRN  carbidopa/levodopa  25/100 1Tablet(s) Oral four times a day  pramipexole 0.25milliGRAM(s) Oral three times a day  acetaminophen    Suspension 650milliGRAM(s) Oral every 6 hours PRN  vancomycin  IVPB 1000milliGRAM(s) IV Intermittent every 24 hours  amLODIPine   Tablet 5milliGRAM(s) Oral daily    Vital Signs Last 24 Hrs  T(C): 37.1, Max: 37.1 (05-02 @ 11:45)  T(F): 98.8, Max: 98.8 (05-02 @ 16:25)  HR: 117 (82 - 117)  BP: 136/74 (107/51 - 174/87)  BP(mean): 92 (68 - 113)  RR: 18 (17 - 26)  SpO2: 94% (94% - 100%)    PHYSICAL EXAM:  NECK: Supple, No jvd  trah in  place  NERVOUS SYSTEM: unresponsive  CHEST/LUNG:ronchis    HEART: Regular rate and rhythm; No murmurs, rubs, or gallops  ABDOMEN: Soft, Nontender, Nondistended; Bowel sounds present  EXTREMITIES:  no  edema no  tenderness  SKIN: No rashes   LABS:                        9.1    9.7   )-----------( 340      ( 02 May 2017 03:29 )             27.4     05-02    134<L>  |  94<L>  |  17  ----------------------------<  94  4.2   |  27  |  0.31<L>    Ca    8.8      02 May 2017 03:29    TPro  9.2<H>  /  Alb  1.2<L>  /  TBili  0.3  /  DBili  x   /  AST  21  /  ALT  17  /  AlkPhos  123<H>  05-02        CAPILLARY BLOOD GLUCOSE      RADIOLOGY & ADDITIONAL TESTS:    Imaging reports  Personally Reviewed:  [ x] YES  [ ] NO    Consultant(s) Notes Reviewed:  [ x] YES  [ ] NO    Care Discussed with Consultants/Other Providers [x] YES  [ ] NO    ACUTE  RESPIRATORY  FAILURE  PNEUMONIA   CONT  VENT  SUPPORT  AB  IVF  S/P  TRACH    recurrant  fever   cont  AB  tylenol  PRN  COLO/VESCICULAR FISTULA S/P  diverting  colostomy    LFT elevation observation  metabolic  encephalopathy with  severe  inflammatory  processs supportive care        Problem/Plan - 3:  ·  Problem: Parkinson disease encephalopathy.  Plan: sinemet pramipexole  anemia  continue  to  monitor  no  clinical  evidence  of  acute bleed  transfuse as  needed for  transfer  to  rehab  facility  with  vent  unit

## 2017-05-04 LAB
ALBUMIN SERPL ELPH-MCNC: 1.4 G/DL — LOW (ref 3.3–5)
ALP SERPL-CCNC: 104 U/L — SIGNIFICANT CHANGE UP (ref 40–120)
ALT FLD-CCNC: <6 U/L — LOW (ref 12–78)
ANION GAP SERPL CALC-SCNC: 9 MMOL/L — SIGNIFICANT CHANGE UP (ref 5–17)
AST SERPL-CCNC: 27 U/L — SIGNIFICANT CHANGE UP (ref 15–37)
BILIRUB SERPL-MCNC: 0.5 MG/DL — SIGNIFICANT CHANGE UP (ref 0.2–1.2)
BUN SERPL-MCNC: 27 MG/DL — HIGH (ref 7–23)
CALCIUM SERPL-MCNC: 9.2 MG/DL — SIGNIFICANT CHANGE UP (ref 8.5–10.1)
CHLORIDE SERPL-SCNC: 97 MMOL/L — SIGNIFICANT CHANGE UP (ref 96–108)
CO2 SERPL-SCNC: 26 MMOL/L — SIGNIFICANT CHANGE UP (ref 22–31)
CREAT SERPL-MCNC: 0.48 MG/DL — LOW (ref 0.5–1.3)
GLUCOSE SERPL-MCNC: 98 MG/DL — SIGNIFICANT CHANGE UP (ref 70–99)
HCT VFR BLD CALC: 25.7 % — LOW (ref 34.5–45)
HGB BLD-MCNC: 9 G/DL — LOW (ref 11.5–15.5)
MCHC RBC-ENTMCNC: 29.9 PG — SIGNIFICANT CHANGE UP (ref 27–34)
MCHC RBC-ENTMCNC: 35 GM/DL — SIGNIFICANT CHANGE UP (ref 32–36)
MCV RBC AUTO: 85.4 FL — SIGNIFICANT CHANGE UP (ref 80–100)
PLATELET # BLD AUTO: 355 K/UL — SIGNIFICANT CHANGE UP (ref 150–400)
POTASSIUM SERPL-MCNC: 4.8 MMOL/L — SIGNIFICANT CHANGE UP (ref 3.5–5.3)
POTASSIUM SERPL-SCNC: 4.8 MMOL/L — SIGNIFICANT CHANGE UP (ref 3.5–5.3)
PROT SERPL-MCNC: 9.5 GM/DL — HIGH (ref 6–8.3)
RBC # BLD: 3.01 M/UL — LOW (ref 3.8–5.2)
RBC # FLD: 16.3 % — HIGH (ref 11–15)
SODIUM SERPL-SCNC: 132 MMOL/L — LOW (ref 135–145)
WBC # BLD: 13.4 K/UL — HIGH (ref 3.8–10.5)
WBC # FLD AUTO: 13.4 K/UL — HIGH (ref 3.8–10.5)

## 2017-05-04 PROCEDURE — 71010: CPT | Mod: 26

## 2017-05-04 RX ADMIN — PRAMIPEXOLE DIHYDROCHLORIDE 0.25 MILLIGRAM(S): 0.12 TABLET ORAL at 14:48

## 2017-05-04 RX ADMIN — Medication 1 TABLET(S): at 05:57

## 2017-05-04 RX ADMIN — Medication 50 MILLIGRAM(S): at 23:14

## 2017-05-04 RX ADMIN — FLUCONAZOLE 100 MILLIGRAM(S): 150 TABLET ORAL at 11:36

## 2017-05-04 RX ADMIN — CARBIDOPA AND LEVODOPA 1 TABLET(S): 25; 100 TABLET ORAL at 11:36

## 2017-05-04 RX ADMIN — Medication 650 MILLIGRAM(S): at 11:36

## 2017-05-04 RX ADMIN — CARBIDOPA AND LEVODOPA 1 TABLET(S): 25; 100 TABLET ORAL at 17:09

## 2017-05-04 RX ADMIN — PRAMIPEXOLE DIHYDROCHLORIDE 0.25 MILLIGRAM(S): 0.12 TABLET ORAL at 05:58

## 2017-05-04 RX ADMIN — ENOXAPARIN SODIUM 40 MILLIGRAM(S): 100 INJECTION SUBCUTANEOUS at 23:14

## 2017-05-04 RX ADMIN — AMLODIPINE BESYLATE 5 MILLIGRAM(S): 2.5 TABLET ORAL at 05:58

## 2017-05-04 RX ADMIN — PRAMIPEXOLE DIHYDROCHLORIDE 0.25 MILLIGRAM(S): 0.12 TABLET ORAL at 23:14

## 2017-05-04 RX ADMIN — PANTOPRAZOLE SODIUM 40 MILLIGRAM(S): 20 TABLET, DELAYED RELEASE ORAL at 11:36

## 2017-05-04 RX ADMIN — Medication 50 MILLIGRAM(S): at 14:48

## 2017-05-04 RX ADMIN — CARBIDOPA AND LEVODOPA 1 TABLET(S): 25; 100 TABLET ORAL at 23:15

## 2017-05-04 RX ADMIN — Medication 1 TABLET(S): at 17:08

## 2017-05-04 RX ADMIN — Medication 50 MILLIGRAM(S): at 05:57

## 2017-05-04 RX ADMIN — Medication 250 MILLIGRAM(S): at 17:08

## 2017-05-04 RX ADMIN — Medication 650 MILLIGRAM(S): at 17:08

## 2017-05-04 RX ADMIN — Medication 650 MILLIGRAM(S): at 23:40

## 2017-05-04 RX ADMIN — CARBIDOPA AND LEVODOPA 1 TABLET(S): 25; 100 TABLET ORAL at 05:57

## 2017-05-04 NOTE — PROGRESS NOTE ADULT - SUBJECTIVE AND OBJECTIVE BOX
Patient seen and examined bedside   Unresponsive    T(F): 101.6, Max: 101.6 (05-04 @ 17:05)  HR: 120 (91 - 120)  BP: 152/70 (124/70 - 152/70)  RR: 18 (18 - 18)  SpO2: 97% (93% - 100%)    PHYSICAL EXAM:  General: unresponsive  HEENT: trache to vent  Abdomen: soft. colostomy functioning well, viable, with stool in bag. PEG intact   : shoemaker cath indwelling with clear urine    LABS:                        9.0    13.4  )-----------( 355      ( 04 May 2017 08:56 )             25.7   05-04    132<L>  |  97  |  27<H>  ----------------------------<  98  4.8   |  26  |  0.48<L>    Ca    9.2      04 May 2017 08:56  TPro  9.5<H>  /  Alb  1.4<L>  /  TBili  0.5  /  DBili  x   /  AST  27  /  ALT  <6<L>  /  AlkPhos  104  05-04    I&/1100cc    A/P: 86y Female a/w PNA, sepsis and colovesicular fistula, s/p aspiration event 4/16 leading to respiratory failure, s/p PEG via GI and POD#6 s/p diverting loop colostomy and tracheostomy    -cont shoemaker catheter and present medical management/supportive care  -routine trache/ostomy care  -discussed with Dr Bahena and Dr Mosley Patient seen and examined bedside   Unresponsive    T(F): 101.6, Max: 101.6 (05-04 @ 17:05)  HR: 120 (91 - 120)  BP: 152/70 (124/70 - 152/70)  RR: 18 (18 - 18)  SpO2: 97% (93% - 100%)    PHYSICAL EXAM:  General: unresponsive  HEENT: trache to vent  Abdomen: soft. colostomy functioning well, viable, with stool in bag. PEG intact   : shoemaker cath indwelling with clear urine    LABS:                        9.0    13.4  )-----------( 355      ( 04 May 2017 08:56 )             25.7   05-04    132<L>  |  97  |  27<H>  ----------------------------<  98  4.8   |  26  |  0.48<L>    Ca    9.2      04 May 2017 08:56  TPro  9.5<H>  /  Alb  1.4<L>  /  TBili  0.5  /  DBili  x   /  AST  27  /  ALT  <6<L>  /  AlkPhos  104  05-04    I&/1100cc    A/P: 86y Female a/w PNA, sepsis and colovesicular fistula, s/p aspiration event 4/16 leading to respiratory failure, s/p PEG via GI and POD#6 s/p diverting loop colostomy and tracheostomy    -cont shoemaker catheter and present medical management/supportive care, abx per ID  -routine trache/ostomy care  -case discussed with Dr Bahena and Dr Mosley

## 2017-05-04 NOTE — PROGRESS NOTE ADULT - SUBJECTIVE AND OBJECTIVE BOX
INTERVAL HPI/OVERNIGHT EVENTS:        REVIEW OF SYSTEMS:  CONSTITUTIONAL:  unresponsive  on  respirator      MEDICATION:  enoxaparin Injectable 40milliGRAM(s) SubCutaneous every 24 hours  chlorhexidine 4% Liquid 1Application(s) Topical daily  aztreonam  IVPB 1000milliGRAM(s) IV Intermittent every 8 hours  fluconAZOLE   Tablet 100milliGRAM(s) Oral daily  pantoprazole   Suspension 40milliGRAM(s) Oral daily  lactobacillus acidophilus 1Tablet(s) Oral every 12 hours  acetaminophen    Suspension. 650milliGRAM(s) Enteral Tube every 6 hours PRN  carbidopa/levodopa  25/100 1Tablet(s) Oral four times a day  pramipexole 0.25milliGRAM(s) Oral three times a day  acetaminophen    Suspension 650milliGRAM(s) Oral every 6 hours PRN  vancomycin  IVPB 1000milliGRAM(s) IV Intermittent every 24 hours  amLODIPine   Tablet 5milliGRAM(s) Oral daily    Vital Signs Last 24 Hrs  T(C): 36.7, Max: 38.2 (05-03 @ 12:00)  T(F): 98, Max: 100.8 (05-03 @ 12:00)  HR: 98 (90 - 120)  BP: 143/75 (123/59 - 144/73)  BP(mean): 79 (79 - 94)  RR: 20 (20 - 25)  SpO2: 99% (93% - 100%)    PHYSICAL EXAM:  GENERAL: NAD, well-groomed, well-developed  EYES:  conjunctiva and sclera colten  NECK: Supple, No JVD  trach  in  palce  NERVOUS SYSTEM:  uncommunicative  opens  eyes  CHEST/LUNG: Clear    HEART: Regular rate and rhythm; No murmurs, rubs, or gallops  ABDOMEN: Soft, Nontender, Nondistended; Bowel sounds present  EXTREMITIES:  no  edema no  tenderness  SKIN: No rashes   LABS:    repeat  labs  pending          CAPILLARY BLOOD GLUCOSE      RADIOLOGY & ADDITIONAL TESTS:    Imaging reports  Personally Reviewed:  [ x] YES  [ ] NO    Consultant(s) Notes Reviewed:  [ x] YES  [ ] NO    Care Discussed with Consultants/Other Providers [x ] YES  [ ] NO    ACUTE  RESPIRATORY  FAILURE  PNEUMONIA   CONT  VENT  SUPPORT  AB  IVF  S/P  TRACH    recurrant  fever   cont  AB  tylenol  PRN  COLO/VESCICULAR FISTULA S/P  diverting  colostomy    LFT elevation observation  metabolic  encephalopathy with  severe  inflammatory  processs supportive care        Problem/Plan - 3:  ·  Problem: Parkinson disease encephalopathy.  Plan: sinemet pramipexole  anemia  continue  to  monitor  no  clinical  evidence  of  acute bleed  transfuse as  needed for  transfer  to  rehab  facility  with  vent  unit INTERVAL HPI/OVERNIGHT EVENTS:        REVIEW OF SYSTEMS:  CONSTITUTIONAL:  unresponsive  on  respirator      MEDICATION:  enoxaparin Injectable 40milliGRAM(s) SubCutaneous every 24 hours  chlorhexidine 4% Liquid 1Application(s) Topical daily  aztreonam  IVPB 1000milliGRAM(s) IV Intermittent every 8 hours  fluconAZOLE   Tablet 100milliGRAM(s) Oral daily  pantoprazole   Suspension 40milliGRAM(s) Oral daily  lactobacillus acidophilus 1Tablet(s) Oral every 12 hours  acetaminophen    Suspension. 650milliGRAM(s) Enteral Tube every 6 hours PRN  carbidopa/levodopa  25/100 1Tablet(s) Oral four times a day  pramipexole 0.25milliGRAM(s) Oral three times a day  acetaminophen    Suspension 650milliGRAM(s) Oral every 6 hours PRN  vancomycin  IVPB 1000milliGRAM(s) IV Intermittent every 24 hours  amLODIPine   Tablet 5milliGRAM(s) Oral daily    Vital Signs Last 24 Hrs  T(C): 36.7, Max: 38.2 (05-03 @ 12:00)  T(F): 98, Max: 100.8 (05-03 @ 12:00)  HR: 98 (90 - 120)  BP: 143/75 (123/59 - 144/73)  BP(mean): 79 (79 - 94)  RR: 20 (20 - 25)  SpO2: 99% (93% - 100%)    PHYSICAL EXAM:  GENERAL: NAD, well-groomed, well-developed  EYES:  conjunctiva and sclera colten  NECK: Supple, No JVD  trach  in  palce  NERVOUS SYSTEM:  uncommunicative  opens  eyes  CHEST/LUNG: Clear    HEART: Regular rate and rhythm; No murmurs, rubs, or gallops  ABDOMEN: Soft, Nontender, Nondistended; Bowel sounds present  EXTREMITIES:  no  edema no  tenderness  SKIN: No rashes   LABS:    repeat  labs  pending          CAPILLARY BLOOD GLUCOSE      RADIOLOGY & ADDITIONAL TESTS:    Imaging reports  Personally Reviewed:  [ x] YES  [ ] NO    Consultant(s) Notes Reviewed:  [ x] YES  [ ] NO    Care Discussed with Consultants/Other Providers [x ] YES  [ ] NO    ACUTE  RESPIRATORY  FAILURE  PNEUMONIA   CONT  VENT  SUPPORT  AB  IVF  S/P  TRACH    recurrant  fever   cont  AB  tylenol  PRN  COLO/VESCICULAR FISTULA S/P  diverting  colostomy    LFT elevation observation  metabolic  encephalopathy with  severe  inflammatory  processs supportive care        Problem/Plan - 3:  ·  Problem: Parkinson disease encephalopathy.  Plan: sinemet pramipexole  anemia  continue  to  monitor  no  clinical  evidence  of  acute bleed  transfuse as  needed for  transfer  to  rehab  facility  with  vent  unit  have  left  message  with  pt's son  Kyle

## 2017-05-04 NOTE — PROGRESS NOTE ADULT - SUBJECTIVE AND OBJECTIVE BOX
pt is on vent via trach  no quality of life   vss  lungs pos for rhonchi  total care   KPS<20  waiting for rehab for vent weaning  pt is DNR  other sx controlled

## 2017-05-04 NOTE — PROGRESS NOTE ADULT - SUBJECTIVE AND OBJECTIVE BOX
INTERVAL HPI:  86F PMH Parkinson's, s/p recent mechanical fall with R intertrochanteric femur fracture requiring R intramedullary nailing of R trochanteric fracture of femur 3/13/17, UTI, PNA presents from [facility] rehab for sepsis, colovesicular fistula with course complicated by aspiration PNA and now s/p trach and diverting loop colostomy.     OVERNIGHT EVENTS:  Out of ICU, on Vent. Low grade fever noted.    Vital Signs Last 24 Hrs  T(C): 38.2, Max: 38.2 (05-04 @ 11:25)  T(F): 100.7, Max: 100.7 (05-04 @ 11:25)  HR: 120 (90 - 120)  BP: 124/70 (123/59 - 143/75)  BP(mean): 79 (79 - 79)  RR: 18 (18 - 25)  SpO2: 100% (93% - 100%)    Mode: AC/ CMV (Assist Control/ Continuous Mandatory Ventilation)  RR (machine): 16  TV (machine): 400  FiO2: 30  PEEP: 5  ITime: 0.9  MAP: 10  PIP: 23    PHYSICAL EXAM:  GEN:       un responsive and comfortable.  HEENT:    trach  RESP:     crackles.  CVS:         Regular rate and rhythm.   ABD:        colostomy    MEDICATIONS  (STANDING):  enoxaparin Injectable 40milliGRAM(s) SubCutaneous every 24 hours  aztreonam  IVPB 1000milliGRAM(s) IV Intermittent every 8 hours  fluconAZOLE   Tablet 100milliGRAM(s) Oral daily  pantoprazole   Suspension 40milliGRAM(s) Oral daily  lactobacillus acidophilus 1Tablet(s) Oral every 12 hours  carbidopa/levodopa  25/100 1Tablet(s) Oral four times a day  pramipexole 0.25milliGRAM(s) Oral three times a day  vancomycin  IVPB 1000milliGRAM(s) IV Intermittent every 24 hours  amLODIPine   Tablet 5milliGRAM(s) Oral daily    MEDICATIONS  (PRN):  acetaminophen    Suspension. 650milliGRAM(s) Enteral Tube every 6 hours PRN Mild Pain (1 - 3)  acetaminophen    Suspension 650milliGRAM(s) Oral every 6 hours PRN For Temp greater than 38 C (100.4 F)    LABS:                        9.0    13.4  )-----------( 355      ( 04 May 2017 08:56 )             25.7     05-04    132<L>  |  97  |  27<H>  ----------------------------<  98  4.8   |  26  |  0.48<L>    Ca    9.2      04 May 2017 08:56    TPro  9.5<H>  /  Alb  1.4<L>  /  TBili  0.5  /  DBili  x   /  AST  27  /  ALT  <6<L>  /  AlkPhos  104  05-04    ASSESSMENT and Plan:  ·	Hypoxic Respiratory failure.  ·	Aspiration Pneumonia.  ·	S/P Diverting  Colostomy for Colovesical Fistula.  ·	Anemia.  ·	Leukocytosis.  ·	Parkinsonism.    Continue full Vent support and antibiotics.  Follow fever trend.                RADIOLOGY & ADDITIONAL STUDIES:    ASSESSMENT AND PLAN:

## 2017-05-05 LAB
ALBUMIN SERPL ELPH-MCNC: 1.2 G/DL — LOW (ref 3.3–5)
ALP SERPL-CCNC: 119 U/L — SIGNIFICANT CHANGE UP (ref 40–120)
ALT FLD-CCNC: 9 U/L — LOW (ref 12–78)
ANION GAP SERPL CALC-SCNC: 13 MMOL/L — SIGNIFICANT CHANGE UP (ref 5–17)
AST SERPL-CCNC: 29 U/L — SIGNIFICANT CHANGE UP (ref 15–37)
BILIRUB SERPL-MCNC: 0.4 MG/DL — SIGNIFICANT CHANGE UP (ref 0.2–1.2)
BUN SERPL-MCNC: 43 MG/DL — HIGH (ref 7–23)
CALCIUM SERPL-MCNC: 9 MG/DL — SIGNIFICANT CHANGE UP (ref 8.5–10.1)
CHLORIDE SERPL-SCNC: 100 MMOL/L — SIGNIFICANT CHANGE UP (ref 96–108)
CO2 SERPL-SCNC: 23 MMOL/L — SIGNIFICANT CHANGE UP (ref 22–31)
CREAT SERPL-MCNC: 0.96 MG/DL — SIGNIFICANT CHANGE UP (ref 0.5–1.3)
GLUCOSE SERPL-MCNC: 119 MG/DL — HIGH (ref 70–99)
HCT VFR BLD CALC: 25.3 % — LOW (ref 34.5–45)
HGB BLD-MCNC: 8.4 G/DL — LOW (ref 11.5–15.5)
MAGNESIUM SERPL-MCNC: 2.4 MG/DL — SIGNIFICANT CHANGE UP (ref 1.8–2.4)
MCHC RBC-ENTMCNC: 28.7 PG — SIGNIFICANT CHANGE UP (ref 27–34)
MCHC RBC-ENTMCNC: 33.1 GM/DL — SIGNIFICANT CHANGE UP (ref 32–36)
MCV RBC AUTO: 86.7 FL — SIGNIFICANT CHANGE UP (ref 80–100)
PHOSPHATE SERPL-MCNC: 5.2 MG/DL — HIGH (ref 2.5–4.5)
PLATELET # BLD AUTO: 362 K/UL — SIGNIFICANT CHANGE UP (ref 150–400)
POTASSIUM SERPL-MCNC: 5 MMOL/L — SIGNIFICANT CHANGE UP (ref 3.5–5.3)
POTASSIUM SERPL-SCNC: 5 MMOL/L — SIGNIFICANT CHANGE UP (ref 3.5–5.3)
PROT SERPL-MCNC: 9.7 GM/DL — HIGH (ref 6–8.3)
RBC # BLD: 2.91 M/UL — LOW (ref 3.8–5.2)
RBC # FLD: 16.9 % — HIGH (ref 11–15)
SODIUM SERPL-SCNC: 136 MMOL/L — SIGNIFICANT CHANGE UP (ref 135–145)
WBC # BLD: 14.9 K/UL — HIGH (ref 3.8–10.5)
WBC # FLD AUTO: 14.9 K/UL — HIGH (ref 3.8–10.5)

## 2017-05-05 PROCEDURE — 71010: CPT | Mod: 26

## 2017-05-05 RX ORDER — PIPERACILLIN AND TAZOBACTAM 4; .5 G/20ML; G/20ML
3.38 INJECTION, POWDER, LYOPHILIZED, FOR SOLUTION INTRAVENOUS ONCE
Qty: 0 | Refills: 0 | Status: COMPLETED | OUTPATIENT
Start: 2017-05-05 | End: 2017-05-05

## 2017-05-05 RX ORDER — PIPERACILLIN AND TAZOBACTAM 4; .5 G/20ML; G/20ML
3.38 INJECTION, POWDER, LYOPHILIZED, FOR SOLUTION INTRAVENOUS EVERY 8 HOURS
Qty: 0 | Refills: 0 | Status: DISCONTINUED | OUTPATIENT
Start: 2017-05-05 | End: 2017-05-08

## 2017-05-05 RX ORDER — GENTAMICIN SULFATE 40 MG/ML
120 VIAL (ML) INJECTION ONCE
Qty: 0 | Refills: 0 | Status: COMPLETED | OUTPATIENT
Start: 2017-05-05 | End: 2017-05-05

## 2017-05-05 RX ADMIN — PRAMIPEXOLE DIHYDROCHLORIDE 0.25 MILLIGRAM(S): 0.12 TABLET ORAL at 14:31

## 2017-05-05 RX ADMIN — PRAMIPEXOLE DIHYDROCHLORIDE 0.25 MILLIGRAM(S): 0.12 TABLET ORAL at 05:11

## 2017-05-05 RX ADMIN — Medication 50 MILLIGRAM(S): at 05:11

## 2017-05-05 RX ADMIN — CARBIDOPA AND LEVODOPA 1 TABLET(S): 25; 100 TABLET ORAL at 11:39

## 2017-05-05 RX ADMIN — CARBIDOPA AND LEVODOPA 1 TABLET(S): 25; 100 TABLET ORAL at 23:05

## 2017-05-05 RX ADMIN — Medication 650 MILLIGRAM(S): at 17:36

## 2017-05-05 RX ADMIN — Medication 1 TABLET(S): at 05:11

## 2017-05-05 RX ADMIN — CARBIDOPA AND LEVODOPA 1 TABLET(S): 25; 100 TABLET ORAL at 05:11

## 2017-05-05 RX ADMIN — AMLODIPINE BESYLATE 5 MILLIGRAM(S): 2.5 TABLET ORAL at 05:11

## 2017-05-05 RX ADMIN — Medication 200 MILLIGRAM(S): at 17:35

## 2017-05-05 RX ADMIN — CARBIDOPA AND LEVODOPA 1 TABLET(S): 25; 100 TABLET ORAL at 17:36

## 2017-05-05 RX ADMIN — Medication 50 MILLIGRAM(S): at 14:31

## 2017-05-05 RX ADMIN — PRAMIPEXOLE DIHYDROCHLORIDE 0.25 MILLIGRAM(S): 0.12 TABLET ORAL at 23:05

## 2017-05-05 RX ADMIN — ENOXAPARIN SODIUM 40 MILLIGRAM(S): 100 INJECTION SUBCUTANEOUS at 23:05

## 2017-05-05 RX ADMIN — Medication 1 TABLET(S): at 17:35

## 2017-05-05 RX ADMIN — Medication 650 MILLIGRAM(S): at 11:39

## 2017-05-05 RX ADMIN — PANTOPRAZOLE SODIUM 40 MILLIGRAM(S): 20 TABLET, DELAYED RELEASE ORAL at 11:39

## 2017-05-05 RX ADMIN — PIPERACILLIN AND TAZOBACTAM 200 GRAM(S): 4; .5 INJECTION, POWDER, LYOPHILIZED, FOR SOLUTION INTRAVENOUS at 18:21

## 2017-05-05 RX ADMIN — FLUCONAZOLE 100 MILLIGRAM(S): 150 TABLET ORAL at 11:39

## 2017-05-05 NOTE — PROGRESS NOTE ADULT - SUBJECTIVE AND OBJECTIVE BOX
INTERVAL HPI/OVERNIGHT EVENTS:        REVIEW OF SYSTEMS:  CONSTITUTIONAL: remains  unrsponsive  on  respirator      MEDICATION:  enoxaparin Injectable 40milliGRAM(s) SubCutaneous every 24 hours  aztreonam  IVPB 1000milliGRAM(s) IV Intermittent every 8 hours  fluconAZOLE   Tablet 100milliGRAM(s) Oral daily  pantoprazole   Suspension 40milliGRAM(s) Oral daily  lactobacillus acidophilus 1Tablet(s) Oral every 12 hours  acetaminophen    Suspension. 650milliGRAM(s) Enteral Tube every 6 hours PRN  carbidopa/levodopa  25/100 1Tablet(s) Oral four times a day  pramipexole 0.25milliGRAM(s) Oral three times a day  acetaminophen    Suspension 650milliGRAM(s) Oral every 6 hours PRN  vancomycin  IVPB 1000milliGRAM(s) IV Intermittent every 24 hours  amLODIPine   Tablet 5milliGRAM(s) Oral daily    Vital Signs Last 24 Hrs  T(C): 37.3, Max: 38.7 (05-04 @ 17:05)  T(F): 99.2, Max: 101.6 (05-04 @ 17:05)  HR: 122 (89 - 128)  BP: 129/64 (124/70 - 152/70)  BP(mean): --  RR: 18 (18 - 18)  SpO2: 94% (93% - 100%)    PHYSICAL EXAM:  GENERAL: NAD, well-groomed, well-developed  EYES:  conjunctiva and sclera clear  ENMT:  Moist mucous membranes,   NECK: Supple, No JVD, Normal thyroid  NERVOUS SYSTEM:  Alert oriented   no  focal  deficits;   CHEST/LUNG: Clear    HEART: Regular rate and rhythm; No murmurs, rubs, or gallops  ABDOMEN: Soft, Nontender, Nondistended; Bowel sounds present  EXTREMITIES:  no  edema no  tenderness  SKIN: No rashes   LABS:                        8.4    14.9  )-----------( 362      ( 05 May 2017 08:33 )             25.3     05-04    132<L>  |  97  |  27<H>  ----------------------------<  98  4.8   |  26  |  0.48<L>    Ca    9.2      04 May 2017 08:56    TPro  9.5<H>  /  Alb  1.4<L>  /  TBili  0.5  /  DBili  x   /  AST  27  /  ALT  <6<L>  /  AlkPhos  104  05-04        CAPILLARY BLOOD GLUCOSE      RADIOLOGY & ADDITIONAL TESTS:    Imaging reports  Personally Reviewed:  [ x] YES  [ ] NO    Consultant(s) Notes Reviewed:  [x ] YES  [ ] NO    Care Discussed with Consultants/Other Providers [x ] YES  [ ] NO    ACUTE  RESPIRATORY  FAILURE  PNEUMONIA   CONT  VENT  SUPPORT  AB  IVF  S/P  TRACH    recurrant  fever   cont  AB  tylenol  PRN  COLO/VESCICULAR FISTULA S/P  diverting  colostomy    LFT elevation observation  metabolic  encephalopathy with  severe  inflammatory  processs supportive care        Problem/Plan - 3:  ·  Problem: Parkinson disease encephalopathy.  Plan: sinemet pramipexole  anemia  continue  to  monitor  no  clinical  evidence  of  acute bleed  transfuse as  needed for  transfer  to  rehab  facility  with  vent  unit  fever  etiology  unclear  will reculture  d/c  navid  discuss  AB  care  with  ID  discussed    with  pt's son  Kyle

## 2017-05-05 NOTE — PROGRESS NOTE ADULT - SUBJECTIVE AND OBJECTIVE BOX
TMAX - 101.6 last evening      On day # 16 Azactam / # 29 Vanco / # 19 Diflucan now    Vital Signs Last 24 Hrs  T(C): 38.7, Max: 38.7 (05-04 @ 17:05)  T(F): 101.6, Max: 101.6 (05-04 @ 17:05)  HR: 121 (89 - 128)  BP: 136/61 (127/68 - 152/70)  BP(mean): --  RR: 18 (18 - 18)  SpO2: 97% (93% - 99%)  Mode: AC/ CMV (Assist Control/ Continuous Mandatory Ventilation)  RR (machine): 16  TV (machine): 400  FiO2: 30  PEEP: 5  ITime: 1  MAP: 12  PIP: 25  Supplemental O2:  on 30 % FIO2 + 5 PEEP on ventilator      Remains poorly responsive, on ventilator with increased temps now.  Isaiah has been d/c'ed  now since this AM.    PHYSICAL EXAM  General:  poorly responsive now, on ventilator, not opening her eyes today, lying semi-upright in bed, noted with tannish- purulent appearing secretions now in suction tubing  HEENT:  conj pink, sclerae anicteric, PERRLA, no oral lesions noted  Neck:  semi-supple, no nodes noted  Heart:  RR  Lungs: bilat rhonchi   Abdomen:  soft, BS+, appears nontender, no masses noted                   PEG in place with feedings in progress- site clean, no erythema, no drainage noted                   Colostomy functioning with brown stool in bag now  Extremities: no edema LE's                     decreased edema of arms and hands with continuing to resolve ecchymoses especially on the dorsum of the Rt hand   Skin:  warm and clammy at present            no rash noted    I&O's Summary:    I & Os for current day (as of 05 May 2017 15:44)  =============================================  IN: 50 ml / OUT: 800 ml / NET: -750 ml      WBC's  CBC Full  -  ( 05 May 2017 08:33 )  WBC Count : 14.9 K/uL  Hemoglobin : 8.4 g/dL  Hematocrit : 25.3 %  Platelet Count - Automated : 362 K/uL  Mean Cell Volume : 86.7 fl  Mean Cell Hemoglobin : 28.7 pg  Mean Cell Hemoglobin Concentration : 33.1 gm/dL  Auto Neutrophil # : x  Auto Lymphocyte # : x  Auto Monocyte # : x  Auto Eosinophil # : x  Auto Basophil # : x  Auto Neutrophil % : x  Auto Lymphocyte % : x  Auto Monocyte % : x  Auto Eosinophil % : x  Auto Basophil % : x    05-05    136  |  100  |  43<H>  ----------------------------<  119<H>  5.0   |  23  |  0.96    Ca    9.0      05 May 2017 08:33  Phos  5.2     05-05  Mg     2.4     05-05    TPro  9.7<H>  /  Alb  1.2<L>  /  TBili  0.4  /  DBili  x   /  AST  29  /  ALT  9<L>  /  AlkPhos  119  05-05    LIVER FUNCTIONS - ( 05 May 2017 08:33 )  Alb: 1.2 g/dL / Pro: 9.7 gm/dL / ALK PHOS: 119 U/L / ALT: 9 U/L / AST: 29 U/L / GGT: x           Sedimentation Rate, Erythrocyte: >140 mm/hr (05-02 @ 03:29)          CULTURES:  Specimen Source: .Sputum sputum (05-02 @ 01:19)  Culture Results:   Normal Respiratory Cindy present (05-02 @ 01:19)    Specimen Source: .Blood Blood (05-02 @ 00:28)  Culture Results:   No growth to date. (05-02 @ 00:28)    Specimen Source: .Blood Blood (05-02 @ 00:28)  Culture Results:   No growth to date. (05-02 @ 00:28)        Radiology:  CXR - 5/4/17 -  Portable chest x-ray is compared to a previous examination dated 5/2/2017.    Impression: Grossly stable pulmonary opacities in the upper lung zones   bilaterally, right greater than left.    New right basilar density may represent pulmonary infiltrate and/or   pleural effusion.    No evidence for pneumothorax.    Stable cardiac silhouette and tracheostomy.                        CXR - 5/5/17 -   FINDINGS:  There is a tracheostomy tube in place. There is consolidation   throughout the right lung as well as faint patchy opacities in the left   lung. Heart size cannot be accurately evaluated in this projection. There   are degenerative changes of the spine.    IMPRESSION: No significant change from 5/4/2017      Impression:  Increasing temps and now with increased WBC's and new Rt base density reported on CXR despite current ab rx with Azactam + Vanco + Diflucan, with Blood and Sputum cx's from 5/2 remaining negative to date.    Suggestions: Will therefore repeat cx's now and empirically change ab rx to Zosyn with Gent x1 dose pending new cx's.  Continue to follow temps, labs, and CXR. TMAX - 101.6 last evening      On day # 16 Azactam / # 29 Vanco / # 19 Diflucan now    Vital Signs Last 24 Hrs  T(C): 38.7, Max: 38.7 (05-04 @ 17:05)  T(F): 101.6, Max: 101.6 (05-04 @ 17:05)  HR: 121 (89 - 128)  BP: 136/61 (127/68 - 152/70)  BP(mean): --  RR: 18 (18 - 18)  SpO2: 97% (93% - 99%)  Mode: AC/ CMV (Assist Control/ Continuous Mandatory Ventilation)  RR (machine): 16  TV (machine): 400  FiO2: 30  PEEP: 5  ITime: 1  MAP: 12  PIP: 25  Supplemental O2:  on 30 % FIO2 + 5 PEEP on ventilator      Remains poorly responsive, on ventilator with increased temps now.  Isaiah has been d/c'ed  now since this AM.    PHYSICAL EXAM  General:  poorly responsive now, on ventilator, not opening her eyes today, lying semi-upright in bed, noted with tannish- purulent appearing secretions now in suction tubing  HEENT:  conj pink, sclerae anicteric, PERRLA, no oral lesions noted  Neck:  semi-supple, no nodes noted  Heart:  RR  Lungs: bilat rhonchi   Abdomen:  soft, BS+, appears nontender, no masses noted                   PEG in place with feedings in progress- site clean, no erythema, no drainage noted                   Colostomy functioning with brown stool in bag now  Extremities: no edema LE's                     decreased edema of arms and hands with continuing to resolve ecchymoses especially on the dorsum of the Rt hand                      RUE midline in place - site clean/ dressing intact - placed 4/25  Skin:  warm and clammy at present            no rash noted    I&O's Summary:    I & Os for current day (as of 05 May 2017 15:44)  =============================================  IN: 50 ml / OUT: 800 ml / NET: -750 ml      WBC's  CBC Full  -  ( 05 May 2017 08:33 )  WBC Count : 14.9 K/uL  Hemoglobin : 8.4 g/dL  Hematocrit : 25.3 %  Platelet Count - Automated : 362 K/uL  Mean Cell Volume : 86.7 fl  Mean Cell Hemoglobin : 28.7 pg  Mean Cell Hemoglobin Concentration : 33.1 gm/dL  Auto Neutrophil # : x  Auto Lymphocyte # : x  Auto Monocyte # : x  Auto Eosinophil # : x  Auto Basophil # : x  Auto Neutrophil % : x  Auto Lymphocyte % : x  Auto Monocyte % : x  Auto Eosinophil % : x  Auto Basophil % : x    05-05    136  |  100  |  43<H>  ----------------------------<  119<H>  5.0   |  23  |  0.96    Ca    9.0      05 May 2017 08:33  Phos  5.2     05-05  Mg     2.4     05-05    TPro  9.7<H>  /  Alb  1.2<L>  /  TBili  0.4  /  DBili  x   /  AST  29  /  ALT  9<L>  /  AlkPhos  119  05-05    LIVER FUNCTIONS - ( 05 May 2017 08:33 )  Alb: 1.2 g/dL / Pro: 9.7 gm/dL / ALK PHOS: 119 U/L / ALT: 9 U/L / AST: 29 U/L / GGT: x           Sedimentation Rate, Erythrocyte: >140 mm/hr (05-02 @ 03:29)          CULTURES:  Specimen Source: .Sputum sputum (05-02 @ 01:19)  Culture Results:   Normal Respiratory Cindy present (05-02 @ 01:19)    Specimen Source: .Blood Blood (05-02 @ 00:28)  Culture Results:   No growth to date. (05-02 @ 00:28)    Specimen Source: .Blood Blood (05-02 @ 00:28)  Culture Results:   No growth to date. (05-02 @ 00:28)        Radiology:  CXR - 5/4/17 -  Portable chest x-ray is compared to a previous examination dated 5/2/2017.    Impression: Grossly stable pulmonary opacities in the upper lung zones   bilaterally, right greater than left.    New right basilar density may represent pulmonary infiltrate and/or   pleural effusion.    No evidence for pneumothorax.    Stable cardiac silhouette and tracheostomy.                        CXR - 5/5/17 -   FINDINGS:  There is a tracheostomy tube in place. There is consolidation   throughout the right lung as well as faint patchy opacities in the left   lung. Heart size cannot be accurately evaluated in this projection. There   are degenerative changes of the spine.    IMPRESSION: No significant change from 5/4/2017      Impression:  Increasing temps and now with increased WBC's and new Rt base density reported on CXR despite current ab rx with Azactam + Vanco + Diflucan, with Blood and Sputum cx's from 5/2 remaining negative to date.    Suggestions: Will therefore repeat cx's now and empirically change ab rx to Zosyn with Gent x1 dose pending new cx's.  Continue to follow temps, labs, and CXR.

## 2017-05-05 NOTE — PROGRESS NOTE ADULT - SUBJECTIVE AND OBJECTIVE BOX
SURGERY PROGRESS HPI:  Pt seen and examined at bedside. Unresponsive. On vent.       Vital Signs Last 24 Hrs  T(C): 37.3, Max: 38.7 (05-04 @ 17:05)  T(F): 99.2, Max: 101.6 (05-04 @ 17:05)  HR: 122 (89 - 128)  BP: 129/64 (124/70 - 152/70)  BP(mean): --  RR: 18 (18 - 18)  SpO2: 94% (93% - 100%)      PHYSICAL EXAM:    PHYSICAL EXAM:  General: unresponsive  HEENT: trache to vent  Abdomen: soft. colostomy functioning well, viable, with air and trace amount of stool in bag. PEG intact and site clean/dry/intact   : shoemaker cath indwelling with clear yellow urine with sediment    I&O's Detail  I & Os for 24h ending 04 May 2017 07:00  =============================================  IN:    Jevity: 1020 ml    Enteral Tube Flush: 300 ml    Solution: 200 ml    Total IN: 1520 ml  ---------------------------------------------  OUT:    Indwelling Catheter - Urethral: 1100 ml    Total OUT: 1100 ml  ---------------------------------------------  Total NET: 420 ml    I & Os for current day (as of 05 May 2017 06:39)  =============================================  IN:    Solution: 50 ml    Total IN: 50 ml  ---------------------------------------------  OUT:    Indwelling Catheter - Urethral: 600 ml    Total OUT: 600 ml  ---------------------------------------------  Total NET: -550 ml      LABS:                        9.0    13.4  )-----------( 355      ( 04 May 2017 08:56 )             25.7     05-04    132<L>  |  97  |  27<H>  ----------------------------<  98  4.8   |  26  |  0.48<L>    Ca    9.2      04 May 2017 08:56    TPro  9.5<H>  /  Alb  1.4<L>  /  TBili  0.5  /  DBili  x   /  AST  27  /  ALT  <6<L>  /  AlkPhos  104  05-04          Assesment: 86y Female a/w PNA, sepsis and colovesicular fistula, s/p aspiration event 4/16 leading to respiratory failure, s/p PEG via GI and POD#7 s/p diverting loop colostomy and tracheostomy    Plan:  -cont shoemaker catheter and present medical management/supportive care, abx per ID  -routine trache/ostomy care  -possible Rocephin for sediments  -case discussed with Dr Bahena and Dr Mosley

## 2017-05-05 NOTE — PROGRESS NOTE ADULT - SUBJECTIVE AND OBJECTIVE BOX
INTERVAL HPI:  86F PMH Parkinson's, s/p recent mechanical fall with R intertrochanteric femur fracture requiring R intramedullary nailing of R trochanteric fracture of femur 3/13/17, UTI, PNA presents from [facility] rehab for sepsis, colovesical fistula with course complicated by aspiration PNA and now s/p trach and diverting loop colostomy.     OVERNIGHT EVENTS:  On Vent.  Fever spike to more than 101 noted.    Vital Signs Last 24 Hrs  T(C): 38.7, Max: 38.7 (05-04 @ 17:05)  T(F): 101.6, Max: 101.6 (05-04 @ 17:05)  HR: 121 (89 - 128)  BP: 136/61 (127/68 - 152/70)  BP(mean): --  RR: 18 (18 - 18)  SpO2: 97% (93% - 99%)    Mode: AC/ CMV (Assist Control/ Continuous Mandatory Ventilation)  RR (machine): 16  TV (machine): 400  FiO2: 30  PEEP: 5  ITime: 1  MAP: 12  PIP: 25    PHYSICAL EXAM:  GEN:         Awake, responsive and comfortable.  HEENT:    Normal.    RESP:     no wheezing.  CVS:        Regular rate and rhythm.   ABD:         Soft, non-tender, non-distended;     MEDICATIONS  (STANDING):  enoxaparin Injectable 40milliGRAM(s) Sub Cutaneous every 24 hours  aztreonam  IVPB 1000milliGRAM(s) IV Intermittent every 8 hours  fluconAZOLE   Tablet 100milliGRAM(s) Oral daily  pantoprazole   Suspension 40milliGRAM(s) Oral daily  lactobacillus acidophilus 1Tablet(s) Oral every 12 hours  carbidopa/levodopa  25/100 1Tablet(s) Oral four times a day  pramipexole 0.25milliGRAM(s) Oral three times a day  vancomycin  IVPB 1000milliGRAM(s) IV Intermittent every 24 hours  amL ODIPine   Tablet 5milliGRAM(s) Oral daily    MEDICATIONS  (PRN):  acetaminophen    Suspension. 650milliGRAM(s) Enteral Tube every 6 hours PRN Mild Pain (1 - 3)  acetaminophen    Suspension 650milliGRAM(s) Oral every 6 hours PRN For Temp greater than 38 C (100.4 F)    LABS:                        8.4    14.9  )-----------( 362      ( 05 May 2017 08:33 )             25.3     05-05    136  |  100  |  43<H>  ----------------------------<  119<H>  5.0   |  23  |  0.96    Ca    9.0      05 May 2017 08:33  Phos  5.2     05-05  Mg     2.4     05-05    TPro  9.7<H>  /  Alb  1.2<L>  /  TBili  0.4  /  DBili  x   /  AST  29  /  ALT  9<L>  /  Alk Phos  119  05-05    ASSESSMENT and Plan:  ·	Hypoxic Respiratory failure.  ·	Aspiration Pneumonia.  ·	S/P Diverting  Colostomy for Colovesical Fistula.  ·	Anemia.  ·	Leukocytosis.  ·	Parkinsonism.    Continue Antibiotics and Vent support.

## 2017-05-06 LAB
ALBUMIN SERPL ELPH-MCNC: 1.2 G/DL — LOW (ref 3.3–5)
ALP SERPL-CCNC: 132 U/L — HIGH (ref 40–120)
ALT FLD-CCNC: 7 U/L — LOW (ref 12–78)
ANION GAP SERPL CALC-SCNC: 12 MMOL/L — SIGNIFICANT CHANGE UP (ref 5–17)
APPEARANCE UR: ABNORMAL
AST SERPL-CCNC: 33 U/L — SIGNIFICANT CHANGE UP (ref 15–37)
BACTERIA # UR AUTO: ABNORMAL
BILIRUB SERPL-MCNC: 0.4 MG/DL — SIGNIFICANT CHANGE UP (ref 0.2–1.2)
BILIRUB UR-MCNC: NEGATIVE — SIGNIFICANT CHANGE UP
BUN SERPL-MCNC: 56 MG/DL — HIGH (ref 7–23)
CALCIUM SERPL-MCNC: 9 MG/DL — SIGNIFICANT CHANGE UP (ref 8.5–10.1)
CHLORIDE SERPL-SCNC: 102 MMOL/L — SIGNIFICANT CHANGE UP (ref 96–108)
CO2 SERPL-SCNC: 25 MMOL/L — SIGNIFICANT CHANGE UP (ref 22–31)
COLOR SPEC: YELLOW — SIGNIFICANT CHANGE UP
COMMENT - URINE: SIGNIFICANT CHANGE UP
CREAT SERPL-MCNC: 1.24 MG/DL — SIGNIFICANT CHANGE UP (ref 0.5–1.3)
CRP SERPL-MCNC: 18.15 MG/DL — HIGH (ref 0–0.4)
DIFF PNL FLD: ABNORMAL
EPI CELLS # UR: SIGNIFICANT CHANGE UP
ERYTHROCYTE [SEDIMENTATION RATE] IN BLOOD: 119 MM/HR — HIGH (ref 0–20)
GLUCOSE SERPL-MCNC: 132 MG/DL — HIGH (ref 70–99)
GLUCOSE UR QL: NEGATIVE MG/DL — SIGNIFICANT CHANGE UP
GRAM STN FLD: SIGNIFICANT CHANGE UP
HCT VFR BLD CALC: 24.6 % — LOW (ref 34.5–45)
HGB BLD-MCNC: 8.4 G/DL — LOW (ref 11.5–15.5)
KETONES UR-MCNC: NEGATIVE — SIGNIFICANT CHANGE UP
LEUKOCYTE ESTERASE UR-ACNC: ABNORMAL
MCHC RBC-ENTMCNC: 29.5 PG — SIGNIFICANT CHANGE UP (ref 27–34)
MCHC RBC-ENTMCNC: 34.1 GM/DL — SIGNIFICANT CHANGE UP (ref 32–36)
MCV RBC AUTO: 86.5 FL — SIGNIFICANT CHANGE UP (ref 80–100)
NITRITE UR-MCNC: NEGATIVE — SIGNIFICANT CHANGE UP
PH UR: 5 — SIGNIFICANT CHANGE UP (ref 5–8)
PLATELET # BLD AUTO: 325 K/UL — SIGNIFICANT CHANGE UP (ref 150–400)
POTASSIUM SERPL-MCNC: 4.4 MMOL/L — SIGNIFICANT CHANGE UP (ref 3.5–5.3)
POTASSIUM SERPL-SCNC: 4.4 MMOL/L — SIGNIFICANT CHANGE UP (ref 3.5–5.3)
PROT SERPL-MCNC: 9.7 GM/DL — HIGH (ref 6–8.3)
PROT UR-MCNC: 100 MG/DL
RBC # BLD: 2.85 M/UL — LOW (ref 3.8–5.2)
RBC # FLD: 17.1 % — HIGH (ref 11–15)
RBC CASTS # UR COMP ASSIST: ABNORMAL /HPF (ref 0–4)
SODIUM SERPL-SCNC: 139 MMOL/L — SIGNIFICANT CHANGE UP (ref 135–145)
SP GR SPEC: 1.02 — SIGNIFICANT CHANGE UP (ref 1.01–1.02)
SPECIMEN SOURCE: SIGNIFICANT CHANGE UP
UROBILINOGEN FLD QL: NEGATIVE MG/DL — SIGNIFICANT CHANGE UP
WBC # BLD: 16.5 K/UL — HIGH (ref 3.8–10.5)
WBC # FLD AUTO: 16.5 K/UL — HIGH (ref 3.8–10.5)
WBC UR QL: ABNORMAL

## 2017-05-06 RX ORDER — GENTAMICIN SULFATE 40 MG/ML
80 VIAL (ML) INJECTION ONCE
Qty: 0 | Refills: 0 | Status: COMPLETED | OUTPATIENT
Start: 2017-05-06 | End: 2017-05-06

## 2017-05-06 RX ADMIN — Medication 1 TABLET(S): at 05:54

## 2017-05-06 RX ADMIN — Medication 1 TABLET(S): at 17:54

## 2017-05-06 RX ADMIN — PRAMIPEXOLE DIHYDROCHLORIDE 0.25 MILLIGRAM(S): 0.12 TABLET ORAL at 22:10

## 2017-05-06 RX ADMIN — PIPERACILLIN AND TAZOBACTAM 25 GRAM(S): 4; .5 INJECTION, POWDER, LYOPHILIZED, FOR SOLUTION INTRAVENOUS at 03:22

## 2017-05-06 RX ADMIN — PANTOPRAZOLE SODIUM 40 MILLIGRAM(S): 20 TABLET, DELAYED RELEASE ORAL at 12:18

## 2017-05-06 RX ADMIN — Medication 650 MILLIGRAM(S): at 05:54

## 2017-05-06 RX ADMIN — PRAMIPEXOLE DIHYDROCHLORIDE 0.25 MILLIGRAM(S): 0.12 TABLET ORAL at 16:29

## 2017-05-06 RX ADMIN — FLUCONAZOLE 100 MILLIGRAM(S): 150 TABLET ORAL at 12:18

## 2017-05-06 RX ADMIN — CARBIDOPA AND LEVODOPA 1 TABLET(S): 25; 100 TABLET ORAL at 17:54

## 2017-05-06 RX ADMIN — CARBIDOPA AND LEVODOPA 1 TABLET(S): 25; 100 TABLET ORAL at 12:18

## 2017-05-06 RX ADMIN — Medication 100 MILLIGRAM(S): at 17:55

## 2017-05-06 RX ADMIN — PRAMIPEXOLE DIHYDROCHLORIDE 0.25 MILLIGRAM(S): 0.12 TABLET ORAL at 05:55

## 2017-05-06 RX ADMIN — PIPERACILLIN AND TAZOBACTAM 25 GRAM(S): 4; .5 INJECTION, POWDER, LYOPHILIZED, FOR SOLUTION INTRAVENOUS at 12:18

## 2017-05-06 RX ADMIN — AMLODIPINE BESYLATE 5 MILLIGRAM(S): 2.5 TABLET ORAL at 05:54

## 2017-05-06 RX ADMIN — CARBIDOPA AND LEVODOPA 1 TABLET(S): 25; 100 TABLET ORAL at 05:54

## 2017-05-06 RX ADMIN — PIPERACILLIN AND TAZOBACTAM 25 GRAM(S): 4; .5 INJECTION, POWDER, LYOPHILIZED, FOR SOLUTION INTRAVENOUS at 20:15

## 2017-05-06 NOTE — PROGRESS NOTE ADULT - SUBJECTIVE AND OBJECTIVE BOX
TMAX- 101.2     On day # 1 Zosyn / # 20 Duflucan / s/p Gent x1    Vital Signs Last 24 Hrs  T(C): 37.3, Max: 38.4 (05-06 @ 05:23)  T(F): 99.2, Max: 101.2 (05-06 @ 05:23)  HR: 115 (112 - 126)  BP: 27/62 (27/62 - 138/72)  BP(mean): --  RR: 19 (18 - 19)  SpO2: 99% (95% - 99%)  Mode: AC/ CMV (Assist Control/ Continuous Mandatory Ventilation)  RR (machine): 16  TV (machine): 400  FiO2: 30  PEEP: 5  ITime: 1  MAP: 12  PIP: 26  Supplemental O2: on 30% FIO2 + 5 PEEP      Remains poorly responsive, on ventilator.  Required Colon to be replaced today secondary to urinary retention - found to have >800cc of urine on bladder scanner done earlier today.  Apparently had not had any urine output since Colon was d/c'ed yesterday.       PHYSICAL EXAM  General:  poorly responsive, on ventilator on 30%FIO2 + 5 PEEP, lying semi-upright in bed.  HEENT:  conj pink, sclerae anicteric, PERRLA, no oral lesions noted  Neck:  supple, no nodes noted             trach site clean now  Heart:  RR  Lungs: rhonchi bilaterally now and with anterior wheeze noted Rt > Lt  Abdomen: soft, BS+, nontender appearance, PEG in place with feedings ongoing, site clean                  colostomy functioning  Extremities:  no edema LE's                     Rt upper arm with Midline in place - dressing intact, site clean - placed 4/25  Skin: warm, clammy          no rash noted      LABS:  CBC Full  -  ( 06 May 2017 08:16 )  WBC Count : 16.5 K/uL  Hemoglobin : 8.4 g/dL  Hematocrit : 24.6 %  Platelet Count - Automated : 325 K/uL  Mean Cell Volume : 86.5 fl  Mean Cell Hemoglobin : 29.5 pg  Mean Cell Hemoglobin Concentration : 34.1 gm/dL  Auto Neutrophil # : x  Auto Lymphocyte # : x  Auto Monocyte # : x  Auto Eosinophil # : x  Auto Basophil # : x  Auto Neutrophil % : x  Auto Lymphocyte % : x  Auto Monocyte % : x  Auto Eosinophil % : x  Auto Basophil % : x    05-06    139  |  102  |  56<H>  ----------------------------<  132<H>  4.4   |  25  |  1.24    Ca    9.0      06 May 2017 08:16  Phos  5.2     05-05  Mg     2.4     05-05    TPro  9.7<H>  /  Alb  1.2<L>  /  TBili  0.4  /  DBili  x   /  AST  33  /  ALT  7<L>  /  AlkPhos  132<H>  05-06    LIVER FUNCTIONS - ( 06 May 2017 08:16 )  Alb: 1.2 g/dL / Pro: 9.7 gm/dL / ALK PHOS: 132 U/L / ALT: 7 U/L / AST: 33 U/L / GGT: x           Sedimentation Rate, Erythrocyte: 119 mm/hr (05-06 @ 08:16)    Sedimentation Rate, Erythrocyte: >140 mm/hr (05-02 @ 03:29)          CULTURES:  Specimen Source: .Sputum Sputum (05-06 @ 01:04)                Gram stain with many PMN's seen and many  Gm Negative Coccobacilli                Cx pending    Specimen Source: .Sputum sputum (05-02 @ 01:19)  Culture Results:   Normal Respiratory Cindy present (05-02 @ 01:19)    Specimen Source: .Blood Blood (05-02 @ 00:28)  Culture Results:   No growth to date. (05-02 @ 00:28)    Specimen Source: .Blood Blood (05-02 @ 00:28)  Culture Results:   No growth to date. (05-02 @ 00:28)          Impression: Fever and increased WBC's, now on Zosyn + Diflucan + s/p Gent x1 for Sepsis with new area of PNA on CXR and now with urinary retention requiring Colon replacement - ?  source contributing to fevers?    Suggestions: Will continue present ab rx and rx with another dose of Gent x 1 pending new cx's.  Follow-up temps, labs, and CXR.

## 2017-05-06 NOTE — PROGRESS NOTE ADULT - SUBJECTIVE AND OBJECTIVE BOX
SURGERY/UROLOGY PROGRESS HPI:  Pt seen and examined at bedside. Resting comfortably on vent.     Vital Signs Last 24 Hrs  T(C): 37.9, Max: 38.7 (05-05 @ 14:22)  T(F): 100.2, Max: 101.6 (05-05 @ 14:22)  HR: 113 (112 - 123)  BP: 138/72 (129/64 - 138/72)  BP(mean): --  RR: 19 (18 - 19)  SpO2: 96% (93% - 98%)      PHYSICAL EXAM:    GENERAL: NAD  HEAD/NECK:  tracheostomy functioning well, site clean/dry/intact   CHEST/LUNG: Clear to ausculation, bilaterally   HEART: RRR S1S2  ABDOMEN: ostomy pink and viable with air and stool in bag. PEG site clean/dry/intact and functioning well. non distended, +BS, soft, non tender, no guarding  : no suprapubic tenderness, no CVA tenderness. shoemaker removed by medicine  EXTREMITIES:  calf soft, non tender b/l    I&O's Detail    I & Os for current day (as of 06 May 2017 04:56)  =============================================  IN:    Solution: 50 ml    Total IN: 50 ml  ---------------------------------------------  OUT:    Indwelling Catheter - Urethral: 800 ml    Total OUT: 800 ml  ---------------------------------------------  Total NET: -750 ml      LABS:                        8.4    14.9  )-----------( 362      ( 05 May 2017 08:33 )             25.3     05-05    136  |  100  |  43<H>  ----------------------------<  119<H>  5.0   |  23  |  0.96    Ca    9.0      05 May 2017 08:33  Phos  5.2     05-05  Mg     2.4     05-05    TPro  9.7<H>  /  Alb  1.2<L>  /  TBili  0.4  /  DBili  x   /  AST  29  /  ALT  9<L>  /  AlkPhos  119  05-05          Assesment: 86y Female a/w PNA, sepsis and colovesicular fistula, s/p aspiration event 4/16 leading to respiratory failure, s/p PEG via GI and POD#8 s/p diverting loop colostomy and tracheostomy    Plan:  -shoemaker removed by medicine for unknown fever origin. Follow up urine culture and discuss with Dr. Bahena  -continue present medical management/supportive care, abx per ID  -routine trache/ostomy care  -will case discuss with Dr Mosley and Dr. Bahena

## 2017-05-06 NOTE — PROGRESS NOTE ADULT - SUBJECTIVE AND OBJECTIVE BOX
INTERVAL HPI/OVERNIGHT EVENTS:        REVIEW OF SYSTEMS:  CONSTITUTIONAL:  unresponsive  on  respirator  has  not  voided  since  d/s  navid  yesterday      MEDICATION:  enoxaparin Injectable 40milliGRAM(s) SubCutaneous every 24 hours  fluconAZOLE   Tablet 100milliGRAM(s) Oral daily  pantoprazole   Suspension 40milliGRAM(s) Oral daily  lactobacillus acidophilus 1Tablet(s) Oral every 12 hours  acetaminophen    Suspension. 650milliGRAM(s) Enteral Tube every 6 hours PRN  carbidopa/levodopa  25/100 1Tablet(s) Oral four times a day  pramipexole 0.25milliGRAM(s) Oral three times a day  acetaminophen    Suspension 650milliGRAM(s) Oral every 6 hours PRN  amLODIPine   Tablet 5milliGRAM(s) Oral daily  piperacillin/tazobactam IVPB. 3.375Gram(s) IV Intermittent every 8 hours    Vital Signs Last 24 Hrs  T(C): 38.4, Max: 38.7 (05-05 @ 14:22)  T(F): 101.2, Max: 101.6 (05-05 @ 14:22)  HR: 118 (112 - 126)  BP: 135/73 (129/68 - 138/72)  BP(mean): --  RR: 19 (18 - 19)  SpO2: 95% (93% - 98%)    PHYSICAL EXAM:  GENERAL: NAD, well-groomed, well-developed  EYES:  conjunctiva and sclera clear  NERVOUS SYSTEM:  unresponsive  CHEST/LUNG: ronchis  at  bases  HEART: Regular rate and rhythm; No murmurs, rubs, or gallops  ABDOMEN: Soft, Nontender, Nondistended; Bowel sounds present  EXTREMITIES:  no  edema no  tenderness  SKIN: No rashes   LABS:                        8.4    16.5  )-----------( 325      ( 06 May 2017 08:16 )             24.6     05-06    139  |  102  |  56<H>  ----------------------------<  132<H>  4.4   |  25  |  1.24    Ca    9.0      06 May 2017 08:16  Phos  5.2     05-05  Mg     2.4     05-05    TPro  9.7<H>  /  Alb  1.2<L>  /  TBili  0.4  /  DBili  x   /  AST  33  /  ALT  7<L>  /  AlkPhos  132<H>  05-06        CAPILLARY BLOOD GLUCOSE      RADIOLOGY & ADDITIONAL TESTS:    Imaging reports  Personally Reviewed:  [ x] YES  [ ] NO    Consultant(s) Notes Reviewed:  [ x] YES  [ ] NO    Care Discussed with Consultants/Other Providers [ x] YES  [ ] NO  ACUTE  RESPIRATORY  FAILURE  PNEUMONIA   CONT  VENT  SUPPORT  AB  IVF  S/P  TRACH    recurrant  fever   cont  AB  tylenol  PRN  COLO/VESCICULAR FISTULA S/P  diverting  colostomy    LFT elevation observation  metabolic  encephalopathy with  severe  inflammatory  processs supportive care        Problem/Plan - 3:  ·  Problem: Parkinson disease encephalopathy.  Plan: sinemet pramipexole  anemia  continue  to  monitor  no  clinical  evidence  of  acute bleed  transfuse as  needed for  transfer  to  rehab  facility  with  vent  unit  fever  pneumonia  obseve  with  new  AB  regimen  urinary  retention  check  bladder  scan  might need  reinsertion  of  shoemaker

## 2017-05-06 NOTE — PROGRESS NOTE ADULT - SUBJECTIVE AND OBJECTIVE BOX
INTERVAL HPI:  86F PMH Parkinson's, s/p recent mechanical fall with R intertrochanteric femur fracture requiring R intramedullary nailing of R trochanteric fracture of femur 3/13/17, UTI, PNA presents from [facility] rehab for sepsis, colovesical fistula with course complicated by aspiration PNA and now s/p trach and diverting loop colostomy.    OVERNIGHT EVENTS:  On Vent. Fever of >101 noted.    Vital Signs Last 24 Hrs  T(C): 37.3, Max: 38.4 (05-06 @ 05:23)  T(F): 99.2, Max: 101.2 (05-06 @ 05:23)  HR: 115 (112 - 126)  BP: 27/62 (27/62 - 138/72)  BP(mean): --  RR: 19 (18 - 19)  SpO2: 99% (95% - 99%)    Mode: AC/ CMV (Assist Control/ Continuous Mandatory Ventilation)  RR (machine): 16  TV (machine): 400  FiO2: 30  PEEP: 5  ITime: 1  MAP: 12  PIP: 26    PHYSICAL EXAM:  GEN:        Resting, comfortable.  HEENT:    Normal.    RESP:     crackles.   CVS:         Regular rate and rhythm.   ABD:         Soft, non-tender, non-distended;     MEDICATIONS  (STANDING):  enoxaparin Injectable 40milliGRAM(s) SubCutaneous every 24 hours  fluconAZOLE   Tablet 100milliGRAM(s) Oral daily  pantoprazole   Suspension 40milliGRAM(s) Oral daily  lactobacillus acidophilus 1Tablet(s) Oral every 12 hours  carbidopa/levodopa  25/100 1Tablet(s) Oral four times a day  pramipexole 0.25milliGRAM(s) Oral three times a day  amLODIPine   Tablet 5milliGRAM(s) Oral daily  piperacillin/tazobactam IVPB. 3.375Gram(s) IV Intermittent every 8 hours    MEDICATIONS  (PRN):  acetaminophen    Suspension. 650milliGRAM(s) Enteral Tube every 6 hours PRN Mild Pain (1 - 3)  acetaminophen    Suspension 650milliGRAM(s) Oral every 6 hours PRN For Temp greater than 38 C (100.4 F)    LABS:                        8.4    16.5  )-----------( 325      ( 06 May 2017 08:16 )             24.6     05-06    139  |  102  |  56<H>  ----------------------------<  132<H>  4.4   |  25  |  1.24    Ca    9.0      06 May 2017 08:16  Phos  5.2     05-05  Mg     2.4     05-05    TPro  9.7<H>  /  Alb  1.2<L>  /  TBili  0.4  /  DBili  x   /  AST  33  /  ALT  7<L>  /  AlkPhos  132<H>  05-06    ASSESSMENT and Plan:  ·	Hypoxic Respiratory failure.  ·	Aspiration Pneumonia.  ·	S/P Diverting  Colostomy for Colovesical Fistula.  ·	Anemia.  ·	Leukocytosis.  ·	Parkinsonism.    Continue antibiotics and Vent support.

## 2017-05-07 LAB
-  AMIKACIN: SIGNIFICANT CHANGE UP
-  AMPICILLIN/SULBACTAM: SIGNIFICANT CHANGE UP
-  CEFEPIME: SIGNIFICANT CHANGE UP
-  CEFTAZIDIME: SIGNIFICANT CHANGE UP
-  CIPROFLOXACIN: SIGNIFICANT CHANGE UP
-  GENTAMICIN: SIGNIFICANT CHANGE UP
-  LEVOFLOXACIN: SIGNIFICANT CHANGE UP
-  MEROPENEM: SIGNIFICANT CHANGE UP
-  TOBRAMYCIN: SIGNIFICANT CHANGE UP
ANION GAP SERPL CALC-SCNC: 12 MMOL/L — SIGNIFICANT CHANGE UP (ref 5–17)
BUN SERPL-MCNC: 79 MG/DL — HIGH (ref 7–23)
CALCIUM SERPL-MCNC: 8.5 MG/DL — SIGNIFICANT CHANGE UP (ref 8.5–10.1)
CHLORIDE SERPL-SCNC: 107 MMOL/L — SIGNIFICANT CHANGE UP (ref 96–108)
CO2 SERPL-SCNC: 24 MMOL/L — SIGNIFICANT CHANGE UP (ref 22–31)
CREAT SERPL-MCNC: 1.66 MG/DL — HIGH (ref 0.5–1.3)
CULTURE RESULTS: NO GROWTH — SIGNIFICANT CHANGE UP
CULTURE RESULTS: SIGNIFICANT CHANGE UP
CULTURE RESULTS: SIGNIFICANT CHANGE UP
GLUCOSE SERPL-MCNC: 145 MG/DL — HIGH (ref 70–99)
HCT VFR BLD CALC: 23.9 % — LOW (ref 34.5–45)
HGB BLD-MCNC: 8.1 G/DL — LOW (ref 11.5–15.5)
MCHC RBC-ENTMCNC: 29.7 PG — SIGNIFICANT CHANGE UP (ref 27–34)
MCHC RBC-ENTMCNC: 34 GM/DL — SIGNIFICANT CHANGE UP (ref 32–36)
MCV RBC AUTO: 87.3 FL — SIGNIFICANT CHANGE UP (ref 80–100)
METHOD TYPE: SIGNIFICANT CHANGE UP
PLATELET # BLD AUTO: 331 K/UL — SIGNIFICANT CHANGE UP (ref 150–400)
POTASSIUM SERPL-MCNC: 4.5 MMOL/L — SIGNIFICANT CHANGE UP (ref 3.5–5.3)
POTASSIUM SERPL-SCNC: 4.5 MMOL/L — SIGNIFICANT CHANGE UP (ref 3.5–5.3)
RBC # BLD: 2.73 M/UL — LOW (ref 3.8–5.2)
RBC # FLD: 17.2 % — HIGH (ref 11–15)
SODIUM SERPL-SCNC: 143 MMOL/L — SIGNIFICANT CHANGE UP (ref 135–145)
SPECIMEN SOURCE: SIGNIFICANT CHANGE UP
WBC # BLD: 18.4 K/UL — HIGH (ref 3.8–10.5)
WBC # FLD AUTO: 18.4 K/UL — HIGH (ref 3.8–10.5)

## 2017-05-07 RX ORDER — SODIUM CHLORIDE 9 MG/ML
1000 INJECTION, SOLUTION INTRAVENOUS
Qty: 0 | Refills: 0 | Status: DISCONTINUED | OUTPATIENT
Start: 2017-05-07 | End: 2017-05-08

## 2017-05-07 RX ADMIN — CARBIDOPA AND LEVODOPA 1 TABLET(S): 25; 100 TABLET ORAL at 01:02

## 2017-05-07 RX ADMIN — FLUCONAZOLE 100 MILLIGRAM(S): 150 TABLET ORAL at 11:56

## 2017-05-07 RX ADMIN — PRAMIPEXOLE DIHYDROCHLORIDE 0.25 MILLIGRAM(S): 0.12 TABLET ORAL at 06:38

## 2017-05-07 RX ADMIN — Medication 1 TABLET(S): at 17:38

## 2017-05-07 RX ADMIN — SODIUM CHLORIDE 75 MILLILITER(S): 9 INJECTION, SOLUTION INTRAVENOUS at 11:56

## 2017-05-07 RX ADMIN — PIPERACILLIN AND TAZOBACTAM 25 GRAM(S): 4; .5 INJECTION, POWDER, LYOPHILIZED, FOR SOLUTION INTRAVENOUS at 11:56

## 2017-05-07 RX ADMIN — PIPERACILLIN AND TAZOBACTAM 25 GRAM(S): 4; .5 INJECTION, POWDER, LYOPHILIZED, FOR SOLUTION INTRAVENOUS at 04:15

## 2017-05-07 RX ADMIN — CARBIDOPA AND LEVODOPA 1 TABLET(S): 25; 100 TABLET ORAL at 06:37

## 2017-05-07 RX ADMIN — PANTOPRAZOLE SODIUM 40 MILLIGRAM(S): 20 TABLET, DELAYED RELEASE ORAL at 11:56

## 2017-05-07 RX ADMIN — PRAMIPEXOLE DIHYDROCHLORIDE 0.25 MILLIGRAM(S): 0.12 TABLET ORAL at 21:57

## 2017-05-07 RX ADMIN — Medication 1 TABLET(S): at 06:37

## 2017-05-07 RX ADMIN — AMLODIPINE BESYLATE 5 MILLIGRAM(S): 2.5 TABLET ORAL at 06:37

## 2017-05-07 RX ADMIN — PRAMIPEXOLE DIHYDROCHLORIDE 0.25 MILLIGRAM(S): 0.12 TABLET ORAL at 14:09

## 2017-05-07 RX ADMIN — Medication 650 MILLIGRAM(S): at 11:56

## 2017-05-07 RX ADMIN — ENOXAPARIN SODIUM 40 MILLIGRAM(S): 100 INJECTION SUBCUTANEOUS at 01:02

## 2017-05-07 RX ADMIN — PIPERACILLIN AND TAZOBACTAM 25 GRAM(S): 4; .5 INJECTION, POWDER, LYOPHILIZED, FOR SOLUTION INTRAVENOUS at 21:56

## 2017-05-07 RX ADMIN — CARBIDOPA AND LEVODOPA 1 TABLET(S): 25; 100 TABLET ORAL at 17:38

## 2017-05-07 RX ADMIN — CARBIDOPA AND LEVODOPA 1 TABLET(S): 25; 100 TABLET ORAL at 11:56

## 2017-05-07 NOTE — PROGRESS NOTE ADULT - SUBJECTIVE AND OBJECTIVE BOX
INTERVAL HPI/OVERNIGHT EVENTS:    nofurther  fever  for  past 24 h    REVIEW OF SYSTEMS:  CONSTITUTIONAL:  unresponsive  on  respirator      MEDICATION:  enoxaparin Injectable 40milliGRAM(s) SubCutaneous every 24 hours  fluconAZOLE   Tablet 100milliGRAM(s) Oral daily  pantoprazole   Suspension 40milliGRAM(s) Oral daily  lactobacillus acidophilus 1Tablet(s) Oral every 12 hours  acetaminophen    Suspension. 650milliGRAM(s) Enteral Tube every 6 hours PRN  carbidopa/levodopa  25/100 1Tablet(s) Oral four times a day  pramipexole 0.25milliGRAM(s) Oral three times a day  acetaminophen    Suspension 650milliGRAM(s) Oral every 6 hours PRN  amLODIPine   Tablet 5milliGRAM(s) Oral daily  piperacillin/tazobactam IVPB. 3.375Gram(s) IV Intermittent every 8 hours    Vital Signs Last 24 Hrs  T(C): 36.8, Max: 37.7 ( @ 11:30)  T(F): 98.2, Max: 99.8 ( @ 11:30)  HR: 121 (103 - 121)  BP: 134/72 (120/61 - 135/72)  BP(mean): --  RR: 18 (18 - 19)  SpO2: 97% (97% - 99%)    PHYSICAL EXAM:  GENERAL: NAD,   EYES:  conjunctiva and sclera clear  NECK:, No JVD, +  trach  NERVOUS SYSTEM:  unresponsive  CHEST/LUNG:  ronchis  HEART: Regular rate and rhythm; No murmurs, rubs, or gallops  ABDOMEN: Soft, Nontender, Nondistended; Bowel sounds present  EXTREMITIES:  no  edema no  tenderness  SKIN: No rashes   LABS:                        8.1    18.4  )-----------( 331      ( 07 May 2017 08:43 )             23.9     05-07    143  |  107  |  79<H>  ----------------------------<  145<H>  4.5   |  24  |  1.66<H>    Ca    8.5      07 May 2017 08:43    TPro  9.7<H>  /  Alb  1.2<L>  /  TBili  0.4  /  DBili  x   /  AST  33  /  ALT  7<L>  /  AlkPhos  132<H>  05-06      Urinalysis Basic - ( 06 May 2017 19:10 )    Color: Yellow / Appearance: Slightly Turbid / S.020 / pH: x  Gluc: x / Ketone: Negative  / Bili: Negative / Urobili: Negative mg/dL   Blood: x / Protein: 100 mg/dL / Nitrite: Negative   Leuk Esterase: Small / RBC: 11-25 /HPF / WBC 6-10   Sq Epi: x / Non Sq Epi: Few / Bacteria: Many      CAPILLARY BLOOD GLUCOSE      RADIOLOGY & ADDITIONAL TESTS:    Imaging reports  Personally Reviewed:  [x] YES  [ ] NO    Consultant(s) Notes Reviewed:  [ x YES  [ ] NO    Care Discussed with Consultants/Other Providers [ x YES  [ ] NO  ACUTE  RESPIRATORY  FAILURE  PNEUMONIA   CONT  VENT  SUPPORT  AB  IVF  S/P  TRACH    recurrant  fever   cont  AB  tylenol  PRN  COLO/VESCICULAR FISTULA S/P  diverting  colostomy    LFT elevation observation  metabolic  encephalopathy with  severe  inflammatory  processs supportive care        Problem/Plan - 3:  ·  Problem: Parkinson disease encephalopathy.  Plan: sinemet pramipexole  anemia  continue  to  monitor  no  clinical  evidence  of  acute bleed  transfuse as  needed for  transfer  to  rehab  facility  with  vent  unit  fever  pneumonia  obseve  with  new  AB  regimen  urinary  retention  observe  with  shoemaker  for  urology  f/u

## 2017-05-07 NOTE — PROGRESS NOTE ADULT - SUBJECTIVE AND OBJECTIVE BOX
INTERVAL HPI:   86F PMH Parkinson's, s/p recent mechanical fall with R intertrochanteric femur fracture requiring R intramedullary nailing of R trochanteric fracture of femur 3/13/17, UTI, PNA presents from [facility] rehab for sepsis, colovesical fistula with course complicated by aspiration PNA and now s/p trach and diverting loop colostomy.    OVERNIGHT EVENTS:  On Vent. Fever spike to >102 noted.    Vital Signs Last 24 Hrs  T(C): 39.3, Max: 39.3 (05-07 @ 12:06)  T(F): 102.8, Max: 102.8 (05-07 @ 12:06)  HR: 120 (103 - 123)  BP: 129/67 (127/62 - 135/72)  BP(mean): --  RR: 18 (18 - 19)  SpO2: 98% (97% - 99%)    Mode: AC/ CMV (Assist Control/ Continuous Mandatory Ventilation)  RR (machine): 16  TV (machine): 400  FiO2: 30  PEEP: 5  ITime: 9  MAP: 12  PIP: 24    PHYSICAL EXAM:  GEN:         Awake, responsive and comfortable.  HEENT:    trach   RESP:      normal     CVS:         Regular rate and rhythm.     MEDICATIONS  (STANDING):  enoxaparin Injectable 40milliGRAM(s) SubCutaneous every 24 hours  fluconAZOLE   Tablet 100milliGRAM(s) Oral daily  pantoprazole   Suspension 40milliGRAM(s) Oral daily  lactobacillus acidophilus 1Tablet(s) Oral every 12 hours  carbidopa/levodopa  25/100 1Tablet(s) Oral four times a day  pramipexole 0.25milliGRAM(s) Oral three times a day  amLODIPine   Tablet 5milliGRAM(s) Oral daily  piperacillin/tazobactam IVPB. 3.375Gram(s) IV Intermittent every 8 hours  dextrose 5% + sodium chloride 0.45%. 1000milliLiter(s) IV Continuous <Continuous>    MEDICATIONS  (PRN):  acetaminophen    Suspension. 650milliGRAM(s) Enteral Tube every 6 hours PRN Mild Pain (1 - 3)  acetaminophen    Suspension 650milliGRAM(s) Oral every 6 hours PRN For Temp greater than 38 C (100.4 F)    LABS:                        8.1    18.4  )-----------( 331      ( 07 May 2017 08:43 )             23.9         143  |  107  |  79<H>  ----------------------------<  145<H>  4.5   |  24  |  1.66<H>    Ca    8.5      07 May 2017 08:43    TPro  9.7<H>  /  Alb  1.2<L>  /  TBili  0.4  /  DBili  x   /  AST  33  /  ALT  7<L>  /  AlkPhos  132<H>      Urinalysis Basic - ( 06 May 2017 19:10 )    Color: Yellow / Appearance: Slightly Turbid / S.020 / pH: x  Gluc: x / Ketone: Negative  / Bili: Negative / Urobili: Negative mg/dL   Blood: x / Protein: 100 mg/dL / Nitrite: Negative   Leuk Esterase: Small / RBC: 11-25 /HPF / WBC 6-10   Sq Epi: x / Non Sq Epi: Few / Bacteria: Many    ASSESSMENT and Plan:  ·	Hypoxic Respiratory failure.  ·	Aspiration Pneumonia.  ·	S/P Diverting  Colostomy for Colovesical Fistula.  ·	Anemia.  ·	Leukocytosis.  ·	On Going fever.  ·	Parkinsonism.    Continue Vent support.  On broad spectrum antibiotics.

## 2017-05-08 LAB
-  IMIPENEM: SIGNIFICANT CHANGE UP
ALBUMIN SERPL ELPH-MCNC: 1.3 G/DL — LOW (ref 3.3–5)
ALP SERPL-CCNC: 102 U/L — SIGNIFICANT CHANGE UP (ref 40–120)
ALT FLD-CCNC: <6 U/L — LOW (ref 12–78)
ANION GAP SERPL CALC-SCNC: 15 MMOL/L — SIGNIFICANT CHANGE UP (ref 5–17)
ANISOCYTOSIS BLD QL: SLIGHT — SIGNIFICANT CHANGE UP
AST SERPL-CCNC: 21 U/L — SIGNIFICANT CHANGE UP (ref 15–37)
BASO STIPL BLD QL SMEAR: PRESENT — SIGNIFICANT CHANGE UP
BASOPHILS # BLD AUTO: 0.1 K/UL — SIGNIFICANT CHANGE UP (ref 0–0.2)
BASOPHILS # BLD AUTO: 0.1 K/UL — SIGNIFICANT CHANGE UP (ref 0–0.2)
BASOPHILS NFR BLD AUTO: 0.9 % — SIGNIFICANT CHANGE UP (ref 0–2)
BASOPHILS NFR BLD AUTO: 1 % — SIGNIFICANT CHANGE UP (ref 0–2)
BILIRUB SERPL-MCNC: 0.3 MG/DL — SIGNIFICANT CHANGE UP (ref 0.2–1.2)
BUN SERPL-MCNC: 101 MG/DL — HIGH (ref 7–23)
CALCIUM SERPL-MCNC: 7.8 MG/DL — LOW (ref 8.5–10.1)
CHLORIDE SERPL-SCNC: 108 MMOL/L — SIGNIFICANT CHANGE UP (ref 96–108)
CO2 SERPL-SCNC: 22 MMOL/L — SIGNIFICANT CHANGE UP (ref 22–31)
CREAT SERPL-MCNC: 2.2 MG/DL — HIGH (ref 0.5–1.3)
CULTURE RESULTS: SIGNIFICANT CHANGE UP
EOSINOPHIL # BLD AUTO: 0.1 K/UL — SIGNIFICANT CHANGE UP (ref 0–0.5)
EOSINOPHIL # BLD AUTO: 0.1 K/UL — SIGNIFICANT CHANGE UP (ref 0–0.5)
EOSINOPHIL NFR BLD AUTO: 0.8 % — SIGNIFICANT CHANGE UP (ref 0–6)
EOSINOPHIL NFR BLD AUTO: 1 % — SIGNIFICANT CHANGE UP (ref 0–6)
GLUCOSE SERPL-MCNC: 180 MG/DL — HIGH (ref 70–99)
GRAM STN FLD: SIGNIFICANT CHANGE UP
HCT VFR BLD CALC: 22.1 % — LOW (ref 34.5–45)
HCT VFR BLD CALC: 22.9 % — LOW (ref 34.5–45)
HGB BLD-MCNC: 7.6 G/DL — LOW (ref 11.5–15.5)
HGB BLD-MCNC: 7.7 G/DL — LOW (ref 11.5–15.5)
HYPOCHROMIA BLD QL: SLIGHT — SIGNIFICANT CHANGE UP
LYMPHOCYTES # BLD AUTO: 2.7 K/UL — SIGNIFICANT CHANGE UP (ref 1–3.3)
LYMPHOCYTES # BLD AUTO: 2.9 K/UL — SIGNIFICANT CHANGE UP (ref 1–3.3)
LYMPHOCYTES # BLD AUTO: 20 % — SIGNIFICANT CHANGE UP (ref 13–44)
LYMPHOCYTES # BLD AUTO: 21 % — SIGNIFICANT CHANGE UP (ref 13–44)
MACROCYTES BLD QL: SLIGHT — SIGNIFICANT CHANGE UP
MANUAL DIF COMMENT BLD-IMP: SIGNIFICANT CHANGE UP
MANUAL DIF COMMENT BLD-IMP: SIGNIFICANT CHANGE UP
MCHC RBC-ENTMCNC: 29.2 PG — SIGNIFICANT CHANGE UP (ref 27–34)
MCHC RBC-ENTMCNC: 29.9 PG — SIGNIFICANT CHANGE UP (ref 27–34)
MCHC RBC-ENTMCNC: 33.5 GM/DL — SIGNIFICANT CHANGE UP (ref 32–36)
MCHC RBC-ENTMCNC: 34.4 GM/DL — SIGNIFICANT CHANGE UP (ref 32–36)
MCV RBC AUTO: 87 FL — SIGNIFICANT CHANGE UP (ref 80–100)
MCV RBC AUTO: 87.1 FL — SIGNIFICANT CHANGE UP (ref 80–100)
METHOD TYPE: SIGNIFICANT CHANGE UP
MICROCYTES BLD QL: SLIGHT — SIGNIFICANT CHANGE UP
MONOCYTES # BLD AUTO: 1.2 K/UL — HIGH (ref 0–0.9)
MONOCYTES # BLD AUTO: 1.2 K/UL — HIGH (ref 0–0.9)
MONOCYTES NFR BLD AUTO: 1 % — LOW (ref 2–14)
MONOCYTES NFR BLD AUTO: 8.6 % — SIGNIFICANT CHANGE UP (ref 2–14)
NEUTROPHILS # BLD AUTO: 11.6 K/UL — HIGH (ref 1.8–7.4)
NEUTROPHILS # BLD AUTO: 9.6 K/UL — HIGH (ref 1.8–7.4)
NEUTROPHILS NFR BLD AUTO: 68.7 % — SIGNIFICANT CHANGE UP (ref 43–77)
NEUTROPHILS NFR BLD AUTO: 74 % — SIGNIFICANT CHANGE UP (ref 43–77)
NEUTS BAND # BLD: 3 % — SIGNIFICANT CHANGE UP (ref 0–8)
ORGANISM # SPEC MICROSCOPIC CNT: SIGNIFICANT CHANGE UP
PLAT MORPH BLD: NORMAL — SIGNIFICANT CHANGE UP
PLATELET # BLD AUTO: 264 K/UL — SIGNIFICANT CHANGE UP (ref 150–400)
PLATELET # BLD AUTO: 276 K/UL — SIGNIFICANT CHANGE UP (ref 150–400)
POIKILOCYTOSIS BLD QL AUTO: SLIGHT — SIGNIFICANT CHANGE UP
POTASSIUM SERPL-MCNC: 3.9 MMOL/L — SIGNIFICANT CHANGE UP (ref 3.5–5.3)
POTASSIUM SERPL-SCNC: 3.9 MMOL/L — SIGNIFICANT CHANGE UP (ref 3.5–5.3)
PROT SERPL-MCNC: 9.1 GM/DL — HIGH (ref 6–8.3)
RBC # BLD: 2.55 M/UL — LOW (ref 3.8–5.2)
RBC # BLD: 2.63 M/UL — LOW (ref 3.8–5.2)
RBC # FLD: 17.1 % — HIGH (ref 11–15)
RBC # FLD: 17.3 % — HIGH (ref 11–15)
RBC BLD AUTO: ABNORMAL
SODIUM SERPL-SCNC: 145 MMOL/L — SIGNIFICANT CHANGE UP (ref 135–145)
SPECIMEN SOURCE: SIGNIFICANT CHANGE UP
T3 SERPL-MCNC: 63 NG/DL — LOW (ref 80–200)
T4 AB SER-ACNC: 3 UG/DL — LOW (ref 4.6–12)
TOXIC GRANULES BLD QL SMEAR: PRESENT — SIGNIFICANT CHANGE UP
WBC # BLD: 14 K/UL — HIGH (ref 3.8–10.5)
WBC # BLD: 15.8 K/UL — HIGH (ref 3.8–10.5)
WBC # FLD AUTO: 14 K/UL — HIGH (ref 3.8–10.5)
WBC # FLD AUTO: 15.8 K/UL — HIGH (ref 3.8–10.5)

## 2017-05-08 PROCEDURE — 71010: CPT | Mod: 26

## 2017-05-08 PROCEDURE — 85060 BLOOD SMEAR INTERPRETATION: CPT

## 2017-05-08 RX ORDER — TOBRAMYCIN SULFATE 40 MG/ML
60 VIAL (ML) INJECTION ONCE
Qty: 0 | Refills: 0 | Status: COMPLETED | OUTPATIENT
Start: 2017-05-08 | End: 2017-05-08

## 2017-05-08 RX ORDER — SODIUM CHLORIDE 9 MG/ML
1000 INJECTION INTRAMUSCULAR; INTRAVENOUS; SUBCUTANEOUS
Qty: 0 | Refills: 0 | Status: DISCONTINUED | OUTPATIENT
Start: 2017-05-08 | End: 2017-05-11

## 2017-05-08 RX ORDER — AMPICILLIN SODIUM AND SULBACTAM SODIUM 250; 125 MG/ML; MG/ML
3 INJECTION, POWDER, FOR SUSPENSION INTRAMUSCULAR; INTRAVENOUS ONCE
Qty: 0 | Refills: 0 | Status: COMPLETED | OUTPATIENT
Start: 2017-05-08 | End: 2017-05-08

## 2017-05-08 RX ORDER — SODIUM CHLORIDE 9 MG/ML
1000 INJECTION INTRAMUSCULAR; INTRAVENOUS; SUBCUTANEOUS
Qty: 0 | Refills: 0 | Status: DISCONTINUED | OUTPATIENT
Start: 2017-05-08 | End: 2017-05-08

## 2017-05-08 RX ORDER — AMPICILLIN SODIUM AND SULBACTAM SODIUM 250; 125 MG/ML; MG/ML
INJECTION, POWDER, FOR SUSPENSION INTRAMUSCULAR; INTRAVENOUS
Qty: 0 | Refills: 0 | Status: DISCONTINUED | OUTPATIENT
Start: 2017-05-08 | End: 2017-05-13

## 2017-05-08 RX ORDER — AMPICILLIN SODIUM AND SULBACTAM SODIUM 250; 125 MG/ML; MG/ML
3 INJECTION, POWDER, FOR SUSPENSION INTRAMUSCULAR; INTRAVENOUS EVERY 8 HOURS
Qty: 0 | Refills: 0 | Status: DISCONTINUED | OUTPATIENT
Start: 2017-05-08 | End: 2017-05-13

## 2017-05-08 RX ADMIN — PIPERACILLIN AND TAZOBACTAM 25 GRAM(S): 4; .5 INJECTION, POWDER, LYOPHILIZED, FOR SOLUTION INTRAVENOUS at 11:21

## 2017-05-08 RX ADMIN — SODIUM CHLORIDE 75 MILLILITER(S): 9 INJECTION, SOLUTION INTRAVENOUS at 04:10

## 2017-05-08 RX ADMIN — AMPICILLIN SODIUM AND SULBACTAM SODIUM 200 GRAM(S): 250; 125 INJECTION, POWDER, FOR SUSPENSION INTRAMUSCULAR; INTRAVENOUS at 18:13

## 2017-05-08 RX ADMIN — CARBIDOPA AND LEVODOPA 1 TABLET(S): 25; 100 TABLET ORAL at 17:37

## 2017-05-08 RX ADMIN — Medication 103 MILLIGRAM(S): at 17:37

## 2017-05-08 RX ADMIN — SODIUM CHLORIDE 75 MILLILITER(S): 9 INJECTION, SOLUTION INTRAVENOUS at 14:16

## 2017-05-08 RX ADMIN — PANTOPRAZOLE SODIUM 40 MILLIGRAM(S): 20 TABLET, DELAYED RELEASE ORAL at 11:22

## 2017-05-08 RX ADMIN — AMPICILLIN SODIUM AND SULBACTAM SODIUM 200 GRAM(S): 250; 125 INJECTION, POWDER, FOR SUSPENSION INTRAMUSCULAR; INTRAVENOUS at 22:02

## 2017-05-08 RX ADMIN — Medication 650 MILLIGRAM(S): at 11:22

## 2017-05-08 RX ADMIN — PRAMIPEXOLE DIHYDROCHLORIDE 0.25 MILLIGRAM(S): 0.12 TABLET ORAL at 05:25

## 2017-05-08 RX ADMIN — PRAMIPEXOLE DIHYDROCHLORIDE 0.25 MILLIGRAM(S): 0.12 TABLET ORAL at 22:03

## 2017-05-08 RX ADMIN — SODIUM CHLORIDE 100 MILLILITER(S): 9 INJECTION INTRAMUSCULAR; INTRAVENOUS; SUBCUTANEOUS at 15:34

## 2017-05-08 RX ADMIN — ENOXAPARIN SODIUM 40 MILLIGRAM(S): 100 INJECTION SUBCUTANEOUS at 00:00

## 2017-05-08 RX ADMIN — AMLODIPINE BESYLATE 5 MILLIGRAM(S): 2.5 TABLET ORAL at 05:26

## 2017-05-08 RX ADMIN — Medication 1 TABLET(S): at 17:37

## 2017-05-08 RX ADMIN — PIPERACILLIN AND TAZOBACTAM 25 GRAM(S): 4; .5 INJECTION, POWDER, LYOPHILIZED, FOR SOLUTION INTRAVENOUS at 04:05

## 2017-05-08 RX ADMIN — CARBIDOPA AND LEVODOPA 1 TABLET(S): 25; 100 TABLET ORAL at 00:05

## 2017-05-08 RX ADMIN — Medication 1 TABLET(S): at 05:25

## 2017-05-08 RX ADMIN — PRAMIPEXOLE DIHYDROCHLORIDE 0.25 MILLIGRAM(S): 0.12 TABLET ORAL at 14:07

## 2017-05-08 RX ADMIN — CARBIDOPA AND LEVODOPA 1 TABLET(S): 25; 100 TABLET ORAL at 05:25

## 2017-05-08 RX ADMIN — FLUCONAZOLE 100 MILLIGRAM(S): 150 TABLET ORAL at 11:22

## 2017-05-08 RX ADMIN — CARBIDOPA AND LEVODOPA 1 TABLET(S): 25; 100 TABLET ORAL at 11:22

## 2017-05-08 NOTE — PROGRESS NOTE ADULT - SUBJECTIVE AND OBJECTIVE BOX
TMAX - 102.8    On day #  3 Zosyn / # 22 Diflucan / s/p Gent x1    Vital Signs Last 24 Hrs  T(C): 38.9, Max: 38.9 (05-08 @ 11:03)  T(F): 102, Max: 102 (05-08 @ 11:03)  HR: 104 (98 - 125)  BP: 118/55 (97/63 - 140/69)  BP(mean): --  RR: 22 (18 - 22)  SpO2: 99% (97% - 100%)  Mode: AC/ CMV (Assist Control/ Continuous Mandatory Ventilation)  RR (machine): 16  TV (machine): 400  FiO2: 30  PEEP: 5  ITime: 1  MAP: 9  PIP: 18  Supplemental O2:  on ventilator on 30% FIO2 + 5 PEEP    Remains on ventilator, poorly responsive, with increased temps, increased WBC's and increased BUN/Creat. and decreased urine output now.      PHYSICAL EXAM  General:  poorly responsive, on ventilator, lying semi-upright in bed  HEENT:  conj pink, sclerae anicteric, PERRLA, no oral lesions noted  Neck:  semi-supple, no nodes noted             trach site - clean, no drainage noted, sutures in place  Heart:  RR   Lungs: bilateral rhonchi throughout   Abdomen:  soft, BS+, nontender appearance, PEG in place with feedings ongoing - site clean                   colostomy functioning but with stool darker in color now  Extremities:  no edema LE's                       Rt arm with midline in place - site clean, dressing intact - placed   Skin:  warm, clammy           no rash noted  Colon in place and draining darker yellow appearing urine      I&O's Summary:    I & Os for current day (as of 08 May 2017 17:35)  =============================================  IN: 0 ml / OUT: 300 ml / NET: -300 ml      LABS:  CBC Full  -  ( 08 May 2017 07:38 )  WBC Count : 15.8 K/uL  Hemoglobin : 7.6 g/dL  Hematocrit : 22.1 %  Platelet Count - Automated : 276 K/uL  Mean Cell Volume : 87.0 fl  Mean Cell Hemoglobin : 29.9 pg  Mean Cell Hemoglobin Concentration : 34.4 gm/dL  Auto Neutrophil # : 11.6 K/uL  Auto Lymphocyte # : 2.7 K/uL  Auto Monocyte # : 1.2 K/uL  Auto Eosinophil # : 0.1 K/uL  Auto Basophil # : 0.1 K/uL  Auto Neutrophil % : 74.0 %  Auto Lymphocyte % : 20.0 %  Auto Monocyte % : 1.0 %  Auto Eosinophil % : 1.0 %  Auto Basophil % : 1.0 %        145  |  108  |  101<H>  ----------------------------<  180<H>  3.9   |  22  |  2.20<H>    Ca    7.8<L>      08 May 2017 07:38    TPro  9.1<H>  /  Alb  1.3<L>  /  TBili  0.3  /  DBili  x   /  AST  21  /  ALT  <6<L>  /  AlkPhos  102  05-08    LIVER FUNCTIONS - ( 08 May 2017 07:38 )  Alb: 1.3 g/dL / Pro: 9.1 gm/dL / ALK PHOS: 102 U/L / ALT: <6 U/L / AST: 21 U/L / GGT: x           Urinalysis Basic - ( 06 May 2017 19:10 )  Color: Yellow / Appearance: Slightly Turbid / S.020 / pH: x  Gluc: x / Ketone: Negative  / Bili: Negative / Urobili: Negative mg/dL   Blood: x / Protein: 100 mg/dL / Nitrite: Negative   Leuk Esterase: Small / RBC: 11-25 /HPF / WBC 6-10   Sq Epi: x / Non Sq Epi: Few / Bacteria: Many      CRP - 18.15    Sedimentation Rate, Erythrocyte: 119 mm/hr ( @ 08:16)        CULTURES:  Specimen Source: .Urine Catheterized ( @ 15:19)  Culture Results:   No growth ( @ 15:19)    Specimen Source: .Sputum Sputum ( @ 01:04)  Culture Results:   Numerous Acinetobacter baumannii/haemolyticus (Carbapenem Resistant)  Normal Respiratory Cindy absent ( @ 01:04)    Specimen Source: .Blood Blood ( @ 23:57)  Culture Results:   No growth to date. ( @ 23:57)    Specimen Source: .Blood Blood ( @ 23:57)  Culture Results:   No growth to date. ( @ 23:57)    Specimen Source: .Sputum sputum ( @ 01:19)  Culture Results:   Normal Respiratory Cindy present ( @ 01:19)    Specimen Source: .Blood Blood ( @ 00:28)  Culture Results:   No growth at 5 days. ( @ 00:28)    Specimen Source: .Blood Blood ( @ 00:28)  Culture Results:   No growth at 5 days. ( @ 00:28)          Impression: Increased temps continue and now with increased BUN/ Creat along with increased WBC's, and now sputum cx from  with Carbapenem- Resistant Acinetobacter - only sensitive to Unasyn and Tobra.  Repeat blood and urine cx's remain negative to date.    Suggestions: Will therefore change ab rx to Unasyn and Tobra x 1 dose only now in view of renal dysfunction and repeat labs and CXR in AM.  Have requested Micro to do a few additional ab sensitivities not on the panel.  Contact Isolation for MDRO Acinetobacter.  Follow-up temps and labs closely. IV fluids in progress and stool for occult blood also sent in view of decreased H+H and dark-colored stool now noted in colostomy.

## 2017-05-08 NOTE — PROGRESS NOTE ADULT - SUBJECTIVE AND OBJECTIVE BOX
INTERVAL HPI:  86F PMH Parkinson's, s/p recent mechanical fall with R intertrochanteric femur fracture requiring R intramedullary nailing of R trochanteric fracture of femur 3/13/17, UTI, PNA presents from [facility] rehab for sepsis, colovesical fistula with course complicated by aspiration PNA and now s/p trach and diverting loop colostomy.    OVERNIGHT EVENTS:  Remains febrile, on Vent. Thick trach secretions reported by RN.    Vital Signs Last 24 Hrs  T(C): 38.9, Max: 38.9 (05-08 @ 11:03)  T(F): 102, Max: 102 (05-08 @ 11:03)  HR: 98 (98 - 125)  BP: 118/55 (97/63 - 140/69)  BP(mean): --  RR: 22 (18 - 22)  SpO2: 99% (97% - 100%)    Mode: AC/ CMV (Assist Control/ Continuous Mandatory Ventilation)  RR (machine): 16  TV (machine): 400  FiO2: 30  PEEP: 5  ITime: 0.9  MAP: 10  PIP: 20    PHYSICAL EXAM:  GEN:         Awake, responsive and comfortable.  HEENT:    Normal.    RESP:     crackles.  CVS:         Regular rate and rhythm.   ABD:         Soft, non-tender, non-distended;     MEDICATIONS  (STANDING):  enoxaparin Injectable 40milliGRAM(s) SubCutaneous every 24 hours  fluconAZOLE   Tablet 100milliGRAM(s) Oral daily  pantoprazole   Suspension 40milliGRAM(s) Oral daily  lactobacillus acidophilus 1Tablet(s) Oral every 12 hours  carbidopa/levodopa  25/100 1Tablet(s) Oral four times a day  pramipexole 0.25milliGRAM(s) Oral three times a day  amLODIPine   Tablet 5milliGRAM(s) Oral daily  piperacillin/tazobactam IVPB. 3.375Gram(s) IV Intermittent every 8 hours  sodium chloride 0.9%. 1000milliLiter(s) IV Continuous <Continuous>    MEDICATIONS  (PRN):  acetaminophen    Suspension. 650milliGRAM(s) Enteral Tube every 6 hours PRN Mild Pain (1 - 3)  acetaminophen    Suspension 650milliGRAM(s) Oral every 6 hours PRN For Temp greater than 38 C (100.4 F)    LABS:                        7.6    15.8  )-----------( 276      ( 08 May 2017 07:38 )             22.1         145  |  108  |  101<H>  ----------------------------<  180<H>  3.9   |  22  |  2.20<H>    Ca    7.8<L>      08 May 2017 07:38    TPro  9.1<H>  /  Alb  1.3<L>  /  TBili  0.3  /  DBili  x   /  AST  21  /  ALT  <6<L>  /  AlkPhos  102      Urinalysis Basic - ( 06 May 2017 19:10 )    Color: Yellow / Appearance: Slightly Turbid / S.020 / pH: x  Gluc: x / Ketone: Negative  / Bili: Negative / Urobili: Negative mg/dL   Blood: x / Protein: 100 mg/dL / Nitrite: Negative   Leuk Esterase: Small / RBC: 11-25 /HPF / WBC 6-10   Sq Epi: x / Non Sq Epi: Few / Bacteria: Many    ASSESSMENT and Plan:  ·	Hypoxic Respiratory failure.  ·	Aspiration Pneumonia.  ·	S/P Diverting  Colostomy for Colovesical Fistula.  ·	Anemia.  ·	Leukocytosis.  ·	On Going fever.  ·	Parkinsonism.    Continue Vent and antibiotics.  Sputum culture.

## 2017-05-08 NOTE — PROGRESS NOTE ADULT - SUBJECTIVE AND OBJECTIVE BOX
INTERVAL HPI/OVERNIGHT EVENTS:        REVIEW OF SYSTEMS:  CONSTITUTIONAL:  unresponsive  on  respirator      MEDICATION:  enoxaparin Injectable 40milliGRAM(s) SubCutaneous every 24 hours  fluconAZOLE   Tablet 100milliGRAM(s) Oral daily  pantoprazole   Suspension 40milliGRAM(s) Oral daily  lactobacillus acidophilus 1Tablet(s) Oral every 12 hours  acetaminophen    Suspension. 650milliGRAM(s) Enteral Tube every 6 hours PRN  carbidopa/levodopa  25/100 1Tablet(s) Oral four times a day  pramipexole 0.25milliGRAM(s) Oral three times a day  acetaminophen    Suspension 650milliGRAM(s) Oral every 6 hours PRN  amLODIPine   Tablet 5milliGRAM(s) Oral daily  piperacillin/tazobactam IVPB. 3.375Gram(s) IV Intermittent every 8 hours  dextrose 5% + sodium chloride 0.45%. 1000milliLiter(s) IV Continuous <Continuous>    Vital Signs Last 24 Hrs  T(C): 37.1, Max: 39.3 ( @ 12:06)  T(F): 98.8, Max: 102.8 ( @ 12:06)  HR: 125 (108 - 125)  BP: 127/69 (127/69 - 140/69)  BP(mean): --  RR: 18 (18 - 19)  SpO2: 98% (97% - 100%)    PHYSICAL EXAM:  GENERAL: NAD, well-groomed, well-developed  NECK: Supple, No JVD,  NERVOUS SYSTEM: unresponsive  CHEST/LUNG: Clear    HEART: Regular rate and rhythm; No murmurs, rubs, or gallops  ABDOMEN: Soft, Nontender, Nondistended; Bowel sounds present  EXTREMITIES:  no  edema no  tenderness  SKIN: No rashes   LABS:                        8.1    18.4  )-----------( 331      ( 07 May 2017 08:43 )             23.9         143  |  107  |  79<H>  ----------------------------<  145<H>  4.5   |  24  |  1.66<H>    Ca    8.5      07 May 2017 08:43    TPro  9.7<H>  /  Alb  1.2<L>  /  TBili  0.4  /  DBili  x   /  AST  33  /  ALT  7<L>  /  AlkPhos  132<H>  -      Urinalysis Basic - ( 06 May 2017 19:10 )    Color: Yellow / Appearance: Slightly Turbid / S.020 / pH: x  Gluc: x / Ketone: Negative  / Bili: Negative / Urobili: Negative mg/dL   Blood: x / Protein: 100 mg/dL / Nitrite: Negative   Leuk Esterase: Small / RBC: 11-25 /HPF / WBC 6-10   Sq Epi: x / Non Sq Epi: Few / Bacteria: Many      CAPILLARY BLOOD GLUCOSE      RADIOLOGY & ADDITIONAL TESTS:    Imaging reports  Personally Reviewed:  [x ] YES  [ ] NO    Consultant(s) Notes Reviewed:  [x ] YES  [ ] NO    ACUTE  RESPIRATORY  FAILURE  PNEUMONIA   CONT  VENT  SUPPORT  AB  IVF  S/P  TRACH    recurrant  fever   cont  AB  tylenol  PRN  COLO/VESCICULAR FISTULA S/P  diverting  colostomy    LFT elevation observation  metabolic  encephalopathy with  severe  inflammatory  processs supportive care        Problem/Plan - 3:  ·  Problem: Parkinson disease encephalopathy.  Plan: sinemet pramipexole  anemia  continue  to  monitor  no  clinical  evidence  of  acute bleed  transfuse as  needed for  transfer  to  rehab  facility  with  vent  unit  fever  pneumonia  obseve  with  new  AB  regimen  urinary  retention  observe  with  shoemaker  for  urology  f/u

## 2017-05-08 NOTE — PROGRESS NOTE ADULT - SUBJECTIVE AND OBJECTIVE BOX
HPI:  patient  with  worsening  lethargy  fever  dysphagia  evaluated  in  ER  admitted  for  pneumonia  UTI  patient  s/p  l  hip  medullary  nail (04 Apr 2017 19:53)  Initial Consultaion Note  Requested by Name:  Date/ Time:  Reason for referral/ Consultation:        PAST MEDICAL & SURGICAL HISTORY:  Pneumonia  Anemia  Parkinson disease  Tremors of nervous system  No pertinent past medical history  Status post hip surgery  No significant past surgical history      SOCIAL HISTORY:                                 Admitted from: home [ ] SNF [ ] [facility] [ ] [facility] [ ]    )    ADVANCE DIRECTIVES:  [ ] YES [ ] NO     DNR [ ] YES [ ] NO  Completed on:                       MOLST  [ ] YES [ ] NO   Completed on  :  Living Will  [ ] YES [ ] NO   Completed on:    Allergies    No Known Allergies    Intolerances        Review of Systems:     PAIN : (Y/N) (0-10)          DYSPNEA:     NAUSEA/VOMITING:   DEPRESSION:        SECRETIONS:(    FRAILTY SYNDROM       FAILURE TO THRIVE     DIBILITY   OTHER SYMPTOMS :    UNABLE TO OBTAIN DUE TO POOR MENTATION (   )    PHYSICAL EXAM:  T(F): 100, Max: 102 (05-08 @ 11:03)  HR: 114 (98 - 125)  BP: 118/55 (97/63 - 140/69)  RR: 22 (18 - 22)  SpO2: 94% (94% - 100%)  Wt(kg): --   WEIGHT :    [id number]                  BMI:  I&O's Summary  I & Os for 24h ending 08 May 2017 07:00  =============================================  IN: 0 ml / OUT: 300 ml / NET: -300 ml    I & Os for current day (as of 08 May 2017 21:20)  =============================================  IN: 1970 ml / OUT: 0 ml / NET: 1970 ml    CAPILLARY BLOOD GLUCOSE      GENERAL: alert [ ] oriented x ______[ ] lethargic        [ ] cachexia     [ ] nonverbal [ ] coma [ ]    HEENT: normal [ ] dry mouth [ ] ET tube/trach [ ]   LUNGS: ]  CV :  normal [ ]  GI : normal [ ]  PEG /NG tube [ ]   : normal [ ] incontinent [ ] oliguria/anuria [ ] shoemaker [ ]  MSK : normal [ ] weakness [ ] edema [ ]              ambulatory [ ] bebbound/wheelchair bound [ ]   SKIN : normal [ ] pressure ulcer (Y/N) Stage ______________ rash (Y/N)    Functional Assessment:Karnofsky Performance Score :  Palliative Performance Status Version 2:         %    LABS:                        7.6    15.8  )-----------( 276      ( 08 May 2017 07:38 )             22.1     05-08    145  |  108  |  101<H>  ----------------------------<  180<H>  3.9   |  22  |  2.20<H>    Ca    7.8<L>      08 May 2017 07:38    TPro  9.1<H>  /  Alb  1.3<L>  /  TBili  0.3  /  DBili  x   /  AST  21  /  ALT  <6<L>  /  AlkPhos  102  05-08          I&O's Detail  I & Os for 24h ending 08 May 2017 07:00  =============================================  IN:    Total IN: 0 ml  ---------------------------------------------  OUT:    Indwelling Catheter - Urethral: 300 ml    Total OUT: 300 ml  ---------------------------------------------  Total NET: -300 ml    I & Os for current day (as of 08 May 2017 21:20)  =============================================  IN:    Jevity: 720 ml    dextrose 5% + sodium chloride 0.45%.: 600 ml    sodium chloride 0.9%.: 400 ml    IV PiggyBack: 150 ml    Solution: 100 ml    Total IN: 1970 ml  ---------------------------------------------  OUT:    Total OUT: 0 ml  ---------------------------------------------  Total NET: 1970 ml      Assessment:   86y Female admitted with ALTERED MENTAL STATUS/UTI/URINE RETENTION  AMS/PNEUMONIA  AMS      PROBLEM LIST :  PROBLEM/RECOMMENDATION: 1  Problem : Goals of care, counseling/ discussion.  Recommendation: met with patient/ family and discussed GOC & Advanced care planning                                    Palliative care info/counseling provided (Y/N)                                      Family meeting                                   Advanced Directives addressed (Y/N)  PROBLEM/ RECOMMENDATION:2  Problem :Resuscitation/ DNR/ DNI,   Recommendation: DNR/ DNI    PROBLEM/ RECOMMENDATION :3  Problem : Medical order for life sustaning treatment  Recommendation : MOLST Form ( initiated/ completed)    PROBLEM/RECOMMENDATION :4  Problem : Advanced care planning  Recommendation : Family meeting.(Y/N)     PLAN:  REFFERALS:                           Unit SW/Case Mgmt (Y/N)                          (Y/N)                         Speech/Swallow (Y/N)                           nutrition                                              Ethics (Y/N)                         PT/OT (Y/N)

## 2017-05-08 NOTE — PROGRESS NOTE ADULT - ATTENDING COMMENTS
TOV failed , pt had 900 ccs of urine post void, necessitating Colon catheter.  Cont Colon for Urinary retention

## 2017-05-08 NOTE — PROGRESS NOTE ADULT - SUBJECTIVE AND OBJECTIVE BOX
SURGERY PROGRESS HPI:  Pt seen and examined at bedside. Resting comfortably on vent.     Vital Signs Last 24 Hrs  T(C): 37.1, Max: 39.3 (-07 @ 12:06)  T(F): 98.8, Max: 102.8 ( @ 12:06)  HR: 125 (108 - 125)  BP: 127/69 (127/69 - 140/69)  BP(mean): --  RR: 18 (18 - 19)  SpO2: 98% (97% - 100%)      PHYSICAL EXAM:      GENERAL: NAD  HEAD/NECK:  tracheostomy functioning well, site clean/dry/intact   CHEST/LUNG: Clear to ausculation, bilaterally   HEART: RRR S1S2  ABDOMEN: ostomy pink and viable with air and stool in bag. PEG site clean/dry/intact and functioning well. non distended, +BS, soft, non tender, no guarding  : no suprapubic tenderness, no CVA tenderness. shoemaker in yellow urine sediment  EXTREMITIES:  calf soft, non tender b/l      I&O's Detail    I & Os for current day (as of 08 May 2017 07:07)  =============================================  IN:    Total IN: 0 ml  ---------------------------------------------  OUT:    Indwelling Catheter - Urethral: 300 ml    Total OUT: 300 ml  ---------------------------------------------  Total NET: -300 ml      LABS:                        8.1    18.4  )-----------( 331      ( 07 May 2017 08:43 )             23.9         143  |  107  |  79<H>  ----------------------------<  145<H>  4.5   |  24  |  1.66<H>    Ca    8.5      07 May 2017 08:43    TPro  9.7<H>  /  Alb  1.2<L>  /  TBili  0.4  /  DBili  x   /  AST  33  /  ALT  7<L>  /  AlkPhos  132<H>        Urinalysis Basic - ( 06 May 2017 19:10 )    Color: Yellow / Appearance: Slightly Turbid / S.020 / pH: x  Gluc: x / Ketone: Negative  / Bili: Negative / Urobili: Negative mg/dL   Blood: x / Protein: 100 mg/dL / Nitrite: Negative   Leuk Esterase: Small / RBC: 11-25 /HPF / WBC 6-10   Sq Epi: x / Non Sq Epi: Few / Bacteria: Many      Culture Results:   No growth ( @ 15:19)  Culture Results:   Numerous Gram Negative Rods  Normal Respiratory Cindy absent ( @ 01:04)  Culture Results:   No growth to date. ( @ 23:57)  Culture Results:   No growth to date. ( @ 23:57)      Assesment: 86y Female a/w PNA, sepsis and colovesicular fistula, s/p aspiration event 4/16 leading to respiratory failure, s/p PEG via GI and POD#10 s/p diverting loop colostomy and tracheostomy    Plan:  -continue shoemaker  -continue present medical management/supportive care, abx per ID  -routine trache/ostomy care  -will case discuss with Dr Mosley and Dr. Bahena

## 2017-05-09 LAB
-  POLYMYXIN B: SIGNIFICANT CHANGE UP
-  TIGECYCLINE: SIGNIFICANT CHANGE UP
ALBUMIN SERPL ELPH-MCNC: 1.3 G/DL — LOW (ref 3.3–5)
ALP SERPL-CCNC: 74 U/L — SIGNIFICANT CHANGE UP (ref 40–120)
ALT FLD-CCNC: <6 U/L — LOW (ref 12–78)
ANION GAP SERPL CALC-SCNC: 18 MMOL/L — HIGH (ref 5–17)
AST SERPL-CCNC: 15 U/L — SIGNIFICANT CHANGE UP (ref 15–37)
BASOPHILS # BLD AUTO: 0.1 K/UL — SIGNIFICANT CHANGE UP (ref 0–0.2)
BASOPHILS NFR BLD AUTO: 0.8 % — SIGNIFICANT CHANGE UP (ref 0–2)
BILIRUB SERPL-MCNC: 0.4 MG/DL — SIGNIFICANT CHANGE UP (ref 0.2–1.2)
BUN SERPL-MCNC: 126 MG/DL — HIGH (ref 7–23)
CALCIUM SERPL-MCNC: 7.7 MG/DL — LOW (ref 8.5–10.1)
CHLORIDE SERPL-SCNC: 111 MMOL/L — HIGH (ref 96–108)
CO2 SERPL-SCNC: 19 MMOL/L — LOW (ref 22–31)
CREAT SERPL-MCNC: 2.74 MG/DL — HIGH (ref 0.5–1.3)
CULTURE RESULTS: SIGNIFICANT CHANGE UP
EOSINOPHIL # BLD AUTO: 0.1 K/UL — SIGNIFICANT CHANGE UP (ref 0–0.5)
EOSINOPHIL NFR BLD AUTO: 0.9 % — SIGNIFICANT CHANGE UP (ref 0–6)
GLUCOSE SERPL-MCNC: 126 MG/DL — HIGH (ref 70–99)
HCT VFR BLD CALC: 19.6 % — CRITICAL LOW (ref 34.5–45)
HGB BLD-MCNC: 6 G/DL — CRITICAL LOW (ref 11.5–15.5)
LYMPHOCYTES # BLD AUTO: 18.9 % — SIGNIFICANT CHANGE UP (ref 13–44)
LYMPHOCYTES # BLD AUTO: 2.4 K/UL — SIGNIFICANT CHANGE UP (ref 1–3.3)
MCHC RBC-ENTMCNC: 27.1 PG — SIGNIFICANT CHANGE UP (ref 27–34)
MCHC RBC-ENTMCNC: 30.6 GM/DL — LOW (ref 32–36)
MCV RBC AUTO: 88.5 FL — SIGNIFICANT CHANGE UP (ref 80–100)
METHOD TYPE: SIGNIFICANT CHANGE UP
MONOCYTES # BLD AUTO: 1.1 K/UL — HIGH (ref 0–0.9)
MONOCYTES NFR BLD AUTO: 9 % — SIGNIFICANT CHANGE UP (ref 2–14)
NEUTROPHILS # BLD AUTO: 8.9 K/UL — HIGH (ref 1.8–7.4)
NEUTROPHILS NFR BLD AUTO: 70.4 % — SIGNIFICANT CHANGE UP (ref 43–77)
OB PNL STL: POSITIVE
ORGANISM # SPEC MICROSCOPIC CNT: SIGNIFICANT CHANGE UP
PLATELET # BLD AUTO: 225 K/UL — SIGNIFICANT CHANGE UP (ref 150–400)
POTASSIUM SERPL-MCNC: 4.5 MMOL/L — SIGNIFICANT CHANGE UP (ref 3.5–5.3)
POTASSIUM SERPL-SCNC: 4.5 MMOL/L — SIGNIFICANT CHANGE UP (ref 3.5–5.3)
PROT SERPL-MCNC: 8.1 GM/DL — SIGNIFICANT CHANGE UP (ref 6–8.3)
RBC # BLD: 2.21 M/UL — LOW (ref 3.8–5.2)
RBC # FLD: 17.1 % — HIGH (ref 11–15)
SODIUM SERPL-SCNC: 148 MMOL/L — HIGH (ref 135–145)
TSH SERPL-MCNC: 0.38 UU/ML — SIGNIFICANT CHANGE UP (ref 0.36–3.74)
WBC # BLD: 12.7 K/UL — HIGH (ref 3.8–10.5)
WBC # FLD AUTO: 12.7 K/UL — HIGH (ref 3.8–10.5)

## 2017-05-09 PROCEDURE — 71010: CPT | Mod: 26

## 2017-05-09 PROCEDURE — 70450 CT HEAD/BRAIN W/O DYE: CPT | Mod: 26

## 2017-05-09 RX ORDER — SODIUM CHLORIDE 9 MG/ML
500 INJECTION INTRAMUSCULAR; INTRAVENOUS; SUBCUTANEOUS ONCE
Qty: 0 | Refills: 0 | Status: COMPLETED | OUTPATIENT
Start: 2017-05-09 | End: 2017-05-09

## 2017-05-09 RX ORDER — LEVOTHYROXINE SODIUM 125 MCG
25 TABLET ORAL DAILY
Qty: 0 | Refills: 0 | Status: DISCONTINUED | OUTPATIENT
Start: 2017-05-09 | End: 2017-05-27

## 2017-05-09 RX ADMIN — PRAMIPEXOLE DIHYDROCHLORIDE 0.25 MILLIGRAM(S): 0.12 TABLET ORAL at 13:47

## 2017-05-09 RX ADMIN — AMPICILLIN SODIUM AND SULBACTAM SODIUM 200 GRAM(S): 250; 125 INJECTION, POWDER, FOR SUSPENSION INTRAMUSCULAR; INTRAVENOUS at 13:47

## 2017-05-09 RX ADMIN — CARBIDOPA AND LEVODOPA 1 TABLET(S): 25; 100 TABLET ORAL at 00:11

## 2017-05-09 RX ADMIN — PRAMIPEXOLE DIHYDROCHLORIDE 0.25 MILLIGRAM(S): 0.12 TABLET ORAL at 05:35

## 2017-05-09 RX ADMIN — SODIUM CHLORIDE 100 MILLILITER(S): 9 INJECTION INTRAMUSCULAR; INTRAVENOUS; SUBCUTANEOUS at 00:13

## 2017-05-09 RX ADMIN — CARBIDOPA AND LEVODOPA 1 TABLET(S): 25; 100 TABLET ORAL at 17:51

## 2017-05-09 RX ADMIN — CARBIDOPA AND LEVODOPA 1 TABLET(S): 25; 100 TABLET ORAL at 05:35

## 2017-05-09 RX ADMIN — Medication 25 MICROGRAM(S): at 17:51

## 2017-05-09 RX ADMIN — ENOXAPARIN SODIUM 40 MILLIGRAM(S): 100 INJECTION SUBCUTANEOUS at 00:10

## 2017-05-09 RX ADMIN — FLUCONAZOLE 100 MILLIGRAM(S): 150 TABLET ORAL at 11:02

## 2017-05-09 RX ADMIN — CARBIDOPA AND LEVODOPA 1 TABLET(S): 25; 100 TABLET ORAL at 11:03

## 2017-05-09 RX ADMIN — Medication 650 MILLIGRAM(S): at 15:03

## 2017-05-09 RX ADMIN — Medication 1 TABLET(S): at 17:51

## 2017-05-09 RX ADMIN — AMPICILLIN SODIUM AND SULBACTAM SODIUM 200 GRAM(S): 250; 125 INJECTION, POWDER, FOR SUSPENSION INTRAMUSCULAR; INTRAVENOUS at 05:35

## 2017-05-09 RX ADMIN — CARBIDOPA AND LEVODOPA 1 TABLET(S): 25; 100 TABLET ORAL at 23:02

## 2017-05-09 RX ADMIN — AMPICILLIN SODIUM AND SULBACTAM SODIUM 200 GRAM(S): 250; 125 INJECTION, POWDER, FOR SUSPENSION INTRAMUSCULAR; INTRAVENOUS at 23:02

## 2017-05-09 RX ADMIN — Medication 1 TABLET(S): at 05:35

## 2017-05-09 RX ADMIN — SODIUM CHLORIDE 1000 MILLILITER(S): 9 INJECTION INTRAMUSCULAR; INTRAVENOUS; SUBCUTANEOUS at 11:56

## 2017-05-09 RX ADMIN — PRAMIPEXOLE DIHYDROCHLORIDE 0.25 MILLIGRAM(S): 0.12 TABLET ORAL at 23:02

## 2017-05-09 RX ADMIN — AMLODIPINE BESYLATE 5 MILLIGRAM(S): 2.5 TABLET ORAL at 05:35

## 2017-05-09 RX ADMIN — SODIUM CHLORIDE 100 MILLILITER(S): 9 INJECTION INTRAMUSCULAR; INTRAVENOUS; SUBCUTANEOUS at 13:04

## 2017-05-09 RX ADMIN — PANTOPRAZOLE SODIUM 40 MILLIGRAM(S): 20 TABLET, DELAYED RELEASE ORAL at 11:02

## 2017-05-09 NOTE — PROGRESS NOTE ADULT - SUBJECTIVE AND OBJECTIVE BOX
INTERVAL HPI:    86F PMH Parkinson's, s/p recent mechanical fall with R intertrochanteric femur fracture requiring R intramedullary nailing of R trochanteric fracture of femur 3/13/17, UTI, PNA presents from [facility] rehab for sepsis, colovesical fistula with course complicated by aspiration PNA and now s/p trach and diverting loop colostomy.    OVERNIGHT EVENTS:  Sputum with MDR Acinetobacter. On Vent , unresponsive. Receiving PRBC for low H & H. Tachycardia also noted.    Vital Signs Last 24 Hrs  T(C): 37.1, Max: 37.8 (05-08 @ 17:00)  T(F): 98.8, Max: 100 (05-08 @ 17:00)  HR: 143 (98 - 143)  BP: 127/67 (118/55 - 127/67)  BP(mean): --  RR: --  SpO2: 100% (93% - 100%)    Mode: AC/ CMV (Assist Control/ Continuous Mandatory Ventilation)  RR (machine): 16  TV (machine): 400  FiO2: 30  PEEP: 5  ITime: 1  MAP: 10  PIP: 21    PHYSICAL EXAM:  GEN:        Awake, responsive and comfortable.  HEENT:    Normal.    RESP:     crackles.  CVS:          Regular rate and rhythm. tachycardic  ABD:         Soft, non-tender, non-distended; peg    MEDICATIONS  (STANDING):  enoxaparin Injectable 40milliGRAM(s) SubCutaneous every 24 hours  fluconAZOLE   Tablet 100milliGRAM(s) Oral daily  pantoprazole   Suspension 40milliGRAM(s) Oral daily  lactobacillus acidophilus 1Tablet(s) Oral every 12 hours  carbidopa/levodopa  25/100 1Tablet(s) Oral four times a day  pramipexole 0.25milliGRAM(s) Oral three times a day  amLODIPine   Tablet 5milliGRAM(s) Oral daily  sodium chloride 0.9%. 1000milliLiter(s) IV Continuous <Continuous>  ampicillin/sulbactam  IVPB  IV Intermittent   ampicillin/sulbactam  IVPB 3Gram(s) IV Intermittent every 8 hours  levothyroxine 25MICROGram(s) Oral daily    MEDICATIONS  (PRN):  acetaminophen    Suspension. 650milliGRAM(s) Enteral Tube every 6 hours PRN Mild Pain (1 - 3)  acetaminophen    Suspension 650milliGRAM(s) Oral every 6 hours PRN For Temp greater than 38 C (100.4 F)    LABS:                        6.0    12.7  )-----------( 225      ( 09 May 2017 08:17 )             19.6     05-09    148<H>  |  111<H>  |  126<H>  ----------------------------<  126<H>  4.5   |  19<L>  |  2.74<H>    Ca    7.7<L>      09 May 2017 08:17    TPro  8.1  /  Alb  1.3<L>  /  TBili  0.4  /  DBili  x   /  AST  15  /  ALT  <6<L>  /  AlkPhos  74  05-09    ASSESSMENT and Plan:  ·	Hypoxic Respiratory failure.  ·	Aspiration Pneumonia. Acinetobacter.  ·	S/P Diverting  Colostomy for Colovesical Fistula.  ·	Anemia.  ·	Leukocytosis.  ·	On Going fever.  ·	Parkinsonism.  ·	Renal insuffiencey.  ·	Hypernatremia.    Continue full Vent support and PRBC transfusion.  Continue antibiotic coverage.  DVT prophylaxis.  Follow Renal function.

## 2017-05-09 NOTE — PROGRESS NOTE ADULT - SUBJECTIVE AND OBJECTIVE BOX
pt is in poor condition   overall prognosis is porr   low pps  kps 0   pt is on dying trajectory   no real change   suggest hospice

## 2017-05-09 NOTE — PROGRESS NOTE ADULT - SUBJECTIVE AND OBJECTIVE BOX
Pt seen and examined at bedside. Resting comfortably on vent.     T(F): 98.8, Max: 100 (05-08 @ 17:00)  HR: 143 (98 - 143)  BP: 127/67 (118/55 - 127/67)  RR: --  SpO2: 100% (93% - 100%)  Wt(kg): --  CAPILLARY BLOOD GLUCOSE      PHYSICAL EXAM:  GENERAL: NAD  HEAD/NECK:  tracheostomy functioning well, site clean/dry/intact   CHEST/LUNG: Clear to ausculation, bilaterally   HEART: RRR S1S2  ABDOMEN: ostomy pink and viable with air and stool in bag. PEG site clean/dry/intact and functioning well. non distended, +BS, soft, non tender, no guarding  : no suprapubic tenderness, no CVA tenderness. shoemaker indwelling with clear urine output  EXTREMITIES:  calf soft, non tender b/l    LABS:                        6.0    12.7  )-----------( 225      ( 09 May 2017 08:17 )             19.6     05-09    148<H>  |  111<H>  |  126<H>  ----------------------------<  126<H>  4.5   |  19<L>  |  2.74<H>    Ca    7.7<L>      09 May 2017 08:17    TPro  8.1  /  Alb  1.3<L>  /  TBili  0.4  /  DBili  x   /  AST  15  /  ALT  <6<L>  /  AlkPhos  74  05-09      I&O's Detail  I & Os for 24h ending 09 May 2017 07:00  =============================================  IN:    sodium chloride 0.9%.: 1500 ml    Jevity: 720 ml    dextrose 5% + sodium chloride 0.45%.: 600 ml    IV PiggyBack: 350 ml    Enteral Tube Flush: 200 ml    Suplena: 200 ml    Solution: 100 ml    Total IN: 3670 ml  ---------------------------------------------  OUT:    Indwelling Catheter - Urethral: 300 ml    Total OUT: 300 ml  ---------------------------------------------  Total NET: 3370 ml    I & Os for current day (as of 09 May 2017 14:45)  =============================================  IN:    0.9% NaCl: 500 ml    sodium chloride 0.9%.: 300 ml    Total IN: 800 ml  ---------------------------------------------  OUT:    Total OUT: 0 ml  ---------------------------------------------  Total NET: 800 ml    Culture Results:   No growth (05-06 @ 15:19)  Culture Results:   Numerous Acinetobacter baumannii/haemolyticus (Carbapenem Resistant)  Normal Respiratory Cindy absent (05-06 @ 01:04)  Culture Results:   No growth to date. (05-05 @ 23:57)  Culture Results:   No growth to date. (05-05 @ 23:57)      Assesment: 86y Female a/w PNA, sepsis and colovesicular fistula, s/p aspiration event 4/16 leading to respiratory failure, s/p PEG via GI and POD#11 s/p diverting loop colostomy and tracheostomy    Plan:  -continue shoemaker  -continue present medical management/supportive care, abx per ID  -routine trache/ostomy care  -will case discuss with Dr Mosley and Dr. Bahena

## 2017-05-09 NOTE — PROGRESS NOTE ADULT - SUBJECTIVE AND OBJECTIVE BOX
TMAX - 102 - decreased to 98.8 today so far    On day # 1 Unasyn / s/p Tobra x 1 dose / # 23 Diflucan    Vital Signs Last 24 Hrs  T(C): 37.1, Max: 37.8 (05-08 @ 17:00)  T(F): 98.8, Max: 100 (05-08 @ 17:00)  HR: 143 (98 - 143)  BP: 127/67 (118/55 - 127/67)  BP(mean): --  RR: --  SpO2: 100% (93% - 100%)  Mode: AC/ CMV (Assist Control/ Continuous Mandatory Ventilation)  RR (machine): 16  TV (machine): 400  FiO2: 30  PEEP: 5  ITime: 1  MAP: 10  PIP: 21  Supplemental O2: on ventilator now on 30% FIO2 + 5 PEEP    Remains poorly responsive, on ventilator.  Temp decreased so far today, on Unasyn now for MDRO Acinetobacter in sputum.  Transfusion in progress now for low H+H, with Stool for Occult Blood Positive now.      PHYSICAL EXAM  General:  poorly responsive, on ventilator, lying in semi-upright position in bed  HEENT:  conj pink, sclerae anicteric, PERRLA, no oral lesions noted  Neck:  semi-supple, no nodes noted,             trach site with scant dried blood at site, sutures in place  Heart:  RR  Lungs:  few rhonchi bilaterally  Abdomen:  soft, BS+, nontender appearance, PEG in place - tube feedings in progress - site clean                   colostomy functioning with black-colored liquid stool noted in bag  Extremities:  no edema LE's                     Rt upper arm Midline in place - site clean, dressing intact, - placed 4/25  Skin:  warm, dry, no rash noted      I&O's Summary :  I & Os for 24h ending 09 May 2017 07:00  =============================================  IN: 3670 ml / OUT: 300 ml / NET: 3370 ml    I & Os for current day (as of 09 May 2017 15:24)  =============================================  IN: 800 ml / OUT: 0 ml / NET: 800 ml        LABS:  CBC Full  -  ( 09 May 2017 08:17 )  WBC Count : 12.7 K/uL  Hemoglobin : 6.0 g/dL  Hematocrit : 19.6 %  Platelet Count - Automated : 225 K/uL  Mean Cell Volume : 88.5 fl  Mean Cell Hemoglobin : 27.1 pg  Mean Cell Hemoglobin Concentration : 30.6 gm/dL  Auto Neutrophil # : 8.9 K/uL  Auto Lymphocyte # : 2.4 K/uL  Auto Monocyte # : 1.1 K/uL  Auto Eosinophil # : 0.1 K/uL  Auto Basophil # : 0.1 K/uL  Auto Neutrophil % : 70.4 %  Auto Lymphocyte % : 18.9 %  Auto Monocyte % : 9.0 %  Auto Eosinophil % : 0.9 %  Auto Basophil % : 0.8 %    05-09    148<H>  |  111<H>  |  126<H>  ----------------------------<  126<H>  4.5   |  19<L>  |  2.74<H>    Ca    7.7<L>      09 May 2017 08:17    TPro  8.1  /  Alb  1.3<L>  /  TBili  0.4  /  DBili  x   /  AST  15  /  ALT  <6<L>  /  AlkPhos  74  05-09    LIVER FUNCTIONS - ( 09 May 2017 08:17 )  Alb: 1.3 g/dL / Pro: 8.1 gm/dL / ALK PHOS: 74 U/L / ALT: <6 U/L / AST: 15 U/L / GGT: x           Sedimentation Rate, Erythrocyte: 119 mm/hr (05-06 @ 08:16)    Stoolfor Occult Blood - positive        CULTURES:  Specimen Source: .Urine Catheterized (05-06 @ 15:19)  Culture Results:   No growth (05-06 @ 15:19)    Specimen Source: .Sputum Sputum (05-06 @ 01:04)  Culture Results:   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 (05-05 @ 23:57)    Specimen Source: .Blood Blood (05-05 @ 23:57)  Culture Results:   No growth to date. 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 No definitive acute abnormality. If   clinically warranted, a follow-up MRI study may be considered.                          CXR - 5/9/17 -   FINDINGS:  There are bilateral patchy opacities, right greater than left.   There is a tracheostomy tube in place. Questionable trace left pleural   effusion. Heart size is within normal limits. There are multilevel   degenerative changes of the spine    IMPRESSION: No significant change from 5/8/2017      Impression:  Sepsis with MDRO ( Carbapenem- Resistant) Acinetobacter now in sputum cx with Multifocal PNA, Respiratory Failure, GI Bleed now and worsening renal function.    Suggestions: Will continue present ab rx with Unasyn, s/p Tobra x1 dose, and Diflucan, and follow-up temps and labs closely.  If renal function continues to worsen will further adjust Unasyn dose.  To be transfused 2 units PRBC's today for Hgb of 6 today.  Awaiting further sensitivity testing of the Acinetobacter isolate.  Continue Contact Isolation.

## 2017-05-09 NOTE — PROGRESS NOTE ADULT - SUBJECTIVE AND OBJECTIVE BOX
INTERVAL HPI/OVERNIGHT EVENTS:    continues  unresponsive  on  respirator    REVIEW OF SYSTEMS:  CONSTITUTIONAL:        MEDICATION:  enoxaparin Injectable 40milliGRAM(s) SubCutaneous every 24 hours  fluconAZOLE   Tablet 100milliGRAM(s) Oral daily  pantoprazole   Suspension 40milliGRAM(s) Oral daily  lactobacillus acidophilus 1Tablet(s) Oral every 12 hours  acetaminophen    Suspension. 650milliGRAM(s) Enteral Tube every 6 hours PRN  carbidopa/levodopa  25/100 1Tablet(s) Oral four times a day  pramipexole 0.25milliGRAM(s) Oral three times a day  acetaminophen    Suspension 650milliGRAM(s) Oral every 6 hours PRN  amLODIPine   Tablet 5milliGRAM(s) Oral daily  sodium chloride 0.9%. 1000milliLiter(s) IV Continuous <Continuous>  ampicillin/sulbactam  IVPB  IV Intermittent   ampicillin/sulbactam  IVPB 3Gram(s) IV Intermittent every 8 hours    Vital Signs Last 24 Hrs  T(C): 37.1, Max: 38.9 (05-08 @ 11:03)  T(F): 98.8, Max: 102 (05-08 @ 11:03)  HR: 132 (98 - 139)  BP: 127/67 (97/63 - 127/67)  BP(mean): --  RR: 22 (22 - 22)  SpO2: 93% (93% - 99%)    PHYSICAL EXAM:  GENERAL: NAD, well-groomed, well-developed  EYES:  conjunctiva and sclera   ENMT:  Moist mucous membranes,   NECK: Supple, No JVD, Normal thyroid  NERVOUS SYSTEM:  Alert oriented   no  focal  deficits;   CHEST/LUNG: Clear    HEART: Regular rate and rhythm; No murmurs, rubs, or gallops  ABDOMEN: Soft, Nontender, Nondistended; Bowel sounds present  EXTREMITIES:  no  edema no  tenderness  SKIN: No rashes   LABS:                        7.7    14.0  )-----------( 264      ( 08 May 2017 21:08 )             22.9     05-08    145  |  108  |  101<H>  ----------------------------<  180<H>  3.9   |  22  |  2.20<H>    Ca    7.8<L>      08 May 2017 07:38    TPro  9.1<H>  /  Alb  1.3<L>  /  TBili  0.3  /  DBili  x   /  AST  21  /  ALT  <6<L>  /  AlkPhos  102  05-08        CAPILLARY BLOOD GLUCOSE      RADIOLOGY & ADDITIONAL TESTS:    Imaging reports  Personally Reviewed:  [x ] YES  [ ] NO    Consultant(s) Notes Reviewed:  [x ] YES  [ ] NO    Care Discussed with Consultants/Other Providers [x ] YES  [ ] NO  ACUTE  RESPIRATORY  FAILURE  PNEUMONIA   CONT  VENT  SUPPORT  AB  IVF  S/P  TRACH    recurrant  fever   cont  AB  as  per  ID  tylenol  PRN  COLO/VESCICULAR FISTULA S/P  diverting  colostomy    LFT elevation observation  metabolic  encephalopathy with  severe  inflammatory  processs supportive care        Problem/Plan - 3:  ·  Problem: Parkinson disease encephalopathy.  Plan: sinemet pramipexole  anemia  continue  to  monitor  no  clinical  evidence  of  acute bleed  transfuse as  needed for  transfer  to  rehab  facility  with  vent  unit  fever  pneumonia  obseve  with  new  AB  regimen  urinary  retention  observe  with  shoemaker

## 2017-05-10 LAB
ALBUMIN SERPL ELPH-MCNC: 1.4 G/DL — LOW (ref 3.3–5)
ALP SERPL-CCNC: 69 U/L — SIGNIFICANT CHANGE UP (ref 40–120)
ALT FLD-CCNC: <6 U/L — LOW (ref 12–78)
ANION GAP SERPL CALC-SCNC: 18 MMOL/L — HIGH (ref 5–17)
AST SERPL-CCNC: 22 U/L — SIGNIFICANT CHANGE UP (ref 15–37)
BILIRUB SERPL-MCNC: 0.5 MG/DL — SIGNIFICANT CHANGE UP (ref 0.2–1.2)
BUN SERPL-MCNC: 142 MG/DL — HIGH (ref 7–23)
CALCIUM SERPL-MCNC: 7.7 MG/DL — LOW (ref 8.5–10.1)
CHLORIDE SERPL-SCNC: 115 MMOL/L — HIGH (ref 96–108)
CO2 SERPL-SCNC: 16 MMOL/L — LOW (ref 22–31)
CREAT SERPL-MCNC: 3.35 MG/DL — HIGH (ref 0.5–1.3)
GLUCOSE SERPL-MCNC: 172 MG/DL — HIGH (ref 70–99)
HCT VFR BLD CALC: 24 % — LOW (ref 34.5–45)
HGB BLD-MCNC: 8.7 G/DL — LOW (ref 11.5–15.5)
MCHC RBC-ENTMCNC: 30.4 PG — SIGNIFICANT CHANGE UP (ref 27–34)
MCHC RBC-ENTMCNC: 36.2 GM/DL — HIGH (ref 32–36)
MCV RBC AUTO: 83.9 FL — SIGNIFICANT CHANGE UP (ref 80–100)
PLATELET # BLD AUTO: 176 K/UL — SIGNIFICANT CHANGE UP (ref 150–400)
POTASSIUM SERPL-MCNC: 4.9 MMOL/L — SIGNIFICANT CHANGE UP (ref 3.5–5.3)
POTASSIUM SERPL-SCNC: 4.9 MMOL/L — SIGNIFICANT CHANGE UP (ref 3.5–5.3)
PROT SERPL-MCNC: 7.6 GM/DL — SIGNIFICANT CHANGE UP (ref 6–8.3)
RBC # BLD: 2.86 M/UL — LOW (ref 3.8–5.2)
RBC # FLD: 16.4 % — HIGH (ref 11–15)
SODIUM SERPL-SCNC: 149 MMOL/L — HIGH (ref 135–145)
WBC # BLD: 11 K/UL — HIGH (ref 3.8–10.5)
WBC # FLD AUTO: 11 K/UL — HIGH (ref 3.8–10.5)

## 2017-05-10 RX ADMIN — FLUCONAZOLE 100 MILLIGRAM(S): 150 TABLET ORAL at 11:15

## 2017-05-10 RX ADMIN — Medication 1 TABLET(S): at 17:39

## 2017-05-10 RX ADMIN — SODIUM CHLORIDE 100 MILLILITER(S): 9 INJECTION INTRAMUSCULAR; INTRAVENOUS; SUBCUTANEOUS at 17:39

## 2017-05-10 RX ADMIN — PRAMIPEXOLE DIHYDROCHLORIDE 0.25 MILLIGRAM(S): 0.12 TABLET ORAL at 13:22

## 2017-05-10 RX ADMIN — PRAMIPEXOLE DIHYDROCHLORIDE 0.25 MILLIGRAM(S): 0.12 TABLET ORAL at 23:01

## 2017-05-10 RX ADMIN — PRAMIPEXOLE DIHYDROCHLORIDE 0.25 MILLIGRAM(S): 0.12 TABLET ORAL at 06:15

## 2017-05-10 RX ADMIN — AMPICILLIN SODIUM AND SULBACTAM SODIUM 200 GRAM(S): 250; 125 INJECTION, POWDER, FOR SUSPENSION INTRAMUSCULAR; INTRAVENOUS at 13:22

## 2017-05-10 RX ADMIN — ENOXAPARIN SODIUM 40 MILLIGRAM(S): 100 INJECTION SUBCUTANEOUS at 00:36

## 2017-05-10 RX ADMIN — CARBIDOPA AND LEVODOPA 1 TABLET(S): 25; 100 TABLET ORAL at 23:06

## 2017-05-10 RX ADMIN — Medication 1 TABLET(S): at 06:15

## 2017-05-10 RX ADMIN — CARBIDOPA AND LEVODOPA 1 TABLET(S): 25; 100 TABLET ORAL at 06:15

## 2017-05-10 RX ADMIN — Medication 25 MICROGRAM(S): at 06:15

## 2017-05-10 RX ADMIN — AMPICILLIN SODIUM AND SULBACTAM SODIUM 200 GRAM(S): 250; 125 INJECTION, POWDER, FOR SUSPENSION INTRAMUSCULAR; INTRAVENOUS at 23:01

## 2017-05-10 RX ADMIN — SODIUM CHLORIDE 100 MILLILITER(S): 9 INJECTION INTRAMUSCULAR; INTRAVENOUS; SUBCUTANEOUS at 10:03

## 2017-05-10 RX ADMIN — CARBIDOPA AND LEVODOPA 1 TABLET(S): 25; 100 TABLET ORAL at 11:14

## 2017-05-10 RX ADMIN — AMPICILLIN SODIUM AND SULBACTAM SODIUM 200 GRAM(S): 250; 125 INJECTION, POWDER, FOR SUSPENSION INTRAMUSCULAR; INTRAVENOUS at 06:15

## 2017-05-10 RX ADMIN — CARBIDOPA AND LEVODOPA 1 TABLET(S): 25; 100 TABLET ORAL at 17:39

## 2017-05-10 RX ADMIN — Medication 650 MILLIGRAM(S): at 06:14

## 2017-05-10 RX ADMIN — SODIUM CHLORIDE 100 MILLILITER(S): 9 INJECTION INTRAMUSCULAR; INTRAVENOUS; SUBCUTANEOUS at 23:06

## 2017-05-10 RX ADMIN — PANTOPRAZOLE SODIUM 40 MILLIGRAM(S): 20 TABLET, DELAYED RELEASE ORAL at 11:14

## 2017-05-10 RX ADMIN — Medication 650 MILLIGRAM(S): at 00:30

## 2017-05-10 RX ADMIN — Medication 650 MILLIGRAM(S): at 17:39

## 2017-05-10 NOTE — PROGRESS NOTE ADULT - SUBJECTIVE AND OBJECTIVE BOX
TMAX - 101.9    On day # 2 Unasyn / s/p Tobra x1 dose / # 24 Diflucan    Vital Signs Last 24 Hrs  T(C): 37.5, Max: 38.8 (05-10 @ 00:01)  T(F): 99.5, Max: 101.9 (05-10 @ 00:01)  HR: 98 (89 - 138)  BP: 111/59 (97/56 - 111/59)  BP(mean): --  RR: 18 (16 - 20)  SpO2: 97% (96% - 99%)  Mode: AC/ CMV (Assist Control/ Continuous Mandatory Ventilation)  RR (machine): 16  TV (machine): 400  FiO2: 30  PEEP: 5  ITime: 0.91  MAP: 9  PIP: 24  Supplemental O2:  on ventilator on 30% FIO2 + 5 PEEP    Remains on ventilator, poorly responsive and still with intermittent fevers.  S/p transfusion of 2 Units of PRBC's yesterday.      PHYSICAL EXAM  General: poorly responsive, on ventilator, lying in bed in semi- upright  position  HEENT: conj pink, sclerae anicteric, PERRLA, no oral lesions noted   Neck:  semi-supple, no nodes noted             trach site with scant amount dried blood at site, sutures intact  Heart:  RR  Lungs:  few anterior rhonchi with decreased BS at bases bilaterally  Abdomen:  soft, BS+, nontender appearance, PEG in place - site clean                   colostomy functioning with black-colored liquid stool noted in bag  Extremities:  no edema LE's                      Rt arm with Midline in place - site clean, dressing intact - placed 4/25  Skin:  warm, dry now      I&O's Summary :  I & Os for 24h ending 10 May 2017 07:00  =============================================  IN: 3130 ml / OUT: 801 ml / NET: 2329 ml    I & Os for current day (as of 10 May 2017 17:04)  =============================================  IN: 1550 ml / OUT: 0 ml / NET: 1550 ml      LABS:  CBC Full  -  ( 10 May 2017 08:23 )  WBC Count : 11.0 K/uL  Hemoglobin : 8.7 g/dL  Hematocrit : 24.0 %  Platelet Count - Automated : 176 K/uL  Mean Cell Volume : 83.9 fl  Mean Cell Hemoglobin : 30.4 pg  Mean Cell Hemoglobin Concentration : 36.2 gm/dL  Auto Neutrophil # : x  Auto Lymphocyte # : x  Auto Monocyte # : x  Auto Eosinophil # : x  Auto Basophil # : x  Auto Neutrophil % : x  Auto Lymphocyte % : x  Auto Monocyte % : x  Auto Eosinophil % : x  Auto Basophil % : x    05-10    149<H>  |  115<H>  |  142<H>  ----------------------------<  172<H>  4.9   |  16<L>  |  3.35<H>    Ca    7.7<L>      10 May 2017 08:23    TPro  7.6  /  Alb  1.4<L>  /  TBili  0.5  /  DBili  x   /  AST  22  /  ALT  <6<L>  /  AlkPhos  69  05-10    LIVER FUNCTIONS - ( 10 May 2017 08:23 )  Alb: 1.4 g/dL / Pro: 7.6 gm/dL / ALK PHOS: 69 U/L / ALT: <6 U/L / AST: 22 U/L / GGT: x           Sedimentation Rate, Erythrocyte: 119 mm/hr (05-06 @ 08:16)        CULTURES:  Specimen Source: .Urine Catheterized (05-06 @ 15:19)  Culture Results:   No growth (05-06 @ 15:19)    Specimen Source: .Sputum Sputum (05-06 @ 01:04)  Culture Results:   Numerous Acinetobacter baumannii/haemolyticus (Carbapenem Resistant)  Normal Respiratory Cindy absent  ***********Note************  There is no standardized, established, or validated  for susceptibility testing of this organism/drug combination  Th (05-06 @ 01:04)    Specimen Source: .Blood Blood (05-05 @ 23:57)  Culture Results:   No growth to date. (05-05 @ 23:57)    Specimen Source: .Blood Blood (05-05 @ 23:57)  Culture Results:   No growth to date. (05-05 @ 23:57)          Impression: Temps continue on ab rx with Unasyn, s/p Tobra x1, and Diflucan now, although WBC's slowly improving but renal function appears to be worsening now. Additional ab sensitivities done now on the MDRO Acinetobacter isolate reveal that it is also sensitive to Polymixin B.    Suggestions: Will continue present ab rx with Unasyn and check random Tobra level in AM.  Follow-up temps and labs closely. Repeat CXR in a few days.

## 2017-05-10 NOTE — PROGRESS NOTE ADULT - SUBJECTIVE AND OBJECTIVE BOX
INTERVAL HPI/OVERNIGHT EVENTS:        REVIEW OF SYSTEMS:  CONSTITUTIONAL:  unresponsive  febrile  on  respirator  MEDICATION:  enoxaparin Injectable 40milliGRAM(s) SubCutaneous every 24 hours  fluconAZOLE   Tablet 100milliGRAM(s) Oral daily  pantoprazole   Suspension 40milliGRAM(s) Oral daily  lactobacillus acidophilus 1Tablet(s) Oral every 12 hours  acetaminophen    Suspension. 650milliGRAM(s) Enteral Tube every 6 hours PRN  carbidopa/levodopa  25/100 1Tablet(s) Oral four times a day  pramipexole 0.25milliGRAM(s) Oral three times a day  acetaminophen    Suspension 650milliGRAM(s) Oral every 6 hours PRN  amLODIPine   Tablet 5milliGRAM(s) Oral daily  sodium chloride 0.9%. 1000milliLiter(s) IV Continuous <Continuous>  ampicillin/sulbactam  IVPB  IV Intermittent   ampicillin/sulbactam  IVPB 3Gram(s) IV Intermittent every 8 hours  levothyroxine 25MICROGram(s) Oral daily    Vital Signs Last 24 Hrs  T(C): 38.6, Max: 38.8 (05-10 @ 00:01)  T(F): 101.4, Max: 101.9 (05-10 @ 00:01)  HR: 102 (89 - 138)  BP: 103/54 (97/56 - 111/59)  BP(mean): --  RR: 17 (16 - 20)  SpO2: 97% (96% - 99%)    PHYSICAL EXAM:  GENERAL: NAD, well-groomed, well-developed  EYES:  conjunctiva and sclera clear  ENMT:  Moist mucous membranes,   NECK: Supple, No JVD, Normal thyroid  NERVOUS SYSTEM:  Alert oriented   no  focal  deficits;   CHEST/LUNG: Clear    HEART: Regular rate and rhythm; No murmurs, rubs, or gallops  ABDOMEN: Soft, Nontender, Nondistended; Bowel sounds present  EXTREMITIES:  no  edema no  tenderness  SKIN: No rashes   LABS:                        8.7    11.0  )-----------( 176      ( 10 May 2017 08:23 )             24.0     05-10    149<H>  |  115<H>  |  142<H>  ----------------------------<  172<H>  4.9   |  16<L>  |  3.35<H>    Ca    7.7<L>      10 May 2017 08:23    TPro  7.6  /  Alb  1.4<L>  /  TBili  0.5  /  DBili  x   /  AST  22  /  ALT  <6<L>  /  AlkPhos  69  05-10        CAPILLARY BLOOD GLUCOSE      RADIOLOGY & ADDITIONAL TESTS:    Imaging reports  Personally Reviewed:  [ x] YES  [ ] NO    Consultant(s) Notes Reviewed:  [x ] YES  [ ] NO    Care Discussed with Consultants/Other Providers [ x] YES  [ ] NO    ACUTE  RESPIRATORY  FAILURE  PNEUMONIA   CONT  VENT  SUPPORT  AB  IVF  S/P  TRACH    recurrant  fever   cont  AB  as  per  ID  tylenol  PRN  recheck  cultures  COLO/VESCICULAR FISTULA S/P  diverting  colostomy    LFT elevation observation  metabolic  encephalopathy with  severe  inflammatory  processs supportive care        Problem/Plan - 3:  ·  Problem: Parkinson disease encephalopathy.  Plan: sinemet pramipexole  anemia  continue  to  monitor  no  clinical  evidence  of  acute bleed  transfuse as  needed for  transfer  to  rehab  facility  with  vent  unit  fever  pneumonia  obseve  with  new  AB  regimen  urinary  retention  observe  with  shoemaker    acute  on renal  failure  cont  ivf  for  renal  eval

## 2017-05-10 NOTE — PROGRESS NOTE ADULT - SUBJECTIVE AND OBJECTIVE BOX
Pt seen and examined at bedside. Resting comfortably on vent. Shoemaker to gravity, still with low urine output. S/P blood transfusion yesterday.    T(F): 101.4, Max: 101.9 (05-10 @ 00:01)  HR: 102 (89 - 138)  BP: 103/54 (97/56 - 111/59)  RR: 17 (16 - 20)  SpO2: 97% (96% - 99%)  Wt(kg): --  CAPILLARY BLOOD GLUCOSE      PHYSICAL EXAM:  GENERAL: NAD  HEAD/NECK:  tracheostomy functioning well, site clean/dry/intact   CHEST/LUNG: Clear to ausculation, bilaterally   HEART: RRR S1S2  ABDOMEN: ostomy pink and viable with air and stool in bag. PEG site clean/dry/intact and functioning well. non distended, +BS, soft, non tender, no guarding  : no suprapubic tenderness, no CVA tenderness. Shoemaker indwelling with clear urine output 800cc/24hr  EXTREMITIES:  calf soft, non tender b/l, +edema    LABS:                        8.7    11.0  )-----------( 176      ( 10 May 2017 08:23 )             24.0     05-10    149<H>  |  115<H>  |  142<H>  ----------------------------<  172<H>  4.9   |  16<L>  |  3.35<H>    Ca    7.7<L>      10 May 2017 08:23    TPro  7.6  /  Alb  1.4<L>  /  TBili  0.5  /  DBili  x   /  AST  22  /  ALT  <6<L>  /  AlkPhos  69  05-10      I&O's Detail  I & Os for 24h ending 10 May 2017 07:00  =============================================  IN:    sodium chloride 0.9%.: 1000 ml    PRBCs (Packed Red Blood Cells): 680 ml    0.9% NaCl: 570 ml    Suplena: 480 ml    Enteral Tube Flush: 200 ml    Solution: 200 ml    Total IN: 3130 ml  ---------------------------------------------  OUT:    Indwelling Catheter - Urethral: 800 ml    Colostomy: 1 ml    Total OUT: 801 ml  ---------------------------------------------  Total NET: 2329 ml    I & Os for current day (as of 10 May 2017 18:44)  =============================================  IN:    sodium chloride 0.9%.: 1100 ml    Suplena: 440 ml    Solution: 100 ml    Enteral Tube Flush: 50 ml    Total IN: 1690 ml  ---------------------------------------------  OUT:    Colostomy: 700 ml    Total OUT: 700 ml  ---------------------------------------------  Total NET: 990 ml    Culture Results:   No growth (05-06 @ 15:19)  Culture Results:   Numerous Acinetobacter baumannii/haemolyticus (Carbapenem Resistant)  Normal Respiratory Cindy absent  ***********Note************  There is no standardized, established, or validated  for susceptibility testing of this organism/drug combination  Th (05-06 @ 01:04)  Culture Results:   No growth to date. (05-05 @ 23:57)  Culture Results:   No growth to date. (05-05 @ 23:57)      Assesment: 86y Female a/w PNA, sepsis and colovesicular fistula, s/p aspiration event 4/16 leading to respiratory failure, s/p PEG via GI and POD#12 s/p diverting loop colostomy and tracheostomy, a/w lowurine ouput and increasing creatnine    Plan:  -continue shoemaker, monitor urine output  -continue medical management/supportive care  - abx per ID  -routine trache/ostomy care  -will case discuss with Dr Mosley and Dr. Bahena

## 2017-05-11 LAB
ALBUMIN SERPL ELPH-MCNC: 1.4 G/DL — LOW (ref 3.3–5)
ALP SERPL-CCNC: 75 U/L — SIGNIFICANT CHANGE UP (ref 40–120)
ALT FLD-CCNC: 8 U/L — LOW (ref 12–78)
ANION GAP SERPL CALC-SCNC: 17 MMOL/L — SIGNIFICANT CHANGE UP (ref 5–17)
APPEARANCE CSF: ABNORMAL
APPEARANCE UR: CLEAR — SIGNIFICANT CHANGE UP
ASO AB SER QL: 15 IU/ML — SIGNIFICANT CHANGE UP (ref 0–408)
AST SERPL-CCNC: 50 U/L — HIGH (ref 15–37)
BACTERIA # UR AUTO: ABNORMAL
BASOPHILS # BLD AUTO: 0.1 K/UL — SIGNIFICANT CHANGE UP (ref 0–0.2)
BASOPHILS NFR BLD AUTO: 0.9 % — SIGNIFICANT CHANGE UP (ref 0–2)
BILIRUB SERPL-MCNC: 0.4 MG/DL — SIGNIFICANT CHANGE UP (ref 0.2–1.2)
BILIRUB UR-MCNC: NEGATIVE — SIGNIFICANT CHANGE UP
BUN SERPL-MCNC: 145 MG/DL — HIGH (ref 7–23)
C3 SERPL-MCNC: 64 MG/DL — LOW (ref 80–180)
C4 SERPL-MCNC: 19 MG/DL — SIGNIFICANT CHANGE UP (ref 10–45)
CALCIUM SERPL-MCNC: 7.5 MG/DL — LOW (ref 8.5–10.1)
CHLORIDE SERPL-SCNC: 119 MMOL/L — HIGH (ref 96–108)
CO2 SERPL-SCNC: 16 MMOL/L — LOW (ref 22–31)
COLOR CSF: ABNORMAL
COLOR SPEC: YELLOW — SIGNIFICANT CHANGE UP
COMMENT - URINE: SIGNIFICANT CHANGE UP
CREAT SERPL-MCNC: 3.63 MG/DL — HIGH (ref 0.5–1.3)
CULTURE RESULTS: SIGNIFICANT CHANGE UP
CULTURE RESULTS: SIGNIFICANT CHANGE UP
DIFF PNL FLD: ABNORMAL
EOSINOPHIL # BLD AUTO: 0.1 K/UL — SIGNIFICANT CHANGE UP (ref 0–0.5)
EOSINOPHIL NFR BLD AUTO: 1.2 % — SIGNIFICANT CHANGE UP (ref 0–6)
EPI CELLS # UR: SIGNIFICANT CHANGE UP
GLUCOSE CSF-MCNC: 109 MG/DL — HIGH (ref 40–70)
GLUCOSE SERPL-MCNC: 132 MG/DL — HIGH (ref 70–99)
GLUCOSE UR QL: NEGATIVE MG/DL — SIGNIFICANT CHANGE UP
GRAM STN FLD: SIGNIFICANT CHANGE UP
HCT VFR BLD CALC: 23.2 % — LOW (ref 34.5–45)
HGB BLD-MCNC: 7.9 G/DL — LOW (ref 11.5–15.5)
KETONES UR-MCNC: NEGATIVE — SIGNIFICANT CHANGE UP
LEUKOCYTE ESTERASE UR-ACNC: ABNORMAL
LYMPHOCYTES # BLD AUTO: 19.5 % — SIGNIFICANT CHANGE UP (ref 13–44)
LYMPHOCYTES # BLD AUTO: 2 K/UL — SIGNIFICANT CHANGE UP (ref 1–3.3)
LYMPHOCYTES # CSF: 78 % — SIGNIFICANT CHANGE UP (ref 40–80)
MCHC RBC-ENTMCNC: 29.2 PG — SIGNIFICANT CHANGE UP (ref 27–34)
MCHC RBC-ENTMCNC: 33.9 GM/DL — SIGNIFICANT CHANGE UP (ref 32–36)
MCV RBC AUTO: 86 FL — SIGNIFICANT CHANGE UP (ref 80–100)
MONOCYTES # BLD AUTO: 0.7 K/UL — SIGNIFICANT CHANGE UP (ref 0–0.9)
MONOCYTES NFR BLD AUTO: 7.4 % — SIGNIFICANT CHANGE UP (ref 2–14)
MONOS+MACROS NFR CSF: 12 % — LOW (ref 15–45)
NEUTROPHILS # BLD AUTO: 7.1 K/UL — SIGNIFICANT CHANGE UP (ref 1.8–7.4)
NEUTROPHILS # CSF: 10 % — HIGH (ref 0–6)
NEUTROPHILS NFR BLD AUTO: 71 % — SIGNIFICANT CHANGE UP (ref 43–77)
NITRITE UR-MCNC: NEGATIVE — SIGNIFICANT CHANGE UP
NRBC NFR CSF: 5 /UL — SIGNIFICANT CHANGE UP (ref 0–5)
PH UR: 5 — SIGNIFICANT CHANGE UP (ref 5–8)
PLATELET # BLD AUTO: 171 K/UL — SIGNIFICANT CHANGE UP (ref 150–400)
POTASSIUM SERPL-MCNC: 4.7 MMOL/L — SIGNIFICANT CHANGE UP (ref 3.5–5.3)
POTASSIUM SERPL-SCNC: 4.7 MMOL/L — SIGNIFICANT CHANGE UP (ref 3.5–5.3)
PROT CSF-MCNC: 66 MG/DL — HIGH (ref 15–45)
PROT SERPL-MCNC: 7 GM/DL — SIGNIFICANT CHANGE UP (ref 6–8.3)
PROT UR-MCNC: 100 MG/DL
RBC # BLD: 2.7 M/UL — LOW (ref 3.8–5.2)
RBC # CSF: 683 /UL — HIGH (ref 0–0)
RBC # FLD: 17.4 % — HIGH (ref 11–15)
RBC CASTS # UR COMP ASSIST: ABNORMAL /HPF (ref 0–4)
SODIUM SERPL-SCNC: 152 MMOL/L — HIGH (ref 135–145)
SP GR SPEC: 1.01 — SIGNIFICANT CHANGE UP (ref 1.01–1.02)
SPECIMEN SOURCE: SIGNIFICANT CHANGE UP
TOBRAMYCIN TROUGH SERPL-MCNC: 0.07 UG/ML — SIGNIFICANT CHANGE UP (ref 0–1.9)
TUBE TYPE: SIGNIFICANT CHANGE UP
UROBILINOGEN FLD QL: NEGATIVE MG/DL — SIGNIFICANT CHANGE UP
WBC # BLD: 10 K/UL — SIGNIFICANT CHANGE UP (ref 3.8–10.5)
WBC # FLD AUTO: 10 K/UL — SIGNIFICANT CHANGE UP (ref 3.8–10.5)
WBC UR QL: SIGNIFICANT CHANGE UP

## 2017-05-11 PROCEDURE — 99497 ADVNCD CARE PLAN 30 MIN: CPT

## 2017-05-11 RX ORDER — ENOXAPARIN SODIUM 100 MG/ML
30 INJECTION SUBCUTANEOUS EVERY 24 HOURS
Qty: 0 | Refills: 0 | Status: DISCONTINUED | OUTPATIENT
Start: 2017-05-11 | End: 2017-05-27

## 2017-05-11 RX ORDER — SODIUM CHLORIDE 9 MG/ML
1000 INJECTION, SOLUTION INTRAVENOUS
Qty: 0 | Refills: 0 | Status: DISCONTINUED | OUTPATIENT
Start: 2017-05-11 | End: 2017-05-14

## 2017-05-11 RX ADMIN — AMPICILLIN SODIUM AND SULBACTAM SODIUM 200 GRAM(S): 250; 125 INJECTION, POWDER, FOR SUSPENSION INTRAMUSCULAR; INTRAVENOUS at 13:04

## 2017-05-11 RX ADMIN — Medication 1 TABLET(S): at 18:08

## 2017-05-11 RX ADMIN — Medication 650 MILLIGRAM(S): at 18:09

## 2017-05-11 RX ADMIN — SODIUM CHLORIDE 100 MILLILITER(S): 9 INJECTION, SOLUTION INTRAVENOUS at 13:03

## 2017-05-11 RX ADMIN — CARBIDOPA AND LEVODOPA 1 TABLET(S): 25; 100 TABLET ORAL at 23:08

## 2017-05-11 RX ADMIN — AMPICILLIN SODIUM AND SULBACTAM SODIUM 200 GRAM(S): 250; 125 INJECTION, POWDER, FOR SUSPENSION INTRAMUSCULAR; INTRAVENOUS at 22:06

## 2017-05-11 RX ADMIN — FLUCONAZOLE 100 MILLIGRAM(S): 150 TABLET ORAL at 11:44

## 2017-05-11 RX ADMIN — PRAMIPEXOLE DIHYDROCHLORIDE 0.25 MILLIGRAM(S): 0.12 TABLET ORAL at 23:08

## 2017-05-11 RX ADMIN — CARBIDOPA AND LEVODOPA 1 TABLET(S): 25; 100 TABLET ORAL at 11:44

## 2017-05-11 RX ADMIN — Medication 1 TABLET(S): at 05:33

## 2017-05-11 RX ADMIN — Medication 25 MICROGRAM(S): at 05:33

## 2017-05-11 RX ADMIN — PRAMIPEXOLE DIHYDROCHLORIDE 0.25 MILLIGRAM(S): 0.12 TABLET ORAL at 13:04

## 2017-05-11 RX ADMIN — ENOXAPARIN SODIUM 30 MILLIGRAM(S): 100 INJECTION SUBCUTANEOUS at 11:43

## 2017-05-11 RX ADMIN — PRAMIPEXOLE DIHYDROCHLORIDE 0.25 MILLIGRAM(S): 0.12 TABLET ORAL at 05:33

## 2017-05-11 RX ADMIN — PANTOPRAZOLE SODIUM 40 MILLIGRAM(S): 20 TABLET, DELAYED RELEASE ORAL at 11:44

## 2017-05-11 RX ADMIN — CARBIDOPA AND LEVODOPA 1 TABLET(S): 25; 100 TABLET ORAL at 05:34

## 2017-05-11 RX ADMIN — CARBIDOPA AND LEVODOPA 1 TABLET(S): 25; 100 TABLET ORAL at 18:08

## 2017-05-11 RX ADMIN — AMPICILLIN SODIUM AND SULBACTAM SODIUM 200 GRAM(S): 250; 125 INJECTION, POWDER, FOR SUSPENSION INTRAMUSCULAR; INTRAVENOUS at 05:33

## 2017-05-11 NOTE — GOALS OF CARE CONVERSATION - PERSONAL ADVANCE DIRECTIVE - NS PRO AD NO ADVANCE DIRECTIVE
Son stated not sure if pt has one will check
Son stated not sure if pt has one will check
No
Son stated not sure if pt has one will check

## 2017-05-11 NOTE — PROGRESS NOTE ADULT - SUBJECTIVE AND OBJECTIVE BOX
SURGERY PROGRESS HPI:  Pt seen and examined at bedside. Resting comfortably on vent. Low urine output.    Vital Signs Last 24 Hrs  T(C): 37.8, Max: 38.8 (05-10 @ 05:33)  T(F): 100, Max: 101.9 (05-10 @ 05:33)  HR: 90 (89 - 138)  BP: 94/51 (94/51 - 111/59)  BP(mean): --  RR: 20 (17 - 20)  SpO2: 98% (96% - 99%)      PHYSICAL EXAM:  GENERAL: NAD  HEAD/NECK:  tracheostomy functioning well, site clean/dry/intact   CHEST/LUNG: Clear to ausculation, bilaterally   HEART: RRR S1S2  ABDOMEN: ostomy pink and viable with air and stool in bag. PEG site clean/dry/intact and functioning well. non distended, +BS, soft, non tender, no guarding  : no suprapubic tenderness, no CVA tenderness. Shoemaker indwelling with clear urine output 200cc/24hr  EXTREMITIES:  calf soft, non tender b/l, +edema      I&O's Detail  I & Os for 24h ending 10 May 2017 07:00  =============================================  IN:    sodium chloride 0.9%.: 1000 ml    PRBCs (Packed Red Blood Cells): 680 ml    0.9% NaCl: 570 ml    Suplena: 480 ml    Enteral Tube Flush: 200 ml    Solution: 200 ml    Total IN: 3130 ml  ---------------------------------------------  OUT:    Indwelling Catheter - Urethral: 800 ml    Colostomy: 1 ml    Total OUT: 801 ml  ---------------------------------------------  Total NET: 2329 ml    I & Os for current day (as of 11 May 2017 04:58)  =============================================  IN:    sodium chloride 0.9%.: 1100 ml    Suplena: 440 ml    Solution: 100 ml    Enteral Tube Flush: 50 ml    Total IN: 1690 ml  ---------------------------------------------  OUT:    Colostomy: 700 ml    Indwelling Catheter - Urethral: 200 ml    Total OUT: 900 ml  ---------------------------------------------  Total NET: 790 ml      LABS:                        8.7    11.0  )-----------( 176      ( 10 May 2017 08:23 )             24.0     05-10    149<H>  |  115<H>  |  142<H>  ----------------------------<  172<H>  4.9   |  16<L>  |  3.35<H>    Ca    7.7<L>      10 May 2017 08:23    TPro  7.6  /  Alb  1.4<L>  /  TBili  0.5  /  DBili  x   /  AST  22  /  ALT  <6<L>  /  AlkPhos  69  05-10        Assesment: 86y Female a/w PNA, sepsis and colovesicular fistula, s/p aspiration event 4/16 leading to respiratory failure, s/p PEG via GI and POD#12 s/p diverting loop colostomy and tracheostomy, low urine output and increasing creatinine    Plan:  -continue shoemaker, monitor urine output  -continue medical management/supportive care  -abx per ID  -routine trache/ostomy care  -will case discuss with Dr Mosley and Dr. Bahena SURGERY PROGRESS HPI:  Pt seen and examined at bedside. Resting comfortably on vent. Low urine output.    Vital Signs Last 24 Hrs  T(C): 37.8, Max: 38.8 (05-10 @ 05:33)  T(F): 100, Max: 101.9 (05-10 @ 05:33)  HR: 90 (89 - 138)  BP: 94/51 (94/51 - 111/59)  BP(mean): --  RR: 20 (17 - 20)  SpO2: 98% (96% - 99%)      PHYSICAL EXAM:  GENERAL: NAD  HEAD/NECK:  tracheostomy functioning well, site clean/dry/intact   CHEST/LUNG: Clear to ausculation, bilaterally   HEART: RRR S1S2  ABDOMEN: ostomy pink and viable with air and stool in bag. PEG site clean/dry/intact and functioning well. non distended, +BS, soft, non tender, no guarding  : no suprapubic tenderness, no CVA tenderness. Shoemaker indwelling with clear urine output 200cc/24hr  EXTREMITIES:  calf soft, non tender b/l, +edema      I&O's Detail  I & Os for 24h ending 10 May 2017 07:00  =============================================  IN:    sodium chloride 0.9%.: 1000 ml    PRBCs (Packed Red Blood Cells): 680 ml    0.9% NaCl: 570 ml    Suplena: 480 ml    Enteral Tube Flush: 200 ml    Solution: 200 ml    Total IN: 3130 ml  ---------------------------------------------  OUT:    Indwelling Catheter - Urethral: 800 ml    Colostomy: 1 ml    Total OUT: 801 ml  ---------------------------------------------  Total NET: 2329 ml    I & Os for current day (as of 11 May 2017 04:58)  =============================================  IN:    sodium chloride 0.9%.: 1100 ml    Suplena: 440 ml    Solution: 100 ml    Enteral Tube Flush: 50 ml    Total IN: 1690 ml  ---------------------------------------------  OUT:    Colostomy: 700 ml    Indwelling Catheter - Urethral: 200 ml    Total OUT: 900 ml  ---------------------------------------------  Total NET: 790 ml      LABS:                        8.7    11.0  )-----------( 176      ( 10 May 2017 08:23 )             24.0     05-10    149<H>  |  115<H>  |  142<H>  ----------------------------<  172<H>  4.9   |  16<L>  |  3.35<H>    Ca    7.7<L>      10 May 2017 08:23    TPro  7.6  /  Alb  1.4<L>  /  TBili  0.5  /  DBili  x   /  AST  22  /  ALT  <6<L>  /  AlkPhos  69  05-10        Assesment: 86y Female a/w PNA, sepsis and colovesicular fistula, s/p aspiration event 4/16 leading to respiratory failure, s/p PEG via GI and POD#13 s/p diverting loop colostomy and tracheostomy, low urine output and increasing creatinine    Plan:  -continue shoemaker, monitor urine output  -continue medical management/supportive care  -abx per ID  -routine trache/ostomy care  -will case discuss with Dr Mosley and Dr. Bahena

## 2017-05-11 NOTE — PROGRESS NOTE ADULT - SUBJECTIVE AND OBJECTIVE BOX
HPI:  patient  with  worsening  lethargy  fever  dysphagia  evaluated  in  ER  admitted  for  pneumonia  UTI  patient  s/p  l  hip  medullary  nail (04 Apr 2017 19:53)  Initial Consultaion Note  Requested by Name:  Date/ Time:  Reason for referral/ Consultation:        PAST MEDICAL & SURGICAL HISTORY:  Pneumonia  Anemia  Parkinson disease  Tremors of nervous system  No pertinent past medical history  Status post hip surgery  No significant past surgical history      SOCIAL HISTORY:                                 Admitted from: home [ ] SNF [ ] [facility] [ ] [facility] [ ]    )    ADVANCE DIRECTIVES:  [ ] YES [ ] NO     DNR [ ] YES [ ] NO  Completed on:                       MOLST  [ ] YES [ ] NO   Completed on  :  Living Will  [ ] YES [ ] NO   Completed on:    Allergies    No Known Allergies    Intolerances        Review of Systems:     PAIN : (Y/N) (0-10)          DYSPNEA:     NAUSEA/VOMITING:   DEPRESSION:        SECRETIONS:(    FRAILTY SYNDROM       FAILURE TO THRIVE     DIBILITY   OTHER SYMPTOMS :    UNABLE TO OBTAIN DUE TO POOR MENTATION (   )    PHYSICAL EXAM:  T(F): 99.8, Max: 101.4 (05-10 @ 17:20)  HR: 131 (87 - 135)  BP: 121/64 (94/51 - 121/64)  RR: 18 (17 - 20)  SpO2: 94% (94% - 99%)  Wt(kg): --   WEIGHT :    [id number]                  BMI:  I&O's Summary    I & Os for current day (as of 11 May 2017 15:11)  =============================================  IN: 3090 ml / OUT: 1300 ml / NET: 1790 ml    CAPILLARY BLOOD GLUCOSE      GENERAL: alert [ ] oriented x ______[ ] lethargic        [ ] cachexia     [ ] nonverbal [ ] coma [ ]    HEENT: normal [ ] dry mouth [ ] ET tube/trach [ ]   LUNGS: ]  CV :  normal [ ]  GI : normal [ ]  PEG /NG tube [ ]   : normal [ ] incontinent [ ] oliguria/anuria [ ] shoemaker [ ]  MSK : normal [ ] weakness [ ] edema [ ]              ambulatory [ ] bebbound/wheelchair bound [ ]   SKIN : normal [ ] pressure ulcer (Y/N) Stage ______________ rash (Y/N)    Functional Assessment:Karnofsky Performance Score :  Palliative Performance Status Version 2:         %    LABS:                        7.9    10.0  )-----------( 171      ( 11 May 2017 08:40 )             23.2     05-11    152<H>  |  119<H>  |  145<H>  ----------------------------<  132<H>  4.7   |  16<L>  |  3.63<H>    Ca    7.5<L>      11 May 2017 08:40    TPro  7.0  /  Alb  1.4<L>  /  TBili  0.4  /  DBili  x   /  AST  50<H>  /  ALT  8<L>  /  AlkPhos  75  05-11          I&O's Detail    I & Os for current day (as of 11 May 2017 15:11)  =============================================  IN:    sodium chloride 0.9%: 2300 ml    Suplena: 440 ml    Solution: 300 ml    Enteral Tube Flush: 50 ml    Total IN: 3090 ml  ---------------------------------------------  OUT:    Colostomy: 900 ml    Indwelling Catheter - Urethral: 400 ml    Total OUT: 1300 ml  ---------------------------------------------  Total NET: 1790 ml      Assessment:   86y Female admitted with ALTERED MENTAL STATUS/UTI/URINE RETENTION  AMS/PNEUMONIA  AMS      PROBLEM LIST :  PROBLEM/RECOMMENDATION: 1  Problem : Goals of care, counseling/ discussion.  Recommendation: met with patient/ family and discussed GOC & Advanced care planning                                    Palliative care info/counseling provided (Y/N)                                      Family meeting                                   Advanced Directives addressed (Y/N)  PROBLEM/ RECOMMENDATION:2  Problem :Resuscitation/ DNR/ DNI,   Recommendation: DNR/ DNI    PROBLEM/ RECOMMENDATION :3  Problem : Medical order for life sustaning treatment  Recommendation : MOLST Form ( initiated/ completed)    PROBLEM/RECOMMENDATION :4  Problem : Advanced care planning  Recommendation : Family meeting.(Y/N)     PLAN:  REFFERALS:                           Unit SW/Case Mgmt (Y/N)                          (Y/N)                         Speech/Swallow (Y/N)                           nutrition                                              Ethics (Y/N)                         PT/OT (Y/N)

## 2017-05-11 NOTE — PROGRESS NOTE ADULT - SUBJECTIVE AND OBJECTIVE BOX
Asked to see pt again for anemia - r/o GI bleed  Pt remains intubated and unresponsive      MEDICATIONS  (STANDING):  fluconAZOLE   Tablet 100milliGRAM(s) Oral daily  pantoprazole   Suspension 40milliGRAM(s) Oral daily  lactobacillus acidophilus 1Tablet(s) Oral every 12 hours  carbidopa/levodopa  25/100 1Tablet(s) Oral four times a day  pramipexole 0.25milliGRAM(s) Oral three times a day  ampicillin/sulbactam  IVPB  IV Intermittent   ampicillin/sulbactam  IVPB 3Gram(s) IV Intermittent every 8 hours  levothyroxine 25MICROGram(s) Oral daily  enoxaparin Injectable 30milliGRAM(s) SubCutaneous every 24 hours  dextrose 5% 1000milliLiter(s) IV Continuous <Continuous>    MEDICATIONS  (PRN):  acetaminophen    Suspension. 650milliGRAM(s) Enteral Tube every 6 hours PRN Mild Pain (1 - 3)  acetaminophen    Suspension 650milliGRAM(s) Oral every 6 hours PRN For Temp greater than 38 C (100.4 F)      Allergies    No Known Allergies    Intolerances        Vital Signs Last 24 Hrs  T(C): 37.7, Max: 38.3 (05-11 @ 05:44)  T(F): 99.9, Max: 100.9 (05-11 @ 05:44)  HR: 122 (87 - 135)  BP: 97/50 (94/51 - 121/64)  BP(mean): --  RR: 18 (18 - 20)  SpO2: 96% (90% - 99%)    PHYSICAL EXAM:  General: NAD.  CVS: S1, S2  Chest: air entry bilaterally present  Abd: BS present, soft, non-tender, +peg      LABS:                        7.9    10.0  )-----------( 171      ( 11 May 2017 08:40 )             23.2     05-11    152<H>  |  119<H>  |  145<H>  ----------------------------<  132<H>  4.7   |  16<L>  |  3.63<H>    Ca    7.5<L>      11 May 2017 08:40    TPro  7.0  /  Alb  1.4<L>  /  TBili  0.4  /  DBili  x   /  AST  50<H>  /  ALT  8<L>  /  AlkPhos  75  05-11      check stools for OB  Pt is on lovenox  continue IV protonix  CBC in AM

## 2017-05-11 NOTE — CONSULT NOTE ADULT - SUBJECTIVE AND OBJECTIVE BOX
Patient is a 86y old  Female with ZAHIDA since 5/4 in setting of persistent fever, polymicrobial sepsis, chronic vent dependence, obtundation. Admitted on April 4th with PNA, urosepsis.   Cr NL till 5/4 0.28-0.3. No contrast study within last few weeks of hospitalization. Has been on numerous anti-microbials including Genta, Tobramycin. Reported oliguria for last 48h.   Pt is unresponsive, vented.       PAST MEDICAL & SURGICAL HISTORY:  Pneumonia  Anemia  Parkinson disease  Tremors of nervous system    Surg Hx:  Status post hip surgery  Tracheostomy  PEG  Colostomy    Allergies    No Known Allergies            MEDICATIONS  (STANDING):  enoxaparin Injectable 40milliGRAM(s) SubCutaneous every 24 hours  fluconAZOLE   Tablet 100milliGRAM(s) Oral daily  pantoprazole   Suspension 40milliGRAM(s) Oral daily  lactobacillus acidophilus 1Tablet(s) Oral every 12 hours  carbidopa/levodopa  25/100 1Tablet(s) Oral four times a day  pramipexole 0.25milliGRAM(s) Oral three times a day  sodium chloride 0.9%. 1000milliLiter(s) IV Continuous <Continuous>  ampicillin/sulbactam  IVPB  IV Intermittent   ampicillin/sulbactam  IVPB 3Gram(s) IV Intermittent every 8 hours  levothyroxine 25MICROGram(s) Oral daily    MEDICATIONS  (PRN):  acetaminophen    Suspension. 650milliGRAM(s) Enteral Tube every 6 hours PRN Mild Pain (1 - 3)  acetaminophen    Suspension 650milliGRAM(s) Oral every 6 hours PRN For Temp greater than 38 C (100.4 F)      Daily     Daily     Drug Dosing Weight  Height (cm): 157.5 (04 Apr 2017 22:29)  Weight (kg): 58.6 (04 Apr 2017 22:29)  BMI (kg/m2): 23.6 (04 Apr 2017 22:29)  BSA (m2): 1.59 (04 Apr 2017 22:29)      REVIEW OF SYSTEMS:    CONSTITUTIONAL: POS fever, weight loss  RESPIRATORY: No cough, wheezing, chills or hemoptysis; No shortness of breath  CARDIOVASCULAR: No chest pain, palpitations, dizziness, or leg swelling  GASTROINTESTINAL: No abdominal or epigastric pain. No nausea, vomiting, or hematemesis; No diarrhea or constipation. No melena or hematochezia.  GENITOURINARY: No dysuria, frequency, hematuria, or incontinence  SKIN: No itching, burning, rashes, or lesions   LYMPH NODES: No enlarged glands  NEURO: no asterixis            I&O's Detail    I & Os for current day (as of 11 May 2017 10:45)  =============================================  IN:    sodium chloride 0.9%.: 2300 ml    Suplena: 440 ml    Solution: 300 ml    Enteral Tube Flush: 50 ml    Total IN: 3090 ml  ---------------------------------------------  OUT:    Colostomy: 900 ml    Indwelling Catheter - Urethral: 400 ml    Total OUT: 1300 ml  ---------------------------------------------  Total NET: 1790 ml      I & Os for current day (as of 05-11 @ 10:45)  =============================================  IN: 3090 ml / OUT: 1300 ml / NET: 1790 ml      PHYSICAL EXAM:    GENERAL: NAD  HEAD:  Atraumatic, Normocephalic  EYES: EOMI, PERRLA, conjunctiva and sclera clear  ENMT: No tonsillar erythema, exudates, or enlargement; Moist mucous membranes, Good dentition, No lesions  NECK: Supple, No JVD, Normal thyroid  NERVOUS SYSTEM:  unresponsive, decerebrate.   CHEST/LUNG: Clear to percussion bilaterally; No rales, rhonchi, wheezing, or rubs  HEART: Regular rate and rhythm; No murmurs, rubs, or gallops  ABDOMEN: colostomy  EXTREMITIES:  2+ Peripheral Pulses, No clubbing, cyanosis, or edema. Decerebrate  LYMPH: No lymphadenopathy noted  SKIN: No rashes or lesions    LABS:  CBC Full  -  ( 11 May 2017 08:40 )  WBC Count : 10.0 K/uL  Hemoglobin : 7.9 g/dL  Hematocrit : 23.2 %  Platelet Count - Automated : 171 K/uL  Mean Cell Volume : 86.0 fl  Mean Cell Hemoglobin : 29.2 pg  Mean Cell Hemoglobin Concentration : 33.9 gm/dL  Auto Neutrophil # : 7.1 K/uL  Auto Lymphocyte # : 2.0 K/uL  Auto Monocyte # : 0.7 K/uL  Auto Eosinophil # : 0.1 K/uL  Auto Basophil # : 0.1 K/uL  Auto Neutrophil % : 71.0 %  Auto Lymphocyte % : 19.5 %  Auto Monocyte % : 7.4 %  Auto Eosinophil % : 1.2 %  Auto Basophil % : 0.9 %    05-11    152<H>  |  119<H>  |  145<H>  ----------------------------<  132<H>  4.7   |  16<L>  |  3.63<H>    Ca    7.5<L>      11 May 2017 08:40    TPro  7.0  /  Alb  1.4<L>  /  TBili  0.4  /  DBili  x   /  AST  50<H>  /  ALT  8<L>  /  AlkPhos  75  05-11    CAPILLARY BLOOD GLUCOSE            ASSESSMENT and PLAN:  * ZAHIDA -- DDx: septic ATN, pre-renal azotemia, nephrotoxicity of anti-microbials, PIGN. Will send appropriate urine and serum studies. Cont to follow on IVFs. Avoid hypotension, nephrotoxins. CrCl < 10cc/min. Adjust all meds accordingly.   * Dec Lovenox to 30mg Q24h  * Prognosis is rather poor overall. Pt is not a candidate for aggressive w/u nor is she a candidate for dialysis

## 2017-05-11 NOTE — PROGRESS NOTE ADULT - SUBJECTIVE AND OBJECTIVE BOX
INTERVAL HPI:  86F PMH Parkinson's, s/p recent mechanical fall with R intertrochanteric femur fracture requiring R intramedullary nailing of R trochanteric fracture of femur 3/13/17, UTI, PNA presents from [facility] rehab for sepsis, colovesical fistula with course complicated by aspiration PNA and now s/p trach and diverting loop colostomy.    OVERNIGHT EVENTS:  On vent and febrile.    Vital Signs Last 24 Hrs  T(C): 37.7, Max: 38.6 (05-10 @ 17:20)  T(F): 99.8, Max: 101.4 (05-10 @ 17:20)  HR: 131 (87 - 135)  BP: 121/64 (94/51 - 121/64)  BP(mean): --  RR: 18 (17 - 20)  SpO2: 94% (94% - 99%)    Mode: AC/ CMV (Assist Control/ Continuous Mandatory Ventilation)  RR (machine): 16  TV (machine): 400  FiO2: 30  PEEP: 5  ITime: 1  MAP: 7  PIP: 19    PHYSICAL EXAM:  GEN:        un responsive and comfortable.  HEENT:    Normal.    RESP:     crackles.  CVS:        Regular rate and rhythm.   ABD:         Soft, non-tender, non-distended;     MEDICATIONS  (STANDING):  fluconAZOLE   Tablet 100milliGRAM(s) Oral daily  pantoprazole   Suspension 40milliGRAM(s) Oral daily  lactobacillus acidophilus 1Tablet(s) Oral every 12 hours  carbidopa/levodopa  25/100 1Tablet(s) Oral four times a day  pramipexole 0.25milliGRAM(s) Oral three times a day  ampicillin/sulbactam  IVPB  IV Intermittent   ampicillin/sulbactam  IVPB 3Gram(s) IV Intermittent every 8 hours  levothyroxine 25MICROGram(s) Oral daily  enoxaparin Injectable 30milliGRAM(s) SubCutaneous every 24 hours  dextrose 5% 1000milliLiter(s) IV Continuous <Continuous>    MEDICATIONS  (PRN):  acetaminophen    Suspension. 650milliGRAM(s) Enteral Tube every 6 hours PRN Mild Pain (1 - 3)  acetaminophen    Suspension 650milliGRAM(s) Oral every 6 hours PRN For Temp greater than 38 C (100.4 F)    LABS:                        7.9    10.0  )-----------( 171      ( 11 May 2017 08:40 )             23.2     05-11    152<H>  |  119<H>  |  145<H>  ----------------------------<  132<H>  4.7   |  16<L>  |  3.63<H>    Ca    7.5<L>      11 May 2017 08:40    TPro  7.0  /  Alb  1.4<L>  /  TBili  0.4  /  DBili  x   /  AST  50<H>  /  ALT  8<L>  /  AlkPhos  75  05-11    ASSESSMENT and Plan:  ·	Hypoxic Respiratory failure.  ·	Aspiration Pneumonia. Acinetobacter.  ·	S/P Diverting  Colostomy for Colovesical Fistula.  ·	Anemia.  ·	Leukocytosis.  ·	On Going fever.  ·	Parkinsonism.  ·	Renal insuffiencey.  ·	Hypernatremia.    Continue antibiotics and Vent support.

## 2017-05-12 LAB
ANION GAP SERPL CALC-SCNC: 17 MMOL/L — SIGNIFICANT CHANGE UP (ref 5–17)
BUN SERPL-MCNC: 146 MG/DL — HIGH (ref 7–23)
CALCIUM SERPL-MCNC: 7.6 MG/DL — LOW (ref 8.5–10.1)
CHLORIDE SERPL-SCNC: 115 MMOL/L — HIGH (ref 96–108)
CO2 SERPL-SCNC: 16 MMOL/L — LOW (ref 22–31)
CREAT ?TM UR-MCNC: 31 MG/DL — SIGNIFICANT CHANGE UP
CREAT SERPL-MCNC: 3.88 MG/DL — HIGH (ref 0.5–1.3)
CRYPTOC AG CSF-ACNC: NEGATIVE — SIGNIFICANT CHANGE UP
EOSINOPHIL NFR URNS MANUAL: NEGATIVE — SIGNIFICANT CHANGE UP
GLUCOSE SERPL-MCNC: 171 MG/DL — HIGH (ref 70–99)
HCT VFR BLD CALC: 23 % — LOW (ref 34.5–45)
HGB BLD-MCNC: 8.2 G/DL — LOW (ref 11.5–15.5)
MCHC RBC-ENTMCNC: 30.3 PG — SIGNIFICANT CHANGE UP (ref 27–34)
MCHC RBC-ENTMCNC: 35.6 GM/DL — SIGNIFICANT CHANGE UP (ref 32–36)
MCV RBC AUTO: 85.2 FL — SIGNIFICANT CHANGE UP (ref 80–100)
PLATELET # BLD AUTO: 171 K/UL — SIGNIFICANT CHANGE UP (ref 150–400)
POTASSIUM SERPL-MCNC: 4.6 MMOL/L — SIGNIFICANT CHANGE UP (ref 3.5–5.3)
POTASSIUM SERPL-SCNC: 4.6 MMOL/L — SIGNIFICANT CHANGE UP (ref 3.5–5.3)
RBC # BLD: 2.7 M/UL — LOW (ref 3.8–5.2)
RBC # FLD: 17.1 % — HIGH (ref 11–15)
SODIUM SERPL-SCNC: 148 MMOL/L — HIGH (ref 135–145)
SODIUM UR-SCNC: 46 MMOL/L — SIGNIFICANT CHANGE UP
WBC # BLD: 10.1 K/UL — SIGNIFICANT CHANGE UP (ref 3.8–10.5)
WBC # FLD AUTO: 10.1 K/UL — SIGNIFICANT CHANGE UP (ref 3.8–10.5)

## 2017-05-12 RX ADMIN — AMPICILLIN SODIUM AND SULBACTAM SODIUM 200 GRAM(S): 250; 125 INJECTION, POWDER, FOR SUSPENSION INTRAMUSCULAR; INTRAVENOUS at 13:40

## 2017-05-12 RX ADMIN — AMPICILLIN SODIUM AND SULBACTAM SODIUM 200 GRAM(S): 250; 125 INJECTION, POWDER, FOR SUSPENSION INTRAMUSCULAR; INTRAVENOUS at 05:43

## 2017-05-12 RX ADMIN — Medication 1 TABLET(S): at 05:34

## 2017-05-12 RX ADMIN — Medication 1 TABLET(S): at 17:15

## 2017-05-12 RX ADMIN — ENOXAPARIN SODIUM 30 MILLIGRAM(S): 100 INJECTION SUBCUTANEOUS at 12:08

## 2017-05-12 RX ADMIN — CARBIDOPA AND LEVODOPA 1 TABLET(S): 25; 100 TABLET ORAL at 05:34

## 2017-05-12 RX ADMIN — PANTOPRAZOLE SODIUM 40 MILLIGRAM(S): 20 TABLET, DELAYED RELEASE ORAL at 11:57

## 2017-05-12 RX ADMIN — PRAMIPEXOLE DIHYDROCHLORIDE 0.25 MILLIGRAM(S): 0.12 TABLET ORAL at 13:40

## 2017-05-12 RX ADMIN — FLUCONAZOLE 100 MILLIGRAM(S): 150 TABLET ORAL at 11:54

## 2017-05-12 RX ADMIN — SODIUM CHLORIDE 100 MILLILITER(S): 9 INJECTION, SOLUTION INTRAVENOUS at 17:57

## 2017-05-12 RX ADMIN — AMPICILLIN SODIUM AND SULBACTAM SODIUM 200 GRAM(S): 250; 125 INJECTION, POWDER, FOR SUSPENSION INTRAMUSCULAR; INTRAVENOUS at 23:16

## 2017-05-12 RX ADMIN — CARBIDOPA AND LEVODOPA 1 TABLET(S): 25; 100 TABLET ORAL at 17:15

## 2017-05-12 RX ADMIN — SODIUM CHLORIDE 100 MILLILITER(S): 9 INJECTION, SOLUTION INTRAVENOUS at 05:34

## 2017-05-12 RX ADMIN — PRAMIPEXOLE DIHYDROCHLORIDE 0.25 MILLIGRAM(S): 0.12 TABLET ORAL at 23:15

## 2017-05-12 RX ADMIN — PRAMIPEXOLE DIHYDROCHLORIDE 0.25 MILLIGRAM(S): 0.12 TABLET ORAL at 05:34

## 2017-05-12 RX ADMIN — CARBIDOPA AND LEVODOPA 1 TABLET(S): 25; 100 TABLET ORAL at 23:15

## 2017-05-12 RX ADMIN — Medication 25 MICROGRAM(S): at 05:34

## 2017-05-12 RX ADMIN — CARBIDOPA AND LEVODOPA 1 TABLET(S): 25; 100 TABLET ORAL at 11:54

## 2017-05-12 NOTE — PROGRESS NOTE ADULT - SUBJECTIVE AND OBJECTIVE BOX
INTERVAL HPI/OVERNIGHT EVENTS:        REVIEW OF SYSTEMS:  CONSTITUTIONAL:  unresponsive on  respirator  s/p  LP      MEDICATION:  fluconAZOLE   Tablet 100milliGRAM(s) Oral daily  pantoprazole   Suspension 40milliGRAM(s) Oral daily  lactobacillus acidophilus 1Tablet(s) Oral every 12 hours  acetaminophen    Suspension. 650milliGRAM(s) Enteral Tube every 6 hours PRN  carbidopa/levodopa  25/100 1Tablet(s) Oral four times a day  pramipexole 0.25milliGRAM(s) Oral three times a day  acetaminophen    Suspension 650milliGRAM(s) Oral every 6 hours PRN  ampicillin/sulbactam  IVPB  IV Intermittent   ampicillin/sulbactam  IVPB 3Gram(s) IV Intermittent every 8 hours  levothyroxine 25MICROGram(s) Oral daily  enoxaparin Injectable 30milliGRAM(s) SubCutaneous every 24 hours  dextrose 5% 1000milliLiter(s) IV Continuous <Continuous>    Vital Signs Last 24 Hrs  T(C): 37.1, Max: 37.8 ( @ 17:03)  T(F): 98.8, Max: 100.1 ( @ 17:03)  HR: 114 (90 - 135)  BP: 106/60 (97/50 - 121/64)  BP(mean): --  RR: 18 (18 - 18)  SpO2: 98% (90% - 98%)    PHYSICAL EXAM:  GENERAL: NAD, well-groomed, well-developed  EYES:  conjunctiva and sclera clear  ENMT:  Moist mucous membranes,   NECK: Supple, No JVD, Normal thyroid  NERVOUS SYSTEM:  Alert oriented   no  focal  deficits;   CHEST/LUNG: Clear    HEART: Regular rate and rhythm; No murmurs, rubs, or gallops  ABDOMEN: Soft, Nontender, Nondistended; Bowel sounds present  EXTREMITIES:  no  edema no  tenderness  SKIN: No rashes   LABS:                        7.9    10.0  )-----------( 171      ( 11 May 2017 08:40 )             23.2         152<H>  |  119<H>  |  145<H>  ----------------------------<  132<H>  4.7   |  16<L>  |  3.63<H>    Ca    7.5<L>      11 May 2017 08:40    TPro  7.0  /  Alb  1.4<L>  /  TBili  0.4  /  DBili  x   /  AST  50<H>  /  ALT  8<L>  /  AlkPhos  75  05-11      Urinalysis Basic - ( 11 May 2017 18:59 )    Color: Yellow / Appearance: Clear / S.015 / pH: x  Gluc: x / Ketone: Negative  / Bili: Negative / Urobili: Negative mg/dL   Blood: x / Protein: 100 mg/dL / Nitrite: Negative   Leuk Esterase: Moderate / RBC: 11-25 /HPF / WBC 3-5   Sq Epi: x / Non Sq Epi: Few / Bacteria: Many      CAPILLARY BLOOD GLUCOSE      RADIOLOGY & ADDITIONAL TESTS:    Imaging reports  Personally Reviewed:  [x ] YES  [ ] NO    Consultant(s) Notes Reviewed:  [x ] YES  [ ] NO    Care Discussed with Consultants/Other Providers [ x] YES  [ ] NO  ACUTE  RESPIRATORY  FAILURE  PNEUMONIA   CONT  VENT  SUPPORT  AB  IVF  S/P  TRACH    recurrant  fever   cont  AB  as  per  ID  tylenol  PRN  recheck  cultures  COLO/VESCICULAR FISTULA S/P  diverting  colostomy    LFT elevation observation  metabolic  encephalopathy with  severe  inflammatory  processs supportive care        Problem/Plan - 3:  ·  Problem: Parkinson disease encephalopathy.  Plan: sinemet pramipexole  anemia  continue  to  monitor  no  clinical  evidence  of  acute bleed  transfuse as  needed    fever  pneumonia  obseve  with  new  AB  regimen check  CSF  cultures might need  BRIEN  if  fevers  persist   urinary  retention  observe  with  shoemaker    acute  on renal  failure  cont  ivf  as  per  renal  prognosis  guarded

## 2017-05-12 NOTE — PROGRESS NOTE ADULT - SUBJECTIVE AND OBJECTIVE BOX
Pt remains intubated; unresponsive   H/H holding steady - last Hgb 8.2    MEDICATIONS  (STANDING):  fluconAZOLE   Tablet 100milliGRAM(s) Oral daily  pantoprazole   Suspension 40milliGRAM(s) Oral daily  lactobacillus acidophilus 1Tablet(s) Oral every 12 hours  carbidopa/levodopa  25/100 1Tablet(s) Oral four times a day  pramipexole 0.25milliGRAM(s) Oral three times a day  ampicillin/sulbactam  IVPB  IV Intermittent   ampicillin/sulbactam  IVPB 3Gram(s) IV Intermittent every 8 hours  levothyroxine 25MICROGram(s) Oral daily  enoxaparin Injectable 30milliGRAM(s) SubCutaneous every 24 hours  dextrose 5% 1000milliLiter(s) IV Continuous <Continuous>    MEDICATIONS  (PRN):  acetaminophen    Suspension. 650milliGRAM(s) Enteral Tube every 6 hours PRN Mild Pain (1 - 3)  acetaminophen    Suspension 650milliGRAM(s) Oral every 6 hours PRN For Temp greater than 38 C (100.4 F)      Allergies    No Known Allergies    Intolerances        Vital Signs Last 24 Hrs  T(C): 37.1, Max: 37.8 (05-11 @ 17:03)  T(F): 98.8, Max: 100.1 (05-11 @ 17:03)  HR: 137 (90 - 137)  BP: 106/60 (97/50 - 121/64)  BP(mean): --  RR: 18 (18 - 18)  SpO2: 96% (90% - 98%)    PHYSICAL EXAM:  General: NAD.  CVS: S1, S2  Chest: air entry bilaterally present  Abd: BS present, soft, non-tender      LABS:                        8.2    10.1  )-----------( 171      ( 12 May 2017 07:53 )             23.0     05-12    148<H>  |  115<H>  |  146<H>  ----------------------------<  171<H>  4.6   |  16<L>  |  3.88<H>    Ca    7.6<L>      12 May 2017 07:53    TPro  7.0  /  Alb  1.4<L>  /  TBili  0.4  /  DBili  x   /  AST  50<H>  /  ALT  8<L>  /  AlkPhos  75  05-11      continue protonix via peg  Pt is not a candidate for any interventional GI procedures  CBc in AM

## 2017-05-12 NOTE — PROGRESS NOTE ADULT - SUBJECTIVE AND OBJECTIVE BOX
TMAX- 100.9    On day # 4 Unasyn / s/p Tobra x1 / # 26nDiflucan    Vital Signs Last 24 Hrs  T(C): 37.6, Max: 37.8 (05-11 @ 17:03)  T(F): 99.6, Max: 100.1 (05-11 @ 17:03)  HR: 74 (74 - 137)  BP: 105/60 (97/50 - 115/53)  BP(mean): --  RR: 18 (18 - 18)  SpO2: 95% (90% - 98%)  Mode: AC/ CMV (Assist Control/ Continuous Mandatory Ventilation)  RR (machine): 16  TV (machine): 400  FiO2: 30  PEEP: 5  ITime: 1  MAP: 9  PIP: 18  Supplemental O2: on ventilator    S/p LP yesterday.        PHYSICAL EXAM  General:    HEENT:    Neck:    Heart:    Lungs:    Abdomen:    Extremities:    Skin:      I&O's Summary :    I & Os for current day (as of 12 May 2017 14:11)  =============================================  IN: 0 ml / OUT: 650 ml / NET: -650 ml      LABS:CBC Full  -  ( 12 May 2017 07:53 )  WBC Count : 10.1 K/uL  Hemoglobin : 8.2 g/dL  Hematocrit : 23.0 %  Platelet Count - Automated : 171 K/uL  Mean Cell Volume : 85.2 fl  Mean Cell Hemoglobin : 30.3 pg  Mean Cell Hemoglobin Concentration : 35.6 gm/dL  Auto Neutrophil # : x  Auto Lymphocyte # : x  Auto Monocyte # : x  Auto Eosinophil # : x  Auto Basophil # : x  Auto Neutrophil % : x  Auto Lymphocyte % : x  Auto Monocyte % : x  Auto Eosinophil % : x  Auto Basophil % : x        148<H>  |  115<H>  |  146<H>  ----------------------------<  171<H>  4.6   |  16<L>  |  3.88<H>    Ca    7.6<L>      12 May 2017 07:53    TPro  7.0  /  Alb  1.4<L>  /  TBili  0.4  /  DBili  x   /  AST  50<H>  /  ALT  8<L>  /  AlkPhos  75  05-11    LIVER FUNCTIONS - ( 11 May 2017 08:40 )  Alb: 1.4 g/dL / Pro: 7.0 gm/dL / ALK PHOS: 75 U/L / ALT: 8 U/L / AST: 50 U/L / GGT: x             Urinalysis Basic - ( 11 May 2017 18:59 )  Color: Yellow / Appearance: Clear / S.015 / pH: x  Gluc: x / Ketone: Negative  / Bili: Negative / Urobili: Negative mg/dL   Blood: x / Protein: 100 mg/dL / Nitrite: Negative   Leuk Esterase: Moderate / RBC: 11-25 /HPF / WBC 3-5   Sq Epi: x / Non Sq Epi: Few / Bacteria: Many              CULTURES:  Specimen Source: .CSF CSF ( @ 23:11)    Specimen Source: .Blood Blood-Venous ( @ 09:15)  Culture Results:   No growth to date. ( @ 09:15)    Specimen Source: .Urine Catheterized ( @ 15:19)  Culture Results:   No growth ( @ 15:19)    Specimen Source: .Sputum Sputum ( @ 01:04)  Culture Results:   Numerous Acinetobacter baumannii/haemolyticus (Carbapenem Resistant)  Normal Respiratory Cindy absent  ***********Note************  There is no standardized, established, or validated  for susceptibility testing of this organism/drug combination  Th ( @ 01:04)    Specimen Source: .Blood Blood ( @ 23:57)  Culture Results:   No growth at 5 days. ( @ 23:57)    Specimen Source: .Blood Blood ( @ 23:57)  Culture Results:   No growth at 5 days. ( @ 23:57)        Impression:    Suggestions: TMAX- 100.9    On day # 4 Unasyn / s/p Tobra x1 / # 26 Diflucan    Vital Signs Last 24 Hrs  T(C): 37.6, Max: 37.8 (05-11 @ 17:03)  T(F): 99.6, Max: 100.1 (05-11 @ 17:03)  HR: 74 (74 - 137)  BP: 105/60 (97/50 - 115/53)  BP(mean): --  RR: 18 (18 - 18)  SpO2: 95% (90% - 98%)  Mode: AC/ CMV (Assist Control/ Continuous Mandatory Ventilation)  RR (machine): 16  TV (machine): 400  FiO2: 30  PEEP: 5  ITime: 1  MAP: 9  PIP: 18  Supplemental O2: on ventilator    S/p LP yesterday for further w/u.  Preliminary results revealed 5 WBC'S with 10% PMN's and 78% Lymphs and 683 RBC's.  CSF glucose was 109 and CSF protein was 66.  Gm Stain revealed no PMN's and no organisms were seen.  Based on these preliminary results there is no evidence of meningitis at this time.      PHYSICAL EXAM  General:  remains on ventilator, lying in bed in semi-upright position, poorly responsive.  HEENT:  conj pink, sclerae anicteric, PERRLA, no oral lesions noted  Neck:  semi-supple, no nodes noted, trach site clean  Heart:  RR  Lungs: decreased BS at bases with few anterior rhonchi   Abdomen: soft, BS+, appears nontender, PEG site clean with feedings in progress                  colostomy functioning and with black liquid stool noted in bag   Extremities:  no edema LE's                     arms and hands with mild swelling                     Rt upper arm with midline in place - site clean- placed   Skin:  warm, dry now, no rash noted      I&O's Summary :    I & Os for current day (as of 12 May 2017 14:11)  =============================================  IN: 0 ml / OUT: 650 ml / NET: -650 ml      LABS:CBC Full  -  ( 12 May 2017 07:53 )  WBC Count : 10.1 K/uL  Hemoglobin : 8.2 g/dL  Hematocrit : 23.0 %  Platelet Count - Automated : 171 K/uL  Mean Cell Volume : 85.2 fl  Mean Cell Hemoglobin : 30.3 pg  Mean Cell Hemoglobin Concentration : 35.6 gm/dL  Auto Neutrophil # : x  Auto Lymphocyte # : x  Auto Monocyte # : x  Auto Eosinophil # : x  Auto Basophil # : x  Auto Neutrophil % : x  Auto Lymphocyte % : x  Auto Monocyte % : x  Auto Eosinophil % : x  Auto Basophil % : x        148<H>  |  115<H>  |  146<H>  ----------------------------<  171<H>  4.6   |  16<L>  |  3.88<H>    Ca    7.6<L>      12 May 2017 07:53    TPro  7.0  /  Alb  1.4<L>  /  TBili  0.4  /  DBili  x   /  AST  50<H>  /  ALT  8<L>  /  AlkPhos  75  05-11    LIVER FUNCTIONS - ( 11 May 2017 08:40 )  Alb: 1.4 g/dL / Pro: 7.0 gm/dL / ALK PHOS: 75 U/L / ALT: 8 U/L / AST: 50 U/L / GGT: x             Urinalysis Basic - ( 11 May 2017 18:59 )  Color: Yellow / Appearance: Clear / S.015 / pH: x  Gluc: x / Ketone: Negative  / Bili: Negative / Urobili: Negative mg/dL   Blood: x / Protein: 100 mg/dL / Nitrite: Negative   Leuk Esterase: Moderate / RBC: 11-25 /HPF / WBC 3-5   Sq Epi: x / Non Sq Epi: Few / Bacteria: Many              CULTURES:  Specimen Source: .CSF CSF ( @ 23:11)    Specimen Source: .Blood Blood-Venous ( @ 09:15)  Culture Results:   No growth to date. ( @ 09:15)    Specimen Source: .Urine Catheterized ( @ 15:19)  Culture Results:   No growth ( @ 15:19)    Specimen Source: .Sputum Sputum ( @ 01:04)  Culture Results:   Numerous Acinetobacter baumannii/haemolyticus (Carbapenem Resistant)  Normal Respiratory Cindy absent  ***********Note************  There is no standardized, established, or validated  for susceptibility testing of this organism/drug combination  Th ( @ 01:04)    Specimen Source: .Blood Blood ( @ 23:57)  Culture Results:   No growth at 5 days. ( @ 23:57)    Specimen Source: .Blood Blood ( @ 23:57)  Culture Results:   No growth at 5 days. ( @ 23:57)        CSF Cryptococcal Ag - negative    Impression: Temps appear to trending downwards now on Unasyn and s/p Tobra x1 for Sepsis with  MDRO Acinetobacter PNA now, but with slowly worsening renal function and continued Respiratory Failure.    Suggestions: Will continue current ab rx and consider further dosage adjustment of the Unasyn if her Creatinine continues to rise.  Follow-up temps, labs, and cx's including CSF results.

## 2017-05-12 NOTE — PROGRESS NOTE ADULT - SUBJECTIVE AND OBJECTIVE BOX
Patient seen in follow up for ZAHIDA; vent dependent, unresponsive.      MEDICATIONS  (STANDING):  fluconAZOLE   Tablet 100milliGRAM(s) Oral daily  pantoprazole   Suspension 40milliGRAM(s) Oral daily  lactobacillus acidophilus 1Tablet(s) Oral every 12 hours  carbidopa/levodopa  25/100 1Tablet(s) Oral four times a day  pramipexole 0.25milliGRAM(s) Oral three times a day  ampicillin/sulbactam  IVPB  IV Intermittent   ampicillin/sulbactam  IVPB 3Gram(s) IV Intermittent every 8 hours  levothyroxine 25MICROGram(s) Oral daily  enoxaparin Injectable 30milliGRAM(s) SubCutaneous every 24 hours  dextrose 5% 1000milliLiter(s) IV Continuous <Continuous>    MEDICATIONS  (PRN):  acetaminophen    Suspension. 650milliGRAM(s) Enteral Tube every 6 hours PRN Mild Pain (1 - 3)  acetaminophen    Suspension 650milliGRAM(s) Oral every 6 hours PRN For Temp greater than 38 C (100.4 F)    PHYSICAL EXAM:      T(C): 37.6, Max: 37.8 (05-11 @ 17:03)  HR: 74 (74 - 137)  BP: 105/60 (97/50 - 115/53)  RR: 18 (18 - 18)  SpO2: 95% (90% - 98%)  Wt(kg): --  Respiratory: clear anteriorly, decreased BS at bases  Cardiovascular: S1 S2  Gastrointestinal: soft NT ND +BS  Extremities:  1 edema                                    8.2    10.1  )-----------( 171      ( 12 May 2017 07:53 )             23.0     05-12    148<H>  |  115<H>  |  146<H>  ----------------------------<  171<H>  4.6   |  16<L>  |  3.88<H>    Ca    7.6<L>      12 May 2017 07:53    TPro  7.0  /  Alb  1.4<L>  /  TBili  0.4  /  DBili  x   /  AST  50<H>  /  ALT  8<L>  /  AlkPhos  75  05-11      LIVER FUNCTIONS - ( 11 May 2017 08:40 )  Alb: 1.4 g/dL / Pro: 7.0 gm/dL / ALK PHOS: 75 U/L / ALT: 8 U/L / AST: 50 U/L / GGT: x             Assessment and Plan:    ZAHIDA; suspected septic ATN, pre-renal azotemia, nephrotoxicity of anti-microbials, PIGN.   Overall prognosis poor, hemodialytic intervention will not improve long term goals.  Supportive care.

## 2017-05-12 NOTE — PROGRESS NOTE ADULT - SUBJECTIVE AND OBJECTIVE BOX
Patient seen and examined at bedside in no distress. Unresponsive, on vent.     Vital Signs Last 24 Hrs  T(F): 98.8, Max: 100.1 (05-11 @ 17:03)  HR: 137  BP: 106/60  RR: 18  SpO2: 96%    GENERAL: Unresponsive on respirator  HEENT: Trache in place, site c/d/i  CHEST/LUNG: Clear to auscultation bilaterally, respirations nonlabored  HEART: S1S2, Regular rate and rhythm  ABDOMEN: +BS, soft, nondistended, +ostomy pink with air and stool in bag, PEG site c/d/i  EXTREMITIES:  b/l LE edema   : shoemaker catheter in situ draining clear, yellow urine, output: 650cc/24hrs    LABS:                        8.2    10.1  )-----------( 171      ( 12 May 2017 07:53 )             23.0     05-12    148<H>  |  115<H>  |  146<H>  ----------------------------<  171<H>  4.6   |  16<L>  |  3.88<H>    Ca    7.6<L>      12 May 2017 07:53    TPro  7.0  /  Alb  1.4<L>  /  TBili  0.4  /  DBili  x   /  AST  50<H>  /  ALT  8<L>  /  AlkPhos  75  05-11      Assesment: 86 year old female a/w PNA, sepsis, and colovesicular fistula, s/p aspiration event 4/16 leading to respiratory failure, s/p PEG via GI and POD#14 s/p diverting loop colostomy and tracheostomy, with low H/H s/p 4uPRBC this week, now with increasing creatinine   Plan:  - continue shoemaker catheter, monitor urine output  - IV hydration, renal follow up   - no GI intervention planned, c/w protonix  - continue antibiotics per ID  - continue with routine trache and ostomy care  - will d/w Dr Bahena and Dr Mosley

## 2017-05-12 NOTE — PROGRESS NOTE ADULT - SUBJECTIVE AND OBJECTIVE BOX
INTERVAL HPI:  86F PMH Parkinson's, s/p recent mechanical fall with R intertrochanteric femur fracture requiring R intramedullary nailing of R trochanteric fracture of femur 3/13/17, UTI, PNA presents from [facility] rehab for sepsis, colovesical fistula with course complicated by aspiration PNA and now s/p trach and diverting loop colostomy.    OVERNIGHT EVENTS:  Fever appears trending down . Remains on Vent    Vital Signs Last 24 Hrs  T(C): 37.6, Max: 37.7 (05-11 @ 23:45)  T(F): 99.6, Max: 99.9 (05-11 @ 23:45)  HR: 128 (74 - 137)  BP: 100/50 (97/50 - 106/60)  BP(mean): --  RR: 19 (18 - 19)  SpO2: 100% (95% - 100%)    Mode: AC/ CMV (Assist Control/ Continuous Mandatory Ventilation)  RR (machine): 16  TV (machine): 400  FiO2: 30  PEEP: 5  ITime: 1  MAP: 8  PIP: 21    PHYSICAL EXAM:  GEN:         Awake, responsive and comfortable.  HEENT:    Normal.    RESP:      crackles.  CVS:         Regular rate and rhythm.   ABD:         Soft, non-tender, non-distended;   :             No costovertebral angle tenderness  EXTR:            No clubbing, cyanosis or edema  CNS:              Intact sensory and motor function.    MEDICATIONS  (STANDING):  fluconAZOLE   Tablet 100milliGRAM(s) Oral daily  pantoprazole   Suspension 40milliGRAM(s) Oral daily  lactobacillus acidophilus 1Tablet(s) Oral every 12 hours  carbidopa/levodopa  25/100 1Tablet(s) Oral four times a day  pramipexole 0.25milliGRAM(s) Oral three times a day  ampicillin/sulbactam  IVPB  IV Intermittent   ampicillin/sulbactam  IVPB 3Gram(s) IV Intermittent every 8 hours  levothyroxine 25MICROGram(s) Oral daily  enoxaparin Injectable 30milliGRAM(s) SubCutaneous every 24 hours  dextrose 5% 1000milliLiter(s) IV Continuous <Continuous>    MEDICATIONS  (PRN):  acetaminophen    Suspension. 650milliGRAM(s) Enteral Tube every 6 hours PRN Mild Pain (1 - 3)  acetaminophen    Suspension 650milliGRAM(s) Oral every 6 hours PRN For Temp greater than 38 C (100.4 F)    LABS:                        8.2    10.1  )-----------( 171      ( 12 May 2017 07:53 )             23.0         148<H>  |  115<H>  |  146<H>  ----------------------------<  171<H>  4.6   |  16<L>  |  3.88<H>    Ca    7.6<L>      12 May 2017 07:53    TPro  7.0  /  Alb  1.4<L>  /  TBili  0.4  /  DBili  x   /  AST  50<H>  /  ALT  8<L>  /  AlkPhos  75  -    Urinalysis Basic - ( 11 May 2017 18:59 )    Color: Yellow / Appearance: Clear / S.015 / pH: x  Gluc: x / Ketone: Negative  / Bili: Negative / Urobili: Negative mg/dL   Blood: x / Protein: 100 mg/dL / Nitrite: Negative   Leuk Esterase: Moderate / RBC: 11-25 /HPF / WBC 3-5   Sq Epi: x / Non Sq Epi: Few / Bacteria: Many    ASSESSMENT and Plan:  ·	Hypoxic Respiratory failure.  ·	Aspiration Pneumonia. Acinetobacter.  ·	S/P Diverting  Colostomy for Colovesical Fistula.  ·	Anemia.  ·	Leukocytosis.  ·	On Going fever.  ·	Parkinsonism.  ·	Renal insuffiencey.  ·	Hypernatremia.    On Unasyn and Fluconazole.  Continue Vent.

## 2017-05-13 LAB
ALBUMIN SERPL ELPH-MCNC: 1.4 G/DL — LOW (ref 3.3–5)
ALP SERPL-CCNC: 85 U/L — SIGNIFICANT CHANGE UP (ref 40–120)
ALT FLD-CCNC: 14 U/L — SIGNIFICANT CHANGE UP (ref 12–78)
ANION GAP SERPL CALC-SCNC: 22 MMOL/L — HIGH (ref 5–17)
AST SERPL-CCNC: 38 U/L — HIGH (ref 15–37)
BILIRUB SERPL-MCNC: 0.4 MG/DL — SIGNIFICANT CHANGE UP (ref 0.2–1.2)
BUN SERPL-MCNC: 148 MG/DL — HIGH (ref 7–23)
CALCIUM SERPL-MCNC: 7.2 MG/DL — LOW (ref 8.5–10.1)
CHLORIDE SERPL-SCNC: 106 MMOL/L — SIGNIFICANT CHANGE UP (ref 96–108)
CO2 SERPL-SCNC: 16 MMOL/L — LOW (ref 22–31)
CREAT SERPL-MCNC: 4.28 MG/DL — HIGH (ref 0.5–1.3)
GLUCOSE SERPL-MCNC: 161 MG/DL — HIGH (ref 70–99)
HCT VFR BLD CALC: 20.9 % — CRITICAL LOW (ref 34.5–45)
HGB BLD-MCNC: 7.2 G/DL — LOW (ref 11.5–15.5)
MAGNESIUM SERPL-MCNC: 2.7 MG/DL — HIGH (ref 1.6–2.6)
MCHC RBC-ENTMCNC: 29 PG — SIGNIFICANT CHANGE UP (ref 27–34)
MCHC RBC-ENTMCNC: 34.6 GM/DL — SIGNIFICANT CHANGE UP (ref 32–36)
MCV RBC AUTO: 83.8 FL — SIGNIFICANT CHANGE UP (ref 80–100)
PHOSPHATE SERPL-MCNC: 8.8 MG/DL — SIGNIFICANT CHANGE UP (ref 2.5–4.5)
PLATELET # BLD AUTO: 191 K/UL — SIGNIFICANT CHANGE UP (ref 150–400)
POTASSIUM SERPL-MCNC: 4.6 MMOL/L — SIGNIFICANT CHANGE UP (ref 3.5–5.3)
POTASSIUM SERPL-SCNC: 4.6 MMOL/L — SIGNIFICANT CHANGE UP (ref 3.5–5.3)
PROT SERPL-MCNC: 6.6 GM/DL — SIGNIFICANT CHANGE UP (ref 6–8.3)
RBC # BLD: 2.49 M/UL — LOW (ref 3.8–5.2)
RBC # FLD: 16.6 % — HIGH (ref 11–15)
SODIUM SERPL-SCNC: 144 MMOL/L — SIGNIFICANT CHANGE UP (ref 135–145)
WBC # BLD: 11.6 K/UL — HIGH (ref 3.8–10.5)
WBC # FLD AUTO: 11.6 K/UL — HIGH (ref 3.8–10.5)

## 2017-05-13 RX ORDER — AMPICILLIN SODIUM AND SULBACTAM SODIUM 250; 125 MG/ML; MG/ML
3 INJECTION, POWDER, FOR SUSPENSION INTRAMUSCULAR; INTRAVENOUS EVERY 12 HOURS
Qty: 0 | Refills: 0 | Status: DISCONTINUED | OUTPATIENT
Start: 2017-05-13 | End: 2017-05-20

## 2017-05-13 RX ADMIN — CARBIDOPA AND LEVODOPA 1 TABLET(S): 25; 100 TABLET ORAL at 17:41

## 2017-05-13 RX ADMIN — Medication 1 TABLET(S): at 05:21

## 2017-05-13 RX ADMIN — PRAMIPEXOLE DIHYDROCHLORIDE 0.25 MILLIGRAM(S): 0.12 TABLET ORAL at 13:23

## 2017-05-13 RX ADMIN — PRAMIPEXOLE DIHYDROCHLORIDE 0.25 MILLIGRAM(S): 0.12 TABLET ORAL at 05:21

## 2017-05-13 RX ADMIN — CARBIDOPA AND LEVODOPA 1 TABLET(S): 25; 100 TABLET ORAL at 12:04

## 2017-05-13 RX ADMIN — PANTOPRAZOLE SODIUM 40 MILLIGRAM(S): 20 TABLET, DELAYED RELEASE ORAL at 12:05

## 2017-05-13 RX ADMIN — AMPICILLIN SODIUM AND SULBACTAM SODIUM 200 GRAM(S): 250; 125 INJECTION, POWDER, FOR SUSPENSION INTRAMUSCULAR; INTRAVENOUS at 13:22

## 2017-05-13 RX ADMIN — PRAMIPEXOLE DIHYDROCHLORIDE 0.25 MILLIGRAM(S): 0.12 TABLET ORAL at 22:45

## 2017-05-13 RX ADMIN — CARBIDOPA AND LEVODOPA 1 TABLET(S): 25; 100 TABLET ORAL at 05:21

## 2017-05-13 RX ADMIN — Medication 25 MICROGRAM(S): at 05:21

## 2017-05-13 RX ADMIN — AMPICILLIN SODIUM AND SULBACTAM SODIUM 200 GRAM(S): 250; 125 INJECTION, POWDER, FOR SUSPENSION INTRAMUSCULAR; INTRAVENOUS at 05:21

## 2017-05-13 RX ADMIN — FLUCONAZOLE 100 MILLIGRAM(S): 150 TABLET ORAL at 12:04

## 2017-05-13 RX ADMIN — ENOXAPARIN SODIUM 30 MILLIGRAM(S): 100 INJECTION SUBCUTANEOUS at 12:04

## 2017-05-13 RX ADMIN — SODIUM CHLORIDE 100 MILLILITER(S): 9 INJECTION, SOLUTION INTRAVENOUS at 12:03

## 2017-05-13 RX ADMIN — Medication 650 MILLIGRAM(S): at 12:07

## 2017-05-13 RX ADMIN — Medication 1 TABLET(S): at 17:41

## 2017-05-13 NOTE — PROGRESS NOTE ADULT - SUBJECTIVE AND OBJECTIVE BOX
TMAX: - 100.4    On day # 5 Unasyn / s/p Tobra x1 / # 27 Diflucan    Vital Signs Last 24 Hrs  T(C): 37.7, Max: 38 (05-13 @ 11:40)  T(F): 99.8, Max: 100.4 (05-13 @ 11:40)  HR: 115 (114 - 126)  BP: 96/53 (96/49 - 98/51)  BP(mean): --  RR: 20 (20 - 25)  SpO2: 97% (97% - 99%)  Mode: AC/ CMV (Assist Control/ Continuous Mandatory Ventilation)  RR (machine): 16  TV (machine): 400  FiO2: 30  PEEP: 5  ITime: 0.91  MAP: 8  PIP: 20  Supplemental O2:  on ventilator on 30% FIO2 + 5 PEEP      Remains poorly responsive, on ventilator, on the same vent settings.  S/p LP 2 days ago with no evidence of Meningitis based on parameters and CSF Cx negative to date.      PHYSICAL EXAM  General:  poorly responsive, on ventilator, lying in bed in semi-upright position  HEENT:  conj pink, sclerae anicteric, PERRLA, no oral lesions noted  Neck:  supple, no nodes noted  Heart: RR   Lungs:  decreased BS at bases bilaterally, with few anterior rhonchi  Abdomen:  soft, BS+, nontender appearance, Peg in place - site clean and with tube feedings in progress                  colostomy functioning with black-colored soft/liquid stool  Extremities:  2+ edema LE's noted                     hands and arms with mild swelling  Skin:  warm, dry, no rash noted      I&O's Summary:    I & Os for current day (as of 13 May 2017 17:33)  =============================================  IN: 0 ml / OUT: 120 ml / NET: -120 ml      LABS:  CBC Full  -  ( 13 May 2017 14:33 )  WBC Count : 11.6 K/uL  Hemoglobin : 7.2 g/dL  Hematocrit : 20.9 %  Platelet Count - Automated : 191 K/uL  Mean Cell Volume : 83.8 fl  Mean Cell Hemoglobin : 29.0 pg  Mean Cell Hemoglobin Concentration : 34.6 gm/dL  Auto Neutrophil # : x  Auto Lymphocyte # : x  Auto Monocyte # : x  Auto Eosinophil # : x  Auto Basophil # : x  Auto Neutrophil % : x  Auto Lymphocyte % : x  Auto Monocyte % : x  Auto Eosinophil % : x  Auto Basophil % : x        144  |  106  |  148<H>  ----------------------------<  161<H>  4.6   |  16<L>  |  4.28<H>    Ca    7.2<L>      13 May 2017 14:33  Phos  8.8       Mg     2.7         TPro  6.6  /  Alb  1.4<L>  /  TBili  0.4  /  DBili  x   /  AST  38<H>  /  ALT  14  /  AlkPhos  85      LIVER FUNCTIONS - ( 13 May 2017 14:33 )  Alb: 1.4 g/dL / Pro: 6.6 gm/dL / ALK PHOS: 85 U/L / ALT: 14 U/L / AST: 38 U/L / GGT: x               Urinalysis Basic - ( 11 May 2017 18:59 )  Color: Yellow / Appearance: Clear / S.015 / pH: x  Gluc: x / Ketone: Negative  / Bili: Negative / Urobili: Negative mg/dL   Blood: x / Protein: 100 mg/dL / Nitrite: Negative   Leuk Esterase: Moderate / RBC: 11-25 /HPF / WBC 3-5   Sq Epi: x / Non Sq Epi: Few / Bacteria: Many        CULTURES:  Specimen Source: .CSF CSF ( @ 23:11)  Culture Results:   No growth to date ( @ 23:11)    Specimen Source: .Blood Blood-Venous ( @ 09:15)  Culture Results:   No growth to date. ( @ 09:15)          Impression: Temps trending downwards on present ab rx with Unasyn and Diflucan for Sepsis with MDRO Acinetobacter Multifocal PNA, s/p trach, PEG, and Diverting Colostomy for colovesicle fistula, and with respiratory failure, and now with worsening renal function.  No evidence of Meningitis based on CSF paramenters and CX.    Suggestions: Will continue present ab rx  and adjust Unasyn dose in view of worsening renal function and follow-up temps and labs closely.  Repeat CXR in a few days.

## 2017-05-13 NOTE — PROGRESS NOTE ADULT - SUBJECTIVE AND OBJECTIVE BOX
NEPHROLOGY PROGRESS NOTE    CHIEF COMPLAINT:  ZAHIDA    HPI:  Renal function severely compromised, oliguric, on IV HCO3 drip.    ROS:  non responsive on vent    EXAM:  T(F): 100.4  HR: 114  BP: 96/49  RR: 20  SpO2: 97%  Wt(kg): --    Conversant, in no apparent distress  Normal respiratory effort, lungs clear bilaterally  Heart RRR with no murmur, no peripheral edema         LABS                             8.2    10.1  )-----------( 171      ( 12 May 2017 07:53 )             23.0          05-12    148<H>  |  115<H>  |  146<H>  ----------------------------<  171<H>  4.6   |  16<L>  |  3.88<H>    Ca    7.6<L>      12 May 2017 07:53           Urinalysis Basic - ( 11 May 2017 18:59 )    Color: Yellow / Appearance: Clear / S.015 / pH: x  Gluc: x / Ketone: Negative  / Bili: Negative / Urobili: Negative mg/dL   Blood: x / Protein: 100 mg/dL / Nitrite: Negative   Leuk Esterase: Moderate / RBC: 11-25 /HPF / WBC 3-5   Sq Epi: x / Non Sq Epi: Few / Bacteria: Many      ASSESSMENT:  1.  Oliguric ATN  2.  Hypernatremia  3.  Metabolic acidosis    PLAN:  Continue HCO3 drip and check BMP in AM

## 2017-05-13 NOTE — PROGRESS NOTE ADULT - SUBJECTIVE AND OBJECTIVE BOX
SURGERY PROGRESS HPI:  Patient seen and examined at bedside in no distress. Unresponsive, on vent.       Vital Signs Last 24 Hrs  T(C): 37.7, Max: 37.7 ( @ 05:18)  T(F): 99.9, Max: 99.9 ( @ 05:18)  HR: 122 (74 - 137)  BP: 98/51 (98/51 - 105/60)  BP(mean): --  RR: 20 (18 - 20)  SpO2: 97% (95% - 100%)      PHYSICAL EXAM:    GENERAL: Unresponsive on respirator  HEENT: Trache in place, site c/d/i  CHEST/LUNG: Clear to auscultation bilaterally, respirations nonlabored  HEART: S1S2, Regular rate and rhythm  ABDOMEN: +BS, soft, nondistended, +ostomy pink with air and stool in bag, PEG site c/d/i  EXTREMITIES:  b/l LE edema   : shoemaker catheter in situ draining clear, yellow urine, output: 650cc/24hrs    I&O's Detail  I & Os for 24h ending 12 May 2017 07:00  =============================================  IN:    Total IN: 0 ml  ---------------------------------------------  OUT:    Indwelling Catheter - Urethral: 650 ml    Total OUT: 650 ml  ---------------------------------------------  Total NET: -650 ml    I & Os for current day (as of 13 May 2017 06:35)  =============================================  IN:    Total IN: 0 ml  ---------------------------------------------  OUT:    Indwelling Catheter - Stomal: 120 ml    Total OUT: 120 ml  ---------------------------------------------  Total NET: -120 ml      LABS:                        8.2    10.1  )-----------( 171      ( 12 May 2017 07:53 )             23.0         148<H>  |  115<H>  |  146<H>  ----------------------------<  171<H>  4.6   |  16<L>  |  3.88<H>    Ca    7.6<L>      12 May 2017 07:53    TPro  7.0  /  Alb  1.4<L>  /  TBili  0.4  /  DBili  x   /  AST  50<H>  /  ALT  8<L>  /  AlkPhos  75        Urinalysis Basic - ( 11 May 2017 18:59 )    Color: Yellow / Appearance: Clear / S.015 / pH: x  Gluc: x / Ketone: Negative  / Bili: Negative / Urobili: Negative mg/dL   Blood: x / Protein: 100 mg/dL / Nitrite: Negative   Leuk Esterase: Moderate / RBC: 11-25 /HPF / WBC 3-5   Sq Epi: x / Non Sq Epi: Few / Bacteria: Many      Culture Results:   No growth to date ( @ 23:11)  Culture Results:   No growth to date. ( @ 09:15)      Assesment: 86 year old female a/w PNA, sepsis, and colovesicular fistula, s/p aspiration event 4/16 leading to respiratory failure, s/p PEG via GI and POD#15 s/p diverting loop colostomy and tracheostomy, with low H/H s/p 4uPRBC this week, now with increasing creatinine     Plan:  - continue shoemaker catheter, monitor urine output  - IV hydration, renal follow up   - no GI intervention planned, c/w protonix  - continue antibiotics per ID  - continue with routine trache and ostomy care  - will d/w Dr Bahena and Dr Mosley

## 2017-05-13 NOTE — PROGRESS NOTE ADULT - SUBJECTIVE AND OBJECTIVE BOX
No changes - pt still intubated and unresponsive      MEDICATIONS  (STANDING):  fluconAZOLE   Tablet 100milliGRAM(s) Oral daily  pantoprazole   Suspension 40milliGRAM(s) Oral daily  lactobacillus acidophilus 1Tablet(s) Oral every 12 hours  carbidopa/levodopa  25/100 1Tablet(s) Oral four times a day  pramipexole 0.25milliGRAM(s) Oral three times a day  ampicillin/sulbactam  IVPB  IV Intermittent   ampicillin/sulbactam  IVPB 3Gram(s) IV Intermittent every 8 hours  levothyroxine 25MICROGram(s) Oral daily  enoxaparin Injectable 30milliGRAM(s) SubCutaneous every 24 hours  dextrose 5% 1000milliLiter(s) IV Continuous <Continuous>    MEDICATIONS  (PRN):  acetaminophen    Suspension. 650milliGRAM(s) Enteral Tube every 6 hours PRN Mild Pain (1 - 3)  acetaminophen    Suspension 650milliGRAM(s) Oral every 6 hours PRN For Temp greater than 38 C (100.4 F)      Allergies    No Known Allergies    Intolerances        Vital Signs Last 24 Hrs  T(C): 38, Max: 38 (05-13 @ 11:40)  T(F): 100.4, Max: 100.4 (05-13 @ 11:40)  HR: 114 (114 - 128)  BP: 96/49 (96/49 - 100/50)  BP(mean): --  RR: 20 (19 - 20)  SpO2: 97% (97% - 100%)    PHYSICAL EXAM:  General: NAD.  CVS: S1, S2  Chest: air entry bilaterally present  Abd: BS present, soft, non-tender      LABS:                        8.2    10.1  )-----------( 171      ( 12 May 2017 07:53 )             23.0     05-12    148<H>  |  115<H>  |  146<H>  ----------------------------<  171<H>  4.6   |  16<L>  |  3.88<H>    Ca    7.6<L>      12 May 2017 07:53     follow CBC  no GI intervention - pt too ill for any procedures

## 2017-05-13 NOTE — PROGRESS NOTE ADULT - SUBJECTIVE AND OBJECTIVE BOX
INTERVAL HPI/OVERNIGHT EVENTS:        REVIEW OF SYSTEMS:  CONSTITUTIONAL: continues  unresponsive on  respirator      MEDICATION:  fluconAZOLE   Tablet 100milliGRAM(s) Oral daily  pantoprazole   Suspension 40milliGRAM(s) Oral daily  lactobacillus acidophilus 1Tablet(s) Oral every 12 hours  acetaminophen    Suspension. 650milliGRAM(s) Enteral Tube every 6 hours PRN  carbidopa/levodopa  25/100 1Tablet(s) Oral four times a day  pramipexole 0.25milliGRAM(s) Oral three times a day  acetaminophen    Suspension 650milliGRAM(s) Oral every 6 hours PRN  ampicillin/sulbactam  IVPB  IV Intermittent   ampicillin/sulbactam  IVPB 3Gram(s) IV Intermittent every 8 hours  levothyroxine 25MICROGram(s) Oral daily  enoxaparin Injectable 30milliGRAM(s) SubCutaneous every 24 hours  dextrose 5% 1000milliLiter(s) IV Continuous <Continuous>    Vital Signs Last 24 Hrs  T(C): 37.7, Max: 37.7 ( @ 05:18)  T(F): 99.9, Max: 99.9 ( @ 05:18)  HR: 114 (74 - 128)  BP: 98/51 (98/51 - 105/60)  BP(mean): --  RR: 20 (18 - 20)  SpO2: 97% (95% - 100%)    PHYSICAL EXAM:  GENERAL: NAD, well-groomed, well-developed  EYES:  conjunctiva and sclera clear  ENMT:  Moist mucous membranes,   NECK: Supple, No JVD, Normal thyroid  NERVOUS SYSTEM:  Alert oriented   no  focal  deficits;   CHEST/LUNG: Clear    HEART: Regular rate and rhythm; No murmurs, rubs, or gallops  ABDOMEN: Soft, Nontender, Nondistended; Bowel sounds present  EXTREMITIES:  no  edema no  tenderness  SKIN: No rashes   LABS:                        8.2    10.1  )-----------( 171      ( 12 May 2017 07:53 )             23.0     -    148<H>  |  115<H>  |  146<H>  ----------------------------<  171<H>  4.6   |  16<L>  |  3.88<H>    Ca    7.6<L>      12 May 2017 07:53    TPro  7.0  /  Alb  1.4<L>  /  TBili  0.4  /  DBili  x   /  AST  50<H>  /  ALT  8<L>  /  AlkPhos  75  05-11      Urinalysis Basic - ( 11 May 2017 18:59 )    Color: Yellow / Appearance: Clear / S.015 / pH: x  Gluc: x / Ketone: Negative  / Bili: Negative / Urobili: Negative mg/dL   Blood: x / Protein: 100 mg/dL / Nitrite: Negative   Leuk Esterase: Moderate / RBC: 11-25 /HPF / WBC 3-5   Sq Epi: x / Non Sq Epi: Few / Bacteria: Many      CAPILLARY BLOOD GLUCOSE      RADIOLOGY & ADDITIONAL TESTS:    Imaging reports  Personally Reviewed:  [x ] YES  [ ] NO    Consultant(s) Notes Reviewed:  [x ] YES  [ ] NO    Care Discussed with Consultants/Other Providers [x ] YES  [ ] NO    ACUTE  RESPIRATORY  FAILURE  PNEUMONIA   CONT  VENT  SUPPORT  AB  IVF  S/P  TRACH    recurrant  fever   cont  AB  as  per  ID  tylenol  PRN  recheck  cultures  COLO/VESCICULAR FISTULA S/P  diverting  colostomy    LFT elevation observation  metabolic  encephalopathy with  severe  inflammatory  processs supportive care        Problem/Plan - 3:  ·  Problem: Parkinson disease encephalopathy.  Plan: sinemet pramipexole  anemia  continue  to  monitor  no  clinical  evidence  of  acute bleed  transfuse as  needed    fever  pneumonia  obseve  with  new  AB  regimen check  CSF  cultures might need  BRIEN  if  fevers  persist   urinary  retention  observe  with  shoemaker    acute  on renal  failure  cont  ivf  as  per  renal  prognosis  guarded

## 2017-05-13 NOTE — PROGRESS NOTE ADULT - SUBJECTIVE AND OBJECTIVE BOX
INTERVAL HPI:  86F PMH Parkinson's, s/p recent mechanical fall with R intertrochanteric femur fracture requiring R intramedullary nailing of R trochanteric fracture of femur 3/13/17, UTI, PNA presents from [facility] rehab for sepsis, colovesical fistula with course complicated by aspiration PNA and now s/p trach and diverting loop colostomy.    OVERNIGHT EVENTS:  Comfortable in bed, no distress. Low H & H noted.    Vital Signs Last 24 Hrs  T(C): 38, Max: 38 (05-13 @ 11:40)  T(F): 100.4, Max: 100.4 (05-13 @ 11:40)  HR: 114 (114 - 128)  BP: 96/49 (96/49 - 100/50)  BP(mean): --  RR: 20 (19 - 25)  SpO2: 97% (97% - 100%)    Mode: AC/ CMV (Assist Control/ Continuous Mandatory Ventilation)  RR (machine): 16  TV (machine): 400  FiO2: 30  PEEP: 5  ITime: 1  MAP: 9  PIP: 23    PHYSICAL EXAM:  GEN:         Awake, responsive and comfortable.  HEENT:    Normal.    RESP:      no crackles.  CVS:           Regular rate and rhythm.   ABD:         Soft, non-tender, non-distended;     MEDICATIONS  (STANDING):  fluconAZOLE   Tablet 100milliGRAM(s) Oral daily  pantoprazole   Suspension 40milliGRAM(s) Oral daily  lactobacillus acidophilus 1Tablet(s) Oral every 12 hours  carbidopa/levodopa  25/100 1Tablet(s) Oral four times a day  pramipexole 0.25milliGRAM(s) Oral three times a day  levothyroxine 25MICROGram(s) Oral daily  enoxaparin Injectable 30milliGRAM(s) SubCutaneous every 24 hours  dextrose 5% 1000milliLiter(s) IV Continuous <Continuous>  ampicillin/sulbactam  IVPB 3Gram(s) IV Intermittent every 12 hours    MEDICATIONS  (PRN):  acetaminophen    Suspension. 650milliGRAM(s) Enteral Tube every 6 hours PRN Mild Pain (1 - 3)  acetaminophen    Suspension 650milliGRAM(s) Oral every 6 hours PRN For Temp greater than 38 C (100.4 F)    LABS:                        7.2    11.6  )-----------( 191      ( 13 May 2017 14:33 )             20.9     05-13    144  |  106  |  148<H>  ----------------------------<  161<H>  4.6   |  16<L>  |  4.28<H>    Ca    7.2<L>      13 May 2017 14:33  Phos  8.8     -  Mg     2.7         TPro  6.6  /  Alb  1.4<L>  /  TBili  0.4  /  DBili  x   /  AST  38<H>  /  ALT  14  /  AlkPhos  85  05-    Urinalysis Basic - ( 11 May 2017 18:59 )    Color: Yellow / Appearance: Clear / S.015 / pH: x  Gluc: x / Ketone: Negative  / Bili: Negative / Urobili: Negative mg/dL   Blood: x / Protein: 100 mg/dL / Nitrite: Negative   Leuk Esterase: Moderate / RBC: 11-25 /HPF / WBC 3-5   Sq Epi: x / Non Sq Epi: Few / Bacteria: Many    ASSESSMENT and Plan:  ·	Hypoxic Respiratory failure.  ·	Aspiration Pneumonia. Acinetobacter.  ·	S/P Diverting  Colostomy for Colovesical Fistula.  ·	Anemia.  ·	Leukocytosis.  ·	On Going fever.  ·	Parkinsonism.  ·	Renal insuffiencey.  ·	Hypernatremia improved.    Continue antibiotics and vent support.  PRBC transfusion.

## 2017-05-14 LAB
ANION GAP SERPL CALC-SCNC: 18 MMOL/L — HIGH (ref 5–17)
BUN SERPL-MCNC: 137 MG/DL — HIGH (ref 7–23)
CALCIUM SERPL-MCNC: 7.5 MG/DL — LOW (ref 8.5–10.1)
CHLORIDE SERPL-SCNC: 102 MMOL/L — SIGNIFICANT CHANGE UP (ref 96–108)
CO2 SERPL-SCNC: 18 MMOL/L — LOW (ref 22–31)
CREAT SERPL-MCNC: 4.47 MG/DL — HIGH (ref 0.5–1.3)
CULTURE RESULTS: SIGNIFICANT CHANGE UP
GLUCOSE SERPL-MCNC: 159 MG/DL — HIGH (ref 70–99)
GRAM STN FLD: SIGNIFICANT CHANGE UP
HCT VFR BLD CALC: 20.8 % — CRITICAL LOW (ref 34.5–45)
HGB BLD-MCNC: 7.5 G/DL — LOW (ref 11.5–15.5)
MCHC RBC-ENTMCNC: 30 PG — SIGNIFICANT CHANGE UP (ref 27–34)
MCHC RBC-ENTMCNC: 35.8 GM/DL — SIGNIFICANT CHANGE UP (ref 32–36)
MCV RBC AUTO: 83.8 FL — SIGNIFICANT CHANGE UP (ref 80–100)
PLATELET # BLD AUTO: 163 K/UL — SIGNIFICANT CHANGE UP (ref 150–400)
POTASSIUM SERPL-MCNC: 4.5 MMOL/L — SIGNIFICANT CHANGE UP (ref 3.5–5.3)
POTASSIUM SERPL-SCNC: 4.5 MMOL/L — SIGNIFICANT CHANGE UP (ref 3.5–5.3)
RBC # BLD: 2.49 M/UL — LOW (ref 3.8–5.2)
RBC # FLD: 16.2 % — HIGH (ref 11–15)
SODIUM SERPL-SCNC: 138 MMOL/L — SIGNIFICANT CHANGE UP (ref 135–145)
SPECIMEN SOURCE: SIGNIFICANT CHANGE UP
WBC # BLD: 11.7 K/UL — HIGH (ref 3.8–10.5)
WBC # FLD AUTO: 11.7 K/UL — HIGH (ref 3.8–10.5)

## 2017-05-14 RX ADMIN — PRAMIPEXOLE DIHYDROCHLORIDE 0.25 MILLIGRAM(S): 0.12 TABLET ORAL at 14:24

## 2017-05-14 RX ADMIN — AMPICILLIN SODIUM AND SULBACTAM SODIUM 200 GRAM(S): 250; 125 INJECTION, POWDER, FOR SUSPENSION INTRAMUSCULAR; INTRAVENOUS at 00:18

## 2017-05-14 RX ADMIN — Medication 1 TABLET(S): at 06:31

## 2017-05-14 RX ADMIN — CARBIDOPA AND LEVODOPA 1 TABLET(S): 25; 100 TABLET ORAL at 12:41

## 2017-05-14 RX ADMIN — AMPICILLIN SODIUM AND SULBACTAM SODIUM 200 GRAM(S): 250; 125 INJECTION, POWDER, FOR SUSPENSION INTRAMUSCULAR; INTRAVENOUS at 06:31

## 2017-05-14 RX ADMIN — PANTOPRAZOLE SODIUM 40 MILLIGRAM(S): 20 TABLET, DELAYED RELEASE ORAL at 12:41

## 2017-05-14 RX ADMIN — FLUCONAZOLE 100 MILLIGRAM(S): 150 TABLET ORAL at 14:24

## 2017-05-14 RX ADMIN — PRAMIPEXOLE DIHYDROCHLORIDE 0.25 MILLIGRAM(S): 0.12 TABLET ORAL at 21:14

## 2017-05-14 RX ADMIN — CARBIDOPA AND LEVODOPA 1 TABLET(S): 25; 100 TABLET ORAL at 06:31

## 2017-05-14 RX ADMIN — CARBIDOPA AND LEVODOPA 1 TABLET(S): 25; 100 TABLET ORAL at 17:45

## 2017-05-14 RX ADMIN — Medication 1 TABLET(S): at 17:45

## 2017-05-14 RX ADMIN — PRAMIPEXOLE DIHYDROCHLORIDE 0.25 MILLIGRAM(S): 0.12 TABLET ORAL at 06:31

## 2017-05-14 RX ADMIN — Medication 25 MICROGRAM(S): at 06:31

## 2017-05-14 RX ADMIN — ENOXAPARIN SODIUM 30 MILLIGRAM(S): 100 INJECTION SUBCUTANEOUS at 12:41

## 2017-05-14 RX ADMIN — CARBIDOPA AND LEVODOPA 1 TABLET(S): 25; 100 TABLET ORAL at 00:18

## 2017-05-14 RX ADMIN — AMPICILLIN SODIUM AND SULBACTAM SODIUM 200 GRAM(S): 250; 125 INJECTION, POWDER, FOR SUSPENSION INTRAMUSCULAR; INTRAVENOUS at 17:44

## 2017-05-14 NOTE — PROGRESS NOTE ADULT - SUBJECTIVE AND OBJECTIVE BOX
No changes in pts condition - intubated; unresponsive  No bleeding noted      MEDICATIONS  (STANDING):  fluconAZOLE   Tablet 100milliGRAM(s) Oral daily  pantoprazole   Suspension 40milliGRAM(s) Oral daily  lactobacillus acidophilus 1Tablet(s) Oral every 12 hours  carbidopa/levodopa  25/100 1Tablet(s) Oral four times a day  pramipexole 0.25milliGRAM(s) Oral three times a day  levothyroxine 25MICROGram(s) Oral daily  enoxaparin Injectable 30milliGRAM(s) SubCutaneous every 24 hours  ampicillin/sulbactam  IVPB 3Gram(s) IV Intermittent every 12 hours    MEDICATIONS  (PRN):  acetaminophen    Suspension. 650milliGRAM(s) Enteral Tube every 6 hours PRN Mild Pain (1 - 3)  acetaminophen    Suspension 650milliGRAM(s) Oral every 6 hours PRN For Temp greater than 38 C (100.4 F)      Allergies    No Known Allergies    Intolerances        Vital Signs Last 24 Hrs  T(C): 37.3, Max: 37.8 (05-13 @ 23:10)  T(F): 99.2, Max: 100 (05-13 @ 23:10)  HR: 96 (96 - 129)  BP: 117/66 (107/62 - 159/100)  BP(mean): --  RR: 16 (16 - 16)  SpO2: 98% (97% - 99%)    PHYSICAL EXAM:  General: NAD.  CVS: S1, S2  Chest: air entry bilaterally present  Abd: BS present, soft, non-tender, +peg      LABS:                        7.5    11.7  )-----------( 163      ( 14 May 2017 10:34 )             20.8     05-14    138  |  102  |  137<H>  ----------------------------<  159<H>  4.5   |  18<L>  |  4.47<H>    Ca    7.5<L>      14 May 2017 10:32  Phos  8.8     05-13  Mg     2.7     05-13    TPro  6.6  /  Alb  1.4<L>  /  TBili  0.4  /  DBili  x   /  AST  38<H>  /  ALT  14  /  AlkPhos  85  05-13        Continue peg feedings  consider transfusion if hgb falls further  cbc in AM

## 2017-05-14 NOTE — PROGRESS NOTE ADULT - SUBJECTIVE AND OBJECTIVE BOX
Patient seen and examined at bedside in no distress. Unresponsive, on vent.    Vital Signs Last 24 Hrs  T(F): 99.2, Max: 100 (05-13 @ 23:10)  HR: 112  BP: 107/62  RR: 16  SpO2: 99%  Wt(kg): --   CAPILLARY BLOOD GLUCOSE    GENERAL: Unresponsive on respirator  HEENT: Trache in place, site c/d/i  CHEST/LUNG: Clear to auscultation bilaterally, respirations nonlabored  HEART: S1S2, Regular rate and rhythm  ABDOMEN: +BS, soft, nondistended, +ostomy pink with air and stool in bag, PEG site c/d/i  EXTREMITIES:  b/l LE edema   : shoemaker catheter in situ draining clear, yellow urine, output: 100cc/24hrs    I&O's Detail    I & Os for current day (as of 14 May 2017 14:47)  =============================================  IN:    dextrose 5%: 1100 ml    Suplena: 480 ml    Enteral Tube Flush: 150 ml    Solution: 100 ml    Total IN: 1830 ml  ---------------------------------------------  OUT:    Indwelling Catheter - Urethral: 100 ml    Total OUT: 100 ml  ---------------------------------------------  Total NET: 1730 ml      LABS:                        7.5    11.7  )-----------( 163      ( 14 May 2017 10:34 )             20.8     05-14    138  |  102  |  137<H>  ----------------------------<  159<H>  4.5   |  18<L>  |  4.47<H>    Ca    7.5<L>      14 May 2017 10:32  Phos  8.8     05-13  Mg     2.7     05-13    TPro  6.6  /  Alb  1.4<L>  /  TBili  0.4  /  DBili  x   /  AST  38<H>  /  ALT  14  /  AlkPhos  85  05-13    Assesment: 86 year old female a/w PNA, sepsis, and colovesicular fistula, s/p aspiration event 4/16 leading to respiratory failure, s/p PEG via GI and POD#16 s/p diverting loop colostomy and tracheostomy, with anemia s/p 4uPRBC this week, now with worsening renal failure, oliguria.     Plan:  - continue shoemaker catheter, monitor urine output  - IV hydration, renal follow up   - no GI intervention planned, c/w protonix  - continue antibiotics per ID  - continue with routine trache and ostomy care  - supportive care  - will d/w Dr Mosley. Discussed with Dr. Bahena

## 2017-05-14 NOTE — PROGRESS NOTE ADULT - SUBJECTIVE AND OBJECTIVE BOX
INTERVAL HPI:   86F PMH Parkinson's, s/p recent mechanical fall with R intertrochanteric femur fracture requiring R intramedullary nailing of R trochanteric fracture of femur 3/13/17, UTI, PNA presents from [facility] rehab for sepsis, colovesical fistula with course complicated by aspiration PNA and now s/p trach and diverting loop colostomy.    OVERNIGHT EVENTS:  On vent and comfortable.    Vital Signs Last 24 Hrs  T(C): 37.3, Max: 37.8 (05-13 @ 23:10)  T(F): 99.2, Max: 100 (05-13 @ 23:10)  HR: 112 (106 - 129)  BP: 107/62 (96/53 - 159/100)  BP(mean): --  RR: 16 (16 - 20)  SpO2: 99% (97% - 99%)    Mode: AC/ CMV (Assist Control/ Continuous Mandatory Ventilation)  RR (machine): 16  TV (machine): 400  FiO2: 30  PEEP: 5  ITime: 0.91  MAP: 10  PIP: 22    PHYSICAL EXAM:  GEN:         Awake, responsive and comfortable.  HEENT:    Normal.    RESP:      no wheezing.  CVS:          Regular rate and rhythm.   ABD:         Soft, non-tender, non-distended;     MEDICATIONS  (STANDING):  fluconAZOLE   Tablet 100milliGRAM(s) Oral daily  pantoprazole   Suspension 40milliGRAM(s) Oral daily  lactobacillus acidophilus 1Tablet(s) Oral every 12 hours  carbidopa/levodopa  25/100 1Tablet(s) Oral four times a day  pramipexole 0.25milliGRAM(s) Oral three times a day  levothyroxine 25MICROGram(s) Oral daily  enoxaparin Injectable 30milliGRAM(s) SubCutaneous every 24 hours  dextrose 5% 1000milliLiter(s) IV Continuous <Continuous>  ampicillin/sulbactam  IVPB 3Gram(s) IV Intermittent every 12 hours    MEDICATIONS  (PRN):  acetaminophen    Suspension. 650milliGRAM(s) Enteral Tube every 6 hours PRN Mild Pain (1 - 3)  acetaminophen    Suspension 650milliGRAM(s) Oral every 6 hours PRN For Temp greater than 38 C (100.4 F)    LABS:                        7.5    11.7  )-----------( 163      ( 14 May 2017 10:34 )             20.8     05-14    138  |  102  |  137<H>  ----------------------------<  159<H>  4.5   |  18<L>  |  4.47<H>    Ca    7.5<L>      14 May 2017 10:32  Phos  8.8     05-13  Mg     2.7     05-13    TPro  6.6  /  Alb  1.4<L>  /  TBili  0.4  /  DBili  x   /  AST  38<H>  /  ALT  14  /  AlkPhos  85  05-13    ASSESSMENT and Plan:  ·	Hypoxic Respiratory failure.  ·	Aspiration Pneumonia. Acinetobacter.  ·	S/P Diverting  Colostomy for Colovesical Fistula.  ·	Anemia.  ·	Leukocytosis.  ·	On Going fever.  ·	Parkinsonism.  ·	Renal insuffiencey.  ·	Hypernatremia improved.    On antibiotics and Vent.

## 2017-05-14 NOTE — PROGRESS NOTE ADULT - SUBJECTIVE AND OBJECTIVE BOX
INTERVAL HPI/OVERNIGHT EVENTS:        REVIEW OF SYSTEMS:  CONSTITUTIONAL:  continues unresponsive  on  respirator      MEDICATION:  fluconAZOLE   Tablet 100milliGRAM(s) Oral daily  pantoprazole   Suspension 40milliGRAM(s) Oral daily  lactobacillus acidophilus 1Tablet(s) Oral every 12 hours  acetaminophen    Suspension. 650milliGRAM(s) Enteral Tube every 6 hours PRN  carbidopa/levodopa  25/100 1Tablet(s) Oral four times a day  pramipexole 0.25milliGRAM(s) Oral three times a day  acetaminophen    Suspension 650milliGRAM(s) Oral every 6 hours PRN  levothyroxine 25MICROGram(s) Oral daily  enoxaparin Injectable 30milliGRAM(s) SubCutaneous every 24 hours  dextrose 5% 1000milliLiter(s) IV Continuous <Continuous>  ampicillin/sulbactam  IVPB 3Gram(s) IV Intermittent every 12 hours    Vital Signs Last 24 Hrs  T(C): 37.1, Max: 38 (05-13 @ 11:40)  T(F): 98.8, Max: 100.4 (05-13 @ 11:40)  HR: 109 (106 - 129)  BP: 159/100 (96/49 - 159/100)  BP(mean): --  RR: 16 (16 - 25)  SpO2: 99% (97% - 99%)    PHYSICAL EXAM:  GENERAL: NAD, well-groomed, well-developed  EYES:  conjunctiva and sclera clear  ENMT:  Moist mucous membranes,   NECK: Supple, No JVD, Normal thyroid  NERVOUS SYSTEM:  Alert oriented   no  focal  deficits;   CHEST/LUNG: Clear    HEART: Regular rate and rhythm; No murmurs, rubs, or gallops  ABDOMEN: Soft, Nontender, Nondistended; Bowel sounds present  EXTREMITIES:  no  edema no  tenderness  SKIN: No rashes   LABS:                        7.2    11.6  )-----------( 191      ( 13 May 2017 14:33 )             20.9     05-13    144  |  106  |  148<H>  ----------------------------<  161<H>  4.6   |  16<L>  |  4.28<H>    Ca    7.2<L>      13 May 2017 14:33  Phos  8.8     05-13  Mg     2.7     05-13    TPro  6.6  /  Alb  1.4<L>  /  TBili  0.4  /  DBili  x   /  AST  38<H>  /  ALT  14  /  AlkPhos  85  05-13        CAPILLARY BLOOD GLUCOSE      RADIOLOGY & ADDITIONAL TESTS:    Imaging reports  Personally Reviewed:  [ x] YES  [ ] NO    Consultant(s) Notes Reviewed:  [x ] YES  [ ] NO    Care Discussed with Consultants/Other Providers [x ] YES  [ ] NO  ACUTE  RESPIRATORY  FAILURE  PNEUMONIA   CONT  VENT  SUPPORT  AB  IVF  S/P  TRACH    recurrant  fever   cont  AB  as  per  ID  tylenol  PRN  recheck  cultures  COLO/VESCICULAR FISTULA S/P  diverting  colostomy    LFT elevation observation  metabolic  encephalopathy with  severe  inflammatory  processs supportive care          Problem: Parkinson disease encephalopathy.  Plan: sinemet pramipexole  anemia  continue  to  monitor  no  clinical  evidence  of  acute bleed  transfuse as  needed    fever  pneumonia  obseve  with  new  AB  regimen check  CSF  cultures might need  BRIEN  if  fevers  persist   urinary  retention  observe  with  shoemaker    acute  on renal  failure  cont  ivf  as  per  renal  prognosis  guarded

## 2017-05-14 NOTE — PROGRESS NOTE ADULT - SUBJECTIVE AND OBJECTIVE BOX
NEPHROLOGY PROGRESS NOTE    CHIEF COMPLAINT:  ZAHIDA    HPI:  Renal function slowly worse.  Remains oliguric on IVF NaHCO3 and tube feeds.  Unresponsive on vent.    ROS:  unable    EXAM:  T(F): 99.2  HR: 112  BP: 107/62  RR: 16  SpO2: 99%    Normal respiratory effort, lungs clear bilaterally  Heart RRR with no murmur, mild-moderate peripheral edema         LABS                             7.5    11.7  )-----------( 163      ( 14 May 2017 10:34 )             20.8          05-14    138  |  102  |  137<H>  ----------------------------<  159<H>  4.5   |  18<L>  |  4.47<H>    Ca    7.5<L>      14 May 2017 10:32  Phos  8.8     05-13  Mg     2.7     05-13    TPro  6.6  /  Alb  1.4<L>  /  TBili  0.4  /  DBili  x   /  AST  38<H>  /  ALT  14  /  AlkPhos  85  05-13      ASSESSMENT:  1.  ZAHIDA due to ATN, oliguric  2.  Hypernatremia, resolved  3.  Metabolic acidosis, slowly better    PLAN:  D/C IV fluid

## 2017-05-15 LAB
ANION GAP SERPL CALC-SCNC: 22 MMOL/L — HIGH (ref 5–17)
BUN SERPL-MCNC: 144 MG/DL — HIGH (ref 7–23)
CALCIUM SERPL-MCNC: 7.4 MG/DL — LOW (ref 8.5–10.1)
CHLORIDE SERPL-SCNC: 101 MMOL/L — SIGNIFICANT CHANGE UP (ref 96–108)
CO2 SERPL-SCNC: 15 MMOL/L — LOW (ref 22–31)
CREAT SERPL-MCNC: 4.77 MG/DL — HIGH (ref 0.5–1.3)
GLUCOSE SERPL-MCNC: 118 MG/DL — HIGH (ref 70–99)
HCT VFR BLD CALC: 19.1 % — CRITICAL LOW (ref 34.5–45)
HGB BLD-MCNC: 7 G/DL — CRITICAL LOW (ref 11.5–15.5)
MCHC RBC-ENTMCNC: 30.3 PG — SIGNIFICANT CHANGE UP (ref 27–34)
MCHC RBC-ENTMCNC: 36.6 GM/DL — HIGH (ref 32–36)
MCV RBC AUTO: 82.9 FL — SIGNIFICANT CHANGE UP (ref 80–100)
PLATELET # BLD AUTO: 159 K/UL — SIGNIFICANT CHANGE UP (ref 150–400)
POTASSIUM SERPL-MCNC: 4.5 MMOL/L — SIGNIFICANT CHANGE UP (ref 3.5–5.3)
POTASSIUM SERPL-SCNC: 4.5 MMOL/L — SIGNIFICANT CHANGE UP (ref 3.5–5.3)
RBC # BLD: 2.31 M/UL — LOW (ref 3.8–5.2)
RBC # FLD: 16.4 % — HIGH (ref 11–15)
SODIUM SERPL-SCNC: 138 MMOL/L — SIGNIFICANT CHANGE UP (ref 135–145)
WBC # BLD: 11.1 K/UL — HIGH (ref 3.8–10.5)
WBC # FLD AUTO: 11.1 K/UL — HIGH (ref 3.8–10.5)

## 2017-05-15 RX ADMIN — Medication 25 MICROGRAM(S): at 05:22

## 2017-05-15 RX ADMIN — PRAMIPEXOLE DIHYDROCHLORIDE 0.25 MILLIGRAM(S): 0.12 TABLET ORAL at 05:22

## 2017-05-15 RX ADMIN — Medication 1 TABLET(S): at 05:22

## 2017-05-15 RX ADMIN — PRAMIPEXOLE DIHYDROCHLORIDE 0.25 MILLIGRAM(S): 0.12 TABLET ORAL at 23:20

## 2017-05-15 RX ADMIN — CARBIDOPA AND LEVODOPA 1 TABLET(S): 25; 100 TABLET ORAL at 05:22

## 2017-05-15 RX ADMIN — FLUCONAZOLE 100 MILLIGRAM(S): 150 TABLET ORAL at 12:57

## 2017-05-15 RX ADMIN — CARBIDOPA AND LEVODOPA 1 TABLET(S): 25; 100 TABLET ORAL at 00:23

## 2017-05-15 RX ADMIN — PANTOPRAZOLE SODIUM 40 MILLIGRAM(S): 20 TABLET, DELAYED RELEASE ORAL at 12:56

## 2017-05-15 RX ADMIN — Medication 1 TABLET(S): at 17:31

## 2017-05-15 RX ADMIN — CARBIDOPA AND LEVODOPA 1 TABLET(S): 25; 100 TABLET ORAL at 17:31

## 2017-05-15 RX ADMIN — AMPICILLIN SODIUM AND SULBACTAM SODIUM 200 GRAM(S): 250; 125 INJECTION, POWDER, FOR SUSPENSION INTRAMUSCULAR; INTRAVENOUS at 05:22

## 2017-05-15 RX ADMIN — CARBIDOPA AND LEVODOPA 1 TABLET(S): 25; 100 TABLET ORAL at 12:56

## 2017-05-15 RX ADMIN — PRAMIPEXOLE DIHYDROCHLORIDE 0.25 MILLIGRAM(S): 0.12 TABLET ORAL at 13:51

## 2017-05-15 RX ADMIN — ENOXAPARIN SODIUM 30 MILLIGRAM(S): 100 INJECTION SUBCUTANEOUS at 13:00

## 2017-05-15 RX ADMIN — CARBIDOPA AND LEVODOPA 1 TABLET(S): 25; 100 TABLET ORAL at 23:14

## 2017-05-15 NOTE — PROGRESS NOTE ADULT - SUBJECTIVE AND OBJECTIVE BOX
INTERVAL HPI/OVERNIGHT EVENTS:    Patient on vent unresponsive. H/H trend down.  Colotomy output noted to be melanotic. Patient for 2 u pc transfusion.  Shoemaker intact with clear yellow output.        Vital Signs Last 24 Hrs  T(C): 37.7, Max: 37.7 (05-15 @ 11:14)  T(F): 99.8, Max: 99.8 (05-15 @ 11:14)  HR: 109 (90 - 128)  BP: 101/52 (96/52 - 117/66)  BP(mean): --  RR: --INTERVAL HPI/OVERNIGHT EVENTS:          Vital Signs Last 24 Hrs  T(C): 37.7, Max: 37.7 (05-15 @ 11:14)  T(F): 99.8, Max: 99.8 (05-15 @ 11:14)  HR: 109 (90 - 128)  BP: 101/52 (96/52 - 117/66)  BP(mean): --  RR: --  SpO2: 96% (94% - 99%)    MEDICATIONS  (STANDING):  fluconAZOLE   Tablet 100milliGRAM(s) Oral daily  pantoprazole   Suspension 40milliGRAM(s) Oral daily  lactobacillus acidophilus 1Tablet(s) Oral every 12 hours  carbidopa/levodopa  25/100 1Tablet(s) Oral four times a day  pramipexole 0.25milliGRAM(s) Oral three times a day  levothyroxine 25MICROGram(s) Oral daily  enoxaparin Injectable 30milliGRAM(s) SubCutaneous every 24 hours  ampicillin/sulbactam  IVPB 3Gram(s) IV Intermittent every 12 hours    MEDICATIONS  (PRN):  acetaminophen    Suspension. 650milliGRAM(s) Enteral Tube every 6 hours PRN Mild Pain (1 - 3)  acetaminophen    Suspension 650milliGRAM(s) Oral every 6 hours PRN For Temp greater than 38 C (100.4 F)      PHYSICAL EXAM:    GENERAL: Unresponsive on respirator  HEENT: Trache in place, site c/d/i  CHEST/LUNG: Clear to auscultation bilaterally, respirations nonlabored  HEART: S1S2, Regular rate and rhythm  ABDOMEN: +BS, soft, nondistended, +ostomy pink with dark stools on the bag  PEG site c/d/i  EXTREMITIES:  b/l LE edema   : shoemaker catheter in situ draining clear, yellow urine,     I&O's Detail    I & Os for current day (as of 15 May 2017 16:30)  =============================================  IN:    0.9% NaCl: 1000 ml    Suplena: 960 ml    Solution: 200 ml    Total IN: 2160 ml  ---------------------------------------------  OUT:    Colostomy: 150 ml    Indwelling Catheter - Urethral: 75 ml    Total OUT: 225 ml  ---------------------------------------------  Total NET: 1935 ml      LABS:                        7.0    11.1  )-----------( 159      ( 15 May 2017 08:26 )             19.1     05-15    138  |  101  |  144<H>  ----------------------------<  118<H>  4.5   |  15<L>  |  4.77<H>    Ca    7.4<L>      15 May 2017 08:26          Impression:  Assesment: 86 year old female a/w PNA, sepsis, and colovesicular fistula, s/p aspiration event 4/16 leading to respiratory failure, s/p PEG via GI and POD#17 s/p diverting loop colostomy and tracheostomy, ZAHIDA,  anemia s/p 4uPRBC this week with dec H/H now     Plan:  for 2 u pc transfusion   monitor ostomy output -- dark stools.  trend h/h   GI follow up   Renal follow up  kep shoemaker intact. i/o.    - continue antibiotics per ID  - continue with routine trache and ostomy care  - supportive care  - will d/w Dr Mosley. Discussed with Dr. Bahena

## 2017-05-15 NOTE — PROGRESS NOTE ADULT - SUBJECTIVE AND OBJECTIVE BOX
TMAX - 99.8    On day # 7 Unasyn / # 29 Diflucan    Vital Signs Last 24 Hrs  T(C): 37.7, Max: 37.7 (05-15 @ 11:14)  T(F): 99.8, Max: 99.8 (05-15 @ 11:14)  HR: 104 (90 - 128)  BP: 101/52 (96/52 - 117/66)  BP(mean): --  RR: --  SpO2: 96% (94% - 99%)  Mode: AC/ CMV (Assist Control/ Continuous Mandatory Ventilation)  RR (machine): 16  TV (machine): 400  FiO2: 30  PEEP: 5  ITime: 0.8  MAP: 9  PIP: 19  Supplemental O2:  on ventilator on 30 % FIO2 + 5 PEEP      Remains poorly responsive, on ventilator.    PHYSICAL EXAM  General:  opened eyes briefly today to stimulation, but remains poorly responsive, on ventilator, lying semi-upright in bed  HEENT: conj pink, sclerae anicteric, PERRLA, no oral lesions noted  Neck:  supple, no nodes noted  Heart:  RR  Lungs:  decreased BS at bases  Abdomen:  soft, BS+, nontender appearance, PEG site clean, feedings in progress                   colostomy with black liquid stool  Extremities:  1+ edema LE's                      Rt arm Midline in place - site clean - placed 4/25  Skin: warm, dry, no rash noted         I&O's Summary :    I & Os for current day (as of 15 May 2017 16:47)  =============================================  IN: 2160 ml / OUT: 225 ml / NET: 1935 ml      LABS:  CBC Full  -  ( 15 May 2017 08:26 )  WBC Count : 11.1 K/uL  Hemoglobin : 7.0 g/dL  Hematocrit : 19.1 %  Platelet Count - Automated : 159 K/uL  Mean Cell Volume : 82.9 fl  Mean Cell Hemoglobin : 30.3 pg  Mean Cell Hemoglobin Concentration : 36.6 gm/dL  Auto Neutrophil # : x  Auto Lymphocyte # : x  Auto Monocyte # : x  Auto Eosinophil # : x  Auto Basophil # : x  Auto Neutrophil % : x  Auto Lymphocyte % : x  Auto Monocyte % : x  Auto Eosinophil % : x  Auto Basophil % : x    05-15    138  |  101  |  144<H>  ----------------------------<  118<H>  4.5   |  15<L>  |  4.77<H>    Ca    7.4<L>      15 May 2017 08:26      CULTURES:  Specimen Source: .CSF CSF (05-11 @ 23:11)  Culture Results:   No growth at 3 days. (05-11 @ 23:11)    Specimen Source: .Blood Blood-Venous (05-11 @ 09:15)  Culture Results:   No growth to date. (05-11 @ 09:15)          Impression:  Temps have improved now on Unasyn + Diflucan for Sepsis with MDRO Acinetobacter PNA with respiratory failure and worsening renal function now.    Suggestions: Will continue present ab rx and follow-up temps and labs.  Repeat CXR in a few days.

## 2017-05-15 NOTE — PROGRESS NOTE ADULT - SUBJECTIVE AND OBJECTIVE BOX
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    OVERNIGHT EVENTS:  On ventilator, unresponsive. H & H drop noted.    Vital Signs Last 24 Hrs  T(C): 37.7, Max: 37.7 (05-15 @ 11:14)  T(F): 99.8, Max: 99.8 (05-15 @ 11:14)  HR: 107 (90 - 128)  BP: 118/60 (96/52 - 118/60)  BP(mean): --  RR: --  SpO2: 98% (94% - 99%)    Mode: AC/ CMV (Assist Control/ Continuous Mandatory Ventilation)  RR (machine): 16  TV (machine): 400  FiO2: 30  PEEP: 5  ITime: 0.8  MAP: 9  PIP: 19    PHYSICAL EXAM:  GEN:         Awake, responsive and comfortable.  HEENT:    Normal.    RESP:       Track in place.  CVS:             Regular rate and rhythm.   ABD:         Soft, non-tender, non-distended;   :             No costovertebral angle tenderness  EXTR:            No clubbing, cyanosis or edema  CNS:              Intact sensory and motor function.    MEDICATIONS  (STANDING):  fluconAZOLE   Tablet 100milliGRAM(s) Oral daily  pantoprazole   Suspension 40milliGRAM(s) Oral daily  lactobacillus acidophilus 1Tablet(s) Oral every 12 hours  carbidopa/levodopa  25/100 1Tablet(s) Oral four times a day  pramipexole 0.25milliGRAM(s) Oral three times a day  levothyroxine 25MICROGram(s) Oral daily  enoxaparin Injectable 30milliGRAM(s) SubCutaneous every 24 hours  ampicillin/sulbactam  IVPB 3Gram(s) IV Intermittent every 12 hours    MEDICATIONS  (PRN):  acetaminophen    Suspension. 650milliGRAM(s) Enteral Tube every 6 hours PRN Mild Pain (1 - 3)  acetaminophen    Suspension 650milliGRAM(s) Oral every 6 hours PRN For Temp greater than 38 C (100.4 F)    LABS:                        7.0    11.1  )-----------( 159      ( 15 May 2017 08:26 )             19.1     05-15    138  |  101  |  144<H>  ----------------------------<  118<H>  4.5   |  15<L>  |  4.77<H>    Ca    7.4<L>      15 May 2017 08:26    ASSESSMENT and Plan:  ·	Hypoxic Respiratory failure.  ·	Aspiration Pneumonia. Acinetobacter.  ·	S/P Diverting  Colostomy for Colovesical Fistula.  ·	Anemia.  ·	Leukocytosis.  ·	On Going fever.  ·	Parkinsonism.  ·	Renal insuffiencey.  ·	Hypernatremia improved.    On antibiotics.  Will continue full vent support.

## 2017-05-15 NOTE — PROGRESS NOTE ADULT - SUBJECTIVE AND OBJECTIVE BOX
INTERVAL HPI/OVERNIGHT EVENTS:        REVIEW OF SYSTEMS:  CONSTITUTIONAL:  continues  unresponsive  on  respirator      MEDICATION:  fluconAZOLE   Tablet 100milliGRAM(s) Oral daily  pantoprazole   Suspension 40milliGRAM(s) Oral daily  lactobacillus acidophilus 1Tablet(s) Oral every 12 hours  acetaminophen    Suspension. 650milliGRAM(s) Enteral Tube every 6 hours PRN  carbidopa/levodopa  25/100 1Tablet(s) Oral four times a day  pramipexole 0.25milliGRAM(s) Oral three times a day  acetaminophen    Suspension 650milliGRAM(s) Oral every 6 hours PRN  levothyroxine 25MICROGram(s) Oral daily  enoxaparin Injectable 30milliGRAM(s) SubCutaneous every 24 hours  ampicillin/sulbactam  IVPB 3Gram(s) IV Intermittent every 12 hours    Vital Signs Last 24 Hrs  T(C): 37.2, Max: 37.3 (05-14 @ 11:30)  T(F): 98.9, Max: 99.2 (05-14 @ 11:30)  HR: 90 (90 - 121)  BP: 110/62 (96/52 - 117/66)  BP(mean): --  RR: --  SpO2: 98% (94% - 99%)    PHYSICAL EXAM:  GENERAL: NAD, well-groomed, well-developed  EYES:  conjunctiva and sclera clear  NERVOUS SYSTEM:  Alert oriented   no  focal  deficits;   CHEST/LUNG: Clear    HEART: Regular rate and rhythm; No murmurs, rubs, or gallops  ABDOMEN: Soft, Nontender, Nondistended; Bowel sounds present  EXTREMITIES:  no  edema no  tenderness  SKIN: No rashes   LABS:               repeat  labs  pending    CAPILLARY BLOOD GLUCOSE      RADIOLOGY & ADDITIONAL TESTS:    Imaging reports  Personally Reviewed:  [x ] YES  [ ] NO    Consultant(s) Notes Reviewed:  [x ] YES  [ ] NO    Care Discussed with Consultants/Other Providers [x ] YES  [ ] NO    A/P  ACUTE  RESPIRATORY  FAILURE  PNEUMONIA   CONT  VENT  SUPPORT  AB  IVF  S/P  TRACH    recurrant  fever   cont  AB  as  per  ID  tylenol  PRN  recheck  cultures  COLO/VESCICULAR FISTULA S/P  diverting  colostomy    LFT elevation observation  metabolic  encephalopathy with  severe  inflammatory  processs supportive care          Problem: Parkinson disease encephalopathy.  Plan: sinemet pramipexole  anemia  continue  to  monitor  no  clinical  evidence  of  acute bleed  transfuse as  needed    fever  pneumonia  obseve  with  new  AB  regimen check  CSF  cultures might need  BRIEN  if  fevers  persist   urinary  retention  observe  with  shoemaker    acute  on renal  failure  cont  ivf  as  per  renal  prognosis  guarded  discussed  with  patient's son

## 2017-05-15 NOTE — PROGRESS NOTE ADULT - SUBJECTIVE AND OBJECTIVE BOX
Patient seen in follow up for ZAHIDA; no new events; appears comfortable.    MEDICATIONS  (STANDING):  fluconAZOLE   Tablet 100milliGRAM(s) Oral daily  pantoprazole   Suspension 40milliGRAM(s) Oral daily  lactobacillus acidophilus 1Tablet(s) Oral every 12 hours  carbidopa/levodopa  25/100 1Tablet(s) Oral four times a day  pramipexole 0.25milliGRAM(s) Oral three times a day  levothyroxine 25MICROGram(s) Oral daily  enoxaparin Injectable 30milliGRAM(s) SubCutaneous every 24 hours  ampicillin/sulbactam  IVPB 3Gram(s) IV Intermittent every 12 hours    MEDICATIONS  (PRN):  acetaminophen    Suspension. 650milliGRAM(s) Enteral Tube every 6 hours PRN Mild Pain (1 - 3)  acetaminophen    Suspension 650milliGRAM(s) Oral every 6 hours PRN For Temp greater than 38 C (100.4 F)    PHYSICAL EXAM:      T(C): 37.7, Max: 37.7 (05-15 @ 11:14)  HR: 103 (90 - 128)  BP: 101/52 (96/52 - 117/66)  RR: --  SpO2: 99% (94% - 99%)  Wt(kg): --  Respiratory: clear anteriorly, decreased BS at bases  Cardiovascular: S1 S2  Gastrointestinal: soft NT ND +BS  Extremities:   1-2 edema                                    7.0    11.1  )-----------( 159      ( 15 May 2017 08:26 )             19.1     05-15    138  |  101  |  144<H>  ----------------------------<  118<H>  4.5   |  15<L>  |  4.77<H>    Ca    7.4<L>      15 May 2017 08:26  Phos  8.8     05-13  Mg     2.7     05-13    TPro  6.6  /  Alb  1.4<L>  /  TBili  0.4  /  DBili  x   /  AST  38<H>  /  ALT  14  /  AlkPhos  85  05-13      LIVER FUNCTIONS - ( 13 May 2017 14:33 )  Alb: 1.4 g/dL / Pro: 6.6 gm/dL / ALK PHOS: 85 U/L / ALT: 14 U/L / AST: 38 U/L / GGT: x             Assessment and Plan:    ZAHIDA, 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  Continue supportive care.

## 2017-05-15 NOTE — PROGRESS NOTE ADULT - SUBJECTIVE AND OBJECTIVE BOX
No changes - intubated, unresponsive      MEDICATIONS  (STANDING):  fluconAZOLE   Tablet 100milliGRAM(s) Oral daily  pantoprazole   Suspension 40milliGRAM(s) Oral daily  lactobacillus acidophilus 1Tablet(s) Oral every 12 hours  carbidopa/levodopa  25/100 1Tablet(s) Oral four times a day  pramipexole 0.25milliGRAM(s) Oral three times a day  levothyroxine 25MICROGram(s) Oral daily  enoxaparin Injectable 30milliGRAM(s) SubCutaneous every 24 hours  ampicillin/sulbactam  IVPB 3Gram(s) IV Intermittent every 12 hours    MEDICATIONS  (PRN):  acetaminophen    Suspension. 650milliGRAM(s) Enteral Tube every 6 hours PRN Mild Pain (1 - 3)  acetaminophen    Suspension 650milliGRAM(s) Oral every 6 hours PRN For Temp greater than 38 C (100.4 F)      Allergies    No Known Allergies    Intolerances        Vital Signs Last 24 Hrs  T(C): 37.2, Max: 37.3 (05-14 @ 11:30)  T(F): 98.9, Max: 99.2 (05-14 @ 11:30)  HR: 128 (90 - 128)  BP: 110/62 (96/52 - 117/66)  BP(mean): --  RR: --  SpO2: 97% (94% - 99%)    PHYSICAL EXAM:  General: NAD.  CVS: S1, S2  Chest: air entry bilaterally present  Abd: BS present, soft, non-tender      LABS:                        7.0    11.1  )-----------( 159      ( 15 May 2017 08:26 )             19.1     05-15    138  |  101  |  144<H>  ----------------------------<  118<H>  4.5   |  15<L>  |  4.77<H>    Ca    7.4<L>      15 May 2017 08:26  Phos  8.8     05-13  Mg     2.7     05-13    TPro  6.6  /  Alb  1.4<L>  /  TBili  0.4  /  DBili  x   /  AST  38<H>  /  ALT  14  /  AlkPhos  85  05-13      continue antibx  consider transfusion - will discuss with Dr White

## 2017-05-16 LAB
ANION GAP SERPL CALC-SCNC: 24 MMOL/L — HIGH (ref 5–17)
BUN SERPL-MCNC: 145 MG/DL — HIGH (ref 7–23)
CALCIUM SERPL-MCNC: 6.8 MG/DL — LOW (ref 8.5–10.1)
CHLORIDE SERPL-SCNC: 101 MMOL/L — SIGNIFICANT CHANGE UP (ref 96–108)
CO2 SERPL-SCNC: 14 MMOL/L — LOW (ref 22–31)
CREAT SERPL-MCNC: 4.9 MG/DL — HIGH (ref 0.5–1.3)
CULTURE RESULTS: SIGNIFICANT CHANGE UP
GLUCOSE SERPL-MCNC: 104 MG/DL — HIGH (ref 70–99)
HCT VFR BLD CALC: 25.1 % — LOW (ref 34.5–45)
HGB BLD-MCNC: 9.1 G/DL — LOW (ref 11.5–15.5)
MCHC RBC-ENTMCNC: 30.2 PG — SIGNIFICANT CHANGE UP (ref 27–34)
MCHC RBC-ENTMCNC: 36.3 GM/DL — HIGH (ref 32–36)
MCV RBC AUTO: 83.1 FL — SIGNIFICANT CHANGE UP (ref 80–100)
PLATELET # BLD AUTO: 161 K/UL — SIGNIFICANT CHANGE UP (ref 150–400)
POTASSIUM SERPL-MCNC: 4.2 MMOL/L — SIGNIFICANT CHANGE UP (ref 3.5–5.3)
POTASSIUM SERPL-SCNC: 4.2 MMOL/L — SIGNIFICANT CHANGE UP (ref 3.5–5.3)
RBC # BLD: 3.02 M/UL — LOW (ref 3.8–5.2)
RBC # FLD: 15.5 % — HIGH (ref 11–15)
SODIUM SERPL-SCNC: 139 MMOL/L — SIGNIFICANT CHANGE UP (ref 135–145)
SPECIMEN SOURCE: SIGNIFICANT CHANGE UP
VDRL CSF-TITR: NEGATIVE — SIGNIFICANT CHANGE UP
WBC # BLD: 10.4 K/UL — SIGNIFICANT CHANGE UP (ref 3.8–10.5)
WBC # FLD AUTO: 10.4 K/UL — SIGNIFICANT CHANGE UP (ref 3.8–10.5)

## 2017-05-16 RX ADMIN — FLUCONAZOLE 100 MILLIGRAM(S): 150 TABLET ORAL at 11:55

## 2017-05-16 RX ADMIN — CARBIDOPA AND LEVODOPA 1 TABLET(S): 25; 100 TABLET ORAL at 11:55

## 2017-05-16 RX ADMIN — CARBIDOPA AND LEVODOPA 1 TABLET(S): 25; 100 TABLET ORAL at 06:23

## 2017-05-16 RX ADMIN — AMPICILLIN SODIUM AND SULBACTAM SODIUM 200 GRAM(S): 250; 125 INJECTION, POWDER, FOR SUSPENSION INTRAMUSCULAR; INTRAVENOUS at 17:16

## 2017-05-16 RX ADMIN — PRAMIPEXOLE DIHYDROCHLORIDE 0.25 MILLIGRAM(S): 0.12 TABLET ORAL at 23:09

## 2017-05-16 RX ADMIN — Medication 25 MICROGRAM(S): at 06:23

## 2017-05-16 RX ADMIN — PRAMIPEXOLE DIHYDROCHLORIDE 0.25 MILLIGRAM(S): 0.12 TABLET ORAL at 06:23

## 2017-05-16 RX ADMIN — PRAMIPEXOLE DIHYDROCHLORIDE 0.25 MILLIGRAM(S): 0.12 TABLET ORAL at 13:12

## 2017-05-16 RX ADMIN — Medication 1 TABLET(S): at 17:16

## 2017-05-16 RX ADMIN — CARBIDOPA AND LEVODOPA 1 TABLET(S): 25; 100 TABLET ORAL at 23:09

## 2017-05-16 RX ADMIN — ENOXAPARIN SODIUM 30 MILLIGRAM(S): 100 INJECTION SUBCUTANEOUS at 11:55

## 2017-05-16 RX ADMIN — CARBIDOPA AND LEVODOPA 1 TABLET(S): 25; 100 TABLET ORAL at 17:16

## 2017-05-16 RX ADMIN — AMPICILLIN SODIUM AND SULBACTAM SODIUM 200 GRAM(S): 250; 125 INJECTION, POWDER, FOR SUSPENSION INTRAMUSCULAR; INTRAVENOUS at 06:23

## 2017-05-16 RX ADMIN — Medication 1 TABLET(S): at 06:23

## 2017-05-16 RX ADMIN — PANTOPRAZOLE SODIUM 40 MILLIGRAM(S): 20 TABLET, DELAYED RELEASE ORAL at 11:55

## 2017-05-16 NOTE — PROGRESS NOTE ADULT - SUBJECTIVE AND OBJECTIVE BOX
HPI:  patient  with  worsening  lethargy  fever  dysphagia  evaluated  in  ER  admitted  for  pneumonia  UTI  patient  s/p  l  hip  medullary  nail (04 Apr 2017 19:53)  Initial Consultaion Note  Requested by Name:  Date/ Time:  Reason for referral/ Consultation:        PAST MEDICAL & SURGICAL HISTORY:  Pneumonia  Anemia  Parkinson disease  Tremors of nervous system  No pertinent past medical history  Status post hip surgery  No significant past surgical history      SOCIAL HISTORY:                                 Admitted from: home [ ] SNF [ ] [facility] [ ] [facility] [ ]    )    ADVANCE DIRECTIVES:  [ ] YES [ ] NO     DNR [ ] YES [ ] NO  Completed on:                       MOLST  [ ] YES [ ] NO   Completed on  :  Living Will  [ ] YES [ ] NO   Completed on:    Allergies    No Known Allergies    Intolerances        Review of Systems:     PAIN : (Y/N) (0-10)          DYSPNEA:     NAUSEA/VOMITING:   DEPRESSION:        SECRETIONS:(    FRAILTY SYNDROM       FAILURE TO THRIVE     DIBILITY   OTHER SYMPTOMS :    UNABLE TO OBTAIN DUE TO POOR MENTATION (   )    PHYSICAL EXAM:  T(F): 98.8, Max: 99.8 (05-15 @ 16:58)  HR: 86 (83 - 115)  BP: 112/57 (108/66 - 119/62)  RR: 20 (20 - 26)  SpO2: 99% (96% - 99%)  Wt(kg): --   WEIGHT :    [id number]                  BMI:  I&O's Summary    I & Os for current day (as of 16 May 2017 16:34)  =============================================  IN: 0 ml / OUT: 300 ml / NET: -300 ml    CAPILLARY BLOOD GLUCOSE      GENERAL: alert [ ] oriented x ______[ ] lethargic        [ ] cachexia     [ ] nonverbal [ ] coma [ ]    HEENT: normal [ ] dry mouth [ ] ET tube/trach [ ]   LUNGS: ]  CV :  normal [ ]  GI : normal [ ]  PEG /NG tube [ ]   : normal [ ] incontinent [ ] oliguria/anuria [ ] shoemaker [ ]  MSK : normal [ ] weakness [ ] edema [ ]              ambulatory [ ] bebbound/wheelchair bound [ ]   SKIN : normal [ ] pressure ulcer (Y/N) Stage ______________ rash (Y/N)    Functional Assessment:Karnofsky Performance Score :  Palliative Performance Status Version 2:         %    LABS:                        9.1    10.4  )-----------( 161      ( 16 May 2017 07:57 )             25.1     05-16    139  |  101  |  145<H>  ----------------------------<  104<H>  4.2   |  14<L>  |  4.90<H>    Ca    6.8<L>      16 May 2017 09:49            I&O's Detail    I & Os for current day (as of 16 May 2017 16:34)  =============================================  IN:    Total IN: 0 ml  ---------------------------------------------  OUT:    Indwelling Catheter - Stomal: 300 ml    Total OUT: 300 ml  ---------------------------------------------  Total NET: -300 ml      Assessment:   86y Female admitted with ALTERED MENTAL STATUS/UTI/URINE RETENTION  AMS/PNEUMONIA  AMS      PROBLEM LIST :  PROBLEM/RECOMMENDATION: 1  Problem : Goals of care, counseling/ discussion.  Recommendation: met with patient/ family and discussed GOC & Advanced care planning                                    Palliative care info/counseling provided (Y/N)                                      Family meeting                                   Advanced Directives addressed (Y/N)  PROBLEM/ RECOMMENDATION:2  Problem :Resuscitation/ DNR/ DNI,   Recommendation: DNR/ DNI    PROBLEM/ RECOMMENDATION :3  Problem : Medical order for life sustaning treatment  Recommendation : MOLST Form ( initiated/ completed)    PROBLEM/RECOMMENDATION :4  Problem : Advanced care planning  Recommendation : Family meeting.(Y/N)     PLAN:  REFFERALS:                           Unit SW/Case Mgmt (Y/N)                          (Y/N)                         Speech/Swallow (Y/N)                           nutrition                                              Ethics (Y/N)                         PT/OT (Y/N)

## 2017-05-16 NOTE — PROGRESS NOTE ADULT - SUBJECTIVE AND OBJECTIVE BOX
INTERVAL HPI/OVERNIGHT EVENTS:        REVIEW OF SYSTEMS:  CONSTITUTIONAL:  unresponsive  on  respirator    NECK: No pain or stiffnes  RESPIRATORY: No SOB   CARDIOVASCULAR: No chest pain, palpitations, dizziness,   GASTROINTESTINAL: No abdominal pain. No nausea, vomiting,   NEUROLOGICAL: No headaches, no  blurry  vision no  dizziness  SKIN: No itching,   MUSCULOSKELETAL: No pain    MEDICATION:  fluconAZOLE   Tablet 100milliGRAM(s) Oral daily  pantoprazole   Suspension 40milliGRAM(s) Oral daily  lactobacillus acidophilus 1Tablet(s) Oral every 12 hours  acetaminophen    Suspension. 650milliGRAM(s) Enteral Tube every 6 hours PRN  carbidopa/levodopa  25/100 1Tablet(s) Oral four times a day  pramipexole 0.25milliGRAM(s) Oral three times a day  acetaminophen    Suspension 650milliGRAM(s) Oral every 6 hours PRN  levothyroxine 25MICROGram(s) Oral daily  enoxaparin Injectable 30milliGRAM(s) SubCutaneous every 24 hours  ampicillin/sulbactam  IVPB 3Gram(s) IV Intermittent every 12 hours    Vital Signs Last 24 Hrs  T(C): 37.1, Max: 37.7 (05-15 @ 11:14)  T(F): 98.8, Max: 99.8 (05-15 @ 11:14)  HR: 106 (83 - 128)  BP: 115/64 (101/52 - 119/62)  BP(mean): --  RR: 20 (20 - 26)  SpO2: 98% (96% - 99%)    PHYSICAL EXAM:  GENERAL: NAD, well-groomed, well-developed  EYES:  conjunctiva and sclera clear  ENMT:  Moist mucous membranes,   NECK: Supple, No JVD, Normal thyroid  NERVOUS SYSTEM: unresponsive  CHEST/LUNG: Clear    HEART: Regular rate and rhythm; No murmurs, rubs, or gallops  ABDOMEN: Soft, Nontender, Nondistended; Bowel sounds present  EXTREMITIES:  +  edema no  tenderness  SKIN: No rashes   LABS:                        9.1    10.4  )-----------( 161      ( 16 May 2017 07:57 )             25.1     05-15    138  |  101  |  144<H>  ----------------------------<  118<H>  4.5   |  15<L>  |  4.77<H>    Ca    7.4<L>      15 May 2017 08:26          CAPILLARY BLOOD GLUCOSE      RADIOLOGY & ADDITIONAL TESTS:    Imaging reports  Personally Reviewed:  [ x] YES  [ ] NO    Consultant(s) Notes Reviewed:  [ x] YES  [ ] NO    Care Discussed with Consultants/Other Providers [x ] YES  [ ] NO  A/P  ACUTE  RESPIRATORY  FAILURE  PNEUMONIA   CONT  VENT  SUPPORT  AB  IVF  S/P  TRACH    recurrant  fever   cont  AB  as  per  ID  tylenol  PRN  recheck  cultures  COLO/VESCICULAR FISTULA S/P  diverting  colostomy    LFT elevation observation  metabolic  encephalopathy with  severe  inflammatory  processs supportive care          Problem: Parkinson disease encephalopathy.  Plan: sinemet pramipexole  anemia  continue  to  monitor  no  clinical  evidence  of  acute bleed  transfuse as  needed    fever  pneumonia  obseve  with  new  AB  regimen check  CSF  cultures might need  BRIEN  if  fevers  persist   urinary  retention  observe  with  shoemaker    acute  on renal  failure  cont  ivf  as  per  renal  prognosis  guarded  discussed  with  patient's son

## 2017-05-16 NOTE — PROGRESS NOTE ADULT - SUBJECTIVE AND OBJECTIVE BOX
No changes - intubated, unresponsive, anemic  Pt transfused 2 units PRBC's      MEDICATIONS  (STANDING):  pantoprazole   Suspension 40milliGRAM(s) Oral daily  lactobacillus acidophilus 1Tablet(s) Oral every 12 hours  carbidopa/levodopa  25/100 1Tablet(s) Oral four times a day  pramipexole 0.25milliGRAM(s) Oral three times a day  levothyroxine 25MICROGram(s) Oral daily  enoxaparin Injectable 30milliGRAM(s) SubCutaneous every 24 hours  ampicillin/sulbactam  IVPB 3Gram(s) IV Intermittent every 12 hours    MEDICATIONS  (PRN):  acetaminophen    Suspension. 650milliGRAM(s) Enteral Tube every 6 hours PRN Mild Pain (1 - 3)  acetaminophen    Suspension 650milliGRAM(s) Oral every 6 hours PRN For Temp greater than 38 C (100.4 F)      Allergies    No Known Allergies    Intolerances        Vital Signs Last 24 Hrs  T(C): 37.2, Max: 37.2 (05-16 @ 16:58)  T(F): 98.9, Max: 98.9 (05-16 @ 16:58)  HR: 89 (83 - 106)  BP: 102/52 (102/52 - 115/64)  BP(mean): --  RR: 19 (19 - 22)  SpO2: 98% (97% - 99%)    PHYSICAL EXAM:  General: NAD.  CVS: S1, S2  Chest: air entry bilaterally present  Abd: BS present, soft, non-tender, +peg      LABS:                        9.1    10.4  )-----------( 161      ( 16 May 2017 07:57 )             25.1     05-16    139  |  101  |  145<H>  ----------------------------<  104<H>  4.2   |  14<L>  |  4.90<H>    Ca    6.8<L>      16 May 2017 09:49        follow CBC (hgb 9.1 after 2u Prbc's)  on protonix via peg

## 2017-05-16 NOTE — GOALS OF CARE CONVERSATION - PERSONAL ADVANCE DIRECTIVE - WHAT MATTERS MOST
Per son Kyle that plan is for pt to be successfully weaned from Respirator & go to rehab to & eventually go home with him again. (Son informed me that she was doing GREAT at [employer] & was dancing, but then got sick by them).  Did educate son about Palliative Care & son returned good understanding, however he wants to continue with aggressive tx, & not interested in "comfort, Palliative Care" at this time.

## 2017-05-16 NOTE — PROGRESS NOTE ADULT - SUBJECTIVE AND OBJECTIVE BOX
INTERVAL HPI: 86F PMH Parkinson's, s/p recent mechanical fall with R intertrochanteric femur fracture requiring R intramedullary nailing of R trochanteric fracture of femur 3/13/17, UTI, PNA presents from [facility] rehab for sepsis, colovesical fistula with course complicated by aspiration PNA and now s/p trach and diverting loop colostomy.     OVERNIGHT EVENTS:  Remains on Ventilator and very lethargic. temperature trending down    Vital Signs Last 24 Hrs  T(C): 37.1, Max: 37.7 (05-15 @ 16:58)  T(F): 98.8, Max: 99.8 (05-15 @ 16:58)  HR: 86 (83 - 115)  BP: 112/57 (108/66 - 119/62)  BP(mean): --  RR: 20 (20 - 26)  SpO2: 99% (96% - 99%)    Mode: AC/ CMV (Assist Control/ Continuous Mandatory Ventilation)  RR (machine): 16  TV (machine): 400  FiO2: 30  PEEP: 5  ITime: 1  MAP: 9  PIP: 16    PHYSICAL EXAM:  GEN:         Awake, responsive and comfortable.  HEENT:    Normal.    RESP:       crackles.  CVS:           Regular rate and rhythm.   ABD:         Soft, non-tender, non-distended;   :             No costovertebral angle tenderness  EXTR:            No clubbing, cyanosis or edema  CNS:              Intact sensory and motor function.    MEDICATIONS  (STANDING):  pantoprazole   Suspension 40milliGRAM(s) Oral daily  lactobacillus acidophilus 1Tablet(s) Oral every 12 hours  carbidopa/levodopa  25/100 1Tablet(s) Oral four times a day  pramipexole 0.25milliGRAM(s) Oral three times a day  levothyroxine 25MICROGram(s) Oral daily  enoxaparin Injectable 30milliGRAM(s) SubCutaneous every 24 hours  ampicillin/sulbactam  IVPB 3Gram(s) IV Intermittent every 12 hours    MEDICATIONS  (PRN):  acetaminophen    Suspension. 650milliGRAM(s) Enteral Tube every 6 hours PRN Mild Pain (1 - 3)  acetaminophen    Suspension 650milliGRAM(s) Oral every 6 hours PRN For Temp greater than 38 C (100.4 F)    LABS:                        9.1    10.4  )-----------( 161      ( 16 May 2017 07:57 )             25.1     05-16    139  |  101  |  145<H>  ----------------------------<  104<H>  4.2   |  14<L>  |  4.90<H>    Ca    6.8<L>      16 May 2017 09:49    ASSESSMENT and Plan:  ·	Hypoxic Respiratory failure.  ·	Aspiration Pneumonia. Acinetobacter.  ·	S/P Diverting  Colostomy for Colovesical Fistula.  ·	Anemia.  ·	Leukocytosis better.  ·	Parkinsonism.  ·	Renal insuffiencey.  ·	Hypernatremia improved.    Not wean able at this point, will continue full vent support.  On Unasyn.

## 2017-05-16 NOTE — PROGRESS NOTE ADULT - SUBJECTIVE AND OBJECTIVE BOX
TMAX -  99.8    On day # 8 Unasyn / # 30 Diflucan now    Vital Signs Last 24 Hrs  T(C): 37.1, Max: 37.7 (05-15 @ 16:58)  T(F): 98.8, Max: 99.8 (05-15 @ 16:58)  HR: 86 (83 - 115)  BP: 112/57 (108/66 - 119/62)  BP(mean): --  RR: 20 (20 - 26)  SpO2: 99% (96% - 99%)  Mode: AC/ CMV (Assist Control/ Continuous Mandatory Ventilation)  RR (machine): 16  TV (machine): 400  FiO2: 30  PEEP: 5  ITime: 1  MAP: 9  PIP: 16  Supplemental O2:  on ventilator on 30% FIO2 + 5 PEEP    Remains unresponsive, on ventilator, on the same vent settings.      PHYSICAL EXAM  General:  poorly responsive, on ventilator, lying in bed in semi-upright position  HEENT:  conj pink, sclerae anicteric, PERRLA, no oral lesions noted  Neck:  semi-supple, no nodes noted             trach midline - site clean, sutures in place  Heart:  RR  Lungs:  decreased BS at bases, otherwise clear at present  Abdomen:  soft, BS+, seems nontender                   PEG site clean, tube feedings in progress                   Colostomy with liquid dark brown stool noted today  Extremities:  1+ edema LE's                      hands and arms with mild swelling                      Rt upper arm with Midline in place - site clean, dressing intact - placed 4/25  Skin:  warm, dry, no rash noted       I&O's Summary :    I & Os for current day (as of 16 May 2017 15:29)  =============================================  IN: 0 ml / OUT: 300 ml / NET: -300 ml      LABS:  CBC Full  -  ( 16 May 2017 07:57 )  WBC Count : 10.4 K/uL  Hemoglobin : 9.1 g/dL  Hematocrit : 25.1 %  Platelet Count - Automated : 161 K/uL  Mean Cell Volume : 83.1 fl  Mean Cell Hemoglobin : 30.2 pg  Mean Cell Hemoglobin Concentration : 36.3 gm/dL  Auto Neutrophil # : x  Auto Lymphocyte # : x  Auto Monocyte # : x  Auto Eosinophil # : x  Auto Basophil # : x  Auto Neutrophil % : x  Auto Lymphocyte % : x  Auto Monocyte % : x  Auto Eosinophil % : x  Auto Basophil % : x    05-16    139  |  101  |  145<H>  ----------------------------<  104<H>  4.2   |  14<L>  |  4.90<H>    Ca    6.8<L>      16 May 2017 09:49        CULTURES:  Specimen Source: .CSF CSF (05-11 @ 23:11)  Culture Results:   No growth at 3 days. (05-11 @ 23:11)    Specimen Source: .Blood Blood-Venous (05-11 @ 09:15)  Culture Results:   No growth at 5 days. (05-11 @ 09:15)          Impression:  Temps improved on ab rx with Unasyn + Diflucan for Sepsis with Multifocal PNA secondary to MDRO Acinetobacter Baumanii, with decreased WBC's but increasing BUN/Creat and decreased urine output.    Suggestions: Will continue current ab rx with Unasyn, d/c Diflucan now, and follow-up CXR in AM along with labs. Condition remains guarded.

## 2017-05-17 LAB
ANION GAP SERPL CALC-SCNC: 25 MMOL/L — HIGH (ref 5–17)
BUN SERPL-MCNC: 150 MG/DL — HIGH (ref 7–23)
CALCIUM SERPL-MCNC: 6.6 MG/DL — LOW (ref 8.5–10.1)
CHLORIDE SERPL-SCNC: 103 MMOL/L — SIGNIFICANT CHANGE UP (ref 96–108)
CO2 SERPL-SCNC: 12 MMOL/L — LOW (ref 22–31)
CREAT SERPL-MCNC: 5.07 MG/DL — HIGH (ref 0.5–1.3)
GLUCOSE SERPL-MCNC: 107 MG/DL — HIGH (ref 70–99)
HCT VFR BLD CALC: 25.1 % — LOW (ref 34.5–45)
HGB BLD-MCNC: 8.8 G/DL — LOW (ref 11.5–15.5)
MCHC RBC-ENTMCNC: 29 PG — SIGNIFICANT CHANGE UP (ref 27–34)
MCHC RBC-ENTMCNC: 34.8 GM/DL — SIGNIFICANT CHANGE UP (ref 32–36)
MCV RBC AUTO: 83.3 FL — SIGNIFICANT CHANGE UP (ref 80–100)
PLATELET # BLD AUTO: 183 K/UL — SIGNIFICANT CHANGE UP (ref 150–400)
POTASSIUM SERPL-MCNC: 4 MMOL/L — SIGNIFICANT CHANGE UP (ref 3.5–5.3)
POTASSIUM SERPL-SCNC: 4 MMOL/L — SIGNIFICANT CHANGE UP (ref 3.5–5.3)
RBC # BLD: 3.02 M/UL — LOW (ref 3.8–5.2)
RBC # FLD: 16 % — HIGH (ref 11–15)
SODIUM SERPL-SCNC: 140 MMOL/L — SIGNIFICANT CHANGE UP (ref 135–145)
WBC # BLD: 11.7 K/UL — HIGH (ref 3.8–10.5)
WBC # FLD AUTO: 11.7 K/UL — HIGH (ref 3.8–10.5)

## 2017-05-17 PROCEDURE — 71010: CPT | Mod: 26

## 2017-05-17 RX ADMIN — Medication 650 MILLIGRAM(S): at 22:38

## 2017-05-17 RX ADMIN — ENOXAPARIN SODIUM 30 MILLIGRAM(S): 100 INJECTION SUBCUTANEOUS at 12:32

## 2017-05-17 RX ADMIN — CARBIDOPA AND LEVODOPA 1 TABLET(S): 25; 100 TABLET ORAL at 05:16

## 2017-05-17 RX ADMIN — AMPICILLIN SODIUM AND SULBACTAM SODIUM 200 GRAM(S): 250; 125 INJECTION, POWDER, FOR SUSPENSION INTRAMUSCULAR; INTRAVENOUS at 17:59

## 2017-05-17 RX ADMIN — PRAMIPEXOLE DIHYDROCHLORIDE 0.25 MILLIGRAM(S): 0.12 TABLET ORAL at 05:16

## 2017-05-17 RX ADMIN — AMPICILLIN SODIUM AND SULBACTAM SODIUM 200 GRAM(S): 250; 125 INJECTION, POWDER, FOR SUSPENSION INTRAMUSCULAR; INTRAVENOUS at 05:16

## 2017-05-17 RX ADMIN — Medication 1 TABLET(S): at 05:16

## 2017-05-17 RX ADMIN — CARBIDOPA AND LEVODOPA 1 TABLET(S): 25; 100 TABLET ORAL at 12:32

## 2017-05-17 RX ADMIN — PRAMIPEXOLE DIHYDROCHLORIDE 0.25 MILLIGRAM(S): 0.12 TABLET ORAL at 22:37

## 2017-05-17 RX ADMIN — Medication 1 TABLET(S): at 17:59

## 2017-05-17 RX ADMIN — Medication 25 MICROGRAM(S): at 05:16

## 2017-05-17 RX ADMIN — PRAMIPEXOLE DIHYDROCHLORIDE 0.25 MILLIGRAM(S): 0.12 TABLET ORAL at 13:22

## 2017-05-17 RX ADMIN — CARBIDOPA AND LEVODOPA 1 TABLET(S): 25; 100 TABLET ORAL at 17:59

## 2017-05-17 RX ADMIN — PANTOPRAZOLE SODIUM 40 MILLIGRAM(S): 20 TABLET, DELAYED RELEASE ORAL at 12:32

## 2017-05-17 NOTE — PROGRESS NOTE ADULT - SUBJECTIVE AND OBJECTIVE BOX
INTERVAL HPI:  86F PMH Parkinson's, s/p recent mechanical fall with R intertrochanteric femur fracture requiring R intramedullary nailing of R trochanteric fracture of femur 3/13/17, UTI, PNA presents from [facility] rehab for sepsis, colovesical fistula with course complicated by aspiration PNA and now s/p trach and diverting loop colostomy.     OVERNIGHT EVENTS:  No significant change in over all clinical status.    Vital Signs Last 24 Hrs  T(C): 37.7, Max: 37.7 (05-17 @ 16:48)  T(F): 99.8, Max: 99.8 (05-17 @ 16:48)  HR: 93 (87 - 100)  BP: 125/62 (118/61 - 134/69)  BP(mean): --  RR: 19 (18 - 20)  SpO2: 99% (98% - 99%)    Mode: AC/ CMV (Assist Control/ Continuous Mandatory Ventilation)  RR (machine): 16  TV (machine): 400  FiO2: 30  PEEP: 5  ITime: 1  MAP: 11  PIP: 23    PHYSICAL EXAM:  GEN:        unresponsive and comfortable.  HEENT:    Normal.    RESP:     crackles.  CVS:             Regular rate and rhythm.   ABD:         Soft, non-tender, non-distended;     MEDICATIONS  (STANDING):  pantoprazole   Suspension 40milliGRAM(s) Oral daily  lactobacillus acidophilus 1Tablet(s) Oral every 12 hours  carbidopa/levodopa  25/100 1Tablet(s) Oral four times a day  pramipexole 0.25milliGRAM(s) Oral three times a day  levothyroxine 25MICROGram(s) Oral daily  enoxaparin Injectable 30milliGRAM(s) SubCutaneous every 24 hours  ampicillin/sulbactam  IVPB 3Gram(s) IV Intermittent every 12 hours    MEDICATIONS  (PRN):  acetaminophen    Suspension. 650milliGRAM(s) Enteral Tube every 6 hours PRN Mild Pain (1 - 3)  acetaminophen    Suspension 650milliGRAM(s) Oral every 6 hours PRN For Temp greater than 38 C (100.4 F)    LABS:                        8.8    11.7  )-----------( 183      ( 17 May 2017 07:59 )             25.1     05-17    140  |  103  |  150<H>  ----------------------------<  107<H>  4.0   |  12<L>  |  5.07<H>    Ca    6.6<L>      17 May 2017 07:59    ASSESSMENT and Plan:  ·	Hypoxic Respiratory failure.  ·	Aspiration Pneumonia. Acinetobacter.  ·	S/P Diverting  Colostomy for Colovesical Fistula.  ·	Anemia.  ·	Leukocytosis better.  ·	Parkinsonism.  ·	Renal insuffiencey.  ·	Hypernatremia improved.    Continue supportive care. Poor prognosis.  Consider hospice evaluation.

## 2017-05-17 NOTE — PROGRESS NOTE ADULT - SUBJECTIVE AND OBJECTIVE BOX
Patient seen in follow up for ZAHIDA; no distress.    MEDICATIONS  (STANDING):  pantoprazole   Suspension 40milliGRAM(s) Oral daily  lactobacillus acidophilus 1Tablet(s) Oral every 12 hours  carbidopa/levodopa  25/100 1Tablet(s) Oral four times a day  pramipexole 0.25milliGRAM(s) Oral three times a day  levothyroxine 25MICROGram(s) Oral daily  enoxaparin Injectable 30milliGRAM(s) SubCutaneous every 24 hours  ampicillin/sulbactam  IVPB 3Gram(s) IV Intermittent every 12 hours    MEDICATIONS  (PRN):  acetaminophen    Suspension. 650milliGRAM(s) Enteral Tube every 6 hours PRN Mild Pain (1 - 3)  acetaminophen    Suspension 650milliGRAM(s) Oral every 6 hours PRN For Temp greater than 38 C (100.4 F)    PHYSICAL EXAM:      T(C): 37.5, Max: 37.5 (05-17 @ 05:29)  HR: 88 (87 - 100)  BP: 134/69 (102/52 - 134/69)  RR: 18 (18 - 20)  SpO2: 99% (98% - 99%)  Wt(kg): --  Respiratory: clear anteriorly, decreased BS at bases  Cardiovascular: S1 S2  Gastrointestinal: soft NT ND +BS  Extremities:   1 edema                                    8.8    11.7  )-----------( 183      ( 17 May 2017 07:59 )             25.1     05-17    140  |  103  |  150<H>  ----------------------------<  107<H>  4.0   |  12<L>  |  5.07<H>    Ca    6.6<L>      17 May 2017 07:59            Assessment and Plan:  ZAHIDA; ATN; agree with supportive care only.  Not candidate for HD treatment overall poor prognosis.

## 2017-05-17 NOTE — GOALS OF CARE CONVERSATION - PERSONAL ADVANCE DIRECTIVE - TREATMENT GUIDELINE COMMENT
at present time son would like to continue all aggressive care. pt remains DNR as stated in MOLST. however he would like a few days to think about whether he wants to pursue trach/peg.
son has agreed to make pt dnr as he "does not want her to suffer if her time comes" however would like to cont all aggressive treatment to give "her a chance to recover from PNA"
To be discussed tonight when son comes in.
Trach to vent.  DNR.
Pt is DNR & HC P, son Kyle wants to continue with aggressive tx, & not interested in comfort care at this time.  Will follw as requested.
To be discussed with son at family meeting.

## 2017-05-17 NOTE — PROGRESS NOTE ADULT - SUBJECTIVE AND OBJECTIVE BOX
HPI:  patient  with  worsening  lethargy  fever  dysphagia  evaluated  in  ER  admitted  for  pneumonia  UTI  patient  s/p  l  hip  medullary  nail (04 Apr 2017 19:53)  On vent , unresposive , polypharmacy, pe for dysphagia   overall prognosis is dismal  Interval change ---none  vss  trach clean  peg feeding on   Pt seems comfortable   son was approached by Radha , no placement yet   I can pronide if sh goes to [facility]

## 2017-05-17 NOTE — PROGRESS NOTE ADULT - SUBJECTIVE AND OBJECTIVE BOX
Patient seen and examined at the bedside.  No acute events overnight.  No new complaints. Denies N/V/D, CP, SOB, Headache or dizziness.    HPI:  patient  with  worsening  lethargy  fever  dysphagia  evaluated  in  ER  admitted  for  pneumonia  UTI  patient  s/p  l  hip  medullary  nail (04 Apr 2017 19:53)    interval Hx:  pt with colo-vesicular fistula, emesis with aspiration 4/16, sepsis, transferred to CCU with subsequent PEG by GI service and tracheostomy + diverting colostomy from surgery service, midline placed 4/25, anemia, melena, +shoemaker cath      Vital Signs Last 24 Hrs  T(C): 37.5, Max: 37.5 (05-17 @ 05:29)  T(F): 99.5, Max: 99.5 (05-17 @ 05:29)  HR: 100 (87 - 100)  BP: 118/61 (102/52 - 134/69)  BP(mean): --  RR: 18 (18 - 20)  SpO2: 99% (98% - 99%)                          8.8    11.7  )-----------( 183      ( 17 May 2017 07:59 )             25.1       05-17    140  |  103  |  150<H>  ----------------------------<  107<H>  4.0   |  12<L>  |  5.07<H>    Ca    6.6<L>      17 May 2017 07:59      I&O's Detail  I & Os for 24h ending 17 May 2017 07:00  =============================================  IN:    Total IN: 0 ml  ---------------------------------------------  OUT:    Indwelling Catheter - Stomal: 250 ml    Colostomy: 1 ml    Total OUT: 251 ml  ---------------------------------------------  Total NET: -251 ml    I & Os for current day (as of 17 May 2017 15:23)  =============================================  IN:    Total IN: 0 ml  ---------------------------------------------  OUT:    Indwelling Catheter - Urethral: 200 ml    Total OUT: 200 ml  ---------------------------------------------  Total NET: -200 ml    Mode: AC/ CMV (Assist Control/ Continuous Mandatory Ventilation)  RR (machine): 16  TV (machine): 400  FiO2: 30  PEEP: 5  ITime: 0.81  MAP: 11  PIP: 21    MEDICATIONS  (STANDING):  pantoprazole   Suspension 40milliGRAM(s) Oral daily  lactobacillus acidophilus 1Tablet(s) Oral every 12 hours  carbidopa/levodopa  25/100 1Tablet(s) Oral four times a day  pramipexole 0.25milliGRAM(s) Oral three times a day  levothyroxine 25MICROGram(s) Oral daily  enoxaparin Injectable 30milliGRAM(s) SubCutaneous every 24 hours  ampicillin/sulbactam  IVPB 3Gram(s) IV Intermittent every 12 hours    MEDICATIONS  (PRN):  acetaminophen    Suspension. 650milliGRAM(s) Enteral Tube every 6 hours PRN Mild Pain (1 - 3)  acetaminophen    Suspension 650milliGRAM(s) Oral every 6 hours PRN For Temp greater than 38 C (100.4 F)        Physical Exam  GEN: non responsive  trached on vent  HEENT: NCAT, Trachea midline, no scleral icterus  Resp: no accessory muscle use evident, coarse BS  CV: mild Tachycardia, S1 S2  ABD: Soft,  ND NTTP (no grimace) + colostomy with stool (black)  : +shoemaker  EXT: warm well perfused, +edema,       A/P: 86yFemale a/w recnet h/o IM nail, and colovesicular fistula, coarse complicated by aspiration PNA, respiratory failure Pt required PEG by GI for nutrition, diverting colostomy, tracheostomy, parkinson's encephelopathy    -continue supportive care  -continue shoemaker  -DVT prophylaxis  -continued medical management, palliative, urology, neuro, GI, follow-up  -no acute surgical indication at this time  -will d/w attgs      PAST MEDICAL & SURGICAL HISTORY:  Pneumonia  Anemia  Parkinson disease  Tremors of nervous system  No pertinent past medical history  Status post hip surgery  No significant past surgical history      -

## 2017-05-17 NOTE — PROGRESS NOTE ADULT - SUBJECTIVE AND OBJECTIVE BOX
INTERVAL HPI/OVERNIGHT EVENTS:        REVIEW OF SYSTEMS:  CONSTITUTIONAL:  remains  unresponsive  on  respirator    MEDICATION:  pantoprazole   Suspension 40milliGRAM(s) Oral daily  lactobacillus acidophilus 1Tablet(s) Oral every 12 hours  acetaminophen    Suspension. 650milliGRAM(s) Enteral Tube every 6 hours PRN  carbidopa/levodopa  25/100 1Tablet(s) Oral four times a day  pramipexole 0.25milliGRAM(s) Oral three times a day  acetaminophen    Suspension 650milliGRAM(s) Oral every 6 hours PRN  levothyroxine 25MICROGram(s) Oral daily  enoxaparin Injectable 30milliGRAM(s) SubCutaneous every 24 hours  ampicillin/sulbactam  IVPB 3Gram(s) IV Intermittent every 12 hours    Vital Signs Last 24 Hrs  T(C): 37.5, Max: 37.5 (05-17 @ 05:29)  T(F): 99.5, Max: 99.5 (05-17 @ 05:29)  HR: 87 (86 - 100)  BP: 134/69 (102/52 - 134/69)  BP(mean): --  RR: 18 (18 - 20)  SpO2: 99% (98% - 99%)    PHYSICAL EXAM:  GENERAL: NAD, well-groomed, well-developed  EYES:  conjunctiva and sclera clear  ENMT:  Moist mucous membranes,   NECK: Supple, No JVD, Normal thyroid  NERVOUS SYSTEM:  unrespinsive  CHEST/LUNG: Clear    HEART: Regular rate and rhythm; No murmurs, rubs, or gallops  ABDOMEN: Soft, Nontender, Nondistended; Bowel sounds present  EXTREMITIES:  +  edema no  tenderness  SKIN: No rashes   LABS:             repaet  pending      CAPILLARY BLOOD GLUCOSE      RADIOLOGY & ADDITIONAL TESTS:    Imaging reports  Personally Reviewed:  [ x] YES  [ ] NO    Consultant(s) Notes Reviewed:  [x ] YES  [ ] NO    Care Discussed with Consultants/Other Providers [x ] YES  [ ] NO  ACUTE  RESPIRATORY  FAILURE  PNEUMONIA   CONT  VENT  SUPPORT  AB  IVF  S/P  TRACH    recurrant  fever   cont  AB  as  per  ID  tylenol  PRN  recheck  cultures  COLO/VESCICULAR FISTULA S/P  diverting  colostomy    LFT elevation observation  metabolic  encephalopathy with  severe  inflammatory  processs supportive care          Problem: Parkinson disease encephalopathy.  Plan: sinemet pramipexole  anemia  continue  to  monitor  no  clinical  evidence  of  acute bleed  transfuse as  needed    fever  pneumonia  obseve  with  new  AB  regimen check  CSF  cultures might need  BRIEN  if  fevers  persist   urinary  retention  observe  with  shoemaker     Worsening acute  on renal  failure  cont  ivf  as  per  renal  prognosis  guarded  discussed  with  patient's son

## 2017-05-17 NOTE — PROGRESS NOTE ADULT - SUBJECTIVE AND OBJECTIVE BOX
No changes - on NGT feedings; intubated      MEDICATIONS  (STANDING):  pantoprazole   Suspension 40milliGRAM(s) Oral daily  lactobacillus acidophilus 1Tablet(s) Oral every 12 hours  carbidopa/levodopa  25/100 1Tablet(s) Oral four times a day  pramipexole 0.25milliGRAM(s) Oral three times a day  levothyroxine 25MICROGram(s) Oral daily  enoxaparin Injectable 30milliGRAM(s) SubCutaneous every 24 hours  ampicillin/sulbactam  IVPB 3Gram(s) IV Intermittent every 12 hours    MEDICATIONS  (PRN):  acetaminophen    Suspension. 650milliGRAM(s) Enteral Tube every 6 hours PRN Mild Pain (1 - 3)  acetaminophen    Suspension 650milliGRAM(s) Oral every 6 hours PRN For Temp greater than 38 C (100.4 F)      Allergies    No Known Allergies    Intolerances        Vital Signs Last 24 Hrs  T(C): 37.5, Max: 37.5 (05-17 @ 05:29)  T(F): 99.5, Max: 99.5 (05-17 @ 05:29)  HR: 88 (86 - 100)  BP: 134/69 (102/52 - 134/69)  BP(mean): --  RR: 18 (18 - 20)  SpO2: 99% (98% - 99%)    PHYSICAL EXAM:  General: NAD.  CVS: S1, S2  Chest: air entry bilaterally present  Abd: BS present, soft, non-tender      LABS:                        8.8    11.7  )-----------( 183      ( 17 May 2017 07:59 )             25.1     05-17    140  |  103  |  150<H>  ----------------------------<  107<H>  4.0   |  12<L>  |  5.07<H>    Ca    6.6<L>      17 May 2017 07:59        continue to monitor H/H for slow downward trend  continue antibx

## 2017-05-17 NOTE — GOALS OF CARE CONVERSATION - PERSONAL ADVANCE DIRECTIVE - CONVERSATION DETAILS
Pt without improvement in encephalopathy. Remains unresponsive. Still with fevers. Vent dependent at present time failing weaning. Discussed with son today need for trach and peg if he wishes to continue aggressive care vs comfort measures. Explained to family that with poor mental status and aspiration pt unable to protect airway. explained that if trach/peg pursued, pt eventually will be placed in a nursing/vent facility.
Unable to speak with pt, too weak & lethargic at time of visit.  Called & spoke with son Remigio today 4/7/17.  He informed me that PTA pt was at rehab [facility].  He informed me that pt lives with him, she has a HHA 10hrs X5days, 8hrs on weekends, & he cares for her at night. Son informed me that pt was IADL prior to fall at home & fractured her hip, had it repaired & was doing OK at [facility]. Pt then became lethargic, not eating sent to ER & diagnosed with UTI.
family updated regarding aspiration event and events leading up to intubation. son aware pt is in critical condition and is being supported by mechanical ventilation and has had low BP with underlying sepsis and PNA secondary to aspiration. d/w son advanced directives. he wants a trial of intubation.
86F hx of parkinsons, recent right hip / femur fracture with medullary pins sent from rehab for AMS and fever. Admitted to the hospital for sepsis, UTI/PNA, AMS. Patient had RRT on the floors for hypotension, and hypoxia. Patient is transfer out from critical care, [facility] to [facility] for respiratory failure. called karolian Flood for goals of care conversation. son stated that he will be coming in by 4 pm for the conversation.
Called & spoke with son Kyle Flood today 5/16/17.  He informed me he met to meet with me on Friday 5/12/17, but he informed me he didn't come in til 10:30pm.  When I spoke with him today 5/15/17 he informed me that his "mother,pt" was doing better she smiled at me. Son informed me he makes all medical decisions for pt & he told me that she gets better day by day. Pt is a DNR but does not want Palliative Care for her.  He informed me that he believes she gets better every day.
Spoke with son Kyle today 5/17/17 & requested that we set up for a family meeting with him so we can see if he has any questions, to discuss Goals of Care & Advance care Planning.  He said he would get back to me when he knows a time that he can come.

## 2017-05-17 NOTE — GOALS OF CARE CONVERSATION - PERSONAL ADVANCE DIRECTIVE - FAMILY/CHILD(REN)
Kyle Flood  (son)
Remigio Wright ()
Kyle Flood  ([facility])   [phone]    wk [phone]
Cristino (son) HCP
Kyle (son)
Kyle Flood  (son)  wk# [phone]

## 2017-05-17 NOTE — GOALS OF CARE CONVERSATION - PERSONAL ADVANCE DIRECTIVE - CONVERSATION/DISCUSSION
Diagnosis/MOLST Discussed/Treatment Options/Prognosis
PMD requested Palliative team to see pt./Palliative Care Referral
Prognosis/Treatment Options/Diagnosis
Palliative Care Referral
Prognosis/MOLST Discussed/Palliative Care Referral/Treatment Options

## 2017-05-18 LAB
ALBUMIN SERPL ELPH-MCNC: 1.1 G/DL — LOW (ref 3.3–5)
ALP SERPL-CCNC: 81 U/L — SIGNIFICANT CHANGE UP (ref 40–120)
ALT FLD-CCNC: 9 U/L — LOW (ref 12–78)
ANION GAP SERPL CALC-SCNC: 24 MMOL/L — HIGH (ref 5–17)
AST SERPL-CCNC: 24 U/L — SIGNIFICANT CHANGE UP (ref 15–37)
BILIRUB SERPL-MCNC: 0.3 MG/DL — SIGNIFICANT CHANGE UP (ref 0.2–1.2)
BUN SERPL-MCNC: 149 MG/DL — HIGH (ref 7–23)
CALCIUM SERPL-MCNC: 6.4 MG/DL — CRITICAL LOW (ref 8.5–10.1)
CHLORIDE SERPL-SCNC: 104 MMOL/L — SIGNIFICANT CHANGE UP (ref 96–108)
CO2 SERPL-SCNC: 13 MMOL/L — LOW (ref 22–31)
CREAT SERPL-MCNC: 5.46 MG/DL — HIGH (ref 0.5–1.3)
GLUCOSE SERPL-MCNC: 112 MG/DL — HIGH (ref 70–99)
HCT VFR BLD CALC: 26.3 % — LOW (ref 34.5–45)
HGB BLD-MCNC: 9.4 G/DL — LOW (ref 11.5–15.5)
MCHC RBC-ENTMCNC: 30.3 PG — SIGNIFICANT CHANGE UP (ref 27–34)
MCHC RBC-ENTMCNC: 35.6 GM/DL — SIGNIFICANT CHANGE UP (ref 32–36)
MCV RBC AUTO: 85 FL — SIGNIFICANT CHANGE UP (ref 80–100)
PLATELET # BLD AUTO: 178 K/UL — SIGNIFICANT CHANGE UP (ref 150–400)
POTASSIUM SERPL-MCNC: 4.1 MMOL/L — SIGNIFICANT CHANGE UP (ref 3.5–5.3)
POTASSIUM SERPL-SCNC: 4.1 MMOL/L — SIGNIFICANT CHANGE UP (ref 3.5–5.3)
PROT SERPL-MCNC: 7 GM/DL — SIGNIFICANT CHANGE UP (ref 6–8.3)
RBC # BLD: 3.1 M/UL — LOW (ref 3.8–5.2)
RBC # FLD: 16.8 % — HIGH (ref 11–15)
SODIUM SERPL-SCNC: 141 MMOL/L — SIGNIFICANT CHANGE UP (ref 135–145)
WBC # BLD: 12.4 K/UL — HIGH (ref 3.8–10.5)
WBC # FLD AUTO: 12.4 K/UL — HIGH (ref 3.8–10.5)

## 2017-05-18 PROCEDURE — 74176 CT ABD & PELVIS W/O CONTRAST: CPT | Mod: 26

## 2017-05-18 RX ORDER — IOHEXOL 300 MG/ML
50 INJECTION, SOLUTION INTRAVENOUS ONCE
Qty: 0 | Refills: 0 | Status: DISCONTINUED | OUTPATIENT
Start: 2017-05-18 | End: 2017-05-18

## 2017-05-18 RX ORDER — IOHEXOL 300 MG/ML
30 INJECTION, SOLUTION INTRAVENOUS ONCE
Qty: 0 | Refills: 0 | Status: COMPLETED | OUTPATIENT
Start: 2017-05-18 | End: 2017-05-18

## 2017-05-18 RX ADMIN — CARBIDOPA AND LEVODOPA 1 TABLET(S): 25; 100 TABLET ORAL at 01:08

## 2017-05-18 RX ADMIN — CARBIDOPA AND LEVODOPA 1 TABLET(S): 25; 100 TABLET ORAL at 17:30

## 2017-05-18 RX ADMIN — AMPICILLIN SODIUM AND SULBACTAM SODIUM 200 GRAM(S): 250; 125 INJECTION, POWDER, FOR SUSPENSION INTRAMUSCULAR; INTRAVENOUS at 17:31

## 2017-05-18 RX ADMIN — Medication 1 TABLET(S): at 17:30

## 2017-05-18 RX ADMIN — PRAMIPEXOLE DIHYDROCHLORIDE 0.25 MILLIGRAM(S): 0.12 TABLET ORAL at 14:07

## 2017-05-18 RX ADMIN — Medication 650 MILLIGRAM(S): at 17:30

## 2017-05-18 RX ADMIN — PRAMIPEXOLE DIHYDROCHLORIDE 0.25 MILLIGRAM(S): 0.12 TABLET ORAL at 23:30

## 2017-05-18 RX ADMIN — Medication 25 MICROGRAM(S): at 06:30

## 2017-05-18 RX ADMIN — PANTOPRAZOLE SODIUM 40 MILLIGRAM(S): 20 TABLET, DELAYED RELEASE ORAL at 14:07

## 2017-05-18 RX ADMIN — Medication 1 TABLET(S): at 06:30

## 2017-05-18 RX ADMIN — ENOXAPARIN SODIUM 30 MILLIGRAM(S): 100 INJECTION SUBCUTANEOUS at 14:10

## 2017-05-18 RX ADMIN — CARBIDOPA AND LEVODOPA 1 TABLET(S): 25; 100 TABLET ORAL at 06:38

## 2017-05-18 RX ADMIN — PRAMIPEXOLE DIHYDROCHLORIDE 0.25 MILLIGRAM(S): 0.12 TABLET ORAL at 06:30

## 2017-05-18 RX ADMIN — CARBIDOPA AND LEVODOPA 1 TABLET(S): 25; 100 TABLET ORAL at 14:09

## 2017-05-18 RX ADMIN — AMPICILLIN SODIUM AND SULBACTAM SODIUM 200 GRAM(S): 250; 125 INJECTION, POWDER, FOR SUSPENSION INTRAMUSCULAR; INTRAVENOUS at 06:34

## 2017-05-18 RX ADMIN — IOHEXOL 30 MILLILITER(S): 300 INJECTION, SOLUTION INTRAVENOUS at 11:46

## 2017-05-18 RX ADMIN — CARBIDOPA AND LEVODOPA 1 TABLET(S): 25; 100 TABLET ORAL at 23:31

## 2017-05-18 NOTE — PROGRESS NOTE ADULT - SUBJECTIVE AND OBJECTIVE BOX
Patient seen and examined at the bedside.  No acute events overnight.     Continues on vent, unresponsive      HPI:  patient  with  worsening  lethargy  fever  dysphagia  evaluated  in  ER  admitted  for  pneumonia  UTI  patient  s/p  l  hip  medullary  nail (04 Apr 2017 19:53)      Vital Signs Last 24 Hrs  T(C): 38.1, Max: 38.1 (05-18 @ 11:53)  T(F): 100.6, Max: 100.6 (05-18 @ 11:53)  HR: 115 (90 - 115)  BP: 102/50 (102/50 - 125/62)  BP(mean): --  RR: 16 (16 - 19)  SpO2: 97% (96% - 100%)                          9.4    12.4  )-----------( 178      ( 18 May 2017 07:52 )             26.3       05-18    141  |  104  |  149<H>  ----------------------------<  112<H>  4.1   |  13<L>  |  5.46<H>    Ca    6.4<LL>      18 May 2017 07:52    TPro  7.0  /  Alb  1.1<L>  /  TBili  0.3  /  DBili  x   /  AST  24  /  ALT  9<L>  /  AlkPhos  81  05-18  LIVER FUNCTIONS - ( 18 May 2017 07:52 )  Alb: 1.1 g/dL / Pro: 7.0 gm/dL / ALK PHOS: 81 U/L / ALT: 9 U/L / AST: 24 U/L / GGT: x           I&O's Detail    I & Os for current day (as of 18 May 2017 13:58)  =============================================  IN:    Total IN: 0 ml  ---------------------------------------------  OUT:    Indwelling Catheter - Urethral: 400 ml    Total OUT: 400 ml  ---------------------------------------------  Total NET: -400 ml    Mode: AC/ CMV (Assist Control/ Continuous Mandatory Ventilation)  RR (machine): 16  TV (machine): 400  FiO2: 30  PEEP: 5  ITime: 1  MAP: 11  PIP: 22    MEDICATIONS  (STANDING):  pantoprazole   Suspension 40milliGRAM(s) Oral daily  lactobacillus acidophilus 1Tablet(s) Oral every 12 hours  carbidopa/levodopa  25/100 1Tablet(s) Oral four times a day  pramipexole 0.25milliGRAM(s) Oral three times a day  levothyroxine 25MICROGram(s) Oral daily  enoxaparin Injectable 30milliGRAM(s) SubCutaneous every 24 hours  ampicillin/sulbactam  IVPB 3Gram(s) IV Intermittent every 12 hours    MEDICATIONS  (PRN):  acetaminophen    Suspension. 650milliGRAM(s) Enteral Tube every 6 hours PRN Mild Pain (1 - 3)  acetaminophen    Suspension 650milliGRAM(s) Oral every 6 hours PRN For Temp greater than 38 C (100.4 F)        Physical Exam  GEN: supine  unresponsive  HEENT: NCAT, Trachea midline on vent, no scleral icterus  Resp: unlabored, no accessory muscle use evident, coarse BS  CV: No Tachycardia, S1 S2  ABD: Soft, +functioning colostomy liquified stool black, + PEG  EXT: warm well perfused, no edema, no calf tenderness    A/P: 86yFemale a/w recent h/o IM nail, and colovesicular fistula, coarse complicated by aspiration PNA, respiratory failure Pt required PEG by GI for nutrition, diverting colostomy, tracheostomy, parkinson's encephelopathy with poor prognosis    -consider PICC placement for long term IV access as current midline will need to be removed early next week  -continue supportive care  -continue shoemaker  -DVT prophylaxis  -continued medical management, palliative, urology, neuro, GI, follow-up  -no acute surgical indication at this time  -will d/w attgs      PAST MEDICAL & SURGICAL HISTORY:  Pneumonia  Anemia  Parkinson disease  Tremors of nervous system  No pertinent past medical history  Status post hip surgery  No significant past surgical history      -

## 2017-05-18 NOTE — PROGRESS NOTE ADULT - SUBJECTIVE AND OBJECTIVE BOX
No changes - on peg feedings; Intubated:      MEDICATIONS  (STANDING):  pantoprazole   Suspension 40milliGRAM(s) Oral daily  lactobacillus acidophilus 1Tablet(s) Oral every 12 hours  carbidopa/levodopa  25/100 1Tablet(s) Oral four times a day  pramipexole 0.25milliGRAM(s) Oral three times a day  levothyroxine 25MICROGram(s) Oral daily  enoxaparin Injectable 30milliGRAM(s) SubCutaneous every 24 hours  ampicillin/sulbactam  IVPB 3Gram(s) IV Intermittent every 12 hours    MEDICATIONS  (PRN):  acetaminophen    Suspension. 650milliGRAM(s) Enteral Tube every 6 hours PRN Mild Pain (1 - 3)  acetaminophen    Suspension 650milliGRAM(s) Oral every 6 hours PRN For Temp greater than 38 C (100.4 F)      Allergies    No Known Allergies    Intolerances        Vital Signs Last 24 Hrs  T(C): 38.1, Max: 38.1 (05-18 @ 11:53)  T(F): 100.6, Max: 100.6 (05-18 @ 11:53)  HR: 98 (90 - 115)  BP: 102/50 (102/50 - 125/62)  BP(mean): --  RR: 16 (16 - 19)  SpO2: 97% (96% - 100%)    PHYSICAL EXAM:  General: NAD.  CVS: S1, S2  Chest: air entry bilaterally present, +rhoncji  Abd: BS present, soft, non-tender,+peg      LABS:                        9.4    12.4  )-----------( 178      ( 18 May 2017 07:52 )             26.3     05-18    141  |  104  |  149<H>  ----------------------------<  112<H>  4.1   |  13<L>  |  5.46<H>    Ca    6.4<LL>      18 May 2017 07:52    TPro  7.0  /  Alb  1.1<L>  /  TBili  0.3  /  DBili  x   /  AST  24  /  ALT  9<L>  /  AlkPhos  81  05-18      continue antibx  continue peg feedings   H/H stable

## 2017-05-18 NOTE — PROGRESS NOTE ADULT - SUBJECTIVE AND OBJECTIVE BOX
Subjective: unresponsive. Chronically vented. FiO2 50%      MEDICATIONS  (STANDING):  pantoprazole   Suspension 40milliGRAM(s) Oral daily  lactobacillus acidophilus 1Tablet(s) Oral every 12 hours  carbidopa/levodopa  25/100 1Tablet(s) Oral four times a day  pramipexole 0.25milliGRAM(s) Oral three times a day  levothyroxine 25MICROGram(s) Oral daily  enoxaparin Injectable 30milliGRAM(s) SubCutaneous every 24 hours  ampicillin/sulbactam  IVPB 3Gram(s) IV Intermittent every 12 hours    MEDICATIONS  (PRN):  acetaminophen    Suspension. 650milliGRAM(s) Enteral Tube every 6 hours PRN Mild Pain (1 - 3)  acetaminophen    Suspension 650milliGRAM(s) Oral every 6 hours PRN For Temp greater than 38 C (100.4 F)          T(C): 37.8, Max: 37.9 (05-17 @ 22:38)  HR: 113 (90 - 113)  BP: 121/58 (103/46 - 125/62)  RR: 16 (16 - 19)  SpO2: 96% (96% - 100%)  Wt(kg): --        I&O's Detail    I & Os for current day (as of 18 May 2017 09:56)  =============================================  IN:    Total IN: 0 ml  ---------------------------------------------  OUT:    Indwelling Catheter - Urethral: 400 ml    Total OUT: 400 ml  ---------------------------------------------  Total NET: -400 ml      Mode: AC/ CMV (Assist Control/ Continuous Mandatory Ventilation)  RR (machine): 16  TV (machine): 400  FiO2: 30  PEEP: 5  ITime: 0.8  MAP: 11  PIP: 23       PHYSICAL EXAM:    GENERAL: unresponsive, chronically vented  EYES: EOMI, PERRLA, conjunctiva and sclera clear  NECK: Supple, no inc in JVP  CHEST/LUNG: coarse rhonchi  HEART: S1S2  ABDOMEN: Soft, Nontender, Nondistended; Bowel sounds present  EXTREMITIES:  pitting and non-pitting edema  NEURO: no asterixis      LABS:  CBC Full  -  ( 18 May 2017 07:52 )  WBC Count : 12.4 K/uL  Hemoglobin : 9.4 g/dL  Hematocrit : 26.3 %  Platelet Count - Automated : 178 K/uL  Mean Cell Volume : 85.0 fl  Mean Cell Hemoglobin : 30.3 pg  Mean Cell Hemoglobin Concentration : 35.6 gm/dL  Auto Neutrophil # : x  Auto Lymphocyte # : x  Auto Monocyte # : x  Auto Eosinophil # : x  Auto Basophil # : x  Auto Neutrophil % : x  Auto Lymphocyte % : x  Auto Monocyte % : x  Auto Eosinophil % : x  Auto Basophil % : x    05-18    141  |  104  |  149<H>  ----------------------------<  112<H>  4.1   |  13<L>  |  5.46<H>    Ca    6.4<LL>      18 May 2017 07:52    TPro  7.0  /  Alb  1.1<L>  /  TBili  0.3  /  DBili  x   /  AST  24  /  ALT  9<L>  /  AlkPhos  81  05-18        ASSESSMENT and PLAN:    ASSESSMENT and PLAN:  * ZAHIDA -- Likely nephrotoxic ATN vs AIN. DDx: per initial consult. Cont supportive care.  Avoid hypotension, nephrotoxins. CrCl < 10cc/min. Adjust all meds accordingly.   * Prognosis is rather poor overall. Pt is not a candidate for aggressive w/u nor is she a candidate for dialysis

## 2017-05-18 NOTE — PROGRESS NOTE ADULT - SUBJECTIVE AND OBJECTIVE BOX
INTERVAL HPI:  86F PMH Parkinson's, s/p recent mechanical fall with R intertrochanteric femur fracture requiring R intramedullary nailing of R trochanteric fracture of femur 3/13/17, UTI, PNA presents from [facility] rehab for sepsis, colovesical fistula with course complicated by aspiration PNA, Respiratory failure and now s/p trach and diverting loop colostomy.   Grew Acinetobacter in sputum and off and on with fever.    OVERNIGHT EVENTS:  Remains on Vent, still with fever and renal failure.    Vital Signs Last 24 Hrs  T(C): 38.1, Max: 38.1 (05-18 @ 11:53)  T(F): 100.6, Max: 100.6 (05-18 @ 11:53)  HR: 115 (90 - 115)  BP: 102/50 (102/50 - 125/62)  BP(mean): --  RR: 16 (16 - 19)  SpO2: 97% (96% - 100%)    Mode: AC/ CMV (Assist Control/ Continuous Mandatory Ventilation)  RR (machine): 16  TV (machine): 400  FiO2: 30  PEEP: 5  ITime: 1  MAP: 11  PIP: 22    PHYSICAL EXAM:  GEN:        unresponsive and comfortable.  HEENT:    Normal.    RESP:      no wheezing.  CVS:         Regular rate and rhythm.   ABD:         Soft, non-tender, non-distended;     MEDICATIONS  (STANDING):  pantoprazole   Suspension 40milliGRAM(s) Oral daily  lactobacillus acidophilus 1Tablet(s) Oral every 12 hours  carbidopa/levodopa  25/100 1Tablet(s) Oral four times a day  pramipexole 0.25milliGRAM(s) Oral three times a day  levothyroxine 25MICROGram(s) Oral daily  enoxaparin Injectable 30milliGRAM(s) SubCutaneous every 24 hours  ampicillin/sulbactam  IVPB 3Gram(s) IV Intermittent every 12 hours    MEDICATIONS  (PRN):  acetaminophen    Suspension. 650milliGRAM(s) Enteral Tube every 6 hours PRN Mild Pain (1 - 3)  acetaminophen    Suspension 650milliGRAM(s) Oral every 6 hours PRN For Temp greater than 38 C (100.4 F)    LABS:                        9.4    12.4  )-----------( 178      ( 18 May 2017 07:52 )             26.3     05-18    141  |  104  |  149<H>  ----------------------------<  112<H>  4.1   |  13<L>  |  5.46<H>    Ca    6.4<LL>      18 May 2017 07:52    TPro  7.0  /  Alb  1.1<L>  /  TBili  0.3  /  DBili  x   /  AST  24  /  ALT  9<L>  /  AlkPhos  81  05-18    ASSESSMENT and Plan:  ·	Hypoxic Respiratory failure.  ·	Aspiration Pneumonia. Acinetobacter.  ·	Sepsis.  ·	Metabolic encephalopathy.  ·	S/P Diverting  Colostomy for Colovesical Fistula.  ·	Anemia.  ·	Leukocytosis better.  ·	Parkinsonism.  ·	Renal insuffiencey.  ·	Hypernatremia improved.    On Unasyn for Acinetobacter.  Continue Vent support.  For abdominal CT due to on going fever.

## 2017-05-18 NOTE — PROGRESS NOTE ADULT - SUBJECTIVE AND OBJECTIVE BOX
TMAX - 100.6    On day #  10 Unasyn  alone now    Vital Signs Last 24 Hrs  T(C): 38.1, Max: 38.1 (05-18 @ 11:53)  T(F): 100.6, Max: 100.6 (05-18 @ 11:53)  HR: 98 (90 - 115)  BP: 102/50 (102/50 - 125/62)  BP(mean): --  RR: 16 (16 - 19)  SpO2: 97% (96% - 100%)  Mode: AC/ CMV (Assist Control/ Continuous Mandatory Ventilation)  RR (machine): 16  TV (machine): 400  FiO2: 30  PEEP: 5  ITime: 1  MAP: 9  PIP: 19  Supplemental O2:   on ventilator      Remains poorly responsive, on ventilator, on 30% FIO2 + 5 PEEP.  Had temp to 100.6  this AM and underwent CT ABD + PELVIS with oral contrast only.    PHYSICAL EXAM  General:  remains poorly responsive, on ventilator, but did open her eyes today to stimulation, but not following commands, also noted facial grimacing today.  HEENT:  conj pink, sclerae anicteric, PERRLA, no oral lesions noted  Neck: semi-supple, no nodes noted, trach site with small amount of dried blood, and sutures remain in place   Heart:  RR  Lungs:  decreased BS at bases bilaterally  Abdomen:  soft, BS+, appears nontender, no masses noted                   PEG site clean - tube feedings in progress                   colostomy functioning and with dark brown loose stool noted  Extremities:  2+ edema RLE, 1+ edema LLE                     hands and arms with mild swelling                     Rt upper arm with Midline in place - site clean, dressing intact - placed 4/25  Skin: warm, dry, no rash noted       I&O's Summary :    I & Os for current day (as of 18 May 2017 16:05)  =============================================  IN: 0 ml / OUT: 400 ml / NET: -400 ml       LABS:  CBC Full  -  ( 18 May 2017 07:52 )  WBC Count : 12.4 K/uL  Hemoglobin : 9.4 g/dL  Hematocrit : 26.3 %  Platelet Count - Automated : 178 K/uL  Mean Cell Volume : 85.0 fl  Mean Cell Hemoglobin : 30.3 pg  Mean Cell Hemoglobin Concentration : 35.6 gm/dL  Auto Neutrophil # : x  Auto Lymphocyte # : x  Auto Monocyte # : x  Auto Eosinophil # : x  Auto Basophil # : x  Auto Neutrophil % : x  Auto Lymphocyte % : x  Auto Monocyte % : x  Auto Eosinophil % : x  Auto Basophil % : x    05-18    141  |  104  |  149<H>  ----------------------------<  112<H>  4.1   |  13<L>  |  5.46<H>    Ca    6.4<LL>      18 May 2017 07:52    TPro  7.0  /  Alb  1.1<L>  /  TBili  0.3  /  DBili  x   /  AST  24  /  ALT  9<L>  /  AlkPhos  81  05-18    LIVER FUNCTIONS - ( 18 May 2017 07:52 )  Alb: 1.1 g/dL / Pro: 7.0 gm/dL / ALK PHOS: 81 U/L / ALT: 9 U/L / AST: 24 U/L / GGT: x               CULTURES:  Specimen Source: .CSF CSF (05-11 @ 23:11)  Culture Results:   No growth at 3 days. (05-11 @ 23:11)          Radiology:  CXR - 5/17/17 -    FINDINGS: Opacity in right upper lung zone appears more dense now than on   prior study of May 9, 2017, and pleura lateral left lung base appears   thicker now than on prior examination, compatible with interval increase   in right-sided airspace disease as well as interval increase in   left-sided pleural effusion. Clinical correlation is advised. Remainder   of the study appears similar to prior exam.    IMPRESSION: Findings as above.                      CT ABD + PELVIS -   5/18/18 -  IMPRESSION:     Small bilateral pleural effusions, right greater than left, with   associated bibasilar patchy opacities which may represent atelectasis   and/or pneumonia.    Urinary bladder is collapsed around a Colon catheter and contains a small   amount of air which  may be related to recent instrumentation. Correlate   with urinalysis.        Impression:  Low-grade temps on Unasyn alone now for rx of MDRO Acinetobacter Multifocal PNA with respiratory failure, and with mild increase WBC's and CXR now reported with increase in infiltrates as well as Lt pleural effusion ( bilateral effusions as per CT Scan), and with worsening renal function.    Suggestions: Will continue current ab rx with Unasyn for now and re-cx blood and sputum now in view of increase in temps.  Follow-up labs in AM.  Discussed this AM with Dr. White via phone.

## 2017-05-18 NOTE — PROGRESS NOTE ADULT - SUBJECTIVE AND OBJECTIVE BOX
INTERVAL HPI/OVERNIGHT EVENTS:        REVIEW OF SYSTEMS:  CONSTITUTIONAL:  continues  unresponsive  on  respirator  with  low  grade  fever      MEDICATION:  pantoprazole   Suspension 40milliGRAM(s) Oral daily  lactobacillus acidophilus 1Tablet(s) Oral every 12 hours  acetaminophen    Suspension. 650milliGRAM(s) Enteral Tube every 6 hours PRN  carbidopa/levodopa  25/100 1Tablet(s) Oral four times a day  pramipexole 0.25milliGRAM(s) Oral three times a day  acetaminophen    Suspension 650milliGRAM(s) Oral every 6 hours PRN  levothyroxine 25MICROGram(s) Oral daily  enoxaparin Injectable 30milliGRAM(s) SubCutaneous every 24 hours  ampicillin/sulbactam  IVPB 3Gram(s) IV Intermittent every 12 hours    Vital Signs Last 24 Hrs  T(C): 37.8, Max: 37.9 (05-17 @ 22:38)  T(F): 100, Max: 100.3 (05-17 @ 22:38)  HR: 98 (88 - 100)  BP: 121/58 (103/46 - 125/62)  BP(mean): --  RR: 16 (16 - 19)  SpO2: 99% (98% - 100%)    PHYSICAL EXAM:  GENERAL: NAD, well-groomed, well-developed  EYES:  conjunctiva and sclera clear  ENMT:  Moist mucous membranes,   NECK: Supple, No JVD, Normal thyroid  NERVOUS SYSTEM:  Alert oriented   no  focal  deficits;   CHEST/LUNG: Clear    HEART: Regular rate and rhythm; No murmurs, rubs, or gallops  ABDOMEN: Soft, Nontender, Nondistended; Bowel sounds present  EXTREMITIES:  no  edema no  tenderness  SKIN: No rashes   LABS:                        8.8    11.7  )-----------( 183      ( 17 May 2017 07:59 )             25.1     05-17    140  |  103  |  150<H>  ----------------------------<  107<H>  4.0   |  12<L>  |  5.07<H>    Ca    6.6<L>      17 May 2017 07:59          CAPILLARY BLOOD GLUCOSE      RADIOLOGY & ADDITIONAL TESTS:    Imaging reports  Personally Reviewed:  [x ] YES  [ ] NO    Consultant(s) Notes Reviewed:  [x ] YES  [ ] NO    Care Discussed with Consultants/Other Providers [x ] YES  [ ] NO  ACUTE  RESPIRATORY  FAILURE  PNEUMONIA   CONT  VENT  SUPPORT  AB  IVF  S/P  TRACH    recurrant  fever   cont  AB  as  per  ID  tylenol  PRN  recheck  cultures  COLO/VESCICULAR FISTULA S/P  diverting  colostomy    LFT elevation observation  metabolic  encephalopathy with  severe  inflammatory  processs supportive care          Problem: Parkinson disease encephalopathy.  Plan: sinemet pramipexole  anemia  continue  to  monitor  no  clinical  evidence  of  acute bleed  transfuse as  needed    fever  pneumonia  obseve  with  new  AB  regimen might need  BRIEN   will discuss  with  ID  urinary  retention  observe  with  shoemaker     Worsening acute  on renal  failure  cont  ivf  as  per  renal  prognosis  guarded

## 2017-05-18 NOTE — PROGRESS NOTE ADULT - SUBJECTIVE AND OBJECTIVE BOX
HPI:  patient  with  worsening  lethargy  fever  dysphagia  evaluated  in  ER  admitted  for  pneumonia  UTI  patient  s/p  l  hip  medullary  nail (04 Apr 2017 19:53)    ingterval 24 hrs   no real change   on vent   unresponsive   no new sx   all sx controlled   prognosis poor   son has refused [place] option   only wants extended care

## 2017-05-19 LAB
ALBUMIN SERPL ELPH-MCNC: 1.1 G/DL — LOW (ref 3.3–5)
ALP SERPL-CCNC: 82 U/L — SIGNIFICANT CHANGE UP (ref 40–120)
ALT FLD-CCNC: 8 U/L — LOW (ref 12–78)
ANION GAP SERPL CALC-SCNC: 24 MMOL/L — HIGH (ref 5–17)
AST SERPL-CCNC: 29 U/L — SIGNIFICANT CHANGE UP (ref 15–37)
BILIRUB SERPL-MCNC: 0.3 MG/DL — SIGNIFICANT CHANGE UP (ref 0.2–1.2)
BUN SERPL-MCNC: 153 MG/DL — HIGH (ref 7–23)
CALCIUM SERPL-MCNC: 6 MG/DL — CRITICAL LOW (ref 8.5–10.1)
CHLORIDE SERPL-SCNC: 103 MMOL/L — SIGNIFICANT CHANGE UP (ref 96–108)
CO2 SERPL-SCNC: 12 MMOL/L — LOW (ref 22–31)
CREAT SERPL-MCNC: 5.53 MG/DL — HIGH (ref 0.5–1.3)
GLUCOSE SERPL-MCNC: 128 MG/DL — HIGH (ref 70–99)
GRAM STN FLD: SIGNIFICANT CHANGE UP
HCT VFR BLD CALC: 22 % — LOW (ref 34.5–45)
HGB BLD-MCNC: 7.7 G/DL — LOW (ref 11.5–15.5)
MCHC RBC-ENTMCNC: 29.4 PG — SIGNIFICANT CHANGE UP (ref 27–34)
MCHC RBC-ENTMCNC: 35 GM/DL — SIGNIFICANT CHANGE UP (ref 32–36)
MCV RBC AUTO: 84 FL — SIGNIFICANT CHANGE UP (ref 80–100)
PLATELET # BLD AUTO: 160 K/UL — SIGNIFICANT CHANGE UP (ref 150–400)
POTASSIUM SERPL-MCNC: 4.1 MMOL/L — SIGNIFICANT CHANGE UP (ref 3.5–5.3)
POTASSIUM SERPL-SCNC: 4.1 MMOL/L — SIGNIFICANT CHANGE UP (ref 3.5–5.3)
PROT SERPL-MCNC: 6.6 GM/DL — SIGNIFICANT CHANGE UP (ref 6–8.3)
RBC # BLD: 2.62 M/UL — LOW (ref 3.8–5.2)
RBC # FLD: 16.5 % — HIGH (ref 11–15)
SODIUM SERPL-SCNC: 139 MMOL/L — SIGNIFICANT CHANGE UP (ref 135–145)
SPECIMEN SOURCE: SIGNIFICANT CHANGE UP
WBC # BLD: 10.8 K/UL — HIGH (ref 3.8–10.5)
WBC # FLD AUTO: 10.8 K/UL — HIGH (ref 3.8–10.5)

## 2017-05-19 RX ORDER — COLISTIMETHATE SODIUM 150 MG/2ML
150 INJECTION INTRAMUSCULAR; INTRAVENOUS ONCE
Qty: 0 | Refills: 0 | Status: COMPLETED | OUTPATIENT
Start: 2017-05-19 | End: 2017-05-19

## 2017-05-19 RX ADMIN — PRAMIPEXOLE DIHYDROCHLORIDE 0.25 MILLIGRAM(S): 0.12 TABLET ORAL at 22:24

## 2017-05-19 RX ADMIN — Medication 1 TABLET(S): at 17:51

## 2017-05-19 RX ADMIN — COLISTIMETHATE SODIUM 100 MILLIGRAM(S): 150 INJECTION INTRAMUSCULAR; INTRAVENOUS at 17:50

## 2017-05-19 RX ADMIN — Medication 650 MILLIGRAM(S): at 12:27

## 2017-05-19 RX ADMIN — Medication 25 MICROGRAM(S): at 06:06

## 2017-05-19 RX ADMIN — Medication 1 TABLET(S): at 06:06

## 2017-05-19 RX ADMIN — ENOXAPARIN SODIUM 30 MILLIGRAM(S): 100 INJECTION SUBCUTANEOUS at 13:18

## 2017-05-19 RX ADMIN — AMPICILLIN SODIUM AND SULBACTAM SODIUM 200 GRAM(S): 250; 125 INJECTION, POWDER, FOR SUSPENSION INTRAMUSCULAR; INTRAVENOUS at 18:17

## 2017-05-19 RX ADMIN — CARBIDOPA AND LEVODOPA 1 TABLET(S): 25; 100 TABLET ORAL at 23:41

## 2017-05-19 RX ADMIN — PRAMIPEXOLE DIHYDROCHLORIDE 0.25 MILLIGRAM(S): 0.12 TABLET ORAL at 06:06

## 2017-05-19 RX ADMIN — CARBIDOPA AND LEVODOPA 1 TABLET(S): 25; 100 TABLET ORAL at 12:26

## 2017-05-19 RX ADMIN — PANTOPRAZOLE SODIUM 40 MILLIGRAM(S): 20 TABLET, DELAYED RELEASE ORAL at 12:26

## 2017-05-19 RX ADMIN — AMPICILLIN SODIUM AND SULBACTAM SODIUM 200 GRAM(S): 250; 125 INJECTION, POWDER, FOR SUSPENSION INTRAMUSCULAR; INTRAVENOUS at 06:06

## 2017-05-19 RX ADMIN — CARBIDOPA AND LEVODOPA 1 TABLET(S): 25; 100 TABLET ORAL at 17:54

## 2017-05-19 RX ADMIN — CARBIDOPA AND LEVODOPA 1 TABLET(S): 25; 100 TABLET ORAL at 06:06

## 2017-05-19 RX ADMIN — Medication 650 MILLIGRAM(S): at 06:06

## 2017-05-19 NOTE — PROGRESS NOTE ADULT - SUBJECTIVE AND OBJECTIVE BOX
Patient seen and examined bedside resting comfortably.  cannot communicate.   Colon catheter drainig clear urine      T(F): 100.6, Max: 101.4 (05-19 @ 06:04)  HR: 113 (92 - 114)  BP: 118/56 (117/56 - 128/60)  RR: 18 (18 - 19)  SpO2: 97% (96% - 98%)    PHYSICAL EXAM:  General: NAD, WDWN  Neuro: non communicatve  HEENT: NCAT, EOMI, conjunctiva clear  Lung: tracheostomy in place  Abdomen: soft, NTND. Normactive BS  Extremities:edema ++  : Colon draining clear urine    LABS:                        7.7    10.8  )-----------( 160      ( 19 May 2017 08:45 )             22.0     05-19    139  |  103  |  153<H>  ----------------------------<  128<H>  4.1   |  12<L>  |  5.53<H>    Ca    6.0<LL>      19 May 2017 08:45    TPro  6.6  /  Alb  1.1<L>  /  TBili  0.3  /  DBili  x   /  AST  29  /  ALT  8<L>  /  AlkPhos  82  05-19      I&O's Detail    I & Os for current day (as of 19 May 2017 14:28)  =============================================  IN:    Suplena: 960 ml    Enteral Tube Flush: 400 ml    Solution: 200 ml    Total IN: 1560 ml  ---------------------------------------------  OUT:    Indwelling Catheter - Urethral: 550 ml    Colostomy: 200 ml    Total OUT: 750 ml  ---------------------------------------------  Total NET: 810 ml

## 2017-05-19 NOTE — PROGRESS NOTE ADULT - SUBJECTIVE AND OBJECTIVE BOX
Patient seen in follow up for ZAHIDA; no new events, no distress.    MEDICATIONS  (STANDING):  pantoprazole   Suspension 40milliGRAM(s) Oral daily  lactobacillus acidophilus 1Tablet(s) Oral every 12 hours  carbidopa/levodopa  25/100 1Tablet(s) Oral four times a day  pramipexole 0.25milliGRAM(s) Oral three times a day  levothyroxine 25MICROGram(s) Oral daily  enoxaparin Injectable 30milliGRAM(s) SubCutaneous every 24 hours  ampicillin/sulbactam  IVPB 3Gram(s) IV Intermittent every 12 hours    MEDICATIONS  (PRN):  acetaminophen    Suspension. 650milliGRAM(s) Enteral Tube every 6 hours PRN Mild Pain (1 - 3)  acetaminophen    Suspension 650milliGRAM(s) Oral every 6 hours PRN For Temp greater than 38 C (100.4 F)    PHYSICAL EXAM:      T(C): 38.6, Max: 38.6 (05-19 @ 06:04)  HR: 92 (92 - 115)  BP: 125/59 (102/50 - 128/60)  RR: 18 (18 - 19)  SpO2: 97% (96% - 98%)  Wt(kg): --  Respiratory: clear anteriorly, decreased BS at bases  Cardiovascular: S1 S2  Gastrointestinal: soft NT ND +BS  Extremities:    1-2 edema                                    7.7    10.8  )-----------( 160      ( 19 May 2017 08:45 )             22.0     05-19    139  |  103  |  153<H>  ----------------------------<  128<H>  4.1   |  12<L>  |  5.53<H>    Ca    6.0<LL>  Ca corrects to 8.4     19 May 2017 08:45    TPro  6.6  /  Alb  1.1<L>  /  TBili  0.3  /  DBili  x   /  AST  29  /  ALT  8<L>  /  AlkPhos  82  05-19      LIVER FUNCTIONS - ( 19 May 2017 08:45 )  Alb: 1.1 g/dL / Pro: 6.6 gm/dL / ALK PHOS: 82 U/L / ALT: 8 U/L / AST: 29 U/L / GGT: x             Assessment and Plan:    ZAHIDA, 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   Supportive care only as per team.   Overall prognosis poor.

## 2017-05-19 NOTE — PROGRESS NOTE ADULT - SUBJECTIVE AND OBJECTIVE BOX
HPI:  patient  with  worsening  lethargy  fever  dysphagia  evaluated  in  ER  admitted  for  pneumonia  UTI  patient  s/p  l  hip  medullary  nail (04 Apr 2017 19:53)  no interval change   pt is in vent   prognosis poor   still waiting for disposition   sx controlled

## 2017-05-19 NOTE — PROGRESS NOTE ADULT - SUBJECTIVE AND OBJECTIVE BOX
INTERVAL HPI/OVERNIGHT EVENTS:        REVIEW OF SYSTEMS:  CONSTITUTIONAL:  continues  unresponsive on  respirator  continues  with  fever      MEDICATION:  pantoprazole   Suspension 40milliGRAM(s) Oral daily  lactobacillus acidophilus 1Tablet(s) Oral every 12 hours  acetaminophen    Suspension. 650milliGRAM(s) Enteral Tube every 6 hours PRN  carbidopa/levodopa  25/100 1Tablet(s) Oral four times a day  pramipexole 0.25milliGRAM(s) Oral three times a day  acetaminophen    Suspension 650milliGRAM(s) Oral every 6 hours PRN  levothyroxine 25MICROGram(s) Oral daily  enoxaparin Injectable 30milliGRAM(s) SubCutaneous every 24 hours  ampicillin/sulbactam  IVPB 3Gram(s) IV Intermittent every 12 hours    Vital Signs Last 24 Hrs  T(C): 38.6, Max: 38.6 (05-19 @ 06:04)  T(F): 101.4, Max: 101.4 (05-19 @ 06:04)  HR: 113 (98 - 115)  BP: 125/59 (102/50 - 128/60)  BP(mean): --  RR: 18 (18 - 19)  SpO2: 98% (96% - 98%)    PHYSICAL EXAM:  GENERAL: NAD, well-groomed, well-developed  EYES:  conjunctiva and sclera clear  ENMT:  Moist mucous membranes,   NECK: Supple, No JVD, Normal thyroid  NERVOUS SYSTEM:  Alert oriented   no  focal  deficits;   CHEST/LUNG: Clear    HEART: Regular rate and rhythm; No murmurs, rubs, or gallops  ABDOMEN: Soft, Nontender, Nondistended; Bowel sounds present  EXTREMITIES:  no  edema no  tenderness  SKIN: No rashes   LABS:                        9.4    12.4  )-----------( 178      ( 18 May 2017 07:52 )             26.3     05-18    141  |  104  |  149<H>  ----------------------------<  112<H>  4.1   |  13<L>  |  5.46<H>    Ca    6.4<LL>      18 May 2017 07:52    TPro  7.0  /  Alb  1.1<L>  /  TBili  0.3  /  DBili  x   /  AST  24  /  ALT  9<L>  /  AlkPhos  81  05-18        CAPILLARY BLOOD GLUCOSE      RADIOLOGY & ADDITIONAL TESTS:    Imaging reports  Personally Reviewed:  [x ] YES  [ ] NO    Consultant(s) Notes Reviewed:  [x ] YES  [ ] NO    Care Discussed with Consultants/Other Providers [x ] YES  [ ] NO  ACUTE  RESPIRATORY  FAILURE  PNEUMONIA   CONT  VENT  SUPPORT  AB  IVF  S/P  TRACH    recurrant  fever   cont  AB  as  per  ID  tylenol  PRN  recheck  cultures  COLO/VESCICULAR FISTULA S/P  diverting  colostomy    LFT elevation observation  metabolic  encephalopathy with  severe  inflammatory  processs supportive care          Problem: Parkinson disease encephalopathy.  Plan: sinemet pramipexole  anemia  continue  to  monitor  no  clinical  evidence  of  acute bleed  transfuse as  needed    fever  pneumonia  obseve  with  new  AB  regimen might need  BRIEN   will discuss  with  ID  urinary  retention  observe  with  shoemaker     Worsening acute  on renal  failure  cont    as  per  renal  prognosis  guarded

## 2017-05-19 NOTE — PROGRESS NOTE ADULT - SUBJECTIVE AND OBJECTIVE BOX
TMAX - 101.4    On day # 11 Unasyn    Vital Signs Last 24 Hrs  T(C): 38.1, Max: 38.6 (05-19 @ 06:04)  T(F): 100.6, Max: 101.4 (05-19 @ 06:04)  HR: 113 (92 - 114)  BP: 118/56 (117/56 - 128/60)  BP(mean): --  RR: 18 (18 - 19)  SpO2: 97% (96% - 98%)  Mode: AC/ CMV (Assist Control/ Continuous Mandatory Ventilation)  RR (machine): 16  TV (machine): 400  FiO2: 30  PEEP: 5  PS: 3  ITime: 1  MAP: 10  PIP: 22  Supplemental O2:  on ventilator on 30% FIO2 + 5 PEEP    Remains poorly respnsive, on ventilator with intermittent fevers noted.      PHYSICAL EXAM  General: poorly responsive, on ventilator, lying in bed in semi-upright position  HEENT:  conj pink, sclerae anicteric, PERRLA, no oral lesions noted  Neck:  semi-supple, no nodes noted             trach site clean  Heart:  RR  Lungs:  decreased BS at bases bilaterally  Abdomen:  soft, BS+, nontender appearance, PEG in place with feedings in progress                   Colostomy functioning and presently leaking some dark brown liquid drainage  Extremities: trace edema LE's                     hands and arms with mild swelling                     Rt upper arm with Midline in place - site clean, dressing intact, placed 4/25   Skin:  warm, clammy now, no rash noted      I&O's Summary :    I & Os for current day (as of 19 May 2017 16:46)  =============================================  IN: 1560 ml / OUT: 750 ml / NET: 810 ml      LABS:  CBC Full  -  ( 19 May 2017 08:45 )  WBC Count : 10.8 K/uL  Hemoglobin : 7.7 g/dL  Hematocrit : 22.0 %  Platelet Count - Automated : 160 K/uL  Mean Cell Volume : 84.0 fl  Mean Cell Hemoglobin : 29.4 pg  Mean Cell Hemoglobin Concentration : 35.0 gm/dL  Auto Neutrophil # : x  Auto Lymphocyte # : x  Auto Monocyte # : x  Auto Eosinophil # : x  Auto Basophil # : x  Auto Neutrophil % : x  Auto Lymphocyte % : x  Auto Monocyte % : x  Auto Eosinophil % : x  Auto Basophil % : x    05-19    139  |  103  |  153<H>  ----------------------------<  128<H>  4.1   |  12<L>  |  5.53<H>    Ca    6.0<LL>      19 May 2017 08:45    TPro  6.6  /  Alb  1.1<L>  /  TBili  0.3  /  DBili  x   /  AST  29  /  ALT  8<L>  /  AlkPhos  82  05-19    LIVER FUNCTIONS - ( 19 May 2017 08:45 )  Alb: 1.1 g/dL / Pro: 6.6 gm/dL / ALK PHOS: 82 U/L / ALT: 8 U/L / AST: 29 U/L / GGT: x             CULTURES:  Specimen Source: .Sputum Sputum (05-19 @ 00:48)          Impression:    Suggestions:

## 2017-05-19 NOTE — PROGRESS NOTE ADULT - SUBJECTIVE AND OBJECTIVE BOX
No changes - intubated - on tube feedings    MEDICATIONS  (STANDING):  pantoprazole   Suspension 40milliGRAM(s) Oral daily  lactobacillus acidophilus 1Tablet(s) Oral every 12 hours  carbidopa/levodopa  25/100 1Tablet(s) Oral four times a day  pramipexole 0.25milliGRAM(s) Oral three times a day  levothyroxine 25MICROGram(s) Oral daily  enoxaparin Injectable 30milliGRAM(s) SubCutaneous every 24 hours  ampicillin/sulbactam  IVPB 3Gram(s) IV Intermittent every 12 hours    MEDICATIONS  (PRN):  acetaminophen    Suspension. 650milliGRAM(s) Enteral Tube every 6 hours PRN Mild Pain (1 - 3)  acetaminophen    Suspension 650milliGRAM(s) Oral every 6 hours PRN For Temp greater than 38 C (100.4 F)      Allergies    No Known Allergies    Intolerances        Vital Signs Last 24 Hrs  T(C): 37.8, Max: 38.6 (05-19 @ 06:04)  T(F): 100.1, Max: 101.4 (05-19 @ 06:04)  HR: 111 (92 - 114)  BP: 136/64 (117/56 - 136/64)  BP(mean): --  RR: 20 (18 - 20)  SpO2: 98% (97% - 98%)    PHYSICAL EXAM:  General: NAD.  CVS: S1, S2  Chest: air entry bilaterally present, + rhonchi  Abd: BS present, soft, non-tender, +peg      LABS:                        7.7    10.8  )-----------( 160      ( 19 May 2017 08:45 )             22.0     05-19    139  |  103  |  153<H>  ----------------------------<  128<H>  4.1   |  12<L>  |  5.53<H>    Ca    6.0<LL>      19 May 2017 08:45    TPro  6.6  /  Alb  1.1<L>  /  TBili  0.3  /  DBili  x   /  AST  29  /  ALT  8<L>  /  AlkPhos  82  05-19      pt with slowly falling H/H again  pt not a candidate for endoscopy  consider transfusion for hgb<7-7.5

## 2017-05-20 LAB
ALBUMIN SERPL ELPH-MCNC: 1.1 G/DL — LOW (ref 3.3–5)
ALP SERPL-CCNC: 101 U/L — SIGNIFICANT CHANGE UP (ref 40–120)
ALT FLD-CCNC: 14 U/L — SIGNIFICANT CHANGE UP (ref 12–78)
ANION GAP SERPL CALC-SCNC: 26 MMOL/L — HIGH (ref 5–17)
AST SERPL-CCNC: 53 U/L — HIGH (ref 15–37)
BILIRUB SERPL-MCNC: 0.3 MG/DL — SIGNIFICANT CHANGE UP (ref 0.2–1.2)
BLD GP AB SCN SERPL QL: SIGNIFICANT CHANGE UP
BUN SERPL-MCNC: 156 MG/DL — HIGH (ref 7–23)
CALCIUM SERPL-MCNC: 6.3 MG/DL — CRITICAL LOW (ref 8.5–10.1)
CHLORIDE SERPL-SCNC: 105 MMOL/L — SIGNIFICANT CHANGE UP (ref 96–108)
CO2 SERPL-SCNC: 11 MMOL/L — LOW (ref 22–31)
CREAT SERPL-MCNC: 5.72 MG/DL — HIGH (ref 0.5–1.3)
GLUCOSE SERPL-MCNC: 118 MG/DL — HIGH (ref 70–99)
HCT VFR BLD CALC: 19.4 % — CRITICAL LOW (ref 34.5–45)
HGB BLD-MCNC: 7 G/DL — CRITICAL LOW (ref 11.5–15.5)
MCHC RBC-ENTMCNC: 30.9 PG — SIGNIFICANT CHANGE UP (ref 27–34)
MCHC RBC-ENTMCNC: 36.1 GM/DL — HIGH (ref 32–36)
MCV RBC AUTO: 85.7 FL — SIGNIFICANT CHANGE UP (ref 80–100)
PLATELET # BLD AUTO: 123 K/UL — LOW (ref 150–400)
POTASSIUM SERPL-MCNC: 4.1 MMOL/L — SIGNIFICANT CHANGE UP (ref 3.5–5.3)
POTASSIUM SERPL-SCNC: 4.1 MMOL/L — SIGNIFICANT CHANGE UP (ref 3.5–5.3)
PROT SERPL-MCNC: 6.6 GM/DL — SIGNIFICANT CHANGE UP (ref 6–8.3)
RBC # BLD: 2.26 M/UL — LOW (ref 3.8–5.2)
RBC # FLD: 17.2 % — HIGH (ref 11–15)
SODIUM SERPL-SCNC: 142 MMOL/L — SIGNIFICANT CHANGE UP (ref 135–145)
WBC # BLD: 12.5 K/UL — HIGH (ref 3.8–10.5)
WBC # FLD AUTO: 12.5 K/UL — HIGH (ref 3.8–10.5)

## 2017-05-20 RX ORDER — AMPICILLIN SODIUM AND SULBACTAM SODIUM 250; 125 MG/ML; MG/ML
3 INJECTION, POWDER, FOR SUSPENSION INTRAMUSCULAR; INTRAVENOUS EVERY 24 HOURS
Qty: 0 | Refills: 0 | Status: DISCONTINUED | OUTPATIENT
Start: 2017-05-21 | End: 2017-05-23

## 2017-05-20 RX ADMIN — CARBIDOPA AND LEVODOPA 1 TABLET(S): 25; 100 TABLET ORAL at 17:41

## 2017-05-20 RX ADMIN — PRAMIPEXOLE DIHYDROCHLORIDE 0.25 MILLIGRAM(S): 0.12 TABLET ORAL at 06:33

## 2017-05-20 RX ADMIN — Medication 650 MILLIGRAM(S): at 06:34

## 2017-05-20 RX ADMIN — Medication 1 TABLET(S): at 17:41

## 2017-05-20 RX ADMIN — Medication 25 MICROGRAM(S): at 06:33

## 2017-05-20 RX ADMIN — PRAMIPEXOLE DIHYDROCHLORIDE 0.25 MILLIGRAM(S): 0.12 TABLET ORAL at 14:56

## 2017-05-20 RX ADMIN — ENOXAPARIN SODIUM 30 MILLIGRAM(S): 100 INJECTION SUBCUTANEOUS at 12:13

## 2017-05-20 RX ADMIN — PRAMIPEXOLE DIHYDROCHLORIDE 0.25 MILLIGRAM(S): 0.12 TABLET ORAL at 22:19

## 2017-05-20 RX ADMIN — Medication 1 TABLET(S): at 06:33

## 2017-05-20 RX ADMIN — CARBIDOPA AND LEVODOPA 1 TABLET(S): 25; 100 TABLET ORAL at 12:12

## 2017-05-20 RX ADMIN — PANTOPRAZOLE SODIUM 40 MILLIGRAM(S): 20 TABLET, DELAYED RELEASE ORAL at 12:12

## 2017-05-20 RX ADMIN — AMPICILLIN SODIUM AND SULBACTAM SODIUM 200 GRAM(S): 250; 125 INJECTION, POWDER, FOR SUSPENSION INTRAMUSCULAR; INTRAVENOUS at 06:34

## 2017-05-20 RX ADMIN — CARBIDOPA AND LEVODOPA 1 TABLET(S): 25; 100 TABLET ORAL at 06:33

## 2017-05-20 NOTE — PROGRESS NOTE ADULT - SUBJECTIVE AND OBJECTIVE BOX
TMAX- 101.4 - decreased to 100.6    On day # 12 Unasyn / s/p Colistin x 1 last PM    Vital Signs Last 24 Hrs  T(C): 37.2, Max: 38.1 (05-20 @ 05:21)  T(F): 99, Max: 100.6 (05-20 @ 05:21)  HR: 92 (60 - 115)  BP: 110/53 (102/57 - 138/65)  BP(mean): --  RR: 24 (18 - 25)  SpO2: 98% (96% - 100%)  Mode: AC/ CMV (Assist Control/ Continuous Mandatory Ventilation)  RR (machine): 16  TV (machine): 400  FiO2: 30  PEEP: 5  ITime: 0.8  MAP: 10  PIP: 22  Supplemental O2:  on ventilator    Remains poorly responsive, on ventilator, on 30% FIO2 + 5 PEEP.  Blood transfusion in progress now for low H+H this AM of 7.0/ 19.4.        PHYSICAL EXAM  General:  poorly responsive, on ventilator, with occassional facial grimacing noted, lying semi-upright in bed now.  HEENT:  conj pink, sclerae anicteric, PERRLA, no oral lesions noted  Neck:  semi-supple, no nodes noted             trach site clean, sutures in place  Heart:  RR  Lungs:  decreased BS at bases bilaterally  Abdomen: soft, BS+, appears nontender, PEG site clean - feedings in progress                  Colostomy with black-appearing liquid stool in bag now   Extremities: 1+ edema LE's                     Lt upper arm with Midline in place - site clean, dressing intact - placed 4/25   Skin:  warm, dry, no rash noted      I&O's Summary :    I & Os for current day (as of 20 May 2017 18:34)  =============================================  IN: 0 ml / OUT: 650 ml / NET: -650 ml        LABS:  CBC Full  -  ( 20 May 2017 07:26 )  WBC Count : 12.5 K/uL  Hemoglobin : 7.0 g/dL  Hematocrit : 19.4 %  Platelet Count - Automated : 123 K/uL  Mean Cell Volume : 85.7 fl  Mean Cell Hemoglobin : 30.9 pg  Mean Cell Hemoglobin Concentration : 36.1 gm/dL  Auto Neutrophil # : x  Auto Lymphocyte # : x  Auto Monocyte # : x  Auto Eosinophil # : x  Auto Basophil # : x  Auto Neutrophil % : x  Auto Lymphocyte % : x  Auto Monocyte % : x  Auto Eosinophil % : x  Auto Basophil % : x    05-20    142  |  105  |  156<H>  ----------------------------<  118<H>  4.1   |  11<L>  |  5.72<H>    Ca    6.3<LL>      20 May 2017 07:26    TPro  6.6  /  Alb  1.1<L>  /  TBili  0.3  /  DBili  x   /  AST  53<H>  /  ALT  14  /  AlkPhos  101  05-20    LIVER FUNCTIONS - ( 20 May 2017 07:26 )  Alb: 1.1 g/dL / Pro: 6.6 gm/dL / ALK PHOS: 101 U/L / ALT: 14 U/L / AST: 53 U/L / GGT: x               CULTURES:  Specimen Source: .Sputum Sputum (05-19 @ 00:48)  Culture Results:   Few Gram Negative Rods (05-19 @ 00:48)    Specimen Source: .Blood Blood (05-19 @ 00:32)  Culture Results:   No growth to date. (05-19 @ 00:32)    Specimen Source: .Blood Blood (05-19 @ 00:28)  Culture Results:   No growth to date. (05-19 @ 00:28)        Impression:  Fevers continue intermittently, on Unasyn and s/p Colistin x1, and now with GI bleeding and anemia requiring blood transfusion again and worsening renal function.    Suggestions: Will follow-up blood and sputum cx's  and adjust Unasyn dose again to q24hr dosing in view of renal dysfunction.  Follow-up temps, labs, and CXR. Condition poor.

## 2017-05-20 NOTE — PROVIDER CONTACT NOTE (CRITICAL VALUE NOTIFICATION) - SITUATION
calcium 6.0
Calcium 6.3
Critical value result Hgb 7.2 HH 20.9
low Hb
Hemoglobin 7.0  Hematocrit 19.4

## 2017-05-20 NOTE — PROGRESS NOTE ADULT - SUBJECTIVE AND OBJECTIVE BOX
INTERVAL HPI:  86F PMH Parkinson's, s/p recent mechanical fall with R intertrochanteric femur fracture requiring R intramedullary nailing of R trochanteric fracture of femur 3/13/17, UTI, PNA presents from [facility] rehab for sepsis, colovesical fistula with course complicated by aspiration PNA, Respiratory failure and now s/p trach and diverting loop colostomy.   Grew Acinetobacter in sputum and off and on with fever.    OVERNIGHT EVENTS:  Fever to 100.6 noted, otherwise no change.    Vital Signs Last 24 Hrs  T(C): 37.5, Max: 38.1 (05-20 @ 05:21)  T(F): 99.5, Max: 100.6 (05-20 @ 05:21)  HR: 99 (60 - 115)  BP: 120/56 (112/53 - 138/65)  BP(mean): --  RR: 24 (18 - 25)  SpO2: 99% (96% - 100%)    Mode: AC/ CMV (Assist Control/ Continuous Mandatory Ventilation)  RR (machine): 16  TV (machine): 400  FiO2: 30  PEEP: 5  ITime: 0.8  MAP: 9  PIP: 21    PHYSICAL EXAM:  GEN:        unresponsive and comfortable.  HEENT:    trach   RESP:   CVS:         Regular rate and rhythm.   ABD:        peg    MEDICATIONS  (STANDING):  pantoprazole   Suspension 40milliGRAM(s) Oral daily  lactobacillus acidophilus 1Tablet(s) Oral every 12 hours  carbidopa/levodopa  25/100 1Tablet(s) Oral four times a day  pramipexole 0.25milliGRAM(s) Oral three times a day  levothyroxine 25MICROGram(s) Oral daily  enoxaparin Injectable 30milliGRAM(s) SubCutaneous every 24 hours  ampicillin/sulbactam  IVPB 3Gram(s) IV Intermittent every 12 hours    MEDICATIONS  (PRN):  acetaminophen    Suspension. 650milliGRAM(s) Enteral Tube every 6 hours PRN Mild Pain (1 - 3)  acetaminophen    Suspension 650milliGRAM(s) Oral every 6 hours PRN For Temp greater than 38 C (100.4 F)    LABS:                        7.0    12.5  )-----------( 123      ( 20 May 2017 07:26 )             19.4     05-20    142  |  105  |  156<H>  ----------------------------<  118<H>  4.1   |  11<L>  |  5.72<H>    Ca    6.3<LL>      20 May 2017 07:26    TPro  6.6  /  Alb  1.1<L>  /  TBili  0.3  /  DBili  x   /  AST  53<H>  /  ALT  14  /  AlkPhos  101  05-20    ASSESSMENT and Plan:  ·	Hypoxic Respiratory failure.  ·	Aspiration Pneumonia. Acinetobacter.  ·	Bilateral  pleural effusion.  ·	Sepsis.  ·	Metabolic encephalopathy.  ·	S/P Diverting  Colostomy for Colovesical Fistula.  ·	Anemia.  ·	Leukocytosis better.  ·	Parkinsonism.  ·	Renal insuffiencey.  ·	Hypernatremia improved.    On Unasyn. Will Continue Vent support.

## 2017-05-20 NOTE — PROGRESS NOTE ADULT - SUBJECTIVE AND OBJECTIVE BOX
INTERVAL HPI/OVERNIGHT EVENTS:        REVIEW OF SYSTEMS:  CONSTITUTIONAL:  unresponsive  on  respirator      MEDICATION:  pantoprazole   Suspension 40milliGRAM(s) Oral daily  lactobacillus acidophilus 1Tablet(s) Oral every 12 hours  acetaminophen    Suspension. 650milliGRAM(s) Enteral Tube every 6 hours PRN  carbidopa/levodopa  25/100 1Tablet(s) Oral four times a day  pramipexole 0.25milliGRAM(s) Oral three times a day  acetaminophen    Suspension 650milliGRAM(s) Oral every 6 hours PRN  levothyroxine 25MICROGram(s) Oral daily  enoxaparin Injectable 30milliGRAM(s) SubCutaneous every 24 hours  ampicillin/sulbactam  IVPB 3Gram(s) IV Intermittent every 12 hours    Vital Signs Last 24 Hrs  T(C): 38.1, Max: 38.1 (05-19 @ 10:56)  T(F): 100.6, Max: 100.6 (05-19 @ 10:56)  HR: 87 (87 - 115)  BP: 112/53 (112/53 - 136/64)  BP(mean): --  RR: 18 (18 - 20)  SpO2: 98% (96% - 98%)    PHYSICAL EXAM:  GENERAL: NAD,   EYES:  conjunctiva and sclera clear  ENMT:  Moist mucous membranes,   NECK: Supple, No JVD, Normal thyroid  NERVOUS SYSTEM:  unrespnsive;   CHEST/LUNG: ronchis    HEART: Regular rate and rhythm; No murmurs, rubs, or gallops  ABDOMEN: Soft, Nontender, Nondistended; Bowel sounds present  EXTREMITIES:  no  edema  SKIN: No rashes   LABS:                        7.7    10.8  )-----------( 160      ( 19 May 2017 08:45 )             22.0     05-19    139  |  103  |  153<H>  ----------------------------<  128<H>  4.1   |  12<L>  |  5.53<H>    Ca    6.0<LL>      19 May 2017 08:45    TPro  6.6  /  Alb  1.1<L>  /  TBili  0.3  /  DBili  x   /  AST  29  /  ALT  8<L>  /  AlkPhos  82  05-19        CAPILLARY BLOOD GLUCOSE      RADIOLOGY & ADDITIONAL TESTS:    Imaging reports  Personally Reviewed:  [ x] YES  [ ] NO    Consultant(s) Notes Reviewed:  [x ] YES  [ ] NO    Care Discussed with Consultants/Other Providers [x ] YES  [ ] NO  ACUTE  RESPIRATORY  FAILURE  PNEUMONIA   CONT  VENT  SUPPORT  AB  IVF  S/P  TRACH    recurrant  fever   cont  AB  as  per  ID  tylenol  PRN  recheck  cultures  COLO/VESCICULAR FISTULA S/P  diverting  colostomy    LFT elevation observation  metabolic  encephalopathy with  severe  inflammatory  processs supportive care          Problem: Parkinson disease encephalopathy.  Plan: sinemet pramipexole  anemia  continue  to  monitor  no  clinical  evidence  of  acute bleed  transfuse as  needed    fever  pneumonia  obseve  with  new  AB    urinary  retention  observe  with  shoemaker     Worsening acute  on renal  failure  cont    as  per  renal  prognosis  guarded  discussed  with  pt's  son

## 2017-05-21 LAB
-  AMIKACIN: SIGNIFICANT CHANGE UP
-  AMPICILLIN/SULBACTAM: SIGNIFICANT CHANGE UP
-  CEFEPIME: SIGNIFICANT CHANGE UP
-  CEFTAZIDIME: SIGNIFICANT CHANGE UP
-  CEFTAZIDIME: SIGNIFICANT CHANGE UP
-  CIPROFLOXACIN: SIGNIFICANT CHANGE UP
-  GENTAMICIN: SIGNIFICANT CHANGE UP
-  IMIPENEM: SIGNIFICANT CHANGE UP
-  LEVOFLOXACIN: SIGNIFICANT CHANGE UP
-  LEVOFLOXACIN: SIGNIFICANT CHANGE UP
-  MEROPENEM: SIGNIFICANT CHANGE UP
-  TOBRAMYCIN: SIGNIFICANT CHANGE UP
-  TRIMETHOPRIM/SULFAMETHOXAZOLE: SIGNIFICANT CHANGE UP
ALBUMIN SERPL ELPH-MCNC: 1.1 G/DL — LOW (ref 3.3–5)
ALP SERPL-CCNC: 92 U/L — SIGNIFICANT CHANGE UP (ref 40–120)
ALT FLD-CCNC: 11 U/L — LOW (ref 12–78)
ANION GAP SERPL CALC-SCNC: 25 MMOL/L — HIGH (ref 5–17)
AST SERPL-CCNC: 33 U/L — SIGNIFICANT CHANGE UP (ref 15–37)
BILIRUB SERPL-MCNC: 0.3 MG/DL — SIGNIFICANT CHANGE UP (ref 0.2–1.2)
BUN SERPL-MCNC: 167 MG/DL — HIGH (ref 7–23)
CALCIUM SERPL-MCNC: 6.5 MG/DL — CRITICAL LOW (ref 8.5–10.1)
CHLORIDE SERPL-SCNC: 104 MMOL/L — SIGNIFICANT CHANGE UP (ref 96–108)
CO2 SERPL-SCNC: 12 MMOL/L — LOW (ref 22–31)
CREAT SERPL-MCNC: 5.45 MG/DL — HIGH (ref 0.5–1.3)
CULTURE RESULTS: SIGNIFICANT CHANGE UP
GLUCOSE SERPL-MCNC: 143 MG/DL — HIGH (ref 70–99)
GRAM STN FLD: SIGNIFICANT CHANGE UP
HCT VFR BLD CALC: 27.4 % — LOW (ref 34.5–45)
HGB BLD-MCNC: 9.6 G/DL — LOW (ref 11.5–15.5)
MCHC RBC-ENTMCNC: 29.7 PG — SIGNIFICANT CHANGE UP (ref 27–34)
MCHC RBC-ENTMCNC: 34.9 GM/DL — SIGNIFICANT CHANGE UP (ref 32–36)
MCV RBC AUTO: 85 FL — SIGNIFICANT CHANGE UP (ref 80–100)
METHOD TYPE: SIGNIFICANT CHANGE UP
ORGANISM # SPEC MICROSCOPIC CNT: SIGNIFICANT CHANGE UP
PLATELET # BLD AUTO: 116 K/UL — LOW (ref 150–400)
POTASSIUM SERPL-MCNC: 3.9 MMOL/L — SIGNIFICANT CHANGE UP (ref 3.5–5.3)
POTASSIUM SERPL-SCNC: 3.9 MMOL/L — SIGNIFICANT CHANGE UP (ref 3.5–5.3)
PROT SERPL-MCNC: 6.7 GM/DL — SIGNIFICANT CHANGE UP (ref 6–8.3)
RBC # BLD: 3.22 M/UL — LOW (ref 3.8–5.2)
RBC # FLD: 16.7 % — HIGH (ref 11–15)
SODIUM SERPL-SCNC: 141 MMOL/L — SIGNIFICANT CHANGE UP (ref 135–145)
SPECIMEN SOURCE: SIGNIFICANT CHANGE UP
WBC # BLD: 12 K/UL — HIGH (ref 3.8–10.5)
WBC # FLD AUTO: 12 K/UL — HIGH (ref 3.8–10.5)

## 2017-05-21 PROCEDURE — 71010: CPT | Mod: 26

## 2017-05-21 RX ORDER — SODIUM CHLORIDE 9 MG/ML
1000 INJECTION, SOLUTION INTRAVENOUS
Qty: 0 | Refills: 0 | Status: COMPLETED | OUTPATIENT
Start: 2017-05-21 | End: 2017-05-21

## 2017-05-21 RX ADMIN — CARBIDOPA AND LEVODOPA 1 TABLET(S): 25; 100 TABLET ORAL at 18:22

## 2017-05-21 RX ADMIN — PANTOPRAZOLE SODIUM 40 MILLIGRAM(S): 20 TABLET, DELAYED RELEASE ORAL at 11:44

## 2017-05-21 RX ADMIN — Medication 25 MICROGRAM(S): at 05:19

## 2017-05-21 RX ADMIN — PRAMIPEXOLE DIHYDROCHLORIDE 0.25 MILLIGRAM(S): 0.12 TABLET ORAL at 21:39

## 2017-05-21 RX ADMIN — ENOXAPARIN SODIUM 30 MILLIGRAM(S): 100 INJECTION SUBCUTANEOUS at 11:44

## 2017-05-21 RX ADMIN — AMPICILLIN SODIUM AND SULBACTAM SODIUM 200 GRAM(S): 250; 125 INJECTION, POWDER, FOR SUSPENSION INTRAMUSCULAR; INTRAVENOUS at 05:15

## 2017-05-21 RX ADMIN — SODIUM CHLORIDE 100 MILLILITER(S): 9 INJECTION, SOLUTION INTRAVENOUS at 11:44

## 2017-05-21 RX ADMIN — PRAMIPEXOLE DIHYDROCHLORIDE 0.25 MILLIGRAM(S): 0.12 TABLET ORAL at 14:09

## 2017-05-21 RX ADMIN — PRAMIPEXOLE DIHYDROCHLORIDE 0.25 MILLIGRAM(S): 0.12 TABLET ORAL at 05:17

## 2017-05-21 RX ADMIN — CARBIDOPA AND LEVODOPA 1 TABLET(S): 25; 100 TABLET ORAL at 05:16

## 2017-05-21 RX ADMIN — Medication 1 TABLET(S): at 05:17

## 2017-05-21 RX ADMIN — CARBIDOPA AND LEVODOPA 1 TABLET(S): 25; 100 TABLET ORAL at 11:44

## 2017-05-21 RX ADMIN — CARBIDOPA AND LEVODOPA 1 TABLET(S): 25; 100 TABLET ORAL at 01:03

## 2017-05-21 RX ADMIN — SODIUM CHLORIDE 100 MILLILITER(S): 9 INJECTION, SOLUTION INTRAVENOUS at 21:39

## 2017-05-21 RX ADMIN — Medication 1 TABLET(S): at 18:22

## 2017-05-21 NOTE — PROGRESS NOTE ADULT - SUBJECTIVE AND OBJECTIVE BOX
Patient seen in follow up for ZAHIDA; no new events. Remains unresponsive.    MEDICATIONS  (STANDING):  pantoprazole   Suspension 40milliGRAM(s) Oral daily  lactobacillus acidophilus 1Tablet(s) Oral every 12 hours  carbidopa/levodopa  25/100 1Tablet(s) Oral four times a day  pramipexole 0.25milliGRAM(s) Oral three times a day  levothyroxine 25MICROGram(s) Oral daily  enoxaparin Injectable 30milliGRAM(s) SubCutaneous every 24 hours  ampicillin/sulbactam  IVPB 3Gram(s) IV Intermittent every 24 hours  dextrose 5% 1000milliLiter(s) IV Continuous <Continuous>    MEDICATIONS  (PRN):  acetaminophen    Suspension. 650milliGRAM(s) Enteral Tube every 6 hours PRN Mild Pain (1 - 3)  acetaminophen    Suspension 650milliGRAM(s) Oral every 6 hours PRN For Temp greater than 38 C (100.4 F)    PHYSICAL EXAM:      T(C): 36.8, Max: 37.7 (05-20 @ 12:00)  HR: 104 (60 - 104)  BP: 117/64 (102/57 - 138/65)  RR: 19 (18 - 25)  SpO2: 97% (96% - 100%)  Wt(kg): --  Respiratory: clear anteriorly, decreased BS at bases  Cardiovascular: S1 S2  Gastrointestinal: soft NT ND +BS  Extremities:  1  edema                                    9.6    12.0  )-----------( 116      ( 21 May 2017 07:54 )             27.4     05-21    141  |  104  |  167<H>  ----------------------------<  143<H>  3.9   |  12<L>  |  5.45<H>    Ca    6.5<LL>      21 May 2017 07:54    TPro  6.7  /  Alb  1.1<L>  /  TBili  0.3  /  DBili  x   /  AST  33  /  ALT  11<L>  /  AlkPhos  92  05-21      LIVER FUNCTIONS - ( 21 May 2017 07:54 )  Alb: 1.1 g/dL / Pro: 6.7 gm/dL / ALK PHOS: 92 U/L / ALT: 11 U/L / AST: 33 U/L / GGT: x             Assessment and Plan:  ZAHIDA, ATN suspected; supportive care with IVF and bicarbonate; TF adjustments. Patient seen in follow up for ZAHIDA; no new events. Remains unresponsive.    MEDICATIONS  (STANDING):  pantoprazole   Suspension 40milliGRAM(s) Oral daily  lactobacillus acidophilus 1Tablet(s) Oral every 12 hours  carbidopa/levodopa  25/100 1Tablet(s) Oral four times a day  pramipexole 0.25milliGRAM(s) Oral three times a day  levothyroxine 25MICROGram(s) Oral daily  enoxaparin Injectable 30milliGRAM(s) SubCutaneous every 24 hours  ampicillin/sulbactam  IVPB 3Gram(s) IV Intermittent every 24 hours  dextrose 5% 1000milliLiter(s) IV Continuous <Continuous>    MEDICATIONS  (PRN):  acetaminophen    Suspension. 650milliGRAM(s) Enteral Tube every 6 hours PRN Mild Pain (1 - 3)  acetaminophen    Suspension 650milliGRAM(s) Oral every 6 hours PRN For Temp greater than 38 C (100.4 F)    PHYSICAL EXAM:      T(C): 36.8, Max: 37.7 (05-20 @ 12:00)  HR: 104 (60 - 104)  BP: 117/64 (102/57 - 138/65)  RR: 19 (18 - 25)  SpO2: 97% (96% - 100%)  Wt(kg): --  Respiratory: clear anteriorly, decreased BS at bases  Cardiovascular: S1 S2  Gastrointestinal: soft NT ND +BS  Extremities:  1  edema                                    9.6    12.0  )-----------( 116      ( 21 May 2017 07:54 )             27.4     05-21    141  |  104  |  167<H>  ----------------------------<  143<H>  3.9   |  12<L>  |  5.45<H>    Ca    6.5<LL>      21 May 2017 07:54    TPro  6.7  /  Alb  1.1<L>  /  TBili  0.3  /  DBili  x   /  AST  33  /  ALT  11<L>  /  AlkPhos  92  05-21      LIVER FUNCTIONS - ( 21 May 2017 07:54 )  Alb: 1.1 g/dL / Pro: 6.7 gm/dL / ALK PHOS: 92 U/L / ALT: 11 U/L / AST: 33 U/L / GGT: x             Assessment and Plan:  ZAHIDA, ATN suspected; supportive care with IVF and bicarbonate; TF adjustments.  Elevated BUN>>>cr suggestive of GI urea generation with blood loss.

## 2017-05-21 NOTE — PROGRESS NOTE ADULT - SUBJECTIVE AND OBJECTIVE BOX
INTERVAL HPI/OVERNIGHT EVENTS:    s/p  transfusion 2  units  PRBCs    REVIEW OF SYSTEMS:  CONSTITUTIONAL:  continues  unresponsive  on  respirator    NECK: No pain or stiffness  RESPIRATORY: No SOB   CARDIOVASCULAR: No chest pain, palpitations, dizziness,   GASTROINTESTINAL: No abdominal pain. No nausea, vomiting,   NEUROLOGICAL: No headaches, no  blurry  vision no  dizziness  SKIN: No itching,   MUSCULOSKELETAL: No pain    MEDICATION:  pantoprazole   Suspension 40milliGRAM(s) Oral daily  lactobacillus acidophilus 1Tablet(s) Oral every 12 hours  acetaminophen    Suspension. 650milliGRAM(s) Enteral Tube every 6 hours PRN  carbidopa/levodopa  25/100 1Tablet(s) Oral four times a day  pramipexole 0.25milliGRAM(s) Oral three times a day  acetaminophen    Suspension 650milliGRAM(s) Oral every 6 hours PRN  levothyroxine 25MICROGram(s) Oral daily  enoxaparin Injectable 30milliGRAM(s) SubCutaneous every 24 hours  ampicillin/sulbactam  IVPB 3Gram(s) IV Intermittent every 24 hours    Vital Signs Last 24 Hrs  T(C): 36.8, Max: 37.7 (05-20 @ 12:00)  T(F): 98.2, Max: 99.8 (05-20 @ 12:00)  HR: 101 (60 - 103)  BP: 117/64 (102/57 - 138/65)  BP(mean): --  RR: 19 (18 - 25)  SpO2: 98% (96% - 100%)    PHYSICAL EXAM:  GENERAL: NAD, well-groomed, well-developed  EYES:  conjunctiva and sclera clear  ENMT:  Moist mucous membranes,   NECK: Supple, No JVD, Normal thyroid  NERVOUS SYSTEM:  Alert oriented   no  focal  deficits;   CHEST/LUNG: Clear    HEART: Regular rate and rhythm; No murmurs, rubs, or gallops  ABDOMEN: Soft, Nontender, Nondistended; Bowel sounds present  EXTREMITIES:  no  edema no  tenderness  SKIN: No rashes   LABS:                 repeat  labs  pending    CAPILLARY BLOOD GLUCOSE      RADIOLOGY & ADDITIONAL TESTS:    Imaging reports  Personally Reviewed:  [x ] YES  [ ] NO    Consultant(s) Notes Reviewed:  [x ] YES  [ ] NO    Care Discussed with Consultants/Other Providers [x ] YES  [ ] NO  ACUTE  RESPIRATORY  FAILURE  PNEUMONIA   CONT  VENT  SUPPORT  AB  IVF  S/P  TRACH    recurrant  fever   cont  AB  as  per  ID  tylenol  PRN  recheck  cultures  COLO/VESCICULAR FISTULA S/P  diverting  colostomy    LFT elevation observation  metabolic  encephalopathy with  severe  inflammatory  processs supportive care          Problem: Parkinson disease encephalopathy.  Plan: sinemet pramipexole  anemia  continue  to  monitor  no  clinical  evidence  of  acute bleed  transfuse as  needed    fever  pneumonia  obseve  with  new  AB    urinary  retention  observe  with  shoemaker     Worsening acute  on renal  failure  cont    as  per  renal  prognosis  guarded

## 2017-05-21 NOTE — PROGRESS NOTE ADULT - SUBJECTIVE AND OBJECTIVE BOX
INTERVAL HPI:  86F PMH Parkinson's, s/p recent mechanical fall with R intertrochanteric femur fracture requiring R intramedullary nailing of R trochanteric fracture of femur 3/13/17, UTI, PNA presents from [facility] rehab for sepsis, colovesical fistula with course complicated by aspiration PNA, Respiratory failure and now s/p trach and diverting loop colostomy.   Grew Acinetobacter in sputum and off and on with fever.    OVERNIGHT EVENTS:  Unresponsive, comfortable looking on Vent.    Vital Signs Last 24 Hrs  T(C): 37.2, Max: 37.6 (05-20 @ 18:00)  T(F): 99, Max: 99.7 (05-20 @ 19:01)  HR: 89 (78 - 104)  BP: 117/54 (102/57 - 117/64)  BP(mean): --  RR: 19 (19 - 24)  SpO2: 99% (97% - 100%)    Mode: AC/ CMV (Assist Control/ Continuous Mandatory Ventilation)  RR (machine): 16  TV (machine): 400  FiO2: 30  PEEP: 5  ITime: 1  MAP: 9  PIP: 24    PHYSICAL EXAM:  GEN:        un responsive and comfortable.  HEENT:    Normal.    RESP:      normal looking.  CVS:         Regular rate and rhythm.   ABD:         Soft, non-tender, non-distended;     MEDICATIONS  (STANDING):  pantoprazole   Suspension 40milliGRAM(s) Oral daily  lactobacillus acidophilus 1Tablet(s) Oral every 12 hours  carbidopa/levodopa  25/100 1Tablet(s) Oral four times a day  pramipexole 0.25milliGRAM(s) Oral three times a day  levothyroxine 25MICROGram(s) Oral daily  enoxaparin Injectable 30milliGRAM(s) SubCutaneous every 24 hours  ampicillin/sulbactam  IVPB 3Gram(s) IV Intermittent every 24 hours    MEDICATIONS  (PRN):  acetaminophen    Suspension. 650milliGRAM(s) Enteral Tube every 6 hours PRN Mild Pain (1 - 3)  acetaminophen    Suspension 650milliGRAM(s) Oral every 6 hours PRN For Temp greater than 38 C (100.4 F)    LABS:                        9.6    12.0  )-----------( 116      ( 21 May 2017 07:54 )             27.4     05-21    141  |  104  |  167<H>  ----------------------------<  143<H>  3.9   |  12<L>  |  5.45<H>    Ca    6.5<LL>      21 May 2017 07:54    TPro  6.7  /  Alb  1.1<L>  /  TBili  0.3  /  DBili  x   /  AST  33  /  ALT  11<L>  /  AlkPhos  92  05-21    ASSESSMENT and Plan:  ·	Hypoxic Respiratory failure.  ·	Aspiration Pneumonia. Acinetobacter.  ·	Bilateral  pleural effusion.  ·	Sepsis.  ·	Metabolic encephalopathy.  ·	S/P Diverting  Colostomy for Colovesical Fistula.  ·	Anemia.  ·	Leukocytosis better.  ·	Parkinsonism.  ·	CKD4  ·	Hypernatremia improved.    Antibiotics, Vent and Nutritional support.

## 2017-05-22 LAB
ALBUMIN SERPL ELPH-MCNC: 1 G/DL — LOW (ref 3.3–5)
ALP SERPL-CCNC: 79 U/L — SIGNIFICANT CHANGE UP (ref 40–120)
ALT FLD-CCNC: 8 U/L — LOW (ref 12–78)
ANION GAP SERPL CALC-SCNC: 28 MMOL/L — HIGH (ref 5–17)
ANISOCYTOSIS BLD QL: SLIGHT — SIGNIFICANT CHANGE UP
AST SERPL-CCNC: 17 U/L — SIGNIFICANT CHANGE UP (ref 15–37)
BASO STIPL BLD QL SMEAR: PRESENT — SIGNIFICANT CHANGE UP
BILIRUB SERPL-MCNC: 0.3 MG/DL — SIGNIFICANT CHANGE UP (ref 0.2–1.2)
BUN SERPL-MCNC: 161 MG/DL — HIGH (ref 7–23)
CA-I BLD-SCNC: 0.85 MMOL/L — LOW (ref 1.05–1.34)
CALCIUM SERPL-MCNC: 6.2 MG/DL — CRITICAL LOW (ref 8.5–10.1)
CHLORIDE SERPL-SCNC: 99 MMOL/L — SIGNIFICANT CHANGE UP (ref 96–108)
CO2 SERPL-SCNC: 15 MMOL/L — LOW (ref 22–31)
CREAT SERPL-MCNC: 5.42 MG/DL — HIGH (ref 0.5–1.3)
DACRYOCYTES BLD QL SMEAR: SLIGHT — SIGNIFICANT CHANGE UP
ELLIPTOCYTES BLD QL SMEAR: SLIGHT — SIGNIFICANT CHANGE UP
EOSINOPHIL NFR BLD AUTO: 2 % — SIGNIFICANT CHANGE UP (ref 0–6)
GLUCOSE SERPL-MCNC: 130 MG/DL — HIGH (ref 70–99)
HCT VFR BLD CALC: 24.3 % — LOW (ref 34.5–45)
HGB BLD-MCNC: 9 G/DL — LOW (ref 11.5–15.5)
LYMPHOCYTES # BLD AUTO: 13 % — SIGNIFICANT CHANGE UP (ref 13–44)
MACROCYTES BLD QL: SLIGHT — SIGNIFICANT CHANGE UP
MCHC RBC-ENTMCNC: 30.4 PG — SIGNIFICANT CHANGE UP (ref 27–34)
MCHC RBC-ENTMCNC: 37 GM/DL — HIGH (ref 32–36)
MCV RBC AUTO: 82.1 FL — SIGNIFICANT CHANGE UP (ref 80–100)
MICROCYTES BLD QL: SLIGHT — SIGNIFICANT CHANGE UP
MONOCYTES NFR BLD AUTO: 2 % — SIGNIFICANT CHANGE UP (ref 2–14)
NEUTROPHILS NFR BLD AUTO: 80 % — HIGH (ref 43–77)
NEUTS BAND # BLD: 3 % — SIGNIFICANT CHANGE UP (ref 0–8)
OVALOCYTES BLD QL SMEAR: SLIGHT — SIGNIFICANT CHANGE UP
PLAT MORPH BLD: NORMAL — SIGNIFICANT CHANGE UP
PLATELET # BLD AUTO: 102 K/UL — LOW (ref 150–400)
POIKILOCYTOSIS BLD QL AUTO: SLIGHT — SIGNIFICANT CHANGE UP
POLYCHROMASIA BLD QL SMEAR: SLIGHT — SIGNIFICANT CHANGE UP
POTASSIUM SERPL-MCNC: 3.3 MMOL/L — LOW (ref 3.5–5.3)
POTASSIUM SERPL-SCNC: 3.3 MMOL/L — LOW (ref 3.5–5.3)
PROT SERPL-MCNC: 6.5 GM/DL — SIGNIFICANT CHANGE UP (ref 6–8.3)
RBC # BLD: 2.96 M/UL — LOW (ref 3.8–5.2)
RBC # FLD: 16 % — HIGH (ref 11–15)
RBC BLD AUTO: ABNORMAL
ROULEAUX BLD QL SMEAR: PRESENT — SIGNIFICANT CHANGE UP
SODIUM SERPL-SCNC: 142 MMOL/L — SIGNIFICANT CHANGE UP (ref 135–145)
WBC # BLD: 12.2 K/UL — HIGH (ref 3.8–10.5)
WBC # FLD AUTO: 12.2 K/UL — HIGH (ref 3.8–10.5)

## 2017-05-22 RX ORDER — POTASSIUM CHLORIDE 20 MEQ
20 PACKET (EA) ORAL ONCE
Qty: 0 | Refills: 0 | Status: DISCONTINUED | OUTPATIENT
Start: 2017-05-22 | End: 2017-05-22

## 2017-05-22 RX ORDER — SUCRALFATE 1 G
1 TABLET ORAL
Qty: 0 | Refills: 0 | Status: DISCONTINUED | OUTPATIENT
Start: 2017-05-22 | End: 2017-05-27

## 2017-05-22 RX ORDER — POTASSIUM CHLORIDE 20 MEQ
20 PACKET (EA) ORAL ONCE
Qty: 0 | Refills: 0 | Status: COMPLETED | OUTPATIENT
Start: 2017-05-22 | End: 2017-05-22

## 2017-05-22 RX ORDER — NYSTATIN CREAM 100000 [USP'U]/G
1 CREAM TOPICAL
Qty: 0 | Refills: 0 | Status: DISCONTINUED | OUTPATIENT
Start: 2017-05-22 | End: 2017-05-27

## 2017-05-22 RX ADMIN — CARBIDOPA AND LEVODOPA 1 TABLET(S): 25; 100 TABLET ORAL at 06:07

## 2017-05-22 RX ADMIN — Medication 1 TABLET(S): at 17:24

## 2017-05-22 RX ADMIN — CARBIDOPA AND LEVODOPA 1 TABLET(S): 25; 100 TABLET ORAL at 17:23

## 2017-05-22 RX ADMIN — PRAMIPEXOLE DIHYDROCHLORIDE 0.25 MILLIGRAM(S): 0.12 TABLET ORAL at 23:41

## 2017-05-22 RX ADMIN — Medication 20 MILLIEQUIVALENT(S): at 15:54

## 2017-05-22 RX ADMIN — Medication 25 MICROGRAM(S): at 06:08

## 2017-05-22 RX ADMIN — Medication 1 GRAM(S): at 11:40

## 2017-05-22 RX ADMIN — NYSTATIN CREAM 1 APPLICATION(S): 100000 CREAM TOPICAL at 18:45

## 2017-05-22 RX ADMIN — CARBIDOPA AND LEVODOPA 1 TABLET(S): 25; 100 TABLET ORAL at 00:00

## 2017-05-22 RX ADMIN — Medication 1 TABLET(S): at 06:07

## 2017-05-22 RX ADMIN — Medication 1 GRAM(S): at 17:24

## 2017-05-22 RX ADMIN — PANTOPRAZOLE SODIUM 40 MILLIGRAM(S): 20 TABLET, DELAYED RELEASE ORAL at 11:40

## 2017-05-22 RX ADMIN — Medication 1 GRAM(S): at 23:41

## 2017-05-22 RX ADMIN — CARBIDOPA AND LEVODOPA 1 TABLET(S): 25; 100 TABLET ORAL at 11:40

## 2017-05-22 RX ADMIN — ENOXAPARIN SODIUM 30 MILLIGRAM(S): 100 INJECTION SUBCUTANEOUS at 11:40

## 2017-05-22 RX ADMIN — PRAMIPEXOLE DIHYDROCHLORIDE 0.25 MILLIGRAM(S): 0.12 TABLET ORAL at 15:55

## 2017-05-22 RX ADMIN — PRAMIPEXOLE DIHYDROCHLORIDE 0.25 MILLIGRAM(S): 0.12 TABLET ORAL at 06:07

## 2017-05-22 RX ADMIN — AMPICILLIN SODIUM AND SULBACTAM SODIUM 200 GRAM(S): 250; 125 INJECTION, POWDER, FOR SUSPENSION INTRAMUSCULAR; INTRAVENOUS at 06:08

## 2017-05-22 RX ADMIN — CARBIDOPA AND LEVODOPA 1 TABLET(S): 25; 100 TABLET ORAL at 23:42

## 2017-05-22 NOTE — PROGRESS NOTE ADULT - SUBJECTIVE AND OBJECTIVE BOX
INTERVAL HPI/OVERNIGHT EVENTS:        REVIEW OF SYSTEMS:  CONSTITUTIONAL:  unresponsive on  respirator    NECK: No pain or stiffnes  RESPIRATORY: No SOB   CARDIOVASCULAR: No chest pain, palpitations, dizziness,   GASTROINTESTINAL: No abdominal pain. No nausea, vomiting,   NEUROLOGICAL: No headaches, no  blurry  vision no  dizziness  SKIN: No itching,   MUSCULOSKELETAL: No pain    MEDICATION:  pantoprazole   Suspension 40milliGRAM(s) Oral daily  lactobacillus acidophilus 1Tablet(s) Oral every 12 hours  acetaminophen    Suspension. 650milliGRAM(s) Enteral Tube every 6 hours PRN  carbidopa/levodopa  25/100 1Tablet(s) Oral four times a day  pramipexole 0.25milliGRAM(s) Oral three times a day  acetaminophen    Suspension 650milliGRAM(s) Oral every 6 hours PRN  levothyroxine 25MICROGram(s) Oral daily  enoxaparin Injectable 30milliGRAM(s) SubCutaneous every 24 hours  ampicillin/sulbactam  IVPB 3Gram(s) IV Intermittent every 24 hours  sucralfate 1Gram(s) Oral four times a day    Vital Signs Last 24 Hrs  T(C): 36.1, Max: 37.2 (05-21 @ 17:16)  T(F): 97, Max: 99 (05-21 @ 17:16)  HR: 84 (78 - 112)  BP: 123/56 (107/53 - 123/56)  BP(mean): --  RR: 20 (19 - 20)  SpO2: 98% (97% - 100%)    PHYSICAL EXAM:  GENERAL: NAD, well-groomed, well-developed  EYES:  conjunctiva and sclera clear  ENMT:  Moist mucous membranes,   NECK: Supple, No JVD, Normal thyroid  NERVOUS SYSTEM:  Alert oriented   no  focal  deficits;   CHEST/LUNG: Clear    HEART: Regular rate and rhythm; No murmurs, rubs, or gallops  ABDOMEN: Soft, Nontender, Nondistended; Bowel sounds present  EXTREMITIES:  no  edema no  tenderness  SKIN: No rashes   LABS:                        9.0    12.2  )-----------( 102      ( 22 May 2017 08:35 )             24.3     05-22    142  |  99  |  161<H>  ----------------------------<  130<H>  3.3<L>   |  15<L>  |  5.42<H>    Ca    6.2<LL>      22 May 2017 08:35    TPro  6.5  /  Alb  1.0<L>  /  TBili  0.3  /  DBili  x   /  AST  17  /  ALT  8<L>  /  AlkPhos  79  05-22        CAPILLARY BLOOD GLUCOSE      RADIOLOGY & ADDITIONAL TESTS:    Imaging reports  Personally Reviewed:  [x ] YES  [ ] NO    Consultant(s) Notes Reviewed:  [x ] YES  [ ] NO    Care Discussed with Consultants/Other Providers [x ] YES  [ ] NO  ACUTE  RESPIRATORY  FAILURE  PNEUMONIA   CONT  VENT  SUPPORT  AB  IVF  S/P  TRACH    recurrant  fever   cont  AB  as  per  ID  tylenol  PRN  recheck  cultures  COLO/VESCICULAR FISTULA S/P  diverting  colostomy    LFT elevation observation  metabolic  encephalopathy with  severe  inflammatory  processs supportive care          Problem: Parkinson disease encephalopathy.  Plan: sinemet pramipexole  anemia  continue  to  monitor  no  clinical  evidence  of  acute bleed  transfuse as  needed  add  carafate  fever  pneumonia  obseve  with  new  AB    urinary  retention  observe  with  shoemaker     Worsening acute  on renal  failure  cont    as  per  renal  prognosis  guarded

## 2017-05-22 NOTE — PROGRESS NOTE ADULT - SUBJECTIVE AND OBJECTIVE BOX
INTERVAL HPI:  86F PMH Parkinson's, s/p recent mechanical fall with R intertrochanteric femur fracture requiring R intramedullary nailing of R trochanteric fracture of femur 3/13/17, UTI, PNA presents from [facility] rehab for sepsis, colovesical fistula with course complicated by aspiration PNA, Respiratory failure and now s/p trach and diverting loop colostomy.   Grew Acinetobacter in sputum and off and on with fever.    OVERNIGHT EVENTS:  Continue to be dependent on Vent.     Vital Signs Last 24 Hrs  T(C): 36.7, Max: 37.2 (05-21 @ 17:16)  T(F): 98, Max: 99 (05-21 @ 17:16)  HR: 86 (82 - 112)  BP: 125/59 (117/54 - 125/59)  BP(mean): --  RR: 18 (18 - 20)  SpO2: 98% (98% - 99%)    Mode: AC/ CMV (Assist Control/ Continuous Mandatory Ventilation)  RR (machine): 16  TV (machine): 400  FiO2: 30  PEEP: 5  ITime: 0.81  MAP: 7  PIP: 11    PHYSICAL EXAM:  GEN:        Awake, responsive and comfortable.  HEENT:    Normal.    RESP:       no wheezing.  CVS:          Regular rate and rhythm.   ABD:         Soft, non-tender, non-distended;     MEDICATIONS  (STANDING):  pantoprazole   Suspension 40milliGRAM(s) Oral daily  lactobacillus acidophilus 1Tablet(s) Oral every 12 hours  carbidopa/levodopa  25/100 1Tablet(s) Oral four times a day  pramipexole 0.25milliGRAM(s) Oral three times a day  levothyroxine 25MICROGram(s) Oral daily  enoxaparin Injectable 30milliGRAM(s) SubCutaneous every 24 hours  ampicillin/sulbactam  IVPB 3Gram(s) IV Intermittent every 24 hours  sucralfate 1Gram(s) Oral four times a day  potassium chloride   Powder 20milliEquivalent(s) Oral once    MEDICATIONS  (PRN):  acetaminophen    Suspension. 650milliGRAM(s) Enteral Tube every 6 hours PRN Mild Pain (1 - 3)  acetaminophen    Suspension 650milliGRAM(s) Oral every 6 hours PRN For Temp greater than 38 C (100.4 F)    LABS:                        9.0    12.2  )-----------( 102      ( 22 May 2017 08:35 )             24.3     05-22    142  |  99  |  161<H>  ----------------------------<  130<H>  3.3<L>   |  15<L>  |  5.42<H>    Ca    6.2<LL>      22 May 2017 08:35    TPro  6.5  /  Alb  1.0<L>  /  TBili  0.3  /  DBili  x   /  AST  17  /  ALT  8<L>  /  AlkPhos  79  05-22    ASSESSMENT and Plan:  ·	Hypoxic Respiratory failure.  ·	Aspiration Pneumonia. Acinetobacter.  ·	Bilateral  pleural effusion.  ·	Sepsis.  ·	Metabolic encephalopathy.  ·	S/P Diverting  Colostomy for Colovesical Fistula.  ·	Anemia.  ·	Leukocytosis better.  ·	Parkinsonism.  ·	CKD4  ·	Hypernatremia improved.  ·	Hypokalemia.    Replace potassium, follow electrolytes.  Continue vent and antibiotics.

## 2017-05-22 NOTE — PROGRESS NOTE ADULT - SUBJECTIVE AND OBJECTIVE BOX
Patient seen in follow up for ZAHIDA; no acute distress.    MEDICATIONS  (STANDING):  pantoprazole   Suspension 40milliGRAM(s) Oral daily  lactobacillus acidophilus 1Tablet(s) Oral every 12 hours  carbidopa/levodopa  25/100 1Tablet(s) Oral four times a day  pramipexole 0.25milliGRAM(s) Oral three times a day  levothyroxine 25MICROGram(s) Oral daily  enoxaparin Injectable 30milliGRAM(s) SubCutaneous every 24 hours  ampicillin/sulbactam  IVPB 3Gram(s) IV Intermittent every 24 hours  sucralfate 1Gram(s) Oral four times a day    MEDICATIONS  (PRN):  acetaminophen    Suspension. 650milliGRAM(s) Enteral Tube every 6 hours PRN Mild Pain (1 - 3)  acetaminophen    Suspension 650milliGRAM(s) Oral every 6 hours PRN For Temp greater than 38 C (100.4 F)    PHYSICAL EXAM:      T(C): 36.1, Max: 37.2 (05-21 @ 17:16)  HR: 84 (78 - 112)  BP: 123/56 (107/53 - 123/56)  RR: 20 (19 - 20)  SpO2: 98% (97% - 100%)  Wt(kg): --  Respiratory: clear anteriorly, decreased BS at bases  Cardiovascular: S1 S2  Gastrointestinal: soft NT ND +BS  Extremities:   1 edema                                    9.0    12.2  )-----------( 102      ( 22 May 2017 08:35 )             24.3     05-22    142  |  99  |  161<H>  ----------------------------<  130<H>  3.3<L>   |  15<L>  |  5.42<H>    Ca    6.2<LL>      22 May 2017 08:35  Calcium corrects to 8.6    TPro  6.5  /  Alb  1.0<L>  /  TBili  0.3  /  DBili  x   /  AST  17  /  ALT  8<L>  /  AlkPhos  79  05-22      LIVER FUNCTIONS - ( 22 May 2017 08:35 )  Alb: 1.0 g/dL / Pro: 6.5 gm/dL / ALK PHOS: 79 U/L / ALT: 8 U/L / AST: 17 U/L / GGT: x             Assessment and Plan:      ZAHIDA, ATN; nonoliguric; indices stabilizing;  Judicious IVF; KCL   Will follow.

## 2017-05-22 NOTE — PROGRESS NOTE ADULT - SUBJECTIVE AND OBJECTIVE BOX
No changes - still with slow downward trend of H/H  transfused 2 more units of PRBC's on 5/20      MEDICATIONS  (STANDING):  pantoprazole   Suspension 40milliGRAM(s) Oral daily  lactobacillus acidophilus 1Tablet(s) Oral every 12 hours  carbidopa/levodopa  25/100 1Tablet(s) Oral four times a day  pramipexole 0.25milliGRAM(s) Oral three times a day  levothyroxine 25MICROGram(s) Oral daily  enoxaparin Injectable 30milliGRAM(s) SubCutaneous every 24 hours  ampicillin/sulbactam  IVPB 3Gram(s) IV Intermittent every 24 hours  sucralfate 1Gram(s) Oral four times a day    MEDICATIONS  (PRN):  acetaminophen    Suspension. 650milliGRAM(s) Enteral Tube every 6 hours PRN Mild Pain (1 - 3)  acetaminophen    Suspension 650milliGRAM(s) Oral every 6 hours PRN For Temp greater than 38 C (100.4 F)      Allergies    No Known Allergies    Intolerances        Vital Signs Last 24 Hrs  T(C): 36.7, Max: 37.2 (05-21 @ 17:16)  T(F): 98, Max: 99 (05-21 @ 17:16)  HR: 86 (82 - 112)  BP: 125/59 (117/54 - 125/59)  BP(mean): --  RR: 18 (18 - 20)  SpO2: 98% (98% - 99%)    PHYSICAL EXAM:  General: NAD.  CVS: S1, S2  Chest: air entry bilaterally present  Abd: BS present, soft, non-tender      LABS:                        9.0    12.2  )-----------( 102      ( 22 May 2017 08:35 )             24.3     05-22    142  |  99  |  161<H>  ----------------------------<  130<H>  3.3<L>   |  15<L>  |  5.42<H>    Ca    6.2<LL>      22 May 2017 08:35    TPro  6.5  /  Alb  1.0<L>  /  TBili  0.3  /  DBili  x   /  AST  17  /  ALT  8<L>  /  AlkPhos  79  05-22      will discuss further with Dr White about slowly dropping H/H and need for possible EGD/Colon

## 2017-05-23 LAB
ALBUMIN SERPL ELPH-MCNC: 1 G/DL — LOW (ref 3.3–5)
ALP SERPL-CCNC: 85 U/L — SIGNIFICANT CHANGE UP (ref 40–120)
ALT FLD-CCNC: <6 U/L — LOW (ref 12–78)
ANION GAP SERPL CALC-SCNC: 25 MMOL/L — HIGH (ref 5–17)
AST SERPL-CCNC: 19 U/L — SIGNIFICANT CHANGE UP (ref 15–37)
BILIRUB SERPL-MCNC: 0.3 MG/DL — SIGNIFICANT CHANGE UP (ref 0.2–1.2)
BUN SERPL-MCNC: 169 MG/DL — HIGH (ref 7–23)
CALCIUM SERPL-MCNC: 6.1 MG/DL — CRITICAL LOW (ref 8.5–10.1)
CHLORIDE SERPL-SCNC: 100 MMOL/L — SIGNIFICANT CHANGE UP (ref 96–108)
CO2 SERPL-SCNC: 15 MMOL/L — LOW (ref 22–31)
CREAT SERPL-MCNC: 5.33 MG/DL — HIGH (ref 0.5–1.3)
GLUCOSE SERPL-MCNC: 113 MG/DL — HIGH (ref 70–99)
HCT VFR BLD CALC: 24.3 % — LOW (ref 34.5–45)
HGB BLD-MCNC: 8.7 G/DL — LOW (ref 11.5–15.5)
MCHC RBC-ENTMCNC: 30 PG — SIGNIFICANT CHANGE UP (ref 27–34)
MCHC RBC-ENTMCNC: 35.8 GM/DL — SIGNIFICANT CHANGE UP (ref 32–36)
MCV RBC AUTO: 84 FL — SIGNIFICANT CHANGE UP (ref 80–100)
PLATELET # BLD AUTO: 123 K/UL — LOW (ref 150–400)
POTASSIUM SERPL-MCNC: 3.5 MMOL/L — SIGNIFICANT CHANGE UP (ref 3.5–5.3)
POTASSIUM SERPL-SCNC: 3.5 MMOL/L — SIGNIFICANT CHANGE UP (ref 3.5–5.3)
PROT SERPL-MCNC: 6.6 GM/DL — SIGNIFICANT CHANGE UP (ref 6–8.3)
RBC # BLD: 2.9 M/UL — LOW (ref 3.8–5.2)
RBC # FLD: 16.6 % — HIGH (ref 11–15)
SODIUM SERPL-SCNC: 140 MMOL/L — SIGNIFICANT CHANGE UP (ref 135–145)
WBC # BLD: 12.3 K/UL — HIGH (ref 3.8–10.5)
WBC # FLD AUTO: 12.3 K/UL — HIGH (ref 3.8–10.5)

## 2017-05-23 RX ORDER — CALCIUM CARBONATE 500(1250)
3 TABLET ORAL AT BEDTIME
Qty: 0 | Refills: 0 | Status: DISCONTINUED | OUTPATIENT
Start: 2017-05-23 | End: 2017-05-27

## 2017-05-23 RX ORDER — CALCITRIOL 0.5 UG/1
0.25 CAPSULE ORAL DAILY
Qty: 0 | Refills: 0 | Status: DISCONTINUED | OUTPATIENT
Start: 2017-05-23 | End: 2017-05-27

## 2017-05-23 RX ORDER — SODIUM BICARBONATE 1 MEQ/ML
1300 SYRINGE (ML) INTRAVENOUS EVERY 12 HOURS
Qty: 0 | Refills: 0 | Status: DISCONTINUED | OUTPATIENT
Start: 2017-05-23 | End: 2017-05-27

## 2017-05-23 RX ADMIN — AMPICILLIN SODIUM AND SULBACTAM SODIUM 200 GRAM(S): 250; 125 INJECTION, POWDER, FOR SUSPENSION INTRAMUSCULAR; INTRAVENOUS at 05:21

## 2017-05-23 RX ADMIN — Medication 1 GRAM(S): at 11:57

## 2017-05-23 RX ADMIN — CARBIDOPA AND LEVODOPA 1 TABLET(S): 25; 100 TABLET ORAL at 11:57

## 2017-05-23 RX ADMIN — PRAMIPEXOLE DIHYDROCHLORIDE 0.25 MILLIGRAM(S): 0.12 TABLET ORAL at 05:20

## 2017-05-23 RX ADMIN — PRAMIPEXOLE DIHYDROCHLORIDE 0.25 MILLIGRAM(S): 0.12 TABLET ORAL at 21:46

## 2017-05-23 RX ADMIN — NYSTATIN CREAM 1 APPLICATION(S): 100000 CREAM TOPICAL at 05:25

## 2017-05-23 RX ADMIN — Medication 3 TABLET(S): at 21:46

## 2017-05-23 RX ADMIN — Medication 1 GRAM(S): at 05:20

## 2017-05-23 RX ADMIN — ENOXAPARIN SODIUM 30 MILLIGRAM(S): 100 INJECTION SUBCUTANEOUS at 11:57

## 2017-05-23 RX ADMIN — Medication 1 TABLET(S): at 18:09

## 2017-05-23 RX ADMIN — Medication 1 GRAM(S): at 18:09

## 2017-05-23 RX ADMIN — CALCITRIOL 0.25 MICROGRAM(S): 0.5 CAPSULE ORAL at 13:39

## 2017-05-23 RX ADMIN — Medication 1300 MILLIGRAM(S): at 18:09

## 2017-05-23 RX ADMIN — PANTOPRAZOLE SODIUM 40 MILLIGRAM(S): 20 TABLET, DELAYED RELEASE ORAL at 11:56

## 2017-05-23 RX ADMIN — Medication 1 TABLET(S): at 05:20

## 2017-05-23 RX ADMIN — PRAMIPEXOLE DIHYDROCHLORIDE 0.25 MILLIGRAM(S): 0.12 TABLET ORAL at 13:39

## 2017-05-23 RX ADMIN — CARBIDOPA AND LEVODOPA 1 TABLET(S): 25; 100 TABLET ORAL at 18:09

## 2017-05-23 RX ADMIN — CARBIDOPA AND LEVODOPA 1 TABLET(S): 25; 100 TABLET ORAL at 05:20

## 2017-05-23 RX ADMIN — Medication 25 MICROGRAM(S): at 05:21

## 2017-05-23 RX ADMIN — NYSTATIN CREAM 1 APPLICATION(S): 100000 CREAM TOPICAL at 18:09

## 2017-05-23 NOTE — PHYSICAL THERAPY INITIAL EVALUATION ADULT - PERTINENT HX OF CURRENT PROBLEM, REHAB EVAL
Altered Mental Status, UTI, Urine Retention
Patient was admitted from [facility] on 4/04/17 due to AMS, aspiration pneumonia, sepsis, and colovesical fistula. PMH: Parkison's Disease. S/p right hip ORIF (3/13/17) due to fall. Currently in respiratory failure and s/p tracheostomy and diverting loop colostomy. Chart reviewed and noted down trending hemoglobin and hematocrit.

## 2017-05-23 NOTE — PHYSICAL THERAPY INITIAL EVALUATION ADULT - CRITERIA FOR SKILLED THERAPEUTIC INTERVENTIONS
anticipated discharge recommendation/impairments found/functional limitations in following categories/risk reduction/prevention
impairments found/risk reduction/prevention/rehab potential/functional limitations in following categories

## 2017-05-23 NOTE — PHYSICAL THERAPY INITIAL EVALUATION ADULT - PRECAUTIONS/LIMITATIONS, REHAB EVAL
isolation precautions/oxygen therapy device and L/min/aspiration precautions/fall precautions/mechanical vent via tracheostomy (30% FiO2)
oxygen therapy device and L/min/cardiac precautions/2L O2 via nasal canula/fall precautions

## 2017-05-23 NOTE — PROGRESS NOTE ADULT - SUBJECTIVE AND OBJECTIVE BOX
No changes   still with trending down H/H      MEDICATIONS  (STANDING):  pantoprazole   Suspension 40milliGRAM(s) Oral daily  lactobacillus acidophilus 1Tablet(s) Oral every 12 hours  carbidopa/levodopa  25/100 1Tablet(s) Oral four times a day  pramipexole 0.25milliGRAM(s) Oral three times a day  levothyroxine 25MICROGram(s) Oral daily  enoxaparin Injectable 30milliGRAM(s) SubCutaneous every 24 hours  ampicillin/sulbactam  IVPB 3Gram(s) IV Intermittent every 24 hours  sucralfate 1Gram(s) Oral four times a day  nystatin Powder 1Application(s) Topical two times a day  sodium bicarbonate 1300milliGRAM(s) Oral every 12 hours  calcium carbonate 500 mG (Tums) Chewable 3Tablet(s) Chew at bedtime  calcitriol   Capsule 0.25MICROGram(s) Oral daily    MEDICATIONS  (PRN):  acetaminophen    Suspension. 650milliGRAM(s) Enteral Tube every 6 hours PRN Mild Pain (1 - 3)  acetaminophen    Suspension 650milliGRAM(s) Oral every 6 hours PRN For Temp greater than 38 C (100.4 F)      Allergies    No Known Allergies    Intolerances        Vital Signs Last 24 Hrs  T(C): 36.8, Max: 36.8 (05-23 @ 04:53)  T(F): 98.3, Max: 98.3 (05-23 @ 04:53)  HR: 124 (84 - 124)  BP: 123/56 (107/52 - 131/62)  BP(mean): --  RR: 18 (16 - 18)  SpO2: 99% (95% - 99%)    PHYSICAL EXAM:  General: NAD.  CVS: S1, S2  Chest: air entry bilaterally present  Abd: BS present, soft, non-tender      LABS:                        8.7    12.3  )-----------( 123      ( 23 May 2017 07:41 )             24.3     05-23    140  |  100  |  169<H>  ----------------------------<  113<H>  3.5   |  15<L>  |  5.33<H>    Ca    6.1<LL>      23 May 2017 07:41    TPro  6.6  /  Alb  1.0<L>  /  TBili  0.3  /  DBili  x   /  AST  19  /  ALT  <6<L>  /  AlkPhos  85  05-23      will discuss with Dr White what to do about slowly falling H/H - pt is intubated and unresponsive  follow CBC

## 2017-05-23 NOTE — PHYSICAL THERAPY INITIAL EVALUATION ADULT - GENERAL OBSERVATIONS, REHAB EVAL
Patient encountered supine in bed, not awake, alert nor arousable to verbal and tactile cuing, vitals as charted. Attachments: trach, colostomy pouch, left IV, right midline catheter, PEG tube, and shoemaker. Observed leakage of colostomy pouch and RN Rula made aware. Total assist for rolling. Chest ausculation: course crackles in bilateral lung bases (R>L). Patient encountered supine in bed, not awake, alert nor arousable to verbal and tactile cuing, vitals as charted. Attachments: trach, colostomy pouch, left IV, right midline catheter, PEG tube, and shoemaker. Observed leakage of colostomy pouch and RN Rula made aware. Chest ausculation: course crackles in bilateral lung bases (R>L). Patient encountered supine in bed, not awake, alert nor arousable to verbal and tactile cueing, vitals as charted. Attachments: trach, colostomy pouch, left IV, right midline catheter, PEG tube, and shoemaker. Observed leakage of colostomy pouch and RN Rula made aware. Chest ausculation: coarse crackles in bilateral lung bases (R>L).

## 2017-05-23 NOTE — PHYSICAL THERAPY INITIAL EVALUATION ADULT - TRANSFER SKILLS, REHAB EVAL
needed assist/rolling walker and 2 person assist/needs device
needed assist/rolling walker, assist of 2

## 2017-05-23 NOTE — PHYSICAL THERAPY INITIAL EVALUATION ADULT - IMPAIRMENTS FOUND, PT EVAL
gait, locomotion, and balance/arousal, attention, and cognition/muscle strength/integumentary integrity/ergonomics and body mechanics/posture
ergonomics and body mechanics/aerobic capacity/endurance/arousal, attention, and cognition/ROM/muscle strength/gait, locomotion, and balance

## 2017-05-23 NOTE — PROVIDER CONTACT NOTE (CRITICAL VALUE NOTIFICATION) - ACTION/TREATMENT ORDERED:
Put out a call to house PA/NP at [phone]. Awaiting call back
PA confirming with Dr White need for treatment
type and screen for 1unit PRBC
Calcium Bicarb ordered
repeat lab am
1L bolus to be given
K+ ordered
No further recommendations made.
Pablo abarca.
Type and screen with crossmatch ordered for 2 units of PRBC in needed for tomorrow . Order activated read back Blood bank called and informed
will follow up

## 2017-05-23 NOTE — PROGRESS NOTE ADULT - SUBJECTIVE AND OBJECTIVE BOX
INTERVAL HPI/OVERNIGHT EVENTS:        REVIEW OF SYSTEMS:  CONSTITUTIONAL:  unresponsive  on  respirator    MEDICATION:  pantoprazole   Suspension 40milliGRAM(s) Oral daily  lactobacillus acidophilus 1Tablet(s) Oral every 12 hours  acetaminophen    Suspension. 650milliGRAM(s) Enteral Tube every 6 hours PRN  carbidopa/levodopa  25/100 1Tablet(s) Oral four times a day  pramipexole 0.25milliGRAM(s) Oral three times a day  acetaminophen    Suspension 650milliGRAM(s) Oral every 6 hours PRN  levothyroxine 25MICROGram(s) Oral daily  enoxaparin Injectable 30milliGRAM(s) SubCutaneous every 24 hours  ampicillin/sulbactam  IVPB 3Gram(s) IV Intermittent every 24 hours  sucralfate 1Gram(s) Oral four times a day  nystatin Powder 1Application(s) Topical two times a day    Vital Signs Last 24 Hrs  T(C): 36.8, Max: 36.8 (05-23 @ 04:53)  T(F): 98.3, Max: 98.3 (05-23 @ 04:53)  HR: 105 (84 - 111)  BP: 123/56 (107/52 - 131/62)  BP(mean): --  RR: 18 (16 - 18)  SpO2: 96% (95% - 98%)    PHYSICAL EXAM:  GENERAL: NAD, well-groomed, well-developed  EYES:  conjunctiva and sclera clear  ENMT:  Moist mucous membranes,   NECK: Supple, No JVD, Normal thyroid  NERVOUS SYSTEM: unresponsive  CHEST/LUNG: few  ronchis   HEART: Regular rate and rhythm; No murmurs, rubs, or gallops  ABDOMEN: Soft, Nontender, Nondistended; Bowel sounds present  EXTREMITIES:  no  edema no  tenderness  SKIN: No rashes   LABS:                        8.7    12.3  )-----------( 123      ( 23 May 2017 07:41 )             24.3     05-23    140  |  100  |  169<H>  ----------------------------<  113<H>  3.5   |  15<L>  |  5.33<H>    Ca    6.1<LL>      23 May 2017 07:41    TPro  6.6  /  Alb  1.0<L>  /  TBili  0.3  /  DBili  x   /  AST  19  /  ALT  <6<L>  /  AlkPhos  85  05-23        CAPILLARY BLOOD GLUCOSE      RADIOLOGY & ADDITIONAL TESTS:    Imaging reports  Personally Reviewed:  [x ] YES  [ ] NO    Consultant(s) Notes Reviewed:  [x ] YES  [ ] NO    Care Discussed with Consultants/Other Providers [ x] YES  [ ] NO  ACUTE  RESPIRATORY  FAILURE  PNEUMONIA   CONT  VENT  SUPPORT  AB  IVF  S/P  TRACH    recurrant  fever   cont  AB  as  per  ID  tylenol  PRN  recheck  cultures  COLO/VESCICULAR FISTULA S/P  diverting  colostomy    LFT elevation observation  metabolic  encephalopathy with  severe  inflammatory  processs supportive care          Problem: Parkinson disease encephalopathy.  Plan: sinemet pramipexole  anemia  continue  to  monitor  no  clinical  evidence  of  acute bleed  transfuse as  needed  add  carafate would warrant  endoscopy  colonoscopy  if  continued  drop in H/H will  speak  to  Pt's  son  and  GI  fever  pneumonia  obseve  with  new  AB    urinary  retention  observe  with  shoemaker     Worsening acute  on renal  failure  cont    as  per  renal  prognosis  guarded

## 2017-05-23 NOTE — PROGRESS NOTE ADULT - SUBJECTIVE AND OBJECTIVE BOX
Patient seen in follow up for ZAHIDA; no distress.    MEDICATIONS  (STANDING):  pantoprazole   Suspension 40milliGRAM(s) Oral daily  lactobacillus acidophilus 1Tablet(s) Oral every 12 hours  carbidopa/levodopa  25/100 1Tablet(s) Oral four times a day  pramipexole 0.25milliGRAM(s) Oral three times a day  levothyroxine 25MICROGram(s) Oral daily  enoxaparin Injectable 30milliGRAM(s) SubCutaneous every 24 hours  ampicillin/sulbactam  IVPB 3Gram(s) IV Intermittent every 24 hours  sucralfate 1Gram(s) Oral four times a day  nystatin Powder 1Application(s) Topical two times a day  sodium bicarbonate 1300milliGRAM(s) Oral every 12 hours  calcium carbonate 500 mG (Tums) Chewable 3Tablet(s) Chew at bedtime  calcitriol   Capsule 0.25MICROGram(s) Oral daily    MEDICATIONS  (PRN):  acetaminophen    Suspension. 650milliGRAM(s) Enteral Tube every 6 hours PRN Mild Pain (1 - 3)  acetaminophen    Suspension 650milliGRAM(s) Oral every 6 hours PRN For Temp greater than 38 C (100.4 F)    PHYSICAL EXAM:      T(C): 37.1, Max: 37.1 (05-23 @ 11:35)  HR: 120 (84 - 131)  BP: 102/52 (102/52 - 131/62)  RR: 18 (16 - 18)  SpO2: 96% (95% - 99%)  Wt(kg): --  Respiratory: clear anteriorly, decreased BS at bases  Cardiovascular: S1 S2  Gastrointestinal: soft NT ND +BS  Extremities: 1- edema                                    8.7    12.3  )-----------( 123      ( 23 May 2017 07:41 )             24.3     05-23    140  |  100  |  169<H>  ----------------------------<  113<H>  3.5   |  15<L>  |  5.33<H>    Ca    6.1<LL>      23 May 2017 07:41    TPro  6.6  /  Alb  1.0<L>  /  TBili  0.3  /  DBili  x   /  AST  19  /  ALT  <6<L>  /  AlkPhos  85  05-23      LIVER FUNCTIONS - ( 23 May 2017 07:41 )  Alb: 1.0 g/dL / Pro: 6.6 gm/dL / ALK PHOS: 85 U/L / ALT: <6 U/L / AST: 19 U/L / GGT: x             Assessment and Plan:    ZAHIDA, ATN; nonoliguric;   Supportive care; PO bicarbonate; Vit d and Caco3.

## 2017-05-23 NOTE — PHYSICAL THERAPY INITIAL EVALUATION ADULT - SKIN INTEGRITY
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

## 2017-05-23 NOTE — PROGRESS NOTE ADULT - SUBJECTIVE AND OBJECTIVE BOX
INTERVAL HPI:  86F PMH Parkinson's, s/p recent mechanical fall with R intertrochanteric femur fracture requiring R intramedullary nailing of R trochanteric fracture of femur 3/13/17, UTI, PNA presents from [facility] rehab for sepsis, colovesical fistula with course complicated by aspiration PNA, Respiratory failure and now s/p trach and diverting loop colostomy.   Grew Acinetobacter in sputum and off and on with fever.    OVERNIGHT EVENTS:  unresponsive and on vent.    Vital Signs Last 24 Hrs  T(C): 37.1, Max: 37.1 (05-23 @ 11:35)  T(F): 98.8, Max: 98.8 (05-23 @ 11:35)  HR: 120 (84 - 131)  BP: 102/52 (102/52 - 131/62)  BP(mean): --  RR: 18 (16 - 18)  SpO2: 96% (95% - 99%)    Mode: AC/ CMV (Assist Control/ Continuous Mandatory Ventilation)  RR (machine): 16  TV (machine): 400  FiO2: 30  PEEP: 5  ITime: 1  MAP: 10  PIP: 32    PHYSICAL EXAM:  GEN:         Awake, responsive and comfortable.  HEENT:    Normal.    RESP:      normal     CVS:         Regular rate and rhythm.   ABD:         Soft, non-tender, non-distended;   :             No costovertebral angle tenderness  EXTR:            No clubbing, cyanosis or edema  CNS:              Intact sensory and motor function.    MEDICATIONS  (STANDING):  pantoprazole   Suspension 40milliGRAM(s) Oral daily  lactobacillus acidophilus 1Tablet(s) Oral every 12 hours  carbidopa/levodopa  25/100 1Tablet(s) Oral four times a day  pramipexole 0.25milliGRAM(s) Oral three times a day  levothyroxine 25MICROGram(s) Oral daily  enoxaparin Injectable 30milliGRAM(s) Sub Cutaneous every 24 hours  ampicillin/sulbactam  IVPB 3Gram(s) IV Intermittent every 24 hours  sucralfate 1Gram(s) Oral four times a day  nystatin Powder 1Application(s) Topical two times a day  sodium bicarbonate 1300milliGRAM(s) Oral every 12 hours  calcium carbonate 500 mG (Tums) Chewable 3Tablet(s) Chew at bedtime  calcitriol   Capsule 0.25MICROGram(s) Oral daily    MEDICATIONS  (PRN):  acetaminophen    Suspension. 650milliGRAM(s) Enteral Tube every 6 hours PRN Mild Pain (1 - 3)  acetaminophen    Suspension 650milliGRAM(s) Oral every 6 hours PRN For Temp greater than 38 C (100.4 F)    LABS:                        8.7    12.3  )-----------( 123      ( 23 May 2017 07:41 )             24.3     05-23    140  |  100  |  169<H>  ----------------------------<  113<H>  3.5   |  15<L>  |  5.33<H>    Ca    6.1<LL>      23 May 2017 07:41    TPro  6.6  /  Alb  1.0<L>  /  TBili  0.3  /  DBili  x   /  AST  19  /  ALT  <6<L>  /  AlkPhos  85  05-23    ASSESSMENT and Plan:  ·	Hypoxic Respiratory failure.  ·	Aspiration Pneumonia. Acinetobacter.  ·	Bilateral  pleural effusion.  ·	Sepsis.  ·	Metabolic encephalopathy.  ·	S/P Diverting  Colostomy for Colovesical Fistula.  ·	Anemia.  ·	Leukocytosis better.  ·	Parkinsonism.  ·	CKD4  ·	Hypernatremia improved.  ·	Hypokalemia.    Has been on Vent and antibiotics without and significant improvement.  Palliative care to have a meeting with son.  poor prognosis.

## 2017-05-23 NOTE — PHYSICAL THERAPY INITIAL EVALUATION ADULT - MODIFIED CLINICAL TEST OF SENSORY INTEGRATION IN BALANCE TEST
Barthel Index: Feeding Score _0__, Bathing Score _0__, Grooming Score _0__, Dressing Score _0__, Bowels Score _0__, Bladder Score _0 _, Toilet Score _0 __, Transfers Score _0 __, Mobility Score _0  __, Stairs Score _0__,     Total Score ___ 0
Barthel Index: 0/100

## 2017-05-23 NOTE — PHYSICAL THERAPY INITIAL EVALUATION ADULT - RISK REDUCTION/PREVENTION, PT EVAL
risk factors/secondary impairments/recurrence of condition
secondary impairments/risk factors/secondary complications of immobility

## 2017-05-23 NOTE — PHYSICAL THERAPY INITIAL EVALUATION ADULT - ADDITIONAL COMMENTS
Per personal aide at bedside, pt was admitted from [facility] s/p R hip ORIF where she was ambulating c a rolling walker and 2 person assist. Per PT evaluation from 3/14/17 (R hip episode) SonIglesia reported that pt was I in ambulation w/o assist device and pt was able to negotiate 4-5 steps c one rail on the stairs under supervision prior to R hip episode. Pt has an aide from 8-6, 7 days a week to assist in ambulation and ADL's.
As per PT eval 4/07/17: Patient was admitted from [facility] s/p R hip ORIF where she was ambulating c a rolling walker and 2 person assist. Son, Iglesia,  reported that prior to hip injury, patient was I in ambulation w/o assist device and pt was able to negotiate 4-5 steps c one rail on the stairs under supervision prior to R hip episode. Pt has an aide from 8-6, 7 days a week to assist in ambulation and ADL's.

## 2017-05-23 NOTE — PROGRESS NOTE ADULT - SUBJECTIVE AND OBJECTIVE BOX
TMAX - 98.8    On day # 15 Unasyn / s/p Colistin x1 dose    Vital Signs Last 24 Hrs  T(C): 37.1, Max: 37.1 (05-23 @ 11:35)  T(F): 98.8, Max: 98.8 (05-23 @ 11:35)  HR: 130 (84 - 131)  BP: 102/52 (102/52 - 131/62)  BP(mean): --  RR: 18 (16 - 18)  SpO2: 95% (95% - 99%)  Mode: AC/ CMV (Assist Control/ Continuous Mandatory Ventilation)  RR (machine): 16  TV (machine): 400  FiO2: 30  PEEP: 5  ITime: 0.8  MAP: 10  PIP: 27  Supplemental O2:  on ventilator    Patient remains on ventilator on 30% FIO2 + 5 PEEP.  She remains unresponsive and with poor urine output.      PHYSICAL EXAM  General:  unresponsive, on ventilator, lying in bed in semi-upright position  HEENT:  conj pink, sclerae anicteric, PERRLA, no oral lesions noted  Neck:  semi-supple, no nodes noted            trach site clean, sutures in place  Heart:  RR  Lungs:  few anterior rhonchi with decreased BS at bases bilaterally  Abdomen:  soft, BS +, appears nontender, Peg in place - site clean, tube feedings in progress                   colostomy functioning with liquid dark brown stool noted  Extremities:  2+ edema RLE, 1+ edema LLE                      Lt upper arm with Midline in place - site clean, dressing intact - placed 4/25  Skin: warm, dry, no rash noted       I&O's Summary :  I & Os for 24h ending 23 May 2017 07:00  =============================================  IN: 1110 ml / OUT: 300 ml / NET: 810 ml    I & Os for current day (as of 23 May 2017 16:41)  =============================================  IN: 0 ml / OUT: 100 ml / NET: -100 ml      LABS:  CBC Full  -  ( 23 May 2017 07:41 )  WBC Count : 12.3 K/uL  Hemoglobin : 8.7 g/dL  Hematocrit : 24.3 %  Platelet Count - Automated : 123 K/uL  Mean Cell Volume : 84.0 fl  Mean Cell Hemoglobin : 30.0 pg  Mean Cell Hemoglobin Concentration : 35.8 gm/dL  Auto Neutrophil # : x  Auto Lymphocyte # : x  Auto Monocyte # : x  Auto Eosinophil # : x  Auto Basophil # : x  Auto Neutrophil % : x  Auto Lymphocyte % : x  Auto Monocyte % : x  Auto Eosinophil % : x  Auto Basophil % : x    05-23    140  |  100  |  169<H>  ----------------------------<  113<H>  3.5   |  15<L>  |  5.33<H>    Ca    6.1<LL>      23 May 2017 07:41    TPro  6.6  /  Alb  1.0<L>  /  TBili  0.3  /  DBili  x   /  AST  19  /  ALT  <6<L>  /  AlkPhos  85  05-23    LIVER FUNCTIONS - ( 23 May 2017 07:41 )  Alb: 1.0 g/dL / Pro: 6.6 gm/dL / ALK PHOS: 85 U/L / ALT: <6 U/L / AST: 19 U/L / GGT: x               CULTURES:  Specimen Source: .Sputum Sputum (05-19 @ 00:48)  Culture Results:   Few Acinetobacter baumannii/haemolyticus (Carbapenem Resistant) - now resistant to Unasyn  Moderate Stenotrophomonas maltophilia - only sensitive to Bactrim  Normal Respiratory Cindy absent (05-19 @ 00:48)    Specimen Source: .Blood Blood (05-19 @ 00:32)  Culture Results:   No growth to date. (05-19 @ 00:32)    Specimen Source: .Blood Blood (05-19 @ 00:28)  Culture Results:   No growth to date. (05-19 @ 00:28)          Radiology:  CXR - 5/21 -  Heart is magnified by technique. Tracheostomy again noted.    Diffusely increased interstitial pattern suggesting chronic interstitial   disease again noted.    Slight blunting of the lateral costophrenic angles again noted.    Chronic appearing density right apex again seen.    The chest is similar to May 17.    IMPRESSION: Unchanged chest as above.      Impression: Patient has been afebrile now for > 48 hrs. on ab rx with Unasyn, but now with repeat Sputum Cx again + for Carbapenem- Resist Acinetobacter now resistant to the Unasyn as well as Stenotrophomonas Maltophilia - only sensitive to Bactrim.    Suggestions: As patient has been afebrile and has now completed >14 days of ab rx with Unasyn, with decreased WBC's and no changes in ventilator settings, will try d/c Unasyn and observe closely off ab rx.  The Stenotrophomonas in new sputum cx may represent colonization from prolonged intubation as Gram Stain only showed few PMN's, therefore will not specifically rx this isolate with ab's at this time.  If temps recur will re-evaluate.  Continue to follow-up temps and labs closely. TMAX - 98.8    On day # 15 Unasyn / s/p Colistin x1 dose    Vital Signs Last 24 Hrs  T(C): 37.1, Max: 37.1 (05-23 @ 11:35)  T(F): 98.8, Max: 98.8 (05-23 @ 11:35)  HR: 130 (84 - 131)  BP: 102/52 (102/52 - 131/62)  BP(mean): --  RR: 18 (16 - 18)  SpO2: 95% (95% - 99%)  Mode: AC/ CMV (Assist Control/ Continuous Mandatory Ventilation)  RR (machine): 16  TV (machine): 400  FiO2: 30  PEEP: 5  ITime: 0.8  MAP: 10  PIP: 27  Supplemental O2:  on ventilator    Patient remains on ventilator on 30% FIO2 + 5 PEEP.  She remains unresponsive and with poor urine output.      PHYSICAL EXAM  General:  unresponsive, on ventilator, lying in bed in semi-upright position  HEENT:  conj pink, sclerae anicteric, PERRLA, no oral lesions noted  Neck:  semi-supple, no nodes noted            trach site clean, sutures in place  Heart:  RR  Lungs:  few anterior rhonchi with decreased BS at bases bilaterally  Abdomen:  soft, BS +, appears nontender, Peg in place - site clean, tube feedings in progress                   colostomy functioning with liquid dark brown stool noted  Extremities:  2+ edema RLE, 1+ edema LLE                      Rt. upper arm with Midline in place - site clean, dressing intact - placed 4/25  Skin: warm, dry, no rash noted       I&O's Summary :  I & Os for 24h ending 23 May 2017 07:00  =============================================  IN: 1110 ml / OUT: 300 ml / NET: 810 ml    I & Os for current day (as of 23 May 2017 16:41)  =============================================  IN: 0 ml / OUT: 100 ml / NET: -100 ml      LABS:  CBC Full  -  ( 23 May 2017 07:41 )  WBC Count : 12.3 K/uL  Hemoglobin : 8.7 g/dL  Hematocrit : 24.3 %  Platelet Count - Automated : 123 K/uL  Mean Cell Volume : 84.0 fl  Mean Cell Hemoglobin : 30.0 pg  Mean Cell Hemoglobin Concentration : 35.8 gm/dL  Auto Neutrophil # : x  Auto Lymphocyte # : x  Auto Monocyte # : x  Auto Eosinophil # : x  Auto Basophil # : x  Auto Neutrophil % : x  Auto Lymphocyte % : x  Auto Monocyte % : x  Auto Eosinophil % : x  Auto Basophil % : x    05-23    140  |  100  |  169<H>  ----------------------------<  113<H>  3.5   |  15<L>  |  5.33<H>    Ca    6.1<LL>      23 May 2017 07:41    TPro  6.6  /  Alb  1.0<L>  /  TBili  0.3  /  DBili  x   /  AST  19  /  ALT  <6<L>  /  AlkPhos  85  05-23    LIVER FUNCTIONS - ( 23 May 2017 07:41 )  Alb: 1.0 g/dL / Pro: 6.6 gm/dL / ALK PHOS: 85 U/L / ALT: <6 U/L / AST: 19 U/L / GGT: x               CULTURES:  Specimen Source: .Sputum Sputum (05-19 @ 00:48)  Culture Results:   Few Acinetobacter baumannii/haemolyticus (Carbapenem Resistant) - now resistant to Unasyn  Moderate Stenotrophomonas maltophilia - only sensitive to Bactrim  Normal Respiratory Cindy absent (05-19 @ 00:48)    Specimen Source: .Blood Blood (05-19 @ 00:32)  Culture Results:   No growth to date. (05-19 @ 00:32)    Specimen Source: .Blood Blood (05-19 @ 00:28)  Culture Results:   No growth to date. (05-19 @ 00:28)          Radiology:  CXR - 5/21 -  Heart is magnified by technique. Tracheostomy again noted.    Diffusely increased interstitial pattern suggesting chronic interstitial   disease again noted.    Slight blunting of the lateral costophrenic angles again noted.    Chronic appearing density right apex again seen.    The chest is similar to May 17.    IMPRESSION: Unchanged chest as above.      Impression: Patient has been afebrile now for > 48 hrs. on ab rx with Unasyn, but now with repeat Sputum Cx again + for Carbapenem- Resist Acinetobacter now resistant to the Unasyn as well as Stenotrophomonas Maltophilia - only sensitive to Bactrim.    Suggestions: As patient has been afebrile and has now completed >14 days of ab rx with Unasyn, with decreased WBC's and no changes in ventilator settings, will try d/c Unasyn and observe closely off ab rx.  The Stenotrophomonas in new sputum cx may represent colonization from prolonged intubation as Gram Stain only showed few PMN's, therefore will not specifically rx this isolate with ab's at this time.  If temps recur will re-evaluate.  Continue to follow-up temps and labs closely.

## 2017-05-24 LAB
ALBUMIN SERPL ELPH-MCNC: 1 G/DL — LOW (ref 3.3–5)
ALP SERPL-CCNC: 82 U/L — SIGNIFICANT CHANGE UP (ref 40–120)
ALT FLD-CCNC: 7 U/L — LOW (ref 12–78)
ANION GAP SERPL CALC-SCNC: 26 MMOL/L — HIGH (ref 5–17)
AST SERPL-CCNC: 15 U/L — SIGNIFICANT CHANGE UP (ref 15–37)
BASOPHILS NFR BLD AUTO: SIGNIFICANT CHANGE UP % (ref 0–2)
BILIRUB DIRECT SERPL-MCNC: 0.07 MG/DL — SIGNIFICANT CHANGE UP (ref 0.05–0.2)
BILIRUB INDIRECT FLD-MCNC: 0.2 MG/DL — SIGNIFICANT CHANGE UP (ref 0.2–1)
BILIRUB SERPL-MCNC: 0.3 MG/DL — SIGNIFICANT CHANGE UP (ref 0.2–1.2)
BLD GP AB SCN SERPL QL: SIGNIFICANT CHANGE UP
BUN SERPL-MCNC: 171 MG/DL — HIGH (ref 7–23)
CALCIUM SERPL-MCNC: 6.4 MG/DL — CRITICAL LOW (ref 8.5–10.1)
CHLORIDE SERPL-SCNC: 99 MMOL/L — SIGNIFICANT CHANGE UP (ref 96–108)
CO2 SERPL-SCNC: 14 MMOL/L — LOW (ref 22–31)
CREAT SERPL-MCNC: 5.29 MG/DL — HIGH (ref 0.5–1.3)
CULTURE RESULTS: SIGNIFICANT CHANGE UP
CULTURE RESULTS: SIGNIFICANT CHANGE UP
EOSINOPHIL NFR BLD AUTO: SIGNIFICANT CHANGE UP % (ref 0–6)
GLUCOSE SERPL-MCNC: 116 MG/DL — HIGH (ref 70–99)
HCT VFR BLD CALC: 21.3 % — LOW (ref 34.5–45)
HGB BLD-MCNC: 7.7 G/DL — LOW (ref 11.5–15.5)
LYMPHOCYTES # BLD AUTO: SIGNIFICANT CHANGE UP % (ref 13–44)
MCHC RBC-ENTMCNC: 30 PG — SIGNIFICANT CHANGE UP (ref 27–34)
MCHC RBC-ENTMCNC: 36.1 GM/DL — HIGH (ref 32–36)
MCV RBC AUTO: 83 FL — SIGNIFICANT CHANGE UP (ref 80–100)
MONOCYTES NFR BLD AUTO: SIGNIFICANT CHANGE UP % (ref 2–14)
NEUTROPHILS NFR BLD AUTO: SIGNIFICANT CHANGE UP % (ref 43–77)
PLATELET # BLD AUTO: 109 K/UL — LOW (ref 150–400)
POTASSIUM SERPL-MCNC: 3.5 MMOL/L — SIGNIFICANT CHANGE UP (ref 3.5–5.3)
POTASSIUM SERPL-SCNC: 3.5 MMOL/L — SIGNIFICANT CHANGE UP (ref 3.5–5.3)
PROT SERPL-MCNC: 6.4 GM/DL — SIGNIFICANT CHANGE UP (ref 6–8.3)
RBC # BLD: 2.57 M/UL — LOW (ref 3.8–5.2)
RBC # FLD: 16.5 % — HIGH (ref 11–15)
SODIUM SERPL-SCNC: 139 MMOL/L — SIGNIFICANT CHANGE UP (ref 135–145)
SPECIMEN SOURCE: SIGNIFICANT CHANGE UP
SPECIMEN SOURCE: SIGNIFICANT CHANGE UP
WBC # BLD: 11 K/UL — HIGH (ref 3.8–10.5)
WBC # FLD AUTO: 11 K/UL — HIGH (ref 3.8–10.5)

## 2017-05-24 RX ORDER — SOD SULF/SODIUM/NAHCO3/KCL/PEG
4000 SOLUTION, RECONSTITUTED, ORAL ORAL ONCE
Qty: 0 | Refills: 0 | Status: COMPLETED | OUTPATIENT
Start: 2017-05-24 | End: 2017-05-24

## 2017-05-24 RX ADMIN — CARBIDOPA AND LEVODOPA 1 TABLET(S): 25; 100 TABLET ORAL at 00:14

## 2017-05-24 RX ADMIN — Medication 3 TABLET(S): at 22:47

## 2017-05-24 RX ADMIN — Medication 25 MICROGRAM(S): at 05:24

## 2017-05-24 RX ADMIN — PRAMIPEXOLE DIHYDROCHLORIDE 0.25 MILLIGRAM(S): 0.12 TABLET ORAL at 05:24

## 2017-05-24 RX ADMIN — Medication 4000 MILLILITER(S): at 16:52

## 2017-05-24 RX ADMIN — Medication 1300 MILLIGRAM(S): at 05:25

## 2017-05-24 RX ADMIN — CARBIDOPA AND LEVODOPA 1 TABLET(S): 25; 100 TABLET ORAL at 05:24

## 2017-05-24 RX ADMIN — Medication 1300 MILLIGRAM(S): at 17:44

## 2017-05-24 RX ADMIN — NYSTATIN CREAM 1 APPLICATION(S): 100000 CREAM TOPICAL at 05:25

## 2017-05-24 RX ADMIN — Medication 1 GRAM(S): at 05:24

## 2017-05-24 RX ADMIN — CARBIDOPA AND LEVODOPA 1 TABLET(S): 25; 100 TABLET ORAL at 11:59

## 2017-05-24 RX ADMIN — Medication 1 GRAM(S): at 11:59

## 2017-05-24 RX ADMIN — Medication 1 GRAM(S): at 00:14

## 2017-05-24 RX ADMIN — Medication 1 TABLET(S): at 16:51

## 2017-05-24 RX ADMIN — PANTOPRAZOLE SODIUM 40 MILLIGRAM(S): 20 TABLET, DELAYED RELEASE ORAL at 11:59

## 2017-05-24 RX ADMIN — Medication 1 TABLET(S): at 05:24

## 2017-05-24 RX ADMIN — PRAMIPEXOLE DIHYDROCHLORIDE 0.25 MILLIGRAM(S): 0.12 TABLET ORAL at 22:46

## 2017-05-24 RX ADMIN — CARBIDOPA AND LEVODOPA 1 TABLET(S): 25; 100 TABLET ORAL at 16:50

## 2017-05-24 RX ADMIN — NYSTATIN CREAM 1 APPLICATION(S): 100000 CREAM TOPICAL at 16:51

## 2017-05-24 RX ADMIN — Medication 1 GRAM(S): at 16:51

## 2017-05-24 RX ADMIN — PRAMIPEXOLE DIHYDROCHLORIDE 0.25 MILLIGRAM(S): 0.12 TABLET ORAL at 16:12

## 2017-05-24 RX ADMIN — CALCITRIOL 0.25 MICROGRAM(S): 0.5 CAPSULE ORAL at 11:59

## 2017-05-24 NOTE — PROGRESS NOTE ADULT - SUBJECTIVE AND OBJECTIVE BOX
Still intubated, unresponsive  Hgb continues to fall      MEDICATIONS  (STANDING):  pantoprazole   Suspension 40milliGRAM(s) Oral daily  lactobacillus acidophilus 1Tablet(s) Oral every 12 hours  carbidopa/levodopa  25/100 1Tablet(s) Oral four times a day  pramipexole 0.25milliGRAM(s) Oral three times a day  levothyroxine 25MICROGram(s) Oral daily  enoxaparin Injectable 30milliGRAM(s) SubCutaneous every 24 hours  sucralfate 1Gram(s) Oral four times a day  nystatin Powder 1Application(s) Topical two times a day  sodium bicarbonate 1300milliGRAM(s) Oral every 12 hours  calcium carbonate 500 mG (Tums) Chewable 3Tablet(s) Chew at bedtime  calcitriol   Capsule 0.25MICROGram(s) Oral daily    MEDICATIONS  (PRN):  acetaminophen    Suspension. 650milliGRAM(s) Enteral Tube every 6 hours PRN Mild Pain (1 - 3)  acetaminophen    Suspension 650milliGRAM(s) Oral every 6 hours PRN For Temp greater than 38 C (100.4 F)      Allergies    No Known Allergies    Intolerances        Vital Signs Last 24 Hrs  T(C): 36.6, Max: 37.1 (05-23 @ 11:35)  T(F): 97.9, Max: 98.8 (05-23 @ 11:35)  HR: 96 (80 - 130)  BP: 115/57 (102/52 - 116/56)  BP(mean): --  RR: 19 (16 - 19)  SpO2: 110% (95% - 110%)    PHYSICAL EXAM:  General: NAD.  CVS: S1, S2  Chest: air entry bilaterally present  Abd: BS present, soft, non-tender      LABS:                        7.7    11.0  )-----------( 109      ( 24 May 2017 08:50 )             21.3     05-24    139  |  99  |  171<H>  ----------------------------<  116<H>  3.5   |  14<L>  |  5.29<H>    Ca    6.4<LL>      24 May 2017 08:50    TPro  6.4  /  Alb  1.0<L>  /  TBili  0.3  /  DBili  .07  /  AST  15  /  ALT  7<L>  /  AlkPhos  82  05-24      Pt has required transfusion on several occasions - discussed with Dr White and family - although there is a risk pt has continued to require transfusion - will schedule for EGD/Colon tomorrow  Transfuse 2 u PRBC's

## 2017-05-24 NOTE — PROGRESS NOTE ADULT - SUBJECTIVE AND OBJECTIVE BOX
INTERVAL HPI/OVERNIGHT EVENTS:        REVIEW OF SYSTEMS:  CONSTITUTIONAL:  presents  no  complaints    NECK: No pain or stiffnes  RESPIRATORY: No SOB   CARDIOVASCULAR: No chest pain, palpitations, dizziness,   GASTROINTESTINAL: No abdominal pain. No nausea, vomiting,   NEUROLOGICAL: No headaches, no  blurry  vision no  dizziness  SKIN: No itching,   MUSCULOSKELETAL: No pain    MEDICATION:  pantoprazole   Suspension 40milliGRAM(s) Oral daily  lactobacillus acidophilus 1Tablet(s) Oral every 12 hours  acetaminophen    Suspension. 650milliGRAM(s) Enteral Tube every 6 hours PRN  carbidopa/levodopa  25/100 1Tablet(s) Oral four times a day  pramipexole 0.25milliGRAM(s) Oral three times a day  acetaminophen    Suspension 650milliGRAM(s) Oral every 6 hours PRN  levothyroxine 25MICROGram(s) Oral daily  enoxaparin Injectable 30milliGRAM(s) SubCutaneous every 24 hours  sucralfate 1Gram(s) Oral four times a day  nystatin Powder 1Application(s) Topical two times a day  sodium bicarbonate 1300milliGRAM(s) Oral every 12 hours  calcium carbonate 500 mG (Tums) Chewable 3Tablet(s) Chew at bedtime  calcitriol   Capsule 0.25MICROGram(s) Oral daily    Vital Signs Last 24 Hrs  T(C): 36.6, Max: 37.1 (05-23 @ 11:35)  T(F): 97.9, Max: 98.8 (05-23 @ 11:35)  HR: 96 (80 - 131)  BP: 115/57 (102/52 - 124/64)  BP(mean): --  RR: 19 (16 - 19)  SpO2: 98% (95% - 99%)    PHYSICAL EXAM:  GENERAL: NAD, well-groomed, well-developed  EYES:  conjunctiva and sclera clear  ENMT:  Moist mucous membranes,   NECK: Supple, No JVD, Normal thyroid  NERVOUS SYSTEM:  Alert oriented   no  focal  deficits;   CHEST/LUNG: Clear    HEART: Regular rate and rhythm; No murmurs, rubs, or gallops  ABDOMEN: Soft, Nontender, Nondistended; Bowel sounds present  EXTREMITIES:  no  edema no  tenderness  SKIN: No rashes   LABS:                        8.7    12.3  )-----------( 123      ( 23 May 2017 07:41 )             24.3     05-23    140  |  100  |  169<H>  ----------------------------<  113<H>  3.5   |  15<L>  |  5.33<H>    Ca    6.1<LL>      23 May 2017 07:41    TPro  6.6  /  Alb  1.0<L>  /  TBili  0.3  /  DBili  x   /  AST  19  /  ALT  <6<L>  /  AlkPhos  85  05-23        CAPILLARY BLOOD GLUCOSE      RADIOLOGY & ADDITIONAL TESTS:    Imaging reports  Personally Reviewed:  [ x] YES  [ ] NO    Consultant(s) Notes Reviewed:  [x ] YES  [ ] NO    Care Discussed with Consultants/Other Providers [ x] YES  [ ] NO  ACUTE  RESPIRATORY  FAILURE  PNEUMONIA   CONT  VENT  SUPPORT  AB  IVF  S/P  TRACH. Fever  appears  to  have  resolved  observation off  AB    COLO/VESCICULAR FISTULA S/P  diverting  colostomy    LFT elevation observation  metabolic  encephalopathy with  severe  inflammatory  process supportive care          Problem: Parkinson disease encephalopathy.  Plan: sinemet pramipexole  anemia  continue  to  monitor  no  clinical  evidence  of  acute bleed  transfuse as  needed  add  carafate  endoscopy  colonoscopy  if  continued  drop in H/H will  speak  to  Pt's  son  and  GI  fever  pneumonia  obseve  with  new  AB    urinary  retention  observe  with  shoemaker     Worsening acute  on renal  failure  cont    as  per  renal  prognosis  guarded

## 2017-05-24 NOTE — PROVIDER CONTACT NOTE (CRITICAL VALUE NOTIFICATION) - PERSON GIVING RESULT:
MARY ANNE celis
ANUPAMA ROBLES / LAB
Crystal
Crystal Ricci
Gaurav Jean
Hb 7.0/hct 19.1
Irene Gutiérrez
Louis Gan
NHAN mark
Raghavendra Lab
Rebecca Ashton
Rhett Mcdowell
Rosina Garcia
cary luu
gaby
Rebecca COLLIER
TYE Ford

## 2017-05-24 NOTE — PROGRESS NOTE ADULT - SUBJECTIVE AND OBJECTIVE BOX
Patient seen in follow up for ZAHIDA; vent dependent; unresponsive.    MEDICATIONS  (STANDING):  pantoprazole   Suspension 40milliGRAM(s) Oral daily  lactobacillus acidophilus 1Tablet(s) Oral every 12 hours  carbidopa/levodopa  25/100 1Tablet(s) Oral four times a day  pramipexole 0.25milliGRAM(s) Oral three times a day  levothyroxine 25MICROGram(s) Oral daily  enoxaparin Injectable 30milliGRAM(s) SubCutaneous every 24 hours  sucralfate 1Gram(s) Oral four times a day  nystatin Powder 1Application(s) Topical two times a day  sodium bicarbonate 1300milliGRAM(s) Oral every 12 hours  calcium carbonate 500 mG (Tums) Chewable 3Tablet(s) Chew at bedtime  calcitriol   Capsule 0.25MICROGram(s) Oral daily  polyethylene glycol/electrolyte Solution. 4000milliLiter(s) Enteral Tube once    MEDICATIONS  (PRN):  acetaminophen    Suspension. 650milliGRAM(s) Enteral Tube every 6 hours PRN Mild Pain (1 - 3)  acetaminophen    Suspension 650milliGRAM(s) Oral every 6 hours PRN For Temp greater than 38 C (100.4 F)    PHYSICAL EXAM:      T(C): 36.8, Max: 37.1 (05-23 @ 16:25)  HR: 97 (80 - 128)  BP: 130/57 (110/54 - 130/57)  RR: 18 (16 - 19)  SpO2: 99% (96% - 110%)  Wt(kg): --  Respiratory: clear anteriorly, decreased BS at bases  Cardiovascular: S1 S2  Gastrointestinal: soft NT ND +BS  Extremities:  1  edema                                    7.7    11.0  )-----------( 109      ( 24 May 2017 08:50 )             21.3     05-24    139  |  99  |  171<H>  ----------------------------<  116<H>  3.5   |  14<L>  |  5.29<H>    Ca    6.4<LL>      24 May 2017 08:50  corrected Ca is 8.8    TPro  6.4  /  Alb  1.0<L>  /  TBili  0.3  /  DBili  .07  /  AST  15  /  ALT  7<L>  /  AlkPhos  82  05-24      LIVER FUNCTIONS - ( 24 May 2017 08:50 )  Alb: 1.0 g/dL / Pro: 6.4 gm/dL / ALK PHOS: 82 U/L / ALT: 7 U/L / AST: 15 U/L / GGT: x             Assessment and Plan:  ZAHIDA, suspected ATN vs infectious GN, nonoliguric.  Elevated BUN>>cr with possible GI urea source.  Agree with supportive care; poor prognosis and comorbid status precludes the consideration for hemodialysis.

## 2017-05-24 NOTE — PROGRESS NOTE ADULT - SUBJECTIVE AND OBJECTIVE BOX
INTERVAL HPI:  86F PMH Parkinson's, s/p recent mechanical fall with R intertrochanteric femur fracture requiring R intramedullary nailing of R trochanteric fracture of femur 3/13/17, UTI, PNA presents from [facility] rehab for sepsis, colovesical fistula with course complicated by aspiration PNA, Respiratory failure and now s/p trach and diverting loop colostomy.   Grew Acinetobacter in sputum and off and on with fever.    OVERNIGHT EVENTS:  Fever is down but no other change.    Vital Signs Last 24 Hrs  T(C): 36.8, Max: 37.1 (05-23 @ 16:25)  T(F): 98.2, Max: 98.8 (05-23 @ 16:25)  HR: 97 (80 - 128)  BP: 130/57 (110/54 - 130/57)  BP(mean): --  RR: 18 (16 - 19)  SpO2: 99% (96% - 110%)    Mode: AC/ CMV (Assist Control/ Continuous Mandatory Ventilation)  RR (machine): 16  TV (machine): 400  FiO2: 30  PEEP: 5  ITime: 1  MAP: 8  PIP: 13    PHYSICAL EXAM:  GEN:         Awake, responsive and comfortable.  HEENT:    Normal.    RESP:   CVS:             Regular rate and rhythm.   ABD:         Soft, non-tender, non-distended;   :             No costovertebral angle tenderness  EXTR:            No clubbing, cyanosis or edema  CNS:              Intact sensory and motor function.    MEDICATIONS  (STANDING):  pantoprazole   Suspension 40milliGRAM(s) Oral daily  lactobacillus acidophilus 1Tablet(s) Oral every 12 hours  carbidopa/levodopa  25/100 1Tablet(s) Oral four times a day  pramipexole 0.25milliGRAM(s) Oral three times a day  levothyroxine 25MICROGram(s) Oral daily  enoxaparin Injectable 30milliGRAM(s) SubCutaneous every 24 hours  sucralfate 1Gram(s) Oral four times a day  nystatin Powder 1Application(s) Topical two times a day  sodium bicarbonate 1300milliGRAM(s) Oral every 12 hours  calcium carbonate 500 mG (Tums) Chewable 3Tablet(s) Chew at bedtime  calcitriol   Capsule 0.25MICROGram(s) Oral daily  polyethylene glycol/electrolyte Solution. 4000milliLiter(s) Enteral Tube once    MEDICATIONS  (PRN):  acetaminophen    Suspension. 650milliGRAM(s) Enteral Tube every 6 hours PRN Mild Pain (1 - 3)  acetaminophen    Suspension 650milliGRAM(s) Oral every 6 hours PRN For Temp greater than 38 C (100.4 F)    LABS:                        7.7    11.0  )-----------( 109      ( 24 May 2017 08:50 )             21.3     05-24    139  |  99  |  171<H>  ----------------------------<  116<H>  3.5   |  14<L>  |  5.29<H>    Ca    6.4<LL>      24 May 2017 08:50    TPro  6.4  /  Alb  1.0<L>  /  TBili  0.3  /  DBili  .07  /  AST  15  /  ALT  7<L>  /  AlkPhos  82  05-24    ASSESSMENT and Plan:  ·	Hypoxic Respiratory failure.  ·	Aspiration Pneumonia. Acinetobacter.  ·	Bilateral  pleural effusion.  ·	Sepsis.  ·	Metabolic encephalopathy.  ·	S/P Diverting  Colostomy for Colovesical Fistula.  ·	Anemia.  ·	Leukocytosis better.  ·	Parkinsonism.  ·	CKD4  ·	Hypernatremia improved.  ·	Hypokalemia.    Pulmonary status on Vent support is OK but no improvement in mental status  Over all prognosis poor.

## 2017-05-24 NOTE — PROGRESS NOTE ADULT - SUBJECTIVE AND OBJECTIVE BOX
pt nis stable   she is on vent   debilitated   prognosis dismal  Radha arranging meeting w multiple specialty  No other changes

## 2017-05-24 NOTE — PROVIDER CONTACT NOTE (CRITICAL VALUE NOTIFICATION) - NAME OF MD/NP/PA/DO NOTIFIED:
Dr. White
DR JACKSON
Dr Palafox
Dr White
Dr. White
Josef (house PA)
MD Christopher Eisenberg
TEMO Newell
TEMO buckner
TEMO crowley
rehan garcia
np prosper
Dr. White
Josef (House PA)

## 2017-05-24 NOTE — PROVIDER CONTACT NOTE (CRITICAL VALUE NOTIFICATION) - TEST AND RESULT REPORTED:
hemoglobin and hematocrit  6.8/20.1
Calcium 6.1
H/H 6.0/19.6
BLD CULTURE 4/16/17 PRELIMINARY REPORT GROWTH IN AEROBIC BOTTLE GRAM POSITIVE COCCI IN CLUSTERS
Calcium 6.3
Calcium is 6.2
Calcium is 6.4
H&H 7.2/20.7
HCT 20.9 Hgb 7.2
Hemoglobin 7.0, Hematocrit 19.4
Potassium 2.8
Rebecca ribeiro
Total Calcium 6.5;
ca 6.4
calcium 6.0
hematocrit 20.8
lactate 4.2

## 2017-05-24 NOTE — PROGRESS NOTE ADULT - SUBJECTIVE AND OBJECTIVE BOX
TMAX - 98.8    Off ab rx now since yesterday    Vital Signs Last 24 Hrs  T(C): 36.8, Max: 37.1 (05-23 @ 16:25)  T(F): 98.2, Max: 98.8 (05-23 @ 16:25)  HR: 97 (80 - 128)  BP: 130/57 (110/54 - 130/57)  BP(mean): --  RR: 18 (16 - 19)  SpO2: 99% (96% - 110%)  Mode: AC/ CMV (Assist Control/ Continuous Mandatory Ventilation)  RR (machine): 16  TV (machine): 400  FiO2: 30  PEEP: 5  ITime: 1  MAP: 8  PIP: 13  Supplemental O2:  on ventilator    Remains poorly responsive, on ventilator on 30% FIO2 + 5 PEEP ( settings unchanged), and continues with poor urine output and now H+H decreasing again.      PHYSICAL EXAM  General:  poorly responsive, on ventilator, lying semi-upright in bed now  HEENT:  conj pink, sclerae anicteric, PERRLA, no oral lesions noted although mucosa somewhat dry at present  Neck: semi-supple, no nodes noted            trach site clean, mild pinkish discoloration of the skin distal to the site, sutures remain in place   Heart:  RR  Lungs:  few rhonchi anteriorly upper airway  Abdomen:  soft, BS+, appears nontender, PEG site clean - tube feedings in psrogress                   colostomy with a small amount of  brown liquid stool in bag now  Extremities:  2+ edema both LE's                      arms and hands somewhat more edematous today                      Rt arm Midline in place - site clean - placed 4/25  Skin:  warm, dry now           no rash noted      I&O's Summary:    I & Os for current day (as of 24 May 2017 15:04)  =============================================  IN: 880 ml / OUT: 400 ml / NET: 480 ml      LABS:  CBC Full  -  ( 24 May 2017 08:50 )  WBC Count : 11.0 K/uL  Hemoglobin : 7.7 g/dL  Hematocrit : 21.3 %  Platelet Count - Automated : 109 K/uL  Mean Cell Volume : 83.0 fl  Mean Cell Hemoglobin : 30.0 pg  Mean Cell Hemoglobin Concentration : 36.1 gm/dL  Auto Neutrophil # : x  Auto Lymphocyte # : x  Auto Monocyte # : x  Auto Eosinophil # : x  Auto Basophil # : x  Auto Neutrophil % : see note %  Auto Lymphocyte % : see note %  Auto Monocyte % : see note %  Auto Eosinophil % : see note %  Auto Basophil % : see note %    05-24    139  |  99  |  171<H>  ----------------------------<  116<H>  3.5   |  14<L>  |  5.29<H>    Ca    6.4<LL>      24 May 2017 08:50    TPro  6.4  /  Alb  1.0<L>  /  TBili  0.3  /  DBili  .07  /  AST  15  /  ALT  7<L>  /  AlkPhos  82  05-24    LIVER FUNCTIONS - ( 24 May 2017 08:50 )  Alb: 1.0 g/dL / Pro: 6.4 gm/dL / ALK PHOS: 82 U/L / ALT: 7 U/L / AST: 15 U/L / GGT: x               CULTURES:  Specimen Source: .Sputum Sputum (05-19 @ 00:48)  Culture Results:   Few Acinetobacter baumannii/haemolyticus (Carbapenem Resistant)  Moderate Stenotrophomonas maltophilia  Normal Respiratory Cindy absent (05-19 @ 00:48)    Specimen Source: .Blood Blood (05-19 @ 00:32)  Culture Results:   No growth at 5 days. (05-19 @ 00:32)    Specimen Source: .Blood Blood (05-19 @ 00:28)  Culture Results:   No growth at 5 days. (05-19 @ 00:28)          Impression: Remains afebrile so far off ab rx now since yesterday, but remains ventilator dependent and with renal dysfunction, and now with decreasing H+H again.  Also noted that patient's Midline has now been in place since 4/25 and will require replacement for continued IV access.    Suggestions: Will therefore continue to observe off ab rx and re-evaluate for any recurrent fever.  Notified Surgical PA of need to replace Midline. Continue to follow-up temps and labs closely.  GI w/u in progress.

## 2017-05-25 ENCOUNTER — RESULT REVIEW (OUTPATIENT)
Age: 82
End: 2017-05-25

## 2017-05-25 LAB
CULTURE RESULTS: SIGNIFICANT CHANGE UP
HCT VFR BLD CALC: 26.1 % — LOW (ref 34.5–45)
HGB BLD-MCNC: 9.2 G/DL — LOW (ref 11.5–15.5)
MCHC RBC-ENTMCNC: 28.7 PG — SIGNIFICANT CHANGE UP (ref 27–34)
MCHC RBC-ENTMCNC: 35.2 GM/DL — SIGNIFICANT CHANGE UP (ref 32–36)
MCV RBC AUTO: 81.6 FL — SIGNIFICANT CHANGE UP (ref 80–100)
PLATELET # BLD AUTO: 116 K/UL — LOW (ref 150–400)
RBC # BLD: 3.2 M/UL — LOW (ref 3.8–5.2)
RBC # FLD: 15.9 % — HIGH (ref 11–15)
SPECIMEN SOURCE: SIGNIFICANT CHANGE UP
WBC # BLD: 10.5 K/UL — SIGNIFICANT CHANGE UP (ref 3.8–10.5)
WBC # FLD AUTO: 10.5 K/UL — SIGNIFICANT CHANGE UP (ref 3.8–10.5)

## 2017-05-25 PROCEDURE — 36571 INSERT PICVAD CATH: CPT | Mod: AS

## 2017-05-25 PROCEDURE — 76937 US GUIDE VASCULAR ACCESS: CPT | Mod: 26,AS

## 2017-05-25 RX ORDER — SODIUM CHLORIDE 9 MG/ML
1000 INJECTION, SOLUTION INTRAVENOUS
Qty: 0 | Refills: 0 | Status: DISCONTINUED | OUTPATIENT
Start: 2017-05-25 | End: 2017-05-27

## 2017-05-25 RX ADMIN — Medication 1 GRAM(S): at 23:49

## 2017-05-25 RX ADMIN — Medication 1 GRAM(S): at 18:52

## 2017-05-25 RX ADMIN — PRAMIPEXOLE DIHYDROCHLORIDE 0.25 MILLIGRAM(S): 0.12 TABLET ORAL at 22:41

## 2017-05-25 RX ADMIN — Medication 1300 MILLIGRAM(S): at 18:52

## 2017-05-25 RX ADMIN — NYSTATIN CREAM 1 APPLICATION(S): 100000 CREAM TOPICAL at 18:53

## 2017-05-25 RX ADMIN — NYSTATIN CREAM 1 APPLICATION(S): 100000 CREAM TOPICAL at 05:30

## 2017-05-25 RX ADMIN — CARBIDOPA AND LEVODOPA 1 TABLET(S): 25; 100 TABLET ORAL at 18:52

## 2017-05-25 RX ADMIN — Medication 3 TABLET(S): at 22:41

## 2017-05-25 RX ADMIN — Medication 1 TABLET(S): at 18:52

## 2017-05-25 RX ADMIN — CARBIDOPA AND LEVODOPA 1 TABLET(S): 25; 100 TABLET ORAL at 23:49

## 2017-05-25 NOTE — PROCEDURE NOTE - ADDITIONAL PROCEDURE DETAILS
Patient seen at bedside for midline catheter placement.  Consent obtained from patient after risks/benefits/alternatives explained.   Upper extremity prepped and draped in usual sterile fashion. Time out performed with RN. 4Fr 14 cm single lumen midline catheter placed using ultrasound guided seldinger technique, R basilic vein. Flushes well, with good venous return. Patient tolerated procedure well without complication and was left in no acute distress. Upper arm circumference noted to be 27cm
pressure held over puncture site, no bleeding noted
flexible bronchoscopy performed at bedside. pt with right lung field infiltrate on CXR. pt preoxygenated with FIO2 100%. Pt already intubated on mechanical ventilation prior to procedure for hypoxic respiratory failure. Bronchoscope inserted via ETT. Ruby with sharp borders. Bilateral bronchial tree examined to subsegmental levels. Right mainstem bronchus with notable mucus which was suctioned and cleared. No endobronchial lesions noted bilaterally. Right bronchus intermedius with yellow mucus noted, suctioned and cleared. 20cc bronchoalveolar lavage performed to RUL, RML, and RLL. left bronchial tree with some scattered mucus in left main bronchus and LLL. BAL sent for microbiology. Pt tolerated procedure well. No episodes of oxygen desaturation.
Patient seen at bedside for midline catheter placement.  Consent obtained from patient after risks/benefits/alternatives explained.   Upper extremity prepped and draped in usual sterile fashion. Time out performed with RN. 4Fr 15 cm single lumen midline catheter placed using ultrasound guided seldinger technique, Left basilic vein. Flushes well, with good venous return. Patient tolerated procedure well without complication and was left in no acute distress. Upper arm circumference noted to be 29cm.

## 2017-05-25 NOTE — PROGRESS NOTE ADULT - SUBJECTIVE AND OBJECTIVE BOX
GI:  see gastro report    Imp; GI Bleed; Gastritis; Hiatal Hernia    Plan:  No bleeding seen from stomach; check path; for colonoscopy

## 2017-05-25 NOTE — PROGRESS NOTE ADULT - SUBJECTIVE AND OBJECTIVE BOX
INTERVAL HPI:  86F PMH Parkinson's, s/p recent mechanical fall with R intertrochanteric femur fracture requiring R intramedullary nailing of R trochanteric fracture of femur 3/13/17, UTI, PNA presents from [facility] rehab for sepsis, colovesical fistula with course complicated by aspiration PNA, Respiratory failure and now s/p trach and diverting loop colostomy.   Grew Acinetobacter in sputum and off and on with fever.    OVERNIGHT EVENTS:  On Vent , unresponsive    Vital Signs Last 24 Hrs  T(C): 36.8, Max: 37 (05-24 @ 17:00)  T(F): 98.2, Max: 98.6 (05-24 @ 17:00)  HR: 88 (83 - 110)  BP: 130/44 (115/36 - 136/35)  BP(mean): --  RR: 20 (17 - 24)  SpO2: 99% (97% - 103%)    Mode: AC/ CMV (Assist Control/ Continuous Mandatory Ventilation)  RR (machine): 16  TV (machine): 400  FiO2: 30  PEEP: 5  ITime: 1  MAP: 9  PIP: 19    PHYSICAL EXAM:  GEN:        unresponsive and comfortable.  HEENT:    Trach  RESP:      on vent  CVS:         Regular rate and rhythm.   ABD:         Soft, non-tender, non-distended;     MEDICATIONS  (STANDING):  pantoprazole   Suspension 40milliGRAM(s) Oral daily  lactobacillus acidophilus 1Tablet(s) Oral every 12 hours  carbidopa/levodopa  25/100 1Tablet(s) Oral four times a day  pramipexole 0.25milliGRAM(s) Oral three times a day  levothyroxine 25MICROGram(s) Oral daily  enoxaparin Injectable 30milliGRAM(s) SubCutaneous every 24 hours  sucralfate 1Gram(s) Oral four times a day  nystatin Powder 1Application(s) Topical two times a day  sodium bicarbonate 1300milliGRAM(s) Oral every 12 hours  calcium carbonate 500 mG (Tums) Chewable 3Tablet(s) Chew at bedtime  calcitriol   Capsule 0.25MICROGram(s) Oral daily  sodium biphosphate Rectal Enema 1Enema Rectal once    MEDICATIONS  (PRN):  acetaminophen    Suspension. 650milliGRAM(s) Enteral Tube every 6 hours PRN Mild Pain (1 - 3)  acetaminophen    Suspension 650milliGRAM(s) Oral every 6 hours PRN For Temp greater than 38 C (100.4 F)    LABS:                        9.2    10.5  )-----------( 116      ( 25 May 2017 07:30 )             26.1     05-24    139  |  99  |  171<H>  ----------------------------<  116<H>  3.5   |  14<L>  |  5.29<H>    Ca    6.4<LL>      24 May 2017 08:50    TPro  6.4  /  Alb  1.0<L>  /  TBili  0.3  /  DBili  .07  /  AST  15  /  ALT  7<L>  /  AlkPhos  82  05-24    ASSESSMENT and Plan:  ·	Hypoxic Respiratory failure.  ·	Aspiration Pneumonia. Acinetobacter.  ·	Bilateral  pleural effusion.  ·	Sepsis.  ·	Metabolic encephalopathy.  ·	S/P Diverting  Colostomy for Colovesical Fistula.  ·	Anemia.  ·	Leukocytosis better.  ·	Parkinsonism.  ·	CKD4  ·	Hypernatremia improved.  ·	Hypokalemia.    Continue supportive care.  Palliative care planning meeting with family.

## 2017-05-25 NOTE — PROCEDURE NOTE - NSSITEPREP_SKIN_A_CORE
chlorhexidine
Adherence to aseptic technique: hand hygiene prior to donning barriers (gown, gloves), don cap and mask, sterile drape over patient/chlorhexidine
chlorhexidine

## 2017-05-25 NOTE — PROGRESS NOTE ADULT - SUBJECTIVE AND OBJECTIVE BOX
GI:  see colonoscopy report    Imp:  Colonoscopy performed via colostomy and rectum.  +Diverticulosis -  no bleeding    Plan:  no bleeding from Bowel - however, pt is oozing BRB from the stoma; Will discuss this further with Dr White and Surgery. resume feedings; CBC in AM

## 2017-05-25 NOTE — PROCEDURE NOTE - NSPOSTCAREGUIDE_GEN_A_CORE
Instructed patient/caregiver regarding signs and symptoms of infection/Verbal/written post procedure instructions were given to patient/caregiver/Care for catheter as per unit/ICU protocols/Keep the cast/splint/dressing clean and dry/Instructed patient/caregiver to follow-up with primary care physician
Care for catheter as per unit/ICU protocols

## 2017-05-25 NOTE — PROCEDURE NOTE - NSTIMEOUT_GEN_A_CORE
Patient's first and last name, , procedure, and correct site confirmed prior to the start of procedure.

## 2017-05-25 NOTE — PROGRESS NOTE ADULT - SUBJECTIVE AND OBJECTIVE BOX
HPI:  patient  with  worsening  lethargy  fever  dysphagia  evaluated  in  ER  admitted  for  pneumonia  UTI  patient  s/p  l  hip  medullary  nail (04 Apr 2017 19:53)      pt is on vent   unresposive   overall prognosis is dismal  KPS<20   pt is on dying trajectory   with no real hope of recovery   need to meet son to discuss GOC

## 2017-05-25 NOTE — PROCEDURE NOTE - NSINFORMCONSENT_GEN_A_CORE
Benefits, risks, and possible complications of procedure explained to patient/caregiver who verbalized understanding and gave written consent.
This was an emergent procedure.
telephone consent from karolina Flood
Benefits, risks, and possible complications of procedure explained to patient/caregiver who verbalized understanding and gave verbal consent./Telephone consent by Milton
Benefits, risks, and possible complications of procedure explained to patient/caregiver who verbalized understanding and gave written consent.

## 2017-05-25 NOTE — PROGRESS NOTE ADULT - SUBJECTIVE AND OBJECTIVE BOX
INTERVAL HPI/OVERNIGHT EVENTS:        REVIEW OF SYSTEMS:  CONSTITUTIONAL:  unresponsive  on  respirator    MEDICATION:  pantoprazole   Suspension 40milliGRAM(s) Oral daily  lactobacillus acidophilus 1Tablet(s) Oral every 12 hours  acetaminophen    Suspension. 650milliGRAM(s) Enteral Tube every 6 hours PRN  carbidopa/levodopa  25/100 1Tablet(s) Oral four times a day  pramipexole 0.25milliGRAM(s) Oral three times a day  acetaminophen    Suspension 650milliGRAM(s) Oral every 6 hours PRN  levothyroxine 25MICROGram(s) Oral daily  enoxaparin Injectable 30milliGRAM(s) SubCutaneous every 24 hours  sucralfate 1Gram(s) Oral four times a day  nystatin Powder 1Application(s) Topical two times a day  sodium bicarbonate 1300milliGRAM(s) Oral every 12 hours  calcium carbonate 500 mG (Tums) Chewable 3Tablet(s) Chew at bedtime  calcitriol   Capsule 0.25MICROGram(s) Oral daily  sodium biphosphate Rectal Enema 1Enema Rectal once    Vital Signs Last 24 Hrs  T(C): 36.8, Max: 37 (05-24 @ 17:00)  T(F): 98.2, Max: 98.6 (05-24 @ 17:00)  HR: 88 (83 - 110)  BP: 130/44 (115/36 - 136/35)  BP(mean): --  RR: 20 (17 - 24)  SpO2: 99% (97% - 103%)    PHYSICAL EXAM:  GENERAL: NAD, well-groomed, well-developed  EYES:  conjunctiva and sclera clear  ENMT:  Moist mucous membranes,   NECK: Supple, No JVD, Normal thyroid  NERVOUS SYSTEM:  Alert oriented   no  focal  deficits;   CHEST/LUNG: Clear    HEART: Regular rate and rhythm; No murmurs, rubs, or gallops  ABDOMEN: Soft, Nontender, Nondistended; Bowel sounds present  EXTREMITIES:  no  edema no  tenderness  SKIN: No rashes   LABS:                        9.2    10.5  )-----------( 116      ( 25 May 2017 07:30 )             26.1     05-24    139  |  99  |  171<H>  ----------------------------<  116<H>  3.5   |  14<L>  |  5.29<H>    Ca    6.4<LL>      24 May 2017 08:50    TPro  6.4  /  Alb  1.0<L>  /  TBili  0.3  /  DBili  .07  /  AST  15  /  ALT  7<L>  /  AlkPhos  82  05-24        CAPILLARY BLOOD GLUCOSE      RADIOLOGY & ADDITIONAL TESTS:    Imaging reports  Personally Reviewed:  [x ] YES  [ ] NO    Consultant(s) Notes Reviewed:  [x ] YES  [ ] NO    Care Discussed with Consultants/Other Providers [ x] YES  [ ] NO  ACUTE  RESPIRATORY  FAILURE  PNEUMONIA   CONT  VENT  SUPPORT  AB  IVF  S/P  TRACH. Fever  appears  to  have  resolved  observation off  AB    COLO/VESCICULAR FISTULA S/P  diverting  colostomy    LFT elevation observation  metabolic  encephalopathy with  severe  inflammatory  process supportive care          Problem: Parkinson disease encephalopathy.  Plan: sinemet pramipexole  anemia  for   endoscopy  colonoscopy  discussed  with  GI  left  message  with  Pt's  son  5/24/17  on  the  subject  have  not  received  answer    fever  pneumonia  obseve  off AB  urinary  retention  observe  with  shoemaker     Worsening acute  on renal  failure appears  stabilized  prognosis  guarded

## 2017-05-25 NOTE — PROGRESS NOTE ADULT - SUBJECTIVE AND OBJECTIVE BOX
TMAX - 98.6    Off all ab rx now for > 48hrs.    Vital Signs Last 24 Hrs  T(C): 36.8, Max: 37 (05-24 @ 17:00)  T(F): 98.2, Max: 98.6 (05-24 @ 17:00)  HR: 90 (83 - 110)  BP: 141/93 (115/36 - 141/93)  BP(mean): --  RR: 18 (17 - 24)  SpO2: 99% (97% - 99%)  Mode: AC/ CMV (Assist Control/ Continuous Mandatory Ventilation)  RR (machine): 12  TV (machine): 400  FiO2: 30  PEEP: 5  ITime: 1  MAP: 9  PIP: 20  Supplemental O2: on ventilator      Remains poorly responsive, on ventilator, on 30% FIO2 + 5 PEEP.  S/p transfusion of 2 units of PRBC's yesterday for low H+H.      PHYSICAL EXAM  General:  poorly responsive, on ventilator, but eyes open today, following no commands though, lying in bed in semi-upright position  HEENT: conj. pink, sclerae anicteric, PERRLA, no oral lesions noted   Neck:  semi-supple, no nodes noted             trach site clean, no drainage seen - sutures remain in place  Heart: RR    Lungs: few upper airway rhonchi bilaterally, with decreased BS at bases    Abdomen:  soft, BS +, PEG site clean - some bilious appearing drainage now in tubing, clamped and no feedings running now                   colostomy with some liquid brown stool in bag  Extremities:  2+ edema LE's                      both upper extremities with some swelling                      Rt upper arm with Midline in place - dressing intact, site clean - placed 4/25  Skin:  warm, dry, no rash noted      I&O's Summary:    I & Os for current day (as of 25 May 2017 13:29)  =============================================  IN: 298 ml / OUT: 1125 ml / NET: -827 ml      LABS:  CBC Full  -  ( 25 May 2017 07:30 )  WBC Count : 10.5 K/uL  Hemoglobin : 9.2 g/dL  Hematocrit : 26.1 %  Platelet Count - Automated : 116 K/uL  Mean Cell Volume : 81.6 fl  Mean Cell Hemoglobin : 28.7 pg  Mean Cell Hemoglobin Concentration : 35.2 gm/dL  Auto Neutrophil # : x  Auto Lymphocyte # : x  Auto Monocyte # : x  Auto Eosinophil # : x  Auto Basophil # : x  Auto Neutrophil % : x  Auto Lymphocyte % : x  Auto Monocyte % : x  Auto Eosinophil % : x  Auto Basophil % : x    05-24    139  |  99  |  171<H>  ----------------------------<  116<H>  3.5   |  14<L>  |  5.29<H>    Ca    6.4<LL>      24 May 2017 08:50    TPro  6.4  /  Alb  1.0<L>  /  TBili  0.3  /  DBili  .07  /  AST  15  /  ALT  7<L>  /  AlkPhos  82  05-24    LIVER FUNCTIONS - ( 24 May 2017 08:50 )  Alb: 1.0 g/dL / Pro: 6.4 gm/dL / ALK PHOS: 82 U/L / ALT: 7 U/L / AST: 15 U/L / GGT: x               CULTURES:  Specimen Source: .Sputum Sputum (05-19 @ 00:48)  Culture Results:   Few Acinetobacter baumannii/haemolyticus (Carbapenem Resistant)  Moderate Stenotrophomonas maltophilia  Normal Respiratory Cindy absent (05-19 @ 00:48)    Specimen Source: .Blood Blood (05-19 @ 00:32)  Culture Results:   No growth at 5 days. (05-19 @ 00:32)    Specimen Source: .Blood Blood (05-19 @ 00:28)  Culture Results:   No growth at 5 days. (05-19 @ 00:28)        Impression: Remains afebrile now off ab rx for > 48hrs, with H+H increased s/p transfusion of 2 units of PRBC's last evening.    Suggestions: Will continue to observe off ab rx as long as patient remains afebrile and with WBC's WNL.  Await change of Midline as present one has been in place now since 4/25 and patient is still in need of IV access.  For possible EGD and Colonoscopy today.

## 2017-05-25 NOTE — PROGRESS NOTE ADULT - SUBJECTIVE AND OBJECTIVE BOX
PETER TOBYKINGS  86y  Female    Patient is a 86y old  Female who presents with a chief complaint of change  in  mental  status  pneumonia  dysphagia.   VDRF via trach. poorly responsive.     PAST MEDICAL & SURGICAL HISTORY:  Pneumonia  Anemia  Parkinson disease  Tremors of nervous system  No pertinent past medical history  Status post hip surgery  No significant past surgical history          PHYSICAL EXAM:    T(C): 36.8, Max: 37 (05-24 @ 17:00)  HR: 90 (83 - 110)  BP: 141/93 (115/36 - 141/93)  RR: 18 (17 - 24)  SpO2: 99% (97% - 99%)  Wt(kg): --    I&O's Detail    I & Os for current day (as of 25 May 2017 14:38)  =============================================  IN:    Packed Red Blood Cells: 298 ml    Total IN: 298 ml  ---------------------------------------------  OUT:    Indwelling Catheter - Urethral: 575 ml    Colostomy: 550 ml    Total OUT: 1125 ml  ---------------------------------------------  Total NET: -827 ml      Respiratory: clear anteriorly, decreased BS at bases  Cardiovascular: S1 S2  Gastrointestinal: soft NT ND +BS  Extremities: edema   Neuro: unresponsive on the vent.    MEDICATIONS  (STANDING):  pantoprazole   Suspension 40milliGRAM(s) Oral daily  lactobacillus acidophilus 1Tablet(s) Oral every 12 hours  carbidopa/levodopa  25/100 1Tablet(s) Oral four times a day  pramipexole 0.25milliGRAM(s) Oral three times a day  levothyroxine 25MICROGram(s) Oral daily  enoxaparin Injectable 30milliGRAM(s) SubCutaneous every 24 hours  sucralfate 1Gram(s) Oral four times a day  nystatin Powder 1Application(s) Topical two times a day  sodium bicarbonate 1300milliGRAM(s) Oral every 12 hours  calcium carbonate 500 mG (Tums) Chewable 3Tablet(s) Chew at bedtime  calcitriol   Capsule 0.25MICROGram(s) Oral daily  sodium biphosphate Rectal Enema 1Enema Rectal once    MEDICATIONS  (PRN):  acetaminophen    Suspension. 650milliGRAM(s) Enteral Tube every 6 hours PRN Mild Pain (1 - 3)  acetaminophen    Suspension 650milliGRAM(s) Oral every 6 hours PRN For Temp greater than 38 C (100.4 F)                            9.2    10.5  )-----------( 116      ( 25 May 2017 07:30 )             26.1       05-24    139  |  99  |  171<H>  ----------------------------<  116<H>  3.5   |  14<L>  |  5.29<H>    Ca    6.4<LL>      24 May 2017 08:50    TPro  6.4  /  Alb  1.0<L>  /  TBili  0.3  /  DBili  .07  /  AST  15  /  ALT  7<L>  /  AlkPhos  82  05-24

## 2017-05-26 LAB
ALBUMIN SERPL ELPH-MCNC: 1 G/DL — LOW (ref 3.3–5)
ALP SERPL-CCNC: 86 U/L — SIGNIFICANT CHANGE UP (ref 40–120)
ALT FLD-CCNC: 8 U/L — LOW (ref 12–78)
ANION GAP SERPL CALC-SCNC: 26 MMOL/L — HIGH (ref 5–17)
AST SERPL-CCNC: 13 U/L — LOW (ref 15–37)
BILIRUB SERPL-MCNC: 0.3 MG/DL — SIGNIFICANT CHANGE UP (ref 0.2–1.2)
BUN SERPL-MCNC: 168 MG/DL — SIGNIFICANT CHANGE UP (ref 7–23)
CALCIUM SERPL-MCNC: 6.5 MG/DL — CRITICAL LOW (ref 8.5–10.1)
CHLORIDE SERPL-SCNC: 101 MMOL/L — SIGNIFICANT CHANGE UP (ref 96–108)
CO2 SERPL-SCNC: 12 MMOL/L — LOW (ref 22–31)
CREAT SERPL-MCNC: 5.1 MG/DL — HIGH (ref 0.5–1.3)
GLUCOSE SERPL-MCNC: 103 MG/DL — HIGH (ref 70–99)
HCT VFR BLD CALC: 25.4 % — LOW (ref 34.5–45)
HGB BLD-MCNC: 9.4 G/DL — LOW (ref 11.5–15.5)
MAGNESIUM SERPL-MCNC: 3.1 MG/DL — HIGH (ref 1.6–2.6)
MCHC RBC-ENTMCNC: 30.2 PG — SIGNIFICANT CHANGE UP (ref 27–34)
MCHC RBC-ENTMCNC: 37.2 GM/DL — HIGH (ref 32–36)
MCV RBC AUTO: 81.3 FL — SIGNIFICANT CHANGE UP (ref 80–100)
PHOSPHATE SERPL-MCNC: 8.7 MG/DL — SIGNIFICANT CHANGE UP (ref 2.5–4.5)
PLATELET # BLD AUTO: 113 K/UL — LOW (ref 150–400)
POTASSIUM SERPL-MCNC: 3.3 MMOL/L — LOW (ref 3.5–5.3)
POTASSIUM SERPL-SCNC: 3.3 MMOL/L — LOW (ref 3.5–5.3)
PROT SERPL-MCNC: 6.5 GM/DL — SIGNIFICANT CHANGE UP (ref 6–8.3)
RBC # BLD: 3.12 M/UL — LOW (ref 3.8–5.2)
RBC # FLD: 15.5 % — HIGH (ref 11–15)
SODIUM SERPL-SCNC: 139 MMOL/L — SIGNIFICANT CHANGE UP (ref 135–145)
WBC # BLD: 6.9 K/UL — SIGNIFICANT CHANGE UP (ref 3.8–10.5)
WBC # FLD AUTO: 6.9 K/UL — SIGNIFICANT CHANGE UP (ref 3.8–10.5)

## 2017-05-26 RX ADMIN — Medication 1 TABLET(S): at 17:14

## 2017-05-26 RX ADMIN — Medication 1 GRAM(S): at 12:10

## 2017-05-26 RX ADMIN — SODIUM CHLORIDE 75 MILLILITER(S): 9 INJECTION, SOLUTION INTRAVENOUS at 21:29

## 2017-05-26 RX ADMIN — NYSTATIN CREAM 1 APPLICATION(S): 100000 CREAM TOPICAL at 05:42

## 2017-05-26 RX ADMIN — CARBIDOPA AND LEVODOPA 1 TABLET(S): 25; 100 TABLET ORAL at 17:14

## 2017-05-26 RX ADMIN — PANTOPRAZOLE SODIUM 40 MILLIGRAM(S): 20 TABLET, DELAYED RELEASE ORAL at 12:06

## 2017-05-26 RX ADMIN — Medication 3 TABLET(S): at 21:27

## 2017-05-26 RX ADMIN — Medication 1 GRAM(S): at 23:28

## 2017-05-26 RX ADMIN — NYSTATIN CREAM 1 APPLICATION(S): 100000 CREAM TOPICAL at 17:14

## 2017-05-26 RX ADMIN — PRAMIPEXOLE DIHYDROCHLORIDE 0.25 MILLIGRAM(S): 0.12 TABLET ORAL at 21:27

## 2017-05-26 RX ADMIN — Medication 1 TABLET(S): at 05:40

## 2017-05-26 RX ADMIN — Medication 1300 MILLIGRAM(S): at 17:15

## 2017-05-26 RX ADMIN — SODIUM CHLORIDE 75 MILLILITER(S): 9 INJECTION, SOLUTION INTRAVENOUS at 05:39

## 2017-05-26 RX ADMIN — PRAMIPEXOLE DIHYDROCHLORIDE 0.25 MILLIGRAM(S): 0.12 TABLET ORAL at 05:39

## 2017-05-26 RX ADMIN — Medication 1 GRAM(S): at 17:14

## 2017-05-26 RX ADMIN — PRAMIPEXOLE DIHYDROCHLORIDE 0.25 MILLIGRAM(S): 0.12 TABLET ORAL at 15:02

## 2017-05-26 RX ADMIN — Medication 1 GRAM(S): at 05:39

## 2017-05-26 RX ADMIN — Medication 25 MICROGRAM(S): at 05:39

## 2017-05-26 RX ADMIN — ENOXAPARIN SODIUM 30 MILLIGRAM(S): 100 INJECTION SUBCUTANEOUS at 12:06

## 2017-05-26 RX ADMIN — CALCITRIOL 0.25 MICROGRAM(S): 0.5 CAPSULE ORAL at 12:06

## 2017-05-26 RX ADMIN — Medication 1300 MILLIGRAM(S): at 05:39

## 2017-05-26 RX ADMIN — CARBIDOPA AND LEVODOPA 1 TABLET(S): 25; 100 TABLET ORAL at 12:06

## 2017-05-26 RX ADMIN — CARBIDOPA AND LEVODOPA 1 TABLET(S): 25; 100 TABLET ORAL at 05:40

## 2017-05-26 RX ADMIN — CARBIDOPA AND LEVODOPA 1 TABLET(S): 25; 100 TABLET ORAL at 23:28

## 2017-05-26 NOTE — PROGRESS NOTE ADULT - SUBJECTIVE AND OBJECTIVE BOX
INTERVAL HPI:  86F PMH Parkinson's, s/p recent mechanical fall with R intertrochanteric femur fracture requiring R intramedullary nailing of R trochanteric fracture of femur 3/13/17, UTI, PNA presents from [facility] rehab for sepsis, colovesical fistula with course complicated by aspiration PNA, Respiratory failure and now s/p trach and diverting loop colostomy.   Grew Acinetobacter in sputum and off and on with fever.    OVERNIGHT EVENTS:  Fever improved. Unresponsive, on Vent.    Vital Signs Last 24 Hrs  T(C): 37, Max: 37 (05-26 @ 12:15)  T(F): 98.6, Max: 98.6 (05-26 @ 12:15)  HR: 95 (81 - 136)  BP: 141/72 (93/68 - 141/72)  BP(mean): --  RR: 18 (14 - 19)  SpO2: 140% (91% - 140%)    Mode: AC/ CMV (Assist Control/ Continuous Mandatory Ventilation)  RR (machine): 16  TV (machine): 400  FiO2: 40  PEEP: 5  ITime: 0.8  MAP: 9  PIP: 17    PHYSICAL EXAM:  GEN:         Awake, responsive and comfortable.  HEENT:    Normal.    RESP:     no wheezing.  CVS:         Regular rate and rhythm.   ABD:         Soft, non-tender, non-distended;     MEDICATIONS  (STANDING):  pantoprazole   Suspension 40milliGRAM(s) Oral daily  lactobacillus acidophilus 1Tablet(s) Oral every 12 hours  carbidopa/levodopa  25/100 1Tablet(s) Oral four times a day  pramipexole 0.25milliGRAM(s) Oral three times a day  levothyroxine 25MICROGram(s) Oral daily  enoxaparin Injectable 30milliGRAM(s) SubCutaneous every 24 hours  sucralfate 1Gram(s) Oral four times a day  nystatin Powder 1Application(s) Topical two times a day  sodium bicarbonate 1300milliGRAM(s) Oral every 12 hours  calcium carbonate 500 mG (Tums) Chewable 3Tablet(s) Chew at bedtime  calcitriol   Capsule 0.25MICROGram(s) Oral daily  sodium biphosphate Rectal Enema 1Enema Rectal once  lactated ringers. 1000milliLiter(s) IV Continuous <Continuous>    MEDICATIONS  (PRN):  acetaminophen    Suspension. 650milliGRAM(s) Enteral Tube every 6 hours PRN Mild Pain (1 - 3)  acetaminophen    Suspension 650milliGRAM(s) Oral every 6 hours PRN For Temp greater than 38 C (100.4 F)    LABS:                        9.4    6.9   )-----------( 113      ( 26 May 2017 07:44 )             25.4     05-26    139  |  101  |  168  ----------------------------<  103<H>  3.3<L>   |  12<L>  |  5.10<H>    Ca    6.5<LL>      26 May 2017 07:44  Phos  8.7     05-26  Mg     3.1     05-26    TPro  6.5  /  Alb  1.0<L>  /  TBili  0.3  /  DBili  x   /  AST  13<L>  /  ALT  8<L>  /  AlkPhos  86  05-26    ASSESSMENT and Plan:  ·	Hypoxic Respiratory failure.  ·	Aspiration Pneumonia. Acinetobacter.  ·	Bilateral  pleural effusion.  ·	Sepsis.  ·	Metabolic encephalopathy.  ·	S/P Diverting  Colostomy for Colovesical Fistula.  ·	Anemia.  ·	Leukocytosis better.  ·	Parkinsonism.  ·	CKD4  ·	Hypernatremia improved.  ·	Hypokalemia.    Continue full vent support.  Suction PRN.

## 2017-05-26 NOTE — PROGRESS NOTE ADULT - SUBJECTIVE AND OBJECTIVE BOX
Patient seen in follow up for ZAHIDA, appears comfortable.    MEDICATIONS  (STANDING):  pantoprazole   Suspension 40milliGRAM(s) Oral daily  lactobacillus acidophilus 1Tablet(s) Oral every 12 hours  carbidopa/levodopa  25/100 1Tablet(s) Oral four times a day  pramipexole 0.25milliGRAM(s) Oral three times a day  levothyroxine 25MICROGram(s) Oral daily  enoxaparin Injectable 30milliGRAM(s) SubCutaneous every 24 hours  sucralfate 1Gram(s) Oral four times a day  nystatin Powder 1Application(s) Topical two times a day  sodium bicarbonate 1300milliGRAM(s) Oral every 12 hours  calcium carbonate 500 mG (Tums) Chewable 3Tablet(s) Chew at bedtime  calcitriol   Capsule 0.25MICROGram(s) Oral daily  sodium biphosphate Rectal Enema 1Enema Rectal once  lactated ringers. 1000milliLiter(s) IV Continuous <Continuous>    MEDICATIONS  (PRN):  acetaminophen    Suspension. 650milliGRAM(s) Enteral Tube every 6 hours PRN Mild Pain (1 - 3)  acetaminophen    Suspension 650milliGRAM(s) Oral every 6 hours PRN For Temp greater than 38 C (100.4 F)    PHYSICAL EXAM:      T(C): 37, Max: 37 (05-26 @ 12:15)  HR: 114 (81 - 136)  BP: 141/72 (93/68 - 141/72)  RR: 18 (14 - 19)  SpO2: 97% (91% - 103%)  Wt(kg): --  Respiratory: clear anteriorly, decreased BS at bases  Cardiovascular: S1 S2  Gastrointestinal: soft NT ND +BS  Extremities: 2  edema                                    9.4    6.9   )-----------( 113      ( 26 May 2017 07:44 )             25.4     05-26    139  |  101  |  168  ----------------------------<  103<H>  3.3<L>   |  12<L>  |  5.10<H>    Ca    6.5<LL>  Ca corrects to 8.9    26 May 2017 07:44  Phos  8.7     05-26  Mg     3.1     05-26    TPro  6.5  /  Alb  1.0<L>  /  TBili  0.3  /  DBili  x   /  AST  13<L>  /  ALT  8<L>  /  AlkPhos  86  05-26      LIVER FUNCTIONS - ( 26 May 2017 07:44 )  Alb: 1.0 g/dL / Pro: 6.5 gm/dL / ALK PHOS: 86 U/L / ALT: 8 U/L / AST: 13 U/L / GGT: x             Assessment and Plan:  ZAHIDA; suspected ATN; nonoliguric.  Supportive care; overall prognosis poor.

## 2017-05-26 NOTE — PROGRESS NOTE ADULT - SUBJECTIVE AND OBJECTIVE BOX
TMAX - 98.6    Off ab rx now for > 72 hrs.    Vital Signs Last 24 Hrs  T(C): 37, Max: 37 (05-26 @ 12:15)  T(F): 98.6, Max: 98.6 (05-26 @ 12:15)  HR: 95 (81 - 136)  BP: 141/72 (100/65 - 141/72)  BP(mean): --  RR: 18 (14 - 19)  SpO2: 140% (91% - 140%)  Mode: AC/ CMV (Assist Control/ Continuous Mandatory Ventilation)  RR (machine): 16  TV (machine): 400  FiO2: 40  PEEP: 5  ITime: 0.8  MAP: 9  PIP: 17  Supplemental O2:  on ventilator      Patient remains poorly responsive, on ventilator but noted to be on increased FIO2 now of 40% + 5 PEEP.  S/p EGD and Colonoscopy yesterday - found to have Gastritis, Hiatal Hernia, and Diverticulosis.  Pathology pending.  S/p new Midline placement in LUE yesterday as well - RUE midline removed.        PHYSICAL EXAM  General:  poorly responsive, on ventilator, lying in semi-upright position in bed  HEENT: conj pink, sclerae anicteric, PERRLA, no oral lesions noted   Neck:  semi-supple, no nodes noted            trach site clean, sutures in place  Heart: RR   Lungs:  bilateral upper airway rhonchi with decreased BS at bases  Abdomen:  soft, BS+, nontender appearance                   PEG site clean and with feedings in progress                   colostomy functioning and with liquid  dark brown stool in bag  Extremities: 2+ edema LE's                     Lt upper arm Midline in place now - site clean and with dressing intact - placed 5/25   Skin:  warm, dry, no rash noted      I&O's Summary:    I & Os for current day (as of 26 May 2017 16:10)  =============================================  IN: 0 ml / OUT: 200 ml / NET: -200 ml       LABS:  CBC Full  -  ( 26 May 2017 07:44 )  WBC Count : 6.9 K/uL  Hemoglobin : 9.4 g/dL  Hematocrit : 25.4 %  Platelet Count - Automated : 113 K/uL  Mean Cell Volume : 81.3 fl  Mean Cell Hemoglobin : 30.2 pg  Mean Cell Hemoglobin Concentration : 37.2 gm/dL  Auto Neutrophil # : x  Auto Lymphocyte # : x  Auto Monocyte # : x  Auto Eosinophil # : x  Auto Basophil # : x  Auto Neutrophil % : x  Auto Lymphocyte % : x  Auto Monocyte % : x  Auto Eosinophil % : x  Auto Basophil % : x    05-26    139  |  101  |  168  ----------------------------<  103<H>  3.3<L>   |  12<L>  |  5.10<H>    Ca    6.5<LL>      26 May 2017 07:44  Phos  8.7     05-26  Mg     3.1     05-26    TPro  6.5  /  Alb  1.0<L>  /  TBili  0.3  /  DBili  x   /  AST  13<L>  /  ALT  8<L>  /  AlkPhos  86  05-26    LIVER FUNCTIONS - ( 26 May 2017 07:44 )  Alb: 1.0 g/dL / Pro: 6.5 gm/dL / ALK PHOS: 86 U/L / ALT: 8 U/L / AST: 13 U/L / GGT: x               Impression: Remains afebrile now off ab rx for a few days and with WBC's - WNL.    Suggestions: Will continue to observe off ab rx and re-cx and re-evaluate for any recurrent fevers, increase WBC's or other signs of suspected Sepsis.

## 2017-05-26 NOTE — PROGRESS NOTE ADULT - SUBJECTIVE AND OBJECTIVE BOX
SURGERY/UROLOGY PROGRESS HPI:  Pt seen and examined at bedside. Resting comfortably on vent.      Vital Signs Last 24 Hrs  T(C): 36.9, Max: 36.9 (05-25 @ 16:31)  T(F): 98.4, Max: 98.5 (05-25 @ 16:31)  HR: 96 (81 - 136)  BP: 118/54 (93/68 - 141/93)  BP(mean): --  RR: 16 (14 - 20)  SpO2: 98% (91% - 99%)      PHYSICAL EXAM:    Physical Exam  GEN: supine  unresponsive  HEENT: NCAT, Trachea midline on vent, no scleral icterus  Resp: unlabored, no accessory muscle use evident, coarse BS  CV: No Tachycardia, S1 S2  ABD: Soft, +functioning colostomy which is pink and viable. liquified black stool and air in colostomy bag, + PEG  : +shoemaker with clear yellow urine 575cc/24 hrs  EXT: warm well perfused, no edema, no calf tenderness. Midline in left arm is clean/dry/intact.    I&O's Detail    I & Os for current day (as of 26 May 2017 04:42)  =============================================  IN:    Packed Red Blood Cells: 298 ml    Total IN: 298 ml  ---------------------------------------------  OUT:    Indwelling Catheter - Urethral: 575 ml    Colostomy: 550 ml    Total OUT: 1125 ml  ---------------------------------------------  Total NET: -827 ml      LABS:                        9.2    10.5  )-----------( 116      ( 25 May 2017 07:30 )             26.1     05-24    139  |  99  |  171<H>  ----------------------------<  116<H>  3.5   |  14<L>  |  5.29<H>    Ca    6.4<LL>      24 May 2017 08:50    TPro  6.4  /  Alb  1.0<L>  /  TBili  0.3  /  DBili  .07  /  AST  15  /  ALT  7<L>  /  AlkPhos  82  05-24    Colonoscopy: Imp:  Colonoscopy performed via colostomy and rectum.  +Diverticulosis -  no bleeding  EGD: Imp; GI Bleed; Gastritis; Hiatal Hernia      Assessment: 86yFemale a/w recent h/o IM nail, and colovesicular fistula, coarse complicated by aspiration PNA, respiratory failure Pt required PEG by GI for nutrition, diverting colostomy, tracheostomy, parkinson's encephelopathy with poor prognosis. New midline placed 5/25/17.    Plan:  -supportive care  -continue shoemaker  -continue colostomy care  -continue trache care  -continue PEG care  -DVT prophylaxis  -continued medical management, palliative, urology, neuro, GI, follow-up  -no acute surgical indication at this time  -will discuss with Dr. Mosley and Dr. Bahena

## 2017-05-26 NOTE — PROGRESS NOTE ADULT - SUBJECTIVE AND OBJECTIVE BOX
Case reviewed with Dr Mosley.  Pt noted with local oozing from stoma on colonoscopy yesterday.  Per Dr Mosley, no surgical intervention planned.  Continue local care.  Pt is surgically clear for discharge when medically stable.

## 2017-05-26 NOTE — PROGRESS NOTE ADULT - SUBJECTIVE AND OBJECTIVE BOX
Pt tolerated EGD/Colon; intubated; unresponsive  Pt with bleeding from stoma    MEDICATIONS  (STANDING):  pantoprazole   Suspension 40milliGRAM(s) Oral daily  lactobacillus acidophilus 1Tablet(s) Oral every 12 hours  carbidopa/levodopa  25/100 1Tablet(s) Oral four times a day  pramipexole 0.25milliGRAM(s) Oral three times a day  levothyroxine 25MICROGram(s) Oral daily  enoxaparin Injectable 30milliGRAM(s) SubCutaneous every 24 hours  sucralfate 1Gram(s) Oral four times a day  nystatin Powder 1Application(s) Topical two times a day  sodium bicarbonate 1300milliGRAM(s) Oral every 12 hours  calcium carbonate 500 mG (Tums) Chewable 3Tablet(s) Chew at bedtime  calcitriol   Capsule 0.25MICROGram(s) Oral daily  sodium biphosphate Rectal Enema 1Enema Rectal once  lactated ringers. 1000milliLiter(s) IV Continuous <Continuous>    MEDICATIONS  (PRN):  acetaminophen    Suspension. 650milliGRAM(s) Enteral Tube every 6 hours PRN Mild Pain (1 - 3)  acetaminophen    Suspension 650milliGRAM(s) Oral every 6 hours PRN For Temp greater than 38 C (100.4 F)      Allergies    No Known Allergies    Intolerances        Vital Signs Last 24 Hrs  T(C): 37, Max: 37 (05-26 @ 12:15)  T(F): 98.6, Max: 98.6 (05-26 @ 12:15)  HR: 95 (81 - 136)  BP: 141/72 (93/68 - 141/72)  BP(mean): --  RR: 18 (14 - 19)  SpO2: 140% (91% - 140%)    PHYSICAL EXAM:  General: NAD.  CVS: S1, S2  Chest: air entry bilaterally present  Abd: BS present, soft, non-tender, Peg      LABS:                        9.4    6.9   )-----------( 113      ( 26 May 2017 07:44 )             25.4     05-26    139  |  101  |  168  ----------------------------<  103<H>  3.3<L>   |  12<L>  |  5.10<H>    Ca    6.5<LL>      26 May 2017 07:44  Phos  8.7     05-26  Mg     3.1     05-26    TPro  6.5  /  Alb  1.0<L>  /  TBili  0.3  /  DBili  x   /  AST  13<L>  /  ALT  8<L>  /  AlkPhos  86  05-26      Follow CBC  Peg feedings resumed

## 2017-05-26 NOTE — PROGRESS NOTE ADULT - SUBJECTIVE AND OBJECTIVE BOX
INTERVAL HPI/OVERNIGHT EVENTS:        REVIEW OF SYSTEMS:  CONSTITUTIONAL: continues  unresponsive  on  respirator      MEDICATION:  pantoprazole   Suspension 40milliGRAM(s) Oral daily  lactobacillus acidophilus 1Tablet(s) Oral every 12 hours  acetaminophen    Suspension. 650milliGRAM(s) Enteral Tube every 6 hours PRN  carbidopa/levodopa  25/100 1Tablet(s) Oral four times a day  pramipexole 0.25milliGRAM(s) Oral three times a day  acetaminophen    Suspension 650milliGRAM(s) Oral every 6 hours PRN  levothyroxine 25MICROGram(s) Oral daily  enoxaparin Injectable 30milliGRAM(s) SubCutaneous every 24 hours  sucralfate 1Gram(s) Oral four times a day  nystatin Powder 1Application(s) Topical two times a day  sodium bicarbonate 1300milliGRAM(s) Oral every 12 hours  calcium carbonate 500 mG (Tums) Chewable 3Tablet(s) Chew at bedtime  calcitriol   Capsule 0.25MICROGram(s) Oral daily  sodium biphosphate Rectal Enema 1Enema Rectal once  lactated ringers. 1000milliLiter(s) IV Continuous <Continuous>    Vital Signs Last 24 Hrs  T(C): 36.9, Max: 36.9 (05-25 @ 16:31)  T(F): 98.4, Max: 98.5 (05-25 @ 16:31)  HR: 99 (81 - 136)  BP: 125/59 (93/68 - 125/59)  BP(mean): --  RR: 19 (14 - 19)  SpO2: 103% (91% - 103%)    PHYSICAL EXAM:  GENERAL: NAD, well-groomed, well-developed  EYES:  conjunctiva and sclera clear  ENMT:  Moist mucous membranes,   NECK: Supple, No JVD, Normal thyroid  NERVOUS SYSTEM:  Alert oriented   no  focal  deficits;   CHEST/LUNG: Clear    HEART: Regular rate and rhythm; No murmurs, rubs, or gallops  ABDOMEN: Soft, Nontender, Nondistended; Bowel sounds present  EXTREMITIES:  no  edema no  tenderness  SKIN: No rashes   LABS:                        9.4    6.9   )-----------( 113      ( 26 May 2017 07:44 )             25.4     05-26    139  |  101  |  168  ----------------------------<  103<H>  3.3<L>   |  12<L>  |  5.10<H>    Ca    6.5<LL>      26 May 2017 07:44  Phos  8.7     05-26  Mg     3.1     05-26    TPro  6.5  /  Alb  1.0<L>  /  TBili  0.3  /  DBili  x   /  AST  13<L>  /  ALT  8<L>  /  AlkPhos  86  05-26        CAPILLARY BLOOD GLUCOSE      RADIOLOGY & ADDITIONAL TESTS:    Imaging reports  Personally Reviewed:  [x ] YES  [ ] NO    Consultant(s) Notes Reviewed:  [x ] YES  [ ] NO    Care Discussed with Consultants/Other Providers [x ] YES  [ ] NO  ACUTE  RESPIRATORY  FAILURE  PNEUMONIA   CONT  VENT  SUPPORT  AB  IVF  S/P  TRACH. Fever  appears  to  have  resolved  observation off  AB    COLO/VESCICULAR FISTULA S/P  diverting  colostomy    LFT elevation observation  metabolic  encephalopathy with  severe  inflammatory  process supportive care          Problem: Parkinson disease encephalopathy.  Plan: sinemet pramipexole  anemia  results  of  endoscopy  and  colonoscopy  noted  discussed  with  Dr Figueredo  have  laeft  message  with  dr Mosley as  to  any  further  surgical treatments  fever  pneumonia  obseve  off AB  urinary  retention  observe  with  shoemaker     Worsening acute  on chronic renal  failure appears  stabilized  prognosis  guarded  discharge  to  rehab  if  cleared  by  surgery

## 2017-05-27 VITALS
HEART RATE: 111 BPM | SYSTOLIC BLOOD PRESSURE: 70 MMHG | DIASTOLIC BLOOD PRESSURE: 27 MMHG | RESPIRATION RATE: 17 BRPM | OXYGEN SATURATION: 87 %

## 2017-05-27 LAB
ANION GAP SERPL CALC-SCNC: 26 MMOL/L — HIGH (ref 5–17)
BUN SERPL-MCNC: 160 MG/DL — HIGH (ref 7–23)
CALCIUM SERPL-MCNC: 6.8 MG/DL — LOW (ref 8.5–10.1)
CHLORIDE SERPL-SCNC: 101 MMOL/L — SIGNIFICANT CHANGE UP (ref 96–108)
CO2 SERPL-SCNC: 13 MMOL/L — LOW (ref 22–31)
CREAT SERPL-MCNC: 5.2 MG/DL — HIGH (ref 0.5–1.3)
GLUCOSE SERPL-MCNC: 106 MG/DL — HIGH (ref 70–99)
HCT VFR BLD CALC: 24.9 % — LOW (ref 34.5–45)
HGB BLD-MCNC: 9 G/DL — LOW (ref 11.5–15.5)
MCHC RBC-ENTMCNC: 29.3 PG — SIGNIFICANT CHANGE UP (ref 27–34)
MCHC RBC-ENTMCNC: 36.4 GM/DL — HIGH (ref 32–36)
MCV RBC AUTO: 80.6 FL — SIGNIFICANT CHANGE UP (ref 80–100)
PLATELET # BLD AUTO: 128 K/UL — LOW (ref 150–400)
POTASSIUM SERPL-MCNC: 3.1 MMOL/L — LOW (ref 3.5–5.3)
POTASSIUM SERPL-SCNC: 3.1 MMOL/L — LOW (ref 3.5–5.3)
RBC # BLD: 3.08 M/UL — LOW (ref 3.8–5.2)
RBC # FLD: 15.7 % — HIGH (ref 11–15)
SODIUM SERPL-SCNC: 140 MMOL/L — SIGNIFICANT CHANGE UP (ref 135–145)
WBC # BLD: 3.4 K/UL — LOW (ref 3.8–10.5)
WBC # FLD AUTO: 3.4 K/UL — LOW (ref 3.8–10.5)

## 2017-05-27 PROCEDURE — 71010: CPT | Mod: 26

## 2017-05-27 RX ORDER — LEVOTHYROXINE SODIUM 125 MCG
1 TABLET ORAL
Qty: 0 | Refills: 0 | COMMUNITY
Start: 2017-05-27

## 2017-05-27 RX ORDER — POTASSIUM CHLORIDE 20 MEQ
20 PACKET (EA) ORAL ONCE
Qty: 0 | Refills: 0 | Status: DISCONTINUED | OUTPATIENT
Start: 2017-05-27 | End: 2017-05-27

## 2017-05-27 RX ORDER — SUCRALFATE 1 G
1 TABLET ORAL
Qty: 0 | Refills: 0 | COMMUNITY
Start: 2017-05-27

## 2017-05-27 RX ORDER — DIGOXIN 250 MCG
0.12 TABLET ORAL ONCE
Qty: 0 | Refills: 0 | Status: COMPLETED | OUTPATIENT
Start: 2017-05-27 | End: 2017-05-27

## 2017-05-27 RX ORDER — CALCITRIOL 0.5 UG/1
1 CAPSULE ORAL
Qty: 0 | Refills: 0 | COMMUNITY
Start: 2017-05-27

## 2017-05-27 RX ORDER — SODIUM CHLORIDE 9 MG/ML
500 INJECTION INTRAMUSCULAR; INTRAVENOUS; SUBCUTANEOUS ONCE
Qty: 0 | Refills: 0 | Status: COMPLETED | OUTPATIENT
Start: 2017-05-27 | End: 2017-05-27

## 2017-05-27 RX ORDER — OXYCODONE HYDROCHLORIDE 5 MG/1
1 TABLET ORAL
Qty: 0 | Refills: 0 | COMMUNITY

## 2017-05-27 RX ORDER — IPRATROPIUM/ALBUTEROL SULFATE 18-103MCG
3 AEROSOL WITH ADAPTER (GRAM) INHALATION ONCE
Qty: 0 | Refills: 0 | Status: COMPLETED | OUTPATIENT
Start: 2017-05-27 | End: 2017-05-27

## 2017-05-27 RX ORDER — SODIUM CHLORIDE 9 MG/ML
1500 INJECTION INTRAMUSCULAR; INTRAVENOUS; SUBCUTANEOUS ONCE
Qty: 0 | Refills: 0 | Status: COMPLETED | OUTPATIENT
Start: 2017-05-27 | End: 2017-05-27

## 2017-05-27 RX ORDER — CIPROFLOXACIN LACTATE 400MG/40ML
1 VIAL (ML) INTRAVENOUS
Qty: 0 | Refills: 0 | COMMUNITY

## 2017-05-27 RX ORDER — PRAMIPEXOLE DIHYDROCHLORIDE 0.12 MG/1
1 TABLET ORAL
Qty: 0 | Refills: 0 | COMMUNITY
Start: 2017-05-27

## 2017-05-27 RX ORDER — PANTOPRAZOLE SODIUM 20 MG/1
40 TABLET, DELAYED RELEASE ORAL
Qty: 0 | Refills: 0 | COMMUNITY
Start: 2017-05-27

## 2017-05-27 RX ORDER — CHOLESTYRAMINE 4 G/9G
1 POWDER, FOR SUSPENSION ORAL
Qty: 0 | Refills: 0 | COMMUNITY

## 2017-05-27 RX ORDER — SODIUM BICARBONATE 1 MEQ/ML
2 SYRINGE (ML) INTRAVENOUS
Qty: 0 | Refills: 0 | COMMUNITY
Start: 2017-05-27

## 2017-05-27 RX ORDER — POTASSIUM CHLORIDE 20 MEQ
20 PACKET (EA) ORAL ONCE
Qty: 0 | Refills: 0 | Status: COMPLETED | OUTPATIENT
Start: 2017-05-27 | End: 2017-05-27

## 2017-05-27 RX ORDER — LACTOBACILLUS ACIDOPHILUS 100MM CELL
1 CAPSULE ORAL
Qty: 0 | Refills: 0 | COMMUNITY
Start: 2017-05-27

## 2017-05-27 RX ORDER — POTASSIUM CHLORIDE 20 MEQ
40 PACKET (EA) ORAL
Qty: 0 | Refills: 0 | Status: DISCONTINUED | OUTPATIENT
Start: 2017-05-27 | End: 2017-05-27

## 2017-05-27 RX ADMIN — PRAMIPEXOLE DIHYDROCHLORIDE 0.25 MILLIGRAM(S): 0.12 TABLET ORAL at 05:07

## 2017-05-27 RX ADMIN — NYSTATIN CREAM 1 APPLICATION(S): 100000 CREAM TOPICAL at 06:41

## 2017-05-27 RX ADMIN — Medication 1 TABLET(S): at 05:07

## 2017-05-27 RX ADMIN — Medication 1 GRAM(S): at 17:22

## 2017-05-27 RX ADMIN — CARBIDOPA AND LEVODOPA 1 TABLET(S): 25; 100 TABLET ORAL at 11:54

## 2017-05-27 RX ADMIN — Medication 3 MILLILITER(S): at 12:00

## 2017-05-27 RX ADMIN — Medication 1 GRAM(S): at 11:54

## 2017-05-27 RX ADMIN — CARBIDOPA AND LEVODOPA 1 TABLET(S): 25; 100 TABLET ORAL at 17:22

## 2017-05-27 RX ADMIN — SODIUM CHLORIDE 500 MILLILITER(S): 9 INJECTION INTRAMUSCULAR; INTRAVENOUS; SUBCUTANEOUS at 17:22

## 2017-05-27 RX ADMIN — SODIUM CHLORIDE 75 MILLILITER(S): 9 INJECTION, SOLUTION INTRAVENOUS at 14:05

## 2017-05-27 RX ADMIN — CALCITRIOL 0.25 MICROGRAM(S): 0.5 CAPSULE ORAL at 11:54

## 2017-05-27 RX ADMIN — PANTOPRAZOLE SODIUM 40 MILLIGRAM(S): 20 TABLET, DELAYED RELEASE ORAL at 11:54

## 2017-05-27 RX ADMIN — Medication 25 MICROGRAM(S): at 05:07

## 2017-05-27 RX ADMIN — Medication 0.12 MILLIGRAM(S): at 14:04

## 2017-05-27 RX ADMIN — Medication 20 MILLIEQUIVALENT(S): at 05:06

## 2017-05-27 RX ADMIN — NYSTATIN CREAM 1 APPLICATION(S): 100000 CREAM TOPICAL at 17:22

## 2017-05-27 RX ADMIN — SODIUM CHLORIDE 1500 MILLILITER(S): 9 INJECTION INTRAMUSCULAR; INTRAVENOUS; SUBCUTANEOUS at 18:23

## 2017-05-27 RX ADMIN — Medication 1 GRAM(S): at 05:07

## 2017-05-27 RX ADMIN — ENOXAPARIN SODIUM 30 MILLIGRAM(S): 100 INJECTION SUBCUTANEOUS at 11:54

## 2017-05-27 RX ADMIN — Medication 1 TABLET(S): at 17:22

## 2017-05-27 RX ADMIN — CARBIDOPA AND LEVODOPA 1 TABLET(S): 25; 100 TABLET ORAL at 05:07

## 2017-05-27 RX ADMIN — PRAMIPEXOLE DIHYDROCHLORIDE 0.25 MILLIGRAM(S): 0.12 TABLET ORAL at 14:05

## 2017-05-27 RX ADMIN — Medication 1300 MILLIGRAM(S): at 05:06

## 2017-05-27 NOTE — DISCHARGE NOTE ADULT - CARE PLAN
Principal Discharge DX:	Acute respiratory failure with hypoxia  Goal:	weaning  off  vent  Instructions for follow-up, activity and diet:	trach  care  follow  with  pulmonary  Secondary Diagnosis:	Aspiration into airway, initial encounter  Secondary Diagnosis:	Parkinson disease  Secondary Diagnosis:	Anemia  Secondary Diagnosis:	Urine retention  Secondary Diagnosis:	Fistula of large intestine  Secondary Diagnosis:	Bladder fistula

## 2017-05-27 NOTE — PROGRESS NOTE ADULT - SUBJECTIVE AND OBJECTIVE BOX
INTERVAL HPI:  86F PMH Parkinson's, s/p recent mechanical fall with R intertrochanteric femur fracture requiring R intramedullary nailing of R trochanteric fracture of femur 3/13/17, UTI, PNA presents from [facility] rehab for sepsis, colovesical fistula with course complicated by aspiration PNA, Respiratory failure and now s/p trach and diverting loop colostomy.   Grew Acinetobacter in sputum and off and on with fever. Completed antibiotics.    OVERNIGHT EVENTS:  Events noted, oxygen requirement has gone up. Now on 100% FIO2. CXR with large left sided consolidation.    Vital Signs Last 24 Hrs  T(C): 37.6, Max: 37.9 (05-27 @ 12:47)  T(F): 99.6, Max: 100.2 (05-27 @ 12:47)  HR: 127 (85 - 138)  BP: 96/39 (92/42 - 166/51)  BP(mean): --  RR: 18 (17 - 24)  SpO2: 92% (85% - 96%)    Mode: AC/ CMV (Assist Control/ Continuous Mandatory Ventilation)  RR (machine): 16  TV (machine): 400  FiO2: 100  PEEP: 5  ITime: 1  MAP: 14  PIP: 33    PHYSICAL EXAM:  GEN:       unresponsive and comfortable.  HEENT:    trach in place   RESP:       crackles.  CVS:          irregular rate and rhythm.   ABD:         Soft, non-tender, non-distended;     MEDICATIONS  (STANDING):  pantoprazole   Suspension 40milliGRAM(s) Oral daily  lactobacillus acidophilus 1Tablet(s) Oral every 12 hours  carbidopa/levodopa  25/100 1Tablet(s) Oral four times a day  pramipexole 0.25milliGRAM(s) Oral three times a day  levothyroxine 25MICROGram(s) Oral daily  enoxaparin Injectable 30milliGRAM(s) SubCutaneous every 24 hours  sucralfate 1Gram(s) Oral four times a day  nystatin Powder 1Application(s) Topical two times a day  sodium bicarbonate 1300milliGRAM(s) Oral every 12 hours  calcium carbonate 500 mG (Tums) Chewable 3Tablet(s) Chew at bedtime  calcitriol   Capsule 0.25MICROGram(s) Oral daily  sodium biphosphate Rectal Enema 1Enema Rectal once  lactated ringers. 1000milliLiter(s) IV Continuous <Continuous>  potassium chloride   Powder 40milliEquivalent(s) Oral two times a day    MEDICATIONS  (PRN):  acetaminophen    Suspension. 650milliGRAM(s) Enteral Tube every 6 hours PRN Mild Pain (1 - 3)  acetaminophen    Suspension 650milliGRAM(s) Oral every 6 hours PRN For Temp greater than 38 C (100.4 F)    LABS:                        9.0    3.4   )-----------( 128      ( 27 May 2017 09:19 )             24.9     05-27    140  |  101  |  160<H>  ----------------------------<  106<H>  3.1<L>   |  13<L>  |  5.20<H>    Ca    6.8<L>      27 May 2017 09:19  Phos  8.7     05-26  Mg     3.1     05-26    TPro  6.5  /  Alb  1.0<L>  /  TBili  0.3  /  DBili  x   /  AST  13<L>  /  ALT  8<L>  /  AlkPhos  86  05-26    EXAM:  CHEST SINGLE VIEW                            PROCEDURE DATE:  05/27/2017        INTERPRETATION:  EXAMINATION DATE: May 27, 2017 at 1117 hours.  CLINICAL INFORMATION: Pneumonia, respiratory failure.    TECHNIQUE: Frontal view of the chest   COMPARISON: May 21, 2017.    FINDINGS:    LINES/TUBES: Unchanged tracheostomy tube.  LUNGS/PLEURA: New patchy consolidation in the left midlung, suspicious   for pneumonia. Unchanged consolidation in the right upper lung. Small   left pleural effusion  MEDIASTINUM: Heart size cannot adequately be assessed on this projection.  OTHER: None.    IMPRESSION:     1.  New patchy consolidation in the left midlung, suspicious for   pneumonia.  2.  Unchanged consolidation in the right upper lung.  3.  Small left pleural effusion.\    SRIKANTH WHITE M.D., ATTENDING RADIOLOGIST  This document has been electronically signed. May 27 2017 11:48AM     ASSESSMENT and Plan:  ·	Hypoxic Respiratory failure. On 100% FIO2.  ·	Aspiration Pneumonia. Acinetobacter.  ·	Bilateral  pleural effusion.  ·	Sepsis.  ·	Metabolic encephalopathy.  ·	S/P Diverting  Colostomy for Colovesical Fistula.  ·	Anemia.  ·	Leukocytosis better.  ·	Parkinsonism.  ·	CKD4  ·	Hypernatremia improved.  ·	Hypokalemia.    Now with new consolidation on left side with high FIO2 requirement.  Will try to turn to right side to improve SPO2.  Try high peep to improve oxygenation.  Over all poor prognosis.

## 2017-05-27 NOTE — CONSULT NOTE ADULT - CONSULT REASON
RAPID RESPONSE: hypoxia O2 sat 60s, hypotension SBP 70s
? colovesical Fistula, UTI
Respiratory distress, sepsis pneumonia.
SEPSIS/ PNEUMONIA/ UTI
TACHYCARDIA/BRADYCARDIA
dysphagia; resp failure
encephhy
r/o colovesicular fistula
Hypotension
ZAHIDA

## 2017-05-27 NOTE — CONSULT NOTE ADULT - CONSULT REQUESTED DATE/TIME
11-May-2017 10:44
04-Apr-2017 21:54
07-Apr-2017
07-Apr-2017 16:29
09-Apr-2017
09-Apr-2017
15-Apr-2017 10:20
16-Apr-2017 18:00
27-Apr-2017 18:45
27-May-2017

## 2017-05-27 NOTE — DISCHARGE NOTE ADULT - HOSPITAL COURSE
patient  admitted  for  pneumonia    treated  with  AB  IVF  Bronchodilators  found  to  have  colovesicular  fistula at  this  evaluated  by  surgery  and  urology  felt  observation  as  best  course  of  action   patient  had acute  respratory  failure  with  aspiration pneumonia  requiring  Intubation ventilationwas  unble  to  be  weaned off  ventilateor  and  remained  mostly  unresponsive.  patient  underwent  Trach  pacement  and  diverting  coclosomy  and  was  able  to  be  transferred  to  medical  floor  further  hos[ital  course  +  for  recurrent  fever  pneumonmia  requiring  multiple  AB  adjustments  by  ID.  patient  also  continued  with  Anemia  requiring  multiple  transfusions  underwent  EGD  and  colonoscopy  which  showed gastritis  and  diverticular  disease  without  active  bleeding  but  with  oozing  from  stoma  re evaluated  by  surgery  no  surgical  intervention  and  surcal  clear  for  discharge.  patient  with  acute  on  chronic  renal  failure  thought  to  be  secondary  recurrrant  infectious  process did  not  require  HD  stabilized  and  improved  at  time  of  discharge.    clinical course  discussed  with  pt's  son  throughout  hospitalization

## 2017-05-27 NOTE — PROGRESS NOTE ADULT - SUBJECTIVE AND OBJECTIVE BOX
INTERVAL HPI/OVERNIGHT EVENTS:        REVIEW OF SYSTEMS:  CONSTITUTIONAL:  unresponsive  ning  respirator    NECK: No pain or stiffnes  RESPIRATORY: No SOB   CARDIOVASCULAR: No chest pain, palpitations, dizziness,   GASTROINTESTINAL: No abdominal pain. No nausea, vomiting,   NEUROLOGICAL: No headaches, no  blurry  vision no  dizziness  SKIN: No itching,   MUSCULOSKELETAL: No pain    MEDICATION:  pantoprazole   Suspension 40milliGRAM(s) Oral daily  lactobacillus acidophilus 1Tablet(s) Oral every 12 hours  acetaminophen    Suspension. 650milliGRAM(s) Enteral Tube every 6 hours PRN  carbidopa/levodopa  25/100 1Tablet(s) Oral four times a day  pramipexole 0.25milliGRAM(s) Oral three times a day  acetaminophen    Suspension 650milliGRAM(s) Oral every 6 hours PRN  levothyroxine 25MICROGram(s) Oral daily  enoxaparin Injectable 30milliGRAM(s) SubCutaneous every 24 hours  sucralfate 1Gram(s) Oral four times a day  nystatin Powder 1Application(s) Topical two times a day  sodium bicarbonate 1300milliGRAM(s) Oral every 12 hours  calcium carbonate 500 mG (Tums) Chewable 3Tablet(s) Chew at bedtime  calcitriol   Capsule 0.25MICROGram(s) Oral daily  sodium biphosphate Rectal Enema 1Enema Rectal once  lactated ringers. 1000milliLiter(s) IV Continuous <Continuous>    Vital Signs Last 24 Hrs  T(C): 36.2, Max: 37 (05-26 @ 12:15)  T(F): 97.1, Max: 98.6 (05-26 @ 12:15)  HR: 108 (94 - 116)  BP: 120/48 (114/54 - 166/51)  BP(mean): --  RR: 17 (17 - 18)  SpO2: 93% (90% - 140%)    PHYSICAL EXAM:  GENERAL: NAD, well-groomed, well-developed  EYES:  conjunctiva and sclera clear  ENMT:  Moist mucous membranes,   NECK: Supple, No JVD, Normal thyroid  NERVOUS SYSTEM:  Alert oriented   no  focal  deficits;   CHEST/LUNG: Clear    HEART: Regular rate and rhythm; No murmurs, rubs, or gallops  ABDOMEN: Soft, Nontender, Nondistended; Bowel sounds present  EXTREMITIES:  no  edema no  tenderness  SKIN: No rashes   LABS:                        9.4    6.9   )-----------( 113      ( 26 May 2017 07:44 )             25.4     05-26    139  |  101  |  168  ----------------------------<  103<H>  3.3<L>   |  12<L>  |  5.10<H>    Ca    6.5<LL>      26 May 2017 07:44  Phos  8.7     05-26  Mg     3.1     05-26    TPro  6.5  /  Alb  1.0<L>  /  TBili  0.3  /  DBili  x   /  AST  13<L>  /  ALT  8<L>  /  AlkPhos  86  05-26        CAPILLARY BLOOD GLUCOSE      RADIOLOGY & ADDITIONAL TESTS:    Imaging reports  Personally Reviewed:  [ x] YES  [ ] NO    Consultant(s) Notes Reviewed:  [ x] YES  [ ] NO    Care Discussed with Consultants/Other Providers [ x] YES  [ ] NO  ACUTE  RESPIRATORY  FAILURE  PNEUMONIA   CONT  VENT  SUPPORT  AB  IVF  S/P  TRACH. Fever  appears  to  have  resolved  observation off  AB    COLO/VESCICULAR FISTULA S/P  diverting  colostomy    LFT elevation observation  metabolic  encephalopathy with  severe  inflammatory  process supportive care          Problem: Parkinson disease encephalopathy.  Plan: sinemet pramipexole  anemia  results  of  endoscopy  and  colonoscopy  noted   fever  pneumonia  obseve  off AB  urinary  retention  observe  with  shoemaker     Worsening acute  on chronic renal  failure appears  stabilized  prognosis  guarded  discharge  to  rehab

## 2017-05-27 NOTE — CONSULT NOTE ADULT - ATTENDING COMMENTS
pt seen at RRT with NP    pt in dieing process. currently hypotensive and hypoxic on full vent support. 1L NS bolus given. FIO2 100% and PEEP 10 with O2 sat 80s.     Pt is DNR, has had a long hospital course.     Supportive care. Son called and notified of pt's condition.

## 2017-05-27 NOTE — PROVIDER CONTACT NOTE (OTHER) - ACTION/TREATMENT ORDERED:
Dr Chamorro/ respiratory increasing PEEP settings to maintain o2 above 90%. Dr Lozano was paged by dionisio Odom, digoxin PO was given for HR

## 2017-05-27 NOTE — DISCHARGE NOTE ADULT - PATIENT PORTAL LINK FT
“You can access the [facility] Patient Portal, offered by [facility], by registering with the following website: [email or web address]”

## 2017-05-27 NOTE — DISCHARGE NOTE FOR THE EXPIRED PATIENT - HOSPITAL COURSE
87 y/o F admitted on  with acute respiratory failure, PNA, metabolic encephalopathy  today at 1833 due to cardiopulmonary arrest.

## 2017-05-27 NOTE — CHART NOTE - NSCHARTNOTESELECT_GEN_ALL_CORE
Event Note
Event Note/NG tube replacement
R Midline removal/Event Note
Transfer Note
Transfer Note
Event Note

## 2017-05-27 NOTE — PROGRESS NOTE ADULT - SUBJECTIVE AND OBJECTIVE BOX
Pt stable - H/H stable - no bleeding from bowel  ?bleeding from stoma      MEDICATIONS  (STANDING):  pantoprazole   Suspension 40milliGRAM(s) Oral daily  lactobacillus acidophilus 1Tablet(s) Oral every 12 hours  carbidopa/levodopa  25/100 1Tablet(s) Oral four times a day  pramipexole 0.25milliGRAM(s) Oral three times a day  levothyroxine 25MICROGram(s) Oral daily  enoxaparin Injectable 30milliGRAM(s) SubCutaneous every 24 hours  sucralfate 1Gram(s) Oral four times a day  nystatin Powder 1Application(s) Topical two times a day  sodium bicarbonate 1300milliGRAM(s) Oral every 12 hours  calcium carbonate 500 mG (Tums) Chewable 3Tablet(s) Chew at bedtime  calcitriol   Capsule 0.25MICROGram(s) Oral daily  sodium biphosphate Rectal Enema 1Enema Rectal once  lactated ringers. 1000milliLiter(s) IV Continuous <Continuous>  potassium chloride   Powder 40milliEquivalent(s) Oral two times a day  diltiazem Injectable 15milliGRAM(s) IV Push once    MEDICATIONS  (PRN):  acetaminophen    Suspension. 650milliGRAM(s) Enteral Tube every 6 hours PRN Mild Pain (1 - 3)  acetaminophen    Suspension 650milliGRAM(s) Oral every 6 hours PRN For Temp greater than 38 C (100.4 F)      Allergies    No Known Allergies    Intolerances        Vital Signs Last 24 Hrs  T(C): 37.6, Max: 37.9 (05-27 @ 12:47)  T(F): 99.6, Max: 100.2 (05-27 @ 12:47)  HR: 111 (85 - 138)  BP: 70/27 (70/27 - 166/51)  BP(mean): --  RR: 17 (17 - 24)  SpO2: 87% (85% - 96%)    PHYSICAL EXAM:  General: NAD.  CVS: S1, S2  Chest: air entry bilaterally present  Abd: BS present, soft, non-tender      LABS:                        9.0    3.4   )-----------( 128      ( 27 May 2017 09:19 )             24.9     05-27    140  |  101  |  160<H>  ----------------------------<  106<H>  3.1<L>   |  13<L>  |  5.20<H>    Ca    6.8<L>      27 May 2017 09:19  Phos  8.7     05-26  Mg     3.1     05-26    TPro  6.5  /  Alb  1.0<L>  /  TBili  0.3  /  DBili  x   /  AST  13<L>  /  ALT  8<L>  /  AlkPhos  86  05-26        Follow H/H  surgery to address stoma bleeding Pt   Pt seen earlier this AM - stable at that time      MEDICATIONS  (STANDING):  pantoprazole   Suspension 40milliGRAM(s) Oral daily  lactobacillus acidophilus 1Tablet(s) Oral every 12 hours  carbidopa/levodopa  25/100 1Tablet(s) Oral four times a day  pramipexole 0.25milliGRAM(s) Oral three times a day  levothyroxine 25MICROGram(s) Oral daily  enoxaparin Injectable 30milliGRAM(s) SubCutaneous every 24 hours  sucralfate 1Gram(s) Oral four times a day  nystatin Powder 1Application(s) Topical two times a day  sodium bicarbonate 1300milliGRAM(s) Oral every 12 hours  calcium carbonate 500 mG (Tums) Chewable 3Tablet(s) Chew at bedtime  calcitriol   Capsule 0.25MICROGram(s) Oral daily  sodium biphosphate Rectal Enema 1Enema Rectal once  lactated ringers. 1000milliLiter(s) IV Continuous <Continuous>  potassium chloride   Powder 40milliEquivalent(s) Oral two times a day  diltiazem Injectable 15milliGRAM(s) IV Push once    MEDICATIONS  (PRN):  acetaminophen    Suspension. 650milliGRAM(s) Enteral Tube every 6 hours PRN Mild Pain (1 - 3)  acetaminophen    Suspension 650milliGRAM(s) Oral every 6 hours PRN For Temp greater than 38 C (100.4 F)      Allergies    No Known Allergies    Intolerances        Vital Signs Last 24 Hrs  T(C): 37.6, Max: 37.9 ( @ 12:47)  T(F): 99.6, Max: 100.2 ( @ 12:47)  HR: 111 (85 - 138)  BP: 70/27 (70/27 - 166/51)  BP(mean): --  RR: 17 (17 - 24)  SpO2: 87% (85% - 96%)    PHYSICAL EXAM:  General: NAD.  CVS: S1, S2  Chest: air entry bilaterally present  Abd: BS present, soft, non-tender      LABS:                        9.0    3.4   )-----------( 128      ( 27 May 2017 09:19 )             24.9         140  |  101  |  160<H>  ----------------------------<  106<H>  3.1<L>   |  13<L>  |  5.20<H>    Ca    6.8<L>      27 May 2017 09:19  Phos  8.7       Mg     3.1         TPro  6.5  /  Alb  1.0<L>  /  TBili  0.3  /  DBili  x   /  AST  13<L>  /  ALT  8<L>  /  AlkPhos  86        Pt with multi organ failure Pt   Pt seen earlier this AM - stable at that time - seen by Pulm; on antibx  no active GI bleedings      MEDICATIONS  (STANDING):  pantoprazole   Suspension 40milliGRAM(s) Oral daily  lactobacillus acidophilus 1Tablet(s) Oral every 12 hours  carbidopa/levodopa  25/100 1Tablet(s) Oral four times a day  pramipexole 0.25milliGRAM(s) Oral three times a day  levothyroxine 25MICROGram(s) Oral daily  enoxaparin Injectable 30milliGRAM(s) SubCutaneous every 24 hours  sucralfate 1Gram(s) Oral four times a day  nystatin Powder 1Application(s) Topical two times a day  sodium bicarbonate 1300milliGRAM(s) Oral every 12 hours  calcium carbonate 500 mG (Tums) Chewable 3Tablet(s) Chew at bedtime  calcitriol   Capsule 0.25MICROGram(s) Oral daily  sodium biphosphate Rectal Enema 1Enema Rectal once  lactated ringers. 1000milliLiter(s) IV Continuous <Continuous>  potassium chloride   Powder 40milliEquivalent(s) Oral two times a day  diltiazem Injectable 15milliGRAM(s) IV Push once    MEDICATIONS  (PRN):  acetaminophen    Suspension. 650milliGRAM(s) Enteral Tube every 6 hours PRN Mild Pain (1 - 3)  acetaminophen    Suspension 650milliGRAM(s) Oral every 6 hours PRN For Temp greater than 38 C (100.4 F)      Allergies    No Known Allergies    Intolerances        Vital Signs Last 24 Hrs  T(C): 37.6, Max: 37.9 ( @ 12:47)  T(F): 99.6, Max: 100.2 (-27 @ 12:47)  HR: 111 (85 - 138)  BP: 70/27 (70/27 - 166/51)  BP(mean): --  RR: 17 (17 - 24)  SpO2: 87% (85% - 96%)    PHYSICAL EXAM:  General: NAD.  CVS: S1, S2  Chest: air entry bilaterally present  Abd: BS present, soft, non-tender      LABS:                        9.0    3.4   )-----------( 128      ( 27 May 2017 09:19 )             24.9         140  |  101  |  160<H>  ----------------------------<  106<H>  3.1<L>   |  13<L>  |  5.20<H>    Ca    6.8<L>      27 May 2017 09:19  Phos  8.7       Mg     3.1         TPro  6.5  /  Alb  1.0<L>  /  TBili  0.3  /  DBili  x   /  AST  13<L>  /  ALT  8<L>  /  AlkPhos  86        Pt with multi organ failure  Pt

## 2017-05-27 NOTE — DISCHARGE NOTE ADULT - MEDICATION SUMMARY - MEDICATIONS TO TAKE
I will START or STAY ON the medications listed below when I get home from the hospital:    acetaminophen 325 mg oral tablet  -- 2 tab(s) by mouth every 6 hours, As needed, for  pain  -- Indication: For Pain    sodium bicarbonate 650 mg oral tablet  -- 2 tab(s) by mouth every 12 hours  -- Indication: For Renal  failure    carbidopa-levodopa 25 mg-100 mg oral tablet  -- 1 cap(s) by mouth 2 times a day  -- Indication: For Parkinson disease    pramipexole 0.25 mg oral tablet  -- 1 tab(s) by mouth 3 times a day  -- Indication: For Parkinson disease    nystatin 100,000 units/g topical powder  -- 1 application on skin 2 times a day  -- Indication: For dermatitis    sucralfate 1 g oral tablet  -- 1 tab(s) by mouth 4 times a day  -- Indication: For gastritis    lactobacillus acidophilus oral capsule  -- 1 tab(s) by gastrostomy tube once a day  -- Indication: For Supplelmnet    pantoprazole 40 mg oral granule, enteric coated  -- 40 milligram(s) by gastrostomy tube once a day  -- Indication: For gastrtits    levothyroxine 25 mcg (0.025 mg) oral tablet  -- 1 tab(s) by mouth once a day  -- Indication: For hypothyroidism    Multiple Vitamins oral tablet  -- 1 tab(s) by mouth once a day  -- Indication: For Supplement    calcitriol 0.25 mcg oral capsule  -- 1 cap(s) by mouth once a day  -- Indication: For Renal  failure

## 2017-05-27 NOTE — CONSULT NOTE ADULT - ASSESSMENT
86F PMH Parkinson's, s/p recent mechanical fall with R intertrochanteric femur fracture requiring R intramedullary nailing of R trochanteric fracture of femur 3/13/17, UTI, PNA presents from [facility] rehab for sepsis, metabolic encephalopathy, UTI, PNA, colovesicular fistula with course complicated by aspiration of tube feeds, acute hypoxic respiratory failure requiring intubation, hypotension.     1. PULM  - cont mechanical ventilation  - will decrease vent settings to lower TV as pt alkalotic and overbreathing vent  - check sputum culture  - cont broad spectrum antibiotics (on vanco and meropenem)    2. CV  - hypotensive on arrival to ICU  - BP responded to 2L IVF bolus  - initiate vasopressors if needed to maintain MAP >65  - d/c antihypertensives    3. ID  - recurrent sepsis  - now with new aspiration event and new R PNA on CXR  - cont antibiotics  - repeat blood cultures  - with colovesicular fistula UA and urine culture will be dirty and contaminated    4. GEN  - goals of care discussion held with son Maria Ines  - discussed overall poor prognosis and pt's declining condition and critical state  - he has decided to make pt DNR in the event of cardiac arrest however would like all other aggressive measures to be continued for the time being including antibiotics, fluids, mechanical ventilation, and pressors if needed  - MOLST form placed in chart    Dr White notified during RRT of events    Critical Care Time: 90 minutes not including procedures
consult dict s/p hip,surg lethargic arousable open eyes ct head no acute path metabolic encephalopathy  np sezire for eeg tsh b12 blood and urine cuklture    will follow
86 yrs old female has UTI, urinary retention possible Colovesical fistula, sepsis.  Will get Ct scan with oral contrast to r/o abscess. Thanks
Metabolic encephalopathy,  acute hypoxic respiratory failure, course with failure to wean, required trach to vent, remained unresponsive .requiring 100% oxygen and diverting loop colostomy. now with hypotension. In the active dying process, Patient son notified of condition. Remains DNR  Plan supportive care.

## 2017-05-27 NOTE — PROGRESS NOTE ADULT - ASSESSMENT
Pt on vent   sob   DNR  no other changes
awake open eyes does  not follows matthew discuss with family metabolic encepahliopathy fistula colobladder
events noted intubated unresponsive pupils reacts to  light resp. failure colobladder fistula hx of parkinds disease  metabolic encepahlopathy  will follow no seziure reported
lethargic arousable open eyes  does  ot follows commands  metabolic encephalopathy supportive care no seziure
lethargic arousable open eyes does not followos commands metabolic encephaloapthy  supportive care .
lethargic arousable open eyes does not follws commands  quadroparsis sepsis metabolic encepahlopathy
remain i tubated unresponsive  no seziure no cns infection resp failure metabolic encephalopathy  poor prognosis
remain in tubbated no seziure sepsis s/p trach and peg  colostomy dementia hx pf joleen disesase  discuss with son  will follow
remain inresponsive discuss with family at bed side encephhalopathy resp failure for palliative care ,
remain intubated  unresponsive no seziure dnr colobladder fistula metabolic encepahlo[athy .
remain intubated csf no infection discuss with son resp failure encepahkoopathy s/p trach peh and colostomy
remain intubated no  seziure recieving  blood transfusion resp failure renal failure metabolic encepahlopathy s/ppeg trach and colostomy
remain intubated no seziure hx of joleen disease s/p colobladder fistula sepsis dnr poor prognosis supportive care
remain intubated nunresponsive metabolic encephalopathy  poor prognosis
remain intubated s./p trach and peg colostomy open eyes to stimuli on sinemet anf mirapex for parkinsond disease  metabolic encpehalopathy .
remain intubated s/p trach and peg and colostomy no seziure hx of joleen disease  metabolic encephalopathy
remain intubated s/p trach peg and colostomy s/p colobladder fistula hx of joleen disease  metabolic encephalopathy  resp failure metabolic encepahlopathy
remain intubated unresponsive  for palliative care poor prognosis encephalopathy
remain intubated unresponsive dnr sepsis resp failure colo bladder fistula hx of joleen disease
remain intubated unresponsive metabolic encepahloapthy  poor prognosis  terminal weaning
remain intubated unresponsive metabolic encepahloapthy resp failure sepsis palliative care ,
remain intubated unresponsive no cns infection  resp failure s/p trach peg and coliostomy discuss with son encephalopathy no seziure
remain intubated unresponsive open eyes to stimuli no seziure no cns infection metabolic encephalopathy s/p trach  peg colostomy
remain intubated unresponsive resp failure colobaldder fistula sepsis joleen disease  metabolic encephalopathy prognosis poor
remain intubated unresponsive s/p trach paeg colostomy  resp failure  poor prognosis for palliative care ,
remain intubated unresponsive sepsis hx of joleen disease colo bladder fistula resp failure dnr supportive care ,
remain unresponsive ct head no acute path no seziure unresponsive s/p trach peg  colostomy metabolic encepahlopathy  poor prognosis
unresponsive for repeat ct head s/p hip surg abd infection fistula  discuss with family  sepsis metabolic encephalopathy .
unresponsive metabolic encephaloapthy discuss with son poor prognosis s/p hip surg . joleen disease  dem,marianne
unresponsive n/g tube feedin on sunemet and mirapex metabolic encephalopathy   will follow
unresponsive n/g tube feeding no response to stimuli  metabolic encepahloapthy supportive care dnr ,
unresponsive no resp[onse to stimuli coma resp failure  ct head no acute  path . discuss with son detail  resp failure metabolic encephalopathy hx of joleen disease  s/p pag trach  and colostomy poor prognosis
unresponsive no tremors sepsis abd infection discuss with son metabolic encephalopathy
unresponsive resp failure discuss with son csf send for all test r/o meningitis encephalopathy  s/p trach peg colostomy
unresponsive s/p trach and paeg aND COLOSTOMY FOR EEG R/O SEZIURE ENCPEHAKLOPATHY POOR PROGNOSIS S/P SEPSIS
unresponsive uti abd sistula ,  no seziure hx of joleen disease  for ct head metabolic encephalopathy  poor prognosis
86 male VDRF, PEG and now ZAHIDA.  Renal indices are stable.  hypokalemia, supplement today.
86 male with VDRF via trach, Sepsis and ZAHIDA.   anemia, s/p transfusion.  ZAHIDA oliguric, multiple co morbidities, not a candidate for dialysis.   advise comfort care.
86F PMH Parkinson's, s/p recent mechanical fall with R intertrochanteric femur fracture requiring R intramedullary nailing of R trochanteric fracture of femur 3/13/17, UTI, PNA presents from [facility] rehab for sepsis, metabolic encephalopathy, UTI, PNA, colovesicular fistula with course complicated by aspiration of tube feeds, acute hypoxic respiratory failure requiring intubation.    DX: sepsis, acute hypoxic respiratory failure, respiratory failure requiring intubation, aspiration PNA, colovesicular fistula, acute metabolic encephalopathy, anemia, skin rash    1. PULM  - cont mechanical ventilation  - tolerating some weaning   - remains on broad spectrum antibiotics    - f/u with ID regarding antibiotic course  - cont with vanco, meropenem d/francine due to ? allergic rash vs contact dermatitis on back, remains on aztreonam  - poor mental status and copious oral secretions pt likely not able to protect airway and will continue to aspirate without airway being protected  - s/p bronchoscopy with copious secretions bilaterally though R > L    2. CV  - BP stable at present time    3. ID  - sepsis with aspiration PNA  - midline inserted 4/25  - cont with vanco and aztreonam (s/p course of zosyn/meropenem)  - with colovesicular fistula source of chronic underlying infection. /surgery follow up    4. HEME  - anemia with stable Hb   - no signs of acute overt bleeding  - goal to maintain Hb >7, transfuse as needed    5. NEURO  - remains encephalopathic  - CT head without any acute pathology noted (no acute hemorrhage, no acute CVA)    6. GEN  - son updated on condition  - pt remains DNR with MOLST in place  - all other aggressive care to be continued per family wishes  - family still deciding whether they want to pursue trach and peg vs comfort care. spoke with son over phone today and he states he will come in tomorrow to discuss and did not want to have conversation over the phone      Total Critical Care Time: 35min
86F PMH Parkinson's, s/p recent mechanical fall with R intertrochanteric femur fracture requiring R intramedullary nailing of R trochanteric fracture of femur 3/13/17, UTI, PNA presents from [facility] rehab for sepsis, metabolic encephalopathy, UTI, PNA, colovesicular fistula with course complicated by aspiration of tube feeds, acute hypoxic respiratory failure requiring intubation, hypotension.     DX: sepsis, acute hypoxic respiratory failure, respiratory failure requiring intubation, aspiration PNA, colovesicular fistula, acute metabolic encephalopathy, anemia, skin rash    1. PULM  - cont mechanical ventilation  - failed wean today  - remains on broad spectrum antibiotics    - f/u with ID regarding antibiotic course  - cont with vanco, meropenem d/francine 4/20 due to ? allergic rash vs contact dermatitis on back, start aztreonam (day #2)  - wean as tolerates, however with underlying poor mental status and inability to protect airway pt likely to continue to aspirate without airway being protected    2. CV  - hypotension resolved with IVF  - monitoring off antihypertensives  - BP stable at present time  - out of shock state    3. ID  - recurrent sepsis  - aspiration PNA  - blood cultures with 1/4 bottles with coag negative staph which is likely a contaminant   - central line d/francine  4/19  - cont with vanco and aztreonam (s/p course of zosyn/meropenem)  - with colovesicular fistula, UA and urine culture will be dirty and contaminated and concern for source of chronic underlying infection. /surgery follow up ?diverting loop ileostomy    4. HEME  - anemia:  s/p 1 unit pRBC 4/17 with improvement in Hb which remains stable  - no signs of acute overt bleeding  - goal to maintain Hb >7    5. NEURO  - acute encephalopathy likely from underlying sepsis  - CT head without any acute pathology noted (no acute hemorrhage, no acute CVA)  - mental status remains poor    6. GEN  - son updated on condition  - pt remains DNR with MOLST in place  - all other aggressive care to be continued per family wishes  - family to decide whether they want to pursue trach and peg      Total Critical Care Time: 40min
86F PMH Parkinson's, s/p recent mechanical fall with R intertrochanteric femur fracture requiring R intramedullary nailing of R trochanteric fracture of femur 3/13/17, UTI, PNA presents from [facility] rehab for sepsis, metabolic encephalopathy, UTI, PNA, colovesicular fistula with course complicated by aspiration of tube feeds, acute hypoxic respiratory failure requiring intubation, hypotension.     DX: sepsis, acute hypoxic respiratory failure, respiratory failure requiring intubation, aspiration PNA, colovesicular fistula, acute metabolic encephalopathy, anemia, skin rash    1. PULM  - cont mechanical ventilation  - tolerating some weaning today  - remains on broad spectrum antibiotics    - f/u with ID regarding antibiotic course  - cont with vanco, meropenem d/francine due to ? allergic rash vs contact dermatitis on back, start aztreonam  - wean as tolerates, however with underlying poor mental status and copious oral secretions pt likely not able to protect airway and will continue to aspirate without airway being protected    2. CV  - hypotension resolved with IVF  - monitoring off antihypertensives  - BP stable at present time  - out of shock state    3. ID  - recurrent sepsis  - aspiration PNA  - blood cultures with 1/4 bottles with coag negative staph which is likely a contaminant   - central line d/francine yesterday 4/19  - cont with vanco and aztreonam (s/p course of zosyn/meropenem)  - with colovesicular fistula, UA and urine culture will be dirty and contaminated and concern for source of chronic underlying infection. /surgery follow up    4. HEME  - anemia likely partially dilutional and also due to sepsis  - s/p 1 unit pRBC 4/17 with improvement in Hb which remains stable  - no signs of acute overt bleeding  - goal to maintain Hb >7    5. NEURO  - acute encephalopathy likely from underlying sepsis  - CT head without any acute pathology noted (no acute hemorrhage, no acute CVA)    6. GEN  - son updatedon condition  - pt remains DNR with MOLST in place  - all other aggressive care to be continued per family wishes  - family to decide whether they want to pursue trach and peg      Total Critical Care Time: 40min
86F PMH Parkinson's, s/p recent mechanical fall with R intertrochanteric femur fracture requiring R intramedullary nailing of R trochanteric fracture of femur 3/13/17, UTI, PNA presents from [facility] rehab for sepsis, metabolic encephalopathy, UTI, PNA, colovesicular fistula with course complicated by aspiration of tube feeds, acute hypoxic respiratory failure requiring intubation, hypotension.     DX: sepsis, acute hypoxic respiratory failure, respiratory failure requiring intubation, aspiration PNA, colovesicular fistula, acute metabolic encephalopathy, anemia, skin rash    1. PULM  - cont mechanical ventilation  - tolerating some weaning   - remains on broad spectrum antibiotics    - f/u with ID regarding antibiotic course  - cont with vanco, meropenem d/francine due to ? allergic rash vs contact dermatitis on back, remains on aztreonam  - poor mental status and copious oral secretions pt likely not able to protect airway and will continue to aspirate without airway being protected  - s/p bronchoscopy with copious secretions bilaterally though R > L    2. CV  - no further episodes of hypotension  - BP stable at present time    3. ID  - recurrent sepsis with aspiration PNA  - blood cultures with 1/4 bottles with coag negative staph likely a contaminant   - midline inserted by surgical team today  - cont with vanco and aztreonam (s/p course of zosyn/meropenem)  - with colovesicular fistula, UA and urine culture will be dirty and contaminated and concern for source of chronic underlying infection. /surgery follow up    4. HEME  - anemia with stable Hb   - no signs of acute overt bleeding  - goal to maintain Hb >7, transfuse as needed    5. NEURO  - acute encephalopathy likely from underlying sepsis  - CT head without any acute pathology noted (no acute hemorrhage, no acute CVA)    6. GEN  - son updated on condition  - pt remains DNR with MOLST in place  - all other aggressive care to be continued per family wishes  - family still deciding whether they want to pursue trach and peg vs comfort care      Total Critical Care Time: 35min
86F PMH Parkinson's, s/p recent mechanical fall with R intertrochanteric femur fracture requiring R intramedullary nailing of R trochanteric fracture of femur 3/13/17, UTI, PNA presents from [facility] rehab for sepsis, metabolic encephalopathy, UTI, PNA, colovesicular fistula with course complicated by aspiration of tube feeds, acute hypoxic respiratory failure requiring intubation, hypotension.     DX: sepsis, acute hypoxic respiratory failure, respiratory failure requiring intubation, aspiration PNA, colovesicular fistula, acute metabolic encephalopathy, anemia, skin rash    1. PULM  - cont mechanical ventilation  - failed wean today  - remains on broad spectrum antibiotics    - f/u with ID regarding antibiotic course  - cont with vanco, meropenem d/francine 4/20 due to ? allergic rash vs contact dermatitis on back, start aztreonam (day #2)  - wean as tolerates, however with underlying poor mental status and inability to protect airway pt likely to continue to aspirate without airway being protected    2. CV  - hypotension resolved with IVF  - monitoring off antihypertensives  - BP stable at present time  - out of shock state    3. ID  - recurrent sepsis  - aspiration PNA  - blood cultures with 1/4 bottles with coag negative staph which is likely a contaminant   - central line d/francine  4/19  - cont with vanco and aztreonam (s/p course of zosyn/meropenem)  - with colovesicular fistula, UA and urine culture will be dirty and contaminated and concern for source of chronic underlying infection. /surgery follow up ?diverting loop ileostomy    4. HEME  - anemia:  s/p 1 unit pRBC 4/17 with improvement in Hb which remains stable  - no signs of acute overt bleeding  - goal to maintain Hb >7    5. NEURO  - acute encephalopathy likely from underlying sepsis  - CT head without any acute pathology noted (no acute hemorrhage, no acute CVA)  - mental status remains poor    6. GEN  - son updated on condition  - pt remains DNR with MOLST in place  - all other aggressive care to be continued per family wishes  - family to decide whether they want to pursue trach and peg      Total Critical Care Time: 40min
86F PMH Parkinson's, s/p recent mechanical fall with R intertrochanteric femur fracture requiring R intramedullary nailing of R trochanteric fracture of femur 3/13/17, UTI, [facility], metabolic encephalopathy, UTI, PNA, colovesicular fistula with course complicated by aspiration of tube feeds, acute hypoxic respiratory failure requiring intubation, hypotension.
86F PMH Parkinson's, s/p recent mechanical fall with R intertrochanteric femur fracture requiring R intramedullary nailing of R trochanteric fracture of femur 3/13/17, UTI[facility], metabolic encephalopathy, UTI, PNA, colovesicular fistula with course complicated by aspiration of tube feeds, acute hypoxic respiratory failure requiring intubation, hypotension.
86F PMH Parkinson's, s/p recent mechanical fall with R intertrochanteric femur fracture requiring R intramedullary nailing of R trochanteric fracture of femur 3/13/17, UTI, PNA presents from [facility] rehab for sepsis, metabolic encephalopathy, UTI, PNA, colovesicular fistula with course complicated by aspiration of tube feeds, acute hypoxic respiratory failure requiring intubation.    DX: sepsis, acute hypoxic respiratory failure, respiratory failure requiring intubation, aspiration PNA, colovesicular fistula, acute metabolic encephalopathy, anemia, skin rash    1. PULM  - cont mechanical ventilation  - tolerating some weaning   - remains on broad spectrum antibiotics    - cont with vanco, meropenem d/francine due to ? allergic rash vs contact dermatitis on back, remains on aztreonam  - poor mental status, not able to protect airway and will continue to aspirate without airway being protected  - s/p bronchoscopy with copious secretions bilaterally R > L  - family has agreed to trach, scheduled for tomorrow    2. CV  - BP stable at present time    3. ID  - sepsis with aspiration PNA  - midline inserted 4/25  - cont with vanco and aztreonam (s/p course of zosyn/meropenem)  - with colovesicular fistula source of chronic underlying infection. for diverting loop ileostomy tomorrow with surgery    4. HEME  - anemia with stable Hb   - no signs of acute overt bleeding  - goal to maintain Hb >7, transfuse as needed    5. NEURO  - remains encephalopathic  - CT head without any acute pathology noted (no acute hemorrhage, no acute CVA)    6. GEN  - son updated on condition  - he wants to pursue trach/PEG   - pt remains DNR with MOLST in place  - all other aggressive care to be continued per family wishes    Total Critical Care Time: 35 min
86F PMH Parkinson's, s/p recent mechanical fall with R intertrochanteric femur fracture requiring R intramedullary nailing of R trochanteric fracture of femur 3/13/17, UTI, PNA presents from [facility] rehab for sepsis, colovesicular fistula with course complicated by aspiration PNA and now s/p trach and diverting loop colostomy.     PULM  - cont mechanical ventilation  - wean as tolerates  - remains on broad spectrum antibiotics    - s/p bronch 4/24 with negative culture to date  - cont with vanco, aztreonam  - s/p trach     2. CV  - BP stable at present time    3. ID  - sepsis with aspiration PNA  - cont with vanco and aztreonam (s/p course of zosyn/meropenem)  - colovesicular fistula s/p diverting colostomy       NEURO  - remains encephalopathic (metabolic/sepsis/delirium)  - cont sinemet for Parkinson's  -check ammonia level, TSH normal  CTH normal (no MRI while vent dependent)    6. GEN  - son updated on condition  - pt remains DNR with MOLST in place  - all other aggressive care to be continued per family wishes    Total Critical Care Time: 35 min
86F PMH Parkinson's, s/p recent mechanical fall with R intertrochanteric femur fracture requiring R intramedullary nailing of R trochanteric fracture of femur 3/13/17, UTI, PNA presents from [facility] rehab for sepsis, metabolic encephalopathy, UTI, PNA, colovesicular fistula with course complicated by aspiration of tube feeds, acute hypoxic respiratory failure requiring intubation. Failure to wean.    DX: sepsis, acute hypoxic respiratory failure, respiratory failure requiring intubation, aspiration PNA, colovesicular fistula, acute metabolic encephalopathy, anemia, skin rash    1. PULM  - cont mechanical ventilation  - wean as tolerates  - remains on broad spectrum antibiotics    - s/p bronch 4/24 with negative culture to date  - cont with vanco, aztreonam  - s/p trach today 4/27 for failure to wean POD #0      2. CV  - BP stable at present time    3. ID  - sepsis with aspiration PNA  - midline inserted 4/25  - cont with vanco and aztreonam (s/p course of zosyn/meropenem)  - colovesicular fistula s/p diverting colostomy today POD #0    4. HEME  - anemia likely secondary to underlying sepsis, chronic disease, and partially iatrogenic from daily blood draws, no signs of active bleeding  - s/p 1 unit pRBC for surgery today  - goal to maintain Hb >7, transfuse as needed    5. NEURO  - remains encephalopathic  - cont sinemet for Parkinson's    6. GEN  - son updated on condition  - pt remains DNR with MOLST in place  - all other aggressive care to be continued per family wishes    Total Critical Care Time: 35 min
86F parkinsons, s/p right hip fracture, poor mobility, colonic vesiculo fistula transferred to the ICU for hypoxic respiratory failure 2/2 aspiration in the setting of possible UTI/PNA prior being treated with abx.
86F parkinsons, s/p right hip fracture, poor mobility, colonic vesiculo fistula transferred to the ICU for hypoxic respiratory failure 2/2 aspiration in the setting of possible UTI/PNA prior being treated with abx.
Ct scan today reveals fistulous tract between sigmoid colon and the bladder.   Continue Colon Catheter.  No abscess noted   Continue Colon catheter and treat pneumonia
[place] vesicle fistula  Not source of sepsis  Pneumonia.    If patient warrants and stable consider PEG   TRACH and Diverting transverse loop colostomy to divert fistula drainage to bladder. Will be discussed with patients son.
Overall prognosis , still on vent  cognition -unresponsive  pt is DNR
Pt is inn acute phase of tx   son is not ready to see the reality of pts grave condition   spoke w RB to arrange meeting w him asap
adv age , KPS<20  poor prognosis  Pt DNR  agree w comfort care  sx controlled
over night there has been no changes   dnr   cont current tx
pending completion
pt continues to be on vent   dysphagia w peg feeding 'overall prognosis is poor   pt is DNR   suitable for end of life care   family resisting
pt unresposive   on vent  dismal prognosis   suffering index high  pt is DNR
spikinhg fever   stil on vent   prognosis poor   DNR  sx controlled
worsening renal function.  poor prognosis  supportive ecare

## 2017-05-27 NOTE — PROVIDER CONTACT NOTE (OTHER) - SITUATION
Pt DNR/pending d/c to STR/ poor prognosis/ called by NA that HR/SPo2 unable to register on electronic BP reading

## 2017-05-27 NOTE — PROGRESS NOTE ADULT - SUBJECTIVE AND OBJECTIVE BOX
PETER TOBYKINGS  86y  Female    Patient is a 86y old  Female who presents with a chief complaint of respiratory  failure  pneumonia.  comfortable, breathing via trach.       PAST MEDICAL & SURGICAL HISTORY:  Pneumonia  Anemia  Parkinson disease  Tremors of nervous system  No pertinent past medical history  Status post hip surgery  No significant past surgical history          PHYSICAL EXAM:    T(C): 36.2, Max: 37 (05-26 @ 12:15)  HR: 111 (94 - 116)  BP: 120/48 (114/54 - 166/51)  RR: 17 (17 - 18)  SpO2: 93% (90% - 140%)  Wt(kg): --    I&O's Detail    I & Os for current day (as of 27 May 2017 10:29)  =============================================  IN:    lactated ringers.: 1800 ml    Suplena: 960 ml    Total IN: 2760 ml  ---------------------------------------------  OUT:    Indwelling Catheter - Urethral: 375 ml    Colostomy: 250 ml    Total OUT: 625 ml  ---------------------------------------------  Total NET: 2135 ml      Respiratory: clear anteriorly, decreased BS at bases  Cardiovascular: S1 S2  Gastrointestinal: soft NT ND +BS  Extremities: one plus edema   Neuro: Awake     MEDICATIONS  (STANDING):  pantoprazole   Suspension 40milliGRAM(s) Oral daily  lactobacillus acidophilus 1Tablet(s) Oral every 12 hours  carbidopa/levodopa  25/100 1Tablet(s) Oral four times a day  pramipexole 0.25milliGRAM(s) Oral three times a day  levothyroxine 25MICROGram(s) Oral daily  enoxaparin Injectable 30milliGRAM(s) SubCutaneous every 24 hours  sucralfate 1Gram(s) Oral four times a day  nystatin Powder 1Application(s) Topical two times a day  sodium bicarbonate 1300milliGRAM(s) Oral every 12 hours  calcium carbonate 500 mG (Tums) Chewable 3Tablet(s) Chew at bedtime  calcitriol   Capsule 0.25MICROGram(s) Oral daily  sodium biphosphate Rectal Enema 1Enema Rectal once  lactated ringers. 1000milliLiter(s) IV Continuous <Continuous>  potassium chloride   Powder 20milliEquivalent(s) Oral once    MEDICATIONS  (PRN):  acetaminophen    Suspension. 650milliGRAM(s) Enteral Tube every 6 hours PRN Mild Pain (1 - 3)  acetaminophen    Suspension 650milliGRAM(s) Oral every 6 hours PRN For Temp greater than 38 C (100.4 F)                            9.0    3.4   )-----------( 128      ( 27 May 2017 09:19 )             24.9       05-27    140  |  101  |  160<H>  ----------------------------<  106<H>  3.1<L>   |  13<L>  |  5.20<H>    Ca    6.8<L>      27 May 2017 09:19  Phos  8.7     05-26  Mg     3.1     05-26    TPro  6.5  /  Alb  1.0<L>  /  TBili  0.3  /  DBili  x   /  AST  13<L>  /  ALT  8<L>  /  AlkPhos  86  05-26

## 2017-05-27 NOTE — DISCHARGE NOTE ADULT - MEDICATION SUMMARY - MEDICATIONS TO STOP TAKING
I will STOP taking the medications listed below when I get home from the hospital:    enoxaparin  -- 40 milligram(s) subcutaneous every 24 hours for 30 days total for dvt ppx. do not skip doses.    betamethasone-clotrimazole 0.05%-1% topical cream  -- 1 application on skin 2 times a day    Levaquin 500 mg oral tablet  -- 1 tab(s) by mouth every 24 hours for 7 days    Cepacol Sore Throat Cherry 15 mg-3.6 mg mucous membrane lozenge  -- 1 lozenge mucous membrane every 6 hours, As Needed    folic acid 1 mg oral tablet  -- 1 tab(s) by mouth once a day    petrolatum topical ointment  -- 1 application on skin every 6 hours

## 2017-05-27 NOTE — CHART NOTE - NSCHARTNOTEFT_GEN_A_CORE
Called by Dr. Juan, radiologist, regarding cystogram order. States that requested test was not completed.   Patient will require CT scan with retrograde contrast to evaluate fistula.   May obtain imaging tomorrow as per Dr. Bahena.  Continue all medical management and treatment of pneumonia.
86F hx of parkinsons, recent right hip / femur fracture with medullary pins sent from rehab for AMS and fever. Admitted to the hospital for sepsis, UTI/PNA, AMS. Patient had RRT on the floors for hypotension, and hypoxia. During intubation for airway protection, tube feeds found in the endotracheal tube. Transferred to the ICU for acute hypoxic respiratory failure, intubated. Course with failure to wean. Now s/p trach/PEG and diverting colostomy for colo vesiculo fistula on 4/29 POD #1.    Pt remains encephalopathic without improvement in mental status. No change in status.     GENERAL: NAD, elderly female, unresponsive   HEAD:  Atraumatic, Normocephalic  EYES: PERRL, conjunctiva and sclera clear  ENMT: + trach, mild oozing of blood noted around trach on gauze  NECK: Supple, No JVD  NERVOUS SYSTEM:  not responding to commands or stimuli. no purposeful movement   CHEST/LUNG: Clear to auscultation bilaterally; No rales or rhonchi; mechanical breath sounds.   HEART: Regular rate and rhythm  ABDOMEN: Soft, Nontender, Nondistended; Bowel sounds present + PEG + right colostomy  EXTREMITIES:  2+ Peripheral Pulses, No clubbing, cyanosis; bilateral hand edema improving  SKIN: macular petechial rash on back improved    Complete Blood Count (04.29.17 @ 03:14)    WBC Count: 11.8 K/uL    RBC Count: 2.89 M/uL    Hemoglobin: 8.5 g/dL    Hematocrit: 24.5 %    Mean Cell Volume: 84.6 fl    Mean Cell Hemoglobin: 29.4 pg    Mean Cell Hemoglobin Conc: 34.8 gm/dL    Red Cell Distrib Width: 15.8 %    Platelet Count - Automated: 326 K/uL    Basic Metabolic Panel (04.29.17 @ 03:14)    Sodium, Serum: 134 mmol/L    Potassium, Serum: 4.1 mmol/L    Chloride, Serum: 97 mmol/L    Carbon Dioxide, Serum: 26 mmol/L    Anion Gap, Serum: 11 mmol/L    Blood Urea Nitrogen, Serum: 14 mg/dL    Creatinine, Serum: 0.32 mg/dL    Glucose, Serum: 67 mg/dL    Calcium, Total Serum: 8.3 mg/dL      DX: sepsis, acute hypoxic respiratory failure, respiratory failure requiring intubation, aspiration PNA, colovesicular fistula, acute metabolic encephalopathy, anemia, skin rash      - cont to wean from vent as tolerates with trach in place  - trach care  - cont PEG feeds  - remains on antibiotics for sepsis, aspiration PNA  - remains DNR with [facility]  - son would like to continue all other care  - stable for transfer to medical floor on vent
Called to see pt due to dark output in shoemaker.  Appears brown, stool-like.  Will consult Dr Bahena (urology) and Dr Mosley (surgery) to r/o fistula.  D/w Dr White.
[facility] NP NOTE    Patient stable for transfer to medicine service. Dr. White aware patient now POD 1-  S/P trach, Peg and diverting colostomy.    HPI  HPI:  86F hx of Parkinson's , recent right hip / femur fracture with medullary pins sent from rehab for AMS and fever. Admitted to the hospital for sepsis, UTI/PNA, AMS. Patient had RRT on the floors for hypotension, and hypoxia. During intubation for airway protection, tube feeds found in the endotracheal tube. Transferred to the ICU for acute hypoxic respiratory failure, intubated. Course with failure to wean.       Mary Ann Wayne, JAY- C
Hospitalist Medicine NP    Requested to place ngtube. placed 14 fr. pt tolerated the procedure. confirmed with auscultation will do KUB prior to use the ngtube.
House PA Note:    Called by RN for H/H 7/19.4 and calcium of 6.3.  No active bleeding noted as per nurse.  D/w Dr White and will transfuse patient 2 units pRBCs.  Corrected calcium for albumin is 8.62 (wnl).
Rapid Response [facility] [place] NP    [phone]  SMITHA GREEN    Rapid Response was called on a 86y year old Female patient for fever and tachycardia  Patient was seen and examined at the bedside by the rapid response team.      HPI:  87 y/o  white female, born in [place], in the [place] for many yrs[place] with hx Parkinson's Disease, s/p fall with Fx of the Rt Femur in 3/17 and underwent Rt hip ORIF on 3/12 at [facility] with hospital course complicated by  RLL + RML PNA as seen on CT Chest on 3/17 and an EColi UTI, rx'ed then with Rocephin initially and changed to Meropenem + Vanco for about 5 days, then d/francine to [facility] on 3/22 on po Levaquin, readmitted via the ER on 4/4/17 with increasing lethargy, fever, and difficulty swallowing.  In the ER patient found to have low-grade fever and pyuria on u/a and cx's were obtained and patient was begun empirically on Zosyn.  W/u with CT Head and CXR was done and urine cx subsequently grew Morganella sp. which is sensitive to Zosyn.  Patient required NGT placement for feedings and now fever has increased  over the past few days and now with a TMax of >102 and with increased WBC's to 20.2 today.    PAST MEDICAL & SURGICAL HISTORY:  Pneumonia  Anemia  Parkinson disease  Tremors of nervous system  No pertinent past medical history  Status post hip surgery  No significant past surgical history      Allergies: No Known Allergies            Vital Signs Last 24 Hrs  T(C): 38.7, Max: 38.9 (04-07 @ 00:54)  T(F): 101.7, Max: 102.1 (04-07 @ 00:54)  HR: 121 (88 - 125)  BP: 174/88 (150/73 - 182/69)  BP(mean): --  RR: 17 (16 - 18)  SpO2: 96% (96% - 98%)    PHYSICAL EXAM:    GENERAL: Lethargic, well-developed;    HEAD:  Atraumatic, Normocephalic  EYES: EOMI, PERRLA  ENMT:  dry mucous membranes,   NECK: Supple, No JVD  CHEST/LUNG: tachypnea  upper airway rhonchi,  no wheezing  HEART:  tachycardic   ABDOMEN: Soft, Nontender, Nondistended; Bowel sounds present + brown BM  EXTREMITIES:  1+ Peripheral Pulses, No clubbing, cyanosis, or edema        LABS:                        10.0   22.2  )-----------( 295      ( 07 Apr 2017 16:11 )             29.3     04-07    146<H>  |  114<H>  |  13  ----------------------------<  239<H>  4.9   |  20<L>  |  0.73    Ca    8.5      07 Apr 2017 16:11  Phos  1.6     04-07  Mg     1.6     04-07    TPro  8.3  /  Alb  1.9<L>  /  TBili  0.9  /  DBili  x   /  AST  16  /  ALT  12  /  AlkPhos  64  04-07    ABG - ( 07 Apr 2017 16:22 )  pH: x     /  pCO2: 26    /  pO2: 98    / HCO3: 18    / Base Excess: -4.3  /  SaO2: 97                   LIVER FUNCTIONS - ( 07 Apr 2017 07:09 )  Alb: 1.9 g/dL / Pro: 8.3 gm/dL / ALK PHOS: 64 U/L / ALT: 12 U/L / AST: 16 U/L / GGT: x                  Vital Signs Last 24 Hrs*     I & Os for current day (as of 04-07 @ 16:38)  =============================================  IN: 1920 ml / OUT: 601 ml / NET: 1319 ml      Assessment- Rapid Response called for 86y year old Female for fever tMAX 102.8 and tachycardia. Patient admitted with pneumonia and UTI.   - Repeat Labs including lactate , procalcitonin  Basic +Mag and Phos, cbc, and ABG  - IVF given for sepsis  LR 1.5 liters total for now -   - repeat blood cultures   - replete mag and phos as d/w Pulmonary Dr. Chamorro.  - continue ABX as per ID Dr. Rose   - Chest PT q 4   - Suction nasotracheal q 4 and PRN   - Temp - given Motrin x1 and Tylenol  - continue antihypertensive medications     Plan d/w Dr. De La Rosa critical care
Right midline catheter removed  Length noted to be 14cm reconciled with insertion length. Pressure applied for 5 minutes. No bleeding, no hematoma, no complications. Dry pressure dressing applied.  Pt. tolerated procedure well.
Surgery team called by RN to remove midline because patient .  Patient seen at bedside for removal of L midline catheter. Dressing removed, area cleaned with alcohol swab. Entire length of catheter removed, tip intact. Pressure applied for approximately 10 minutes until hemostasis achieved. New dry, sterile dressing applied. Patient tolerated well.

## 2017-05-27 NOTE — DISCHARGE NOTE ADULT - SECONDARY DIAGNOSIS.
Aspiration into airway, initial encounter Parkinson disease Anemia Urine retention Fistula of large intestine Bladder fistula

## 2017-05-30 LAB — SURGICAL PATHOLOGY FINAL REPORT - CH: SIGNIFICANT CHANGE UP

## 2017-06-05 DIAGNOSIS — Z66 DO NOT RESUSCITATE: ICD-10-CM

## 2017-06-05 DIAGNOSIS — Z51.5 ENCOUNTER FOR PALLIATIVE CARE: ICD-10-CM

## 2017-06-05 DIAGNOSIS — A41.89 OTHER SPECIFIED SEPSIS: ICD-10-CM

## 2017-06-05 DIAGNOSIS — H70.93 UNSPECIFIED MASTOIDITIS, BILATERAL: ICD-10-CM

## 2017-06-05 DIAGNOSIS — J69.0 PNEUMONITIS DUE TO INHALATION OF FOOD AND VOMIT: ICD-10-CM

## 2017-06-05 DIAGNOSIS — E03.9 HYPOTHYROIDISM, UNSPECIFIED: ICD-10-CM

## 2017-06-05 DIAGNOSIS — R33.9 RETENTION OF URINE, UNSPECIFIED: ICD-10-CM

## 2017-06-05 DIAGNOSIS — K92.2 GASTROINTESTINAL HEMORRHAGE, UNSPECIFIED: ICD-10-CM

## 2017-06-05 DIAGNOSIS — E87.2 ACIDOSIS: ICD-10-CM

## 2017-06-05 DIAGNOSIS — R13.10 DYSPHAGIA, UNSPECIFIED: ICD-10-CM

## 2017-06-05 DIAGNOSIS — G93.41 METABOLIC ENCEPHALOPATHY: ICD-10-CM

## 2017-06-05 DIAGNOSIS — J96.01 ACUTE RESPIRATORY FAILURE WITH HYPOXIA: ICD-10-CM

## 2017-06-05 DIAGNOSIS — R62.7 ADULT FAILURE TO THRIVE: ICD-10-CM

## 2017-06-05 DIAGNOSIS — N17.0 ACUTE KIDNEY FAILURE WITH TUBULAR NECROSIS: ICD-10-CM

## 2017-06-05 DIAGNOSIS — B96.4 PROTEUS (MIRABILIS) (MORGANII) AS THE CAUSE OF DISEASES CLASSIFIED ELSEWHERE: ICD-10-CM

## 2017-06-05 DIAGNOSIS — K44.9 DIAPHRAGMATIC HERNIA WITHOUT OBSTRUCTION OR GANGRENE: ICD-10-CM

## 2017-06-05 DIAGNOSIS — K29.70 GASTRITIS, UNSPECIFIED, WITHOUT BLEEDING: ICD-10-CM

## 2017-06-05 DIAGNOSIS — K57.90 DIVERTICULOSIS OF INTESTINE, PART UNSPECIFIED, WITHOUT PERFORATION OR ABSCESS WITHOUT BLEEDING: ICD-10-CM

## 2017-06-05 DIAGNOSIS — E87.0 HYPEROSMOLALITY AND HYPERNATREMIA: ICD-10-CM

## 2017-06-05 DIAGNOSIS — G20 PARKINSON'S DISEASE: ICD-10-CM

## 2017-06-05 DIAGNOSIS — N32.2 VESICAL FISTULA, NOT ELSEWHERE CLASSIFIED: ICD-10-CM

## 2017-06-05 DIAGNOSIS — J96.00 ACUTE RESPIRATORY FAILURE, UNSPECIFIED WHETHER WITH HYPOXIA OR HYPERCAPNIA: ICD-10-CM

## 2017-06-05 DIAGNOSIS — E87.6 HYPOKALEMIA: ICD-10-CM

## 2017-06-05 DIAGNOSIS — N39.0 URINARY TRACT INFECTION, SITE NOT SPECIFIED: ICD-10-CM

## 2017-06-05 DIAGNOSIS — D64.9 ANEMIA, UNSPECIFIED: ICD-10-CM

## 2017-06-10 LAB
CULTURE RESULTS: SIGNIFICANT CHANGE UP
SPECIMEN SOURCE: SIGNIFICANT CHANGE UP

## 2017-08-18 NOTE — H&P ADULT. - VENOUS THROMBOEMBOLISM
Immediate Brief Procedure Note    Patient: Randa Linton    Pre-op Dx: L5-S1 bilateral foraminal stenosis with radiculopathy    Post-op Dx: Same    Procedure: Bilateral L5-S1 laminotomies and foraminotomies    Surgeon:  Norberto Rodriguez MD    Assistants: Sridhar CARIAS    Anesthesia Staff: Anesthesiologist: Sherlyn Mathis MD    Anesthesia Type: General    Findings: Stenosis    Estimated Blood Loss: 100 cc    Complications: None    Specimens Removed: None   no

## 2018-06-11 NOTE — PROGRESS NOTE ADULT - SUBJECTIVE AND OBJECTIVE BOX
Pt presents with concerns of STD exposure. No complaints of pain. Emergency Department Nursing Plan of Care       The Nursing Plan of Care is developed from the Nursing assessment and Emergency Department Attending provider initial evaluation. The plan of care may be reviewed in the ED Provider note.     The Plan of Care was developed with the following considerations:   Patient / Family readiness to learn indicated by:verbalized understanding  Persons(s) to be included in education: patient  Barriers to Learning/Limitations:No    Signed     Aggie Du RN    6/11/2018   11:30 AM INTERVAL HPI:   86F PMH Parkinson's, s/p recent mechanical fall with R intertrochanteric femur fracture requiring R intramedullary nailing of R trochanteric fracture of femur 3/13/17, UTI, PNA presents from [facility] rehab for sepsis, colovesical fistula with course complicated by aspiration PNA and now s/p trach and diverting loop colostomy.    OVERNIGHT EVENTS:  Remains on Vent, still febrile to >101.    Vital Signs Last 24 Hrs  T(C): 37.5, Max: 38.8 (05-10 @ 00:01)  T(F): 99.5, Max: 101.9 (05-10 @ 00:01)  HR: 89 (89 - 138)  BP: 111/59 (97/56 - 111/59)  BP(mean): --  RR: 18 (16 - 20)  SpO2: 99% (96% - 99%)    Mode: AC/ CMV (Assist Control/ Continuous Mandatory Ventilation)  RR (machine): 16  TV (machine): 400  FiO2: 30  PEEP: 5  ITime: 1  MAP: 12  PIP: 22    PHYSICAL EXAM:  GEN:        unresponsive and comfortable.  HEENT:    Normal.    RESP:      crackles.  CVS:         Regular rate and rhythm.   ABD:         Soft, non-tender, non-distended; peg    MEDICATIONS  (STANDING):  enoxaparin Injectable 40milliGRAM(s) SubCutaneous every 24 hours  fluconAZOLE   Tablet 100milliGRAM(s) Oral daily  pantoprazole   Suspension 40milliGRAM(s) Oral daily  lactobacillus acidophilus 1Tablet(s) Oral every 12 hours  carbidopa/levodopa  25/100 1Tablet(s) Oral four times a day  pramipexole 0.25milliGRAM(s) Oral three times a day  amLODIPine   Tablet 5milliGRAM(s) Oral daily  sodium chloride 0.9%. 1000milliLiter(s) IV Continuous <Continuous>  ampicillin/sulbactam  IVPB  IV Intermittent   ampicillin/sulbactam  IVPB 3Gram(s) IV Intermittent every 8 hours  levothyroxine 25MICROGram(s) Oral daily    MEDICATIONS  (PRN):  acetaminophen    Suspension. 650milliGRAM(s) Enteral Tube every 6 hours PRN Mild Pain (1 - 3)  acetaminophen    Suspension 650milliGRAM(s) Oral every 6 hours PRN For Temp greater than 38 C (100.4 F)    LABS:                        8.7    11.0  )-----------( 176      ( 10 May 2017 08:23 )             24.0     05-10    149<H>  |  115<H>  |  142<H>  ----------------------------<  172<H>  4.9   |  16<L>  |  3.35<H>    Ca    7.7<L>      10 May 2017 08:23    TPro  7.6  /  Alb  1.4<L>  /  TBili  0.5  /  DBili  x   /  AST  22  /  ALT  <6<L>  /  AlkPhos  69  05-10    ASSESSMENT and Plan:  ·	Hypoxic Respiratory failure.  ·	Aspiration Pneumonia. Acinetobacter.  ·	S/P Diverting  Colostomy for Colovesical Fistula.  ·	Anemia.  ·	Leukocytosis.  ·	On Going fever.  ·	Parkinsonism.  ·	Renal insuffiencey.  ·	Hypernatremia.    Continue Vent support and antibiotics.

## 2019-02-25 NOTE — PROGRESS NOTE ADULT - ASSESSMENT
85 yo F s/p R Hip IMN POD# 1    -Pain control  -DVT PPx  -WBAT  -Physical Therapy  -FU Labs  -DC Planning
86 year old female S/P ORIF hip POD# 3/13/2017, + pneumonia now with tachycardia and hypoxia
86 yr old female s/p right hip surgery POD 3
87 yo F s/p R Hip IMN POD# 2    -Pain control  -DVT PPx  -WBAT  -Physical Therapy  -FU Labs  -Ortho stable for discharge  -Care per primary team
87 yo F s/p R Hip IMN POD# 3    -Pain control  -DVT PPx  -WBAT  -Physical Therapy  -FU Labs  -Ortho stable for discharge  -Care per primary team
A/P:  86yFemale s/p R Hip IMN pod 0  Pain control  DVT ppx  PT/WBAT  FU labs  will reevaluate motor/senstation in AM  Incentive spirometry  Dispo planning
Elderly [place] female with h/o: Parkinson disease s/p Mechanical fall, Closed fracture of right h s/p  Intramedullary nailing of trochanteric fracture of right femur  03/13/2017. Received PRBC for acute post op anemia
Elderly [place] female with h/o: Parkinson disease s/p Mechanical fall, Closed fracture of right h s/p  Intramedullary nailing of trochanteric fracture of right femur  03/13/2017
Pt with hip fracture
Pt with hip fracture
Pt with hip fracture for plan to go to OR tomorrow.
Adequate: hears normal conversation without difficulty

## 2019-04-20 NOTE — ED PROVIDER NOTE - ABDOMINAL EXAM
tender.../DISTENDED/soft
You had a thorough evaluation including an exam, labs and imaging.  1. You were seen for pleural effusion. A copy of your resulted labs, imaging, and findings have been provided to you.  2. Follow up with your pulmonologist and primary care doctor within 48 hours. Please call [phone] to make an appointment or with any questions you may have.  43. Return immediately to the emergency department for new, persistent, or worsening symptoms or signs. Return immediately to the emergency department if you have chest pain, shortness of breath, loss of consciousness, or any other new concerns.

## 2019-12-03 NOTE — DISCHARGE NOTE ADULT - CARE PLAN
Principal Discharge DX:	Closed fracture of right hip, initial encounter  Goal:	return to baseline ADLs  Instructions for follow-up, activity and diet:	1.	Pain Control  2.           WBAT Right lower extremity. Rolling walker/Assistive devices for ambulation as needed.  3.           Ice and elevation to extremity  4.           Continue DVT ppx Lovenox 40  mg subq every 24hrs for a total  of 30 days. do not skip doses.  5.           Keep Aquacel bandage on hip. Only change if saturated or leaking/soiled to dry sterile gauze and paper tape PRN. Remove Sutures/Staples Post Op Day #14. OK to Shower with Aquacel bandage.  Avoid direct water beating on bandage. Continue ICE packs to hip.  6.           Call office if there is any Fevers over 101 Deg F, discharge from wound, increased numbness/tingling.  7.           Follow up with Dr. Xiong in 10-14 days. Call office for appointment.
70

## 2020-04-15 NOTE — PHYSICAL THERAPY INITIAL EVALUATION ADULT - AMBULATION SKILLS, REHAB EVAL
Never smoker
rolling walker and 2 person assist/needs device/needed assist
needed assist/rolling walker, assist of 2

## 2021-06-10 NOTE — PHYSICAL THERAPY INITIAL EVALUATION ADULT - PREDICTED DURATION OF THERAPY (DAYS/WKS), PT EVAL
Controlled Substance Refill Request  Medication Name:   Requested Prescriptions     Pending Prescriptions Disp Refills     dextroamphetamine-amphetamine (ADDERALL XR) 25 MG 24 hr capsule 30 capsule 0     Sig: Take 1 capsule (25 mg total) by mouth daily.     Date Last Fill: 6/30/2020  Requested Pharmacy: [facility] [place], #[id number]  Submit electronically to pharmacy  Controlled Substance Agreement on file:   Encounter-Level CSA Scan Date:    There are no encounter-level csa scan date.        Last office visit:  2/26/2020          4 weeks

## 2023-09-05 NOTE — PROGRESS NOTE ADULT - PROVIDER SPECIALTY LIST ADULT
AHI is high corresponding with mask leak. He was recently hospitalized for covid and has a cough. He has an appointment tomorrow with PCP to help with cough treatment. His weight is around 197 lbs. Will try APAP 8-14 cm to see if this will help with leak as well as new supplies. His daughter will make sure he changes out his supplies tonight. He has an upcoming appt with FEROZ Escoto on 9/27.          JESSI Farley - CNP
Cardiology
Critical Care
Gastroenterology
Infectious Disease
Internal Medicine
Nephrology
Neurology
Palliative Care
Patient daughter says that he needs the provider to look at the data to see his events per hour. She says it was 36.1 per hr last night.
Please advise of increased events
Pulmonology
Surgery
Urology
Infectious Disease
Infectious Disease
Palliative Care
Pulmonology
Surgery
Palliative Care

## 2023-10-01 NOTE — PROGRESS NOTE ADULT - SUBJECTIVE AND OBJECTIVE BOX
INTERVAL HPI/OVERNIGHT EVENTS:        REVIEW OF SYSTEMS:  CONSTITUTIONAL: somnolent  arouseable  follows  commands    NECK: No pain or stiffnes  RESPIRATORY: No SOB   CARDIOVASCULAR: No chest pain, palpitations, dizziness,   GASTROINTESTINAL: No abdominal pain. No nausea, vomiting,   NEUROLOGICAL: No headaches, no  blurry  vision no  dizziness  SKIN: No itching,   MUSCULOSKELETAL: No pain    MEDICATION:  enoxaparin Injectable 40milliGRAM(s) SubCutaneous every 24 hours  carbidopa/levodopa  25/100 1Tablet(s) Oral two times a day  piperacillin/tazobactam IVPB. 3.375Gram(s) IV Intermittent every 8 hours  dextrose 5% + sodium chloride 0.9% with potassium chloride 20 mEq/L 1000milliLiter(s) IV Continuous <Continuous>  morphine  - Injectable 2milliGRAM(s) IV Push every 4 hours PRN  polyethylene glycol 3350 17Gram(s) Oral daily  folic acid 1milliGRAM(s) Oral daily  senna Syrup 10milliLiter(s) Oral at bedtime  acetaminophen    Suspension 650milliGRAM(s) Oral every 6 hours PRN  losartan 50milliGRAM(s) Oral daily    Vital Signs Last 24 Hrs  T(C): 37.9, Max: 38.9 (04-07 @ 00:54)  T(F): 100.2, Max: 102.1 (04-07 @ 00:54)  HR: 125 (88 - 125)  BP: 165/84 (150/73 - 182/69)  BP(mean): --  RR: 16 (15 - 18)  SpO2: 97% (96% - 98%)    PHYSICAL EXAM:  GENERAL: NAD, well-groomed, well-developed  EYES:  conjunctiva and sclera clear  ENMT:  Moist mucous membranes,   NECK: Supple, No JVD, Normal thyroid  NERVOUS SYSTEM:  Alert oriented   no  focal  deficits;   CHEST/LUNG: Clear    HEART: Regular rate and rhythm; No murmurs, rubs, or gallops  ABDOMEN: Soft, Nontender, Nondistended; Bowel sounds present  EXTREMITIES:  no  edema no  tenderness  SKIN: No rashes   LABS:                        9.8    20.2  )-----------( 327      ( 07 Apr 2017 07:09 )             28.8     04-07    148<H>  |  112<H>  |  10  ----------------------------<  148<H>  3.2<L>   |  23  |  0.56    Ca    8.7      07 Apr 2017 07:09  Mg     1.7     04-06    TPro  8.3  /  Alb  1.9<L>  /  TBili  0.9  /  DBili  x   /  AST  16  /  ALT  12  /  AlkPhos  64  04-07        CAPILLARY BLOOD GLUCOSE      RADIOLOGY & ADDITIONAL TESTS:    Imaging reports  Personally Reviewed:  [x ] YES  [ ] NO    Consultant(s) Notes Reviewed:  [x ] YES  [ ] NO    Care Discussed with Consultants/Other Providers [x ] YES  [ ] NO  Assessment and Plan:   Problem/Plan - 1:  ·  Problem: Pneumonia.  Plan: zosyn  vanco  O2  duoneb. for  pulmonary  ID  evlals    Problem/Plan - 2:  ·  Problem: UTI (urinary tract infection).  Plan: zosyn.     Problem/Plan - 3:  ·  Problem: Parkinson disease encephalopathy.  Plan: sinemet discussed  with  neurology  as  to  further  adjustments/addition  to  treatment    Problem/Plan - 4:  ·  Problem: Altered mental status.  Plan: observation further w/u  and  treatment  as per  clinical  course.     Problem/Plan - 5:  ·  Problem: Anemia.  Plan: iron  folic  acid. Name band;

## 2024-11-12 NOTE — DISCHARGE NOTE ADULT - PRINCIPAL DIAGNOSIS
[facility] Progress Note    Patient Complaint:     PCP:   Rashida Brasher (Inactive)       LOS: 1        Initial History of Present Illness (HPI):  This is a 54 y.o. female consulted to GI service for transaminitis. PMH HTN. Patient is Kyr[place]z speaking only, refuses . Son at bedside acting as .  Patient reports acute elevation in BP without any associated symptoms that began on yesterday. Denies fever, headaches, diaphoresis, jaundice, n/v, abdominal pain, diarrhea or constipation. Denies smoking, drinking, recent overseas travel. Denies any oral supplements. Reports taking theo seed tea, tumeric and obi intermittently. Reports taking atenolol for over 20 years.      Bili 3.4, lfts 999/689    Interval hx  No complaints, lfts trending down, mri negative for acute findings.    Medical History:  has a past medical history of Allergy, GERD (gastroesophageal reflux disease), Hypertension, Osteoporosis, Seasonal allergies, and Vitamin D deficiency.    Surgical History:  has a past surgical history that includes Cholecystectomy.      Objective Findings:    Vital Signs:  Temp:  [97.4 °F (36.3 °C)-98.2 °F (36.8 °C)]   Pulse:  [56-67]   Resp:  [18-19]   BP: (107-150)/(55-79)   SpO2:  [95 %-99 %]   Body mass index is 29.16 kg/m².      Physical Exam  Vitals and nursing note reviewed.   Constitutional:       Appearance: She is obese.   HENT:      Head: Normocephalic.   Pulmonary:      Effort: Pulmonary effort is normal.   Abdominal:      General: Bowel sounds are normal.      Palpations: Abdomen is soft.   Skin:     General: Skin is warm and dry.   Neurological:      Mental Status: She is alert and oriented to person, place, and time.   Psychiatric:         Mood and Affect: Mood normal.         Behavior: Behavior normal.         Thought Content: Thought content normal.         Judgment: Judgment normal.               Labs:  Lab Results   Component Value Date    WBC 3.97 11/12/2024    HGB  13.2 11/12/2024    HCT 40.7 11/12/2024     11/12/2024    CHOL 214 (H) 05/17/2024    TRIG 141 05/17/2024    HDL 48 05/17/2024     (H) 11/12/2024     (H) 11/12/2024     11/12/2024    K 3.5 11/12/2024     11/12/2024    CREATININE 0.6 11/12/2024    BUN 11 11/12/2024    CO2 25 11/12/2024    TSH 1.439 05/17/2024    INR 1.0 11/12/2024    HGBA1C 5.4 05/17/2024           Imaging: US abdomen- No acute process seen.  The gallbladder has been removed.  The bile ducts are normal.     Hypoechoic area near the portal vein is most likely focal fatty sparing.  MRI of the liver could be helpful for further evaluation.  A metastatic lesion or malignant lesion cannot be excluded.              Transaminitis. Hypertension. Colon cancer screening.   Plan/ Recommendations:  1.  BP improved. Lfts trending down. Hep serologies ordered. MRI abdomen to further eval liver is without acute findings. Rec outpatient hepatology referral. Ok to d/c from GI standpoint.  2. Due for colon cancer screening, declines colonoscopy, reports she will take cologuard stool test from pcp.   Will sign off    Thank you so much for allowing us to participate in the care of Norma Francois . Please contact us if you have any additional questions.    Jeanne Thomas NP  Gastroenterology  [facility] [place] [facility]         Acute respiratory failure with hypoxia

## 2025-03-28 NOTE — PHYSICAL THERAPY INITIAL EVALUATION ADULT - REFERRING PHYSICIAN, REHAB EVAL
04/01/25      Re:  Cleo Shaw   Juror Number [id number]       To Whom It May Concern:      Cleo Shaw is a patient at [facility], under the care of Florian Ordonez MD. 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 She also has limited mobility and uses a wheelchair.    If you have additional questions, please feel free to contact me.      Sincerely,    Florian Ordonez MD      [facility]  [address]  [address]  [place] [address]  P: [phone]  F: [phone]      
Patti

## 2025-05-19 NOTE — GOALS OF CARE CONVERSATION - PERSONAL ADVANCE DIRECTIVE - NS PRO AD PATIENT TYPE ON CHART
Patient called to request a provider call back to discuss the following:  -Test Results    Please contact Marie to discuss when possible, thank you.    Best contact number:    [phone]     
Do Not Resuscitate (DNR)
Do Not Resuscitate (DNR)